# Patient Record
Sex: FEMALE | Race: WHITE | NOT HISPANIC OR LATINO | Employment: OTHER | URBAN - METROPOLITAN AREA
[De-identification: names, ages, dates, MRNs, and addresses within clinical notes are randomized per-mention and may not be internally consistent; named-entity substitution may affect disease eponyms.]

---

## 2017-01-03 ENCOUNTER — ALLSCRIPTS OFFICE VISIT (OUTPATIENT)
Dept: OTHER | Facility: OTHER | Age: 72
End: 2017-01-03

## 2017-06-16 ENCOUNTER — HOSPITAL ENCOUNTER (EMERGENCY)
Facility: HOSPITAL | Age: 72
Discharge: HOME | End: 2017-06-16
Attending: EMERGENCY MEDICINE
Payer: COMMERCIAL

## 2017-06-16 ENCOUNTER — APPOINTMENT (EMERGENCY)
Dept: NON INVASIVE DIAGNOSTICS | Facility: HOSPITAL | Age: 72
End: 2017-06-16
Payer: COMMERCIAL

## 2017-06-16 VITALS
DIASTOLIC BLOOD PRESSURE: 76 MMHG | RESPIRATION RATE: 16 BRPM | HEART RATE: 93 BPM | WEIGHT: 255.95 LBS | OXYGEN SATURATION: 98 % | TEMPERATURE: 98.6 F | SYSTOLIC BLOOD PRESSURE: 167 MMHG

## 2017-06-16 DIAGNOSIS — R60.9 PERIPHERAL EDEMA: ICD-10-CM

## 2017-06-16 DIAGNOSIS — R60.0 LOWER EXTREMITY EDEMA: Primary | ICD-10-CM

## 2017-06-16 LAB
ALBUMIN SERPL BCP-MCNC: 3.3 G/DL (ref 3.5–5)
ALP SERPL-CCNC: 119 U/L (ref 46–116)
ALT SERPL W P-5'-P-CCNC: 28 U/L (ref 12–78)
ANION GAP SERPL CALCULATED.3IONS-SCNC: 10 MMOL/L (ref 4–13)
AST SERPL W P-5'-P-CCNC: 24 U/L (ref 5–45)
ATRIAL RATE: 90 BPM
BASOPHILS # BLD AUTO: 0.04 THOUSANDS/ΜL (ref 0–0.1)
BASOPHILS NFR BLD AUTO: 1 % (ref 0–1)
BILIRUB SERPL-MCNC: 0.24 MG/DL (ref 0.2–1)
BUN SERPL-MCNC: 10 MG/DL (ref 5–25)
CALCIUM SERPL-MCNC: 8.6 MG/DL (ref 8.3–10.1)
CHLORIDE SERPL-SCNC: 105 MMOL/L (ref 100–108)
CO2 SERPL-SCNC: 26 MMOL/L (ref 21–32)
CREAT SERPL-MCNC: 1.17 MG/DL (ref 0.6–1.3)
EOSINOPHIL # BLD AUTO: 0.37 THOUSAND/ΜL (ref 0–0.61)
EOSINOPHIL NFR BLD AUTO: 5 % (ref 0–6)
ERYTHROCYTE [DISTWIDTH] IN BLOOD BY AUTOMATED COUNT: 15.1 % (ref 11.6–15.1)
GFR SERPL CREATININE-BSD FRML MDRD: 45.7 ML/MIN/1.73SQ M
GLUCOSE SERPL-MCNC: 100 MG/DL (ref 65–140)
HCT VFR BLD AUTO: 33.5 % (ref 34.8–46.1)
HGB BLD-MCNC: 10.7 G/DL (ref 11.5–15.4)
LYMPHOCYTES # BLD AUTO: 1.93 THOUSANDS/ΜL (ref 0.6–4.47)
LYMPHOCYTES NFR BLD AUTO: 28 % (ref 14–44)
MCH RBC QN AUTO: 27.7 PG (ref 26.8–34.3)
MCHC RBC AUTO-ENTMCNC: 31.9 G/DL (ref 31.4–37.4)
MCV RBC AUTO: 87 FL (ref 82–98)
MONOCYTES # BLD AUTO: 0.4 THOUSAND/ΜL (ref 0.17–1.22)
MONOCYTES NFR BLD AUTO: 6 % (ref 4–12)
NEUTROPHILS # BLD AUTO: 4.22 THOUSANDS/ΜL (ref 1.85–7.62)
NEUTS SEG NFR BLD AUTO: 60 % (ref 43–75)
NRBC BLD AUTO-RTO: 0 /100 WBCS
NT-PROBNP SERPL-MCNC: 510 PG/ML
P AXIS: 53 DEGREES
PLATELET # BLD AUTO: 239 THOUSANDS/UL (ref 149–390)
PMV BLD AUTO: 10.3 FL (ref 8.9–12.7)
POTASSIUM SERPL-SCNC: 4.2 MMOL/L (ref 3.5–5.3)
PR INTERVAL: 248 MS
PROT SERPL-MCNC: 6.9 G/DL (ref 6.4–8.2)
QRS AXIS: 43 DEGREES
QRSD INTERVAL: 84 MS
QT INTERVAL: 374 MS
QTC INTERVAL: 457 MS
RBC # BLD AUTO: 3.86 MILLION/UL (ref 3.81–5.12)
SODIUM SERPL-SCNC: 141 MMOL/L (ref 136–145)
SPECIMEN SOURCE: NORMAL
T WAVE AXIS: 52 DEGREES
TROPONIN I BLD-MCNC: 0.01 NG/ML (ref 0–0.08)
VENTRICULAR RATE: 90 BPM
WBC # BLD AUTO: 6.96 THOUSAND/UL (ref 4.31–10.16)

## 2017-06-16 PROCEDURE — 84484 ASSAY OF TROPONIN QUANT: CPT

## 2017-06-16 PROCEDURE — 85025 COMPLETE CBC W/AUTO DIFF WBC: CPT | Performed by: EMERGENCY MEDICINE

## 2017-06-16 PROCEDURE — 36415 COLL VENOUS BLD VENIPUNCTURE: CPT | Performed by: EMERGENCY MEDICINE

## 2017-06-16 PROCEDURE — 96374 THER/PROPH/DIAG INJ IV PUSH: CPT

## 2017-06-16 PROCEDURE — 99284 EMERGENCY DEPT VISIT MOD MDM: CPT

## 2017-06-16 PROCEDURE — 80053 COMPREHEN METABOLIC PANEL: CPT | Performed by: EMERGENCY MEDICINE

## 2017-06-16 PROCEDURE — 93005 ELECTROCARDIOGRAM TRACING: CPT | Performed by: EMERGENCY MEDICINE

## 2017-06-16 PROCEDURE — 93971 EXTREMITY STUDY: CPT

## 2017-06-16 PROCEDURE — 83880 ASSAY OF NATRIURETIC PEPTIDE: CPT | Performed by: EMERGENCY MEDICINE

## 2017-06-16 RX ORDER — MEDICAL SUPPLY, MISCELLANEOUS
1 EACH MISCELLANEOUS DAILY
Qty: 1 EACH | Refills: 0 | Status: SHIPPED | OUTPATIENT
Start: 2017-06-16 | End: 2017-10-07

## 2017-06-16 RX ORDER — NAPROXEN 500 MG/1
500 TABLET ORAL 2 TIMES DAILY WITH MEALS
COMMUNITY
End: 2017-10-07

## 2017-06-16 RX ORDER — TOPIRAMATE 50 MG/1
50 TABLET, FILM COATED ORAL DAILY
COMMUNITY
End: 2017-10-07

## 2017-06-16 RX ORDER — FUROSEMIDE 20 MG/1
20 TABLET ORAL 2 TIMES DAILY
Status: ON HOLD | COMMUNITY
End: 2017-10-11

## 2017-06-16 RX ORDER — ESCITALOPRAM OXALATE 10 MG/1
10 TABLET ORAL DAILY
COMMUNITY
End: 2018-11-14 | Stop reason: SDUPTHER

## 2017-06-16 RX ORDER — LABETALOL 100 MG/1
100 TABLET, FILM COATED ORAL 2 TIMES DAILY
COMMUNITY
End: 2017-10-07

## 2017-06-16 RX ORDER — MECLIZINE HYDROCHLORIDE 25 MG/1
25 TABLET ORAL 3 TIMES DAILY PRN
Status: ON HOLD | COMMUNITY
End: 2020-07-30 | Stop reason: SDUPTHER

## 2017-06-16 RX ORDER — CLOPIDOGREL BISULFATE 75 MG/1
75 TABLET ORAL DAILY
COMMUNITY
End: 2018-06-20 | Stop reason: SDUPTHER

## 2017-06-16 RX ORDER — FUROSEMIDE 40 MG/1
40 TABLET ORAL DAILY
Qty: 5 TABLET | Refills: 0 | Status: SHIPPED | OUTPATIENT
Start: 2017-06-16 | End: 2017-10-07

## 2017-06-16 RX ORDER — LORAZEPAM 1 MG/1
1 TABLET ORAL 3 TIMES DAILY PRN
COMMUNITY
End: 2018-08-29 | Stop reason: SDUPTHER

## 2017-06-16 RX ORDER — POTASSIUM CHLORIDE 20 MEQ/1
20 TABLET, EXTENDED RELEASE ORAL 2 TIMES DAILY
COMMUNITY
End: 2018-02-13 | Stop reason: SDUPTHER

## 2017-06-16 RX ORDER — MULTIVITAMIN
1 TABLET ORAL DAILY
COMMUNITY
End: 2020-12-04 | Stop reason: HOSPADM

## 2017-06-16 RX ORDER — SIMVASTATIN 10 MG
10 TABLET ORAL
COMMUNITY
End: 2018-01-31 | Stop reason: SDUPTHER

## 2017-06-16 RX ORDER — HYDROCHLOROTHIAZIDE 12.5 MG/1
12.5 TABLET ORAL DAILY
COMMUNITY
End: 2017-12-25 | Stop reason: ALTCHOICE

## 2017-06-16 RX ORDER — OXYCODONE HYDROCHLORIDE AND ACETAMINOPHEN 5; 325 MG/1; MG/1
1 TABLET ORAL EVERY 4 HOURS PRN
COMMUNITY
End: 2018-05-22 | Stop reason: SDUPTHER

## 2017-06-16 RX ORDER — NORTRIPTYLINE HYDROCHLORIDE 50 MG/1
50 CAPSULE ORAL
COMMUNITY
End: 2018-02-07 | Stop reason: SDUPTHER

## 2017-06-16 RX ORDER — MELOXICAM 15 MG/1
15 TABLET ORAL DAILY
COMMUNITY
End: 2017-10-07

## 2017-06-16 RX ORDER — FUROSEMIDE 10 MG/ML
20 INJECTION INTRAMUSCULAR; INTRAVENOUS ONCE
Status: COMPLETED | OUTPATIENT
Start: 2017-06-16 | End: 2017-06-16

## 2017-06-16 RX ADMIN — FUROSEMIDE 20 MG: 10 INJECTION, SOLUTION INTRAMUSCULAR; INTRAVENOUS at 15:24

## 2017-06-20 ENCOUNTER — GENERIC CONVERSION - ENCOUNTER (OUTPATIENT)
Dept: OTHER | Facility: OTHER | Age: 72
End: 2017-06-20

## 2017-06-21 ENCOUNTER — ALLSCRIPTS OFFICE VISIT (OUTPATIENT)
Dept: OTHER | Facility: OTHER | Age: 72
End: 2017-06-21

## 2017-06-21 ENCOUNTER — TRANSCRIBE ORDERS (OUTPATIENT)
Dept: ADMINISTRATIVE | Facility: HOSPITAL | Age: 72
End: 2017-06-21

## 2017-06-21 DIAGNOSIS — Z12.31 ENCOUNTER FOR SCREENING MAMMOGRAM FOR MALIGNANT NEOPLASM OF BREAST: ICD-10-CM

## 2017-06-21 DIAGNOSIS — R60.9 EDEMA, UNSPECIFIED TYPE: Primary | ICD-10-CM

## 2017-06-21 DIAGNOSIS — R73.03 PREDIABETES: ICD-10-CM

## 2017-06-21 DIAGNOSIS — E78.00 PURE HYPERCHOLESTEROLEMIA: ICD-10-CM

## 2017-06-21 DIAGNOSIS — Z12.31 VISIT FOR SCREENING MAMMOGRAM: ICD-10-CM

## 2017-06-21 DIAGNOSIS — E87.6 HYPOKALEMIA: ICD-10-CM

## 2017-06-21 DIAGNOSIS — I10 ESSENTIAL (PRIMARY) HYPERTENSION: ICD-10-CM

## 2017-06-21 DIAGNOSIS — R60.9 EDEMA: ICD-10-CM

## 2017-06-21 DIAGNOSIS — I63.9 CEREBRAL INFARCTION (HCC): ICD-10-CM

## 2017-06-30 ENCOUNTER — GENERIC CONVERSION - ENCOUNTER (OUTPATIENT)
Dept: OTHER | Facility: OTHER | Age: 72
End: 2017-06-30

## 2017-06-30 ENCOUNTER — APPOINTMENT (OUTPATIENT)
Dept: LAB | Facility: HOSPITAL | Age: 72
End: 2017-06-30
Attending: INTERNAL MEDICINE
Payer: COMMERCIAL

## 2017-06-30 DIAGNOSIS — I10 ESSENTIAL (PRIMARY) HYPERTENSION: ICD-10-CM

## 2017-06-30 DIAGNOSIS — I63.9 CEREBRAL INFARCTION (HCC): ICD-10-CM

## 2017-06-30 DIAGNOSIS — E78.00 PURE HYPERCHOLESTEROLEMIA: ICD-10-CM

## 2017-06-30 DIAGNOSIS — R73.03 PREDIABETES: ICD-10-CM

## 2017-06-30 DIAGNOSIS — R60.9 EDEMA: ICD-10-CM

## 2017-06-30 LAB
ALBUMIN SERPL BCP-MCNC: 4.6 G/DL (ref 3.5–5)
ALP SERPL-CCNC: 134 U/L (ref 46–116)
ALT SERPL W P-5'-P-CCNC: 37 U/L (ref 12–78)
ANION GAP SERPL CALCULATED.3IONS-SCNC: 8 MMOL/L (ref 4–13)
AST SERPL W P-5'-P-CCNC: 35 U/L (ref 5–45)
BASOPHILS # BLD AUTO: 0.05 THOUSANDS/ΜL (ref 0–0.1)
BASOPHILS NFR BLD AUTO: 0 % (ref 0–1)
BILIRUB SERPL-MCNC: 0.55 MG/DL (ref 0.2–1)
BUN SERPL-MCNC: 34 MG/DL (ref 5–25)
CALCIUM SERPL-MCNC: 9.3 MG/DL (ref 8.3–10.1)
CHLORIDE SERPL-SCNC: 95 MMOL/L (ref 100–108)
CHOLEST SERPL-MCNC: 108 MG/DL (ref 50–200)
CO2 SERPL-SCNC: 35 MMOL/L (ref 21–32)
CREAT SERPL-MCNC: 2.7 MG/DL (ref 0.6–1.3)
EOSINOPHIL # BLD AUTO: 0.41 THOUSAND/ΜL (ref 0–0.61)
EOSINOPHIL NFR BLD AUTO: 4 % (ref 0–6)
ERYTHROCYTE [DISTWIDTH] IN BLOOD BY AUTOMATED COUNT: 15 % (ref 11.6–15.1)
EST. AVERAGE GLUCOSE BLD GHB EST-MCNC: 117 MG/DL
GFR SERPL CREATININE-BSD FRML MDRD: 17.4 ML/MIN/1.73SQ M
GLUCOSE SERPL-MCNC: 157 MG/DL (ref 65–140)
HBA1C MFR BLD: 5.7 % (ref 4.2–6.3)
HCT VFR BLD AUTO: 41.8 % (ref 34.8–46.1)
HDLC SERPL-MCNC: 64 MG/DL (ref 40–60)
HGB BLD-MCNC: 13.8 G/DL (ref 11.5–15.4)
LDLC SERPL CALC-MCNC: 24 MG/DL (ref 0–100)
LYMPHOCYTES # BLD AUTO: 3.17 THOUSANDS/ΜL (ref 0.6–4.47)
LYMPHOCYTES NFR BLD AUTO: 27 % (ref 14–44)
MCH RBC QN AUTO: 28.2 PG (ref 26.8–34.3)
MCHC RBC AUTO-ENTMCNC: 33 G/DL (ref 31.4–37.4)
MCV RBC AUTO: 86 FL (ref 82–98)
MONOCYTES # BLD AUTO: 0.88 THOUSAND/ΜL (ref 0.17–1.22)
MONOCYTES NFR BLD AUTO: 8 % (ref 4–12)
NEUTROPHILS # BLD AUTO: 7.26 THOUSANDS/ΜL (ref 1.85–7.62)
NEUTS SEG NFR BLD AUTO: 61 % (ref 43–75)
NRBC BLD AUTO-RTO: 0 /100 WBCS
PLATELET # BLD AUTO: 449 THOUSANDS/UL (ref 149–390)
PMV BLD AUTO: 11.3 FL (ref 8.9–12.7)
POTASSIUM SERPL-SCNC: 2.7 MMOL/L (ref 3.5–5.3)
PROT SERPL-MCNC: 9.3 G/DL (ref 6.4–8.2)
RBC # BLD AUTO: 4.89 MILLION/UL (ref 3.81–5.12)
SODIUM SERPL-SCNC: 138 MMOL/L (ref 136–145)
TRIGL SERPL-MCNC: 98 MG/DL
TSH SERPL DL<=0.05 MIU/L-ACNC: 4.58 UIU/ML (ref 0.36–3.74)
WBC # BLD AUTO: 11.77 THOUSAND/UL (ref 4.31–10.16)

## 2017-06-30 PROCEDURE — 80053 COMPREHEN METABOLIC PANEL: CPT

## 2017-06-30 PROCEDURE — 36415 COLL VENOUS BLD VENIPUNCTURE: CPT

## 2017-06-30 PROCEDURE — 85025 COMPLETE CBC W/AUTO DIFF WBC: CPT

## 2017-06-30 PROCEDURE — 83036 HEMOGLOBIN GLYCOSYLATED A1C: CPT

## 2017-06-30 PROCEDURE — 84443 ASSAY THYROID STIM HORMONE: CPT

## 2017-06-30 PROCEDURE — 80061 LIPID PANEL: CPT

## 2017-07-03 ENCOUNTER — HOSPITAL ENCOUNTER (OUTPATIENT)
Dept: MAMMOGRAPHY | Facility: HOSPITAL | Age: 72
Discharge: HOME | End: 2017-07-03
Attending: INTERNAL MEDICINE
Payer: COMMERCIAL

## 2017-07-03 DIAGNOSIS — Z12.31 ENCOUNTER FOR SCREENING MAMMOGRAM FOR MALIGNANT NEOPLASM OF BREAST: ICD-10-CM

## 2017-07-03 PROCEDURE — G0202 SCR MAMMO BI INCL CAD: HCPCS

## 2017-07-05 ENCOUNTER — ALLSCRIPTS OFFICE VISIT (OUTPATIENT)
Dept: OTHER | Facility: OTHER | Age: 72
End: 2017-07-05

## 2017-07-07 ENCOUNTER — APPOINTMENT (OUTPATIENT)
Dept: LAB | Facility: HOSPITAL | Age: 72
End: 2017-07-07
Attending: INTERNAL MEDICINE
Payer: COMMERCIAL

## 2017-07-07 DIAGNOSIS — E87.6 HYPOKALEMIA: ICD-10-CM

## 2017-07-07 LAB
ANION GAP SERPL CALCULATED.3IONS-SCNC: 14 MMOL/L (ref 4–13)
BUN SERPL-MCNC: 36 MG/DL (ref 5–25)
CALCIUM SERPL-MCNC: 9.1 MG/DL (ref 8.3–10.1)
CHLORIDE SERPL-SCNC: 91 MMOL/L (ref 100–108)
CO2 SERPL-SCNC: 34 MMOL/L (ref 21–32)
CREAT SERPL-MCNC: 2.28 MG/DL (ref 0.6–1.3)
GFR SERPL CREATININE-BSD FRML MDRD: 21.2 ML/MIN/1.73SQ M
GLUCOSE SERPL-MCNC: 121 MG/DL (ref 65–140)
POTASSIUM SERPL-SCNC: 2.4 MMOL/L (ref 3.5–5.3)
SODIUM SERPL-SCNC: 139 MMOL/L (ref 136–145)

## 2017-07-07 PROCEDURE — 80048 BASIC METABOLIC PNL TOTAL CA: CPT

## 2017-07-07 PROCEDURE — 36415 COLL VENOUS BLD VENIPUNCTURE: CPT

## 2017-07-10 ENCOUNTER — APPOINTMENT (OUTPATIENT)
Dept: LAB | Facility: HOSPITAL | Age: 72
End: 2017-07-10
Attending: INTERNAL MEDICINE
Payer: COMMERCIAL

## 2017-07-10 DIAGNOSIS — E87.6 HYPOKALEMIA: ICD-10-CM

## 2017-07-10 LAB
ANION GAP SERPL CALCULATED.3IONS-SCNC: 6 MMOL/L (ref 4–13)
BUN SERPL-MCNC: 26 MG/DL (ref 5–25)
CALCIUM SERPL-MCNC: 8.8 MG/DL (ref 8.3–10.1)
CHLORIDE SERPL-SCNC: 98 MMOL/L (ref 100–108)
CO2 SERPL-SCNC: 34 MMOL/L (ref 21–32)
CREAT SERPL-MCNC: 1.48 MG/DL (ref 0.6–1.3)
GFR SERPL CREATININE-BSD FRML MDRD: 34.9 ML/MIN/1.73SQ M
GLUCOSE SERPL-MCNC: 102 MG/DL (ref 65–140)
POTASSIUM SERPL-SCNC: 3.1 MMOL/L (ref 3.5–5.3)
SODIUM SERPL-SCNC: 138 MMOL/L (ref 136–145)

## 2017-07-10 PROCEDURE — 36415 COLL VENOUS BLD VENIPUNCTURE: CPT

## 2017-07-10 PROCEDURE — 80048 BASIC METABOLIC PNL TOTAL CA: CPT

## 2017-07-18 ENCOUNTER — ALLSCRIPTS OFFICE VISIT (OUTPATIENT)
Dept: OTHER | Facility: OTHER | Age: 72
End: 2017-07-18

## 2017-08-01 DIAGNOSIS — R60.9 EDEMA: ICD-10-CM

## 2017-08-01 DIAGNOSIS — I10 ESSENTIAL (PRIMARY) HYPERTENSION: ICD-10-CM

## 2017-08-11 ENCOUNTER — APPOINTMENT (OUTPATIENT)
Dept: LAB | Facility: HOSPITAL | Age: 72
End: 2017-08-11
Attending: INTERNAL MEDICINE
Payer: COMMERCIAL

## 2017-08-11 DIAGNOSIS — I10 ESSENTIAL (PRIMARY) HYPERTENSION: ICD-10-CM

## 2017-08-11 DIAGNOSIS — R60.9 EDEMA: ICD-10-CM

## 2017-08-11 LAB
ANION GAP SERPL CALCULATED.3IONS-SCNC: 8 MMOL/L (ref 4–13)
BUN SERPL-MCNC: 16 MG/DL (ref 5–25)
CALCIUM SERPL-MCNC: 8.7 MG/DL (ref 8.3–10.1)
CHLORIDE SERPL-SCNC: 108 MMOL/L (ref 100–108)
CO2 SERPL-SCNC: 27 MMOL/L (ref 21–32)
CREAT SERPL-MCNC: 1.2 MG/DL (ref 0.6–1.3)
GFR SERPL CREATININE-BSD FRML MDRD: 46 ML/MIN/1.73SQ M
GLUCOSE SERPL-MCNC: 80 MG/DL (ref 65–140)
POTASSIUM SERPL-SCNC: 4 MMOL/L (ref 3.5–5.3)
SODIUM SERPL-SCNC: 143 MMOL/L (ref 136–145)

## 2017-08-11 PROCEDURE — 80048 BASIC METABOLIC PNL TOTAL CA: CPT

## 2017-08-11 PROCEDURE — 36415 COLL VENOUS BLD VENIPUNCTURE: CPT

## 2017-09-20 ENCOUNTER — GENERIC CONVERSION - ENCOUNTER (OUTPATIENT)
Dept: OTHER | Facility: OTHER | Age: 72
End: 2017-09-20

## 2017-09-20 ENCOUNTER — GENERIC CONVERSION - ENCOUNTER (OUTPATIENT)
Dept: INTERNAL MEDICINE CLINIC | Facility: CLINIC | Age: 72
End: 2017-09-20

## 2017-10-07 ENCOUNTER — APPOINTMENT (INPATIENT)
Dept: NON INVASIVE DIAGNOSTICS | Facility: HOSPITAL | Age: 72
DRG: 308 | End: 2017-10-07
Payer: COMMERCIAL

## 2017-10-07 ENCOUNTER — HOSPITAL ENCOUNTER (INPATIENT)
Facility: HOSPITAL | Age: 72
LOS: 4 days | Discharge: HOME | DRG: 308 | End: 2017-10-11
Attending: EMERGENCY MEDICINE | Admitting: INTERNAL MEDICINE
Payer: COMMERCIAL

## 2017-10-07 ENCOUNTER — APPOINTMENT (EMERGENCY)
Dept: RADIOLOGY | Facility: HOSPITAL | Age: 72
DRG: 308 | End: 2017-10-07
Payer: COMMERCIAL

## 2017-10-07 ENCOUNTER — GENERIC CONVERSION - ENCOUNTER (OUTPATIENT)
Dept: OTHER | Facility: OTHER | Age: 72
End: 2017-10-07

## 2017-10-07 DIAGNOSIS — I48.91 RAPID ATRIAL FIBRILLATION (HCC): Primary | ICD-10-CM

## 2017-10-07 DIAGNOSIS — I50.9 CHF (CONGESTIVE HEART FAILURE) (HCC): ICD-10-CM

## 2017-10-07 DIAGNOSIS — M79.89 SWELLING OF BOTH LOWER EXTREMITIES: ICD-10-CM

## 2017-10-07 PROBLEM — I67.9 CEREBROVASCULAR DISEASE: Status: ACTIVE | Noted: 2017-10-07

## 2017-10-07 PROBLEM — E78.5 HYPERLIPIDEMIA LDL GOAL <100: Status: ACTIVE | Noted: 2017-10-07

## 2017-10-07 PROBLEM — I10 ESSENTIAL HYPERTENSION: Status: ACTIVE | Noted: 2017-10-07

## 2017-10-07 PROBLEM — R73.03 PREDIABETES: Status: ACTIVE | Noted: 2017-10-07

## 2017-10-07 LAB
ALBUMIN SERPL BCP-MCNC: 3.6 G/DL (ref 3.5–5)
ALP SERPL-CCNC: 126 U/L (ref 46–116)
ALT SERPL W P-5'-P-CCNC: 38 U/L (ref 12–78)
ANION GAP SERPL CALCULATED.3IONS-SCNC: 12 MMOL/L (ref 4–13)
APTT PPP: 29 SECONDS (ref 23–35)
APTT PPP: 40 SECONDS (ref 23–35)
AST SERPL W P-5'-P-CCNC: 56 U/L (ref 5–45)
ATRIAL RATE: 340 BPM
BASOPHILS # BLD AUTO: 0.05 THOUSANDS/ΜL (ref 0–0.1)
BASOPHILS NFR BLD AUTO: 1 % (ref 0–1)
BILIRUB SERPL-MCNC: 0.52 MG/DL (ref 0.2–1)
BUN SERPL-MCNC: 14 MG/DL (ref 5–25)
CALCIUM SERPL-MCNC: 8.9 MG/DL (ref 8.3–10.1)
CHLORIDE SERPL-SCNC: 107 MMOL/L (ref 100–108)
CO2 SERPL-SCNC: 24 MMOL/L (ref 21–32)
CREAT SERPL-MCNC: 1.22 MG/DL (ref 0.6–1.3)
EOSINOPHIL # BLD AUTO: 0.42 THOUSAND/ΜL (ref 0–0.61)
EOSINOPHIL NFR BLD AUTO: 6 % (ref 0–6)
ERYTHROCYTE [DISTWIDTH] IN BLOOD BY AUTOMATED COUNT: 15.9 % (ref 11.6–15.1)
GFR SERPL CREATININE-BSD FRML MDRD: 45 ML/MIN/1.73SQ M
GLUCOSE SERPL-MCNC: 111 MG/DL (ref 65–140)
HCT VFR BLD AUTO: 35.9 % (ref 34.8–46.1)
HGB BLD-MCNC: 11.3 G/DL (ref 11.5–15.4)
INR PPP: 0.98 (ref 0.86–1.16)
LYMPHOCYTES # BLD AUTO: 2.27 THOUSANDS/ΜL (ref 0.6–4.47)
LYMPHOCYTES NFR BLD AUTO: 33 % (ref 14–44)
MAGNESIUM SERPL-MCNC: 2.1 MG/DL (ref 1.6–2.6)
MCH RBC QN AUTO: 27.4 PG (ref 26.8–34.3)
MCHC RBC AUTO-ENTMCNC: 31.5 G/DL (ref 31.4–37.4)
MCV RBC AUTO: 87 FL (ref 82–98)
MONOCYTES # BLD AUTO: 0.43 THOUSAND/ΜL (ref 0.17–1.22)
MONOCYTES NFR BLD AUTO: 6 % (ref 4–12)
NEUTROPHILS # BLD AUTO: 3.82 THOUSANDS/ΜL (ref 1.85–7.62)
NEUTS SEG NFR BLD AUTO: 54 % (ref 43–75)
NRBC BLD AUTO-RTO: 0 /100 WBCS
NT-PROBNP SERPL-MCNC: 2332 PG/ML
P AXIS: 0 DEGREES
PLATELET # BLD AUTO: 291 THOUSANDS/UL (ref 149–390)
PMV BLD AUTO: 10.7 FL (ref 8.9–12.7)
POTASSIUM SERPL-SCNC: 4.2 MMOL/L (ref 3.5–5.3)
PROT SERPL-MCNC: 7.3 G/DL (ref 6.4–8.2)
PROTHROMBIN TIME: 13 SECONDS (ref 12.1–14.4)
QRS AXIS: 62 DEGREES
QRSD INTERVAL: 84 MS
QT INTERVAL: 324 MS
QTC INTERVAL: 476 MS
RBC # BLD AUTO: 4.12 MILLION/UL (ref 3.81–5.12)
SODIUM SERPL-SCNC: 143 MMOL/L (ref 136–145)
SPECIMEN SOURCE: NORMAL
T WAVE AXIS: -23 DEGREES
T3 SERPL-MCNC: 1.2 NG/ML (ref 0.6–1.8)
T4 FREE SERPL-MCNC: 1.1 NG/DL (ref 0.76–1.46)
TROPONIN I BLD-MCNC: 0.01 NG/ML (ref 0–0.08)
TSH SERPL DL<=0.05 MIU/L-ACNC: 6.54 UIU/ML (ref 0.36–3.74)
VENTRICULAR RATE: 130 BPM
WBC # BLD AUTO: 6.99 THOUSAND/UL (ref 4.31–10.16)

## 2017-10-07 PROCEDURE — 96374 THER/PROPH/DIAG INJ IV PUSH: CPT

## 2017-10-07 PROCEDURE — 83735 ASSAY OF MAGNESIUM: CPT | Performed by: EMERGENCY MEDICINE

## 2017-10-07 PROCEDURE — 85025 COMPLETE CBC W/AUTO DIFF WBC: CPT | Performed by: EMERGENCY MEDICINE

## 2017-10-07 PROCEDURE — 36415 COLL VENOUS BLD VENIPUNCTURE: CPT | Performed by: EMERGENCY MEDICINE

## 2017-10-07 PROCEDURE — 93970 EXTREMITY STUDY: CPT

## 2017-10-07 PROCEDURE — 93005 ELECTROCARDIOGRAM TRACING: CPT | Performed by: EMERGENCY MEDICINE

## 2017-10-07 PROCEDURE — 84439 ASSAY OF FREE THYROXINE: CPT | Performed by: EMERGENCY MEDICINE

## 2017-10-07 PROCEDURE — 84443 ASSAY THYROID STIM HORMONE: CPT | Performed by: EMERGENCY MEDICINE

## 2017-10-07 PROCEDURE — 84484 ASSAY OF TROPONIN QUANT: CPT

## 2017-10-07 PROCEDURE — 80053 COMPREHEN METABOLIC PANEL: CPT | Performed by: EMERGENCY MEDICINE

## 2017-10-07 PROCEDURE — 85730 THROMBOPLASTIN TIME PARTIAL: CPT | Performed by: INTERNAL MEDICINE

## 2017-10-07 PROCEDURE — 84480 ASSAY TRIIODOTHYRONINE (T3): CPT | Performed by: EMERGENCY MEDICINE

## 2017-10-07 PROCEDURE — 71020 HB CHEST X-RAY 2VW FRONTAL&LATL: CPT

## 2017-10-07 PROCEDURE — 83880 ASSAY OF NATRIURETIC PEPTIDE: CPT | Performed by: EMERGENCY MEDICINE

## 2017-10-07 PROCEDURE — 85610 PROTHROMBIN TIME: CPT | Performed by: EMERGENCY MEDICINE

## 2017-10-07 PROCEDURE — 85730 THROMBOPLASTIN TIME PARTIAL: CPT | Performed by: EMERGENCY MEDICINE

## 2017-10-07 PROCEDURE — 99285 EMERGENCY DEPT VISIT HI MDM: CPT

## 2017-10-07 RX ORDER — TOPIRAMATE 25 MG/1
25 CAPSULE, COATED PELLETS ORAL 2 TIMES DAILY
COMMUNITY
End: 2018-05-03 | Stop reason: SDUPTHER

## 2017-10-07 RX ORDER — HEPARIN SODIUM 10000 [USP'U]/100ML
3-20 INJECTION, SOLUTION INTRAVENOUS
Status: DISCONTINUED | OUTPATIENT
Start: 2017-10-07 | End: 2017-10-10

## 2017-10-07 RX ORDER — POTASSIUM CHLORIDE 20 MEQ/1
20 TABLET, EXTENDED RELEASE ORAL 2 TIMES DAILY
Status: DISCONTINUED | OUTPATIENT
Start: 2017-10-07 | End: 2017-10-11 | Stop reason: HOSPADM

## 2017-10-07 RX ORDER — TOPIRAMATE 25 MG/1
25 TABLET ORAL 2 TIMES DAILY
Status: DISCONTINUED | OUTPATIENT
Start: 2017-10-07 | End: 2017-10-11 | Stop reason: HOSPADM

## 2017-10-07 RX ORDER — PRAVASTATIN SODIUM 40 MG
20 TABLET ORAL
Status: DISCONTINUED | OUTPATIENT
Start: 2017-10-07 | End: 2017-10-11 | Stop reason: HOSPADM

## 2017-10-07 RX ORDER — HEPARIN SODIUM 1000 [USP'U]/ML
4000 INJECTION, SOLUTION INTRAVENOUS; SUBCUTANEOUS ONCE
Status: COMPLETED | OUTPATIENT
Start: 2017-10-07 | End: 2017-10-07

## 2017-10-07 RX ORDER — FUROSEMIDE 10 MG/ML
40 INJECTION INTRAMUSCULAR; INTRAVENOUS ONCE
Status: COMPLETED | OUTPATIENT
Start: 2017-10-07 | End: 2017-10-07

## 2017-10-07 RX ORDER — NORTRIPTYLINE HYDROCHLORIDE 50 MG/1
50 CAPSULE ORAL
Status: DISCONTINUED | OUTPATIENT
Start: 2017-10-07 | End: 2017-10-11 | Stop reason: HOSPADM

## 2017-10-07 RX ORDER — METOPROLOL TARTRATE 50 MG/1
50 TABLET, FILM COATED ORAL EVERY 12 HOURS SCHEDULED
Status: DISCONTINUED | OUTPATIENT
Start: 2017-10-07 | End: 2017-10-10

## 2017-10-07 RX ORDER — LORAZEPAM 1 MG/1
1 TABLET ORAL 3 TIMES DAILY PRN
Status: DISCONTINUED | OUTPATIENT
Start: 2017-10-07 | End: 2017-10-11 | Stop reason: HOSPADM

## 2017-10-07 RX ORDER — HEPARIN SODIUM 1000 [USP'U]/ML
2000 INJECTION, SOLUTION INTRAVENOUS; SUBCUTANEOUS AS NEEDED
Status: DISCONTINUED | OUTPATIENT
Start: 2017-10-07 | End: 2017-10-10

## 2017-10-07 RX ORDER — HEPARIN SODIUM 1000 [USP'U]/ML
4000 INJECTION, SOLUTION INTRAVENOUS; SUBCUTANEOUS AS NEEDED
Status: DISCONTINUED | OUTPATIENT
Start: 2017-10-07 | End: 2017-10-10

## 2017-10-07 RX ORDER — FUROSEMIDE 10 MG/ML
20 INJECTION INTRAMUSCULAR; INTRAVENOUS
Status: DISCONTINUED | OUTPATIENT
Start: 2017-10-07 | End: 2017-10-08

## 2017-10-07 RX ORDER — DILTIAZEM HYDROCHLORIDE 5 MG/ML
15 INJECTION INTRAVENOUS ONCE
Status: COMPLETED | OUTPATIENT
Start: 2017-10-07 | End: 2017-10-07

## 2017-10-07 RX ORDER — OXYCODONE HYDROCHLORIDE AND ACETAMINOPHEN 5; 325 MG/1; MG/1
1 TABLET ORAL EVERY 4 HOURS PRN
Status: DISCONTINUED | OUTPATIENT
Start: 2017-10-07 | End: 2017-10-11 | Stop reason: HOSPADM

## 2017-10-07 RX ORDER — ESCITALOPRAM OXALATE 10 MG/1
10 TABLET ORAL DAILY
Status: DISCONTINUED | OUTPATIENT
Start: 2017-10-07 | End: 2017-10-11 | Stop reason: HOSPADM

## 2017-10-07 RX ORDER — ONDANSETRON 2 MG/ML
4 INJECTION INTRAMUSCULAR; INTRAVENOUS EVERY 6 HOURS PRN
Status: DISCONTINUED | OUTPATIENT
Start: 2017-10-07 | End: 2017-10-11 | Stop reason: HOSPADM

## 2017-10-07 RX ADMIN — METOPROLOL TARTRATE 50 MG: 50 TABLET ORAL at 12:16

## 2017-10-07 RX ADMIN — DILTIAZEM HYDROCHLORIDE 5 MG/HR: 5 INJECTION INTRAVENOUS at 08:39

## 2017-10-07 RX ADMIN — TOPIRAMATE 25 MG: 25 TABLET, FILM COATED ORAL at 18:00

## 2017-10-07 RX ADMIN — POTASSIUM CHLORIDE 20 MEQ: 1500 TABLET, EXTENDED RELEASE ORAL at 12:15

## 2017-10-07 RX ADMIN — HEPARIN SODIUM 4000 UNITS: 1000 INJECTION, SOLUTION INTRAVENOUS; SUBCUTANEOUS at 18:35

## 2017-10-07 RX ADMIN — ESCITALOPRAM OXALATE 10 MG: 10 TABLET ORAL at 12:15

## 2017-10-07 RX ADMIN — FUROSEMIDE 20 MG: 10 INJECTION, SOLUTION INTRAMUSCULAR; INTRAVENOUS at 12:15

## 2017-10-07 RX ADMIN — DILTIAZEM HYDROCHLORIDE 7.5 MG/HR: 5 INJECTION INTRAVENOUS at 12:14

## 2017-10-07 RX ADMIN — NORTRIPTYLINE HYDROCHLORIDE 50 MG: 50 CAPSULE ORAL at 22:00

## 2017-10-07 RX ADMIN — POTASSIUM CHLORIDE 20 MEQ: 1500 TABLET, EXTENDED RELEASE ORAL at 18:00

## 2017-10-07 RX ADMIN — PRAVASTATIN SODIUM 20 MG: 40 TABLET ORAL at 17:59

## 2017-10-07 RX ADMIN — FUROSEMIDE 20 MG: 10 INJECTION, SOLUTION INTRAMUSCULAR; INTRAVENOUS at 18:00

## 2017-10-07 RX ADMIN — HEPARIN SODIUM AND DEXTROSE 15.1 UNITS/KG/HR: 10000; 5 INJECTION INTRAVENOUS at 18:30

## 2017-10-07 RX ADMIN — DILTIAZEM HYDROCHLORIDE 15 MG: 5 INJECTION INTRAVENOUS at 07:20

## 2017-10-07 RX ADMIN — DILTIAZEM HYDROCHLORIDE 5 MG/HR: 5 INJECTION INTRAVENOUS at 22:04

## 2017-10-07 RX ADMIN — TOPIRAMATE 25 MG: 25 TABLET, FILM COATED ORAL at 12:16

## 2017-10-07 RX ADMIN — HEPARIN SODIUM 4000 UNITS: 1000 INJECTION, SOLUTION INTRAVENOUS; SUBCUTANEOUS at 12:11

## 2017-10-07 RX ADMIN — FUROSEMIDE 40 MG: 10 INJECTION, SOLUTION INTRAMUSCULAR; INTRAVENOUS at 08:14

## 2017-10-07 RX ADMIN — METOPROLOL TARTRATE 50 MG: 50 TABLET ORAL at 22:00

## 2017-10-07 RX ADMIN — OXYCODONE HYDROCHLORIDE AND ACETAMINOPHEN 1 TABLET: 5; 325 TABLET ORAL at 22:11

## 2017-10-07 NOTE — CONSULTS
Cardiology Consult  10/07/17    Referring Physian: MD TAMI Rivera    Chief Complain/Reason for Referal:     IMPRESSION:  1  New AF w/ RVR  2  Prior CVA 2013--s/p medtronic Reveal in place  3  Obesity  4  OSAS  5  HTN      DISCUSSION/RECOMMENDATIONS:  · Con't IV diltiazem, start PO metoprolol for rate control--symptoms improved  · No s/s of decompensated CHF but con't PO lasix, watch for the same  · D/C Eliquis, as will try to d/c loop recorder before discharge  Start IV heparin for CVA prevention (prior hx of the same) and transition to Eliquis after loop recorder removal  · Echo Monday    ======================================================    HPI:  I am seeing this patient in cardiology consultation for:      Gisselle Hartmann is a 81 y o  female with a significant history of CVA in 2013, status post Medtronic Reveal loop recorder placement at that time  Future interrogations did not reveal any paroxysms of atrial fibrillation, therefore she stopped following up with our office sometime last year  She has a history of cardiac catheterization in 2000 which was within normal limits  On this background, she states she has been feeling short of breath over the past 6 days, and presented to the emergency department after her night shift early this morning, and noted to be in atrial fibrillation with rapid ventricular response which was a new diagnosis  She has been on Plavix since her CVA in 2013 without any recurrent events  She has been initiated on intravenous diltiazem, with improvement in her heart rate, now into the low 100s  She states she still gets short of breath walking around in her room, but much better  She has chronic mild lower extremity edema for which she is on low-dose furosemide at home, without any progressive worsening over the past 6 days  She denies orthopnea, subjective weight gain, or exertional chest pain  She denies any dizzy spells or palpitations          Past Medical History:   Diagnosis Date    Hyperlipidemia     Hypertension     Migraine     Prediabetes     Stroke (HCC)          Scheduled Meds:  escitalopram 10 mg Oral Daily   furosemide 20 mg Intravenous BID (diuretic)   nortriptyline 50 mg Oral HS   potassium chloride 20 mEq Oral BID   pravastatin 20 mg Oral Daily With Dinner   topiramate 25 mg Oral BID     Continuous Infusions:  diltiazem 1-15 mg/hr     PRN Meds:  influenza vaccine    LORazepam    meclizine    ondansetron    oxyCODONE-acetaminophen  No Known Allergies  I reviewed the Home Medication list in the chart  Family History   Problem Relation Age of Onset    Diabetes Mother     Heart disease Mother     Hypertension Mother        Social History     Social History    Marital status:      Spouse name: N/A    Number of children: N/A    Years of education: N/A     Occupational History    Not on file  Social History Main Topics    Smoking status: Former Smoker    Smokeless tobacco: Never Used    Alcohol use No    Drug use: No    Sexual activity: Not on file     Other Topics Concern    Not on file     Social History Narrative    fhx not asked in triage       Review of Systems - as per HPI, all others reviewed and negative  Vitals:    10/07/17 0915   BP: 164/74   Pulse: (!) 116   Resp: 19   Temp:    SpO2: 95%     I/O     None        Weight (last 2 days)     Date/Time   Weight    10/07/17 0900  116 (255 4)    10/07/17 0642  113 (250)              GEN: NAD, Alert  HEENT: Mucus membranes moist, pink conjunctivae  EYES: Pupils equal, sclera anicteric  NECK: No JVD/HJR, no carotid bruit  CARDIOVASCULAR: RRR, No murmur, rub, gallops S1,S2, no parasternal heave/thrill  LUNGS: Clear To auscultation bilaterally  ABDOMEN: Soft, nondistended, no hepatic systolic pulsation  EXTREMITIES/VASCULAR: No edema    PSYCH: Normal Affect by limited examination  NEURO: Grossly intact by limited examination    HEME: No significant bleeding, bruising, petechia by limited examination  SKIN: No significant rashes by limited examination  MSK:  Normal upper extremity and trunk strengths by limited examination      EKG:  Atrial fibrillation with rapid ventricular response  TELE:AF  CXR: vascular prominence        Results from last 7 days  Lab Units 10/07/17  0708   WBC Thousand/uL 6 99   HEMOGLOBIN g/dL 11 3*   HEMATOCRIT % 35 9   PLATELETS Thousands/uL 291   NEUTROS PCT % 54   MONOS PCT % 6       Results from last 7 days  Lab Units 10/07/17  0708   SODIUM mmol/L 143   POTASSIUM mmol/L 4 2   CHLORIDE mmol/L 107   CO2 mmol/L 24   BUN mg/dL 14   CREATININE mg/dL 1 22   GLUCOSE RANDOM mg/dL 111   CALCIUM mg/dL 8 9       Results from last 7 days  Lab Units 10/07/17  0708   SODIUM mmol/L 143   POTASSIUM mmol/L 4 2   CHLORIDE mmol/L 107   CO2 mmol/L 24   BUN mg/dL 14   CREATININE mg/dL 1 22   CALCIUM mg/dL 8 9   TOTAL PROTEIN g/dL 7 3   BILIRUBIN TOTAL mg/dL 0 52   ALK PHOS U/L 126*   ALT U/L 38   AST U/L 56*   GLUCOSE RANDOM mg/dL 111     No results found for: TROPONINT                Results from last 7 days  Lab Units 10/07/17  0708   INR  0 98           Invalid input(s): TSH        I have personally reviewed the EKG, CXR and Telemetry images directly  Patient Active Problem List    Diagnosis Date Noted    Atrial fibrillation with rapid ventricular response ([facility] ) 10/07/2017     Priority: Low    Acute congestive heart failure ([facility] ) 10/07/2017     Priority: Low    Cerebrovascular disease 10/07/2017     Priority: Low    Essential hypertension 10/07/2017     Priority: Low    Hyperlipidemia LDL goal <100 10/07/2017     Priority: Low    Prediabetes 10/07/2017     Priority: Low       Portions of the record may have been created with voice recognition software  Occasional wrong word or "sound a like" substitutions may have occurred due to the inherent limitations of voice recognition software   Read the chart carefully and recognize, using context, where substitutions have occurred

## 2017-10-07 NOTE — H&P
History and Physical - [facility]    Patient Information: Christa Poe 81 y o  female MRN: [id number]  Unit/Bed#: ED 08 Encounter: [id number]  Admitting Physician: Kanika Burnham MD  PCP: Dayna Moseley MD  Date of Admission:  10/07/17    Assessment/Plan:    Hospital Problem List:     Principal Problem:    Atrial fibrillation with rapid ventricular response ([facility] )- she has an elevated chads Vasc score of 6 due to due to history of stroke/TIA, hypertension, congestive heart failure, age 76-69, and female sex  Rate remains uncontrolled  Start Cardizem drip at 5 mg and titrate to heart rate less than 100  Start Eliquis 5 mg b i d  in place of Plavix for stroke prevention  She still has a loop recorder in place for the past 6 years  Consult Cardiology  Possibly interrogate her loop recorder to check for chronicity of this atrial fibrillation  Active Problems:    Acute congestive heart failure ([facility] )- tachycardia induced  Check echocardiogram to verify ejection fraction, valvular heart disease, pulmonary pressures  Cerebrovascular disease-she had a history of stroke in 2011  At that time loop recorder was placed  She remained on Plavix since there was no atrial fibrillation identified  However she admitted not following up with Cardiology to check on her rhythm  Since she will be on Eliquis, hold Plavix  Continue statin  Essential hypertension-continue Cardizem drip  Watch for hypotension    Hyperlipidemia LDL goal <100-patient is on simvastatin at home  Last LDL this year was excellent at 24    Start pravastatin (formulary) while in the hospital    Prediabetes-ADA diet    VTE Prophylaxis: Apixaban (Eliquis)  / reason for no mechanical VTE prophylaxis Ruling out DVT   Code Status:  Full code  POLST: There is no POLST form on file for this patient (pre-hospital)    Anticipated Length of Stay:  Patient will be admitted on an Inpatient basis with an anticipated length of stay of  at least 2 midnights  Justification for Hospital Stay:  AFib with RVR and CHF    Total Time for Visit, including Counseling / Coordination of Care: 45 minutes  Greater than 50% of this total time spent on direct patient counseling and coordination of care  Chief Complaint:   Shortness of breath    History of Present Illness:    Woodrow Godinez is a 81 y o  female who presents with shortness of breath  She presented to the hospital due to shortness of breath on exertion  For the past week she has noticed shortness of breath on exertion and easy fatigability  These were associated with leg swelling  She denied chest pain or palpitations  She denied focal weakness or numbness of any extremity  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 and noticed that the shortness of breath was persistent  She had to take several breaks  After her shift she decided to come to the ER and was found to have atrial fibrillation with rapid ventricle response  She also was found to have asymmetric leg swelling  The patient recalls that in 2011 she was diagnosed with a stroke  Atrial fibrillation was considered then  She had a Reveal loop recorder placed  As far she knows, no atrial fibrillation was seen  However she admits that she also did not follow up consistently with her loop recorder and has not seen Cardiology in a while  She remained on Plavix for stroke prevention and fortunately did not suffer a new stroke in the interim  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  She was doing well until last week when she started noticing the shortness of breath  Review of Systems:    Review of Systems   Constitutional: Positive for fatigue  HENT: Negative  Eyes: Negative  Respiratory: Positive for shortness of breath and wheezing  Cardiovascular: Positive for leg swelling  Negative for chest pain and palpitations  Gastrointestinal: Negative  Endocrine: Negative  Genitourinary: Negative  Musculoskeletal: Negative  Skin: Positive for color change  Neurological: Negative  Psychiatric/Behavioral: Negative  Past Medical and Surgical History:     Past Medical History:   Diagnosis Date    Hyperlipidemia     Hypertension     Migraine     Prediabetes     Stroke [facility])        Past Surgical History:   Procedure Laterality Date    APPENDECTOMY      CHOLECYSTECTOMY         Meds/Allergies:    Prior to Admission medications    Medication Sig Start Date End Date Taking?  Authorizing Provider   clopidogrel (PLAVIX) 75 mg tablet Take 75 mg by mouth daily   Yes Historical Provider, MD   escitalopram (LEXAPRO) 10 mg tablet Take 10 mg by mouth daily   Yes Historical Provider, MD   furosemide (LASIX) 20 mg tablet Take 20 mg by mouth 2 (two) times a day     Yes Historical Provider, MD   hydrochlorothiazide (HYDRODIURIL) 12 5 mg tablet Take 12 5 mg by mouth daily   Yes Historical Provider, MD   LORazepam (ATIVAN) 1 mg tablet Take 1 mg by mouth 3 (three) times a day as needed for anxiety   Yes Historical Provider, MD   meclizine (ANTIVERT) 25 mg tablet Take 32 mg by mouth 3 (three) times a day as needed for dizziness   Yes Historical Provider, MD   Multiple Vitamin (MULTIVITAMIN) tablet Take 1 tablet by mouth daily   Yes Historical Provider, MD   nortriptyline (PAMELOR) 50 mg capsule Take 50 mg by mouth daily at bedtime   Yes Historical Provider, MD   oxyCODONE-acetaminophen (PERCOCET) 5-325 mg per tablet Take 1 tablet by mouth every 4 (four) hours as needed for moderate pain (take 1-2 tablets every 4-6 hours PRN)   Yes Historical Provider, MD   potassium chloride (K-DUR,KLOR-CON) 20 mEq tablet Take 20 mEq by mouth 2 (two) times a day     Yes Historical Provider, MD   simvastatin (ZOCOR) 10 mg tablet Take 10 mg by mouth daily at bedtime   Yes Historical Provider, MD   topiramate (TOPAMAX) 25 mg sprinkle capsule Take 25 mg by mouth 2 (two) times a day   Yes Historical Provider, MD   Elastic Bandages & Supports (V-4 HIGH COMPRESSION HOSE) MISC 1 application by Does not apply route daily 6/16/17 10/7/17 Yes Valdemar Cisneros MD   furosemide (LASIX) 20 mg tablet Take 1 tablet (20 mg total) by mouth daily for 15 days  6/17/16 10/7/17  Waylon Blood MD   furosemide (LASIX) 40 mg tablet Take 1 tablet by mouth daily for 5 days 6/16/17 10/7/17  Valdemar Cisneros MD   labetalol (NORMODYNE) 100 mg tablet Take 100 mg by mouth 2 (two) times a day  10/7/17  Historical Provider, MD   meloxicam (MOBIC) 15 mg tablet Take 15 mg by mouth daily  10/7/17  Historical Provider, MD   naproxen (NAPROSYN) 500 mg tablet Take 500 mg by mouth 2 (two) times a day with meals  10/7/17  Historical Provider, MD   topiramate (TOPAMAX) 50 MG tablet Take 50 mg by mouth daily  10/7/17  Historical Provider, MD     I have reviewed home medications using allscripts  Allergies: No Known Allergies    Social History:     Marital Status:    Occupation:  Employed  Patient Pre-hospital Living Situation:  Independent  Patient Pre-hospital Level of Mobility:  Independent  Patient Pre-hospital Diet Restrictions:  Low-carbohydrate  Substance Use History:   History   Alcohol Use No     History   Smoking Status    Former Smoker   Smokeless Tobacco    Never Used     History   Drug Use No       Family History:    Family History   Problem Relation Age of Onset    Diabetes Mother     Heart disease Mother     Hypertension Mother        Physical Exam:     Vitals:   Blood Pressure: 127/67 (10/07/17 0813)  Pulse: 104 (10/07/17 0813)  Temperature: 98 2 °F (36 8 °C) (10/07/17 3972)  Respirations: 20 (10/07/17 0813)  Weight - Scale: 113 kg (250 lb) (10/07/17 0642)  SpO2: 94 % (10/07/17 0813)    Physical Exam   Constitutional: She is oriented to person, place, and time  She appears well-developed  No distress  Obese   HENT:   Mouth/Throat: No oropharyngeal exudate  Eyes: No scleral icterus  Neck: No JVD present  Cardiovascular:   Irregular irregular   Pulmonary/Chest: No stridor  Course breath sounds  Bilateral crackles in the upper and lower lung fields   Abdominal: Soft  She exhibits no distension  There is no tenderness  Musculoskeletal: She exhibits edema  Neurological: She is alert and oriented to person, place, and time  Skin: Skin is warm and dry  She is not diaphoretic  Mild erythema on the right shin   Psychiatric: She has a normal mood and affect  Her behavior is normal        Additional Data:     Lab Results: I have personally reviewed pertinent reports  Results from last 7 days  Lab Units 10/07/17  0708   WBC Thousand/uL 6 99   HEMOGLOBIN g/dL 11 3*   HEMATOCRIT % 35 9   PLATELETS Thousands/uL 291   NEUTROS PCT % 54   LYMPHS PCT % 33   MONOS PCT % 6   EOS PCT % 6       Results from last 7 days  Lab Units 10/07/17  0708   SODIUM mmol/L 143   POTASSIUM mmol/L 4 2   CHLORIDE mmol/L 107   CO2 mmol/L 24   BUN mg/dL 14   CREATININE mg/dL 1 22   CALCIUM mg/dL 8 9   TOTAL PROTEIN g/dL 7 3   BILIRUBIN TOTAL mg/dL 0 52   ALK PHOS U/L 126*   ALT U/L 38   AST U/L 56*   GLUCOSE RANDOM mg/dL 111       Results from last 7 days  Lab Units 10/07/17  0708   INR  0 98       Imaging: I have personally reviewed pertinent reports  and I have personally reviewed pertinent films in PACS  Chest x-ray with vascular congestion    Xr Chest 2 Views    Result Date: 10/7/2017  Narrative: CHEST INDICATION:  dyspnea  History taken directly from the electronic ordering system  COMPARISON:  None VIEWS:  Frontal and lateral projections IMAGES:  2 FINDINGS:  Loop recorder device overlying the chest  Cardiomediastinal silhouette appears unremarkable  Mild interstitial and vascular prominence  No focal consolidation  No pneumothorax or pleural effusion  Visualized osseous structures appear within normal limits for the patient's age  Impression: Mild interstitial and vascular prominence   As per comments in the PACS workstation, findings are concordant with preliminary interpretation provided by the emergency room physician  Workstation performed: [id number]       EKG, Pathology, and Other Studies Reviewed on Admission:   · EKG:  Atrial fibrillation with rapid ventricular response    Allscripts Records Reviewed: Yes     ** Please Note: Dragon 360 Dictation voice to text software may have been used in the creation of this document   **

## 2017-10-07 NOTE — ED PROVIDER NOTES
History  Chief Complaint   Patient presents with    Shortness of Breath     "keep getting SOB when i move around, started monday and getting worse all week "       History provided by:  Patient   used: No    Shortness of Breath   Severity:  Severe  Onset quality:  Gradual  Duration:  1 week  Timing:  Constant  Progression:  Worsening  Chronicity:  New  Context: activity    Relieved by:  Rest  Worsened by: Activity  Ineffective treatments:  None tried  Associated symptoms: no abdominal pain, no chest pain, no claudication, no cough, no diaphoresis, no fever, no headaches, no PND, no sore throat, no sputum production, no vomiting and no wheezing    Risk factors: no hx of PE/DVT, no prolonged immobilization, no recent surgery and no tobacco use        Prior to Admission Medications   Prescriptions Last Dose Informant Patient Reported? Taking?    LORazepam (ATIVAN) 1 mg tablet   Yes Yes   Sig: Take 1 mg by mouth 3 (three) times a day as needed for anxiety   Multiple Vitamin (MULTIVITAMIN) tablet   Yes Yes   Sig: Take 1 tablet by mouth daily   clopidogrel (PLAVIX) 75 mg tablet   Yes Yes   Sig: Take 75 mg by mouth daily   escitalopram (LEXAPRO) 10 mg tablet   Yes Yes   Sig: Take 10 mg by mouth daily   furosemide (LASIX) 20 mg tablet   Yes Yes   Sig: Take 20 mg by mouth 2 (two) times a day     hydrochlorothiazide (HYDRODIURIL) 12 5 mg tablet   Yes Yes   Sig: Take 12 5 mg by mouth daily   meclizine (ANTIVERT) 25 mg tablet   Yes Yes   Sig: Take 32 mg by mouth 3 (three) times a day as needed for dizziness   nortriptyline (PAMELOR) 50 mg capsule   Yes Yes   Sig: Take 50 mg by mouth daily at bedtime   oxyCODONE-acetaminophen (PERCOCET) 5-325 mg per tablet   Yes Yes   Sig: Take 1 tablet by mouth every 4 (four) hours as needed for moderate pain (take 1-2 tablets every 4-6 hours PRN)   potassium chloride (K-DUR,KLOR-CON) 20 mEq tablet   Yes Yes   Sig: Take 20 mEq by mouth 2 (two) times a day     simvastatin (ZOCOR) 10 mg tablet   Yes Yes   Sig: Take 10 mg by mouth daily at bedtime   topiramate (TOPAMAX) 25 mg sprinkle capsule   Yes Yes   Sig: Take 25 mg by mouth 2 (two) times a day      Facility-Administered Medications: None       Past Medical History:   Diagnosis Date    Hyperlipidemia     Hypertension     Migraine     Prediabetes     Stroke [facility])        Past Surgical History:   Procedure Laterality Date    APPENDECTOMY      CHOLECYSTECTOMY         History reviewed  No pertinent family history  I have reviewed and agree with the history as documented  Social History   Substance Use Topics    Smoking status: Former Smoker    Smokeless tobacco: Never Used    Alcohol use No        Review of Systems   Constitutional: Negative for chills, diaphoresis and fever  HENT: Negative for congestion and sore throat  Respiratory: Positive for shortness of breath  Negative for cough, sputum production, chest tightness and wheezing  Cardiovascular: Positive for leg swelling  Negative for chest pain, palpitations, claudication and PND  Swelling for months, right worse than left   Gastrointestinal: Negative for abdominal pain, diarrhea, nausea and vomiting  Genitourinary: Negative for difficulty urinating, dysuria and flank pain  Musculoskeletal: Negative for arthralgias and back pain  Neurological: Negative for weakness, light-headedness, numbness and headaches  All other systems reviewed and are negative  Physical Exam  ED Triage Vitals [10/07/17 0642]   Temperature Pulse Respirations Blood Pressure SpO2   98 2 °F (36 8 °C) (!) 122 22 (!) 180/101 99 %      Temp src Heart Rate Source Patient Position - Orthostatic VS BP Location FiO2 (%)   -- Monitor -- Right arm --      Pain Score       2           Physical Exam   Constitutional: She appears well-developed and well-nourished  She is cooperative  Non-toxic appearance  She does not have a sickly appearance  She does not appear ill   No distress  HENT:   Head: Normocephalic and atraumatic  Right Ear: Hearing normal  No drainage or swelling  Left Ear: Hearing normal  No drainage or swelling  Mouth/Throat: Mucous membranes are normal    Eyes: Conjunctivae and lids are normal  Right eye exhibits no discharge  Left eye exhibits no discharge  Neck: Trachea normal and normal range of motion  No JVD present  Cardiovascular: Normal heart sounds, intact distal pulses and normal pulses  An irregularly irregular rhythm present  Tachycardia present  Exam reveals no gallop and no friction rub  No murmur heard  Pulmonary/Chest: Effort normal and breath sounds normal  No stridor  No respiratory distress  She has no wheezes  She has no rales  Abdominal: Soft  Normal appearance  She exhibits no distension, no ascites and no mass  There is no hepatosplenomegaly  There is no tenderness  There is no rebound, no guarding and no CVA tenderness  Musculoskeletal: Normal range of motion  She exhibits edema  She exhibits no tenderness  Right leg is slightly bigger than the left  Lymphadenopathy:        Right: No inguinal adenopathy present  Left: No inguinal adenopathy present  Neurological: She is alert  She has normal strength  No sensory deficit  She exhibits normal muscle tone  Gait normal  GCS eye subscore is 4  GCS verbal subscore is 5  GCS motor subscore is 6  Skin: Skin is warm, dry and intact  No rash noted  She is not diaphoretic  No pallor  Psychiatric: She has a normal mood and affect  Her speech is normal  Cognition and memory are normal    Nursing note and vitals reviewed        ED Medications  Medications   diltiazem (CARDIZEM) 125 mg in sodium chloride 0 9 % 125 mL infusion (not administered)   diltiazem (CARDIZEM) injection 15 mg (15 mg Intravenous Given 10/7/17 0720)   furosemide (LASIX) injection 40 mg (40 mg Intravenous Given 10/7/17 0814)       Diagnostic Studies  Labs Reviewed   CBC AND DIFFERENTIAL - Abnormal Result Value Ref Range Status    Hemoglobin 11 3 (*) 11 5 - 15 4 g/dL Final    RDW 15 9 (*) 11 6 - 15 1 % Final    WBC 6 99  4 31 - 10 16 Thousand/uL Final    RBC 4 12  3 81 - 5 12 Million/uL Final    Hematocrit 35 9  34 8 - 46 1 % Final    MCV 87  82 - 98 fL Final    MCH 27 4  26 8 - 34 3 pg Final    MCHC 31 5  31 4 - 37 4 g/dL Final    MPV 10 7  8 9 - 12 7 fL Final    Platelets 999  540 - 390 Thousands/uL Final    nRBC 0  /100 WBCs Final    Neutrophils Relative 54  43 - 75 % Final    Lymphocytes Relative 33  14 - 44 % Final    Monocytes Relative 6  4 - 12 % Final    Eosinophils Relative 6  0 - 6 % Final    Basophils Relative 1  0 - 1 % Final    Neutrophils Absolute 3 82  1 85 - 7 62 Thousands/µL Final    Lymphocytes Absolute 2 27  0 60 - 4 47 Thousands/µL Final    Monocytes Absolute 0 43  0 17 - 1 22 Thousand/µL Final    Eosinophils Absolute 0 42  0 00 - 0 61 Thousand/µL Final    Basophils Absolute 0 05  0 00 - 0 10 Thousands/µL Final   COMPREHENSIVE METABOLIC PANEL - Abnormal     AST 56 (*) 5 - 45 U/L Final    Comment: Slightly Hemolyzed; Results May be Affected  Specimen collection should occur prior to Sulfasalazine administration due to the potential for falsely depressed results  Alkaline Phosphatase 126 (*) 46 - 116 U/L Final    Sodium 143  136 - 145 mmol/L Final    Potassium 4 2  3 5 - 5 3 mmol/L Final    Comment: Slightly Hemolyzed; Results May be Affected    Chloride 107  100 - 108 mmol/L Final    CO2 24  21 - 32 mmol/L Final    Anion Gap 12  4 - 13 mmol/L Final    BUN 14  5 - 25 mg/dL Final    Creatinine 1 22  0 60 - 1 30 mg/dL Final    Comment: Standardized to IDMS reference method    Glucose 111  65 - 140 mg/dL Final    Comment:   If the patient is fasting, the ADA then defines impaired fasting glucose as > 100 mg/dL and diabetes as > or equal to 123 mg/dL  Specimen collection should occur prior to Sulfasalazine administration due to the potential for falsely depressed results   Specimen collection should occur prior to Sulfapyridine administration due to the potential for falsely elevated results  Calcium 8 9  8 3 - 10 1 mg/dL Final    ALT 38  12 - 78 U/L Final    Comment:   Specimen collection should occur prior to Sulfasalazine administration due to the potential for falsely depressed results  Total Protein 7 3  6 4 - 8 2 g/dL Final    Albumin 3 6  3 5 - 5 0 g/dL Final    Total Bilirubin 0 52  0 20 - 1 00 mg/dL Final    eGFR 45  ml/min/1 73sq m Final    Narrative:     National Kidney Disease Education Program recommendations are as follows:  GFR calculation is accurate only with a steady state creatinine  Chronic Kidney disease less than 60 ml/min/1 73 sq  meters  Kidney failure less than 15 ml/min/1 73 sq  meters  TSH, 3RD GENERATION - Abnormal     TSH 3RD [facility] 6 539 (*) 0 358 - 3 740 uIU/mL Final    Narrative:     Patients undergoing fluorescein dye angiography may retain small amounts of fluorescein in the body for 48-72 hours post procedure  Samples containing fluorescein can produce falsely depressed TSH values  If the patient had this procedure,a specimen should be resubmitted post fluorescein clearance  The recommended reference ranges for TSH during pregnancy are as follows:  First trimester 0 1 to 2 5 uIU/mL  Second trimester  0 2 to 3 0 uIU/mL  Third trimester 0 3 to 3 0 uIU/m     NT-BNP PRO (BRAIN NATRIURETIC PEPTIDE) - Abnormal     NT-proBNP 2,332 (*) <125 pg/mL Final   PROTIME-INR - Normal    Protime 13 0  12 1 - 14 4 seconds Final    INR 0 98  0 86 - 1 16 Final   APTT - Normal    PTT 29  23 - 35 seconds Final    Narrative:      Therapeutic Heparin Range = 60-90 seconds   MAGNESIUM - Normal    Magnesium 2 1  1 6 - 2 6 mg/dL Final   POCT TROPONIN - Normal    POC Troponin I 0 01  0 00 - 0 08 ng/ml Final    Specimen Type VENOUS   Final    Narrative:     [employer] i-Stat handheld analyzer 99% cutoff is > 0 08ng/mL in network Emergency Departments    o cTnI 99% cutoff is useful only when applied to patients in the clinical setting of myocardial ischemia  o cTnI 99% cutoff should be interpreted in the context of clinical history, ECG findings and possibly cardiac imaging to establish correct diagnosis  o cTnI 99% cutoff may be suggestive but clearly not indicative of a coronary event without the clinical setting of myocardial ischemia  T4, FREE   T3,TOTAL   POCT URINALYSIS DIPSTICK       XR chest 2 views   ED Interpretation   I have personally reviewed the x-ray and my findings are:   vascular congestion        VAS lower limb venous duplex study, complete bilateral    (Results Pending)       Procedures  ECG 12 Lead Documentation  Date/Time: 10/7/2017 7:07 AM  Performed by: Clara Hunt by: Zollie Dubin     Indications / Diagnosis:  Dyspnea  ECG reviewed by me, the ED Provider: yes    Patient location:  ED  Previous ECG:     Previous ECG:  Compared to current    Comparison ECG info:  6/2017    Similarity:  Changes noted  Interpretation:     Interpretation: abnormal    Rate:     ECG rate:  130    ECG rate assessment: tachycardic    Rhythm:     Rhythm: atrial fibrillation and atrial flutter    Ectopy:     Ectopy: none    QRS:     QRS axis:  Normal    QRS intervals:  Normal  Conduction:     Conduction: normal    ST segments:     ST segments:  Normal  T waves:     T waves: normal    Q waves:     Q waves:  II, III, aVF, V5 and V6    CriticalCare Time  Performed by: Clara Hunt by: Zollie Dubin     Critical care provider statement:     Critical care time (minutes):  35    Critical care time was exclusive of:  Separately billable procedures and treating other patients and teaching time    Critical care was necessary to treat or prevent imminent or life-threatening deterioration of the following conditions:  Cardiac failure (Rapid atrial fibrillation and CHF)    Critical care was time spent personally by me on the following activities:  Obtaining history from patient or surrogate, development of treatment plan with patient or surrogate, discussions with primary provider, evaluation of patient's response to treatment, examination of patient, ordering and review of laboratory studies, ordering and review of radiographic studies, re-evaluation of patient's condition and review of old charts    I assumed direction of critical care for this patient from another provider in my specialty: no            Phone Contacts  ED Phone Contact    ED Course  ED Course      0820:  Patient's heartbeat initially responded to IV Cardizem and went down to about 100 which revealed flutter waves variable block  Her heart rate then went back up into the 120s and 130s on placing her on a Cardizem drip  She has vascular congestion on her x-ray and her BNP is elevated so I ordered a dose of Lasix  Her chads 2 score is 3  I discussed this with Internal Medicine and they will take care of the possibility of anticoagulation  She will be admitted into the hospital   She will need telemetry  CHADS2 Score    Flowsheet Row Most Recent Value   CHADS2   CHF History  0 Filed at: 10/07/2017 0758   HTN History  1 Filed at: 10/07/2017 0758   Age 75+  0 Filed at: 10/07/2017 7657   Diabetes History  0 Filed at: 10/07/2017 6965   Stroke or TIA Symptoms Previously  2 Filed at: 10/07/2017 0758   CHADS2 Score  3 Filed at: 10/07/2017 8911                              MDM  Number of Diagnoses or Management Options  Diagnosis management comments: Patient appears to be in rapid atrial fibrillation/flutter  This most likely has been going on the whole week  Unfortunately she can't feel these palpitations so it is unclear as to how long she has been in it but since the symptoms started a week ago my suspicion is that she has been in this all week  Apparently she has never had this before  No history of heart disease or heart failure    She has had a stroke in the past that his left eye without any deficits  Will check laboratory studies to rule out anemia, electrolyte abnormalities, cardiac ischemia, heart failure  Check an x-ray  We will duplex her lower legs however the patient informed me thatA while back she had an ultrasound which revealed that it was negative for DVT  We will check thyroid studies  IV Cardizem for rate control  Amount and/or Complexity of Data Reviewed  Clinical lab tests: ordered and reviewed  Tests in the radiology section of CPT®: ordered and reviewed  Tests in the medicine section of CPT®: ordered and reviewed  Review and summarize past medical records: yes  Discuss the patient with other providers: yes  Independent visualization of images, tracings, or specimens: yes    Patient Progress  Patient progress: stable    CritCare Time    Disposition  Final diagnoses:   Rapid atrial fibrillation ([facility] [address] )   CHF (congestive heart failure) [facility])     ED Disposition     ED Disposition Condition Comment    Admit  Case was discussed with Beau and the patient's admission status was agreed to be Admission Status: inpatient status to the service of Dr Aida Nieves   Follow-up Information    None       Patient's Medications   Discharge Prescriptions    No medications on file     No discharge procedures on file      ED Provider  Electronically Signed by       Katina Rubalcava MD  10/07/17 8389

## 2017-10-07 NOTE — PLAN OF CARE
Problem: Potential for Falls  Goal: Patient will remain free of falls  INTERVENTIONS:  - Assess patient frequently for physical needs  -  Identify cognitive and physical deficits and behaviors that affect risk of falls  -  [place] fall precautions as indicated by assessment   - Educate patient/family on patient safety including physical limitations  - Instruct patient to call for assistance with activity based on assessment  - Modify environment to reduce risk of injury  - Consider OT/PT consult to assist with strengthening/mobility   Outcome: Progressing      Problem: DISCHARGE PLANNING  Goal: Discharge to home or other facility with appropriate resources  INTERVENTIONS:  - Identify barriers to discharge w/patient and caregiver  - Arrange for needed discharge resources and transportation as appropriate  - Identify discharge learning needs (meds, wound care, etc )  - Arrange for interpretive services to assist at discharge as needed  - Refer to Case Management Department for coordinating discharge planning if the patient needs post-hospital services based on physician/advanced practitioner order or complex needs related to functional status, cognitive ability, or social support system   Outcome: Progressing      Problem: Knowledge Deficit  Goal: Patient/family/caregiver demonstrates understanding of disease process, treatment plan, medications, and discharge instructions  Complete learning assessment and assess knowledge base    Interventions:  - Provide teaching at level of understanding  - Provide teaching via preferred learning methods   Outcome: Progressing      Problem: CARDIOVASCULAR - ADULT  Goal: Maintains optimal cardiac output and hemodynamic stability  INTERVENTIONS:  - Monitor I/O, vital signs and rhythm  - Monitor for S/S and trends of decreased cardiac output i e  bleeding, hypotension  - Administer and titrate ordered vasoactive medications to optimize hemodynamic stability  - Assess quality of pulses, skin color and temperature  - Assess for signs of decreased coronary artery perfusion - ex   Angina  - Instruct patient to report change in severity of symptoms  Outcome: Progressing    Goal: Absence of cardiac dysrhythmias or at baseline rhythm  INTERVENTIONS:  - Continuous cardiac monitoring, monitor vital signs, obtain 12 lead EKG if indicated  - Administer antiarrhythmic and heart rate control medications as ordered  - Monitor electrolytes and administer replacement therapy as ordered  Outcome: Progressing

## 2017-10-07 NOTE — ED NOTES
Patient transported to [facility]  [facility], RN  10/07/17 0523

## 2017-10-08 LAB
ANION GAP SERPL CALCULATED.3IONS-SCNC: 9 MMOL/L (ref 4–13)
APTT PPP: 65 SECONDS (ref 23–35)
APTT PPP: 66 SECONDS (ref 23–35)
BUN SERPL-MCNC: 18 MG/DL (ref 5–25)
CALCIUM SERPL-MCNC: 8.6 MG/DL (ref 8.3–10.1)
CHLORIDE SERPL-SCNC: 107 MMOL/L (ref 100–108)
CO2 SERPL-SCNC: 27 MMOL/L (ref 21–32)
CREAT SERPL-MCNC: 1.35 MG/DL (ref 0.6–1.3)
ERYTHROCYTE [DISTWIDTH] IN BLOOD BY AUTOMATED COUNT: 16.2 % (ref 11.6–15.1)
GFR SERPL CREATININE-BSD FRML MDRD: 40 ML/MIN/1.73SQ M
GLUCOSE SERPL-MCNC: 115 MG/DL (ref 65–140)
HCT VFR BLD AUTO: 35 % (ref 34.8–46.1)
HGB BLD-MCNC: 10.9 G/DL (ref 11.5–15.4)
MCH RBC QN AUTO: 27.5 PG (ref 26.8–34.3)
MCHC RBC AUTO-ENTMCNC: 31.1 G/DL (ref 31.4–37.4)
MCV RBC AUTO: 88 FL (ref 82–98)
PLATELET # BLD AUTO: 264 THOUSANDS/UL (ref 149–390)
PMV BLD AUTO: 10.5 FL (ref 8.9–12.7)
POTASSIUM SERPL-SCNC: 3.8 MMOL/L (ref 3.5–5.3)
RBC # BLD AUTO: 3.96 MILLION/UL (ref 3.81–5.12)
SODIUM SERPL-SCNC: 143 MMOL/L (ref 136–145)
WBC # BLD AUTO: 6.88 THOUSAND/UL (ref 4.31–10.16)

## 2017-10-08 PROCEDURE — 85730 THROMBOPLASTIN TIME PARTIAL: CPT | Performed by: INTERNAL MEDICINE

## 2017-10-08 PROCEDURE — 80048 BASIC METABOLIC PNL TOTAL CA: CPT | Performed by: INTERNAL MEDICINE

## 2017-10-08 PROCEDURE — 85027 COMPLETE CBC AUTOMATED: CPT | Performed by: INTERNAL MEDICINE

## 2017-10-08 RX ORDER — FUROSEMIDE 10 MG/ML
40 INJECTION INTRAMUSCULAR; INTRAVENOUS
Status: DISCONTINUED | OUTPATIENT
Start: 2017-10-08 | End: 2017-10-11 | Stop reason: HOSPADM

## 2017-10-08 RX ADMIN — HEPARIN SODIUM AND DEXTROSE 15.11 UNITS/KG/HR: 10000; 5 INJECTION INTRAVENOUS at 07:49

## 2017-10-08 RX ADMIN — FUROSEMIDE 20 MG: 10 INJECTION, SOLUTION INTRAMUSCULAR; INTRAVENOUS at 08:45

## 2017-10-08 RX ADMIN — PRAVASTATIN SODIUM 20 MG: 40 TABLET ORAL at 17:28

## 2017-10-08 RX ADMIN — POTASSIUM CHLORIDE 20 MEQ: 1500 TABLET, EXTENDED RELEASE ORAL at 08:45

## 2017-10-08 RX ADMIN — NORTRIPTYLINE HYDROCHLORIDE 50 MG: 50 CAPSULE ORAL at 21:42

## 2017-10-08 RX ADMIN — FUROSEMIDE 40 MG: 10 INJECTION, SOLUTION INTRAMUSCULAR; INTRAVENOUS at 17:23

## 2017-10-08 RX ADMIN — METOPROLOL TARTRATE 50 MG: 50 TABLET ORAL at 08:45

## 2017-10-08 RX ADMIN — ESCITALOPRAM OXALATE 10 MG: 10 TABLET ORAL at 08:45

## 2017-10-08 RX ADMIN — METOPROLOL TARTRATE 50 MG: 50 TABLET ORAL at 21:42

## 2017-10-08 RX ADMIN — POTASSIUM CHLORIDE 20 MEQ: 1500 TABLET, EXTENDED RELEASE ORAL at 17:28

## 2017-10-08 RX ADMIN — TOPIRAMATE 25 MG: 25 TABLET, FILM COATED ORAL at 17:28

## 2017-10-08 RX ADMIN — TOPIRAMATE 25 MG: 25 TABLET, FILM COATED ORAL at 08:45

## 2017-10-08 NOTE — PROGRESS NOTES
Progress Note - Cardiology   Alli Rodriguez 81 y o  female MRN: [id number]  Unit/Bed#: E4 -01 Encounter: [id number]      Assessment:     1  New AF w/ RVR  2  Prior CVA 2013--s/p medtronic Reveal in place  3  Obesity  4  OSAS  5  HTN    Discussion/Recommendations:    · HR adequately controlled, con't metoprolol  Now off IV dilt  · Con't IV heparin for AF/prior CVA  Transition to Eliquis 5  BID after loop recorder removal--will try for tomorrow  · Con't IV lasix, will augment to 40 mg twice daily in light of some weight gain, persistent shortness of breath, and bibasilar crackles  20 BID--follow I/O--watch renal fx, if continues to worsen, we will back off diuresis  · Echo tomorrow      Subjective:  Patient seen and examined, feels well, still short of breath with ambulation        Physical Exam:  GEN:  NAD  HEENT:  MMM, NCAT, pink conjunctiva, EOMI, nonicteric sclera  CV:  NO JVD/HJR, RR, NO M/R/G, +S1/S2, NO PARASTERNAL HEAVE/THRILL, NO LE EDEMA, NO HEPATIC SYSTOLIC PULSATION, WARM EXTREMITIES  RESP:  Mild bilateral crackles  ABD:  SOFT, NT, NO GROSS ORGANOMEGALY        Vitals:   /73   Pulse 83   Temp (!) 95 9 °F (35 5 °C) (Tympanic)   Resp 18   Ht 5' 0 6" (1 539 m)   Wt 116 kg (255 lb 6 4 oz)   LMP  (LMP Unknown)   SpO2 96%   BMI 48 90 kg/m²   Vitals:    10/07/17 0642 10/07/17 0900   Weight: 113 kg (250 lb) 116 kg (255 lb 6 4 oz)       Intake/Output Summary (Last 24 hours) at 10/08/17 1311  Last data filed at 10/08/17 0900   Gross per 24 hour   Intake           538 07 ml   Output                0 ml   Net           538 07 ml         TELEMETRY:  Atrial fibrillation, rate controlled  Lab Results:    Results from last 7 days  Lab Units 10/08/17  0530   WBC Thousand/uL 6 88   HEMOGLOBIN g/dL 10 9*   HEMATOCRIT % 35 0   PLATELETS Thousands/uL 264       Results from last 7 days  Lab Units 10/08/17  0530 10/07/17  0708   SODIUM mmol/L 143 143   POTASSIUM mmol/L 3 8 4 2   CHLORIDE mmol/L 107 107   CO2 mmol/L 27 24   BUN mg/dL 18 14   CREATININE mg/dL 1 35* 1 22   CALCIUM mg/dL 8 6 8 9   TOTAL PROTEIN g/dL  --  7 3   BILIRUBIN TOTAL mg/dL  --  0 52   ALK PHOS U/L  --  126*   ALT U/L  --  38   AST U/L  --  56*   GLUCOSE RANDOM mg/dL 115 111       Results from last 7 days  Lab Units 10/08/17  0530   SODIUM mmol/L 143   POTASSIUM mmol/L 3 8   CHLORIDE mmol/L 107   CO2 mmol/L 27   BUN mg/dL 18   CREATININE mg/dL 1 35*   GLUCOSE RANDOM mg/dL 115   CALCIUM mg/dL 8 6             Medications:    Current Facility-Administered Medications:     diltiazem (CARDIZEM) 125 mg in sodium chloride 0 9 % 125 mL infusion, 1-15 mg/hr, Intravenous, Titrated, Sarah Davenport MD, Stopped at 10/08/17 0515    escitalopram (LEXAPRO) tablet 10 mg, 10 mg, Oral, Daily, Sarah Davenport MD, 10 mg at 10/08/17 0845    furosemide (LASIX) injection 20 mg, 20 mg, Intravenous, BID (diuretic), Sarah Davenport MD, 20 mg at 10/08/17 0845    heparin (porcine) 25,000 units in 250 mL infusion (premix), 3-20 Units/kg/hr (Order-Specific), Intravenous, Titrated, Rujul Ferrell, DO, Last Rate: 13 6 mL/hr at 10/08/17 0749, 15 111 Units/kg/hr at 10/08/17 0749    heparin (porcine) injection 2,000 Units, 2,000 Units, Intravenous, PRN, Rujul Ferrell, DO    heparin (porcine) injection 4,000 Units, 4,000 Units, Intravenous, PRN, Rujul Ferrell, DO, 4,000 Units at 10/07/17 1835    influenza inactivated quadrivalent vaccine (FLULAVAL) IM injection 0 5 mL, 0 5 mL, Intramuscular, Prior to discharge, Anna Yanez DO    LORazepam (ATIVAN) tablet 1 mg, 1 mg, Oral, TID PRN, Sarah Davenport MD    meclizine (ANTIVERT) tablet 31 25 mg, 31 25 mg, Oral, TID PRN, Sarah Davenport MD    metoprolol tartrate (LOPRESSOR) tablet 50 mg, 50 mg, Oral, Q12H [address], Rujul DO Ghassan, 50 mg at 10/08/17 0845    nortriptyline (PAMELOR) capsule 50 mg, 50 mg, Oral, HS, Sarah Davenport MD, 50 mg at 10/07/17 2200    ondansetron (ZOFRAN) injection 4 mg, 4 mg, Intravenous, Q6H PRN, Sarah Davenport MD  [employer] oxyCODONE-acetaminophen (PERCOCET) 5-325 mg per tablet 1 tablet, 1 tablet, Oral, Q4H PRN, Frankie Garrett MD, 1 tablet at 10/07/17 2211    potassium chloride (K-DUR,KLOR-CON) CR tablet 20 mEq, 20 mEq, Oral, BID, Frankie Garrett MD, 20 mEq at 10/08/17 0845    pravastatin (PRAVACHOL) tablet 20 mg, 20 mg, Oral, Daily With Janeen Sylvester MD, 20 mg at 10/07/17 1759    topiramate (TOPAMAX) tablet 25 mg, 25 mg, Oral, BID, Frankie Garrett MD, 25 mg at 10/08/17 0845    Portions of the record may have been created with voice recognition software  Occasional wrong word or "sound a like" substitutions may have occurred due to the inherent limitations of voice recognition software  Read the chart carefully and recognize, using context, where substitutions have occurred

## 2017-10-08 NOTE — CASE MANAGEMENT
Initial Clinical Review    Admission: Date/Time/Statement: 10/7/17 @ 0806     Orders Placed This Encounter   Procedures    Inpatient Admission (expected length of stay for this patient is greater than two midnights)     Standing Status:   Standing     Number of Occurrences:   1     Order Specific Question:   Admitting Physician     Answer:   Jose Huerta     Order Specific Question:   Level of Care     Answer:   Med Surg [16]     Order Specific Question:   Estimated length of stay     Answer:   More than 2 Midnights     Order Specific Question:   Certification     Answer:   I certify that inpatient services are medically necessary for this patient for a duration of greater than two midnights  See H&P and MD Progress Notes for additional information about the patient's course of treatment  ED: Date/Time/Mode of Arrival:   ED Arrival Information     Expected Arrival Acuity Means of Arrival Escorted By Service Admission Type    - 10/7/2017 06:36 Urgent Walk-In Self General Medicine Urgent    Arrival Complaint    shortness of breath          Chief Complaint:   Chief Complaint   Patient presents with    Shortness of Breath     "keep getting SOB when i move around, started monday and getting worse all week "       History of Illness:   Prem Gaitan is a 81 y o  female who presents with shortness of breath  She presented to the hospital due to shortness of breath on exertion  For the past week she has noticed shortness of breath on exertion and easy fatigability  These were associated with leg swelling  She denied chest pain or palpitations  She denied focal weakness or numbness of any extremity  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 and noticed that the shortness of breath was persistent  She had to take several breaks  After her shift she decided to come to the ER and was found to have atrial fibrillation with rapid ventricle response    She also was found to have asymmetric leg swelling       The patient recalls that in 2011 she was diagnosed with a stroke  Atrial fibrillation was considered then  She had a Reveal loop recorder placed  As far she knows, no atrial fibrillation was seen  However she admits that she also did not follow up consistently with her loop recorder and has not seen Cardiology in a while  She remained on Plavix for stroke prevention and fortunately did not suffer a new stroke in the interim  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  She was doing well until last week when she started noticing the shortness of breath        ED Vital Signs:   ED Triage Vitals   Temperature Pulse Respirations Blood Pressure SpO2   10/07/17 0642 10/07/17 0642 10/07/17 0642 10/07/17 0642 10/07/17 0642   98 2 °F (36 8 °C) (!) 122 22 (!) 180/101 99 %      Temp Source Heart Rate Source Patient Position - Orthostatic VS BP Location FiO2 (%)   10/07/17 0900 10/07/17 0642 10/07/17 0900 10/07/17 0642 --   Tympanic Monitor Lying Right arm       Pain Score       10/07/17 0642       2        Wt Readings from Last 1 Encounters:   10/07/17 116 kg (255 lb 6 4 oz)       Vital Signs (abnormal):   above    Abnormal Labs/Diagnostic Test Results:   VAS  Le:  No evidence  DVT  BNP   2,332  CXR:     Mild interstitial and vascular prominence       ED Treatment:   Medication Administration from 10/07/2017 0635 to 10/07/2017 4820       Date/Time Order Dose Route Action Action by Comments     10/07/2017 0720 diltiazem (CARDIZEM) injection 15 mg 15 mg Intravenous Given Mary Luu RN      10/07/2017 5935 furosemide (LASIX) injection 40 mg 40 mg Intravenous Given Mary Luu RN      10/07/2017 0935 diltiazem (CARDIZEM) 125 mg in sodium chloride 0 9 % 125 mL infusion 7 5 mg/hr Intravenous Rate/Dose Change Mary Luu RN      10/07/2017 6994 diltiazem (CARDIZEM) 125 mg in sodium chloride 0 9 % 125 mL infusion 5 mg/hr Intravenous New Bag Mary Luu RN Past Medical/Surgical History: Active Ambulatory Problems     Diagnosis Date Noted    No Active Ambulatory Problems     Resolved Ambulatory Problems     Diagnosis Date Noted    No Resolved Ambulatory Problems     Past Medical History:   Diagnosis Date    Hyperlipidemia     Hypertension     Migraine     Prediabetes     Stroke [facility])        Admitting Diagnosis: CHF (congestive heart failure) ([facility]) [I50 9]  SOB (shortness of breath) [R06 02]  Rapid atrial fibrillation ([facility]) [I48 91]  Swelling of both lower extremities [M79 89]    Age/Sex: 81 y o  female    Assessment/Plan:    Atrial fibrillation with rapid ventricular response ([facility] [address] )- she has an elevated chads Vasc score of 6 due to due to history of stroke/TIA, hypertension, congestive heart failure, age 74-69, and female sex  Rate remains uncontrolled  Start Cardizem drip at 5 mg and titrate to heart rate less than 100  Start Eliquis 5 mg b i d  in place of Plavix for stroke prevention  She still has a loop recorder in place for the past 6 years  Consult Cardiology  Possibly interrogate her loop recorder to check for chronicity of this atrial fibrillation  Active Problems:    Acute congestive heart failure ([facility] [address] )- tachycardia induced  Check echocardiogram to verify ejection fraction, valvular heart disease, pulmonary pressures  Cerebrovascular disease-she had a history of stroke in 2011  At that time loop recorder was placed  She remained on Plavix since there was no atrial fibrillation identified  However she admitted not following up with Cardiology to check on her rhythm  Since she will be on Eliquis, hold Plavix  Continue statin  Essential hypertension-continue Cardizem drip  Watch for hypotension    Hyperlipidemia LDL goal <100-patient is on simvastatin at home  Last LDL this year was excellent at 24    Start pravastatin (formulary) while in the hospital    Prediabetes-ADA diet     VTE Prophylaxis: Apixaban (Eliquis)  / reason for no mechanical VTE prophylaxis Ruling out DVT   Code Status:  Full code  POLST: There is no POLST form on file for this patient (pre-hospital)     Anticipated Length of Stay:  Patient will be admitted on an Inpatient basis with an anticipated length of stay of  at least 2 midnights  Justification for Hospital Stay:  AFib with RVR and CHF       Admission Orders:   IP    10/7  @   0806  Scheduled Meds:   escitalopram 10 mg Oral Daily   furosemide 40 mg Intravenous BID (diuretic)   metoprolol tartrate 50 mg Oral Q12H [address]   nortriptyline 50 mg Oral HS   potassium chloride 20 mEq Oral BID   pravastatin 20 mg Oral Daily With Dinner   topiramate 25 mg Oral BID     Continuous Infusions:   diltiazem 1-15 mg/hr Last Rate: Stopped (10/08/17 0515)   heparin (porcine) 3-20 Units/kg/hr (Order-Specific) Last Rate: 15 111 Units/kg/hr (10/08/17 0749)     PRN Meds: heparin (porcine)    heparin (porcine)    influenza vaccine    LORazepam    meclizine    ondansetron    oxyCODONE-acetaminophen    Tele  CCD diet  Cons cardiology  2 DE    Cardiology  Progress note  10/8  1  New AF w/ RVR  2  Prior CVA 2013--s/p medtronic Reveal in place  3  Obesity  4  OSAS  5  HTN     Discussion/Recommendations:     · HR adequately controlled, con't metoprolol  Now off IV dilt  · Con't IV heparin for AF/prior CVA  Transition to Eliquis 5  BID after loop recorder removal--will try for tomorrow  · Con't IV lasix, will augment to 40 mg twice daily in light of some weight gain, persistent shortness of breath, and bibasilar crackles  20 BID--follow I/O--watch renal fx, if continues to worsen, we will back off diuresis  · Echo tomorrow       [facility] in the [facility] by Rajiv De La O for 2017  Network Utilization Review Department  Phone: [phone]; Fax [phone]  ATTENTION: The Network Utilization Review Department is now centralized for our 7 Facilities   Make a note that we have a new phone and fax numbers for our Department  Please call with any questions or concerns to [phone] and carefully follow the prompts so that you are directed to the right person  All voicemails are confidential  Fax any determinations, approvals, denials, and requests for initial or continue stay review clinical to [phone]  Due to HIGH CALL volume, it would be easier if you could please send faxed requests to expedite your requests and in part, help us provide discharge notifications faster

## 2017-10-08 NOTE — PLAN OF CARE
Problem: Potential for Falls  Goal: Patient will remain free of falls  INTERVENTIONS:  - Assess patient frequently for physical needs  -  Identify cognitive and physical deficits and behaviors that affect risk of falls  -  [place] fall precautions as indicated by assessment   - Educate patient/family on patient safety including physical limitations  - Instruct patient to call for assistance with activity based on assessment  - Modify environment to reduce risk of injury  - Consider OT/PT consult to assist with strengthening/mobility   Outcome: Progressing      Problem: DISCHARGE PLANNING  Goal: Discharge to home or other facility with appropriate resources  INTERVENTIONS:  - Identify barriers to discharge w/patient and caregiver  - Arrange for needed discharge resources and transportation as appropriate  - Identify discharge learning needs (meds, wound care, etc )  - Arrange for interpretive services to assist at discharge as needed  - Refer to Case Management Department for coordinating discharge planning if the patient needs post-hospital services based on physician/advanced practitioner order or complex needs related to functional status, cognitive ability, or social support system   Outcome: Progressing      Problem: Knowledge Deficit  Goal: Patient/family/caregiver demonstrates understanding of disease process, treatment plan, medications, and discharge instructions  Complete learning assessment and assess knowledge base    Interventions:  - Provide teaching at level of understanding  - Provide teaching via preferred learning methods   Outcome: Progressing      Problem: CARDIOVASCULAR - ADULT  Goal: Maintains optimal cardiac output and hemodynamic stability  INTERVENTIONS:  - Monitor I/O, vital signs and rhythm  - Monitor for S/S and trends of decreased cardiac output i e  bleeding, hypotension  - Administer and titrate ordered vasoactive medications to optimize hemodynamic stability  - Assess quality of pulses, skin color and temperature  - Assess for signs of decreased coronary artery perfusion - ex   Angina  - Instruct patient to report change in severity of symptoms   Outcome: Progressing    Goal: Absence of cardiac dysrhythmias or at baseline rhythm  INTERVENTIONS:  - Continuous cardiac monitoring, monitor vital signs, obtain 12 lead EKG if indicated  - Administer antiarrhythmic and heart rate control medications as ordered  - Monitor electrolytes and administer replacement therapy as ordered   Outcome: Progressing

## 2017-10-09 ENCOUNTER — TELEPHONE (OUTPATIENT)
Dept: PREADMISSION TESTING | Facility: HOSPITAL | Age: 72
End: 2017-10-09

## 2017-10-09 DIAGNOSIS — I50.32 CHRONIC DIASTOLIC HEART FAILURE (HCC): ICD-10-CM

## 2017-10-09 LAB
APTT PPP: 31 SECONDS (ref 23–35)
APTT PPP: 45 SECONDS (ref 23–35)

## 2017-10-09 PROCEDURE — 85730 THROMBOPLASTIN TIME PARTIAL: CPT | Performed by: INTERNAL MEDICINE

## 2017-10-09 RX ADMIN — ESCITALOPRAM OXALATE 10 MG: 10 TABLET ORAL at 08:47

## 2017-10-09 RX ADMIN — PRAVASTATIN SODIUM 20 MG: 40 TABLET ORAL at 16:44

## 2017-10-09 RX ADMIN — POTASSIUM CHLORIDE 20 MEQ: 1500 TABLET, EXTENDED RELEASE ORAL at 08:46

## 2017-10-09 RX ADMIN — POTASSIUM CHLORIDE 20 MEQ: 1500 TABLET, EXTENDED RELEASE ORAL at 16:44

## 2017-10-09 RX ADMIN — TOPIRAMATE 25 MG: 25 TABLET, FILM COATED ORAL at 08:47

## 2017-10-09 RX ADMIN — METOPROLOL TARTRATE 50 MG: 50 TABLET ORAL at 22:07

## 2017-10-09 RX ADMIN — NORTRIPTYLINE HYDROCHLORIDE 50 MG: 50 CAPSULE ORAL at 22:07

## 2017-10-09 RX ADMIN — HEPARIN SODIUM AND DEXTROSE 21.1 UNITS/KG/HR: 10000; 5 INJECTION INTRAVENOUS at 18:11

## 2017-10-09 RX ADMIN — HEPARIN SODIUM 2000 UNITS: 1000 INJECTION, SOLUTION INTRAVENOUS; SUBCUTANEOUS at 08:37

## 2017-10-09 RX ADMIN — TOPIRAMATE 25 MG: 25 TABLET, FILM COATED ORAL at 16:44

## 2017-10-09 RX ADMIN — HEPARIN SODIUM 4000 UNITS: 1000 INJECTION, SOLUTION INTRAVENOUS; SUBCUTANEOUS at 16:37

## 2017-10-09 RX ADMIN — FUROSEMIDE 40 MG: 10 INJECTION, SOLUTION INTRAMUSCULAR; INTRAVENOUS at 16:39

## 2017-10-09 RX ADMIN — FUROSEMIDE 40 MG: 10 INJECTION, SOLUTION INTRAMUSCULAR; INTRAVENOUS at 08:42

## 2017-10-09 RX ADMIN — HEPARIN SODIUM AND DEXTROSE 15.1 UNITS/KG/HR: 10000; 5 INJECTION INTRAVENOUS at 00:15

## 2017-10-09 RX ADMIN — METOPROLOL TARTRATE 50 MG: 50 TABLET ORAL at 08:46

## 2017-10-09 NOTE — PROGRESS NOTES
Progress Note - Cardiology   Lang Roy 81 y o  female MRN: [id number]  Unit/Bed#: E4 -01 Encounter: [id number]      Assessment:     1  New AF w/ RVR  2  Prior CVA 2013--s/p medtronic Reveal in place  3  Obesity  4  OSAS  5  HTN  6  Acute diastolic CHF, likely seconeary to #1    Discussion/Recommendations:    · Con't IV lasix--check BMP/BNP tomorrow--still some crackles on exam w/ TRAMMELL in the room  · Increase metoprolol to 75 pO BID for better rate control  · Echo pending  · Loop recorder explant tomorrow--bridge w/ IV Heparin in light of h/o CVA--then NOAC      Subjective: Pt seen/examined    Feels well, still TRAMMELL      Physical Exam:  GEN:  NAD  HEENT:  MMM, NCAT, pink conjunctiva, EOMI, nonicteric sclera  CV:  NO JVD/HJR, RR, NO M/R/G, +S1/S2, NO PARASTERNAL HEAVE/THRILL, NO LE EDEMA, NO HEPATIC SYSTOLIC PULSATION, WARM EXTREMITIES  RESP:  Bibasilar crackles  ABD:  SOFT, NT, NO GROSS ORGANOMEGALY        Vitals:   BP 98/78   Pulse 96   Temp (!) 96 8 °F (36 °C) (Tympanic)   Resp 18   Ht 5' 0 6" (1 539 m)   Wt 117 kg (257 lb 15 oz)   LMP  (LMP Unknown)   SpO2 98%   BMI 49 38 kg/m²   Vitals:    10/08/17 1316 10/09/17 0549   Weight: 116 kg (255 lb 11 7 oz) 117 kg (257 lb 15 oz)       Intake/Output Summary (Last 24 hours) at 10/09/17 1610  Last data filed at 10/09/17 1001   Gross per 24 hour   Intake          1295 92 ml   Output              400 ml   Net           895 92 ml         TELEMETRY: AF w/ intermittent RVR  Lab Results:    Results from last 7 days  Lab Units 10/08/17  0530   WBC Thousand/uL 6 88   HEMOGLOBIN g/dL 10 9*   HEMATOCRIT % 35 0   PLATELETS Thousands/uL 264       Results from last 7 days  Lab Units 10/08/17  0530 10/07/17  0708   SODIUM mmol/L 143 143   POTASSIUM mmol/L 3 8 4 2   CHLORIDE mmol/L 107 107   CO2 mmol/L 27 24   BUN mg/dL 18 14   CREATININE mg/dL 1 35* 1 22   CALCIUM mg/dL 8 6 8 9   TOTAL PROTEIN g/dL  --  7 3   BILIRUBIN TOTAL mg/dL  --  0 52   ALK PHOS U/L  --  126*   ALT U/L  --  38   AST U/L  --  56*   GLUCOSE RANDOM mg/dL 115 111       Results from last 7 days  Lab Units 10/08/17  0530   SODIUM mmol/L 143   POTASSIUM mmol/L 3 8   CHLORIDE mmol/L 107   CO2 mmol/L 27   BUN mg/dL 18   CREATININE mg/dL 1 35*   GLUCOSE RANDOM mg/dL 115   CALCIUM mg/dL 8 6             Medications:    Current Facility-Administered Medications:     escitalopram (LEXAPRO) tablet 10 mg, 10 mg, Oral, Daily, Jaxon Hannon MD, 10 mg at 10/09/17 0847    furosemide (LASIX) injection 40 mg, 40 mg, Intravenous, BID (diuretic), Rujul Ferrell, DO, 40 mg at 10/09/17 0842    heparin (porcine) 25,000 units in 250 mL infusion (premix), 3-20 Units/kg/hr (Order-Specific), Intravenous, Titrated, Pravin Factor PA-C, Last Rate: 15 4 mL/hr at 10/09/17 0831, 17 1 Units/kg/hr at 10/09/17 0831    heparin (porcine) injection 2,000 Units, 2,000 Units, Intravenous, PRN, Rujul Ferrell, DO, 2,000 Units at 10/09/17 0837    heparin (porcine) injection 4,000 Units, 4,000 Units, Intravenous, PRN, Rujul Ferrell, DO, 4,000 Units at 10/07/17 1835    influenza inactivated quadrivalent vaccine (FLULAVAL) IM injection 0 5 mL, 0 5 mL, Intramuscular, Prior to discharge, Anna Yanez DO    LORazepam (ATIVAN) tablet 1 mg, 1 mg, Oral, TID PRN, Jaxon Hannon MD    meclizine (ANTIVERT) tablet 31 25 mg, 31 25 mg, Oral, TID PRN, Jaxon Hannon MD    metoprolol tartrate (LOPRESSOR) tablet 50 mg, 50 mg, Oral, Q12H [facility], Milla Ferrell DO, 50 mg at 10/09/17 0846    nortriptyline (PAMELOR) capsule 50 mg, 50 mg, Oral, HS, Jaxon Hannon MD, 50 mg at 10/08/17 2142    ondansetron (ZOFRAN) injection 4 mg, 4 mg, Intravenous, Q6H PRN, Jaxon Hannon MD    oxyCODONE-acetaminophen (PERCOCET) 5-325 mg per tablet 1 tablet, 1 tablet, Oral, Q4H PRN, Jaxon Hannon MD, 1 tablet at 10/07/17 2211    potassium chloride (K-DUR,KLOR-CON) CR tablet 20 mEq, 20 mEq, Oral, BID, Jaxon Hannon MD, 20 mEq at 10/09/17 0846    pravastatin (PRAVACHOL) tablet 20 mg, 20 mg, Oral, Daily With Sesar Arvizu MD, 20 mg at 10/08/17 1728    topiramate (TOPAMAX) tablet 25 mg, 25 mg, Oral, BID, Alesha Burkett MD, 25 mg at 10/09/17 0847    Portions of the record may have been created with voice recognition software  Occasional wrong word or "sound a like" substitutions may have occurred due to the inherent limitations of voice recognition software  Read the chart carefully and recognize, using context, where substitutions have occurred

## 2017-10-09 NOTE — PROGRESS NOTES
[address] Internal Medicine Progress Note  Patient: Keerthi Conde 81 y o  female   MRN: [id number]  PCP: Felicitas Amaya MD  Unit/Bed#: E4 -01 Encounter: [id number]  Date Of Visit: 10/09/17    Assessment:    Principal Problem:    Atrial fibrillation with rapid ventricular response (HCC)  Active Problems:    Acute congestive heart failure ([address] )    Cerebrovascular disease    Essential hypertension    Hyperlipidemia LDL goal <100    Prediabetes      Plan:    · Atrial fibrillation with rapid ventricular response now off diltiazem drip and on beta-blockade with rate 90 to 100 still in AFib is due to have loop recorder removed today will continue heparin drip through today and initiate NOAC in a m  · Acute congestive heart failure likely in relation to rapid ventricular response echocardiogram pending still in fluid overload continue IV Lasix  · History of cerebral vascular disease Plavix on hold  · Essential hypertension BP at goal continue metoprolol dosing 50 mg b i d   · Hyperlipidemia continue pravastatin      VTE Pharmacologic Prophylaxis:   Pharmacologic: Heparin Drip  Mechanical VTE Prophylaxis in Place: No    Discussions with Specialists or Other Care Team Provider:  Now    Time Spent for Care: 45 minutes  More than 50% of total time spent on counseling and coordination of care as described above  Subjective:   Denies much discomfort denies palpitations denies shortness of breath denies any leg pain chest pain  Anxious to have loop recorder removed    Objective:     Vitals:   Temp (24hrs), Av 9 °F (35 5 °C), Min:95 °F (35 °C), Max:96 5 °F (35 8 °C)    HR:  [83-94] 93  Resp:  [18] 18  BP: (120-132)/(73-92) 120/92  SpO2:  [94 %-99 %] 99 %  Body mass index is 49 38 kg/m²  Input and Output Summary (last 24 hours):        Intake/Output Summary (Last 24 hours) at 10/09/17 1029  Last data filed at 10/09/17 0600   Gross per 24 hour   Intake          1021 69 ml   Output              400 ml   Net 621 69 ml       Physical Exam:     Physical Exam:   General appearance: alert, appears stated age and cooperative  Head: Normocephalic, without obvious abnormality, atraumatic  Lungs: clear to auscultation bilaterally  Heart: irregularly irregular rhythm  Abdomen: soft, non-tender; bowel sounds normal; no masses,  no organomegaly  Back: negative  Extremities: extremities normal, atraumatic, no cyanosis or edema  Neurologic: Grossly normal      Additional Data:     Labs:      Results from last 7 days  Lab Units 10/08/17  0530 10/07/17  0708   WBC Thousand/uL 6 88 6 99   HEMOGLOBIN g/dL 10 9* 11 3*   HEMATOCRIT % 35 0 35 9   PLATELETS Thousands/uL 264 291   NEUTROS PCT %  --  54   LYMPHS PCT %  --  33   MONOS PCT %  --  6   EOS PCT %  --  6       Results from last 7 days  Lab Units 10/08/17  0530 10/07/17  0708   SODIUM mmol/L 143 143   POTASSIUM mmol/L 3 8 4 2   CHLORIDE mmol/L 107 107   CO2 mmol/L 27 24   BUN mg/dL 18 14   CREATININE mg/dL 1 35* 1 22   CALCIUM mg/dL 8 6 8 9   TOTAL PROTEIN g/dL  --  7 3   BILIRUBIN TOTAL mg/dL  --  0 52   ALK PHOS U/L  --  126*   ALT U/L  --  38   AST U/L  --  56*   GLUCOSE RANDOM mg/dL 115 111       Results from last 7 days  Lab Units 10/07/17  0708   INR  0 98       * I Have Reviewed All Lab Data Listed Above  * Additional Pertinent Lab Tests Reviewed: All Labs For Current Hospital Admission Reviewed    Imaging:  Xr Chest 2 Views    Result Date: 10/7/2017  Narrative: CHEST INDICATION:  dyspnea  History taken directly from the electronic ordering system  COMPARISON:  None VIEWS:  Frontal and lateral projections IMAGES:  2 FINDINGS:  Loop recorder device overlying the chest  Cardiomediastinal silhouette appears unremarkable  Mild interstitial and vascular prominence  No focal consolidation  No pneumothorax or pleural effusion  Visualized osseous structures appear within normal limits for the patient's age  Impression: Mild interstitial and vascular prominence   As per comments in the PACS workstation, findings are concordant with preliminary interpretation provided by the emergency room physician  Workstation performed: [id number]     Vas Lower Limb Venous Duplex Study, Complete Bilateral    Result Date: 10/7/2017  Narrative:  [facility] REPORT [facility]INICAL: Indications: Other specified soft tissue disorders [M79 89]  Patient seen in the ED with SOB x 1 week  Patient denies any leg symptoms however her right leg is swollen and red with unknown duration  Risk Factors: The patient has history of obesity  Clinical: Right Lower Limb There is edema and pigmentation  CONCLUSION: Impression: RIGHT LOWER LIMB: No evidence of acute or chronic deep vein thrombosis  No evidence of superficial thrombophlebitis noted  Doppler evaluation shows a normal response to augmentation maneuvers  Popliteal, posterior tibial and anterior tibial arterial Doppler waveforms are triphasic  LEFT LOWER LIMB: No evidence of acute or chronic deep vein thrombosis  No evidence of superficial thrombophlebitis noted  Doppler evaluation shows a normal response to augmentation maneuvers  Popliteal, posterior tibial and anterior tibial arterial Doppler waveforms are triphasic  Dr Jad Riggs notified of preliminary results  SIGNATURE: Electronically Signed by: Leslie Weaver MD, [address] on 2017-10-07 04:20:33 PM    Imaging Reports Reviewed Today Include:  Chest x-ray venous Dopplers reviewed  Imaging Personally Reviewed by Myself Includes:  no  Procedure: Xr Chest 2 Views    Result Date: 10/7/2017  Narrative: CHEST INDICATION:  dyspnea  History taken directly from the electronic ordering system  COMPARISON:  None VIEWS:  Frontal and lateral projections IMAGES:  2 FINDINGS:  Loop recorder device overlying the chest  Cardiomediastinal silhouette appears unremarkable  Mild interstitial and vascular prominence  No focal consolidation  No pneumothorax or pleural effusion   Visualized osseous structures appear within normal limits for the patient's age  Impression: Mild interstitial and vascular prominence  As per comments in the PACS workstation, findings are concordant with preliminary interpretation provided by the emergency room physician  Workstation performed: [id number]     Procedure: Vas Lower Limb Venous Duplex Study, Complete Bilateral    Result Date: 10/7/2017  Narrative:  [facility] REPORT CLINICAL: Indications: Other specified soft tissue disorders [M79 89]  Patient seen in the ED with SOB x 1 week  Patient denies any leg symptoms however her right leg is swollen and red with unknown duration  Risk Factors: The patient has history of obesity  Clinical: Right Lower Limb There is edema and pigmentation  CONCLUSION: Impression: RIGHT LOWER LIMB: No evidence of acute or chronic deep vein thrombosis  No evidence of superficial thrombophlebitis noted  Doppler evaluation shows a normal response to augmentation maneuvers  Popliteal, posterior tibial and anterior tibial arterial Doppler waveforms are triphasic  LEFT LOWER LIMB: No evidence of acute or chronic deep vein thrombosis  No evidence of superficial thrombophlebitis noted  Doppler evaluation shows a normal response to augmentation maneuvers  Popliteal, posterior tibial and anterior tibial arterial Doppler waveforms are triphasic  Dr Lori Ramos notified of preliminary results    SIGNATURE: Electronically Signed by: Desmond Donaldson MD, RPVI on 2017-10-07 04:20:33 PM       Recent Cultures (last 7 days):           Last 24 Hours Medication List:     escitalopram 10 mg Oral Daily   furosemide 40 mg Intravenous BID (diuretic)   metoprolol tartrate 50 mg Oral Q12H [address]   nortriptyline 50 mg Oral HS   potassium chloride 20 mEq Oral BID   pravastatin 20 mg Oral Daily With Dinner   topiramate 25 mg Oral BID        Today, Patient Was Seen By: Tenzin Grady MD    ** Please Note: Dragon 360 Dictation voice to text software may have been used in the creation of this document   **

## 2017-10-10 ENCOUNTER — APPOINTMENT (INPATIENT)
Dept: NON INVASIVE DIAGNOSTICS | Facility: HOSPITAL | Age: 72
DRG: 308 | End: 2017-10-10
Attending: INTERNAL MEDICINE
Payer: COMMERCIAL

## 2017-10-10 ENCOUNTER — APPOINTMENT (INPATIENT)
Dept: NON INVASIVE DIAGNOSTICS | Facility: HOSPITAL | Age: 72
DRG: 308 | End: 2017-10-10
Payer: COMMERCIAL

## 2017-10-10 ENCOUNTER — GENERIC CONVERSION - ENCOUNTER (OUTPATIENT)
Dept: OTHER | Facility: OTHER | Age: 72
End: 2017-10-10

## 2017-10-10 LAB — APTT PPP: 98 SECONDS (ref 23–35)

## 2017-10-10 PROCEDURE — 93306 TTE W/DOPPLER COMPLETE: CPT

## 2017-10-10 PROCEDURE — 0JPT32Z REMOVAL OF MONITORING DEVICE FROM TRUNK SUBCUTANEOUS TISSUE AND FASCIA, PERCUTANEOUS APPROACH: ICD-10-PCS | Performed by: INTERNAL MEDICINE

## 2017-10-10 PROCEDURE — 33284 HB REMOVE PAT-ACTIVE HT RECORD: CPT | Performed by: INTERNAL MEDICINE

## 2017-10-10 RX ORDER — FENTANYL CITRATE 50 UG/ML
INJECTION, SOLUTION INTRAMUSCULAR; INTRAVENOUS AS NEEDED
Status: DISCONTINUED | OUTPATIENT
Start: 2017-10-10 | End: 2017-10-10 | Stop reason: SURG

## 2017-10-10 RX ORDER — GENTAMICIN SULFATE 40 MG/ML
INJECTION, SOLUTION INTRAMUSCULAR; INTRAVENOUS CODE/TRAUMA/SEDATION MEDICATION
Status: COMPLETED | OUTPATIENT
Start: 2017-10-10 | End: 2017-10-10

## 2017-10-10 RX ORDER — MIDAZOLAM HYDROCHLORIDE 1 MG/ML
INJECTION INTRAMUSCULAR; INTRAVENOUS AS NEEDED
Status: DISCONTINUED | OUTPATIENT
Start: 2017-10-10 | End: 2017-10-10 | Stop reason: SURG

## 2017-10-10 RX ORDER — DEXAMETHASONE SODIUM PHOSPHATE 4 MG/ML
INJECTION, SOLUTION INTRA-ARTICULAR; INTRALESIONAL; INTRAMUSCULAR; INTRAVENOUS; SOFT TISSUE AS NEEDED
Status: DISCONTINUED | OUTPATIENT
Start: 2017-10-10 | End: 2017-10-10 | Stop reason: SURG

## 2017-10-10 RX ORDER — ONDANSETRON 2 MG/ML
INJECTION INTRAMUSCULAR; INTRAVENOUS AS NEEDED
Status: DISCONTINUED | OUTPATIENT
Start: 2017-10-10 | End: 2017-10-10 | Stop reason: SURG

## 2017-10-10 RX ORDER — KETAMINE HYDROCHLORIDE 50 MG/ML
INJECTION, SOLUTION, CONCENTRATE INTRAMUSCULAR; INTRAVENOUS AS NEEDED
Status: DISCONTINUED | OUTPATIENT
Start: 2017-10-10 | End: 2017-10-10 | Stop reason: SURG

## 2017-10-10 RX ORDER — SODIUM CHLORIDE 9 MG/ML
INJECTION, SOLUTION INTRAVENOUS CONTINUOUS PRN
Status: DISCONTINUED | OUTPATIENT
Start: 2017-10-10 | End: 2017-10-10 | Stop reason: SURG

## 2017-10-10 RX ORDER — LIDOCAINE HYDROCHLORIDE 10 MG/ML
INJECTION, SOLUTION INFILTRATION; PERINEURAL CODE/TRAUMA/SEDATION MEDICATION
Status: COMPLETED | OUTPATIENT
Start: 2017-10-10 | End: 2017-10-10

## 2017-10-10 RX ADMIN — MIDAZOLAM HYDROCHLORIDE 1 MG: 1 INJECTION, SOLUTION INTRAMUSCULAR; INTRAVENOUS at 08:12

## 2017-10-10 RX ADMIN — TOPIRAMATE 25 MG: 25 TABLET, FILM COATED ORAL at 17:33

## 2017-10-10 RX ADMIN — FUROSEMIDE 40 MG: 10 INJECTION, SOLUTION INTRAMUSCULAR; INTRAVENOUS at 10:01

## 2017-10-10 RX ADMIN — FENTANYL CITRATE 25 MCG: 50 INJECTION, SOLUTION INTRAMUSCULAR; INTRAVENOUS at 08:20

## 2017-10-10 RX ADMIN — DEXAMETHASONE SODIUM PHOSPHATE 4 MG: 4 INJECTION, SOLUTION INTRAMUSCULAR; INTRAVENOUS at 08:28

## 2017-10-10 RX ADMIN — GENTAMICIN SULFATE 80 MG: 40 INJECTION, SOLUTION INTRAMUSCULAR; INTRAVENOUS at 08:47

## 2017-10-10 RX ADMIN — PRAVASTATIN SODIUM 20 MG: 40 TABLET ORAL at 17:33

## 2017-10-10 RX ADMIN — SODIUM CHLORIDE: 0.9 INJECTION, SOLUTION INTRAVENOUS at 08:12

## 2017-10-10 RX ADMIN — ESCITALOPRAM OXALATE 10 MG: 10 TABLET ORAL at 10:01

## 2017-10-10 RX ADMIN — METOPROLOL TARTRATE 50 MG: 50 TABLET ORAL at 10:01

## 2017-10-10 RX ADMIN — NORTRIPTYLINE HYDROCHLORIDE 50 MG: 50 CAPSULE ORAL at 21:33

## 2017-10-10 RX ADMIN — OXYCODONE HYDROCHLORIDE AND ACETAMINOPHEN 1 TABLET: 5; 325 TABLET ORAL at 23:59

## 2017-10-10 RX ADMIN — LIDOCAINE HYDROCHLORIDE 9 ML: 10 INJECTION, SOLUTION INFILTRATION; PERINEURAL at 08:41

## 2017-10-10 RX ADMIN — METOPROLOL TARTRATE 75 MG: 50 TABLET ORAL at 21:32

## 2017-10-10 RX ADMIN — FENTANYL CITRATE 25 MCG: 50 INJECTION, SOLUTION INTRAMUSCULAR; INTRAVENOUS at 08:27

## 2017-10-10 RX ADMIN — POTASSIUM CHLORIDE 20 MEQ: 1500 TABLET, EXTENDED RELEASE ORAL at 10:01

## 2017-10-10 RX ADMIN — TOPIRAMATE 25 MG: 25 TABLET, FILM COATED ORAL at 10:01

## 2017-10-10 RX ADMIN — ONDANSETRON HYDROCHLORIDE 4 MG: 2 INJECTION, SOLUTION INTRAVENOUS at 08:23

## 2017-10-10 RX ADMIN — FENTANYL CITRATE 25 MCG: 50 INJECTION, SOLUTION INTRAMUSCULAR; INTRAVENOUS at 08:36

## 2017-10-10 RX ADMIN — POTASSIUM CHLORIDE 20 MEQ: 1500 TABLET, EXTENDED RELEASE ORAL at 17:33

## 2017-10-10 RX ADMIN — APIXABAN 5 MG: 5 TABLET, FILM COATED ORAL at 10:01

## 2017-10-10 RX ADMIN — MIDAZOLAM HYDROCHLORIDE 1 MG: 1 INJECTION, SOLUTION INTRAMUSCULAR; INTRAVENOUS at 08:32

## 2017-10-10 RX ADMIN — KETAMINE HYDROCHLORIDE 25 MG: 50 INJECTION INTRAMUSCULAR; INTRAVENOUS at 08:12

## 2017-10-10 RX ADMIN — FUROSEMIDE 40 MG: 10 INJECTION, SOLUTION INTRAMUSCULAR; INTRAVENOUS at 17:33

## 2017-10-10 RX ADMIN — FENTANYL CITRATE 25 MCG: 50 INJECTION, SOLUTION INTRAMUSCULAR; INTRAVENOUS at 08:46

## 2017-10-10 NOTE — PLAN OF CARE
Problem: DISCHARGE PLANNING - CARE MANAGEMENT  Goal: Discharge to post-acute care or home with appropriate resources  INTERVENTIONS:  - Conduct assessment to determine patient/family and health care team treatment goals, and need for post-acute services based on payer coverage, community resources, and patient preferences, and barriers to discharge  - Address psychosocial, clinical, and financial barriers to discharge as identified in assessment in conjunction with the patient/family and health care team  - Arrange appropriate level of post-acute services according to patients   needs and preference and payer coverage in collaboration with the physician and health care team  - Communicate with and update the patient/family, physician, and health care team regarding progress on the discharge plan  - Arrange appropriate transportation to post-acute venues  Outcome: Adequate for Discharge  No other needs at present  CM to follow as needed

## 2017-10-10 NOTE — PLAN OF CARE
CARDIOVASCULAR - ADULT     Maintains optimal cardiac output and hemodynamic stability Progressing     Absence of cardiac dysrhythmias or at baseline rhythm Progressing        DISCHARGE PLANNING     Discharge to home or other facility with appropriate resources Progressing        Knowledge Deficit     Patient/family/caregiver demonstrates understanding of disease process, treatment plan, medications, and discharge instructions Progressing        Nutrition/Hydration-ADULT     Nutrient/Hydration intake appropriate for improving, restoring or maintaining nutritional needs Progressing        Potential for Falls     Patient will remain free of falls Progressing

## 2017-10-10 NOTE — PROCEDURES
PP  LINQ    History and physical were reviewed  Patient was examined and history was reviewed  No change in patient's condition Since history and physical has been completed        The pre- operative diagnosis:  A fib with RVR  Reveal at EOL      Postoperative diagnosis:  A fib with RVR  Reveal at EOL      Procedure:  Explantation of the Reveal        Surgeon: [id number] -none    Specimens - none    Estimated blood loss- 20 ml    Findings-none    Complications none    Anesthesia-  local lidocaine by myself      Details of the device  Reveal    Description of procedure: The patient was seen before the procedure  The details of the procedure was explained and patient agreed to the same  Appropriate consent was signed  The patient was brought to the electrophysiologic laboratory  Proper time out was done  Sterile dressing and draping was done  Local lidocaine was infiltrated, in the left third  intercostal space,  Over the old scar of implantation     A linear 4 cm incision was made with the knife along Jules's lines  The device is quiet deep  There were two major bleeding vessel on the way to the capsule   Each needed to be caught with mosquito forceps and cauterized individually   There was approximately 10 ml bleeding from each  The capsule was incised and the Reveal was removed  The patient did have some bleeding and needed electrocautery to control the same  Closed in 3 separate layers with 2O, 3O and finally two layers with 4 O vicryl      Dressing was done with Steri-Strips, Telfa and Tegaderm  A modified pressure dressing was applied as there was significant bleeding       Patient should not go back on heparin  Chances of hematoma high  Start oral anticoagulation      Summary of the procedure:   The patient came in to the laboratory for Reveal device removal  The device has been removed and patient tolerated the procedure well

## 2017-10-10 NOTE — PROGRESS NOTES
[address] Internal Medicine Progress Note  Patient: Mauri Cintron 81 y o  female   MRN: [id number]  PCP: Sonido Porter MD  Unit/Bed#: E4 -01 Encounter: [id number]  Date Of Visit: 10/10/17    Assessment:    Principal Problem:    Atrial fibrillation with rapid ventricular response (HCC)  Active Problems:    Acute congestive heart failure ([address] )    Cerebrovascular disease    Essential hypertension    Hyperlipidemia LDL goal <100    Prediabetes      Plan:    · Atrial fibrillation with rapid ventricular response now off diltiazem drip and on beta-blockade with rate 90 to 100 still in AFib is due to have loop recorder removed today will  initiate NOAC in a m  · Acute congestive heart failure likely in relation to rapid ventricular response echocardiogram pending still in fluid overload continue IV Lasix as per Cardiology  · History of cerebral vascular disease Plavix on hold  · Essential hypertension BP at goal continue metoprolol dosing 50 mg b i d   · Hyperlipidemia continue pravastatin      VTE Pharmacologic Prophylaxis:   Pharmacologic: Apixaban (Eliquis)  Mechanical VTE Prophylaxis in Place: No    Discussions with Specialists or Other Care Team Provider:  Now    Time Spent for Care: 45 minutes  More than 50% of total time spent on counseling and coordination of care as described above  Subjective:   Denies much discomfort denies palpitations denies shortness of breath denies any leg pain chest pain  Anxious to have loop recorder removed    Objective:     Vitals:   Temp (24hrs), Av 2 °F (36 2 °C), Min:96 4 °F (35 8 °C), Max:98 4 °F (36 9 °C)    HR:  [] 96  Resp:  [14-18] 14  BP: ()/(60-93) 119/60  SpO2:  [94 %-99 %] 99 %  Body mass index is 48 12 kg/m²  Input and Output Summary (last 24 hours):        Intake/Output Summary (Last 24 hours) at 10/10/17 0910  Last data filed at 10/10/17 09   Gross per 24 hour   Intake          1353 57 ml   Output             1300 ml   Net 53 57 ml       Physical Exam:     Physical Exam:   General appearance: alert, appears stated age and cooperative  Head: Normocephalic, without obvious abnormality, atraumatic  Lungs: rales bilaterally  Heart: irregularly irregular rhythm  Abdomen: soft, non-tender; bowel sounds normal; no masses,  no organomegaly  Back: negative  Extremities: extremities normal, atraumatic, no cyanosis or edema  Neurologic: Grossly normal      Additional Data:     Labs:      Results from last 7 days  Lab Units 10/08/17  0530 10/07/17  0708   WBC Thousand/uL 6 88 6 99   HEMOGLOBIN g/dL 10 9* 11 3*   HEMATOCRIT % 35 0 35 9   PLATELETS Thousands/uL 264 291   NEUTROS PCT %  --  54   LYMPHS PCT %  --  33   MONOS PCT %  --  6   EOS PCT %  --  6       Results from last 7 days  Lab Units 10/08/17  0530 10/07/17  0708   SODIUM mmol/L 143 143   POTASSIUM mmol/L 3 8 4 2   CHLORIDE mmol/L 107 107   CO2 mmol/L 27 24   BUN mg/dL 18 14   CREATININE mg/dL 1 35* 1 22   CALCIUM mg/dL 8 6 8 9   TOTAL PROTEIN g/dL  --  7 3   BILIRUBIN TOTAL mg/dL  --  0 52   ALK PHOS U/L  --  126*   ALT U/L  --  38   AST U/L  --  56*   GLUCOSE RANDOM mg/dL 115 111       Results from last 7 days  Lab Units 10/07/17  0708   INR  0 98       * I Have Reviewed All Lab Data Listed Above  * Additional Pertinent Lab Tests Reviewed: All Labs For Current Hospital Admission Reviewed    Imaging:  Xr Chest 2 Views    Result Date: 10/7/2017  Narrative: CHEST INDICATION:  dyspnea  History taken directly from the electronic ordering system  COMPARISON:  None VIEWS:  Frontal and lateral projections IMAGES:  2 FINDINGS:  Loop recorder device overlying the chest  Cardiomediastinal silhouette appears unremarkable  Mild interstitial and vascular prominence  No focal consolidation  No pneumothorax or pleural effusion  Visualized osseous structures appear within normal limits for the patient's age  Impression: Mild interstitial and vascular prominence   As per comments in the PACS workstation, findings are concordant with preliminary interpretation provided by the emergency room physician  Workstation performed: [id number]     Vas Lower Limb Venous Duplex Study, Complete Bilateral    Result Date: 10/7/2017  Narrative:  [facility] REPORT [facility]INICAL: Indications: Other specified soft tissue disorders [M79 89]  Patient seen in the ED with SOB x 1 week  Patient denies any leg symptoms however her right leg is swollen and red with unknown duration  Risk Factors: The patient has history of obesity  Clinical: Right Lower Limb There is edema and pigmentation  CONCLUSION: Impression: RIGHT LOWER LIMB: No evidence of acute or chronic deep vein thrombosis  No evidence of superficial thrombophlebitis noted  Doppler evaluation shows a normal response to augmentation maneuvers  Popliteal, posterior tibial and anterior tibial arterial Doppler waveforms are triphasic  LEFT LOWER LIMB: No evidence of acute or chronic deep vein thrombosis  No evidence of superficial thrombophlebitis noted  Doppler evaluation shows a normal response to augmentation maneuvers  Popliteal, posterior tibial and anterior tibial arterial Doppler waveforms are triphasic  Dr Zaina Hickey notified of preliminary results  SIGNATURE: Electronically Signed by: Jasmina Abdul MD, [address] on 2017-10-07 04:20:33 PM    Imaging Reports Reviewed Today Include:  Chest x-ray venous Dopplers reviewed  Imaging Personally Reviewed by Myself Includes:  no  Procedure: Xr Chest 2 Views    Result Date: 10/7/2017  Narrative: CHEST INDICATION:  dyspnea  History taken directly from the electronic ordering system  COMPARISON:  None VIEWS:  Frontal and lateral projections IMAGES:  2 FINDINGS:  Loop recorder device overlying the chest  Cardiomediastinal silhouette appears unremarkable  Mild interstitial and vascular prominence  No focal consolidation  No pneumothorax or pleural effusion   Visualized osseous structures appear within normal limits for the patient's age  Impression: Mild interstitial and vascular prominence  As per comments in the PACS workstation, findings are concordant with preliminary interpretation provided by the emergency room physician  Workstation performed: [id number]     Procedure: Vas Lower Limb Venous Duplex Study, Complete Bilateral    Result Date: 10/7/2017  Narrative:  [facility] REPORT [facility]IN[facility]: Indications: Other specified soft tissue disorders [M79 89]  Patient seen in the ED with SOB x 1 week  Patient denies any leg symptoms however her right leg is swollen and red with unknown duration  Risk Factors: The patient has history of obesity  Clinical: Right Lower Limb There is edema and pigmentation  CONCLUSION: Impression: RIGHT LOWER LIMB: No evidence of acute or chronic deep vein thrombosis  No evidence of superficial thrombophlebitis noted  Doppler evaluation shows a normal response to augmentation maneuvers  Popliteal, posterior tibial and anterior tibial arterial Doppler waveforms are triphasic  LEFT LOWER LIMB: No evidence of acute or chronic deep vein thrombosis  No evidence of superficial thrombophlebitis noted  Doppler evaluation shows a normal response to augmentation maneuvers  Popliteal, posterior tibial and anterior tibial arterial Doppler waveforms are triphasic  Dr Betty Baker notified of preliminary results    SIGNATURE: Electronically Signed by: Harriet Oates MD, RPVI on 2017-10-07 04:20:33 PM       Recent Cultures (last 7 days):           Last 24 Hours Medication List:     apixaban 5 mg Oral BID   escitalopram 10 mg Oral Daily   furosemide 40 mg Intravenous BID (diuretic)   metoprolol tartrate 50 mg Oral Q12H [address]   nortriptyline 50 mg Oral HS   potassium chloride 20 mEq Oral BID   pravastatin 20 mg Oral Daily With Dinner   topiramate 25 mg Oral BID        Today, Patient Was Seen By: Lenin Nagel MD    ** Please Note: Dragon 360 Dictation voice to text software may have been used in the creation of this document   **

## 2017-10-10 NOTE — PLAN OF CARE
CARDIOVASCULAR - ADULT     Maintains optimal cardiac output and hemodynamic stability Progressing     Absence of cardiac dysrhythmias or at baseline rhythm Progressing        DISCHARGE PLANNING     Discharge to home or other facility with appropriate resources Progressing        DISCHARGE PLANNING - CARE MANAGEMENT     Discharge to post-acute care or home with appropriate resources Progressing        Knowledge Deficit     Patient/family/caregiver demonstrates understanding of disease process, treatment plan, medications, and discharge instructions Progressing        Nutrition/Hydration-ADULT     Nutrient/Hydration intake appropriate for improving, restoring or maintaining nutritional needs Progressing        Potential for Falls     Patient will remain free of falls Progressing

## 2017-10-10 NOTE — ANESTHESIA PREPROCEDURE EVALUATION
Review of Systems/Medical History  Patient summary reviewed        Cardiovascular  Hyperlipidemia, Hypertension , CHF ,    Pulmonary  Negative pulmonary ROS ,        GI/Hepatic  Negative GI/hepatic ROS          Negative  ROS        Endo/Other  Negative endo/other ROS      GYN  Negative gynecology ROS          Hematology  Negative hematology ROS      Musculoskeletal  Negative musculoskeletal ROS        Neurology    CVA ,    Psychology   Negative psychology ROS            Physical Exam    Airway    Mallampati score: III  TM Distance: >3 FB  Neck ROM: full     Dental   upper dentures and lower dentures,     Cardiovascular  Rhythm: regular, Rate: normal, Cardiovascular exam normal    Pulmonary  Pulmonary exam normal Breath sounds clear to auscultation,     Other Findings        Anesthesia Plan  ASA Score- 3       Anesthesia Type- general and IV sedation with anesthesia  Comment: choice      Induction- intravenous      Informed Consent  Anesthetic plan and risks discussed with patient

## 2017-10-11 VITALS
DIASTOLIC BLOOD PRESSURE: 78 MMHG | HEART RATE: 65 BPM | RESPIRATION RATE: 18 BRPM | OXYGEN SATURATION: 93 % | BODY MASS INDEX: 46.66 KG/M2 | WEIGHT: 247.14 LBS | TEMPERATURE: 97.7 F | HEIGHT: 61 IN | SYSTOLIC BLOOD PRESSURE: 121 MMHG

## 2017-10-11 LAB
ANION GAP SERPL CALCULATED.3IONS-SCNC: 6 MMOL/L (ref 4–13)
BUN SERPL-MCNC: 28 MG/DL (ref 5–25)
CALCIUM SERPL-MCNC: 9.1 MG/DL (ref 8.3–10.1)
CHLORIDE SERPL-SCNC: 105 MMOL/L (ref 100–108)
CO2 SERPL-SCNC: 30 MMOL/L (ref 21–32)
CREAT SERPL-MCNC: 1.39 MG/DL (ref 0.6–1.3)
GFR SERPL CREATININE-BSD FRML MDRD: 38 ML/MIN/1.73SQ M
GLUCOSE SERPL-MCNC: 115 MG/DL (ref 65–140)
POTASSIUM SERPL-SCNC: 4.5 MMOL/L (ref 3.5–5.3)
SODIUM SERPL-SCNC: 141 MMOL/L (ref 136–145)

## 2017-10-11 PROCEDURE — 80048 BASIC METABOLIC PNL TOTAL CA: CPT | Performed by: INTERNAL MEDICINE

## 2017-10-11 PROCEDURE — 90686 IIV4 VACC NO PRSV 0.5 ML IM: CPT | Performed by: INTERNAL MEDICINE

## 2017-10-11 RX ORDER — FUROSEMIDE 20 MG/1
40 TABLET ORAL DAILY
Qty: 30 TABLET | Refills: 0 | Status: SHIPPED | OUTPATIENT
Start: 2017-10-11 | End: 2017-12-28 | Stop reason: HOSPADM

## 2017-10-11 RX ORDER — METOPROLOL TARTRATE 75 MG/1
75 TABLET, FILM COATED ORAL EVERY 12 HOURS SCHEDULED
Qty: 60 TABLET | Refills: 0 | Status: SHIPPED | OUTPATIENT
Start: 2017-10-11 | End: 2017-12-28 | Stop reason: HOSPADM

## 2017-10-11 RX ADMIN — FUROSEMIDE 40 MG: 10 INJECTION, SOLUTION INTRAMUSCULAR; INTRAVENOUS at 09:58

## 2017-10-11 RX ADMIN — ESCITALOPRAM OXALATE 10 MG: 10 TABLET ORAL at 09:59

## 2017-10-11 RX ADMIN — INFLUENZA VIRUS VACCINE 0.5 ML: 15; 15; 15; 15 SUSPENSION INTRAMUSCULAR at 16:18

## 2017-10-11 RX ADMIN — METOPROLOL TARTRATE 75 MG: 50 TABLET ORAL at 09:59

## 2017-10-11 RX ADMIN — POTASSIUM CHLORIDE 20 MEQ: 1500 TABLET, EXTENDED RELEASE ORAL at 09:59

## 2017-10-11 RX ADMIN — TOPIRAMATE 25 MG: 25 TABLET, FILM COATED ORAL at 09:59

## 2017-10-11 RX ADMIN — APIXABAN 5 MG: 5 TABLET, FILM COATED ORAL at 09:59

## 2017-10-11 NOTE — DISCHARGE SUMMARY
Discharge Summary - [facility]    Patient Information: Lisy Ann 81 y o  female MRN: [id number]  Unit/Bed#: E4 -01 Encounter: [id number]    Discharging Physician / Practitioner: Danny Pace MD  PCP: Qasim Lance MD  Admission Date: 10/7/2017  Discharge Date: 10/11/17    Reason for Admission:  Atrial fibrillation with rapid ventricular response    Discharge Diagnoses:  Atrial fibrillation with rapid ventricular response with induced acute congestive heart failure    Principal Problem:    Atrial fibrillation with rapid ventricular response ([address] )  Active Problems:    Acute congestive heart failure ([address] )    Cerebrovascular disease    Essential hypertension    Hyperlipidemia LDL goal <100    Prediabetes  Resolved Problems:    * No resolved hospital problems  *      Consultations During Hospital Stay:  · Cardiology    Procedures Performed:     Xr Chest 2 Views    Result Date: 10/7/2017  Narrative: CHEST INDICATION:  dyspnea  History taken directly from the electronic ordering system  COMPARISON:  None VIEWS:  Frontal and lateral projections IMAGES:  2 FINDINGS:  Loop recorder device overlying the chest  Cardiomediastinal silhouette appears unremarkable  Mild interstitial and vascular prominence  No focal consolidation  No pneumothorax or pleural effusion  Visualized osseous structures appear within normal limits for the patient's age  Impression: Mild interstitial and vascular prominence  As per comments in the PACS workstation, findings are concordant with preliminary interpretation provided by the emergency room physician  Workstation performed: [id number]     Vas Lower Limb Venous Duplex Study, Complete Bilateral    Result Date: 10/7/2017  Narrative:  [facility] REPORT [facility]INICAL: Indications: Other specified soft tissue disorders [M79 89]  Patient seen in the ED with SOB x 1 week   Patient denies any leg symptoms however her right leg is swollen and red with unknown duration  Risk Factors: The patient has history of obesity  Clinical: Right Lower Limb There is edema and pigmentation  CONCLUSION: Impression: RIGHT LOWER LIMB: No evidence of acute or chronic deep vein thrombosis  No evidence of superficial thrombophlebitis noted  Doppler evaluation shows a normal response to augmentation maneuvers  Popliteal, posterior tibial and anterior tibial arterial Doppler waveforms are triphasic  LEFT LOWER LIMB: No evidence of acute or chronic deep vein thrombosis  No evidence of superficial thrombophlebitis noted  Doppler evaluation shows a normal response to augmentation maneuvers  Popliteal, posterior tibial and anterior tibial arterial Doppler waveforms are triphasic  Dr Leopold Dominion notified of preliminary results  SIGNATURE: Electronically Signed by: Abelardo Yeboah MD, RPVI on 2017-10-07 04:20:33 PM        Significant Findings:     · Patient presented with shortness of breath peripheral edema to the ED and was noted to be in a rapid response atrial fibrillation she had an elevated Chads Vasc score due to history of stroke/TIA hypertension congestive heart failure and age female sex  She had an implanted loop recorder 6 years ago  · She underwent Lasix diuresis based on presentation of basilar crackles and fluid overload  · 2D echocardiogram showing an echo of 50% ejection fraction no regional wall motion abnormalities with mildly increased wall thickness mildly dilated left atrium and moderate to severe mitral regurgitation  · She was placed on full anticoagulation with heparin as a bridge to NOAC and had her loop recorder removed by Dr Felicitas Price    · Rate control was achieved with metoprolol 75 mg twice a day and remains is atrial fibrillation she has been placed on Eliquis after loop recorder removal 5 mg twice a day  · Given prescription for metoprolol 75 mg twice a day furosemide 40 mg daily which is a change from previous 20 mg b i d  and Eliquis 5 mg b i d  she will continue on simvastatin 10 mg daily and potassium chloride b i d  avoiding hydrochlorothiazide going forward and have follow-up with Cardiology in 2 to 4 weeks and sees Dr Liu Barnes  Incidental Findings:   · * none    Test Results Pending at Discharge (will require follow up): · None     Outpatient Tests Requested:  · None    Complications:  None    Hospital Course:     Marcia Neville is a 81 y o  female patient who originally presented to the hospital on 10/7/2017 due to rapid ventricular response atrial fibrillation    Condition at Discharge: good     Discharge Day Visit / Exam:     * Please refer to separate progress for these details *    Discharge instructions/Information to patient and family:   See after visit summary for information provided to patient and family  Provisions for Follow-Up Care:  See after visit summary for information related to follow-up care and any pertinent home health orders  Disposition:     Home    For Discharges to [facility] SNF:   · Not Applicable to this Patient - Not Applicable to this Patient      Discharge Statement:  I spent 50minutes discharging the patient  This time was spent on the day of discharge  I had direct contact with the patient on the day of discharge  Greater than 50% of the total time was spent examining patient, answering all patient questions, arranging and discussing plan of care with patient as well as directly providing post-discharge instructions  Additional time then spent on discharge activities  Discharge Medications:  See after visit summary for reconciled discharge medications provided to patient and family  ** Please Note: Dragon 360 Dictation voice to text software may have been used in the creation of this document   **

## 2017-10-11 NOTE — PROGRESS NOTES
[facility] Progress Note  Patient: Gissel Guillen 81 y o  female   MRN: [id number]  PCP: Yani Faustin MD  Unit/Bed#: E4 -01 Encounter: [id number]  Date Of Visit: 10/11/17    Assessment:    Principal Problem:    Atrial fibrillation with rapid ventricular response (HCC)  Active Problems:    Acute congestive heart failure ([address] )    Cerebrovascular disease    Essential hypertension    Hyperlipidemia LDL goal <100    Prediabetes      Plan:    · Atrial fibrillation with rapid ventricular response now off diltiazem drip and on beta-blockade with rate 90 to 100 still in AFib is due to have loop recorder removed today will  initiate NOAC in a m  and ask case management to assess coverage  · Acute congestive heart failure likely in relation to rapid ventricular response echocardiogram pending still in fluid overload continue IV Lasix as per Cardiology still has positive fluid balance and has not had progressive diuresis 2 point that can change to p o  will confer with Cardiology  · History of cerebral vascular disease Plavix on hold  · Essential hypertension BP at goal continue metoprolol dosing 50 mg b i d   · Hyperlipidemia continue pravastatin      VTE Pharmacologic Prophylaxis:   Pharmacologic: Apixaban (Eliquis)  Mechanical VTE Prophylaxis in Place: No    Discussions with Specialists or Other Care Team Provider:  Now    Time Spent for Care: 45 minutes  More than 50% of total time spent on counseling and coordination of care as described above  Subjective:   Denies much discomfort denies palpitations denies shortness of breath denies any leg pain chest pain  Had loop recorder removed some tenderness on on over dressing site otherwise no signs of active bleeding nor hematoma      Objective:     Vitals:   Temp (24hrs), Av 5 °F (36 4 °C), Min:97 2 °F (36 2 °C), Max:97 8 °F (36 6 °C)    HR:  [] 94  Resp:  [17-18] 18  BP: ()/(65-94) 123/94  SpO2:  [95 %-99 %] 95 %  Body mass index is 47 31 kg/m²  Input and Output Summary (last 24 hours): Intake/Output Summary (Last 24 hours) at 10/11/17 1003  Last data filed at 10/10/17 1905   Gross per 24 hour   Intake              240 ml   Output              200 ml   Net               40 ml       Physical Exam:     Physical Exam:   General appearance: alert, appears stated age and cooperative  Head: Normocephalic, without obvious abnormality, atraumatic  Lungs: clear to auscultation bilaterally  Heart: irregularly irregular rhythm  Abdomen: soft, non-tender; bowel sounds normal; no masses,  no organomegaly  Back: negative  Extremities: extremities normal, atraumatic, no cyanosis or edema  Neurologic: Grossly normal      Additional Data:     Labs:      Results from last 7 days  Lab Units 10/08/17  0530 10/07/17  0708   WBC Thousand/uL 6 88 6 99   HEMOGLOBIN g/dL 10 9* 11 3*   HEMATOCRIT % 35 0 35 9   PLATELETS Thousands/uL 264 291   NEUTROS PCT %  --  54   LYMPHS PCT %  --  33   MONOS PCT %  --  6   EOS PCT %  --  6       Results from last 7 days  Lab Units 10/11/17  0510  10/07/17  0708   SODIUM mmol/L 141  < > 143   POTASSIUM mmol/L 4 5  < > 4 2   CHLORIDE mmol/L 105  < > 107   CO2 mmol/L 30  < > 24   BUN mg/dL 28*  < > 14   CREATININE mg/dL 1 39*  < > 1 22   CALCIUM mg/dL 9 1  < > 8 9   TOTAL PROTEIN g/dL  --   --  7 3   BILIRUBIN TOTAL mg/dL  --   --  0 52   ALK PHOS U/L  --   --  126*   ALT U/L  --   --  38   AST U/L  --   --  56*   GLUCOSE RANDOM mg/dL 115  < > 111   < > = values in this interval not displayed  Results from last 7 days  Lab Units 10/07/17  0708   INR  0 98       * I Have Reviewed All Lab Data Listed Above  * Additional Pertinent Lab Tests Reviewed: All Labs For Current Hospital Admission Reviewed    Imaging:  Xr Chest 2 Views    Result Date: 10/7/2017  Narrative: CHEST INDICATION:  dyspnea  History taken directly from the electronic ordering system      COMPARISON:  None VIEWS:  Frontal and lateral projections IMAGES:  2 FINDINGS:  Loop recorder device overlying the chest  Cardiomediastinal silhouette appears unremarkable  Mild interstitial and vascular prominence  No focal consolidation  No pneumothorax or pleural effusion  Visualized osseous structures appear within normal limits for the patient's age  Impression: Mild interstitial and vascular prominence  As per comments in the PACS workstation, findings are concordant with preliminary interpretation provided by the emergency room physician  Workstation performed: [id number]     Vas Lower Limb Venous Duplex Study, Complete Bilateral    Result Date: 10/7/2017  Narrative:  [facility] REPORT CLINICAL: Indications: Other specified soft tissue disorders [M79 89]  Patient seen in the ED with SOB x 1 week  Patient denies any leg symptoms however her right leg is swollen and red with unknown duration  Risk Factors: The patient has history of obesity  Clinical: Right Lower Limb There is edema and pigmentation  CONCLUSION: Impression: RIGHT LOWER LIMB: No evidence of acute or chronic deep vein thrombosis  No evidence of superficial thrombophlebitis noted  Doppler evaluation shows a normal response to augmentation maneuvers  Popliteal, posterior tibial and anterior tibial arterial Doppler waveforms are triphasic  LEFT LOWER LIMB: No evidence of acute or chronic deep vein thrombosis  No evidence of superficial thrombophlebitis noted  Doppler evaluation shows a normal response to augmentation maneuvers  Popliteal, posterior tibial and anterior tibial arterial Doppler waveforms are triphasic  Dr Argenis Brown notified of preliminary results  SIGNATURE: Electronically Signed by: Gabrielle Kwong MD, [address] on 2017-10-07 04:20:33 PM    Imaging Reports Reviewed Today Include:  Chest x-ray venous Dopplers reviewed  Imaging Personally Reviewed by Myself Includes:  no  Procedure: Xr Chest 2 Views    Result Date: 10/7/2017  Narrative: CHEST INDICATION:  dyspnea   History taken directly from the electronic ordering system  COMPARISON:  None VIEWS:  Frontal and lateral projections IMAGES:  2 FINDINGS:  Loop recorder device overlying the chest  Cardiomediastinal silhouette appears unremarkable  Mild interstitial and vascular prominence  No focal consolidation  No pneumothorax or pleural effusion  Visualized osseous structures appear within normal limits for the patient's age  Impression: Mild interstitial and vascular prominence  As per comments in the PACS workstation, findings are concordant with preliminary interpretation provided by the emergency room physician  Workstation performed: [id number]     Procedure: Vas Lower Limb Venous Duplex Study, Complete Bilateral    Result Date: 10/7/2017  Narrative:  [facility] REPORT CLINICAL: Indications: Other specified soft tissue disorders [M79 89]  Patient seen in the ED with SOB x 1 week  Patient denies any leg symptoms however her right leg is swollen and red with unknown duration  Risk Factors: The patient has history of obesity  Clinical: Right Lower Limb There is edema and pigmentation  CONCLUSION: Impression: RIGHT LOWER LIMB: No evidence of acute or chronic deep vein thrombosis  No evidence of superficial thrombophlebitis noted  Doppler evaluation shows a normal response to augmentation maneuvers  Popliteal, posterior tibial and anterior tibial arterial Doppler waveforms are triphasic  LEFT LOWER LIMB: No evidence of acute or chronic deep vein thrombosis  No evidence of superficial thrombophlebitis noted  Doppler evaluation shows a normal response to augmentation maneuvers  Popliteal, posterior tibial and anterior tibial arterial Doppler waveforms are triphasic  Dr Ethan Zimmer notified of preliminary results    SIGNATURE: Electronically Signed by: Miryam Pinzon MD, RPVI on 2017-10-07 04:20:33 PM       Recent Cultures (last 7 days):           Last 24 Hours Medication List:     apixaban 5 mg Oral BID   escitalopram 10 mg Oral Daily   furosemide 40 mg Intravenous BID (diuretic)   metoprolol tartrate 75 mg Oral Q12H PÉREZ   nortriptyline 50 mg Oral HS   potassium chloride 20 mEq Oral BID   pravastatin 20 mg Oral Daily With Dinner   topiramate 25 mg Oral BID        Today, Patient Was Seen By: Quyen Ríos MD    ** Please Note: Dragon 360 Dictation voice to text software may have been used in the creation of this document   **

## 2017-10-11 NOTE — CASE MANAGEMENT
Continued Stay Review    Date: 10/10/2017    Vital Signs: 97 1 - 120 - 18   125/90    Medications:   Scheduled Meds:   apixaban 5 mg Oral BID   escitalopram 10 mg Oral Daily   furosemide 40 mg Intravenous BID (diuretic)   metoprolol tartrate 75 mg Oral Q12H [address]   nortriptyline 50 mg Oral HS   potassium chloride 20 mEq Oral BID   pravastatin 20 mg Oral Daily With Dinner   topiramate 25 mg Oral BID     Continuous Infusions:  Heparin Drip - DC'd  PRN Meds: influenza vaccine    LORazepam    meclizine    ondansetron    oxyCODONE-acetaminophen x 1    Abnormal Labs/Diagnostic Results: none    Age/Sex: 81 y o  female     Assessment/Plan:     Principal Problem:    Atrial fibrillation with rapid ventricular response ([facility])  Active Problems:    Acute congestive heart failure ([facility] [address] )    Cerebrovascular disease    Essential hypertension    Hyperlipidemia LDL goal <100    Prediabetes     Plan:     · Atrial fibrillation with rapid ventricular response now off diltiazem drip and on beta-blockade with rate 90 to 100 still in AFib is due to have loop recorder removed today will  initiate NOAC in a m  · Acute congestive heart failure likely in relation to rapid ventricular response echocardiogram pending still in fluid overload continue IV Lasix as per Cardiology  · History of cerebral vascular disease Plavix on hold  · Essential hypertension BP at goal continue metoprolol dosing 50 mg b i d   · Hyperlipidemia continue pravastatin  ·   Discharge Plan:  Anticipate home when medically cleared    [facility] in the [facility] by Rajiv De La O for 2017  Network Utilization Review Department  Phone: [phone]; Fax [phone]  ATTENTION: The Network Utilization Review Department is now centralized for our 7 Facilities  Make a note that we have a new phone and fax numbers for our Department   Please call with any questions or concerns to [phone] and carefully follow the prompts so that you are directed to the right person  All voicemails are confidential  Fax any determinations, approvals, denials, and requests for initial or continue stay review clinical to [phone]  Due to HIGH CALL volume, it would be easier if you could please send faxed requests to expedite your requests and in part, help us provide discharge notifications faster

## 2017-10-11 NOTE — PROGRESS NOTES
Pt alert/oriented, no complain of pain, L chest wall dressing is dried and intact, rapid HR (112), given schedule metoprolol

## 2017-10-11 NOTE — PROGRESS NOTES
Progress Note - Juliette Ayala 81 y o  female MRN: [id number]    Unit/Bed#: E4 -01 Encounter: [id number]  Subjective:   No chest pain  No palpitations  Still SOB  No edema or lightheadedness  Objective:   Vitals: Blood pressure 116/76, pulse 62, temperature (!) 97 3 °F (36 3 °C), temperature source Tympanic, resp  rate 18, height 5' 0 6" (1 539 m), weight 112 kg (247 lb 2 2 oz), SpO2 95 %  ,Body mass index is 47 31 kg/m²  CMP:   Results from last 7 days  Lab Units 10/11/17  0510  10/07/17  0708   SODIUM mmol/L 141  < > 143   POTASSIUM mmol/L 4 5  < > 4 2   CHLORIDE mmol/L 105  < > 107   CO2 mmol/L 30  < > 24   ANION GAP mmol/L 6  < > 12   BUN mg/dL 28*  < > 14   CREATININE mg/dL 1 39*  < > 1 22   GLUCOSE RANDOM mg/dL 115  < > 111   CALCIUM mg/dL 9 1  < > 8 9   AST U/L  --   --  56*   ALT U/L  --   --  38   ALK PHOS U/L  --   --  126*   TOTAL PROTEIN g/dL  --   --  7 3   ALBUMIN g/dL  --   --  3 6   BILIRUBIN TOTAL mg/dL  --   --  0 52   EGFR ml/min/1 73sq m 38  < > 45   < > = values in this interval not displayed        Physical exam:  Lungs-decreased breath sounds w/o wheezing rhonchi or rales  Heart-Irregular without murmur rub or gallop  Abd-NT w/o mass or OM  Ext-no edema        Assessment:  1/ AFIB with RVR-now rate ok on metoprolol 75 BID-now on eliquis  2/ prior CVA  3/ acute diastolic CHF-improved  4/ HTN  5/ HLP    Plan:  Ok to home  Lopressor 75 mg BID (new)  On furosemide 40 mg daily (increased dosage)  Eliquis 5 mg BID (new)  Simvastatin 10 mg daily  KCL 20 meq BID (same)  No HCTZ  Will arrange FU with us in 2-4 weeks  Sees Dr Lindell Pallas as well      Bonnie Shelby MD

## 2017-10-12 NOTE — CASE MANAGEMENT
Notification of Discharge  This is a Notification of Discharge from our facility [address]  Please be advised that this patient has been discharge from our facility  Below you will find the admission and discharge date and time including the patients disposition  PRESENTATION DATE: 10/7/2017  6:45 AM  IP ADMISSION DATE: 10/7/17 0806  DISCHARGE DATE: 10/11/2017  4:52 PM  DISPOSITION: [address] [place] [address] in the [facility] by Rajiv De L aO for 2017  Network Utilization Review Department  Phone: [phone]; Fax [phone]  ATTENTION: The Network Utilization Review Department is now centralized for our 7 Facilities  Make a note that we have a new phone and fax numbers for our Department  Please call with any questions or concerns to [phone] and carefully follow the prompts so that you are directed to the right person  All voicemails are confidential  Fax any determinations, approvals, denials, and requests for initial or continue stay review clinical to [phone]  Due to HIGH CALL volume, it would be easier if you could please send faxed requests to expedite your requests and in part, help us provide discharge notifications faster

## 2017-10-17 ENCOUNTER — GENERIC CONVERSION - ENCOUNTER (OUTPATIENT)
Dept: OTHER | Facility: OTHER | Age: 72
End: 2017-10-17

## 2017-10-17 ENCOUNTER — HOSPITAL ENCOUNTER (EMERGENCY)
Facility: HOSPITAL | Age: 72
Discharge: HOME | End: 2017-10-17
Admitting: EMERGENCY MEDICINE
Payer: COMMERCIAL

## 2017-10-17 VITALS
SYSTOLIC BLOOD PRESSURE: 139 MMHG | WEIGHT: 250.8 LBS | BODY MASS INDEX: 48.02 KG/M2 | HEART RATE: 103 BPM | OXYGEN SATURATION: 98 % | TEMPERATURE: 98.4 F | RESPIRATION RATE: 20 BRPM | DIASTOLIC BLOOD PRESSURE: 65 MMHG

## 2017-10-17 DIAGNOSIS — S05.00XA CORNEAL ABRASION: Primary | ICD-10-CM

## 2017-10-17 PROCEDURE — 99283 EMERGENCY DEPT VISIT LOW MDM: CPT

## 2017-10-17 RX ORDER — PROPARACAINE HYDROCHLORIDE 5 MG/ML
1 SOLUTION/ DROPS OPHTHALMIC ONCE
Status: COMPLETED | OUTPATIENT
Start: 2017-10-17 | End: 2017-10-17

## 2017-10-17 RX ORDER — POLYMYXIN B SULFATE AND TRIMETHOPRIM 1; 10000 MG/ML; [USP'U]/ML
1 SOLUTION OPHTHALMIC EVERY 4 HOURS
Qty: 10 ML | Refills: 0 | Status: SHIPPED | OUTPATIENT
Start: 2017-10-17 | End: 2017-10-22

## 2017-10-17 RX ADMIN — FLUORESCEIN SODIUM 1 STRIP: 1 STRIP OPHTHALMIC at 17:14

## 2017-10-17 RX ADMIN — PROPARACAINE HYDROCHLORIDE 1 DROP: 5 SOLUTION/ DROPS OPHTHALMIC at 17:14

## 2017-10-17 NOTE — DISCHARGE INSTRUCTIONS
Corneal Abrasion   WHAT YOU NEED TO KNOW:   A corneal abrasion is a scratch on the cornea of your eye  The cornea is the clear layer that covers the front of your eye  A small scratch may heal in 1 to 2 days  Deeper or larger scratches may take longer to heal         DISCHARGE INSTRUCTIONS:   Contact your healthcare provider if:   · Your eye pain or vision gets worse  · You have yellow or green drainage from your eye  · You have questions or concerns about your condition or care  Medicines:   · Medicines  may be given in the form of eyedrops or ointment to help prevent an eye infection  You may also be given eye drops to decrease pain  Ask how to take this medicine safely  · Take your medicine as directed  Contact your healthcare provider if you think your medicine is not helping or if you have side effects  Tell him or her if you are allergic to any medicine  Keep a list of the medicines, vitamins, and herbs you take  Include the amounts, and when and why you take them  Bring the list or the pill bottles to follow-up visits  Carry your medicine list with you in case of an emergency  Follow up with your healthcare provider as directed:  Write down your questions so you remember to ask them during your visits  Self-care:   · Do not touch or rub your eye  · Ask your healthcare provider when you can start your normal activities  · Ask your healthcare provider when you can wear your contact lenses  · Wear sunglasses in bright light until your eyes feel better  Help prevent corneal abrasions:   · Remove your contact lenses if your eyes feel dry or irritated  · Wash your hands if you need to touch your eyes or your face  · Trim your child's fingernails so he cannot scratch his eye  · Wear protective eyewear when you work with chemicals, wood, dust, or metal      · Wear protective eyewear when you play sports  · Do not wear your contacts for longer than you should       · Do not wear colored lenses or lenses with shapes on them  These lenses may cause eye damage and vision loss  · Do not wear glitter makeup  Glitter can easily get into your eyes and under contact lenses  · Do not sleep with your contacts in your eyes  © 2017 [address] Information is for End User's use only and may not be sold, redistributed or otherwise used for commercial purposes  All illustrations and images included in CareNotes® are the copyrighted property of [employer] , [employer]  or Rajiv De La O  The above information is an  only  It is not intended as medical advice for individual conditions or treatments  Talk to your doctor, nurse or pharmacist before following any medical regimen to see if it is safe and effective for you

## 2017-10-17 NOTE — ED NOTES
Patient tolerated eye flush with norbert lens and 500 mL NSS       Madeline Holden RN  10/17/17 8985

## 2017-10-17 NOTE — ED PROVIDER NOTES
History  Chief Complaint   Patient presents with    Foreign Body in Eye     Woke up with foreign body sensation in right eye  Feels like something scratching everytime she blinks  Redness noted  82 yo f presents today c/o foreign body sensation to right eye since this AM  Pt reports blurred vision, tearing and redness  Cannot recall getting anything in her eye  Denies pain on EOM, headaches  Wears glasses  Doesn't usually see an eye doctor  Prior to Admission Medications   Prescriptions Last Dose Informant Patient Reported? Taking?    LORazepam (ATIVAN) 1 mg tablet   Yes No   Sig: Take 1 mg by mouth 3 (three) times a day as needed for anxiety   Multiple Vitamin (MULTIVITAMIN) tablet   Yes No   Sig: Take 1 tablet by mouth daily   apixaban (ELIQUIS) 5 mg   No No   Sig: Take 1 tablet by mouth 2 (two) times a day   clopidogrel (PLAVIX) 75 mg tablet   Yes No   Sig: Take 75 mg by mouth daily   escitalopram (LEXAPRO) 10 mg tablet   Yes No   Sig: Take 10 mg by mouth daily   furosemide (LASIX) 20 mg tablet   No No   Sig: Take 2 tablets by mouth daily   hydrochlorothiazide (HYDRODIURIL) 12 5 mg tablet   Yes No   Sig: Take 12 5 mg by mouth daily   meclizine (ANTIVERT) 25 mg tablet   Yes No   Sig: Take 32 mg by mouth 3 (three) times a day as needed for dizziness   metoprolol tartrate 75 MG TABS   No No   Sig: Take 1 tablet by mouth every 12 (twelve) hours   nortriptyline (PAMELOR) 50 mg capsule   Yes No   Sig: Take 50 mg by mouth daily at bedtime   oxyCODONE-acetaminophen (PERCOCET) 5-325 mg per tablet   Yes No   Sig: Take 1 tablet by mouth every 4 (four) hours as needed for moderate pain (take 1-2 tablets every 4-6 hours PRN)   potassium chloride (K-DUR,KLOR-CON) 20 mEq tablet   Yes No   Sig: Take 20 mEq by mouth 2 (two) times a day     simvastatin (ZOCOR) 10 mg tablet   Yes No   Sig: Take 10 mg by mouth daily at bedtime   topiramate (TOPAMAX) 25 mg sprinkle capsule   Yes No   Sig: Take 25 mg by mouth 2 (two) times a day      Facility-Administered Medications: None       Past Medical History:   Diagnosis Date    Hyperlipidemia     Hypertension     Migraine     Prediabetes     Stroke [facility])        Past Surgical History:   Procedure Laterality Date    APPENDECTOMY      CHOLECYSTECTOMY         Family History   Problem Relation Age of Onset    Diabetes Mother     Heart disease Mother     Hypertension Mother      I have reviewed and agree with the history as documented  Social History   Substance Use Topics    Smoking status: Former Smoker    Smokeless tobacco: Never Used    Alcohol use No        Review of Systems   Eyes: Positive for photophobia, redness and visual disturbance  Negative for pain, discharge and itching  All other systems reviewed and are negative  Physical Exam  ED Triage Vitals [10/17/17 1606]   Temperature Pulse Respirations Blood Pressure SpO2   98 4 °F (36 9 °C) 103 20 139/65 98 %      Temp Source Heart Rate Source Patient Position - Orthostatic VS BP Location FiO2 (%)   Oral Monitor Sitting Right arm --      Pain Score       8           Physical Exam   Constitutional: She appears well-developed and well-nourished  No distress  HENT:   Head: Normocephalic and atraumatic  Eyes: EOM and lids are normal  Pupils are equal, round, and reactive to light  Lids are everted and swept, no foreign bodies found  Right conjunctiva is injected  Slit lamp exam:       The right eye shows corneal abrasion  The right eye shows no anterior chamber bulge  Neck: Normal range of motion  Cardiovascular: Normal rate  Pulmonary/Chest: Effort normal  No respiratory distress  Abdominal: Soft  Musculoskeletal: Normal range of motion  Neurological: She is alert  Skin: Skin is warm and dry  Capillary refill takes less than 2 seconds  No rash noted  She is not diaphoretic  Psychiatric: She has a normal mood and affect         ED Medications  Medications   fluorescein sodium sterile 1 mg ophthalmic strip 1 strip (1 strip Right Eye Given 10/17/17 1714)   proparacaine (ALCAINE) 0 5 % ophthalmic solution 1 drop (1 drop Right Eye Given 10/17/17 1714)       Diagnostic Studies  Labs Reviewed - No data to display    No orders to display       Procedures  Procedures      Phone Contacts  ED Phone Contact    ED Course  ED Course                                MDM  Number of Diagnoses or Management Options  Corneal abrasion:   Diagnosis management comments: Will treat for corneal abrasion and instructed patient to follow up with Ophthalmology as soon as possible  Return precautions discussed with the patient and her family member  CritCare Time    Disposition  Final diagnoses:   Corneal abrasion     ED Disposition     ED Disposition Condition Comment    Discharge  Keerthi Conde discharge to home/self care      Condition at discharge: Good        Follow-up Information    None       Discharge Medication List as of 10/17/2017  5:15 PM      START taking these medications    Details   polymyxin b-trimethoprim (POLYTRIM) ophthalmic solution Administer 1 drop to the right eye every 4 (four) hours for 5 days, Starting Tue 10/17/2017, Until Sun 10/22/2017, Normal         CONTINUE these medications which have NOT CHANGED    Details   apixaban (ELIQUIS) 5 mg Take 1 tablet by mouth 2 (two) times a day, Starting Wed 10/11/2017, Print      clopidogrel (PLAVIX) 75 mg tablet Take 75 mg by mouth daily, Historical Med      escitalopram (LEXAPRO) 10 mg tablet Take 10 mg by mouth daily, Historical Med      furosemide (LASIX) 20 mg tablet Take 2 tablets by mouth daily, Starting Wed 10/11/2017, Print      hydrochlorothiazide (HYDRODIURIL) 12 5 mg tablet Take 12 5 mg by mouth daily, Historical Med      LORazepam (ATIVAN) 1 mg tablet Take 1 mg by mouth 3 (three) times a day as needed for anxiety, Historical Med      meclizine (ANTIVERT) 25 mg tablet Take 32 mg by mouth 3 (three) times a day as needed for dizziness, Historical Med metoprolol tartrate 75 MG TABS Take 1 tablet by mouth every 12 (twelve) hours, Starting Wed 10/11/2017, Print      Multiple Vitamin (MULTIVITAMIN) tablet Take 1 tablet by mouth daily, Historical Med      nortriptyline (PAMELOR) 50 mg capsule Take 50 mg by mouth daily at bedtime, Historical Med      oxyCODONE-acetaminophen (PERCOCET) 5-325 mg per tablet Take 1 tablet by mouth every 4 (four) hours as needed for moderate pain (take 1-2 tablets every 4-6 hours PRN), Historical Med      potassium chloride (K-DUR,KLOR-CON) 20 mEq tablet Take 20 mEq by mouth 2 (two) times a day  , Historical Med      simvastatin (ZOCOR) 10 mg tablet Take 10 mg by mouth daily at bedtime, Historical Med      topiramate (TOPAMAX) 25 mg sprinkle capsule Take 25 mg by mouth 2 (two) times a day, Historical Med           No discharge procedures on file      ED Provider  Electronically Signed by       Aditya Davis PA-C  10/17/17 9680

## 2017-10-18 ENCOUNTER — ALLSCRIPTS OFFICE VISIT (OUTPATIENT)
Dept: OTHER | Facility: OTHER | Age: 72
End: 2017-10-18

## 2017-10-23 ENCOUNTER — GENERIC CONVERSION - ENCOUNTER (OUTPATIENT)
Dept: OTHER | Facility: OTHER | Age: 72
End: 2017-10-23

## 2017-10-25 ENCOUNTER — ALLSCRIPTS OFFICE VISIT (OUTPATIENT)
Dept: OTHER | Facility: OTHER | Age: 72
End: 2017-10-25

## 2017-10-26 NOTE — PROGRESS NOTES
Assessment  Assessed    1  Atrial fibrillation, unspecified type (427 31) (I48 91)   2  Hypertension (401 9) (I10)   3  Mitral regurgitation (424 0) (I34 0)   4  Chronic diastolic heart failure (203 36) (I50 32)    Plan  Atrial fibrillation, unspecified type    · Amiodarone HCl - 200 MG Oral Tablet; 1 tab three times a day for 2 weeks then 1  tab daily   Rx By: Dorie Silveira; Dispense: 30 Days ; #:200 Tablet; Refill: 0;For: Atrial fibrillation, unspecified type; DAMON = N; Verified Transmission to [address] [place] [address]; Last Updated By: System, SureScripts; 10/25/2017 11:40:49 AM   · Metoprolol Tartrate 100 MG Oral Tablet; One tab every 12 hours   Rx By: Dorie Silveira; Dispense: 30 Days ; #:30 Tablet; Refill: 3;For: Atrial fibrillation, unspecified type; DAMON = N; Verified Transmission to [address] [place] [address]; Last Updated By: System, SureScripts; 10/25/2017 11:40:48 AM  Chronic diastolic heart failure    · 1 - Milla Ferrell DO  Cardiology Co-Management  *  Status: Active  Requested for:  50BWM3788   Ordered; For: Chronic diastolic heart failure; Ordered By: Dorie Silveira Performed:  Due: 50FZA6750  Care Summary provided  : Yes   · Limit your liquids to 6 glasses throughout the day ; Status:Complete;   Done: 82EGD5559   Ordered; For:Chronic diastolic heart failure; Ordered By:Steff Rendon;   · Restrict the salt in your diet by avoiding highly salted foods ; Status:Complete;   Done:  58XPW1816   Ordered; For:Chronic diastolic heart failure; Ordered By:Demian Rendon;   · Weigh yourself every day ; Status:Complete;   Done: 70ANC7923   Ordered; For:Chronic diastolic heart failure; Ordered By:Steff Rendon;   · (1) BASIC METABOLIC PROFILE; Status:Active; Requested KCS:43FSZ8768;    Perform:[facility]; LBD:39YCH6175; Ordered; For:Chronic diastolic heart failure; Ordered By:Steff Rendon;   · (1) MAGNESIUM; Status:Active;  Requested WNX:59SZH5816;    Perform:[facility]; ISL:60WIL3319; Ordered; For:Chronic diastolic heart failure; Ordered By:Steff Rendon;   · 3D LARRY; Status:Active; Requested CUR:54MJP6796;    Perform:[facility]; GPO:08MMB0778; Ordered; For:Chronic diastolic heart failure; Ordered By:Steff Rendon;   · CARDIAC CARDIOVERSION (CATH LAB ONLY); Status:Need Information - Financial  Authorization; Requested [id number];    Perform:[facility]; PALMER:[id number]; Ordered; For:Chronic diastolic heart failure; Ordered By:Carol Rendon;   · EKG/ECG- POC; Status:Active - Perform Order; Requested NHJ:[id number];    Perform: In Office; 30-11-62-95; Ordered; For:Chronic diastolic heart failure; Ordered By:Carol Rendon;    Discussion/Summary  Cardiology Discussion Summary Free Text Note Form [facility]:   Ms Joce Crabtree presents to our office for a recent hospitalization follow up visit  She remains in atrial fibrillation with RVR at 110 BPM  Her BP is 114/62  I have stopped the extra dose of Metoprolol and increased the dose of Metoprolol to 100mg Q12 hours  I have discussed Lainey's history and presentation with Dr Sherwin Barrett  She will start Amiodarone 200mg three times a day for 2 weeks then decrease to 200mg daily  She will undergo a 3 D LARRY to evaluate her mod to severe MR and undergo a CV at this time  Virgil Matt is in agreement with this plan  I have re enforced education in regards to Eliquis monitoring for signs of bleeding  I have re enforced a 2 gm sodium diet and daily weights  Virgil Matt will follow up with Dr Sherwin Barrett in one month  I have ordered a BMP and mag to be done prior to CV  Our  David Veras will coordinate procedures and call Virgil Matt to inform her of the date and time planned        Chief Complaint  Chief Complaint Free Text Note Form: [facility] F/U      History of Present Illness  Cardiology HPI Free Text Note Form [facility]: Ms Dudley Matt is a 81year old female with a known past medical history of HTN, HLD, CVA in 2013 on Plavix sp Reveal loop recorder in plant at this time, and migraine headaches on Topamax  Ms Chilango Vazquez was recently hospitalized at [facility] on 10/07-10/11/17 with atrial fibrillation with RVR and acute diastolic heart failure  She presented to [facility] ER with progressive dyspnea and ro LE Edema  She was found to be in atrial fibrillation with RVR  She was started on IV Cardizem and transitioned to Lopressor 75mg Q12 hours  [id number]= 6 she was started on Eliquis 5mg BID  NT pro BNP 2332, CXR showed mild intersititial and vascular prominence  Carolann Muro was diuresed with IV Lasix for acute diastolic heart failure  Loop recorder was removed by Dr Hernan Stern prior to starting Eliquis  TTE showed LV sytolic function lowe liits of normal EF 50%, wall thickness mildly increased, mild concentric hypertrophy, LA mod dilated, mod to severe MR with possible systolic reversal of pulonary flow, mod AV regurgitation, mild TR  Carolann Muro was discharged home  Labs at discharge sodium 141, potassium 4 5, BUN 28, creat 1 39 GFR 38,  Carolann Muro presents to our office for a recent hospitalization follow up visit  She admit to fatigue and dyspnea with moderate exertion  She denies CP, palpitations, lightheadedness or dizziness  Carolann Muro was seen by Dr Dalila Mckeon on 10/18/17  She continued in AF with RVR  Extra dose of Metoprolol tartrate was ordered 37 5mg in the afternoon  She is not weighting herself at home  She admits to abiding by a 2 gm sodium diet  Review of Systems  Cardiology Female ROS:     Cardiac: rhythm problems, but-- no chest pain,-- no signs of swelling-- and-- no palpitations present  Respiratory: shortness of breath  Neurological: no dizziness      Active Problems  Problems    1  Anserine bursitis (726 61) (M70 50)   2  Anxiety (300 00) (F41 9)   3  Atrial fibrillation, unspecified type (427 31) (I48 91)   4  Bicipital tendinitis of right shoulder (726 12) (M75 21)   5  Cervicalgia (723 1) (M54 2)   6   Colonoscopy (Fiberoptic) Screening 7  Depression (311) (F32 9)   8  Edema (782 3) (R60 9)   9  Encounter for screening mammogram for malignant neoplasm of breast (V76 12)   (Z12 31)   10  Hypercholesterolemia (272 0) (E78 00)   11  Hypertension (401 9) (I10)   12  Hypokalemia (276 8) (E87 6)   13  Insomnia (780 52) (G47 00)   14  Migraine (346 90) (G43 909)   15  Mitral regurgitation (424 0) (I34 0)   16  Need for prophylactic vaccination and inoculation against influenza (V04 81) (Z23)   17  Obesity (278 00) (E66 9)   18  Obstructive sleep apnea (327 23) (G47 33)   19  Osteoarthritis of both knees, unspecified osteoarthritis type (715 96) (M17 0)   20  Prediabetes (790 29) (R73 03)   21  Primary localized osteoarthritis of right knee (715 16) (M17 11)   22  Right knee pain (719 46) (M25 561)   23  Screening for genitourinary condition (V81 6) (Z13 89)   24  Screening for osteoporosis (V82 81) (Z13 820)   25  Stroke (434 91) (I63 9)   26  Transient ischemic attack (435 9) (G45 9)   27  Vertigo (780 4) (R42)    Past Medical History  Problems    1  History of migraine (V12 49) (Z86 69)   2  History of Polyp of sigmoid colon (211 3) (D12 5)   3  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 (433 81)  Active Problems And Past Medical History Reviewed: The active problems and past medical history were reviewed and updated today  Surgical History  Problems    1  History of Appendectomy   2  History of Cholecystectomy  Surgical History Reviewed: The surgical history was reviewed and updated today  Family History  Mother    1  Family history of Arthritis (V17 7)   2  Family history of Coronary Artery Disease (V17 49)   3  Family history of Hypertension (V17 49)  Family History Reviewed: The family history was reviewed and updated today  Social History  Problems    · Former smoker (L74 00) (N98 327)   · Social alcohol use (Z78 9)  Social History Reviewed:  The social history was reviewed and updated today  The social history was reviewed and is unchanged  Current Meds   1  Clopidogrel Bisulfate 75 MG Oral Tablet; take one tablet by mouth every day; Therapy: 41ZNQ7964 to (Evaluate:22Nov2017)  Requested for: 60HHD5666; Last   Rx:92Wky3317 Ordered   2  Eliquis 5 MG Oral Tablet; Take 1 tablet twice daily; Therapy: (Recorded:16Oct2017) to Recorded   3  Escitalopram Oxalate 10 MG Oral Tablet; TAKE ONE TABLET BY MOUTH ONCE DAILY; Therapy: 10THQ8651 to (Last Rx:18Oct2017)  Requested for: 96LSC2749 Ordered   4  Furosemide 20 MG Oral Tablet; Take one tablet  twice daily; Therapy: 88PND1047 to (96 147823)  Requested for: 98Blz1671; Last   Rx:88Jir7799 Ordered   5  LORazepam 1 MG Oral Tablet; TAKE ONE TABLET BY MOUTH 3 TIMES A DAY AS   NEEDED; Therapy: 29QDD4264 to (Evaluate:92Rat4079)  Requested for: 15Dgs4008; Last   Rx:41Juk3390 Ordered   6  Meclizine HCl - 25 MG Oral Tablet; TAKE 1 TABLET 3 TIMES DAILY AS NEEDED; Therapy: 55PNV0665 to (Evaluate:02Jan2016)  Requested for: 80GOY4633; Last   Rx:25Kmu9002 Ordered   7  Metoprolol Tartrate 75 MG Oral Tablet; 75mg bid,37 5 mg each afternoon; Last   Rx:18Oct2017 Ordered   8  Multivitamins TABS; TAKE 1 TABLET DAILY; Therapy: 35YWO0416 to Recorded   9  Nortriptyline HCl - 50 MG Oral Capsule; TAKE 1 CAPSULE AT BEDTIME; Therapy: 15EHN4799 to (96 812019)  Requested for: 51QGU0491; Last   Rx:07Ubi0905 Ordered   10  Oxycodone-Acetaminophen 5-325 MG Oral Tablet; TAKE 1 TO 2 TABLETS EVERY 4 TO 6    HOURS AS NEEDED; Therapy: 66GXS1621 to (Evaluate:20Oct2017); Last Rx:98All2453 Ordered   11  Potassium Chloride Ofelia ER 20 MEQ Oral Tablet Extended Release; TAKE ONE TABLET    BY MOUTH TWICE DAILY; Therapy: 44SDT8848 to (Eloy Orantes)  Requested for: 80Nuz0957; Last    Rx:88Kwn8371 Ordered   12  Simvastatin 10 MG Oral Tablet; take one tablet by mouth every day;     Therapy: 86SWY6217 to (Last Rx:24Jan2017)  Requested for: 25Jan2017 Ordered 13  Topiramate 25 MG Oral Tablet; take 1 tablet by mouth daily; Therapy: 35RWB9486 to (Last Rx:72Gpq3182)  Requested for: 43PMW4986 Ordered  Medication List Reviewed: The medication list was reviewed and updated today  Allergies  Medication    1  No Known Drug Allergies    Vitals  Vital Signs    Recorded: 73OKX6352 11:39AM Recorded: 75QRO8091 10:53AM   Heart Rate  88, L Radial   Respiration  16   Systolic 436, LUE, Sitting 004, RUE   Diastolic 62, LUE, Sitting 70, RUE   BP Comments  No meds this AM   Height  5 ft 5 in   Weight  250 lb 6 oz   BMI Calculated  41 66   BSA Calculated  2 18     Physical Exam    Constitutional   General appearance: No acute distress, well appearing and well nourished  Neck   Neck and thyroid: Normal, supple, trachea midline, no thyromegaly  Pulmonary   Respiratory effort: No increased work of breathing or signs of respiratory distress  Auscultation of lungs: Clear to auscultation, no rales, no rhonchi, no wheezing, good air movement  Cardiovascular   Auscultation of heart: Abnormal  -- irregular rate and rhythm , 2/6 left apex border murmur  Examination of extremities for edema and/or varicosities: Normal     Abdomen   Abdomen: Abnormal  -- obese  Musculoskeletal Gait and station: Normal gait  Skin - Skin and subcutaneous tissue: Normal without rashes or lesions  Skin is warm and well perfused, normal turgor  Neurologic - Speech: Normal     Psychiatric - Orientation to person, place, and time: Normal -- Mood and affect: Normal       Results/Data  ECG Report:   Rhythm and rate: atrial fibrillation--   ventricular rate is 110 beats per minute  QRS: QRS interval: 82 seconds   ST segment: QTc interval: 498 AFib with RVR      Health Management  Encounter for screening mammogram for malignant neoplasm of breast   Digital Bilateral Screening Mammogram With CAD; every 1 year; Last 51Zxk2609; Next Due:  55Lvo7917;  Overdue    Future Appointments    Date/Time Provider Specialty Site   11/29/2017 08:30 AM [place]  Internal Medicine [facility]   12/27/2017 09:30 AM [place]   Internal Medicine [facility]     Signatures   Electronically signed by : Parmjit Gilbert; Oct 25 2017 12:40PM EST                       (Author)    Electronically signed by : Hugo Sharma DO; Oct 25 2017  1:11PM EST

## 2017-11-09 ENCOUNTER — ANESTHESIA (OUTPATIENT)
Dept: NON INVASIVE DIAGNOSTICS | Facility: HOSPITAL | Age: 72
End: 2017-11-09

## 2017-11-09 ENCOUNTER — GENERIC CONVERSION - ENCOUNTER (OUTPATIENT)
Dept: OTHER | Facility: OTHER | Age: 72
End: 2017-11-09

## 2017-11-09 ENCOUNTER — HOSPITAL ENCOUNTER (OUTPATIENT)
Dept: NON INVASIVE DIAGNOSTICS | Facility: HOSPITAL | Age: 72
Discharge: HOME | End: 2017-11-09
Payer: COMMERCIAL

## 2017-11-09 ENCOUNTER — HOSPITAL ENCOUNTER (OUTPATIENT)
Dept: NON INVASIVE DIAGNOSTICS | Facility: HOSPITAL | Age: 72
Discharge: HOME | End: 2017-11-09
Attending: INTERNAL MEDICINE
Payer: COMMERCIAL

## 2017-11-09 ENCOUNTER — ANESTHESIA EVENT (OUTPATIENT)
Dept: NON INVASIVE DIAGNOSTICS | Facility: HOSPITAL | Age: 72
End: 2017-11-09

## 2017-11-09 VITALS
RESPIRATION RATE: 18 BRPM | OXYGEN SATURATION: 93 % | SYSTOLIC BLOOD PRESSURE: 118 MMHG | DIASTOLIC BLOOD PRESSURE: 60 MMHG | HEART RATE: 60 BPM | TEMPERATURE: 98.3 F

## 2017-11-09 DIAGNOSIS — I48.91 ATRIAL FIBRILLATION, UNSPECIFIED TYPE (HCC): ICD-10-CM

## 2017-11-09 DIAGNOSIS — I50.32 CHRONIC DIASTOLIC HEART FAILURE (HCC): ICD-10-CM

## 2017-11-09 LAB
ATRIAL RATE: 63 BPM
P AXIS: 55 DEGREES
PR INTERVAL: 282 MS
QRS AXIS: 0 DEGREES
QRSD INTERVAL: 88 MS
QT INTERVAL: 482 MS
QTC INTERVAL: 493 MS
T WAVE AXIS: -2 DEGREES
VENTRICULAR RATE: 63 BPM

## 2017-11-09 PROCEDURE — 93005 ELECTROCARDIOGRAM TRACING: CPT

## 2017-11-09 PROCEDURE — 93312 ECHO TRANSESOPHAGEAL: CPT

## 2017-11-09 PROCEDURE — 92961 CARDIOVERSION ELECTRIC INT: CPT

## 2017-11-09 PROCEDURE — 76376 3D RENDER W/INTRP POSTPROCES: CPT

## 2017-11-09 RX ORDER — LIDOCAINE HYDROCHLORIDE 10 MG/ML
INJECTION, SOLUTION INFILTRATION; PERINEURAL AS NEEDED
Status: DISCONTINUED | OUTPATIENT
Start: 2017-11-09 | End: 2017-11-09 | Stop reason: SURG

## 2017-11-09 RX ORDER — SODIUM CHLORIDE 9 MG/ML
INJECTION, SOLUTION INTRAVENOUS CONTINUOUS PRN
Status: DISCONTINUED | OUTPATIENT
Start: 2017-11-09 | End: 2017-11-09 | Stop reason: SURG

## 2017-11-09 RX ORDER — PROPOFOL 10 MG/ML
INJECTION, EMULSION INTRAVENOUS AS NEEDED
Status: DISCONTINUED | OUTPATIENT
Start: 2017-11-09 | End: 2017-11-09 | Stop reason: SURG

## 2017-11-09 RX ADMIN — PROPOFOL 100 MG: 10 INJECTION, EMULSION INTRAVENOUS at 11:46

## 2017-11-09 RX ADMIN — PROPOFOL 20 MG: 10 INJECTION, EMULSION INTRAVENOUS at 11:56

## 2017-11-09 RX ADMIN — PROPOFOL 50 MG: 10 INJECTION, EMULSION INTRAVENOUS at 12:02

## 2017-11-09 RX ADMIN — PROPOFOL 20 MG: 10 INJECTION, EMULSION INTRAVENOUS at 11:54

## 2017-11-09 RX ADMIN — PROPOFOL 20 MG: 10 INJECTION, EMULSION INTRAVENOUS at 11:52

## 2017-11-09 RX ADMIN — PROPOFOL 20 MG: 10 INJECTION, EMULSION INTRAVENOUS at 11:58

## 2017-11-09 RX ADMIN — TOPICAL ANESTHETIC 1 SPRAY: 200 SPRAY DENTAL; PERIODONTAL at 11:45

## 2017-11-09 RX ADMIN — PROPOFOL 20 MG: 10 INJECTION, EMULSION INTRAVENOUS at 11:50

## 2017-11-09 RX ADMIN — LIDOCAINE HYDROCHLORIDE 50 MG: 10 INJECTION, SOLUTION INFILTRATION; PERINEURAL at 11:46

## 2017-11-09 RX ADMIN — SODIUM CHLORIDE: 0.9 INJECTION, SOLUTION INTRAVENOUS at 11:15

## 2017-11-09 RX ADMIN — PROPOFOL 20 MG: 10 INJECTION, EMULSION INTRAVENOUS at 11:48

## 2017-11-09 NOTE — DISCHARGE INSTRUCTIONS
Transesophageal Echocardiogram   WHAT YOU NEED TO KNOW:   A transesophageal echocardiogram (LARRY) is a procedure used to check for problems with your heart  It will also show any problems in the blood vessels near your heart  Sound waves are sent to the heart through a tube inserted into your throat  The sound waves show the structure and function of your heart through pictures on a monitor  DISCHARGE INSTRUCTIONS:   Rest:  Rest until you are fully awake  You may be sleepy for a while after your procedure  Do not eat or drink until you are able to swallow normally:  Start with soft foods, such as oatmeal, yogurt, or applesauce  Once you can eat soft foods easily, you may begin to eat solid foods  Follow up with your healthcare provider as directed:  Write down your questions so you remember to ask them during your visits  Contact your healthcare provider if:   · You have a fever or chills  · You taste blood in your mouth  · You have a severe sore throat or difficulty swallowing  · You have questions or concerns about your condition or care  Seek care immediately or call 911 if:   · You have any of the following signs of a heart attack:      ¨ Squeezing, pressure, or pain in your chest that lasts longer than 5 minutes or returns    ¨ Discomfort or pain in your back, neck, jaw, stomach, or arm     ¨ Trouble breathing    ¨ Nausea or vomiting    ¨ Lightheadedness or a sudden cold sweat, especially with chest pain or trouble breathing    © 2017 [facility] [address] is for End User's use only and may not be sold, redistributed or otherwise used for commercial purposes  All illustrations and images included in CareNotes® are the copyrighted property of A [employer] M , Inc  or [employer]  The above information is an  only  It is not intended as medical advice for individual conditions or treatments   Talk to your doctor, nurse or pharmacist before following any medical regimen to see if it is safe and effective for you  Cardioversion   WHAT YOU SHOULD KNOW:   Cardioversion is a procedure to correct arrhythmias, which is when your heart beats too fast or irregularly  Arrhythmias may prevent your body from getting the blood and oxygen it needs  Cardioversion delivers a shock of electricity to your heart to help it return to its normal rhythm  AFTER YOU LEAVE:   Medicines:   · Anticoagulants    are a type of blood thinner medicine that helps prevent clots  Clots can cause strokes, heart attacks, and death  These medicines may cause you to bleed or bruise more easily  ¨ Watch for bleeding from your gums or nose  Watch for blood in your urine and bowel movements  Use a soft washcloth and a soft toothbrush  If you shave, use an electric razor  Avoid activities that can cause bruising or bleeding  ¨ Tell your healthcare provider about all medicines you take because many medicines cannot be used with anticoagulants  Do not start or stop any medicines unless your healthcare provider tells you to  Tell your dentist and other healthcare providers that you take anticoagulants  Wear a bracelet or necklace that says you take this medicine  ¨ You will need regular blood tests so your healthcare provider can decide how much medicine you need  Take anticoagulants exactly as directed  Tell your healthcare provider right away if you forget to take the medicine, or if you take too much  ¨ If you take warfarin, some foods can change how your blood clots  Do not make major changes to your diet while you take warfarin  Warfarin works best when you eat about the same amount of vitamin K every day  Vitamin K is found in green leafy vegetables, broccoli, grapes, and other foods  Ask for more information about what to eat when you take warfarin  · Heart medicine: This medicine helps strengthen or regulate your heartbeat  · Take your medicine as directed    Call your healthcare provider if you think your medicine is not helping or if you have side effects  Tell him if you are allergic to any medicine  Keep a list of the medicines, vitamins, and herbs you take  Include the amounts, and when and why you take them  Bring the list or the pill bottles to follow-up visits  Carry your medicine list with you in case of an emergency  Follow up with your cardiologist as directed:  Write down your questions so you remember to ask them during your visits  Contact your cardiologist if:   · You have a fever  · You have new or worsening weakness or tiredness  · You have questions or concerns about your condition or care  Seek care immediately or call 911 if:   · You feel like your heart is fluttering or jumping in your chest     · The skin around the area where the internal catheter was placed is warm, red, swollen, or has pus coming from it  · You feel lightheaded or you have fainted  · You have chest pain when you take a deep breath or cough  You may cough up blood  · You have discomfort in your chest that feels like squeezing, pressure, fullness, or pain  · You have pain or discomfort in your back, neck, jaw, stomach, or arm  · You have weakness or numbness in part of your body  · You have sudden trouble breathing  · You become confused or have difficulty speaking  · You have dizziness, a severe headache, or vision loss  © 2014 [address] is for End User's use only and may not be sold, redistributed or otherwise used for commercial purposes  All illustrations and images included in CareNotes® are the copyrighted property of A [employer] A M , Inc  or [employer]  The above information is an  only  It is not intended as medical advice for individual conditions or treatments   Talk to your doctor, nurse or pharmacist before following any medical regimen to see if it is safe and effective for you       Moderate Sedation   WHAT YOU NEED TO KNOW:   Moderate sedation, or conscious sedation, is medicine used during procedures to help you feel relaxed and calm  You will be awake and able to follow directions without anxiety or pain  You will remember little to none of the procedure  You may feel tired, weak, or unsteady on your feet after you get sedation  You may also have trouble concentrating or short-term memory loss  These symptoms should go away in 24 hours or less  DISCHARGE INSTRUCTIONS:   Call 911 or have someone else call for any of the following:   · You have sudden trouble breathing  · You cannot be woken  Seek care immediately if:   · You have a severe headache or dizziness  · Your heart is beating faster than usual   Contact your healthcare provider if:   · You have a fever  · You have nausea or are vomiting for more than 8 hours after the procedure  · Your skin is itchy, swollen, or you have a rash  · You have questions or concerns about your condition or care  Self-care:   · Have someone stay with you for 24 hours  This person can drive you to errands and help you do things around the house  This person can also watch for problems  · Rest and do quiet activities for 24 hours  Do not exercise, ride a bike, or play sports  Stand up slowly to prevent dizziness and falls  Take short walks around the house with another person  Slowly return to your usual activities the next day  · Do not drive or use dangerous machines or tools for 24 hours  You may injure yourself or others  Examples include a lawnmower, saw, or drill  Do not return to work for 24 hours if you use dangerous machines or tools for work  · Do not make important decisions for 24 hours  For example, do not sign important papers or invest money  · Drink liquids as directed  Liquids help flush the sedation medicine out of your body   Ask how much liquid to drink each day and which liquids are best for you  · Eat small, frequent meals to prevent nausea and vomiting  Start with clear liquids such as juice or broth  If you do not vomit after clear liquids, you can eat your usual foods  · Do not drink alcohol or take medicines that make you drowsy  This includes medicines that help you sleep and anxiety medicines  Ask your healthcare provider if it is safe for you to take pain medicine  Follow up with your healthcare provider as directed:  Write down your questions so you remember to ask them during your visits  © 2017 [address] Information is for End User's use only and may not be sold, redistributed or otherwise used for commercial purposes  All illustrations and images included in CareNotes® are the copyrighted property of A [employer] A M , Inc  or Rajiv De [employer] O  The above information is an  only  It is not intended as medical advice for individual conditions or treatments  Talk to your doctor, nurse or pharmacist before following any medical regimen to see if it is safe and effective for you

## 2017-11-09 NOTE — PROCEDURES
Indication: Symptomatic atrial fibrillation   Anti-coagulation: Apixaban  Anesthesia: Conscious sedation provided by anesthesiology  20% Benzocaine spray x 1 and propofol 280 mg IV  LARRY findings:  No intracardiac thrombus  Dense persistent smoke seen in LA appendage  Electrical Pad Placement: Anteriorly on the upper sternum and under left breast  Two synchronized biphasic shock delivered at 200 J and 300 J  Successful cardioversion following the second synchronized biphasic shock at 300 J  Complications: None  Sinus rhythm documented by 12 lead ECG  Disposition: Recovery area

## 2017-11-09 NOTE — ANESTHESIA POSTPROCEDURE EVALUATION
Post-Op Assessment Note      CV Status:  Stable    Mental Status:  Alert and awake    Hydration Status:  Euvolemic    PONV Controlled:  Controlled    Airway Patency:  Patent    Post Op Vitals Reviewed: Yes          Staff: CRNA           /51 (11/09/17 1222)    Temp 98 3 °F (36 8 °C) (11/09/17 1222)    Pulse 65 (11/09/17 1222)   Resp 20 (11/09/17 1222)    SpO2 97 % (11/09/17 1222)

## 2017-11-09 NOTE — ANESTHESIA PREPROCEDURE EVALUATION
Review of Systems/Medical History  Patient summary reviewed  Chart reviewed      Cardiovascular  EKG reviewed, Exercise tolerance: poor, Exercise comment: Short of breath after ambulating short distances Hyperlipidemia, Hypertension , Valvular heart disease , mitral regurgitation, Dysrhythmias, atrial fibrillation, CHF heart failure unspecified,   Comment: A-fib with rapid ventricular response; diastolic heart failure, moderate to severe MR, moderate AS,  Pulmonary  Smoker ( Quit in 2011) ex-smoker , ,        GI/Hepatic            Endo/Other    Comment: prediabetes   GYN       Hematology   Musculoskeletal  Obesity  morbid obesity,        Neurology    CVA ( 2011, presented with left-sided weakness) , no residual symptoms,    Psychology         ECHO 10/10/17    LEFT VENTRICLE:  Systolic function was at the lower limits of normal  Ejection fraction was estimated to be 50 %  There were no regional wall motion abnormalities  Wall thickness was mildly increased  There was mild concentric hypertrophy      LEFT ATRIUM:  The atrium was moderately dilated      MITRAL VALVE:  There was atleast moderate to severe regurgitation with possible systolic reversal of pulmonary vein flow      AORTIC VALVE:  There was moderate regurgitation      TRICUSPID VALVE:  There was mild regurgitation      IVC, HEPATIC VEINS:  The inferior vena cava was normal in size and course      PULMONARY ARTERIES:  Systolic pressure was within the normal range  Estimated peak pressure was 30 mmHg      Lab Results   Component Value Date    WBC 6 88 10/08/2017    HGB 10 9 (L) 10/08/2017    HCT 35 0 10/08/2017    MCV 88 10/08/2017     10/08/2017     Lab Results   Component Value Date    GLUCOSE 115 10/11/2017    CALCIUM 9 1 10/11/2017     10/11/2017    K 4 5 10/11/2017    CO2 30 10/11/2017     10/11/2017    BUN 28 (H) 10/11/2017    CREATININE 1 39 (H) 10/11/2017     Lab Results   Component Value Date    HGBA1C 5 7 06/30/2017

## 2017-11-10 LAB
ATRIAL RATE: 98 BPM
QRS AXIS: 7 DEGREES
QRSD INTERVAL: 84 MS
QT INTERVAL: 412 MS
QTC INTERVAL: 490 MS
T WAVE AXIS: 12 DEGREES
VENTRICULAR RATE: 85 BPM

## 2017-11-29 ENCOUNTER — ALLSCRIPTS OFFICE VISIT (OUTPATIENT)
Dept: OTHER | Facility: OTHER | Age: 72
End: 2017-11-29

## 2017-11-29 DIAGNOSIS — I48.91 ATRIAL FIBRILLATION (HCC): ICD-10-CM

## 2017-11-29 DIAGNOSIS — I10 ESSENTIAL (PRIMARY) HYPERTENSION: ICD-10-CM

## 2017-11-29 DIAGNOSIS — I63.9 CEREBRAL INFARCTION (HCC): ICD-10-CM

## 2017-11-29 DIAGNOSIS — I50.32 CHRONIC DIASTOLIC HEART FAILURE (HCC): ICD-10-CM

## 2017-11-29 DIAGNOSIS — E78.00 PURE HYPERCHOLESTEROLEMIA: ICD-10-CM

## 2017-11-30 NOTE — PROGRESS NOTES
Assessment    1  Atrial fibrillation, unspecified type (427 31) (I48 91)   2  Chronic diastolic heart failure (071 45) (I50 32)   3  Hypertension (401 9) (I10)   4  Hypercholesterolemia (272 0) (E78 00)   5  Stroke (434 91) (I63 9)    Plan  Atrial fibrillation, unspecified type    · EKG/ECG- POC; Status:Complete;   Done: 90CND7432 09:02AM  Atrial fibrillation, unspecified type, Chronic diastolic heart failure, Hypercholesterolemia,Hypertension, Stroke    · (1) CBC/PLT/DIFF; Status:Active; Requested for:29Nov2017;    · (1) COMPREHENSIVE METABOLIC PANEL; Status:Active; Requested for:29Nov2017;    · (1) LIPID PANEL, FASTING; Status:Active; Requested for:29Nov2017;    · (1) TSH; Status:Active; Requested for:29Nov2017;    · 1 - Steve Rubin DO  (Cardiology) Co-Management  *  Status: Active  Requested for: 84YMG0255  Care Summary provided  : Yes    Discussion/Summary  Discussion Summary: An EKG was performed which showed atrial fibrillation without acute ischemic changes  I believe that this accounts for the patient's dyspnea on exertion  She is not in overt heart failure at present  Her blood pressure is adequately controlled  The patient is heart rate is adequately controlled  She is on Eliquis for stroke prophylaxis  I recommended that she see Dr Kia Beebe for re-evaluation  I reviewed with her the option of radiofrequency ablation  The patient will continue her current medications for now and restrict her activity  The patient will schedule an appointment here for 3 months  She will contact us sooner for any problems  Chief Complaint  Chief Complaint Free Text Note Form: 6 week f/u for AF & hypertensiongo to lab next week for labs ordered for December      History of Present Illness  HPI: The patient came to the office for evaluation of dyspnea on exertion and diminishing exercise tolerance  Earlier this month she underwent electrical cardioversion because of atrial fibrillation   She was started on amiodarone in advance of this  When she was in atrial fibrillation she noticed diminished exercise capacity  She felt well for a time  However over the last 2 weeks she has had progressive dyspnea on exertion  She has had no chest pain  She has had no PND, orthopnea, or edema  She was concerned that she had reverted to atrial fibrillation  Otherwise, she has been feeling reasonably well  She has no particular issues with her medications at present  Review of Systems  Complete-Female:  Constitutional: No fever, no chills, feels well, no tiredness, no recent weight gain or weight loss  Eyes: No complaints of eye pain, no red eyes, no eyesight problems, no discharge, no dry eyes, no itching of eyes  ENT: no complaints of earache, no loss of hearing, no nose bleeds, no nasal discharge, no sore throat, no hoarseness  Cardiovascular: as noted in HPI  Respiratory: No complaints of shortness of breath, no wheezing, no cough, no SOB on exertion, no orthopnea, no PND  Gastrointestinal: No complaints of abdominal pain, no constipation, no nausea or vomiting, no diarrhea, no bloody stools  Genitourinary: No complaints of dysuria, no incontinence, no pelvic pain, no dysmenorrhea, no vaginal discharge or bleeding  Musculoskeletal: No complaints of arthralgias, no myalgias, no joint swelling or stiffness, no limb pain or swelling  Integumentary: No complaints of skin rash or lesions, no itching, no skin wounds, no breast pain or lump  Neurological: No complaints of headache, no confusion, no convulsions, no numbness, no dizziness or fainting, no tingling, no limb weakness, no difficulty walking  Psychiatric: Not suicidal, no sleep disturbance, no anxiety or depression, no change in personality, no emotional problems  Endocrine: No complaints of proptosis, no hot flashes, no muscle weakness, no deepening of the voice, no feelings of weakness    Hematologic/Lymphatic: No complaints of swollen glands, no swollen glands in the neck, does not bleed easily, does not bruise easily  Active Problems  1  Anserine bursitis (726 61) (M70 50)   2  Anxiety (300 00) (F41 9)   3  Atrial fibrillation, unspecified type (427 31) (I48 91)   4  Bicipital tendinitis of right shoulder (726 12) (M75 21)   5  Cervicalgia (723 1) (M54 2)   6  Chronic diastolic heart failure (235 29) (I50 32)   7  Colonoscopy (Fiberoptic) Screening   8  Depression (311) (F32 9)   9  Edema (782 3) (R60 9)   10  Encounter for screening mammogram for malignant neoplasm of breast (V76 12) (Z12 31)   11  Hypercholesterolemia (272 0) (E78 00)   12  Hypertension (401 9) (I10)   13  Hypokalemia (276 8) (E87 6)   14  Insomnia (780 52) (G47 00)   15  Migraine (346 90) (G43 909)   16  Mitral regurgitation (424 0) (I34 0)   17  Need for prophylactic vaccination and inoculation against influenza (V04 81) (Z23)   18  Obesity (278 00) (E66 9)   19  Obstructive sleep apnea (327 23) (G47 33)   20  Osteoarthritis of both knees, unspecified osteoarthritis type (715 96) (M17 0)   21  Prediabetes (790 29) (R73 03)   22  Primary localized osteoarthritis of right knee (715 16) (M17 11)   23  Right knee pain (719 46) (M25 561)   24  Screening for genitourinary condition (V81 6) (Z13 89)   25  Screening for osteoporosis (V82 81) (Z13 820)   26  Stroke (434 91) (I63 9)   27  Transient ischemic attack (435 9) (G45 9)   28  Vertigo (780 4) (R42)    Past Medical History  1  History of migraine (V12 49) (Z86 69)   2  History of Polyp of sigmoid colon (211 3) (D12 5)   3  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 (433 81)  Active Problems And Past Medical History Reviewed: The active problems and past medical history were reviewed and updated today  Surgical History  1  History of Appendectomy   2  History of Cholecystectomy    Family History  Mother    1  Family history of Arthritis (V17 7)   2  Family history of Coronary Artery Disease (V17 49)   3   Family history of Hypertension (V17 49)  Family History Reviewed: The family history was reviewed and updated today  Social History     · Former smoker (K19 91) (K66 007)   · Social alcohol use (Z78 9)  Social History Reviewed: The social history was reviewed and is unchanged  Current Meds   1  Amiodarone HCl - 200 MG Oral Tablet; 1 tab three times a day for 2 weeks then 1 tab daily; Therapy: 64LUW1643 to (Beny Jana)  Requested for: 25Oct2017; Last Rx:25Oct2017 Ordered   2  Clopidogrel Bisulfate 75 MG Oral Tablet; take one tablet by mouth every day; Therapy: 99AJQ1471 to (Suraj Talavera)  Requested for: 88IEA0724; Last Rx:28Nov2017 Ordered   3  Eliquis 5 MG Oral Tablet; Take 1 tablet twice daily  Requested for: 28Nov2017; Last Rx:28Nov2017 Ordered   4  Escitalopram Oxalate 10 MG Oral Tablet; TAKE ONE TABLET BY MOUTH ONCE DAILY; Therapy: 52MIC8677 to (Last Rx:18Oct2017)  Requested for: 71GLZ8951 Ordered   5  Furosemide 20 MG Oral Tablet; Take one tablet  twice daily; Therapy: 19GWU9729 to (Evaluate:85Xfa7584)  Requested for: 76UJO4441; Last Rx:06Nov2017 Ordered   6  LORazepam 1 MG Oral Tablet; TAKE ONE TABLET BY MOUTH 3 TIMES A DAY AS NEEDED; Therapy: 23FIF6516 to (Evaluate:51Wbc0141)  Requested for: 10Cnp0580; Last Rx:43Qzc8582 Ordered   7  Meclizine HCl - 25 MG Oral Tablet; TAKE 1 TABLET 3 TIMES DAILY AS NEEDED; Therapy: 56SKZ8689 to (Evaluate:02Jan2016)  Requested for: 44MYJ4740; Last Rx:42Xgi2494 Ordered   8  Metoprolol Tartrate 100 MG Oral Tablet; One tab every 12 hours  Requested for: 25Oct2017; Last HERNANDEZ:85QGH8639 Ordered   9  Multivitamins TABS; TAKE 1 TABLET DAILY; Therapy: 88Ber9929 to Recorded   10  Nortriptyline HCl - 50 MG Oral Capsule; TAKE 1 CAPSULE AT BEDTIME; Therapy: 01JUI2656 to (Evaluate:14Tdo3670)  Requested for: 54OHV6462; Last Rx:06Nov2017  Ordered   11  Oxycodone-Acetaminophen 5-325 MG Oral Tablet; TAKE 1 TO 2 TABLETS EVERY 4 TO 6 HOURS AS  NEEDED;   Therapy: 96GHU5572 to "(Evaluate:20Oct2017); Last Rx:69Naw7092 Ordered   12  Potassium Chloride Ofelia ER 20 MEQ Oral Tablet Extended Release; TAKE ONE TABLET BY MOUTH  TWICE DAILY; Therapy: 62XCA2102 to (Evaluate:56Ttc7948)  Requested for: 96NMO0294; Last Rx:06Nov2017  Ordered   13  Simvastatin 10 MG Oral Tablet; take one tablet by mouth every day; Therapy: 96ARW2164 to (Last Rx:24Jan2017)  Requested for: 25Jan2017 Ordered   14  Topiramate 25 MG Oral Tablet; take 1 tablet by mouth daily; Therapy: 60ZDL8741 to (Last Rx:27Oct2017)  Requested for: 30Oct2017 Ordered    Allergies  1  No Known Drug Allergies    Vitals  Vital Signs    Recorded: 29Nov2017 08:39AM   Temperature 98 2 F   Heart Rate 100   Respiration 16   Systolic 096   Diastolic 70   BP CUFF SIZE Large   Height 5 ft 5 in   Weight 250 lb 2 oz   BMI Calculated 41 62   BSA Calculated 2 18       Physical Exam   Constitutional  General appearance: No acute distress, well appearing and well nourished  Pulmonary  Respiratory effort: No increased work of breathing or signs of respiratory distress  Auscultation of lungs: Clear to auscultation  Cardiovascular  Palpation of heart: Abnormal  -- Irregular rhythm  Auscultation of heart: Normal rate and rhythm, normal S1 and S2, without murmurs  Examination of extremities for edema and/or varicosities: Abnormal  -- trace edema  Carotid pulses: Normal    Abdomen  Abdomen: Non-tender, no masses  Liver and spleen: No hepatomegaly or splenomegaly  Lymphatic  Palpation of lymph nodes in neck: No lymphadenopathy     Musculoskeletal  Gait and station: Normal    Inspection/palpation of joints, bones, and muscles: Normal       Psychiatric  Orientation to person, place, and time: Normal    Mood and affect: Normal          Results/Data  EKG/ECG- POC 87NFM0638 09:02AM Landa Barefoot Whitley Kelvin     Test Name Result Flag Reference   EKG/ECG 11/29/2017           Health Management  Encounter for screening mammogram for malignant neoplasm of breast " Digital Bilateral Screening Mammogram With CAD; every 1 year; Last 23Rxk0779; Next HVD:29NKJ1756; Overdue    Future Appointments    Date/Time Provider Specialty Site   03/12/2018 08:30 AM HAIR Dixon   Internal Medicine [facility]   12/15/2017 10:00 AM Fallon Martin DO Cardiology  CARDIOLOGY  [facility]       Signatures   Electronically signed by : HAIR Ibarra ; Nov 29 2017 12:02PM EST                       (Author)

## 2017-12-04 DIAGNOSIS — R73.03 PREDIABETES: ICD-10-CM

## 2017-12-04 DIAGNOSIS — I10 ESSENTIAL (PRIMARY) HYPERTENSION: ICD-10-CM

## 2017-12-04 DIAGNOSIS — E87.6 HYPOKALEMIA: ICD-10-CM

## 2017-12-04 DIAGNOSIS — E78.00 PURE HYPERCHOLESTEROLEMIA: ICD-10-CM

## 2017-12-04 DIAGNOSIS — I48.91 ATRIAL FIBRILLATION (HCC): ICD-10-CM

## 2017-12-15 ENCOUNTER — ALLSCRIPTS OFFICE VISIT (OUTPATIENT)
Dept: OTHER | Facility: OTHER | Age: 72
End: 2017-12-15

## 2017-12-16 NOTE — PROGRESS NOTES
Assessment  Assessed    1  Atrial fibrillation, unspecified type (427 31) (I48 91)   2  Hypertension (401 9) (I10)   3  Mitral regurgitation (424 0) (I34 0)   4  Obstructive sleep apnea (327 23) (G47 33)   5  Obesity (278 00) (E66 9)   6  Stroke (434 91) (I63 9)    Plan  Atrial fibrillation, unspecified type    · EKG/ECG- POC; Status:Complete;   Done: 76CTX7970 10:26AM   Perform: In Office; Last Updated By:Melva Lebron; 12/15/2017 10:26:53 AM;Ordered; For:Atrial fibrillation, unspecified type; Ordered By:Steve Ohara;    Discussion/Summary  Cardiology Discussion Summary Free Text Note Form [facility]:   She has symptomatic persistent atrial fibrillation complicating this is untreated sleep apnea because she does not tolerate a mask obesity and hypertension  Her transthoracic echocardiogram showed severe mitral regurgitation however transesophageal echocardiogram showed no significant mitral regurgitation she does have a very dilated left atrium either secondary to atrial fibrillation or as a cause of atrial fibrillation  Her mitral regurgitation is mild on transesophageal echocardiogram Her fatigue is likely a combination of her the atrial fibrillation and possibly the rate controlling agents at high doses that are required to keep her rate controlled  For atrial fibrillation she has failed amiodarone therapy and the next logical step would be ablation  Given her left atrial dilatation she could be considered for an epicardial and endocardial atrial fibrillation ablation  This would likely give her her best chance at sinus rhythm however is a more complicated procedure involving epicardial access  We will start with an endocardial balloon cryo balloon ablation for pulmonary vein isolation  I explained to her that risks include but are not limited to bleeding bruising damage to blood vessels heart bowels other organ stroke heart perforation heart attack phrenic nerve damage atrial esophageal fistula need for pacemaker   She understands thisI am going to change her from Plavix to baby aspirin seven days prior the procedureBecause she had left atrial appendage dense smoke I am only going to hold one dose of Eliquis prior to the ablation  She will undergo transesophageal echocardiogram and atrial fibrillation ablation in the near futureIf she continues to have symptomatic AFib post standard ablation would encourage her to undergo epicardial ablation with isolation of the posterior wall and consideration for left atrial appendage occlusion at the same time  Finally ideally we could find an option for her sleep apnea such as a dental appliance we could discuss this at a later date  I will also ablate atrial flutter at that same time as her atrial fibrillation ablation as at times on her EKG today her AFib appears organized and atrial flutter can be a trigger for atrial fibrillationI will follow up with her at the time of her atrial fibrillation ablation  Chief Complaint  Chief Complaint Free Text Note Form: return office visit with EKG      History of Present Illness  Cardiology HPI Free Text Note Form [facility]: consult for atrial fibrillation was cardioverted and started on amiodarone  lack of energy for one month  was in hospital with atrial fibrillation RVR in october  symptoms are fatigue  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 afibno etoh  no tobacco has sleep apnea but can't use mask due to claustrophobiaShe 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[facility] [employer] she is helping to support her granddaughter going to medical school and her daughter has lymphoma and is undergoing treatment is due for a bone marrow biopsy in the near futureShe underwent a transesophageal echocardiogram and cardioversion however she did not maintain sinus rhythm I personally reviewed this echocardiogram she has a very dilated left atrium there was smoke but no active clot  Despite amiodarone therapy she remains in atrial fibrillation   She states that she is very tired on the weekend she cannot get up the energy to do her tasks      Review of Systems  Cardiology Female ROS:    Cardiac: No complaints of chest pain, no palpitations, no fainting  Skin: No complaints of nonhealing sores or skin rash  Genitourinary: No complaints of recurrent urinary tract infections, frequent urination at night, difficult urination, blood in urine, kidney stones, loss of bladder control, kidney problems, denies any birth control or hormone replacement, is not post menopausal, not currently pregnant  Psychological: No complaints of feeling depressed, anxiety, panic attacks, or difficulty concentrating  General: lack of energy/fatigue  Respiratory: shortness of breath-- and-- wheezing  HEENT: No complaints of serious problems, hearing problems, nose problems, throat problems, or snoring  Gastrointestinal: No complaints of liver problems, nausea, vomiting, heartburn, constipation, bloody stools, diarrhea, problems swallowing, adbominal pain, or rectal bleeding  Hematologic: No complaints of bleeding disorders, anemia, blood clots, or excessive brusing  Neurological: headaches  Musculoskeletal: back pain    ROS Reviewed:   ROS reviewed  Active Problems  Problems    1  Anserine bursitis (726 61) (M70 50)   2  Anxiety (300 00) (F41 9)   3  Atrial fibrillation, unspecified type (427 31) (I48 91)   4  Bicipital tendinitis of right shoulder (726 12) (M75 21)   5  Cervicalgia (723 1) (M54 2)   6  Chronic diastolic heart failure (111 21) (I50 32)   7  Colonoscopy (Fiberoptic) Screening   8  Depression (311) (F32 9)   9  Edema (782 3) (R60 9)   10  Encounter for screening mammogram for malignant neoplasm of breast (V76 12)  (Z12 31)   11  Hypercholesterolemia (272 0) (E78 00)   12  Hypertension (401 9) (I10)   13  Hypokalemia (276 8) (E87 6)   14  Insomnia (780 52) (G47 00)   15  Migraine (346 90) (G43 909)   16  Mitral regurgitation (424 0) (I34 0)   17   Need for prophylactic vaccination and inoculation against influenza (V04 81) (Z23)   18  Obesity (278 00) (E66 9)   19  Obstructive sleep apnea (327 23) (G47 33)   20  Osteoarthritis of both knees, unspecified osteoarthritis type (715 96) (M17 0)   21  Prediabetes (790 29) (R73 03)   22  Primary localized osteoarthritis of right knee (715 16) (M17 11)   23  Right knee pain (719 46) (M25 561)   24  Screening for genitourinary condition (V81 6) (Z13 89)   25  Screening for osteoporosis (V82 81) (Z13 820)   26  Stroke (434 91) (I63 9)   27  Transient ischemic attack (435 9) (G45 9)   28  Vertigo (780 4) (R42)    Past Medical History  Problems    1  History of migraine (V12 49) (Z86 69)   2  History of Polyp of sigmoid colon (211 3) (D12 5)   3  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 (433 81)  Active Problems And Past Medical History Reviewed: The active problems and past medical history were reviewed and updated today  Surgical History  Problems    1  History of Appendectomy   2  History of Cholecystectomy  Surgical History Reviewed: The surgical history was reviewed and updated today  Family History  Mother    1  Family history of Arthritis (V17 7)   2  Family history of Coronary Artery Disease (V17 49)   3  Family history of Hypertension (V17 49)  Family History Reviewed: The family history was reviewed and updated today  Social History  Problems    · Former smoker (C16 14) (P88 806)   · Social alcohol use (Z78 9)  Social History Reviewed: The social history was reviewed and updated today  The social history was reviewed and is unchanged  Current Meds   1  Amiodarone HCl - 200 MG Oral Tablet; 1 tab three times a day for 2 weeks then 1 tab daily; Therapy: 27TUI3773 to (Megan Slimmer)  Requested for: 25Oct2017; Last Rx:25Oct2017 Ordered   2  Clopidogrel Bisulfate 75 MG Oral Tablet; take one tablet by mouth every day;  Therapy: 78YPR4604 to (Gabi Loud)  Requested for: 30WEJ7357; Last Rx:28Nov2017 Ordered   3  Eliquis 5 MG Oral Tablet; Take 1 tablet twice daily  Requested for: 28Nov2017; Last Rx:28Nov2017 Ordered   4  Escitalopram Oxalate 10 MG Oral Tablet; TAKE ONE TABLET BY MOUTH ONCE DAILY; Therapy: 03KMK2776 to (Last Rx:18Oct2017)  Requested for: 82NPT8910 Ordered   5  Furosemide 20 MG Oral Tablet; Take one tablet  twice daily; Therapy: 43NXV1066 to (Evaluate:57Thu8280)  Requested for: 49COG6551; Last Rx:06Nov2017 Ordered   6  LORazepam 1 MG Oral Tablet; TAKE ONE TABLET BY MOUTH 3 TIMES A DAY AS NEEDED; Therapy: 65AJT4984 to (Evaluate:51Qsx6043)  Requested for: 60Jsj1129; Last Rx:98Mmj1516 Ordered   7  Meclizine HCl - 25 MG Oral Tablet; TAKE 1 TABLET 3 TIMES DAILY AS NEEDED; Therapy: 08XGP4753 to (Evaluate:02Jan2016)  Requested for: 71LPI2768; Last Rx:54Bcq7942 Ordered   8  Metoprolol Tartrate 100 MG Oral Tablet; One tab every 12 hours  Requested for: 25Oct2017; Last JL:48ICY7140 Ordered   9  Multivitamins TABS; TAKE 1 TABLET DAILY; Therapy: 20Mau1512 to Recorded   10  Nortriptyline HCl - 50 MG Oral Capsule; TAKE 1 CAPSULE AT BEDTIME; Therapy: 98SMP1869 to (Evaluate:04Feb2018)  Requested for: 16NAY8814; Last  Rx:06Nov2017 Ordered   11  Oxycodone-Acetaminophen 5-325 MG Oral Tablet; TAKE 1 TO 2 TABLETS EVERY 4 TO 6  HOURS AS NEEDED; Therapy: 26PVD4420 to (Evaluate:20Oct2017); Last Rx:57Ele0491 Ordered   12  Potassium Chloride Ofelia ER 20 MEQ Oral Tablet Extended Release; TAKE ONE TABLET  BY MOUTH TWICE DAILY; Therapy: 69JXZ4597 to (Evaluate:35Ltp4927)  Requested for: 91CEP1527; Last  Rx:06Nov2017 Ordered   13  Simvastatin 10 MG Oral Tablet; take one tablet by mouth every day; Therapy: 20IJR6733 to (Last Rx:24Jan2017)  Requested for: 25Jan2017 Ordered   14  Topiramate 25 MG Oral Tablet; take 1 tablet by mouth daily; Therapy: 87ZTR4783 to (Last Lele Lopez)  Requested for: 69MVB5414 Ordered  Medication List Reviewed: The medication list was reviewed and updated today  Allergies  Medication    1  No Known Drug Allergies    Vitals  Vital Signs    Recorded: 64Ivb2853 10:34AM   Heart Rate 73, Apical   Pulse Quality Irregular, Apical   Systolic 152, LUE, Sitting   Diastolic 72, LUE, Sitting   BP CUFF SIZE Large   Height 5 ft 5 in   Weight 262 lb    BMI Calculated 43 6   BSA Calculated 2 22     Physical Exam   Constitutional - General appearance: No acute distress, well appearing and well nourished  Eyes - Conjunctiva and Sclera examination: Conjunctiva pink, sclera anicteric  Neck - Normal, no JVD   Pulmonary - Respiratory effort: No signs of respiratory distress  -- Auscultation of lungs: Clear to auscultation  Cardiovascular - Auscultation of heart: Abnormal  -- irregularly irregular  -- Pedal pulses: Normal, 2+ bilaterally  -- Examination of extremities for edema and/or varicosities: Normal    Abdomen - Soft  Musculoskeletal - Gait and station: Normal gait  Skin - Skin: Normal without rashes  Skin is warm and well perfused  Neurologic - Speech normal  No focal deficits  Psychiatric - Orientation to person, place, and time: Normal       Results/Data  ECG Report: Atrial fibrillation with controlled ventricular response      Health Management  Encounter for screening mammogram for malignant neoplasm of breast   Digital Bilateral Screening Mammogram With CAD; every 1 year; Last 62Xri7925; Next PRP:77JBY4193; Overdue    Future Appointments    Date/Time Provider Specialty Site   03/12/2018 08:30 AM HAIR Parry   Internal Medicine [facility]     Signatures   Electronically signed by : Palu Banks DO; Dec 15 2017  1:14PM EST                       (Author)

## 2017-12-18 ENCOUNTER — TRANSCRIBE ORDERS (OUTPATIENT)
Dept: ADMINISTRATIVE | Facility: HOSPITAL | Age: 72
End: 2017-12-18

## 2017-12-18 DIAGNOSIS — I48.91 ATRIAL FIBRILLATION, UNSPECIFIED TYPE (HCC): Primary | ICD-10-CM

## 2017-12-23 ENCOUNTER — APPOINTMENT (OUTPATIENT)
Dept: LAB | Facility: HOSPITAL | Age: 72
End: 2017-12-23
Attending: INTERNAL MEDICINE
Payer: COMMERCIAL

## 2017-12-23 DIAGNOSIS — I63.9 CEREBRAL INFARCTION (HCC): ICD-10-CM

## 2017-12-23 DIAGNOSIS — E87.6 HYPOKALEMIA: ICD-10-CM

## 2017-12-23 DIAGNOSIS — E78.00 PURE HYPERCHOLESTEROLEMIA: ICD-10-CM

## 2017-12-23 DIAGNOSIS — I10 ESSENTIAL (PRIMARY) HYPERTENSION: ICD-10-CM

## 2017-12-23 DIAGNOSIS — I50.32 CHRONIC DIASTOLIC HEART FAILURE (HCC): ICD-10-CM

## 2017-12-23 DIAGNOSIS — R73.03 PREDIABETES: ICD-10-CM

## 2017-12-23 DIAGNOSIS — I48.91 ATRIAL FIBRILLATION (HCC): ICD-10-CM

## 2017-12-23 LAB
ALBUMIN SERPL BCP-MCNC: 3.6 G/DL (ref 3.5–5)
ALP SERPL-CCNC: 125 U/L (ref 46–116)
ALT SERPL W P-5'-P-CCNC: 26 U/L (ref 12–78)
ANION GAP SERPL CALCULATED.3IONS-SCNC: 8 MMOL/L (ref 4–13)
AST SERPL W P-5'-P-CCNC: 23 U/L (ref 5–45)
BASOPHILS # BLD AUTO: 0.05 THOUSANDS/ΜL (ref 0–0.1)
BASOPHILS NFR BLD AUTO: 1 % (ref 0–1)
BILIRUB SERPL-MCNC: 0.47 MG/DL (ref 0.2–1)
BUN SERPL-MCNC: 21 MG/DL (ref 5–25)
CALCIUM SERPL-MCNC: 9 MG/DL (ref 8.3–10.1)
CHLORIDE SERPL-SCNC: 107 MMOL/L (ref 100–108)
CHOLEST SERPL-MCNC: 114 MG/DL (ref 50–200)
CO2 SERPL-SCNC: 26 MMOL/L (ref 21–32)
CREAT SERPL-MCNC: 1.36 MG/DL (ref 0.6–1.3)
EOSINOPHIL # BLD AUTO: 0.54 THOUSAND/ΜL (ref 0–0.61)
EOSINOPHIL NFR BLD AUTO: 7 % (ref 0–6)
ERYTHROCYTE [DISTWIDTH] IN BLOOD BY AUTOMATED COUNT: 17.2 % (ref 11.6–15.1)
EST. AVERAGE GLUCOSE BLD GHB EST-MCNC: 111 MG/DL
GFR SERPL CREATININE-BSD FRML MDRD: 39 ML/MIN/1.73SQ M
GLUCOSE P FAST SERPL-MCNC: 95 MG/DL (ref 65–99)
HBA1C MFR BLD: 5.5 % (ref 4.2–6.3)
HCT VFR BLD AUTO: 36.1 % (ref 34.8–46.1)
HDLC SERPL-MCNC: 66 MG/DL (ref 40–60)
HGB BLD-MCNC: 11.2 G/DL (ref 11.5–15.4)
LDLC SERPL CALC-MCNC: 33 MG/DL (ref 0–100)
LYMPHOCYTES # BLD AUTO: 2.61 THOUSANDS/ΜL (ref 0.6–4.47)
LYMPHOCYTES NFR BLD AUTO: 33 % (ref 14–44)
MAGNESIUM SERPL-MCNC: 2.4 MG/DL (ref 1.6–2.6)
MCH RBC QN AUTO: 28.1 PG (ref 26.8–34.3)
MCHC RBC AUTO-ENTMCNC: 31 G/DL (ref 31.4–37.4)
MCV RBC AUTO: 91 FL (ref 82–98)
MONOCYTES # BLD AUTO: 0.39 THOUSAND/ΜL (ref 0.17–1.22)
MONOCYTES NFR BLD AUTO: 5 % (ref 4–12)
NEUTROPHILS # BLD AUTO: 4.27 THOUSANDS/ΜL (ref 1.85–7.62)
NEUTS SEG NFR BLD AUTO: 54 % (ref 43–75)
NRBC BLD AUTO-RTO: 0 /100 WBCS
PLATELET # BLD AUTO: 307 THOUSANDS/UL (ref 149–390)
PMV BLD AUTO: 11.1 FL (ref 8.9–12.7)
POTASSIUM SERPL-SCNC: 4.6 MMOL/L (ref 3.5–5.3)
PROT SERPL-MCNC: 7.4 G/DL (ref 6.4–8.2)
RBC # BLD AUTO: 3.99 MILLION/UL (ref 3.81–5.12)
SODIUM SERPL-SCNC: 141 MMOL/L (ref 136–145)
TRIGL SERPL-MCNC: 74 MG/DL
TSH SERPL DL<=0.05 MIU/L-ACNC: 14.61 UIU/ML (ref 0.36–3.74)
WBC # BLD AUTO: 7.86 THOUSAND/UL (ref 4.31–10.16)

## 2017-12-23 PROCEDURE — 83036 HEMOGLOBIN GLYCOSYLATED A1C: CPT

## 2017-12-23 PROCEDURE — 85025 COMPLETE CBC W/AUTO DIFF WBC: CPT

## 2017-12-23 PROCEDURE — 36415 COLL VENOUS BLD VENIPUNCTURE: CPT

## 2017-12-23 PROCEDURE — 80053 COMPREHEN METABOLIC PANEL: CPT

## 2017-12-23 PROCEDURE — 80061 LIPID PANEL: CPT

## 2017-12-23 PROCEDURE — 83735 ASSAY OF MAGNESIUM: CPT

## 2017-12-23 PROCEDURE — 84443 ASSAY THYROID STIM HORMONE: CPT

## 2017-12-25 ENCOUNTER — HOSPITAL ENCOUNTER (INPATIENT)
Facility: HOSPITAL | Age: 72
LOS: 3 days | Discharge: HOME | DRG: 291 | End: 2017-12-28
Attending: EMERGENCY MEDICINE | Admitting: INTERNAL MEDICINE
Payer: COMMERCIAL

## 2017-12-25 ENCOUNTER — APPOINTMENT (EMERGENCY)
Dept: RADIOLOGY | Facility: HOSPITAL | Age: 72
DRG: 291 | End: 2017-12-25
Payer: COMMERCIAL

## 2017-12-25 DIAGNOSIS — R06.00 DYSPNEA: ICD-10-CM

## 2017-12-25 DIAGNOSIS — I50.9 ACUTE CONGESTIVE HEART FAILURE (HCC): ICD-10-CM

## 2017-12-25 DIAGNOSIS — I50.9 CHF EXACERBATION (HCC): Primary | ICD-10-CM

## 2017-12-25 DIAGNOSIS — I48.91 ATRIAL FIBRILLATION (HCC): ICD-10-CM

## 2017-12-25 DIAGNOSIS — M79.89 LEFT LEG SWELLING: ICD-10-CM

## 2017-12-25 PROBLEM — R06.02 SOB (SHORTNESS OF BREATH): Status: ACTIVE | Noted: 2017-12-25

## 2017-12-25 LAB
ALBUMIN SERPL BCP-MCNC: 3.5 G/DL (ref 3.5–5)
ALP SERPL-CCNC: 133 U/L (ref 46–116)
ALT SERPL W P-5'-P-CCNC: 31 U/L (ref 12–78)
ANION GAP SERPL CALCULATED.3IONS-SCNC: 8 MMOL/L (ref 4–13)
ANION GAP SERPL CALCULATED.3IONS-SCNC: 9 MMOL/L (ref 4–13)
APTT PPP: 28 SECONDS (ref 23–35)
AST SERPL W P-5'-P-CCNC: 30 U/L (ref 5–45)
BASOPHILS # BLD AUTO: 0.07 THOUSANDS/ΜL (ref 0–0.1)
BASOPHILS NFR BLD AUTO: 1 % (ref 0–1)
BILIRUB SERPL-MCNC: 0.36 MG/DL (ref 0.2–1)
BUN SERPL-MCNC: 16 MG/DL (ref 5–25)
BUN SERPL-MCNC: 17 MG/DL (ref 5–25)
CALCIUM SERPL-MCNC: 8.4 MG/DL (ref 8.3–10.1)
CALCIUM SERPL-MCNC: 8.8 MG/DL (ref 8.3–10.1)
CHLORIDE SERPL-SCNC: 105 MMOL/L (ref 100–108)
CHLORIDE SERPL-SCNC: 107 MMOL/L (ref 100–108)
CO2 SERPL-SCNC: 24 MMOL/L (ref 21–32)
CO2 SERPL-SCNC: 26 MMOL/L (ref 21–32)
CREAT SERPL-MCNC: 1.11 MG/DL (ref 0.6–1.3)
CREAT SERPL-MCNC: 1.2 MG/DL (ref 0.6–1.3)
EOSINOPHIL # BLD AUTO: 0.42 THOUSAND/ΜL (ref 0–0.61)
EOSINOPHIL NFR BLD AUTO: 4 % (ref 0–6)
ERYTHROCYTE [DISTWIDTH] IN BLOOD BY AUTOMATED COUNT: 17 % (ref 11.6–15.1)
GFR SERPL CREATININE-BSD FRML MDRD: 46 ML/MIN/1.73SQ M
GFR SERPL CREATININE-BSD FRML MDRD: 50 ML/MIN/1.73SQ M
GLUCOSE SERPL-MCNC: 120 MG/DL (ref 65–140)
GLUCOSE SERPL-MCNC: 130 MG/DL (ref 65–140)
HCT VFR BLD AUTO: 39.4 % (ref 34.8–46.1)
HGB BLD-MCNC: 12.4 G/DL (ref 11.5–15.4)
INR PPP: 1.03 (ref 0.86–1.16)
LYMPHOCYTES # BLD AUTO: 1.5 THOUSANDS/ΜL (ref 0.6–4.47)
LYMPHOCYTES NFR BLD AUTO: 14 % (ref 14–44)
MCH RBC QN AUTO: 28.3 PG (ref 26.8–34.3)
MCHC RBC AUTO-ENTMCNC: 31.5 G/DL (ref 31.4–37.4)
MCV RBC AUTO: 90 FL (ref 82–98)
MONOCYTES # BLD AUTO: 0.49 THOUSAND/ΜL (ref 0.17–1.22)
MONOCYTES NFR BLD AUTO: 5 % (ref 4–12)
NEUTROPHILS # BLD AUTO: 8.24 THOUSANDS/ΜL (ref 1.85–7.62)
NEUTS SEG NFR BLD AUTO: 76 % (ref 43–75)
NT-PROBNP SERPL-MCNC: 2050 PG/ML
P AXIS: 0 DEGREES
PLATELET # BLD AUTO: 365 THOUSANDS/UL (ref 149–390)
PMV BLD AUTO: 10.8 FL (ref 8.9–12.7)
POTASSIUM SERPL-SCNC: 4 MMOL/L (ref 3.5–5.3)
POTASSIUM SERPL-SCNC: 4.3 MMOL/L (ref 3.5–5.3)
PROT SERPL-MCNC: 7.6 G/DL (ref 6.4–8.2)
PROTHROMBIN TIME: 13.5 SECONDS (ref 12.1–14.4)
QRS AXIS: 77 DEGREES
QRSD INTERVAL: 94 MS
QT INTERVAL: 376 MS
QTC INTERVAL: 487 MS
RBC # BLD AUTO: 4.38 MILLION/UL (ref 3.81–5.12)
SODIUM SERPL-SCNC: 139 MMOL/L (ref 136–145)
SODIUM SERPL-SCNC: 140 MMOL/L (ref 136–145)
SPECIMEN SOURCE: NORMAL
T WAVE AXIS: 54 DEGREES
TROPONIN I BLD-MCNC: 0 NG/ML (ref 0–0.08)
TROPONIN I SERPL-MCNC: <0.02 NG/ML
VENTRICULAR RATE: 101 BPM
WBC # BLD AUTO: 10.72 THOUSAND/UL (ref 4.31–10.16)

## 2017-12-25 PROCEDURE — 85730 THROMBOPLASTIN TIME PARTIAL: CPT | Performed by: EMERGENCY MEDICINE

## 2017-12-25 PROCEDURE — 84484 ASSAY OF TROPONIN QUANT: CPT

## 2017-12-25 PROCEDURE — 84484 ASSAY OF TROPONIN QUANT: CPT | Performed by: PHYSICIAN ASSISTANT

## 2017-12-25 PROCEDURE — 83880 ASSAY OF NATRIURETIC PEPTIDE: CPT | Performed by: EMERGENCY MEDICINE

## 2017-12-25 PROCEDURE — 99285 EMERGENCY DEPT VISIT HI MDM: CPT

## 2017-12-25 PROCEDURE — 80048 BASIC METABOLIC PNL TOTAL CA: CPT | Performed by: PHYSICIAN ASSISTANT

## 2017-12-25 PROCEDURE — 85025 COMPLETE CBC W/AUTO DIFF WBC: CPT | Performed by: EMERGENCY MEDICINE

## 2017-12-25 PROCEDURE — 85610 PROTHROMBIN TIME: CPT | Performed by: EMERGENCY MEDICINE

## 2017-12-25 PROCEDURE — 80053 COMPREHEN METABOLIC PANEL: CPT | Performed by: EMERGENCY MEDICINE

## 2017-12-25 PROCEDURE — 36415 COLL VENOUS BLD VENIPUNCTURE: CPT | Performed by: EMERGENCY MEDICINE

## 2017-12-25 PROCEDURE — 71020 HB CHEST X-RAY 2VW FRONTAL&LATL: CPT

## 2017-12-25 PROCEDURE — 93005 ELECTROCARDIOGRAM TRACING: CPT | Performed by: EMERGENCY MEDICINE

## 2017-12-25 RX ORDER — METOPROLOL TARTRATE 100 MG/1
100 TABLET ORAL EVERY 12 HOURS SCHEDULED
Status: DISCONTINUED | OUTPATIENT
Start: 2017-12-25 | End: 2017-12-28 | Stop reason: HOSPADM

## 2017-12-25 RX ORDER — TOPIRAMATE 25 MG/1
25 TABLET ORAL 2 TIMES DAILY
Status: DISCONTINUED | OUTPATIENT
Start: 2017-12-25 | End: 2017-12-28 | Stop reason: HOSPADM

## 2017-12-25 RX ORDER — ESCITALOPRAM OXALATE 10 MG/1
10 TABLET ORAL DAILY
Status: DISCONTINUED | OUTPATIENT
Start: 2017-12-25 | End: 2017-12-28 | Stop reason: HOSPADM

## 2017-12-25 RX ORDER — ACETAMINOPHEN 325 MG/1
650 TABLET ORAL EVERY 4 HOURS PRN
Status: DISCONTINUED | OUTPATIENT
Start: 2017-12-25 | End: 2017-12-28 | Stop reason: HOSPADM

## 2017-12-25 RX ORDER — AMIODARONE HYDROCHLORIDE 200 MG/1
200 TABLET ORAL DAILY
COMMUNITY
End: 2018-02-28 | Stop reason: SDUPTHER

## 2017-12-25 RX ORDER — PRAVASTATIN SODIUM 40 MG
20 TABLET ORAL
Status: DISCONTINUED | OUTPATIENT
Start: 2017-12-25 | End: 2017-12-28 | Stop reason: HOSPADM

## 2017-12-25 RX ORDER — TOPIRAMATE 25 MG/1
25 CAPSULE, COATED PELLETS ORAL 2 TIMES DAILY
Status: DISCONTINUED | OUTPATIENT
Start: 2017-12-25 | End: 2017-12-25 | Stop reason: RX

## 2017-12-25 RX ORDER — AMIODARONE HYDROCHLORIDE 200 MG/1
200 TABLET ORAL DAILY
Status: DISCONTINUED | OUTPATIENT
Start: 2017-12-25 | End: 2017-12-28 | Stop reason: HOSPADM

## 2017-12-25 RX ORDER — LORAZEPAM 1 MG/1
1 TABLET ORAL 3 TIMES DAILY PRN
Status: DISCONTINUED | OUTPATIENT
Start: 2017-12-25 | End: 2017-12-28 | Stop reason: HOSPADM

## 2017-12-25 RX ORDER — POTASSIUM CHLORIDE 20 MEQ/1
20 TABLET, EXTENDED RELEASE ORAL 2 TIMES DAILY
Status: DISCONTINUED | OUTPATIENT
Start: 2017-12-25 | End: 2017-12-28 | Stop reason: HOSPADM

## 2017-12-25 RX ORDER — NORTRIPTYLINE HYDROCHLORIDE 50 MG/1
50 CAPSULE ORAL
Status: DISCONTINUED | OUTPATIENT
Start: 2017-12-25 | End: 2017-12-28 | Stop reason: HOSPADM

## 2017-12-25 RX ORDER — FUROSEMIDE 10 MG/ML
40 INJECTION INTRAMUSCULAR; INTRAVENOUS
Status: DISCONTINUED | OUTPATIENT
Start: 2017-12-25 | End: 2017-12-28 | Stop reason: HOSPADM

## 2017-12-25 RX ORDER — OXYCODONE HYDROCHLORIDE AND ACETAMINOPHEN 5; 325 MG/1; MG/1
1 TABLET ORAL EVERY 4 HOURS PRN
Status: DISCONTINUED | OUTPATIENT
Start: 2017-12-25 | End: 2017-12-28 | Stop reason: HOSPADM

## 2017-12-25 RX ORDER — CLOPIDOGREL BISULFATE 75 MG/1
75 TABLET ORAL DAILY
Status: DISCONTINUED | OUTPATIENT
Start: 2017-12-25 | End: 2017-12-28 | Stop reason: HOSPADM

## 2017-12-25 RX ORDER — FUROSEMIDE 10 MG/ML
20 INJECTION INTRAMUSCULAR; INTRAVENOUS ONCE
Status: COMPLETED | OUTPATIENT
Start: 2017-12-25 | End: 2017-12-25

## 2017-12-25 RX ADMIN — OXYCODONE HYDROCHLORIDE AND ACETAMINOPHEN 1 TABLET: 5; 325 TABLET ORAL at 15:26

## 2017-12-25 RX ADMIN — ESCITALOPRAM OXALATE 10 MG: 10 TABLET ORAL at 15:26

## 2017-12-25 RX ADMIN — NORTRIPTYLINE HYDROCHLORIDE 50 MG: 50 CAPSULE ORAL at 21:41

## 2017-12-25 RX ADMIN — TOPIRAMATE 25 MG: 25 TABLET, FILM COATED ORAL at 17:14

## 2017-12-25 RX ADMIN — APIXABAN 5 MG: 5 TABLET, FILM COATED ORAL at 17:15

## 2017-12-25 RX ADMIN — METOPROLOL TARTRATE 100 MG: 100 TABLET ORAL at 21:41

## 2017-12-25 RX ADMIN — CLOPIDOGREL BISULFATE 75 MG: 75 TABLET ORAL at 15:26

## 2017-12-25 RX ADMIN — FUROSEMIDE 20 MG: 10 INJECTION, SOLUTION INTRAMUSCULAR; INTRAVENOUS at 11:14

## 2017-12-25 RX ADMIN — FUROSEMIDE 40 MG: 10 INJECTION, SOLUTION INTRAMUSCULAR; INTRAVENOUS at 15:26

## 2017-12-25 RX ADMIN — METOPROLOL TARTRATE 100 MG: 100 TABLET ORAL at 15:26

## 2017-12-25 RX ADMIN — AMIODARONE HYDROCHLORIDE 200 MG: 200 TABLET ORAL at 15:26

## 2017-12-25 RX ADMIN — POTASSIUM CHLORIDE 20 MEQ: 1500 TABLET, EXTENDED RELEASE ORAL at 17:15

## 2017-12-25 RX ADMIN — PRAVASTATIN SODIUM 20 MG: 40 TABLET ORAL at 17:14

## 2017-12-25 NOTE — PLAN OF CARE
Problem: Potential for Falls  Goal: Patient will remain free of falls  INTERVENTIONS:  - Assess patient frequently for physical needs  -  Identify cognitive and physical deficits and behaviors that affect risk of falls    -  [place] fall precautions as indicated by assessment   - Educate patient/family on patient safety including physical limitations  - Instruct patient to call for assistance with activity based on assessment  - Modify environment to reduce risk of injury  - Consider OT/PT consult to assist with strengthening/mobility   Outcome: Progressing      Problem: INFECTION - ADULT  Goal: Absence or prevention of progression during hospitalization  INTERVENTIONS:  - Assess and monitor for signs and symptoms of infection  - Monitor lab/diagnostic results  - Monitor all insertion sites, i e  indwelling lines, tubes, and drains  - Monitor endotracheal (as able) and nasal secretions for changes in amount and color  - [place] appropriate cooling/warming therapies per order  - Administer medications as ordered  - Instruct and encourage patient and family to use good hand hygiene technique  - Identify and instruct in appropriate isolation precautions for identified infection/condition   Outcome: Progressing    Goal: Absence of fever/infection during neutropenic period  INTERVENTIONS:  - Monitor WBC  - Implement neutropenic guidelines   Outcome: Progressing      Problem: SAFETY ADULT  Goal: Maintain or return to baseline ADL function  INTERVENTIONS:  -  Assess patient's ability to carry out ADLs; assess patient's baseline for ADL function and identify physical deficits which impact ability to perform ADLs (bathing, care of mouth/teeth, toileting, grooming, dressing, etc )  - Assess/evaluate cause of self-care deficits   - Assess range of motion  - Assess patient's mobility; develop plan if impaired  - Assess patient's need for assistive devices and provide as appropriate  - Encourage maximum independence but intervene and supervise when necessary  ¯ Involve family in performance of ADLs  ¯ Assess for home care needs following discharge   ¯ Request OT consult to assist with ADL evaluation and planning for discharge  ¯ Provide patient education as appropriate   Outcome: Progressing    Goal: Maintain or return mobility status to optimal level  INTERVENTIONS:  - Assess patient's baseline mobility status (ambulation, transfers, stairs, etc )    - Identify cognitive and physical deficits and behaviors that affect mobility  - Identify mobility aids required to assist with transfers and/or ambulation (gait belt, sit-to-stand, lift, walker, cane, etc )  - [place] fall precautions as indicated by assessment  - Record patient progress and toleration of activity level on Mobility SBAR; progress patient to next Phase/Stage  - Instruct patient to call for assistance with activity based on assessment  - Request Rehabilitation consult to assist with strengthening/weightbearing, etc    Outcome: Progressing      Problem: DISCHARGE PLANNING  Goal: Discharge to home or other facility with appropriate resources  INTERVENTIONS:  - Identify barriers to discharge w/patient and caregiver  - Arrange for needed discharge resources and transportation as appropriate  - Identify discharge learning needs (meds, wound care, etc )  - Arrange for interpretive services to assist at discharge as needed  - Refer to Case Management Department for coordinating discharge planning if the patient needs post-hospital services based on physician/advanced practitioner order or complex needs related to functional status, cognitive ability, or social support system   Outcome: Progressing      Problem: Knowledge Deficit  Goal: Patient/family/caregiver demonstrates understanding of disease process, treatment plan, medications, and discharge instructions  Complete learning assessment and assess knowledge base    Interventions:  - Provide teaching at level of understanding  - Provide teaching via preferred learning methods   Outcome: Progressing

## 2017-12-25 NOTE — H&P
H&P- Jordan  1945, 81 y o  female MRN: [id number]    Unit/Bed#: E4 -01 Encounter: [id number]    Primary Care Provider: Nadiya Sandoval MD   Date and time admitted to hospital: 12/25/2017  9:58 AM      History and Physical - [facility]    Patient Information: Jordan  81 y o  female MRN: [id number]  Unit/Bed#: E4 -01 Encounter: [id number]  Admitting Physician: Queen David PA-C  PCP: Nadiya Sandoval MD  Date of Admission:  12/25/17    Assessment/Plan:    Hospital Problem List:     Principal Problem:    SOB (shortness of breath)  Active Problems:    Atrial fibrillation ([address] )    Acute congestive heart failure ([address] )    Essential hypertension    Hyperlipidemia LDL goal <100    Prediabetes        * SOB (shortness of breath)   Assessment & Plan    Suspect 2* CHF consider contribution of a fib to s/sx, pt on 1L NC w/no wob, conversational dyspnea  Lungs with faint rales on lower lateral lung fields  No PNA or s/sx of acute infection  Continue to monitor and treat underlying etiologies        Acute congestive heart failure ([address] )   Assessment & Plan    Suspect mild exacerbation 2* diastolic dysfunction given uncontrolled AFib  LARRY in 11/17  W/LVEF 50% and diffuse mild hypokinesia w/LA w/marked dilatation w/spontaneous echo contrast "Smoke" and severely reduced function of LA appendage w/severe continuous echo contrast "smoke"  By [employer] of 2000 (previous approx 2300) mild worsening lower extremity edema and PND/orthopnea yesterday evening  Chest x-ray demonstrates worsening vascular congestion and right upper and peripheral lung fields compared to prior  Patient on 1 L nasal cannula, no conversational dyspnea work of breathing and nonpitting edema in lower extremities  Received IV Lasix 20 mg in the ED, patient reports she is on 20 mg b i d  40 mg total   Will continue with IV Lasix 40 mg b i d , given concomitant a fib which is uncontrolled will consult Cardiology Atrial fibrillation ([address] )   Assessment & Plan    With lax control w/RVR with heart rate  Between  thus far on tele  Pt has failed OP DC cardioversion, has been loaded w/amiodarone 200mg TID x 1 week then switched to daily as well as had her lopressor increased to 100mg BID  PLEASE NOTE that pt has still been taking her amiodarone TID as 'no one told her to switch to daily'  Pt scheduled for OP cardiac ablation by Dr Bárbara Burnett mid January 2018  Continue OP meds, start on telemetry, consult cardiology as pt known to [address] hgb a1c  Start diabetic diet w/low salt cardiac diet        Hyperlipidemia LDL goal <100   Assessment & Plan    Continue pravachol as substitute for zocor as nonformulary        Essential hypertension   Assessment & Plan    Continue lasix, metoprolol tartrate 100mg BID            ·     VTE Prophylaxis: Apixaban (Eliquis)  / sequential compression device   Code Status:   POLST: There is no POLST form on file for this patient (pre-hospital)    Anticipated Length of Stay:  Patient will be admitted on an Inpatient basis with an anticipated length of stay of  Greater than 2 midnights  Justification for Hospital Stay: CHF w/acute exacerbation    Total Time for Visit, including Counseling / Coordination of Care: 45 minutes  Greater than 50% of this total time spent on direct patient counseling and coordination of care  Chief Complaint:   PND/orthopnea on chronic SOb    History of Present Illness:    Roosevelt Carpenter is a 81 y o  female who presents with PMH of congestive heart failure, uncontrolled atrial fibrillation, obstructive sleep apnea with intolerance to her CPAP mask hypertension, diabetes and hyperlipidemia coming the ED for PND and orthopnea starting yesterday  Patient was last seen in October of 2017 for mild CHF exacerbation  She was on 20 mg p  o  Lasix and then discharged home on 40 mg daily  She has not taken as 20 mg b i d     The patient has uncontrolled AFib and has been followed up with Dr Chantell Minor and now Dr carter  She was initially on Lopressor 75 mg b i d  has been titrated up to 100 mg b i d  and loaded with amiodarone 200 mg t i d  x1 week and now 200 mg daily although she is still taking it by 200 mg t i d  by her own report as no one told her otherwise "  She has been seen by Dr Ethan Martin as her AFib is still uncontrolled and she has LARRY and OP DC cardioversion in November of 2017 with regression to AFib immediately  She is scheduled for an outpatient ablation by Dr carter in mid January 2018  She in on eliquis for her AC  She is on plavix due to a hx of CVA w/o residual deficit  She is coming in today because her shortness of breath has been stable over the last month or two but does have to catch her breath after ambulating in her home to the bathroom requiring 2 minutes to catch her breath  She also has no chest pain no lightheadedness dizziness no palpitations  Unfortunately yesterday evening this seemed to be all of a sudden worse of the patient normally is on 2 pillows and lie supine in her bed  She woke up with PND and orthopnea and took 15 minutes to recover her breathing  She then tried laying back down on 4 pillows behind her head but was still unable to sleep due to her breathing  She has noticed a very mild swelling in her left lower extremity worse than the right although she does not weigh herself daily she does actively use no salt in her diet and avoid canned vegetables and meats    She has no URI symptoms, no fevers or chills, was in her normal state of health she has no nausea vomiting diarrhea  She has not been recently traveling and has a no long distance flight  She is still working and does walk around but cannot do any physical activity such as cleaning the house due to her dyspnea    She is on no o2 at home    Review of Systems:    Review of Systems   Constitutional: Negative for appetite change, chills and fever  HENT: Negative for congestion and sore throat  Respiratory: Positive for cough (dry nonproductive) and shortness of breath  Cardiovascular: Positive for leg swelling  Negative for chest pain and palpitations  Gastrointestinal: Negative for abdominal pain, diarrhea, nausea and vomiting  Neurological: Negative for light-headedness  All other systems reviewed and are negative  Past Medical and Surgical History:     Past Medical History:   Diagnosis Date    Hyperlipidemia     Hypertension     Migraine     Prediabetes     Stroke [facility])        Past Surgical History:   Procedure Laterality Date    APPENDECTOMY      CHOLECYSTECTOMY         Meds/Allergies:    Prior to Admission medications    Medication Sig Start Date End Date Taking?  Authorizing Provider   amiodarone 200 mg tablet Take 200 mg by mouth daily   Yes Historical Provider, MD   apixaban (ELIQUIS) 5 mg Take 1 tablet by mouth 2 (two) times a day 10/11/17  Yes Angel Luis Jasmine MD   clopidogrel (PLAVIX) 75 mg tablet Take 75 mg by mouth daily   Yes Historical Provider, MD   escitalopram (LEXAPRO) 10 mg tablet Take 10 mg by mouth daily   Yes Historical Provider, MD   furosemide (LASIX) 20 mg tablet Take 2 tablets by mouth daily  Patient taking differently: Take 40 mg by mouth 2 (two) times a day   10/11/17  Yes Angel Luis Jasmine MD   LORazepam (ATIVAN) 1 mg tablet Take 1 mg by mouth 3 (three) times a day as needed for anxiety   Yes Historical Provider, MD   metoprolol tartrate 75 MG TABS Take 1 tablet by mouth every 12 (twelve) hours 10/11/17  Yes Angel Luis Jasmine MD   Multiple Vitamin (MULTIVITAMIN) tablet Take 1 tablet by mouth daily   Yes Historical Provider, MD   nortriptyline (PAMELOR) 50 mg capsule Take 50 mg by mouth daily at bedtime   Yes Historical Provider, MD   potassium chloride (K-DUR,KLOR-CON) 20 mEq tablet Take 20 mEq by mouth 2 (two) times a day     Yes Historical Provider, MD   simvastatin (ZOCOR) 10 mg tablet Take 10 mg by mouth daily at bedtime   Yes Historical Provider, MD   topiramate (TOPAMAX) 25 mg sprinkle capsule Take 25 mg by mouth 2 (two) times a day   Yes Historical Provider, MD   meclizine (ANTIVERT) 25 mg tablet Take 32 mg by mouth 3 (three) times a day as needed for dizziness    Historical Provider, MD   oxyCODONE-acetaminophen (PERCOCET) 5-325 mg per tablet Take 1 tablet by mouth every 4 (four) hours as needed for moderate pain (take 1-2 tablets every 4-6 hours PRN)    Historical Provider, MD   hydrochlorothiazide (HYDRODIURIL) 12 5 mg tablet Take 12 5 mg by mouth daily  12/25/17  Historical Provider, MD     I have reviewed home medications with patient personally  Allergies: No Known Allergies    Social History:     Marital Status:    Occupation:   Patient Pre-hospital Living Situation:   Patient Pre-hospital Level of Mobility:   Patient Pre-hospital Diet Restrictions:   Substance Use History:   History   Alcohol Use No     History   Smoking Status    Former Smoker   Smokeless Tobacco    Never Used     History   Drug Use No       Family History:    Family History   Problem Relation Age of Onset    Diabetes Mother     Heart disease Mother     Hypertension Mother        Physical Exam:     Vitals:   Blood Pressure: 140/80 (12/25/17 1310)  Pulse: 95 (12/25/17 1310)  Temperature: 99 7 °F (37 6 °C) (12/25/17 1310)  Temp Source: Tympanic (12/25/17 1310)  Respirations: 20 (12/25/17 1310)  Height: 5' 7" (170 2 cm) (12/25/17 1310)  Weight - Scale: 117 kg (257 lb 0 9 oz) (12/25/17 1310)  SpO2: 99 % (12/25/17 1310)    Physical Exam   Constitutional: She appears well-developed  No distress  Morbidly obese   HENT:   Head: Normocephalic and atraumatic  Right Ear: External ear normal    Left Ear: External ear normal    Mucus membnranes are moist   Eyes: Conjunctivae are normal  Right eye exhibits no discharge  Left eye exhibits no discharge  No scleral icterus  Neck: Normal range of motion  Cardiovascular: Normal rate and normal heart sounds  Exam reveals no gallop and no friction rub  No murmur heard  Irregularly irregular   Pulmonary/Chest: No respiratory distress  She has no wheezes  She has rales  She exhibits no tenderness  Somewhat decreased breath sounds  Fine rales on lower lungs in lateral periphery b/l, on 1L NC w/o wob, conversational dyspnea     Abdominal: Soft  She exhibits no distension  There is no tenderness  There is no rebound and no guarding  Musculoskeletal: She exhibits edema (nonpitting on LE L>R, no posterior calf tenderness negative humberto's)  Neurological: She is alert  Skin: Skin is warm and dry  She is not diaphoretic  No erythema  Psychiatric: She has a normal mood and affect  Vitals reviewed  Additional Data:     Lab Results: I have personally reviewed pertinent reports  Results from last 7 days  Lab Units 12/25/17  1011   WBC Thousand/uL 10 72*   HEMOGLOBIN g/dL 12 4   HEMATOCRIT % 39 4   PLATELETS Thousands/uL 365   NEUTROS PCT % 76*   LYMPHS PCT % 14   MONOS PCT % 5   EOS PCT % 4       Results from last 7 days  Lab Units 12/25/17  1011   SODIUM mmol/L 140   POTASSIUM mmol/L 4 3   CHLORIDE mmol/L 107   CO2 mmol/L 24   BUN mg/dL 16   CREATININE mg/dL 1 11   CALCIUM mg/dL 8 8   TOTAL PROTEIN g/dL 7 6   BILIRUBIN TOTAL mg/dL 0 36   ALK PHOS U/L 133*   ALT U/L 31   AST U/L 30   GLUCOSE RANDOM mg/dL 130       Results from last 7 days  Lab Units 12/25/17  1011   INR  1 03       Imaging: I have personally reviewed pertinent films in PACS and mild vascular congestion of mid and lower lungs particularly worsened in the right by comparison CXR,  no infiltrate or effusion appreciated    No results found      EKG, Pathology, and Other Studies Reviewed on Admission:   · EKG: a fib w/borderline RVR  · No ST changes or T wave inversions in 2+ leads    Allscripts / Epic Records Reviewed: Yes     ** Please Note: This note has been constructed using a voice recognition system   **

## 2017-12-25 NOTE — ASSESSMENT & PLAN NOTE
Suspect mild exacerbation 2* diastolic dysfunction given uncontrolled AFib  LARRY in 11/17  W/LVEF 50% and diffuse mild hypokinesia w/LA w/marked dilatation w/spontaneous echo contrast "Smoke" and severely reduced function of LA appendage w/severe continuous echo contrast "smoke"  By [employer] of 2000 (previous approx 2300) mild worsening lower extremity edema and PND/orthopnea yesterday evening  Chest x-ray demonstrates worsening vascular congestion and right upper and peripheral lung fields compared to prior  Patient on 1 L nasal cannula, no conversational dyspnea work of breathing and nonpitting edema in lower extremities  Received IV Lasix 20 mg in the ED, patient reports she is on 20 mg b i d  40 mg total   Will continue with IV Lasix 40 mg b i d , given concomitant a fib which is uncontrolled will consult Cardiology

## 2017-12-25 NOTE — PLAN OF CARE
DISCHARGE PLANNING     Discharge to home or other facility with appropriate resources Progressing        INFECTION - ADULT     Absence or prevention of progression during hospitalization Progressing     Absence of fever/infection during neutropenic period Progressing        Knowledge Deficit     Patient/family/caregiver demonstrates understanding of disease process, treatment plan, medications, and discharge instructions Progressing        Potential for Falls     Patient will remain free of falls Progressing        SAFETY ADULT     Maintain or return to baseline ADL function Progressing     Maintain or return mobility status to optimal level Progressing

## 2017-12-25 NOTE — ASSESSMENT & PLAN NOTE
With lax control w/RVR with heart rate  Between  thus far on tele  Pt has failed OP DC cardioversion, has been loaded w/amiodarone 200mg TID x 1 week then switched to daily as well as had her lopressor increased to 100mg BID    PLEASE NOTE that pt has still been taking her amiodarone TID as 'no one told her to switch to daily'  Pt scheduled for OP cardiac ablation by Dr Arben Smith mid January 2018  Continue OP meds, start on telemetry, consult cardiology as pt known to Arben Smith

## 2017-12-25 NOTE — ED PROVIDER NOTES
History  Chief Complaint   Patient presents with    Shortness of Breath     pt presents with SOB reports "I'm in afib" pt reports scheduled for ablation soon  pt also reports cough and wheezing  History provided by:  Patient   used: No    Shortness of Breath   Severity:  Moderate  Onset quality:  Gradual  Duration:  1 day  Timing:  Constant  Progression:  Worsening  Chronicity:  Recurrent  Context comment:  CHF, Afib, Orthopnea, PND  Relieved by:  Nothing  Worsened by:  Nothing  Ineffective treatments:  None tried  Associated symptoms: PND    Associated symptoms: no abdominal pain, no chest pain, no cough, no fever, no headaches, no neck pain, no rash, no sore throat, no sputum production and no vomiting    Risk factors: obesity    Risk factors comment:  CHF, Afib      Prior to Admission Medications   Prescriptions Last Dose Informant Patient Reported? Taking?    LORazepam (ATIVAN) 1 mg tablet 12/24/2017 at Unknown time  Yes Yes   Sig: Take 1 mg by mouth 3 (three) times a day as needed for anxiety   Multiple Vitamin (MULTIVITAMIN) tablet 12/24/2017 at Unknown time  Yes Yes   Sig: Take 1 tablet by mouth daily   amiodarone 200 mg tablet 12/24/2017 at Unknown time  Yes Yes   Sig: Take 200 mg by mouth daily   apixaban (ELIQUIS) 5 mg 12/24/2017 at Unknown time  No Yes   Sig: Take 1 tablet by mouth 2 (two) times a day   clopidogrel (PLAVIX) 75 mg tablet 12/24/2017 at Unknown time  Yes Yes   Sig: Take 75 mg by mouth daily   escitalopram (LEXAPRO) 10 mg tablet 12/24/2017 at Unknown time  Yes Yes   Sig: Take 10 mg by mouth daily   furosemide (LASIX) 20 mg tablet 12/24/2017 at Unknown time  No Yes   Sig: Take 2 tablets by mouth daily   Patient taking differently: Take 40 mg by mouth 2 (two) times a day     meclizine (ANTIVERT) 25 mg tablet Unknown at Unknown time  Yes No   Sig: Take 32 mg by mouth 3 (three) times a day as needed for dizziness   metoprolol tartrate 75 MG TABS 12/24/2017 at Unknown time  No Yes   Sig: Take 1 tablet by mouth every 12 (twelve) hours   nortriptyline (PAMELOR) 50 mg capsule 12/24/2017 at Unknown time  Yes Yes   Sig: Take 50 mg by mouth daily at bedtime   oxyCODONE-acetaminophen (PERCOCET) 5-325 mg per tablet Unknown at Unknown time  Yes No   Sig: Take 1 tablet by mouth every 4 (four) hours as needed for moderate pain (take 1-2 tablets every 4-6 hours PRN)   potassium chloride (K-DUR,KLOR-CON) 20 mEq tablet 12/24/2017 at Unknown time  Yes Yes   Sig: Take 20 mEq by mouth 2 (two) times a day     simvastatin (ZOCOR) 10 mg tablet 12/24/2017 at Unknown time  Yes Yes   Sig: Take 10 mg by mouth daily at bedtime   topiramate (TOPAMAX) 25 mg sprinkle capsule 12/24/2017 at Unknown time  Yes Yes   Sig: Take 25 mg by mouth 2 (two) times a day      Facility-Administered Medications: None       Past Medical History:   Diagnosis Date    Hyperlipidemia     Hypertension     Migraine     Prediabetes     Stroke [facility])        Past Surgical History:   Procedure Laterality Date    APPENDECTOMY      CHOLECYSTECTOMY         Family History   Problem Relation Age of Onset    Diabetes Mother     Heart disease Mother     Hypertension Mother      I have reviewed and agree with the history as documented  Social History   Substance Use Topics    Smoking status: Former Smoker    Smokeless tobacco: Never Used    Alcohol use No        Review of Systems   Constitutional: Negative for activity change, chills and fever  HENT: Negative for facial swelling, sore throat and trouble swallowing  Eyes: Negative for pain and visual disturbance  Respiratory: Positive for shortness of breath  Negative for cough, sputum production and chest tightness  Cardiovascular: Positive for PND  Negative for chest pain and leg swelling  Gastrointestinal: Negative for abdominal pain, blood in stool, diarrhea, nausea and vomiting  Genitourinary: Negative for dysuria and flank pain     Musculoskeletal: Negative for back pain, neck pain and neck stiffness  Skin: Negative for pallor and rash  Allergic/Immunologic: Negative for environmental allergies and immunocompromised state  Neurological: Negative for dizziness and headaches  Hematological: Negative for adenopathy  Does not bruise/bleed easily  Psychiatric/Behavioral: Negative for agitation and behavioral problems  All other systems reviewed and are negative  Physical Exam  ED Triage Vitals   Temperature Pulse Respirations Blood Pressure SpO2   12/25/17 1003 12/25/17 1003 12/25/17 1003 12/25/17 1037 12/25/17 1042   98 °F (36 7 °C) (!) 108 20 (!) 193/82 96 %      Temp Source Heart Rate Source Patient Position - Orthostatic VS BP Location FiO2 (%)   12/25/17 1003 12/25/17 1003 12/25/17 1140 12/25/17 1140 --   Oral Monitor Sitting Right arm       Pain Score       12/25/17 1257       No Pain           Orthostatic Vital Signs  Vitals:    12/25/17 1037 12/25/17 1140 12/25/17 1257 12/25/17 1310   BP: (!) 193/82 162/67 137/86 140/80   Pulse:  82 99 95   Patient Position - Orthostatic VS:  Sitting  Sitting       Physical Exam   Constitutional: She is oriented to person, place, and time  She appears well-developed and well-nourished  No distress  HENT:   Head: Normocephalic and atraumatic  Eyes: EOM are normal    Neck: Normal range of motion  Neck supple  Cardiovascular: Normal rate, normal heart sounds and intact distal pulses  An irregularly irregular rhythm present  Pulmonary/Chest: Effort normal and breath sounds normal    Abdominal: Soft  Bowel sounds are normal  There is no tenderness  There is no rebound and no guarding  Musculoskeletal: Normal range of motion  Neurological: She is alert and oriented to person, place, and time  Skin: Skin is warm and dry  Psychiatric: She has a normal mood and affect  Nursing note and vitals reviewed        ED Medications  Medications   escitalopram (LEXAPRO) tablet 10 mg (10 mg Oral Given 12/25/17 1526) clopidogrel (PLAVIX) tablet 75 mg (75 mg Oral Given 12/25/17 1526)   amiodarone tablet 200 mg (200 mg Oral Given 12/25/17 1526)   apixaban (ELIQUIS) tablet 5 mg (not administered)   LORazepam (ATIVAN) tablet 1 mg (not administered)   meclizine (ANTIVERT) tablet 31 25 mg (not administered)   metoprolol tartrate (LOPRESSOR) tablet 100 mg (100 mg Oral Given 12/25/17 1526)   nortriptyline (PAMELOR) capsule 50 mg (not administered)   oxyCODONE-acetaminophen (PERCOCET) 5-325 mg per tablet 1 tablet (1 tablet Oral Given 12/25/17 1526)   potassium chloride (K-DUR,KLOR-CON) CR tablet 20 mEq (not administered)   pravastatin (PRAVACHOL) tablet 20 mg (not administered)   furosemide (LASIX) injection 40 mg (40 mg Intravenous Given 12/25/17 1526)   acetaminophen (TYLENOL) tablet 650 mg (not administered)   topiramate (TOPAMAX) tablet 25 mg (not administered)   furosemide (LASIX) injection 20 mg (20 mg Intravenous Given 12/25/17 1114)       Diagnostic Studies  Results Reviewed     Procedure Component Value Units Date/Time    BNP [[id number]]  (Abnormal) Collected:  12/25/17 1011    Lab Status:  Final result Specimen:  Blood from Arm, Left Updated:  12/25/17 1048     NT-proBNP 2,050 (H) pg/mL     Comprehensive metabolic panel [[id number]]  (Abnormal) Collected:  12/25/17 1011    Lab Status:  Final result Specimen:  Blood from Arm, Left Updated:  12/25/17 1029     Sodium 140 mmol/L      Potassium 4 3 mmol/L      Chloride 107 mmol/L      CO2 24 mmol/L      Anion Gap 9 mmol/L      BUN 16 mg/dL      Creatinine 1 11 mg/dL      Glucose 130 mg/dL      Calcium 8 8 mg/dL      AST 30 U/L      ALT 31 U/L      Alkaline Phosphatase 133 (H) U/L      Total Protein 7 6 g/dL      Albumin 3 5 g/dL      Total Bilirubin 0 36 mg/dL      eGFR 50 ml/min/1 73sq m     Narrative:         National Kidney Disease Education Program recommendations are as follows:  GFR calculation is accurate only with a steady state creatinine  Chronic Kidney disease less than 60 ml/min/1 73 sq  meters  Kidney failure less than 15 ml/min/1 73 sq  meters  Protime-INR [[id number]]  (Normal) Collected:  12/25/17 1011    Lab Status:  Final result Specimen:  Blood from Arm, Left Updated:  12/25/17 1028     Protime 13 5 seconds      INR 1 03    APTT [[id number]]  (Normal) Collected:  12/25/17 1011    Lab Status:  Final result Specimen:  Blood from Arm, Left Updated:  12/25/17 1028     PTT 28 seconds     Narrative: Therapeutic Heparin Range = 60-90 seconds    POCT troponin [[id number]]  (Normal) Collected:  12/25/17 1014    Lab Status:  Final result Updated:  12/25/17 1027     POC Troponin I 0 00 ng/ml      Specimen Type VENOUS    Narrative:         [employer] i-Stat handheld analyzer 99% cutoff is > 0 08ng/mL in [facility] Emergency Departments    o cTnI 99% cutoff is useful only when applied to patients in the clinical setting of myocardial ischemia  o cTnI 99% cutoff should be interpreted in the context of clinical history, ECG findings and possibly cardiac imaging to establish correct diagnosis  o cTnI 99% cutoff may be suggestive but clearly not indicative of a coronary event without the clinical setting of myocardial ischemia      CBC and differential [[id number]]  (Abnormal) Collected:  12/25/17 1011    Lab Status:  Final result Specimen:  Blood from Arm, Left Updated:  12/25/17 1018     WBC 10 72 (H) Thousand/uL      RBC 4 38 Million/uL      Hemoglobin 12 4 g/dL      Hematocrit 39 4 %      MCV 90 fL      MCH 28 3 pg      MCHC 31 5 g/dL      RDW 17 0 (H) %      MPV 10 8 fL      Platelets 640 Thousands/uL      Neutrophils Relative 76 (H) %      Lymphocytes Relative 14 %      Monocytes Relative 5 %      Eosinophils Relative 4 %      Basophils Relative 1 %      Neutrophils Absolute 8 24 (H) Thousands/µL      Lymphocytes Absolute 1 50 Thousands/µL      Monocytes Absolute 0 49 Thousand/µL      Eosinophils Absolute 0 42 Thousand/µL      Basophils Absolute 0 07 Thousands/µL                  X-ray chest 2 views    (Results Pending)   VAS upper limb venous duplex scan, unilateral/limited    (Results Pending)              Procedures  ECG 12 Lead Documentation  Date/Time: 12/25/2017 10:59 AM  Performed by: Filomena Holden  Authorized by: Filomena Holden     Indications / Diagnosis:  Dyspnea  ECG reviewed by me, the ED Provider: yes    Patient location:  ED  Interpretation:     Interpretation: normal    Rate:     ECG rate:  101    ECG rate assessment: normal    Rhythm:     Rhythm: atrial fibrillation    Ectopy:     Ectopy: none    QRS:     QRS axis:  Normal  Conduction:     Conduction: normal    ST segments:     ST segments:  Normal  T waves:     T waves: normal             Phone Contacts  ED Phone Contact    ED Course  ED Course as of Dec 25 1647   Mon Dec 25, 2017   1053 Labs:  Chest x-ray, EKG reviewed, heart rate between 90s to low 100s, chest x-ray c/w CHF exacerbation, will give Lasix, admit to Medicine  NT-proBNP: (!) 2,050                               MDM  Number of Diagnoses or Management Options  [facility] fibrillation ([facility] ):   CHF exacerbation ([facility] ): established and worsening  Dyspnea: established and worsening  Diagnosis management comments: Patient is a 51-year-old female, with history of congestive heart failure, atrial fibrillation, comes in with worsening dyspnea, states that she has been feeling short of breath when she lays down to sleep, orthopnea, PND  He denies fever, cough, chest pain  Patient had a cardioversion for AFib with RVR 3 weeks back, was extended just on her medications, takes metoprolol amiodarone, is scheduled for ablation next month  On exam no acute distress:  Vital signs stable, oxygen saturation 95% on room air, no increase work of breathing, lung shows basal crackles, rest of physical exam within normal limits    Impression:  Congestive heart failure exacerbation, AFib with intermittent RVR, heart rate between 90 and low 100s, at this point we will check cardiac labs, x-ray, give diuretics, admit to Medicine  Amount and/or Complexity of Data Reviewed  Clinical lab tests: reviewed and ordered  Tests in the radiology section of CPT®: ordered and reviewed  Tests in the medicine section of CPT®: ordered and reviewed  Discuss the patient with other providers: yes  Independent visualization of images, tracings, or specimens: yes      CritCare Time    Disposition  Final diagnoses:   CHF exacerbation ([facility] )   Atrial fibrillation ([facility] )   Dyspnea     Time reflects when diagnosis was documented in both MDM as applicable and the Disposition within this note     Time User Action Codes Description Comment    12/25/2017 10:54 AM Lorry Ferraris Add [I50 9] CHF exacerbation ([facility] )     12/25/2017 10:54 AM Lorry Ferraris Add [I48 91] Atrial fibrillation ([facility] )     12/25/2017 10:54 AM Lorry Ferraris Add [R06 00] Dyspnea     12/25/2017  2:29 PM Zach Zabala Add [I50 9] Acute congestive heart failure [facility])       ED Disposition     ED Disposition Condition Comment    Admit  Case was discussed with TAMI Balderas) and the patient's admission status was agreed to be Admission Status: inpatient status to the service of Dr Graeme Michel          Follow-up Information    None       Current Discharge Medication List      CONTINUE these medications which have NOT CHANGED    Details   amiodarone 200 mg tablet Take 200 mg by mouth daily      apixaban (ELIQUIS) 5 mg Take 1 tablet by mouth 2 (two) times a day  Qty: 60 tablet, Refills: 0      clopidogrel (PLAVIX) 75 mg tablet Take 75 mg by mouth daily      escitalopram (LEXAPRO) 10 mg tablet Take 10 mg by mouth daily      furosemide (LASIX) 20 mg tablet Take 2 tablets by mouth daily  Qty: 30 tablet, Refills: 0      LORazepam (ATIVAN) 1 mg tablet Take 1 mg by mouth 3 (three) times a day as needed for anxiety      metoprolol tartrate 75 MG TABS Take 1 tablet by mouth every 12 (twelve) hours  Qty: 60 tablet, Refills: 0      Multiple Vitamin (MULTIVITAMIN) tablet Take 1 tablet by mouth daily      nortriptyline (PAMELOR) 50 mg capsule Take 50 mg by mouth daily at bedtime      potassium chloride (K-DUR,KLOR-CON) 20 mEq tablet Take 20 mEq by mouth 2 (two) times a day        simvastatin (ZOCOR) 10 mg tablet Take 10 mg by mouth daily at bedtime      topiramate (TOPAMAX) 25 mg sprinkle capsule Take 25 mg by mouth 2 (two) times a day      meclizine (ANTIVERT) 25 mg tablet Take 32 mg by mouth 3 (three) times a day as needed for dizziness      oxyCODONE-acetaminophen (PERCOCET) 5-325 mg per tablet Take 1 tablet by mouth every 4 (four) hours as needed for moderate pain (take 1-2 tablets every 4-6 hours PRN)           No discharge procedures on file      ED Provider  Electronically Signed by           Dante Vazquez MD  12/25/17 0375

## 2017-12-26 ENCOUNTER — APPOINTMENT (INPATIENT)
Dept: NON INVASIVE DIAGNOSTICS | Facility: HOSPITAL | Age: 72
DRG: 291 | End: 2017-12-26
Payer: COMMERCIAL

## 2017-12-26 PROCEDURE — 93971 EXTREMITY STUDY: CPT

## 2017-12-26 RX ADMIN — METOPROLOL TARTRATE 100 MG: 100 TABLET ORAL at 10:02

## 2017-12-26 RX ADMIN — POTASSIUM CHLORIDE 20 MEQ: 1500 TABLET, EXTENDED RELEASE ORAL at 10:03

## 2017-12-26 RX ADMIN — ESCITALOPRAM OXALATE 10 MG: 10 TABLET ORAL at 10:05

## 2017-12-26 RX ADMIN — FUROSEMIDE 40 MG: 10 INJECTION, SOLUTION INTRAMUSCULAR; INTRAVENOUS at 16:49

## 2017-12-26 RX ADMIN — FUROSEMIDE 40 MG: 10 INJECTION, SOLUTION INTRAMUSCULAR; INTRAVENOUS at 10:05

## 2017-12-26 RX ADMIN — AMIODARONE HYDROCHLORIDE 200 MG: 200 TABLET ORAL at 10:03

## 2017-12-26 RX ADMIN — POTASSIUM CHLORIDE 20 MEQ: 1500 TABLET, EXTENDED RELEASE ORAL at 18:36

## 2017-12-26 RX ADMIN — TOPIRAMATE 25 MG: 25 TABLET, FILM COATED ORAL at 10:02

## 2017-12-26 RX ADMIN — APIXABAN 5 MG: 5 TABLET, FILM COATED ORAL at 10:03

## 2017-12-26 RX ADMIN — APIXABAN 5 MG: 5 TABLET, FILM COATED ORAL at 18:36

## 2017-12-26 RX ADMIN — TOPIRAMATE 25 MG: 25 TABLET, FILM COATED ORAL at 18:36

## 2017-12-26 RX ADMIN — NORTRIPTYLINE HYDROCHLORIDE 50 MG: 50 CAPSULE ORAL at 21:39

## 2017-12-26 RX ADMIN — PRAVASTATIN SODIUM 20 MG: 40 TABLET ORAL at 16:49

## 2017-12-26 RX ADMIN — CLOPIDOGREL BISULFATE 75 MG: 75 TABLET ORAL at 10:04

## 2017-12-26 NOTE — CASE MANAGEMENT
Thank you,  [facility] in the [facility] by Rajiv De La O for 2017  Network Utilization Review Department  Phone: [phone]; Fax [phone]  ATTENTION: The Network Utilization Review Department is now centralized for our 7 Facilities  Make a note that we have a new phone and fax numbers for our Department  Please call with any questions or concerns to [phone] and carefully follow the prompts so that you are directed to the right person  All voicemails are confidential  Fax any determinations, approvals, denials, and requests for initial or continue stay review clinical to [phone]  Due to HIGH CALL volume, it would be easier if you could please send faxed requests to expedite your requests and in part, help us provide discharge notifications faster  Initial Clinical Review    Admission: Date/Time/Statement: 12/25/17 @ 1055     Orders Placed This Encounter   Procedures    Inpatient Admission (expected length of stay for this patient is greater than two midnights)     Standing Status:   Standing     Number of Occurrences:   1     Order Specific Question:   Admitting Physician     Answer:   Anibal Rendon     Order Specific Question:   Level of Care     Answer:   Med Surg [16]     Order Specific Question:   Estimated length of stay     Answer:   More than 2 Midnights     Order Specific Question:   Certification     Answer:   I certify that inpatient services are medically necessary for this patient for a duration of greater than two midnights  See H&P and MD Progress Notes for additional information about the patient's course of treatment           ED: Date/Time/Mode of Arrival:   ED Arrival Information     Expected Arrival Acuity Means of Arrival Escorted By Service Admission Type    - 12/25/2017 09:55 Urgent Wheelchair Self General Medicine Urgent    Arrival Complaint    AFIB,SOB          Chief Complaint:   Chief Complaint   Patient presents with    Shortness of Breath     pt presents with SOB reports "I'm in afib" pt reports scheduled for ablation soon  pt also reports cough and wheezing  History of Illness: Denise Harrington is a 81 y o  female who presents with PMH of congestive heart failure, uncontrolled atrial fibrillation, obstructive sleep apnea with intolerance to her CPAP mask hypertension, diabetes and hyperlipidemia coming the ED for PND and orthopnea starting yesterday  Patient was last seen in October of 2017 for mild CHF exacerbation  She was on 20 mg p  o  Lasix and then discharged home on 40 mg daily  She has not taken as 20 mg b i d     The patient has uncontrolled AFib and has been followed up with Dr Alejandra Fang and now Dr carter  She was initially on Lopressor 75 mg b i d  has been titrated up to 100 mg b i d  and loaded with amiodarone 200 mg t i d  x1 week and now 200 mg daily although she is still taking it by 200 mg t i d  by her own report as no one told her otherwise "  She has been seen by Dr Annemarie Roman as her AFib is still uncontrolled and she has LARRY and OP DC cardioversion in November of 2017 with regression to AFib immediately  She is scheduled for an outpatient ablation by Dr carter in mid January 2018  She in on eliquis for her AC  She is on plavix due to a hx of CVA w/o residual deficit    She is coming in today because her shortness of breath has been stable over the last month or two but does have to catch her breath after ambulating in her home to the bathroom requiring 2 minutes to catch her breath  She also has no chest pain no lightheadedness dizziness no palpitations  Unfortunately yesterday evening this seemed to be all of a sudden worse of the patient normally is on 2 pillows and lie supine in her bed  She woke up with PND and orthopnea and took 15 minutes to recover her breathing  She then tried laying back down on 4 pillows behind her head but was still unable to sleep due to her breathing    She has noticed a very mild swelling in her left lower extremity worse than the right although she does not weigh herself daily she does actively use no salt in her diet and avoid canned vegetables and meats   She has no URI symptoms, no fevers or chills, was in her normal state of health she has no nausea vomiting diarrhea  She has not been recently traveling and has a no long distance flight  She is still working and does walk around but cannot do any physical activity such as cleaning the house due to her dyspnea  She is on no o2 at home       ED Vital Signs:   ED Triage Vitals   Temperature Pulse Respirations Blood Pressure SpO2   12/25/17 1003 12/25/17 1003 12/25/17 1003 12/25/17 1037 12/25/17 1042   98 °F (36 7 °C) (!) 108 20 (!) 193/82 96 %      Temp Source Heart Rate Source Patient Position - Orthostatic VS BP Location FiO2 (%)   12/25/17 1003 12/25/17 1003 12/25/17 1140 12/25/17 1140 --   Oral Monitor Sitting Right arm       Pain Score       12/25/17 1257       No Pain        Wt Readings from Last 1 Encounters:   12/26/17 117 kg (257 lb 8 oz)       Vital Signs (abnormal): wnl     Abnormal Labs/Diagnostic Test Results: BNP   2050, alk phos  133, wbc 10 72  CXR -      1   Right upper lobe infiltrate, probably pneumonia  EKG- afib / flutter   ED Treatment:   Medication Administration from 12/25/2017 0955 to 12/25/2017 1317       Date/Time Order Dose Route Action Action by Comments     12/25/2017 1114 furosemide (LASIX) injection 20 mg 20 mg Intravenous Given Kitty Paredes RN           Past Medical/Surgical History:    Active Ambulatory Problems     Diagnosis Date Noted    Atrial fibrillation ([facility] ) 10/07/2017    Acute congestive heart failure ([facility] ) 10/07/2017    Cerebrovascular disease 10/07/2017    Essential hypertension 10/07/2017    Hyperlipidemia LDL goal <100 10/07/2017    Prediabetes 10/07/2017     Resolved Ambulatory Problems     Diagnosis Date Noted    No Resolved Ambulatory Problems     Past Medical History:   Diagnosis Date    Hyperlipidemia     Hypertension     Migraine     Prediabetes     Stroke [facility])        Admitting Diagnosis: Atrial fibrillation (HCC) [I48 91]  Dyspnea [R06 00]  SOB (shortness of breath) [R06 02]  CHF exacerbation (HCC) [I50 9]    Age/Sex: 81 y o  female    Assessment/Plan:    Hospital Problem List:      Principal Problem:    SOB (shortness of breath)  Active Problems:    Atrial fibrillation (HCC)    Acute congestive heart failure (HCC)    Essential hypertension    Hyperlipidemia LDL goal <100    Prediabetes         * SOB (shortness of breath)   Assessment & Plan     Suspect 2* CHF consider contribution of a fib to s/sx, pt on 1L NC w/no wob, conversational dyspnea  Lungs with faint rales on lower lateral lung fields  No PNA or s/sx of acute infection  Continue to monitor and treat underlying etiologies       Acute congestive heart failure (HCC)   Assessment & Plan     Suspect mild exacerbation 2* diastolic dysfunction given uncontrolled AFib  LARRY in 11/17  W/LVEF 50% and diffuse mild hypokinesia w/LA w/marked dilatation w/spontaneous echo contrast "Smoke" and severely reduced function of LA appendage w/severe continuous echo contrast "smoke"  By Bnp of 2000 (previous approx 2300) mild worsening lower extremity edema and PND/orthopnea yesterday evening  Chest x-ray demonstrates worsening vascular congestion and right upper and peripheral lung fields compared to prior  Patient on 1 L nasal cannula, no conversational dyspnea work of breathing and nonpitting edema in lower extremities  Received IV Lasix 20 mg in the ED, patient reports she is on 20 mg b i d  40 mg total   Will continue with IV Lasix 40 mg b i d , given concomitant a fib which is uncontrolled will consult Cardiology       Atrial fibrillation [facility])   Assessment & Plan     With lax control w/RVR with heart rate  Between  thus far on tele  Pt has failed OP DC cardioversion, has been loaded w/amiodarone 200mg TID x 1 week then switched to daily as well as had her lopressor increased to 100mg BID  PLEASE NOTE that pt has still been taking her amiodarone TID as 'no one told her to switch to daily'  Pt scheduled for OP cardiac ablation by Dr Zachary Sanchez mid January 2018  Continue OP meds, start on telemetry, consult cardiology as pt known to Zachary Sanchez       Prediabetes   Assessment & Plan     Check hgb a1c  Start diabetic diet w/low salt cardiac diet       Hyperlipidemia LDL goal <100   Assessment & Plan     Continue pravachol as substitute for zocor as nonformulary       Essential hypertension   Assessment & Plan     Continue lasix, metoprolol tartrate 100mg BID               VTE Prophylaxis: Apixaban (Eliquis)  / sequential compression device   Code Status:   POLST: There is no POLST form on file for this patient (pre-hospital)   Anticipated Length of Stay:  Patient will be admitted on an Inpatient basis with an anticipated length of stay of  Greater than 2 midnights     Justification for Hospital Stay: CHF w/acute exacerbation      Admission Orders:  Scheduled Meds:   amiodarone 200 mg Oral Daily   apixaban 5 mg Oral BID   clopidogrel 75 mg Oral Daily   escitalopram 10 mg Oral Daily   furosemide 40 mg Intravenous BID (diuretic)   metoprolol tartrate 100 mg Oral Q12H [facility]   nortriptyline 50 mg Oral HS   potassium chloride 20 mEq Oral BID   pravastatin 20 mg Oral Daily With Dinner   topiramate 25 mg Oral BID     Continuous Infusions:    PRN Meds:   acetaminophen    LORazepam    meclizine    oxyCODONE-acetaminophen    Cardiac diet   BRP   Up as alfonzo   EKG prn cp   Daily weight   I&O   VAS duplex scan   Cardiology consult   SCD  Tele   Cardiology consult   Serial trop     12/25  Alk phos  133, wbc  10 72, BNP   2050

## 2017-12-26 NOTE — MALNUTRITION/BMI
This medical record reflects one or more clinical indicators suggestive of malnutrition and/or morbid obesity  Please indicate the one diagnosis below which you feel best reflects the clinical picture  BMI Findings:  40-44 9    Body mass index is 40 4 kg/m²  See Nutrition note dated 12/26/2017 for additional details  Completed nutrition assessment is viewable in the nutrition documentation

## 2017-12-26 NOTE — PROGRESS NOTES
Progress Note - Lesia Hussein 81 y o  female MRN: [id number]    Unit/Bed#: E4 -01 Encounter: [id number]      Assessment/Plan:  Acute hypoxic respiratory failure secondary to CHF exacerbation   Assessment & Plan      2* CHF consider contribution of a fib to s/sx, pt on 2L NC w/no wob, conversational dyspnea  Lungs with faint rales on lower lateral lung fields  No PNA or s/sx of acute infection         Acute congestive heart failure (HCC)   Assessment & Plan      2* diastolic dysfunction given uncontrolled AFib  LARRY in 11/17  W/LVEF 50% and diffuse mild hypokinesia w/LA w/marked dilatation w/spontaneous echo contrast "Smoke" and severely reduced function of LA appendage w/severe continuous echo contrast "smoke"  Patient presented with BNP  of 2000 ,worsening lower extremity edema and PND/orthopnea the evening prior to admission  Chest x-ray demonstrates worsening vascular congestion and right upper and peripheral lung fields compared to prior  Patient on 2 L nasal cannula, no conversational dyspnea work of breathing and nonpitting edema in lower extremities   Will continue with IV Lasix 40 mg b i d , given concomitant a fib which is uncontrolled will consult Cardiology       Atrial fibrillation ([address] ) with rapid ventricular rate   Assessment & Plan     With lax control w/RVR with heart rate  Between  thus far on tele  Pt has failed OP DC cardioversion, has been loaded w/amiodarone 200mg TID x 1 week then switched to daily as well as had her lopressor increased to 100mg BID       pt was taking her amiodarone TID as 'no one told her to switch to daily'-this is now been corrected  Pt scheduled for OP cardiac ablation by Dr Brandi Singletary mid January 2018  Continue OP meds, start on telemetry, consult cardiology as pt known to Brandi Singletary  Patient on Eliquis       Prediabetes   Assessment & Plan     Check hgb a1c  Start diabetic diet w/low salt cardiac diet       Hyperlipidemia LDL goal <100   Assessment & Plan     Continue pravachol as substitute for zocor as nonformulary       Essential hypertension   Assessment & Plan     Continue lasix, metoprolol tartrate 100mg BID                  Subjective:  Patient admitted with acute diastolic heart failure and atrial fibrillation with rapid ventricular response  Patient was due for cardiac ablation by dr Lyle Lambert mid January 2018  Currently still feeling short of breath and orthopneic  Better on IV Lasix  Is currently on 200 mg daily of amiodarone    Physical Exam:   Vitals: Blood pressure 111/66, pulse 68, temperature 97 9 °F (36 6 °C), temperature source Temporal, resp  rate 18, height 5' 7" (1 702 m), weight 117 kg (257 lb 8 oz), SpO2 95 %  ,Body mass index is 40 33 kg/m²  Gen:  Pleasant, non-tachypnic, non-dyspnic  Conversant  Heart: irregular rate and rhythm, S1S2 present, no murmur, rub or gallop  Lungs:  Bibasilar rales  Abd: soft, non-tender, non-distended  NABS, no guarding, rebound or peritoneal signs  Extremities: no clubbing, cyanosis or edema  2+pedal pulses bilaterally  Full range of motion  Neuro: awake, alert and oriented  Cranial nerves 2-12 intact  Strength and sensation grossly intact  Skin: warm and dry: no petechiae, purpura and rash  LABS:     Results from last 7 days  Lab Units 12/25/17  1011 12/23/17  0630   WBC Thousand/uL 10 72* 7 86   HEMOGLOBIN g/dL 12 4 11 2*   HEMATOCRIT % 39 4 36 1   PLATELETS Thousands/uL 365 307       Results from last 7 days  Lab Units 12/25/17  2212 12/25/17  1011  12/23/17  0630   SODIUM mmol/L 139 140  --  141   POTASSIUM mmol/L 4 0 4 3  --  4 6   CHLORIDE mmol/L 105 107  --  107   CO2 mmol/L 26 24  --  26   BUN mg/dL 17 16  --  21   CREATININE mg/dL 1 20 1 11  --  1 36*   GLUCOSE RANDOM mg/dL 120 130  < >  --    CALCIUM mg/dL 8 4 8 8  --  9 0   < > = values in this interval not displayed    No intake or output data in the 24 hours ending 12/26/17 1244        amiodarone 200 mg Oral Daily   apixaban 5 mg Oral BID clopidogrel 75 mg Oral Daily   escitalopram 10 mg Oral Daily   furosemide 40 mg Intravenous BID (diuretic)   metoprolol tartrate 100 mg Oral Q12H [facility]   nortriptyline 50 mg Oral HS   potassium chloride 20 mEq Oral BID   pravastatin 20 mg Oral Daily With Dinner   topiramate 25 mg Oral BID       Family update-offered to update family-patient declined

## 2017-12-26 NOTE — CONSULTS
Consult - Cardiology   Valeria Solares 81 y o  female MRN: [id number]  Unit/Bed#: E4 -01 Encounter: [id number]          Reason For Consult:  Congestive heart failure    History Of Present Illness: The patient is a 30-year-old white female with a history of morbid obesity and established atrial fibrillation  She also has diastolic congestive heart failure  She also has hypertension and hyperlipidemia as well as prior stroke  The patient has been seen by Dr Pankaj Alvarez as an outpatient  She did have transesophageal echo and direct current cardioversion in November but did revert to atrial fibrillation  She has had ongoing problems with exertional fatigue and has been set up for ablation of atrial fibrillation late next month  She is admitted now with increasing shortness of breath with effort as well as orthopnea  She was aware some left lower extremity edema  She does not weigh herself at home  She has been unaware of palpitations, chest pain or lightheadedness  She did have some wheezing without coughing  Past Medical History:   Established atrial fibrillation  Mitral regurgitation deemed moderate to severe by surface echo but only mild by transesophageal ECHO  Prior stroke  Chronic diastolic heart failure  Hypertension  Hyperlipidemia  Obesity  History of migraine  Anxiety  Obstructive sleep apnea  Glucose intolerance  Vertigo  Appendectomy  Cholecystectomy    Allergy:  No Known Allergies    Medications:  Amiodarone 200 mg daily  Clopidogrel 75 mg daily  Metoprolol tartrate 100 mg b i d  Furosemide 20 mg b i d  Eliquis 5 mg b i d  Celexa  Lorazepam  Meclizine p r n  Multivitamins  Nortriptyline  Oxycodone/acetaminophen  Potassium 20 mEq b i d  Simvastatin 10 mg a day  Topiramate    Family History:  Arthritis, coronary disease, hypertension    Social History:  Quit smoking 2011  Occasional alcohol use      ROS:  Fatigue  Headaches  Back pain    No TIAs or claudication    Exam:  111/66  Pulse 76  General:  Obese elderly female no acute distress  Head: Normocephalic, atraumatic  Eyes:   No Icterus  Normal Conjunctiva  Oropharynx: normal-appearing mucosa and no pharyngitis, no exudate  Neck: supple, symmetrical, trachea midline, thyroid: not enlarged, symmetric, no tenderness/mass/nodules, no carotid bruit and no JVD   Heart: RRR, No: murmer, rub or gallop,   Lungs:  Decreased breath sounds without wheezing rhonchi rales  Abdomen: obese, normal findings: no masses palpable, no organomegaly and soft, non-tender  Lower Limbs:  Trace to +1 edema being greater on the left than the right  Pulses intact      probnp 2050  Hemoglobin 12 4, white blood count 10 72, platelets 306 K  K 4 3, BUN 16, creatinine 1 11  Troponins normal  EKG shows AFib/flutter with poor R-wave progression  Chest x-ray shows possible right upper lobe infiltrate    ASSESSMENT AND PLAN:   1  Acute diastolic congestive heart failure  Continue IV furosemide b i d   Close surveillance of renal function  Weight on admission was about 10 lb greater than it had been on discharge in early October  2   Established atrial fibrillation  For ablation later next month  Rate is well controlled on metoprolol  Continue Eliquis  Patient on Plavix in light of history of stroke which will be converted to aspirin 7 days prior to the procedure  Patient is on amiodarone which I believe electrophysiology wanted on board for upcoming ablation  3   Hypertension-well controlled on beta-blocker therapy only  4    Hyperlipidemia-on statin      Will follow with you and advise further    Sam Nichols MD

## 2017-12-26 NOTE — PLAN OF CARE
Problem: Potential for Falls  Goal: Patient will remain free of falls  INTERVENTIONS:  - Assess patient frequently for physical needs  -  Identify cognitive and physical deficits and behaviors that affect risk of falls    -  [place] fall precautions as indicated by assessment   - Educate patient/family on patient safety including physical limitations  - Instruct patient to call for assistance with activity based on assessment  - Modify environment to reduce risk of injury  - Consider OT/PT consult to assist with strengthening/mobility   Outcome: Progressing      Problem: INFECTION - ADULT  Goal: Absence or prevention of progression during hospitalization  INTERVENTIONS:  - Assess and monitor for signs and symptoms of infection  - Monitor lab/diagnostic results  - Monitor all insertion sites, i e  indwelling lines, tubes, and drains  - Monitor endotracheal (as able) and nasal secretions for changes in amount and color  - [place] appropriate cooling/warming therapies per order  - Administer medications as ordered  - Instruct and encourage patient and family to use good hand hygiene technique  - Identify and instruct in appropriate isolation precautions for identified infection/condition   Outcome: Progressing    Goal: Absence of fever/infection during neutropenic period  INTERVENTIONS:  - Monitor WBC  - Implement neutropenic guidelines   Outcome: Progressing      Problem: SAFETY ADULT  Goal: Maintain or return to baseline ADL function  INTERVENTIONS:  -  Assess patient's ability to carry out ADLs; assess patient's baseline for ADL function and identify physical deficits which impact ability to perform ADLs (bathing, care of mouth/teeth, toileting, grooming, dressing, etc )  - Assess/evaluate cause of self-care deficits   - Assess range of motion  - Assess patient's mobility; develop plan if impaired  - Assess patient's need for assistive devices and provide as appropriate  - Encourage maximum independence but intervene and supervise when necessary  ¯ Involve family in performance of ADLs  ¯ Assess for home care needs following discharge   ¯ Request OT consult to assist with ADL evaluation and planning for discharge  ¯ Provide patient education as appropriate   Outcome: Progressing    Goal: Maintain or return mobility status to optimal level  INTERVENTIONS:  - Assess patient's baseline mobility status (ambulation, transfers, stairs, etc )    - Identify cognitive and physical deficits and behaviors that affect mobility  - Identify mobility aids required to assist with transfers and/or ambulation (gait belt, sit-to-stand, lift, walker, cane, etc )  - [place] fall precautions as indicated by assessment  - Record patient progress and toleration of activity level on Mobility SBAR; progress patient to next Phase/Stage  - Instruct patient to call for assistance with activity based on assessment  - Request Rehabilitation consult to assist with strengthening/weightbearing, etc    Outcome: Progressing      Problem: DISCHARGE PLANNING  Goal: Discharge to home or other facility with appropriate resources  INTERVENTIONS:  - Identify barriers to discharge w/patient and caregiver  - Arrange for needed discharge resources and transportation as appropriate  - Identify discharge learning needs (meds, wound care, etc )  - Arrange for interpretive services to assist at discharge as needed  - Refer to Case Management Department for coordinating discharge planning if the patient needs post-hospital services based on physician/advanced practitioner order or complex needs related to functional status, cognitive ability, or social support system   Outcome: Progressing      Problem: Knowledge Deficit  Goal: Patient/family/caregiver demonstrates understanding of disease process, treatment plan, medications, and discharge instructions  Complete learning assessment and assess knowledge base    Interventions:  - Provide teaching at level of understanding  - Provide teaching via preferred learning methods   Outcome: Progressing

## 2017-12-27 LAB
ANION GAP SERPL CALCULATED.3IONS-SCNC: 10 MMOL/L (ref 4–13)
BUN SERPL-MCNC: 20 MG/DL (ref 5–25)
CALCIUM SERPL-MCNC: 8.8 MG/DL (ref 8.3–10.1)
CHLORIDE SERPL-SCNC: 105 MMOL/L (ref 100–108)
CO2 SERPL-SCNC: 26 MMOL/L (ref 21–32)
CREAT SERPL-MCNC: 1.32 MG/DL (ref 0.6–1.3)
GFR SERPL CREATININE-BSD FRML MDRD: 41 ML/MIN/1.73SQ M
GLUCOSE SERPL-MCNC: 95 MG/DL (ref 65–140)
POTASSIUM SERPL-SCNC: 4.1 MMOL/L (ref 3.5–5.3)
SODIUM SERPL-SCNC: 141 MMOL/L (ref 136–145)

## 2017-12-27 PROCEDURE — 80048 BASIC METABOLIC PNL TOTAL CA: CPT | Performed by: HOSPITALIST

## 2017-12-27 RX ADMIN — APIXABAN 5 MG: 5 TABLET, FILM COATED ORAL at 17:13

## 2017-12-27 RX ADMIN — FUROSEMIDE 40 MG: 10 INJECTION, SOLUTION INTRAMUSCULAR; INTRAVENOUS at 17:13

## 2017-12-27 RX ADMIN — METOPROLOL TARTRATE 100 MG: 100 TABLET ORAL at 08:24

## 2017-12-27 RX ADMIN — TOPIRAMATE 25 MG: 25 TABLET, FILM COATED ORAL at 08:24

## 2017-12-27 RX ADMIN — FUROSEMIDE 40 MG: 10 INJECTION, SOLUTION INTRAMUSCULAR; INTRAVENOUS at 08:24

## 2017-12-27 RX ADMIN — POTASSIUM CHLORIDE 20 MEQ: 1500 TABLET, EXTENDED RELEASE ORAL at 08:24

## 2017-12-27 RX ADMIN — CLOPIDOGREL BISULFATE 75 MG: 75 TABLET ORAL at 08:24

## 2017-12-27 RX ADMIN — AMIODARONE HYDROCHLORIDE 200 MG: 200 TABLET ORAL at 08:24

## 2017-12-27 RX ADMIN — NORTRIPTYLINE HYDROCHLORIDE 50 MG: 50 CAPSULE ORAL at 21:03

## 2017-12-27 RX ADMIN — APIXABAN 5 MG: 5 TABLET, FILM COATED ORAL at 08:24

## 2017-12-27 RX ADMIN — TOPIRAMATE 25 MG: 25 TABLET, FILM COATED ORAL at 17:13

## 2017-12-27 RX ADMIN — POTASSIUM CHLORIDE 20 MEQ: 1500 TABLET, EXTENDED RELEASE ORAL at 17:13

## 2017-12-27 RX ADMIN — PRAVASTATIN SODIUM 20 MG: 40 TABLET ORAL at 17:13

## 2017-12-27 RX ADMIN — ESCITALOPRAM OXALATE 10 MG: 10 TABLET ORAL at 08:25

## 2017-12-27 NOTE — PLAN OF CARE
Problem: Potential for Falls  Goal: Patient will remain free of falls  INTERVENTIONS:  - Assess patient frequently for physical needs  -  Identify cognitive and physical deficits and behaviors that affect risk of falls    -  [place] fall precautions as indicated by assessment   - Educate patient/family on patient safety including physical limitations  - Instruct patient to call for assistance with activity based on assessment  - Modify environment to reduce risk of injury  - Consider OT/PT consult to assist with strengthening/mobility   Outcome: Progressing      Problem: INFECTION - ADULT  Goal: Absence or prevention of progression during hospitalization  INTERVENTIONS:  - Assess and monitor for signs and symptoms of infection  - Monitor lab/diagnostic results  - Monitor all insertion sites, i e  indwelling lines, tubes, and drains  - Monitor endotracheal (as able) and nasal secretions for changes in amount and color  - [place] appropriate cooling/warming therapies per order  - Administer medications as ordered  - Instruct and encourage patient and family to use good hand hygiene technique  - Identify and instruct in appropriate isolation precautions for identified infection/condition   Outcome: Progressing    Goal: Absence of fever/infection during neutropenic period  INTERVENTIONS:  - Monitor WBC  - Implement neutropenic guidelines   Outcome: Progressing      Problem: SAFETY ADULT  Goal: Maintain or return to baseline ADL function  INTERVENTIONS:  -  Assess patient's ability to carry out ADLs; assess patient's baseline for ADL function and identify physical deficits which impact ability to perform ADLs (bathing, care of mouth/teeth, toileting, grooming, dressing, etc )  - Assess/evaluate cause of self-care deficits   - Assess range of motion  - Assess patient's mobility; develop plan if impaired  - Assess patient's need for assistive devices and provide as appropriate  - Encourage maximum independence but intervene and supervise when necessary  ¯ Involve family in performance of ADLs  ¯ Assess for home care needs following discharge   ¯ Request OT consult to assist with ADL evaluation and planning for discharge  ¯ Provide patient education as appropriate   Outcome: Progressing    Goal: Maintain or return mobility status to optimal level  INTERVENTIONS:  - Assess patient's baseline mobility status (ambulation, transfers, stairs, etc )    - Identify cognitive and physical deficits and behaviors that affect mobility  - Identify mobility aids required to assist with transfers and/or ambulation (gait belt, sit-to-stand, lift, walker, cane, etc )  - [place] fall precautions as indicated by assessment  - Record patient progress and toleration of activity level on Mobility SBAR; progress patient to next Phase/Stage  - Instruct patient to call for assistance with activity based on assessment  - Request Rehabilitation consult to assist with strengthening/weightbearing, etc    Outcome: Progressing      Problem: DISCHARGE PLANNING  Goal: Discharge to home or other facility with appropriate resources  INTERVENTIONS:  - Identify barriers to discharge w/patient and caregiver  - Arrange for needed discharge resources and transportation as appropriate  - Identify discharge learning needs (meds, wound care, etc )  - Arrange for interpretive services to assist at discharge as needed  - Refer to Case Management Department for coordinating discharge planning if the patient needs post-hospital services based on physician/advanced practitioner order or complex needs related to functional status, cognitive ability, or social support system   Outcome: Progressing      Problem: Knowledge Deficit  Goal: Patient/family/caregiver demonstrates understanding of disease process, treatment plan, medications, and discharge instructions  Complete learning assessment and assess knowledge base    Interventions:  - Provide teaching at level of understanding  - Provide teaching via preferred learning methods   Outcome: Progressing      Problem: Nutrition/Hydration-ADULT  Goal: Nutrient/Hydration intake appropriate for improving, restoring or maintaining nutritional needs  Monitor and assess patient's nutrition/hydration status for malnutrition (ex- brittle hair, bruises, dry skin, pale skin and conjunctiva, muscle wasting, smooth red tongue, and disorientation)  Collaborate with interdisciplinary team and initiate plan and interventions as ordered  Monitor patient's weight and dietary intake as ordered or per policy  Utilize nutrition screening tool and intervene per policy  Determine patient's food preferences and provide high-protein, high-caloric foods as appropriate       INTERVENTIONS:  - Monitor oral intake, urinary output, labs, and treatment plans  - Assess nutrition and hydration status and recommend course of action  - Evaluate amount of meals eaten  - Assist patient with eating if necessary   - Allow adequate time for meals  - Recommend/ encourage appropriate diets, oral nutritional supplements, and vitamin/mineral supplements  - Order, calculate, and assess calorie counts as needed  - Recommend, monitor, and adjust tube feedings and TPN/PPN based on assessed needs  - Assess need for intravenous fluids  - Provide specific nutrition/hydration education as appropriate  - Include patient/family/caregiver in decisions related to nutrition   Outcome: Progressing

## 2017-12-27 NOTE — PROGRESS NOTES
Progress Note - Severo Mor 81 y o  female MRN: [id number]    Unit/Bed#: E4 -01 Encounter: [id number]      Assessment/Plan:  Acute hypoxic respiratory failure secondary to CHF exacerbation-improved   Assessment & Plan      2* CHF consider contribution of a fib to s/sx, pt on 2L NC w/no wob, conversational dyspnea  Lungs with faint rales on lower lateral lung fields  No PNA or s/sx of acute infection-patient off oxygen now and saturating at 92% on room air        Acute congestive heart failure (HCC)   Assessment & Plan      2* diastolic dysfunction given uncontrolled AFib  LARRY in 11/17  W/LVEF 50% and diffuse mild hypokinesia w/LA w/marked dilatation w/spontaneous echo contrast "Smoke" and severely reduced function of LA appendage w/severe continuous echo contrast "smoke"  Patient presented with BNP  of 2000 ,worsening lower extremity edema and PND/orthopnea the evening prior to admission  Chest x-ray demonstrates worsening vascular congestion and right upper and peripheral lung fields compared to prior  Patient now off oxygen no conversational dyspnea work of breathing and nonpitting edema in lower extremities   Will continue with IV Lasix 40 mg b i d ,-had a 5 lb weight loss over 24 hours  Hopefully switch to oral Lasix tomorrow       Atrial fibrillation [facility]) with rapid ventricular rate   Assessment & Plan     With lax control w/RVR with heart rate  Between  thus far on tele  Pt has failed OP DC cardioversion, has been loaded w/amiodarone 200mg TID x 1 week then switched to daily as well as had her lopressor increased to 100mg BID      pt was taking her amiodarone TID as 'no one told her to switch to daily'-this is now been corrected  Pt scheduled for OP cardiac ablation by Dr Zachary Sanchez mid January 2018  Continue OP meds, start on telemetry, consult cardiology as pt known to Zachary Sanchez  Patient on Eliquis    Plan to switch Plavix to aspirin 7 days prior to procedure        Prediabetes   Assessment & Plan     Check hgb a1c  Start diabetic diet w/low salt cardiac diet       Hyperlipidemia LDL goal <100   Assessment & Plan     Continue pravachol as substitute for zocor as nonformulary       Essential hypertension   Assessment & Plan     Continue lasix, metoprolol tartrate 100mg BID            Morbid obesity with BMI more than 40  Weight reduction recommended      Subjective:  Feels better  Was able to lie flat last night  5 lb weight loss since yesterday  Continue IV Lasix for today  Plan to switch to oral and hopefully discharge  Physical Exam:   Vitals: Blood pressure 134/84, pulse 82, temperature 97 8 °F (36 6 °C), temperature source Tympanic, resp  rate 20, height 5' 7" (1 702 m), weight 115 kg (252 lb 10 4 oz), SpO2 98 %  ,Body mass index is 39 57 kg/m²  Gen:  Pleasant, non-tachypnic, non-dyspnic  Conversant  Obese  Heart: regular rate and rhythm, S1S2 present, no murmur, rub or gallop  Lungs:  Few bibasilar crackles  Abd: soft, non-tender, non-distended  NABS, no guarding, rebound or peritoneal signs  Extremities: no clubbing, cyanosis or edema  2+pedal pulses bilaterally  Full range of motion  Neuro: awake, alert and oriented  Cranial nerves 2-12 intact  Strength and sensation grossly intact  Skin: warm and dry: no petechiae, purpura and rash  LABS:     Results from last 7 days  Lab Units 12/25/17  1011 12/23/17  0630   WBC Thousand/uL 10 72* 7 86   HEMOGLOBIN g/dL 12 4 11 2*   HEMATOCRIT % 39 4 36 1   PLATELETS Thousands/uL 365 307       Results from last 7 days  Lab Units 12/27/17  0536 12/25/17  2212 12/25/17  1011   SODIUM mmol/L 141 139 140   POTASSIUM mmol/L 4 1 4 0 4 3   CHLORIDE mmol/L 105 105 107   CO2 mmol/L 26 26 24   BUN mg/dL 20 17 16   CREATININE mg/dL 1 32* 1 20 1  11   GLUCOSE RANDOM mg/dL 95 120 130   CALCIUM mg/dL 8 8 8 4 8 8       Intake/Output Summary (Last 24 hours) at 12/27/17 1036  Last data filed at 12/27/17 0801   Gross per 24 hour   Intake              180 ml Output              625 ml   Net             -445 ml           amiodarone 200 mg Oral Daily   apixaban 5 mg Oral BID   clopidogrel 75 mg Oral Daily   escitalopram 10 mg Oral Daily   furosemide 40 mg Intravenous BID (diuretic)   metoprolol tartrate 100 mg Oral Q12H PÉREZ   nortriptyline 50 mg Oral HS   potassium chloride 20 mEq Oral BID   pravastatin 20 mg Oral Daily With Dinner   topiramate 25 mg Oral BID       Family update-offered to update daughter patient decline

## 2017-12-27 NOTE — PROGRESS NOTES
Progress Note - Patience Hamper 81 y o  female MRN: [id number]    Unit/Bed#: E4 -01 Encounter: [id number]  Subjective:   Breathing better  Edema better  No palpitations or lightheadedness  No chest pain  Objective:   Vitals: Blood pressure 112/66, pulse 75, temperature 98 3 °F (36 8 °C), temperature source Tympanic, resp  rate 20, height 5' 7" (1 702 m), weight 115 kg (252 lb 10 4 oz), SpO2 94 %  ,Body mass index is 39 57 kg/m²  CMP:   Results from last 7 days  Lab Units 12/27/17  0536  12/25/17  1011   SODIUM mmol/L 141  < > 140   POTASSIUM mmol/L 4 1  < > 4 3   CHLORIDE mmol/L 105  < > 107   CO2 mmol/L 26  < > 24   ANION GAP mmol/L 10  < > 9   BUN mg/dL 20  < > 16   CREATININE mg/dL 1 32*  < > 1 11   GLUCOSE RANDOM mg/dL 95  < > 130   CALCIUM mg/dL 8 8  < > 8 8   AST U/L  --   --  30   ALT U/L  --   --  31   ALK PHOS U/L  --   --  133*   TOTAL PROTEIN g/dL  --   --  7 6   ALBUMIN g/dL  --   --  3 5   BILIRUBIN TOTAL mg/dL  --   --  0 36   EGFR ml/min/1 73sq m 41  < > 50   < > = values in this interval not displayed  Physical exam:  Lungs-decreased breath sounds without wheezing rhonchi rales  Heart-irregular without murmur rub or gallop  Abdomen-obese  Nontender without mass organomegaly  Extremities-trace edema of the left lower extremity  Pulses intact    Assessment/Plan:  1  Acute diastolic congestive heart failure  Continue IV furosemide b i d  Feel that the patient's weight needs to be about 5 lb lower and then can switch to p  o  Diuretics  Will need a higher dose than furosemide 20 mg b i d  and suspect she will need 60-80 mg daily  Hopefully post ablation which is planned for later next month her heart failure will be easier to control  2  Established atrial fibrillation  For ablation in late January with Dr Jayne Vossing  Continue Eliquis  Patient is on Plavix in light of history of stroke which will be converted to aspirin 7 days prior to the procedure    She is on amiodarone which is being loaded in anticipation of ablation  3   Hypertension-well controlled on beta-blocker therapy only  4    Hyperlipidemia-on statin    Magalie Thao MD

## 2017-12-27 NOTE — PLAN OF CARE
Problem: Potential for Falls  Goal: Patient will remain free of falls  INTERVENTIONS:  - Assess patient frequently for physical needs  -  Identify cognitive and physical deficits and behaviors that affect risk of falls    -  [place] fall precautions as indicated by assessment   - Educate patient/family on patient safety including physical limitations  - Instruct patient to call for assistance with activity based on assessment  - Modify environment to reduce risk of injury  - Consider OT/PT consult to assist with strengthening/mobility   Outcome: Progressing      Problem: INFECTION - ADULT  Goal: Absence or prevention of progression during hospitalization  INTERVENTIONS:  - Assess and monitor for signs and symptoms of infection  - Monitor lab/diagnostic results  - Monitor all insertion sites, i e  indwelling lines, tubes, and drains  - Monitor endotracheal (as able) and nasal secretions for changes in amount and color  - [place] appropriate cooling/warming therapies per order  - Administer medications as ordered  - Instruct and encourage patient and family to use good hand hygiene technique  - Identify and instruct in appropriate isolation precautions for identified infection/condition   Outcome: Progressing    Goal: Absence of fever/infection during neutropenic period  INTERVENTIONS:  - Monitor WBC  - Implement neutropenic guidelines   Outcome: Progressing      Problem: SAFETY ADULT  Goal: Maintain or return to baseline ADL function  INTERVENTIONS:  -  Assess patient's ability to carry out ADLs; assess patient's baseline for ADL function and identify physical deficits which impact ability to perform ADLs (bathing, care of mouth/teeth, toileting, grooming, dressing, etc )  - Assess/evaluate cause of self-care deficits   - Assess range of motion  - Assess patient's mobility; develop plan if impaired  - Assess patient's need for assistive devices and provide as appropriate  - Encourage maximum independence but intervene and supervise when necessary  ¯ Involve family in performance of ADLs  ¯ Assess for home care needs following discharge   ¯ Request OT consult to assist with ADL evaluation and planning for discharge  ¯ Provide patient education as appropriate   Outcome: Progressing    Goal: Maintain or return mobility status to optimal level  INTERVENTIONS:  - Assess patient's baseline mobility status (ambulation, transfers, stairs, etc )    - Identify cognitive and physical deficits and behaviors that affect mobility  - Identify mobility aids required to assist with transfers and/or ambulation (gait belt, sit-to-stand, lift, walker, cane, etc )  - [place] fall precautions as indicated by assessment  - Record patient progress and toleration of activity level on Mobility SBAR; progress patient to next Phase/Stage  - Instruct patient to call for assistance with activity based on assessment  - Request Rehabilitation consult to assist with strengthening/weightbearing, etc    Outcome: Progressing      Problem: DISCHARGE PLANNING  Goal: Discharge to home or other facility with appropriate resources  INTERVENTIONS:  - Identify barriers to discharge w/patient and caregiver  - Arrange for needed discharge resources and transportation as appropriate  - Identify discharge learning needs (meds, wound care, etc )  - Arrange for interpretive services to assist at discharge as needed  - Refer to Case Management Department for coordinating discharge planning if the patient needs post-hospital services based on physician/advanced practitioner order or complex needs related to functional status, cognitive ability, or social support system   Outcome: Progressing      Problem: Knowledge Deficit  Goal: Patient/family/caregiver demonstrates understanding of disease process, treatment plan, medications, and discharge instructions  Complete learning assessment and assess knowledge base    Interventions:  - Provide teaching at level of understanding  - Provide teaching via preferred learning methods   Outcome: Progressing      Problem: Nutrition/Hydration-ADULT  Goal: Nutrient/Hydration intake appropriate for improving, restoring or maintaining nutritional needs  Monitor and assess patient's nutrition/hydration status for malnutrition (ex- brittle hair, bruises, dry skin, pale skin and conjunctiva, muscle wasting, smooth red tongue, and disorientation)  Collaborate with interdisciplinary team and initiate plan and interventions as ordered  Monitor patient's weight and dietary intake as ordered or per policy  Utilize nutrition screening tool and intervene per policy  Determine patient's food preferences and provide high-protein, high-caloric foods as appropriate       INTERVENTIONS:  - Monitor oral intake, urinary output, labs, and treatment plans  - Assess nutrition and hydration status and recommend course of action  - Evaluate amount of meals eaten  - Assist patient with eating if necessary   - Allow adequate time for meals  - Recommend/ encourage appropriate diets, oral nutritional supplements, and vitamin/mineral supplements  - Order, calculate, and assess calorie counts as needed  - Recommend, monitor, and adjust tube feedings and TPN/PPN based on assessed needs  - Assess need for intravenous fluids  - Provide specific nutrition/hydration education as appropriate  - Include patient/family/caregiver in decisions related to nutrition   Outcome: Progressing

## 2017-12-28 VITALS
RESPIRATION RATE: 20 BRPM | HEIGHT: 67 IN | SYSTOLIC BLOOD PRESSURE: 122 MMHG | TEMPERATURE: 98.3 F | HEART RATE: 70 BPM | DIASTOLIC BLOOD PRESSURE: 80 MMHG | BODY MASS INDEX: 39.1 KG/M2 | WEIGHT: 249.12 LBS | OXYGEN SATURATION: 100 %

## 2017-12-28 PROBLEM — R06.02 SOB (SHORTNESS OF BREATH): Status: RESOLVED | Noted: 2017-12-25 | Resolved: 2017-12-28

## 2017-12-28 PROBLEM — I50.9 ACUTE CONGESTIVE HEART FAILURE (HCC): Status: RESOLVED | Noted: 2017-10-07 | Resolved: 2017-12-28

## 2017-12-28 RX ORDER — FUROSEMIDE 20 MG/1
20 TABLET ORAL
Qty: 30 TABLET | Refills: 0 | Status: SHIPPED | OUTPATIENT
Start: 2017-12-28 | End: 2018-02-07 | Stop reason: SDUPTHER

## 2017-12-28 RX ORDER — METOPROLOL TARTRATE 100 MG/1
100 TABLET ORAL EVERY 12 HOURS SCHEDULED
Qty: 30 TABLET | Refills: 0 | Status: SHIPPED | OUTPATIENT
Start: 2017-12-28 | End: 2018-08-13 | Stop reason: SDUPTHER

## 2017-12-28 RX ORDER — FUROSEMIDE 40 MG/1
40 TABLET ORAL DAILY
Qty: 30 TABLET | Refills: 0 | Status: SHIPPED | OUTPATIENT
Start: 2017-12-28 | End: 2018-02-07

## 2017-12-28 RX ADMIN — ESCITALOPRAM OXALATE 10 MG: 10 TABLET ORAL at 08:10

## 2017-12-28 RX ADMIN — FUROSEMIDE 40 MG: 10 INJECTION, SOLUTION INTRAMUSCULAR; INTRAVENOUS at 08:11

## 2017-12-28 RX ADMIN — TOPIRAMATE 25 MG: 25 TABLET, FILM COATED ORAL at 08:10

## 2017-12-28 RX ADMIN — AMIODARONE HYDROCHLORIDE 200 MG: 200 TABLET ORAL at 08:11

## 2017-12-28 RX ADMIN — METOPROLOL TARTRATE 100 MG: 100 TABLET ORAL at 08:10

## 2017-12-28 RX ADMIN — APIXABAN 5 MG: 5 TABLET, FILM COATED ORAL at 08:11

## 2017-12-28 RX ADMIN — CLOPIDOGREL BISULFATE 75 MG: 75 TABLET ORAL at 08:11

## 2017-12-28 RX ADMIN — POTASSIUM CHLORIDE 20 MEQ: 1500 TABLET, EXTENDED RELEASE ORAL at 08:11

## 2017-12-28 NOTE — PLAN OF CARE

## 2017-12-28 NOTE — SOCIAL WORK
CM met with patient to discuss dc plan  Patient lives in an apartment alone  She is independent with ADLs, no DME, no hx of VNA  Patient drives and will transport herself home upon d/c, as car is in parking lot  Has support in the home  Has rx coverage and uses [facility] the [facility]  PCP is Dr Abdulkadir Sky  No dc needs expressed or identified at this time  CM to follow as needed

## 2017-12-28 NOTE — PLAN OF CARE
DISCHARGE PLANNING     Discharge to home or other facility with appropriate resources Progressing        INFECTION - ADULT     Absence or prevention of progression during hospitalization Progressing     Absence of fever/infection during neutropenic period Progressing        Knowledge Deficit     Patient/family/caregiver demonstrates understanding of disease process, treatment plan, medications, and discharge instructions Progressing        Nutrition/Hydration-ADULT     Nutrient/Hydration intake appropriate for improving, restoring or maintaining nutritional needs Progressing        Potential for Falls     Patient will remain free of falls Progressing        SAFETY ADULT     Maintain or return to baseline ADL function Progressing     Maintain or return mobility status to optimal level Progressing

## 2017-12-28 NOTE — DISCHARGE INSTRUCTIONS
New Lasix dose 40 mg daily and 20 mg after dinner  Measure weight daily  Follow with Cardiology for a lesion in late January 2018  Heart Failure   WHAT YOU NEED TO KNOW:   Heart failure (HF) is a condition that does not allow your heart to fill or pump properly  Not enough oxygen in your blood gets to your organs and tissues  HF can occur in the right side, the left side, or both lower chambers of your heart  HF is often caused by damage or injury to your heart  The damage may be caused by heart attack, other heart conditions, or high blood pressure  HF is a long-term condition that tends to get worse over time  It is important to manage your health to improve your quality of life  HF can be worsened by heavy alcohol use, smoking, diabetes that is not controlled, or obesity  DISCHARGE INSTRUCTIONS:   Call 911 if:   · You have any of the following signs of a heart attack:      ¨ Squeezing, pressure, or pain in your chest that lasts longer than 5 minutes or returns    ¨ Discomfort or pain in your back, neck, jaw, stomach, or arm     ¨ Trouble breathing    ¨ Nausea or vomiting    ¨ Lightheadedness or a sudden cold sweat, especially with chest pain or trouble breathing    Seek care immediately if:   · You gain 3 or more pounds (1 4 kg) in a day, or more than your healthcare provider says you should  · Your heartbeat is fast, slow, or uneven all the time  Contact your healthcare provider if:   · You have symptoms of worsening HF:      ¨ Shortness of breath at rest, at night, or that is getting worse in any way     ¨ Weight gain of 5 or more pounds (2 2 kg) in a week     ¨ More swelling in your legs or ankles     ¨ Abdominal pain or swelling     ¨ More coughing     ¨ Loss of appetite     ¨ Feeling tired all the time    · You feel hopeless or depressed, or you have lost interest in things you used to enjoy  · You often feel worried or afraid       · You have questions or concerns about your condition or care   Medicines: You may  need any of the following:  · Medicines  may be given to help regulate your heart rhythm  You may also need medicines to lower your blood pressure, and to get rid of extra fluids  · Take your medicine as directed  Contact your healthcare provider if you think your medicine is not helping or if you have side effects  Tell him or her if you are allergic to any medicine  Keep a list of the medicines, vitamins, and herbs you take  Include the amounts, and when and why you take them  Bring the list or the pill bottles to follow-up visits  Carry your medicine list with you in case of an emergency  Follow up with your healthcare provider or cardiologist as directed: You may need to return for other tests  You may need home health care  A healthcare provider will monitor your vital signs, weight, and make sure your medicines are working  Write down your questions so you remember to ask them during your visits  Go to cardiac rehab as directed:  Cardiac rehab is a program run by specialists who will help you safely strengthen your heart  The program includes exercise, relaxation, stress management, and heart-healthy nutrition  Healthcare providers will also make sure your medicines are helping to reduce your symptoms  Manage your HF:  · Do not smoke  Nicotine and other chemicals in cigarettes and cigars can cause lung damage and make HF difficult to manage  Ask your healthcare provider for information if you currently smoke and need help to quit  E-cigarettes or smokeless tobacco still contain nicotine  Talk to your healthcare provider before you use these products  · Do not drink alcohol or take illegal drugs  Alcohol and drugs can worsen your symptoms quickly  · Weigh yourself every morning  Use the same scale, in the same spot  Do this after you use the bathroom, but before you eat or drink anything  Wear the same type of clothing  Do not wear shoes   Record your weight each day so you will notice any sudden weight gain  Swelling and weight gain are signs of fluid retention  If you are overweight, ask how to lose weight safely  · Check your blood pressure and heart rate every day  Ask for more information about how to measure your blood pressure and heart rate correctly  Ask what these numbers should be for you  · Manage any chronic health conditions you have  These include high blood pressure, diabetes, obesity, high cholesterol, metabolic syndrome, and COPD  You will have fewer symptoms if you manage these health conditions  Follow your healthcare provider's recommendations and follow up with him or her regularly  · Eat heart-healthy foods and limit sodium (salt)  An easy way to do this is to eat more fresh fruits and vegetables and fewer canned and processed foods  Replace butter and margarine with heart-healthy oils such as olive oil and canola oil  Other heart-healthy foods include walnuts, whole-grain breads, low-fat dairy products, beans, and lean meats  Fatty fish such as salmon and tuna are also heart healthy  Ask how much salt you can eat each day  Do not use salt substitutes  · Drink liquids as directed  You may need to limit the amount of liquids you drink if you retain fluid  Ask how much liquid to drink each day and which liquids are best for you  · Stay active  If you are not active, your symptoms are likely to worsen quickly  Walking, bicycling, and other types of physical activity help maintain your strength and improve your mood  Physical activity also helps you manage your weight  Work with your healthcare provider to create an exercise plan that is right for you  · Get vaccines as directed  Get a flu shot every year  You may also need the pneumonia vaccine  The flu and pneumonia can be severe for a person who has HF  Vaccines protect you from these infections  Join a support group:  Living with HF can be difficult   It may be helpful to talk with others who have HF  You may learn how to better manage your condition or get emotional support  For more information:   · [address]  [place] , [place]   Phone: 0- [phone]  Web Address: https://www strong com/  org   © 2017 [address]  Information is for End User's use only and may not be sold, redistributed or otherwise used for commercial purposes  All illustrations and images included in CareNotes® are the copyrighted property of A D A M , Inc  or Rajiv De La O  The above information is an  only  It is not intended as medical advice for individual conditions or treatments  Talk to your doctor, nurse or pharmacist before following any medical regimen to see if it is safe and effective for you

## 2017-12-28 NOTE — PLAN OF CARE
CARDIOVASCULAR - ADULT     Maintains optimal cardiac output and hemodynamic stability Progressing     Absence of cardiac dysrhythmias or at baseline rhythm Progressing        DISCHARGE PLANNING     Discharge to home or other facility with appropriate resources Progressing        INFECTION - ADULT     Absence or prevention of progression during hospitalization Progressing     Absence of fever/infection during neutropenic period Progressing        Knowledge Deficit     Patient/family/caregiver demonstrates understanding of disease process, treatment plan, medications, and discharge instructions Progressing        Nutrition/Hydration-ADULT     Nutrient/Hydration intake appropriate for improving, restoring or maintaining nutritional needs Progressing        Potential for Falls     Patient will remain free of falls Progressing        SAFETY ADULT     Maintain or return to baseline ADL function Progressing     Maintain or return mobility status to optimal level Progressing

## 2017-12-28 NOTE — DISCHARGE SUMMARY
[address] INTERNAL MEDICINE  [address] 81 y o  female   MRN: [id number]   Encounter: [id number]   Unit/Bed: E4 -01     Admitting Provider:  Gail Torres DO  Discharge Provider:  Merlyn Pink PA-C  Admission Date: 12/25/2017       Discharge Date: 12/28/17   LOS: 3  Primary Care Physician at Discharge: Mark Mccullough [phone]    DISCHARGE DIAGNOSES  Principal Problem (Resolved):    SOB (shortness of breath)  Active Problems:    Atrial fibrillation ([facility] [address] )    CHF exacerbation ([facility] [address] )    Essential hypertension    Hyperlipidemia LDL goal <100    Prediabetes    HOSPITAL COURSE:  Denise Harrington is a 81 y o  female patient who originally presented to the hospital on 12/25/2017 due to shortness of breath, acute hypoxic respiratory failure, secondary to acute CHF exacerbation   air  Patient required 2 L via nasal cannula during admission  Patient now 100% on room  Patient was evaluated by Cardiology who administered intravenous Lasix for diuresis  Weight decreased the 4 kg occurred during admission  At time of discharge, patient's shortness of breath has greatly improved  On discharge, patient will be sent on Lasix 40 mg daily and 20 mg after dinner  Prior home Lasix regimen of 20 mg b i d  Other active problems during hospitalization are listed below:     1  Atrial fibrillation with RVR:  Patient is known to cardiologist Dr Indai Bejarano  Scheduled cardiac ablation in January of 2018  Continue amiodarone 200 mg daily, Eliquis, Shayla Toprol 100 mg q 12 hours  2   Prediabetes:  Diabetic diet  Hemoglobin A1c 5 5     3   Hyperlipidemia:  Continue statin    4  Benign essential hypertension:  Well controlled between 110-120  Continue metoprolol and Lasix  [facility]   Cardiology, Dr Avilez Smart Chest 2 Views    Result Date: 12/25/2017  Narrative: CHEST INDICATION:  Shortness of breath   COMPARISON: October 7, 2017 VIEWS:  Frontal and lateral projections IMAGES:  2 FINDINGS:     Heart enlarged  Aorta uncoiled  Pulmonary vessels unremarkable  Patchy consolidation in the right upper lobe, most likely pneumonia  Possible additional pneumonia in the right lower lobe and mid left lung  Right lower lobe subsegmental atelectasis  Pleural spaces clear  Visualized osseous structures appear within normal limits for the patient's age  Impression: 1  Right upper lobe infiltrate, probably pneumonia  2   Questionable additional areas of pneumonia in the mid left lung and right lower lobe  Workstation performed: [id number]     Vas Lower Limb Venous Duplex Study, Unilateral/limited    Result Date: 12/26/2017  Narrative:  [facility] REPORT CLINICAL: Indications: Patient presents with left ankle edema for the past couple of months however it has recently subsided  Risk Factors: The patient has history of obesity  Clinical: Left Lower Limb There is edema  CONCLUSION: Impression: RIGHT LOWER LIMB LIMITED: NORMAL Evaluation shows no evidence of thrombus in the common femoral vein  Doppler evaluation shows a normal response to augmentation maneuvers  LEFT LOWER LIMB: NORMAL No evidence of acute or chronic deep vein thrombosis No evidence of superficial thrombophlebitis noted  Doppler evaluation shows a normal response to augmentation maneuvers  Popliteal, posterior tibial and anterior tibial arterial Doppler waveforms are triphasic/biphasic    SIGNATURE: Electronically Signed by: Chong Kwok MD, [address] on 2017-12-26 09:25:18 PM      Other Pertinent Test Results    Results from last 7 days  Lab Units 12/25/17  1011 12/23/17  0630   WBC Thousand/uL 10 72* 7 86   HEMOGLOBIN g/dL 12 4 11 2*   HEMATOCRIT % 39 4 36 1   MCV fL 90 91   PLATELETS Thousands/uL 365 307   INR  1 03  --        Results from last 7 days  Lab Units 12/27/17  0536 12/25/17  2212 12/25/17  1011 12/23/17  0630   SODIUM mmol/L 141 139 140 141 POTASSIUM mmol/L 4 1 4 0 4 3 4 6   CHLORIDE mmol/L 105 105 107 107   CO2 mmol/L 26 26 24 26   ANION GAP mmol/L 10 8 9 8   BUN mg/dL 20 17 16 21   CREATININE mg/dL 1 32* 1 20 1 11 1 36*   CALCIUM mg/dL 8 8 8 4 8 8 9 0   TOTAL PROTEIN g/dL  --   --  7 6 7 4   BILIRUBIN TOTAL mg/dL  --   --  0 36 0 47   ALK PHOS U/L  --   --  133* 125*   ALT U/L  --   --  31 26   AST U/L  --   --  30 23   EGFR ml/min/1 73sq m 41 46 50 39   GLUCOSE RANDOM mg/dL 95 120 130  --    MAGNESIUM mg/dL  --   --   --  2 4       Results from last 7 days  Lab Units 12/25/17  2212 12/25/17  1905 12/25/17  1451   TROPONIN I ng/mL <0 02 <0 02 <0 02       Results from last 7 days  Lab Units 12/25/17  1011   NT-PRO BNP pg/mL 2,050*           Results from last 7 days  Lab Units 12/23/17  0630   HEMOGLOBIN A1C % 5 5       Results from last 7 days  Lab Units 12/23/17  0630   TSH 3RD GENERATON uIU/mL 14 612*           Results from last 7 days  Lab Units 12/23/17  0630   TRIGLYCERIDES mg/dL 74   CHOLESTEROL mg/dL 114   LDL CALC mg/dL 33   HDL mg/dL 66*       Cultures:         Invalid input(s): URIBILINOGEN              PHYSICAL EXAM:  Vitals:   Temp:  [98 2 °F (36 8 °C)-98 3 °F (36 8 °C)] 98 3 °F (36 8 °C)  HR:  [70-71] 70  Resp:  [20] 20  BP: (102-122)/(62-80) 122/80    General appearance: alert, appears stated age and cooperative  Skin: Skin color, texture, turgor normal  No rashes or lesions  Head: Normocephalic, without obvious abnormality, atraumatic  Eyes: conjunctivae/corneas clear  PERRL, EOM's intact  Lungs: clear to auscultation bilaterally  Heart: regular rate and rhythm, S1, S2 normal, no murmur, click, rub or gallop  Abdomen: soft, non-tender; bowel sounds normal; no masses,  no organomegaly  Back: symmetric, no curvature  ROM normal  No CVA tenderness    Extremities: extremities normal, atraumatic, no cyanosis or edema  Neurologic: Grossly normal    Planned Re-admission:  None  Discharge Disposition: Home/Self Care  Facility:  None    Test Results Pending at Discharge:  None  Incidental findings:  None    Medications   Please see After Visit Summary for reconciled discharge medications provided to patient and family  Scheduled medications prescribed or discontinued upon discharge:    1  Lasix 40 mg daily and Lasix 20 mg after dinner   2  Metoprolol changed from 75 mg to 100 mg q 12 hours    Diet restrictions:        Diet Orders            Start     Ordered    12/25/17 1440  Diet Cardiac; Cardiac TLC 2 3 GM NA; Consistent Carbohydrate Diet Level 2 (5 carb servings/75 grams CHO/meal)  Diet effective now     Question Answer Comment   Diet Type Cardiac    Cardiac Cardiac TLC 2 3 GM NA    Other Restriction(s): Consistent Carbohydrate Diet Level 2 (5 carb servings/75 grams CHO/meal)    RD to adjust diet per protocol? Yes        12/25/17 1440    12/25/17 1328  Room Service  Once     Question:  Type of Service  Answer:  Room Service-Appropriate    12/25/17 1328        Activity restrictions: No strenuous activity  Discharge Condition: good    Outpatient Follow-Up  1  Sonya Knowles MD [address] [facility] / [address] [phone] - follow-up within one week  2  Cardiologist Dr Judson Alford in late January as scheduled for ablation     Code Status: Level 1 - Full Code  Discharge Statement   I spent 38 minutes discharging the patient  This time was spent on the day of discharge  Greater than 50% of total time was spent with the patient and / or family counseling and / or coordination of care  NOTE: This text is generated with voice recognition software  There may be translation, syntax, or grammatical errors  If you have any questions, please contact the dictating provider

## 2017-12-28 NOTE — PROGRESS NOTES
Progress Note - Cardiology   aDwit Leon 81 y o  female MRN: [id number]  Unit/Bed#: E4 -01 Encounter: [id number]      Assessment:     1  Acute diastolic congestive heart failure  2  Atrial fibrillation-for ablation next month  3  Hypertension  4  Hyperlipidemia  5  CVA in past    Discussion/Recommendations:    Looks euvolemic and feels back to baseline-if moves around today without any difficulty would be acceptable for discharge  Would send home on 40 mg in the morning and 20 mg in the evening of Lasix  Plavix will be converted to aspirin 7 days prior to ablation  Amiodarone in anticipation of ablation  Would check renal function about 1 week after discharge on the increased diuretic dose  Subjective:     Feels well, notes that she slept almost flat last night without any shortness of breath      Physical Exam:  GEN:  NAD  HEENT:  MMM, NCAT, pink conjunctiva, EOMI, nonicteric sclera  CV:  NO JVD/HJR, IRREGULAR, NO M/R/G, +S1/S2, NO PARASTERNAL HEAVE/THRILL, NO LE EDEMA, NO HEPATIC SYSTOLIC PULSATION, WARM EXTREMITIES  RESP:  CTAB/L  ABD:  SOFT, NT, NO GROSS ORGANOMEGALY    Vitals:   /80   Pulse 70   Temp 98 3 °F (36 8 °C) (Tympanic)   Resp 20   Ht 5' 7" (1 702 m)   Wt 113 kg (249 lb 1 9 oz)   LMP  (LMP Unknown)   SpO2 100%   BMI 39 02 kg/m²   Vitals:    12/28/17 0500 12/28/17 0600   Weight: 113 kg (248 lb 3 8 oz) 113 kg (249 lb 1 9 oz)       Intake/Output Summary (Last 24 hours) at 12/28/17 0848  Last data filed at 12/27/17 2102   Gross per 24 hour   Intake                0 ml   Output              600 ml   Net             -600 ml     Lab Results:    Results from last 7 days  Lab Units 12/25/17  1011   WBC Thousand/uL 10 72*   HEMOGLOBIN g/dL 12 4   HEMATOCRIT % 39 4   PLATELETS Thousands/uL 365       Results from last 7 days  Lab Units 12/27/17  0536  12/25/17  1011   SODIUM mmol/L 141  < > 140   POTASSIUM mmol/L 4 1  < > 4 3   CHLORIDE mmol/L 105  < > 107   CO2 mmol/L 26  < > 24   BUN mg/dL 20  < > 16   CREATININE mg/dL 1 32*  < > 1 11   CALCIUM mg/dL 8 8  < > 8 8   TOTAL PROTEIN g/dL  --   --  7 6   BILIRUBIN TOTAL mg/dL  --   --  0 36   ALK PHOS U/L  --   --  133*   ALT U/L  --   --  31   AST U/L  --   --  30   GLUCOSE RANDOM mg/dL 95  < > 130   < > = values in this interval not displayed      Results from last 7 days  Lab Units 12/27/17  0536   SODIUM mmol/L 141   POTASSIUM mmol/L 4 1   CHLORIDE mmol/L 105   CO2 mmol/L 26   BUN mg/dL 20   CREATININE mg/dL 1 32*   GLUCOSE RANDOM mg/dL 95   CALCIUM mg/dL 8 8       Results from last 7 days  Lab Units 12/23/17  0630   CHOLESTEROL mg/dL 114         Medications:    Current Facility-Administered Medications:     acetaminophen (TYLENOL) tablet 650 mg, 650 mg, Oral, Q4H PRN, Fatoumata Zabala PA-C    amiodarone tablet 200 mg, 200 mg, Oral, Daily, Fatoumata Zabala PA-C, 200 mg at 12/28/17 5256    apixaban (ELIQUIS) tablet 5 mg, 5 mg, Oral, BID, MAEGAN Rodriguez-CHIQUI, 5 mg at 12/28/17 2695    clopidogrel (PLAVIX) tablet 75 mg, 75 mg, Oral, Daily, MAEGAN Rodriguez-C, 75 mg at 12/28/17 0811    escitalopram (LEXAPRO) tablet 10 mg, 10 mg, Oral, Daily, Fatoumata Zabala PA-C, 10 mg at 12/28/17 0810    furosemide (LASIX) injection 40 mg, 40 mg, Intravenous, BID (diuretic), MAEGAN Rodriguez-C, 40 mg at 12/28/17 4622    LORazepam (ATIVAN) tablet 1 mg, 1 mg, Oral, TID PRN, Fatoumata Zabala PA-C    meclizine (ANTIVERT) tablet 31 25 mg, 31 25 mg, Oral, TID PRN, Fatoumata Zabala PA-C    metoprolol tartrate (LOPRESSOR) tablet 100 mg, 100 mg, Oral, Q12H [address], Fatoumata Zabala PA-C, 100 mg at 12/28/17 0810    nortriptyline (PAMELOR) capsule 50 mg, 50 mg, Oral, HS, Fatoumata Zabala PA-C, 50 mg at 12/27/17 2103    oxyCODONE-acetaminophen (PERCOCET) 5-325 mg per tablet 1 tablet, 1 tablet, Oral, Q4H PRN, Fatoumata Zabala PA-C, 1 tablet at 12/25/17 1526    potassium chloride (K-DUR,KLOR-CON) CR tablet 20 mEq, 20 mEq, Oral, BID, Kimani Alberto PA-C, 20 mEq at 12/28/17 3714    pravastatin (PRAVACHOL) tablet 20 mg, 20 mg, Oral, Daily With Clide Macarenaet HAIR Zabala PA-C, 20 mg at 12/27/17 1713    topiramate (TOPAMAX) tablet 25 mg, 25 mg, Oral, BID, Fatoumata Zabala PA-C, 25 mg at 12/28/17 0732    Portions of the record may have been created with voice recognition software  Occasional wrong word or "sound a like" substitutions may have occurred due to the inherent limitations of voice recognition software  Read the chart carefully and recognize, using context, where substitutions have occurred

## 2017-12-29 ENCOUNTER — GENERIC CONVERSION - ENCOUNTER (OUTPATIENT)
Dept: OTHER | Facility: OTHER | Age: 72
End: 2017-12-29

## 2018-01-03 NOTE — CASE MANAGEMENT
Notification of Discharge  This is a Notification of Discharge from our facility [address]  Please be advised that this patient has been discharge from our facility  Below you will find the admission and discharge date and time including the patients disposition  PRESENTATION DATE: 12/25/2017  9:58 AM  IP ADMISSION DATE: 12/25/17 1055  DISCHARGE DATE: 12/28/2017  2:05 PM  DISPOSITION: [facility] in the [facility] by Rajiv De La O for 2017  Network Utilization Review Department  Phone: [phone]; Fax [phone]  ATTENTION: The Network Utilization Review Department is now centralized for our 7 Facilities  Make a note that we have a new phone and fax numbers for our Department  Please call with any questions or concerns to [phone] and carefully follow the prompts so that you are directed to the right person  All voicemails are confidential  Fax any determinations, approvals, denials, and requests for initial or continue stay review clinical to [phone]  Due to HIGH CALL volume, it would be easier if you could please send faxed requests to expedite your requests and in part, help us provide discharge notifications faster

## 2018-01-09 ENCOUNTER — GENERIC CONVERSION - ENCOUNTER (OUTPATIENT)
Dept: OTHER | Facility: OTHER | Age: 73
End: 2018-01-09

## 2018-01-11 ENCOUNTER — ALLSCRIPTS OFFICE VISIT (OUTPATIENT)
Dept: OTHER | Facility: OTHER | Age: 73
End: 2018-01-11

## 2018-01-11 ENCOUNTER — GENERIC CONVERSION - ENCOUNTER (OUTPATIENT)
Dept: OTHER | Facility: OTHER | Age: 73
End: 2018-01-11

## 2018-01-11 DIAGNOSIS — I10 ESSENTIAL (PRIMARY) HYPERTENSION: ICD-10-CM

## 2018-01-11 DIAGNOSIS — I50.32 CHRONIC DIASTOLIC HEART FAILURE (HCC): ICD-10-CM

## 2018-01-11 DIAGNOSIS — E87.6 HYPOKALEMIA: ICD-10-CM

## 2018-01-11 DIAGNOSIS — I48.91 ATRIAL FIBRILLATION (HCC): ICD-10-CM

## 2018-01-11 NOTE — PROCEDURES
Procedures by Gracie Peng MD at 10/10/2017   9:08 AM      Author:  Gracie Peng MD Service:  Electrophysiology Author Type:  Physician    Filed:  10/10/2017  9:14 AM Date of Service:  10/10/2017  9:08 AM Status:  Signed    :  Gracie Peng MD (Physician)         PP  LINQ    History and physical were reviewed  Patient was examined and history was reviewed  No change in patient's condition Since history and physical has been completed        The pre- operative diagnosis:  A fib with RVR  Reveal at [facility]      Postoperative diagnosis:  A fib with RVR  Reveal at [facility]      Procedure:  Explantation of the Reveal        Surgeon: [id number] -none    Specimens - none    Estimated blood loss- 20 ml    Findings-none    Complications none    Anesthesia-  local lidocaine by myself      Details of the device  Reveal    Description of procedure: The patient was seen before the procedure  The details of the procedure was explained and patient agreed to the same  Appropriate consent was signed  The patient was brought to the electrophysiologic laboratory  Proper time out was done  Sterile dressing and draping was done  Local lidocaine was infiltrated, in the left third  intercostal space,  Over the old scar of implantation     A linear 4 cm incision was made with the knife along Jules's lines  The device is quiet deep  There were two major bleeding vessel on the way to the capsule   Each needed to be caught with mosquito forceps and cauterized individually   There was approximately  10 ml bleeding from each  The capsule was incised and the Reveal was removed  The patient did have some bleeding and needed electrocautery to control the same     Closed in 3 separate layers with 2O, 3O and finally two layers with 4 O vicryl      Dressing was done with Steri-Strips, Telfa and Tegaderm  A modified pressure dressing was applied as there was significant bleeding       Patient should not go back on heparin  Chances of hematoma high  Start oral anticoagulation      Summary of the procedure: The patient came in to the laboratory for Reveal device removal  The device has been removed and patient tolerated the procedure well                           Ginny OBRIEN    Oct 10 2017  9:14AM [facility] Standard Time

## 2018-01-12 VITALS
DIASTOLIC BLOOD PRESSURE: 86 MMHG | WEIGHT: 256 LBS | SYSTOLIC BLOOD PRESSURE: 120 MMHG | TEMPERATURE: 98.2 F | BODY MASS INDEX: 42.65 KG/M2 | HEIGHT: 65 IN | RESPIRATION RATE: 16 BRPM | HEART RATE: 96 BPM

## 2018-01-12 VITALS
WEIGHT: 255.13 LBS | BODY MASS INDEX: 42.51 KG/M2 | SYSTOLIC BLOOD PRESSURE: 140 MMHG | HEIGHT: 65 IN | TEMPERATURE: 98.8 F | HEART RATE: 84 BPM | RESPIRATION RATE: 16 BRPM | DIASTOLIC BLOOD PRESSURE: 82 MMHG

## 2018-01-13 VITALS
TEMPERATURE: 97.8 F | DIASTOLIC BLOOD PRESSURE: 70 MMHG | SYSTOLIC BLOOD PRESSURE: 130 MMHG | RESPIRATION RATE: 20 BRPM | WEIGHT: 262.38 LBS | HEART RATE: 104 BPM | HEIGHT: 65 IN | BODY MASS INDEX: 43.71 KG/M2

## 2018-01-13 VITALS
DIASTOLIC BLOOD PRESSURE: 80 MMHG | TEMPERATURE: 98.3 F | WEIGHT: 244.38 LBS | SYSTOLIC BLOOD PRESSURE: 128 MMHG | HEART RATE: 86 BPM | RESPIRATION RATE: 16 BRPM | HEIGHT: 65 IN | BODY MASS INDEX: 40.72 KG/M2

## 2018-01-13 NOTE — PROCEDURES
Procedures by Phil Neri MD at  11/9/2017 11:59 AM      Author:  Phil Neri MD Service:  Cardiology Author Type:  Fellow    Filed:  11/9/2017  3:35 PM Date of Service:  11/9/2017 11:59 AM Status:  Attested    :  Phil Neri MD (Fellow)  Cosigner:  Dewayne Kovacs MD at 11/10/2017 11:21 AM      Pre-procedure Diagnoses:       1  A-fib ([address] ) [I48 91]                Post-procedure Diagnoses:       1  Atrial fibrillation, currently in sinus rhythm [Z86 79]                Procedures:       1  ELECTRICAL CARDIOVERSION [SBR233 (Custom)]              Attestation signed by Dewayne Kovacs MD at 11/10/2017 11:21 AM      I was present throughout the procedure and supervised Dr Chasidy Singer with her documentation  Indication: Symptomatic atrial fibrillation   Anti-coagulation: Apixaban  Anesthesia: Conscious sedation provided by anesthesiology  20% Benzocaine spray x 1 and propofol 280 mg IV  LARRY findings:  No intracardiac thrombus  Dense persistent smoke seen in LA appendage  Electrical Pad Placement: Anteriorly on the upper sternum and under left breast  Two synchronized biphasic shock delivered at 200 J and 300 J  Successful cardioversion following the second synchronized biphasic shock at 300 J  Complications: None  Sinus rhythm documented by 12 lead ECG  Disposition: Recovery area             Received Andrew Alberts MD  Nov 10 2017 11:22AM Eastern Standard Time

## 2018-01-14 VITALS
HEIGHT: 65 IN | DIASTOLIC BLOOD PRESSURE: 62 MMHG | WEIGHT: 250.38 LBS | SYSTOLIC BLOOD PRESSURE: 114 MMHG | HEART RATE: 88 BPM | RESPIRATION RATE: 16 BRPM | BODY MASS INDEX: 41.72 KG/M2

## 2018-01-14 VITALS
HEART RATE: 100 BPM | DIASTOLIC BLOOD PRESSURE: 70 MMHG | WEIGHT: 250.13 LBS | RESPIRATION RATE: 16 BRPM | TEMPERATURE: 98.2 F | HEIGHT: 65 IN | BODY MASS INDEX: 41.67 KG/M2 | SYSTOLIC BLOOD PRESSURE: 124 MMHG

## 2018-01-16 ENCOUNTER — TRANSCRIBE ORDERS (OUTPATIENT)
Dept: RADIOLOGY | Facility: HOSPITAL | Age: 73
End: 2018-01-16

## 2018-01-16 ENCOUNTER — APPOINTMENT (OUTPATIENT)
Dept: LAB | Facility: HOSPITAL | Age: 73
End: 2018-01-16
Payer: COMMERCIAL

## 2018-01-16 DIAGNOSIS — I10 ESSENTIAL (PRIMARY) HYPERTENSION: ICD-10-CM

## 2018-01-16 DIAGNOSIS — I48.91 ATRIAL FIBRILLATION (HCC): ICD-10-CM

## 2018-01-16 DIAGNOSIS — E87.6 HYPOKALEMIA: ICD-10-CM

## 2018-01-16 DIAGNOSIS — I50.32 CHRONIC DIASTOLIC HEART FAILURE (HCC): ICD-10-CM

## 2018-01-16 LAB
ANION GAP SERPL CALCULATED.3IONS-SCNC: 6 MMOL/L (ref 4–13)
BUN SERPL-MCNC: 21 MG/DL (ref 5–25)
CALCIUM SERPL-MCNC: 9 MG/DL (ref 8.3–10.1)
CHLORIDE SERPL-SCNC: 108 MMOL/L (ref 100–108)
CO2 SERPL-SCNC: 29 MMOL/L (ref 21–32)
CREAT SERPL-MCNC: 1.37 MG/DL (ref 0.6–1.3)
GFR SERPL CREATININE-BSD FRML MDRD: 39 ML/MIN/1.73SQ M
GLUCOSE P FAST SERPL-MCNC: 106 MG/DL (ref 65–99)
POTASSIUM SERPL-SCNC: 3.9 MMOL/L (ref 3.5–5.3)
SODIUM SERPL-SCNC: 143 MMOL/L (ref 136–145)

## 2018-01-16 PROCEDURE — 80048 BASIC METABOLIC PNL TOTAL CA: CPT

## 2018-01-16 NOTE — MISCELLANEOUS
Assessment   1  Atrial fibrillation, unspecified type (427 31) (I48 91)  2  Hypertension (401 9) (I10)  3  Hypercholesterolemia (272 0) (E78 00)  4  Obstructive sleep apnea (327 23) (G47 33)  5  Mitral regurgitation (424 0) (I34 0)    Plan  Atrial fibrillation, unspecified type    · Changed: From  Metoprolol Tartrate TABS 75mg bid To Metoprolol Tartrate 75 MG Oral  Tablet 75mg bid,37 5 mg each afternoon  Rx By: David Hardy; Dispense: 0 Days ; #:90 Tablet; Refill: 0;For: Atrial fibrillation,   unspecified type; DAMON = N; Record  Atrial fibrillation, unspecified type, Tachycardia    · EKG/ECG- POC; Status:Complete;   Done: 85YIK4467 09:36AM  Performed: In Office; Due:82Myx0485; Ordered; For:Atrial fibrillation, unspecified type,   Tachycardia; Ordered By:Surya Trinidad;  Depression    · Renew: Escitalopram Oxalate 10 MG Oral Tablet (Lexapro); TAKE ONE TABLET BY  MOUTH ONCE DAILY  Rx By: David Hardy; Dispense: 0 Days ; #:90 Tablet; Refill: 3;For: Depression; DAMON =   N; Verified Transmission to [address] [place] [address]; Last Updated By:   [employer]; 10/18/2017 9:59:27 AM  Mitral regurgitation    · Follow-up visit in 6 weeks Evaluation and Treatment  Follow-up  Status: Complete  Done:  08BCP2404  Ordered; For: Mitral regurgitation;  Ordered By: David Hardy  Performed:   Due:   74XTH6850; Last Updated By: [facility]; 10/18/2017 9:59:22 AM    Discussion/Summary  Discussion Summary:   The patient's heart rate is not that well controlled  An EKG was done which showed atrial fibrillation with a heart rate of 110  No acute ischemic changes were noted  1  I asked her to add an extra half of Lopressor mid day  She will maintain her Eliquis  She will follow up with cardiology as planned  The patient's blood pressure is acceptable  She remains on CPAP for sleep apnea  She is on appropriate cholesterol therapy  I asked her to return here in 6 weeks for further evaluation         1 Amended By: David Hardy; Oct 20 2017 4:53 PM EST    Chief Complaint  Chief Complaint Free Text Note Form: d/c from [facility] 10/11/17  patient's cell phone number was out of order, patient called the office 10/13/2017 to make PIPO LAMBERTS appointment  new medication for AF, Eliquis 5 mg BID & Metoprolol 75 mg BID  to ER yesterday, corneal abrasion  flu vaccine received 10/11/17 inpatient      History of Present Illness  TCM Communication [facility]: The patient is being contacted for 10/18/2017  She was hospitalized [facility]  The date of admission: 10/7/2017, date of discharge: 10/11/2017  Diagnosis: AF with rapid ventricular response  She was discharged to home  Medications reviewed and updated today  She scheduled a follow up appointment  Counseling was provided to the patient  Communication performed and completed by   HPI: The patient returned to the office after recent hospitalization at [facility]  She was admitted with rapid atrial fibrillation and congestive heart failure  Her heart rate was controlled  Heart failure resolved easily  Echocardiogram showed excellent ventricular function with significant mitral regurgitation  The patient was discharged on metoprolol 75 mg twice daily and Eliquis  She has been feeling reasonably well on this although somewhat fatigued  She does have mild dyspnea on exertion but no chest pain, PND, orthopnea, or edema  She has an appointment with Cardiology in 5 days  Review of Systems  Complete-Female:   Constitutional: as noted in HPI  Eyes: No complaints of eye pain, no red eyes, no eyesight problems, no discharge, no dry eyes, no itching of eyes  ENT: no complaints of earache, no loss of hearing, no nose bleeds, no nasal discharge, no sore throat, no hoarseness  Cardiovascular: as noted in HPI  Respiratory: No complaints of shortness of breath, no wheezing, no cough, no SOB on exertion, no orthopnea, no PND     Gastrointestinal: No complaints of abdominal pain, no constipation, no nausea or vomiting, no diarrhea, no bloody stools  Genitourinary: No complaints of dysuria, no incontinence, no pelvic pain, no dysmenorrhea, no vaginal discharge or bleeding  Musculoskeletal: No complaints of arthralgias, no myalgias, no joint swelling or stiffness, no limb pain or swelling  Integumentary: No complaints of skin rash or lesions, no itching, no skin wounds, no breast pain or lump  Neurological: No complaints of headache, no confusion, no convulsions, no numbness, no dizziness or fainting, no tingling, no limb weakness, no difficulty walking  Psychiatric: Not suicidal, no sleep disturbance, no anxiety or depression, no change in personality, no emotional problems  Endocrine: No complaints of proptosis, no hot flashes, no muscle weakness, no deepening of the voice, no feelings of weakness  Hematologic/Lymphatic: No complaints of swollen glands, no swollen glands in the neck, does not bleed easily, does not bruise easily  Active Problems   1  Anserine bursitis (726 61) (M70 50)  2  Anxiety (300 00) (F41 9)  3  Bicipital tendinitis of right shoulder (726 12) (M75 21)  4  Cervicalgia (723 1) (M54 2)  5  Colonoscopy (Fiberoptic) Screening  6  Depression (311) (F32 9)  7  Edema (782 3) (R60 9)  8  Encounter for screening mammogram for malignant neoplasm of breast (V76 12)   (Z12 31)  9  Hypercholesterolemia (272 0) (E78 00)  10  Hypertension (401 9) (I10)  11  Hypokalemia (276 8) (E87 6)  12  Insomnia (780 52) (G47 00)  13  Migraine (346 90) (G43 909)  14  Need for prophylactic vaccination and inoculation against influenza (V04 81) (Z23)  15  Obesity (278 00) (E66 9)  16  Obstructive sleep apnea (327 23) (G47 33)  17  Osteoarthritis of both knees, unspecified osteoarthritis type (715 96) (M17 0)  18  Prediabetes (790 29) (R73 03)  19  Primary localized osteoarthritis of right knee (715 16) (M17 11)  20  Right knee pain (719 46) (M25 561)  21   Screening for genitourinary condition (V81 6) (Z13 89)  22  Screening for osteoporosis (V82 81) (Z13 820)  23  Stroke (434 91) (I63 9)  24  Transient ischemic attack (435 9) (G45 9)  25  Vertigo (780 4) (R42)    Past Medical History   1  History of migraine (V12 49) (Z86 69)  2  History of Polyp of sigmoid colon (211 3) (D12 5)  3  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 (433 81)    Surgical History   1  History of Appendectomy  2  History of Cholecystectomy    Family History  Mother   1  Family history of Arthritis (V17 7)  2  Family history of Coronary Artery Disease (V17 49)  3  Family history of Hypertension (V17 49)  Family History Reviewed: The family history was reviewed and updated today  Social History    · Former smoker (F31 28) (Z87 509)   · Social alcohol use (Z78 9)  Social History Reviewed: The social history was reviewed and is unchanged  Current Meds  1  Clopidogrel Bisulfate 75 MG Oral Tablet; take one tablet by mouth every day; Therapy: 26CVJ2228 to (Evaluate:22Nov2017)  Requested for: 27MFI4763; Last   Rx:54Leb6242 Ordered  2  Eliquis 5 MG Oral Tablet; Take 1 tablet twice daily; Therapy: (Recorded:16Oct2017) to Recorded  3  Escitalopram Oxalate 10 MG Oral Tablet; TAKE ONE TABLET BY MOUTH ONCE DAILY; Therapy: 09BCL3178 to (Last Tab Brink)  Requested for: 27Oct2016 Ordered  4  Furosemide 20 MG Oral Tablet; Take one tablet  twice daily; Therapy: 58PTB6701 to [phone])  Requested for: 01Fhq0673; Last   Rx:88Gud5916 Ordered  5  LORazepam 1 MG Oral Tablet; TAKE ONE TABLET BY MOUTH 3 TIMES A DAY AS   NEEDED; Therapy: 67RUY5532 to (Evaluate:17Enn5253)  Requested for: 19Lgt0476; Last   Rx:59Qaq0016 Ordered  6  Meclizine HCl - 25 MG Oral Tablet; TAKE 1 TABLET 3 TIMES DAILY AS NEEDED; Therapy: 41DJA5710 to (Evaluate:02Jan2016)  Requested for: 35NMO8489; Last   Rx:53Xds8420 Ordered  7  Metoprolol Tartrate TABS; 75mg bid; Therapy: (Recorded:16Oct2017) to Recorded  8  Multivitamins TABS; TAKE 1 TABLET DAILY; Therapy: 03CFV7806 to Recorded  9  Nortriptyline HCl - 50 MG Oral Capsule; TAKE 1 CAPSULE AT BEDTIME; Therapy: 82FQS6930 to ([phone])  Requested for: 74ULV0270; Last   Rx:82Qgf1534 Ordered  10  Oxycodone-Acetaminophen 5-325 MG Oral Tablet; TAKE 1 TO 2 TABLETS EVERY 4 TO 6    HOURS AS NEEDED; Therapy: 28QTA5907 to (Evaluate:20Oct2017); Last Rx:60Bxa6169 Ordered  11  Potassium Chloride Ofelia ER 20 MEQ Oral Tablet Extended Release; TAKE ONE TABLET    BY MOUTH TWICE DAILY; Therapy: 74BWV9552 to ([facility])  Requested for: 67Mgu5065; Last    Rx:92Ysl1062 Ordered  12  Simvastatin 10 MG Oral Tablet; take one tablet by mouth every day; Therapy: 69JDM7224 to (Last Rx:24Jan2017)  Requested for: 25Jan2017 Ordered  13  Topiramate 25 MG Oral Tablet; take 1 tablet by mouth daily; Therapy: 75RTP1684 to (Last Rx:46Klj9585)  Requested for: 25GRN4542 Ordered    Allergies   1  No Known Drug Allergies    Vitals  Signs   Recorded: 61YXG8459 09:20AM   Temperature: 99 3 F  Heart Rate: 128  Respiration: 18  Systolic: 449  Diastolic: 80  BP Cuff Size: Large  Height: 5 ft 5 in  Weight: 251 lb   BMI Calculated: 41 77  BSA Calculated: 2 18    Physical Exam    Constitutional   General appearance: No acute distress, well appearing and well nourished  Pulmonary   Respiratory effort: No increased work of breathing or signs of respiratory distress  Auscultation of lungs: Clear to auscultation  Cardiovascular   Palpation of heart: Abnormal     Auscultation of heart: Normal rate and rhythm, normal S1 and S2, without murmurs  Examination of extremities for edema and/or varicosities: Abnormal   trace edema  Carotid pulses: Normal     Abdomen   Abdomen: Non-tender, no masses  Liver and spleen: No hepatomegaly or splenomegaly  Lymphatic   Palpation of lymph nodes in neck: No lymphadenopathy      Musculoskeletal   Gait and station: Normal  "  Inspection/palpation of joints, bones, and muscles: Normal        Psychiatric   Orientation to person, place, and time: Normal     Mood and affect: Normal          Results/Data  EKG/ECG- POC 42TGV1755 09:36AM Chichi Sheridan     Test Name Result Flag Reference   EKG/ECG 10/18/2017         Health Management  Encounter for screening mammogram for malignant neoplasm of breast   Digital Bilateral Screening Mammogram With CAD; every 1 year; Last 39Lsr9999; Next Due:  26Juj7318; Overdue    Message   Recorded as Task   Date: 10/11/2017 04:53 PM, Created By: System   Task Name: [facility]   Assigned To: Hortencia Lala   Regarding Patient: Fernando Hill, Status: In Progress   Comment:    System - 11 Oct 2017 4:53 PM     Patient discharged from hospital   Patient Name: Michael Car  Patient YOB: 1945  Discharge Date: 10/11/2017  Facility: [facility] 12 Oct 2017 7:14 AM     TASK REASSIGNED: Previously Assigned To [facility] Ebonie Alcocer [facility] 12 Oct 2017 11:09 AM     TASK EDITED  attempted calling patient, voice mail box was full   [facility] 12 Oct 2017 11:09 AM     TASK IN PROGRESS   [facility] 12 Oct 2017 3:58 PM     TASK EDITED  tried to call patient again, mail box still full     Future Appointments    Date/Time Provider Specialty Site   10/25/2017 10:40 AM Lisy Huitron, [address]  Cardiology [facility] 84   11/29/2017 08:30 AM HAIR Ferreira  Internal Medicine [facility] 84   12/27/2017 09:30 AM HAIR Ferreira   Internal Medicine [facility]     Signatures   Electronically signed by : HAIR Cheney ; Oct 20 2017  4:48PM EST                       (Author)    Electronically signed by : HAIR Cheney ; Oct 20 2017  4:53PM EST                       (Author)    "

## 2018-01-16 NOTE — MISCELLANEOUS
Message  Return to work or school:   Lorenzo Pérez is under my professional care   She was seen in my office on   She is able to return to work on  10/24/17            Signatures   Electronically signed by : Larry Rojas DO; Oct 23 2017  4:22PM EST

## 2018-01-17 ENCOUNTER — GENERIC CONVERSION - ENCOUNTER (OUTPATIENT)
Dept: OTHER | Facility: OTHER | Age: 73
End: 2018-01-17

## 2018-01-17 NOTE — RESULT NOTES
Message   Date: 30 Jun 2017 4:24 PM EST, Recorded By: Trish Bland For: Davidson Trinidad   Caller: Nicole Elmira, Self   Phone: [phone] (Home), [phone] x,,,,, (Work)   Reason: Results Inquiry   K+ 2 7 6/30/17, increase KCL 20meq BID, recheck BMP in 1 week, slip will be faxed to [facility]  Hypokalemia    · (1) BASIC METABOLIC PROFILE; Status:Active - Retrospective Authorization;   Requested AGQ:[id number];

## 2018-01-17 NOTE — PROGRESS NOTES
Assessment    1  Hypertension (401 9) (I10)   2  Hypercholesterolemia (272 0) (E78 0)   3  Migraine headache (346 90) (G43 909)   4  Obstructive sleep apnea (327 23) (G47 33)   5  Anserine bursitis (726 61) (M70 50)    Plan  Anserine bursitis    · Follow-up visit in 3 months Evaluation and Treatment  Follow-up  Status: Complete  Done: 18FFH0514  Colonoscopy (Fiberoptic) Screening    · 2 - Adelia Lawrence MD, Rommel Ramos  (Gastroenterology) Physician Referral  Consult  Status: Active  Requested for:  83WNH6081  Care Summary provided  : Yes  Screening for genitourinary condition    · *VB-Urinary Incontinence Screen (Dx V81 6 Screen for UI); Status:Complete;   Done: 26PHV2556  08:53AM    Discussion/Summary  Discussion Summary:   The patient appears to be doing generally well  Her blood pressure control is adequate  She is on appropriate therapy for hyperlipidemia and her migraine  She demonstrates no symptoms of cerebral ischemia  She will pursue a conservative course with respect to her anserine bursitis for the moment  I discussed the possibility of 60 right injections  If things go well she will return here for followup in 3 months  Chief Complaint  Chief Complaint Free Text Note Form: 3 month f/u for hypertension  to ER 1/25/16 for ro knee pain, given Mobic 7 5mg daily, intends to  med today  taking Advil 2 q4h prn until she starts Mobic  has not had any falls in the past year  does not have UI      History of Present Illness  HPI: the patient returned to the office for reevaluation of hypertension, hyperlipidemia, previous stroke, migraine, and sleep apnea  In general, since her last visit she has been feeling well  She denies chest pain, shortness of breath, palpitations, or dizziness  She has had no unusual neurologic symptoms  However, she is having pain in both knees  This was severe enough that she went to the emergency room several days ago  On meloxicam was recommended  She will start that later today   Her knees are not swollen  They are not red  They have not given way nor LOC  She has had no trauma  Today she has a migraine but overall her migraines are adequately controlled  Review of Systems  Complete-Female:   Constitutional: No fever, no chills, feels well, no tiredness, no recent weight gain or weight loss  Eyes: No complaints of eye pain, no red eyes, no eyesight problems, no discharge, no dry eyes, no itching of eyes  ENT: no complaints of earache, no loss of hearing, no nose bleeds, no nasal discharge, no sore throat, no hoarseness  Cardiovascular: as noted in HPI  Respiratory: No complaints of shortness of breath, no wheezing, no cough, no SOB on exertion, no orthopnea, no PND  Gastrointestinal: No complaints of abdominal pain, no constipation, no nausea or vomiting, no diarrhea, no bloody stools  Genitourinary: No complaints of dysuria, no incontinence, no pelvic pain, no dysmenorrhea, no vaginal discharge or bleeding  Musculoskeletal: as noted in HPI  Neurological: as noted in HPI  Psychiatric: Not suicidal, no sleep disturbance, no anxiety or depression, no change in personality, no emotional problems  Endocrine: no muscle weakness  Hematologic/Lymphatic: no tendency for easy bruising  Active Problems    1  Anxiety (300 00) (F41 9)   2  Atrial fibrillation (427 31) (I48 91)   3  Bicipital tendinitis of right shoulder (726 12) (M75 21)   4  Cerebrovascular disease, ill-defined, acute (436) (I67 89)   5  Cervicalgia (723 1) (M54 2)   6  Colonoscopy (Fiberoptic) Screening   7  Depression (311) (F32 9)   8  Encounter for screening mammogram for malignant neoplasm of breast (V76 12) (Z12 31)   9  Hypercholesterolemia (272 0) (E78 0)   10  Hypertension (401 9) (I10)   11  Hypokalemia (276 8) (E87 6)   12  Insomnia (780 52) (G47 00)   13  Migraine headache (346 90) (G43 909)   14  Need for pneumococcal vaccination (V03 82) (Z23)   15   Need for prophylactic vaccination and inoculation against influenza (V04 81) (Z23)   16  Obesity (278 00) (E66 9)   17  Obstructive sleep apnea (327 23) (G47 33)   18  Prediabetes (790 29) (R73 09)   19  Screening for osteoporosis (V82 81) (Z13 820)   20  Transient ischemic attack (435 9) (G45 9)   21  Vertigo (780 4) (R42)    Past Medical History    1  History of migraine (V12 49) (Z86 69)   2  History of Myalgia And Myositis (729 1)   3  History of Polyp of sigmoid colon (211 3) (D12 5)   4  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 (433 81)  Active Problems And Past Medical History Reviewed: The active problems and past medical history were reviewed and updated today  Surgical History    1  History of Appendectomy   2  History of Cholecystectomy    Family History    1  Family history of Arthritis (V17 7)   2  Family history of Coronary Artery Disease (V17 49)   3  Family history of Hypertension (V17 49)  Family History Reviewed: The family history was reviewed and updated today  Social History    · Former smoker (G23 78) (Q19 244)   · Social alcohol use (F10 99)  Social History Reviewed: The social history was reviewed and is unchanged  Current Meds   1  Clopidogrel Bisulfate 75 MG Oral Tablet; take 1 tablet by mouth every day; Therapy: 15QJF8243 to (Last Rx:17Nov2015)  Requested for: 07LFJ9834 Ordered   2  Escitalopram Oxalate 10 MG Oral Tablet; TAKE ONE TABLET BY MOUTH ONCE DAILY; Therapy: 49SQW2868 to (Last Rx:21Wjv3765) Ordered   3  Hydrochlorothiazide 12 5 MG Oral Capsule; TAKE ONE CAPSULE BY MOUTH EVERY DAY; Therapy: 45THM0540 to (Benjie Abel)  Requested for: 59Trt7937; Last Rx:56Wje3230   Ordered   4  Labetalol HCl - 100 MG Oral Tablet; TAKE 1 TABLET BY MOUTH TWICE DAILY AS DIRECTED; Therapy: 04DUF7360 to (Cary Mello)  Requested for: 61JYI7104; Last Rx:59Icd5575   Ordered   5   LORazepam 1 MG Oral Tablet; TAKE ONE TABLET BY MOUTH 3 TIMES A DAY AS NEEDED; Therapy: 18HOM3714 to (Evaluate:05Cvn2226)  Requested for: 22Apr2015; Last Rx:22Apr2015   Ordered   6  Meclizine HCl - 25 MG Oral Tablet; TAKE 1 TABLET 3 TIMES DAILY AS NEEDED; Therapy: 73HIS4090 to (Evaluate:02Jan2016)  Requested for: 58HYE4793; Last Rx:31Lwf8081 Ordered   7  Multivitamins TABS; TAKE 1 TABLET DAILY; Therapy: 92BUL2070 to Recorded   8  Nortriptyline HCl - 25 MG Oral Capsule; TAKE ONE CAPSULE BY MOUTH AT BEDTIME; Therapy: 84PIF4834 to (Last Rx:14Oct2015)  Requested for: 14Oct2015 Ordered   9  Oxycodone-Acetaminophen 5-325 MG Oral Tablet; TAKE 1 TO 2 TABLETS EVERY 4 TO 6 HOURS AS   NEEDED; Therapy: 69MJL3604 to (Evaluate:04Nlv5585); Last Rx:25Jan2016 Ordered   10  Potassium Chloride Ofelia ER 20 MEQ Oral Tablet Extended Release; Take 1 tablet by mouth daily; Therapy: 78CRJ3695 to (Last Rx:25Abj7970)  Requested for: 39Mqb4924 Ordered   11  Simvastatin 10 MG Oral Tablet; take one tablet by mouth every day; Therapy: 31BOX7155 to (Last Rx:22Jan2016)  Requested for: 31SIM5436 Ordered   12  Topiramate 100 MG Oral Tablet; Take 1 tablet by mouth at bedtime; Therapy: 03REV6355 to (Last Rx:82Jlh5614)  Requested for: 75Ajt1407 Ordered   13  Topiramate 50 MG Oral Tablet; TAKE 1 TABLET DAILY; Therapy: 91XLS7661 to (Evaluate:23Apr2016)  Requested for: 444 14 907; Last Rx:26Oct2015    Ordered    Allergies    1  No Known Drug Allergies    Vitals  Vital Signs [Data Includes: Current Encounter]    Recorded: 17LOB4570 09:48AM Recorded: 82ZTV4437 08:46AM   Temperature  98 1 F   Heart Rate  88   Respiration  16   Systolic 561 802   Diastolic 80 999   BP Cuff Size Large    Height  5 ft 5 in   Weight  246 lb    BMI Calculated  40 94   BSA Calculated  2 16     Physical Exam    Constitutional   General appearance: No acute distress, well appearing and well nourished  Pulmonary   Respiratory effort: No increased work of breathing or signs of respiratory distress      Auscultation of lungs: Clear to auscultation  Cardiovascular   Palpation of heart: Normal PMI, no thrills  Auscultation of heart: Normal rate and rhythm, normal S1 and S2, without murmurs  Examination of extremities for edema and/or varicosities: Normal     Carotid pulses: Normal     Abdomen   Abdomen: Non-tender, no masses  Liver and spleen: No hepatomegaly or splenomegaly  Lymphatic   Palpation of lymph nodes in neck: No lymphadenopathy  Musculoskeletal   Gait and station: Normal     Inspection/palpation of joints, bones, and muscles: Abnormal   there was a small effusion of the right knee  range of motion was full bilaterally  There was no evidence of ligamentous laxity or internal derangement  There was significant tenderness over both anserine bursae  Psychiatric   Orientation to person, place, and time: Normal     Mood and affect: Normal          Results/Data  Encounter Results   *VB-Urinary Incontinence Screen (Dx V81 6 Screen for UI) 58EPV5214 08:53AM Javier Trinidad     Test Name Result Flag Reference   Urinary Incontinence Assessment 99ZIN9470       Falls Risk Assessment (Dx V80 09 Screen for Neurologic Disorder) 38YHS7649 08:52AM User, Ahs     Test Name Result Flag Reference   Falls Risk      No falls in the past year       Health Management  Encounter for screening mammogram for malignant neoplasm of breast   Digital Bilateral Screening Mammogram With CAD; every 1 year; Last 76Ukh5423; Next Due:  43Qmj6917; Overdue    Future Appointments    Date/Time Provider Specialty Site   05/04/2016 08:45 AM HAIR Hdz   Internal Medicine [facility]     Signatures   Electronically signed by : HAIR De Leon ; Jan 28 2016  5:39PM EST                       (Author)

## 2018-01-18 NOTE — MISCELLANEOUS
Message     Recorded as Task   Date: 06/16/2017 05:56 PM, Created By: System   Task Name: Verify External Doc   Assigned To: Davidson Trinidad   Regarding Patient: Kathie Bradley, Status: Active   Comment:    System - 16 Jun 2017 5:56 PM     To: Dayo Bourne    Recipient DirectID: [email or web address]    From:     Sender DirectID: [email or web address]  [employer]iri [employer] - 20 Jun 2017 4:08 PM     TASK EDITED  Patient went to ER on 6/16/17 for leg pain and swelling  Patient had a venous duplex done and results were Negative for DVT  Patient was treated and discharged home  Patient has an upcoming appt with Dr Cleopatra Torres on 6/21/17 at 8:45am         Active Problems    1  Anserine bursitis (726 61) (M70 50)   2  Anxiety (300 00) (F41 9)   3  Bicipital tendinitis of right shoulder (726 12) (M75 21)   4  Cervicalgia (723 1) (M54 2)   5  Colonoscopy (Fiberoptic) Screening   6  Depression (311) (F32 9)   7  Encounter for screening mammogram for malignant neoplasm of breast (V76 12)   (Z12 31)   8  Hypercholesterolemia (272 0) (E78 00)   9  Hypertension (401 9) (I10)   10  Hypokalemia (276 8) (E87 6)   11  Insomnia (780 52) (G47 00)   12  Migraine (346 90) (G43 909)   13  Need for pneumococcal vaccination (V03 82) (Z23)   14  Need for prophylactic vaccination and inoculation against influenza (V04 81) (Z23)   15  Obesity (278 00) (E66 9)   16  Obstructive sleep apnea (327 23) (G47 33)   17  Osteoarthritis of both knees, unspecified osteoarthritis type (715 96) (M17 0)   18  Prediabetes (790 29) (R73 03)   19  Primary localized osteoarthritis of right knee (715 16) (M17 11)   20  Right knee pain (719 46) (M25 561)   21  Screening for genitourinary condition (V81 6) (Z13 89)   22  Screening for osteoporosis (V82 81) (Z13 820)   23  Stroke (434 91) (I63 9)   24  Transient ischemic attack (435 9) (G45 9)   25  Vertigo (780 4) (R42)    Current Meds   1   Clopidogrel Bisulfate 75 MG Oral Tablet; take one tablet by mouth every day; Therapy: 93VJZ3200 to (Evaluate:22Nov2017)  Requested for: 86LHB9375; Last   Rx:26Nqt0363 Ordered   2  Escitalopram Oxalate 10 MG Oral Tablet; TAKE ONE TABLET BY MOUTH ONCE DAILY; Therapy: 11RMN3077 to (Last Nicholas Mendez)  Requested for: 27Oct2016 Ordered   3  Furosemide 20 MG Oral Tablet; TAKE 1 TABLET DAILY; Therapy: 27YNS3395 to (Evaluate:22Nov2017)  Requested for: 55APB1740; Last   Rx:07Dzy0045 Ordered   4  Labetalol HCl - 100 MG Oral Tablet; TAKE 1 TABLET BY MOUTH TWICE DAILY AS   DIRECTED; Therapy: 28RFD5754 to (Evaluate:44Nuf8640)  Requested for: 46UOY5543; Last   Rx:29Mar2017 Ordered   5  LORazepam 1 MG Oral Tablet; TAKE ONE TABLET BY MOUTH 3 TIMES A DAY AS   NEEDED; Therapy: 78YNF8085 to (Evaluate:97Cwl8243)  Requested for: 21Swy0209; Last   Rx:97Kdq4107 Ordered   6  Meclizine HCl - 25 MG Oral Tablet; TAKE 1 TABLET 3 TIMES DAILY AS NEEDED; Therapy: 53HIP6072 to (Evaluate:02Jan2016)  Requested for: 15IIX4791; Last   Rx:80Nki5905 Ordered   7  Meloxicam 15 MG Oral Tablet; TAKE 1 TABLET DAILY; Therapy: 21FSR9457 to (Evaluate:56Vof8085)  Requested for: 01ETS8816; Last   Rx:89Kup1107 Ordered   8  Multivitamins TABS; TAKE 1 TABLET DAILY; Therapy: 90PXP6584 to Recorded   9  Naproxen 500 MG Oral Tablet; TAKE 1 TABLET EVERY 12 HOURS WITH FOOD; Therapy: 44LQE3839 to (Evaluate:16Oct2016)  Requested for: 75Vie9167; Last   Rx:68Fno8197 Ordered   10  Nortriptyline HCl - 50 MG Oral Capsule; TAKE 1 CAPSULE AT BEDTIME; Therapy: 99NIQ2222 to (Evaluate:43Zwr4632)  Requested for: 12TIN8122; Last    Rx:02May2017 Ordered   11  Oxycodone-Acetaminophen 5-325 MG Oral Tablet; TAKE 1 TO 2 TABLETS EVERY 4 TO    6 HOURS AS NEEDED; Therapy: 28ALO7432 to (Evaluate:15Jan2017); Last Rx:28Xhq3829 Ordered   12  Potassium Chloride Ofelia ER 20 MEQ Oral Tablet Extended Release; Take 1 tablet by    mouth daily; Therapy: 43ERN2764 to (Last Rx:12Sep2016)  Requested for: 12Sep2016 Ordered   13  Simvastatin 10 MG Oral Tablet; take one tablet by mouth every day; Therapy: 53RYG2992 to (Last Rx:24Jan2017)  Requested for: 25Jan2017 Ordered   14  Topiramate 50 MG Oral Tablet; take one tablet by mouth twice daily; Therapy: 63SDI2010 to (Evaluate:24Hze4532)  Requested for: 20Apr2017; Last    Rx:20Apr2017 Ordered   15  TraMADol HCl - 50 MG Oral Tablet; TAKE 1 TABLET 3 TIMES DAILY, AS NEEDED; Therapy: 48MQS2089 to (Evaluate:06Nky5395); Last Rx:28Jun2016 Ordered    Allergies    1   No Known Drug Allergies    Signatures   Electronically signed by : HAIR Finch ; Jun 21 2017  8:09AM EST                       (Author)

## 2018-01-22 VITALS
BODY MASS INDEX: 42.36 KG/M2 | HEART RATE: 100 BPM | WEIGHT: 254.25 LBS | RESPIRATION RATE: 24 BRPM | SYSTOLIC BLOOD PRESSURE: 170 MMHG | DIASTOLIC BLOOD PRESSURE: 80 MMHG | HEIGHT: 65 IN | TEMPERATURE: 98.5 F

## 2018-01-22 VITALS
WEIGHT: 251 LBS | DIASTOLIC BLOOD PRESSURE: 80 MMHG | BODY MASS INDEX: 41.82 KG/M2 | SYSTOLIC BLOOD PRESSURE: 142 MMHG | TEMPERATURE: 99.3 F | HEIGHT: 65 IN | HEART RATE: 128 BPM | RESPIRATION RATE: 18 BRPM

## 2018-01-22 VITALS — SYSTOLIC BLOOD PRESSURE: 136 MMHG | DIASTOLIC BLOOD PRESSURE: 82 MMHG

## 2018-01-23 VITALS
SYSTOLIC BLOOD PRESSURE: 122 MMHG | WEIGHT: 262 LBS | HEART RATE: 73 BPM | BODY MASS INDEX: 43.65 KG/M2 | DIASTOLIC BLOOD PRESSURE: 72 MMHG | HEIGHT: 65 IN

## 2018-01-23 NOTE — MISCELLANEOUS
History of Present Illness  Communication  A message was left on voicemail requesting a return phone call  A call was made to the patient/patient's family  Future Appointments    Date/Time Provider Specialty Site   03/12/2018 08:30 AM HAIR Shaver   Internal Medicine [facility]     Signatures   Electronically signed by : Merline Andrews, ; Jan 17 2018  3:29PM EST                       (Author)

## 2018-01-23 NOTE — MISCELLANEOUS
History of Present Illness  Communication  A message was left on voicemail requesting a return phone call  A call was made to the patient/patient's family  Future Appointments    Date/Time Provider Specialty Site   01/11/2018 10:00 AM HAIR Mcintyre  Internal Medicine [facility]   03/12/2018 08:30 AM HAIR Burrell   Internal Medicine [facility]     Signatures   Electronically signed by : Michael Ford, ; Jan 9 2018  3:17PM EST                       (Author)

## 2018-01-24 ENCOUNTER — TELEPHONE (OUTPATIENT)
Dept: INPATIENT UNIT | Facility: HOSPITAL | Age: 73
End: 2018-01-24

## 2018-01-24 VITALS
BODY MASS INDEX: 42.03 KG/M2 | HEIGHT: 65 IN | DIASTOLIC BLOOD PRESSURE: 70 MMHG | TEMPERATURE: 96.5 F | WEIGHT: 252.25 LBS | SYSTOLIC BLOOD PRESSURE: 128 MMHG | RESPIRATION RATE: 15 BRPM | HEART RATE: 68 BPM

## 2018-01-24 RX ORDER — PANTOPRAZOLE SODIUM 40 MG/1
40 INJECTION, POWDER, FOR SOLUTION INTRAVENOUS ONCE
Status: CANCELLED | OUTPATIENT
Start: 2018-01-24 | End: 2018-01-24

## 2018-01-25 ENCOUNTER — ANESTHESIA EVENT (OUTPATIENT)
Dept: NON INVASIVE DIAGNOSTICS | Facility: HOSPITAL | Age: 73
End: 2018-01-25
Payer: COMMERCIAL

## 2018-01-25 ENCOUNTER — APPOINTMENT (OUTPATIENT)
Dept: NON INVASIVE DIAGNOSTICS | Facility: HOSPITAL | Age: 73
End: 2018-01-25
Attending: INTERNAL MEDICINE
Payer: COMMERCIAL

## 2018-01-25 ENCOUNTER — HOSPITAL ENCOUNTER (OUTPATIENT)
Dept: NON INVASIVE DIAGNOSTICS | Facility: HOSPITAL | Age: 73
Discharge: HOME | End: 2018-01-26
Attending: INTERNAL MEDICINE | Admitting: INTERNAL MEDICINE
Payer: COMMERCIAL

## 2018-01-25 ENCOUNTER — ANESTHESIA (OUTPATIENT)
Dept: NON INVASIVE DIAGNOSTICS | Facility: HOSPITAL | Age: 73
End: 2018-01-25
Payer: COMMERCIAL

## 2018-01-25 DIAGNOSIS — I48.91 ATRIAL FIBRILLATION (HCC): ICD-10-CM

## 2018-01-25 LAB
ANION GAP SERPL CALCULATED.3IONS-SCNC: 6 MMOL/L (ref 4–13)
ATRIAL RATE: 60 BPM
BASOPHILS # BLD AUTO: 0.04 THOUSANDS/ΜL (ref 0–0.1)
BASOPHILS NFR BLD AUTO: 1 % (ref 0–1)
BUN SERPL-MCNC: 23 MG/DL (ref 5–25)
CALCIUM SERPL-MCNC: 8.9 MG/DL (ref 8.3–10.1)
CHLORIDE SERPL-SCNC: 109 MMOL/L (ref 100–108)
CO2 SERPL-SCNC: 27 MMOL/L (ref 21–32)
CREAT SERPL-MCNC: 1.46 MG/DL (ref 0.6–1.3)
EOSINOPHIL # BLD AUTO: 0.42 THOUSAND/ΜL (ref 0–0.61)
EOSINOPHIL NFR BLD AUTO: 6 % (ref 0–6)
ERYTHROCYTE [DISTWIDTH] IN BLOOD BY AUTOMATED COUNT: 16.4 % (ref 11.6–15.1)
GFR SERPL CREATININE-BSD FRML MDRD: 36 ML/MIN/1.73SQ M
GLUCOSE P FAST SERPL-MCNC: 98 MG/DL (ref 65–99)
GLUCOSE SERPL-MCNC: 98 MG/DL (ref 65–140)
HCT VFR BLD AUTO: 36.9 % (ref 34.8–46.1)
HGB BLD-MCNC: 11.7 G/DL (ref 11.5–15.4)
KCT BLD-ACNC: 189 SEC (ref 89–137)
KCT BLD-ACNC: 303 SEC (ref 89–137)
KCT BLD-ACNC: 354 SEC (ref 89–137)
KCT BLD-ACNC: 374 SEC (ref 89–137)
KCT BLD-ACNC: 412 SEC (ref 89–137)
KCT BLD-ACNC: 449 SEC (ref 89–137)
LYMPHOCYTES # BLD AUTO: 2.19 THOUSANDS/ΜL (ref 0.6–4.47)
LYMPHOCYTES NFR BLD AUTO: 29 % (ref 14–44)
MAGNESIUM SERPL-MCNC: 2.8 MG/DL (ref 1.6–2.6)
MCH RBC QN AUTO: 28.5 PG (ref 26.8–34.3)
MCHC RBC AUTO-ENTMCNC: 31.7 G/DL (ref 31.4–37.4)
MCV RBC AUTO: 90 FL (ref 82–98)
MONOCYTES # BLD AUTO: 0.44 THOUSAND/ΜL (ref 0.17–1.22)
MONOCYTES NFR BLD AUTO: 6 % (ref 4–12)
NEUTROPHILS # BLD AUTO: 4.36 THOUSANDS/ΜL (ref 1.85–7.62)
NEUTS SEG NFR BLD AUTO: 58 % (ref 43–75)
NRBC BLD AUTO-RTO: 0 /100 WBCS
P AXIS: 75 DEGREES
PLATELET # BLD AUTO: 333 THOUSANDS/UL (ref 149–390)
PMV BLD AUTO: 10.9 FL (ref 8.9–12.7)
POTASSIUM SERPL-SCNC: 4.5 MMOL/L (ref 3.5–5.3)
PR INTERVAL: 417 MS
QRS AXIS: 57 DEGREES
QRSD INTERVAL: 100 MS
QT INTERVAL: 583 MS
QTC INTERVAL: 583 MS
RBC # BLD AUTO: 4.11 MILLION/UL (ref 3.81–5.12)
SODIUM SERPL-SCNC: 142 MMOL/L (ref 136–145)
SPECIMEN SOURCE: ABNORMAL
T WAVE AXIS: 56 DEGREES
VENTRICULAR RATE: 60 BPM
WBC # BLD AUTO: 7.46 THOUSAND/UL (ref 4.31–10.16)

## 2018-01-25 PROCEDURE — 80048 BASIC METABOLIC PNL TOTAL CA: CPT | Performed by: PHYSICIAN ASSISTANT

## 2018-01-25 PROCEDURE — 93655 ICAR CATH ABLTJ DSCRT ARRHYT: CPT | Performed by: INTERNAL MEDICINE

## 2018-01-25 PROCEDURE — 83735 ASSAY OF MAGNESIUM: CPT | Performed by: PHYSICIAN ASSISTANT

## 2018-01-25 PROCEDURE — C1894 INTRO/SHEATH, NON-LASER: HCPCS | Performed by: INTERNAL MEDICINE

## 2018-01-25 PROCEDURE — C1730 CATH, EP, 19 OR FEW ELECT: HCPCS | Performed by: INTERNAL MEDICINE

## 2018-01-25 PROCEDURE — C1769 GUIDE WIRE: HCPCS | Performed by: INTERNAL MEDICINE

## 2018-01-25 PROCEDURE — 93325 DOPPLER ECHO COLOR FLOW MAPG: CPT | Performed by: INTERNAL MEDICINE

## 2018-01-25 PROCEDURE — 93320 DOPPLER ECHO COMPLETE: CPT | Performed by: INTERNAL MEDICINE

## 2018-01-25 PROCEDURE — 92960 CARDIOVERSION ELECTRIC EXT: CPT | Performed by: INTERNAL MEDICINE

## 2018-01-25 PROCEDURE — 93312 ECHO TRANSESOPHAGEAL: CPT | Performed by: INTERNAL MEDICINE

## 2018-01-25 PROCEDURE — 93662 INTRACARDIAC ECG (ICE): CPT | Performed by: INTERNAL MEDICINE

## 2018-01-25 PROCEDURE — 85347 COAGULATION TIME ACTIVATED: CPT

## 2018-01-25 PROCEDURE — C1726 CATH, BAL DIL, NON-VASCULAR: HCPCS | Performed by: INTERNAL MEDICINE

## 2018-01-25 PROCEDURE — 93656 COMPRE EP EVAL ABLTJ ATR FIB: CPT | Performed by: INTERNAL MEDICINE

## 2018-01-25 PROCEDURE — C9113 INJ PANTOPRAZOLE SODIUM, VIA: HCPCS | Performed by: PHYSICIAN ASSISTANT

## 2018-01-25 PROCEDURE — 93621 COMP EP EVL L PAC&REC C SINS: CPT | Performed by: INTERNAL MEDICINE

## 2018-01-25 PROCEDURE — C1733 CATH, EP, OTHR THAN COOL-TIP: HCPCS | Performed by: INTERNAL MEDICINE

## 2018-01-25 PROCEDURE — 93613 INTRACARDIAC EPHYS 3D MAPG: CPT | Performed by: INTERNAL MEDICINE

## 2018-01-25 PROCEDURE — C1893 INTRO/SHEATH, FIXED,NON-PEEL: HCPCS | Performed by: INTERNAL MEDICINE

## 2018-01-25 PROCEDURE — 93005 ELECTROCARDIOGRAM TRACING: CPT | Performed by: PHYSICIAN ASSISTANT

## 2018-01-25 PROCEDURE — 93312 ECHO TRANSESOPHAGEAL: CPT

## 2018-01-25 PROCEDURE — 85025 COMPLETE CBC W/AUTO DIFF WBC: CPT | Performed by: PHYSICIAN ASSISTANT

## 2018-01-25 PROCEDURE — C1759 CATH, INTRA ECHOCARDIOGRAPHY: HCPCS | Performed by: INTERNAL MEDICINE

## 2018-01-25 RX ORDER — ALBUTEROL SULFATE 2.5 MG/3ML
2.5 SOLUTION RESPIRATORY (INHALATION) ONCE AS NEEDED
Status: DISCONTINUED | OUTPATIENT
Start: 2018-01-25 | End: 2018-01-25 | Stop reason: HOSPADM

## 2018-01-25 RX ORDER — HEPARIN SODIUM 1000 [USP'U]/ML
INJECTION, SOLUTION INTRAVENOUS; SUBCUTANEOUS CODE/TRAUMA/SEDATION MEDICATION
Status: COMPLETED | OUTPATIENT
Start: 2018-01-25 | End: 2018-01-25

## 2018-01-25 RX ORDER — LORAZEPAM 1 MG/1
1 TABLET ORAL 3 TIMES DAILY PRN
Status: DISCONTINUED | OUTPATIENT
Start: 2018-01-25 | End: 2018-01-26 | Stop reason: HOSPADM

## 2018-01-25 RX ORDER — METOPROLOL TARTRATE 50 MG/1
100 TABLET, FILM COATED ORAL EVERY 12 HOURS SCHEDULED
Status: DISCONTINUED | OUTPATIENT
Start: 2018-01-25 | End: 2018-01-26 | Stop reason: HOSPADM

## 2018-01-25 RX ORDER — ONDANSETRON 2 MG/ML
INJECTION INTRAMUSCULAR; INTRAVENOUS AS NEEDED
Status: DISCONTINUED | OUTPATIENT
Start: 2018-01-25 | End: 2018-01-25 | Stop reason: SURG

## 2018-01-25 RX ORDER — FENTANYL CITRATE 50 UG/ML
INJECTION, SOLUTION INTRAMUSCULAR; INTRAVENOUS AS NEEDED
Status: DISCONTINUED | OUTPATIENT
Start: 2018-01-25 | End: 2018-01-25 | Stop reason: SURG

## 2018-01-25 RX ORDER — PROPOFOL 10 MG/ML
INJECTION, EMULSION INTRAVENOUS AS NEEDED
Status: DISCONTINUED | OUTPATIENT
Start: 2018-01-25 | End: 2018-01-25 | Stop reason: SURG

## 2018-01-25 RX ORDER — POTASSIUM CHLORIDE 20 MEQ/1
20 TABLET, EXTENDED RELEASE ORAL 2 TIMES DAILY
Status: DISCONTINUED | OUTPATIENT
Start: 2018-01-25 | End: 2018-01-26 | Stop reason: HOSPADM

## 2018-01-25 RX ORDER — ACETAMINOPHEN 325 MG/1
650 TABLET ORAL EVERY 4 HOURS PRN
Status: DISCONTINUED | OUTPATIENT
Start: 2018-01-25 | End: 2018-01-26 | Stop reason: HOSPADM

## 2018-01-25 RX ORDER — SUCCINYLCHOLINE CHLORIDE 20 MG/ML
INJECTION INTRAMUSCULAR; INTRAVENOUS AS NEEDED
Status: DISCONTINUED | OUTPATIENT
Start: 2018-01-25 | End: 2018-01-25 | Stop reason: SURG

## 2018-01-25 RX ORDER — MEPERIDINE HYDROCHLORIDE 25 MG/ML
12.5 INJECTION INTRAMUSCULAR; INTRAVENOUS; SUBCUTANEOUS AS NEEDED
Status: DISCONTINUED | OUTPATIENT
Start: 2018-01-25 | End: 2018-01-25 | Stop reason: HOSPADM

## 2018-01-25 RX ORDER — FUROSEMIDE 20 MG/1
20 TABLET ORAL
Status: DISCONTINUED | OUTPATIENT
Start: 2018-01-25 | End: 2018-01-26 | Stop reason: HOSPADM

## 2018-01-25 RX ORDER — PANTOPRAZOLE SODIUM 40 MG/1
40 TABLET, DELAYED RELEASE ORAL
Status: DISCONTINUED | OUTPATIENT
Start: 2018-01-26 | End: 2018-01-26 | Stop reason: HOSPADM

## 2018-01-25 RX ORDER — CLOPIDOGREL BISULFATE 75 MG/1
75 TABLET ORAL DAILY
Status: DISCONTINUED | OUTPATIENT
Start: 2018-01-25 | End: 2018-01-26 | Stop reason: HOSPADM

## 2018-01-25 RX ORDER — AMIODARONE HYDROCHLORIDE 200 MG/1
200 TABLET ORAL DAILY
Status: DISCONTINUED | OUTPATIENT
Start: 2018-01-25 | End: 2018-01-26 | Stop reason: HOSPADM

## 2018-01-25 RX ORDER — PROTAMINE SULFATE 10 MG/ML
INJECTION, SOLUTION INTRAVENOUS AS NEEDED
Status: DISCONTINUED | OUTPATIENT
Start: 2018-01-25 | End: 2018-01-25 | Stop reason: SURG

## 2018-01-25 RX ORDER — PANTOPRAZOLE SODIUM 40 MG/1
40 INJECTION, POWDER, FOR SOLUTION INTRAVENOUS ONCE
Status: COMPLETED | OUTPATIENT
Start: 2018-01-25 | End: 2018-01-25

## 2018-01-25 RX ORDER — ONDANSETRON 2 MG/ML
4 INJECTION INTRAMUSCULAR; INTRAVENOUS ONCE AS NEEDED
Status: DISCONTINUED | OUTPATIENT
Start: 2018-01-25 | End: 2018-01-25 | Stop reason: HOSPADM

## 2018-01-25 RX ORDER — OXYCODONE HYDROCHLORIDE AND ACETAMINOPHEN 5; 325 MG/1; MG/1
1 TABLET ORAL EVERY 4 HOURS PRN
Status: DISCONTINUED | OUTPATIENT
Start: 2018-01-25 | End: 2018-01-26 | Stop reason: HOSPADM

## 2018-01-25 RX ORDER — ROCURONIUM BROMIDE 10 MG/ML
INJECTION, SOLUTION INTRAVENOUS AS NEEDED
Status: DISCONTINUED | OUTPATIENT
Start: 2018-01-25 | End: 2018-01-25 | Stop reason: SURG

## 2018-01-25 RX ORDER — PRAVASTATIN SODIUM 20 MG
20 TABLET ORAL
Status: DISCONTINUED | OUTPATIENT
Start: 2018-01-25 | End: 2018-01-26 | Stop reason: HOSPADM

## 2018-01-25 RX ORDER — SODIUM CHLORIDE 9 MG/ML
INJECTION, SOLUTION INTRAVENOUS CONTINUOUS PRN
Status: DISCONTINUED | OUTPATIENT
Start: 2018-01-25 | End: 2018-01-25 | Stop reason: SURG

## 2018-01-25 RX ORDER — FUROSEMIDE 40 MG/1
40 TABLET ORAL DAILY
Status: DISCONTINUED | OUTPATIENT
Start: 2018-01-26 | End: 2018-01-26 | Stop reason: HOSPADM

## 2018-01-25 RX ORDER — NORTRIPTYLINE HYDROCHLORIDE 50 MG/1
50 CAPSULE ORAL
Status: DISCONTINUED | OUTPATIENT
Start: 2018-01-25 | End: 2018-01-26 | Stop reason: HOSPADM

## 2018-01-25 RX ORDER — HEPARIN SODIUM 10000 [USP'U]/100ML
INJECTION, SOLUTION INTRAVENOUS
Status: COMPLETED | OUTPATIENT
Start: 2018-01-25 | End: 2018-01-25

## 2018-01-25 RX ORDER — TOPIRAMATE 25 MG/1
25 CAPSULE, COATED PELLETS ORAL 2 TIMES DAILY
Status: DISCONTINUED | OUTPATIENT
Start: 2018-01-25 | End: 2018-01-26 | Stop reason: HOSPADM

## 2018-01-25 RX ORDER — ESCITALOPRAM OXALATE 10 MG/1
10 TABLET ORAL DAILY
Status: DISCONTINUED | OUTPATIENT
Start: 2018-01-25 | End: 2018-01-26 | Stop reason: HOSPADM

## 2018-01-25 RX ORDER — GLYCOPYRROLATE 0.2 MG/ML
INJECTION INTRAMUSCULAR; INTRAVENOUS AS NEEDED
Status: DISCONTINUED | OUTPATIENT
Start: 2018-01-25 | End: 2018-01-25 | Stop reason: SURG

## 2018-01-25 RX ADMIN — SODIUM CHLORIDE: 0.9 INJECTION, SOLUTION INTRAVENOUS at 11:32

## 2018-01-25 RX ADMIN — APIXABAN 5 MG: 5 TABLET, FILM COATED ORAL at 14:42

## 2018-01-25 RX ADMIN — FUROSEMIDE 20 MG: 20 TABLET ORAL at 17:39

## 2018-01-25 RX ADMIN — GLYCOPYRROLATE 0.4 MG: 0.2 INJECTION, SOLUTION INTRAMUSCULAR; INTRAVENOUS at 10:09

## 2018-01-25 RX ADMIN — NORTRIPTYLINE HYDROCHLORIDE 50 MG: 50 CAPSULE ORAL at 23:23

## 2018-01-25 RX ADMIN — IOHEXOL 62 ML: 350 INJECTION, SOLUTION INTRAVENOUS at 11:36

## 2018-01-25 RX ADMIN — DEXAMETHASONE SODIUM PHOSPHATE 10 MG: 10 INJECTION INTRAMUSCULAR; INTRAVENOUS at 11:43

## 2018-01-25 RX ADMIN — CLOPIDOGREL BISULFATE 75 MG: 75 TABLET ORAL at 16:46

## 2018-01-25 RX ADMIN — HEPARIN SODIUM 1300 UNITS/HR: 10000 INJECTION, SOLUTION INTRAVENOUS at 09:22

## 2018-01-25 RX ADMIN — ROCURONIUM BROMIDE 20 MG: 10 INJECTION INTRAVENOUS at 09:39

## 2018-01-25 RX ADMIN — PROPOFOL 150 MG: 10 INJECTION, EMULSION INTRAVENOUS at 08:31

## 2018-01-25 RX ADMIN — APIXABAN 5 MG: 5 TABLET, FILM COATED ORAL at 17:39

## 2018-01-25 RX ADMIN — SUCCINYLCHOLINE CHLORIDE 100 MG: 20 INJECTION, SOLUTION INTRAMUSCULAR; INTRAVENOUS at 08:31

## 2018-01-25 RX ADMIN — HEPARIN SODIUM 20000 UNITS: 1000 INJECTION INTRAVENOUS; SUBCUTANEOUS at 09:21

## 2018-01-25 RX ADMIN — HEPARIN SODIUM 7500 UNITS: 1000 INJECTION INTRAVENOUS; SUBCUTANEOUS at 10:07

## 2018-01-25 RX ADMIN — FENTANYL CITRATE 50 MCG: 50 INJECTION, SOLUTION INTRAMUSCULAR; INTRAVENOUS at 08:26

## 2018-01-25 RX ADMIN — PHENYLEPHRINE HYDROCHLORIDE 25 MCG/MIN: 10 INJECTION INTRAVENOUS at 08:32

## 2018-01-25 RX ADMIN — PRAVASTATIN SODIUM 20 MG: 20 TABLET ORAL at 16:46

## 2018-01-25 RX ADMIN — SODIUM CHLORIDE: 0.9 INJECTION, SOLUTION INTRAVENOUS at 08:20

## 2018-01-25 RX ADMIN — METOPROLOL TARTRATE 100 MG: 50 TABLET ORAL at 21:02

## 2018-01-25 RX ADMIN — NEOSTIGMINE METHYLSULFATE 3 MG: 1 INJECTION, SOLUTION INTRAMUSCULAR; INTRAVENOUS; SUBCUTANEOUS at 10:09

## 2018-01-25 RX ADMIN — PROTAMINE SULFATE 50 MG: 10 INJECTION, SOLUTION INTRAVENOUS at 11:32

## 2018-01-25 RX ADMIN — ROCURONIUM BROMIDE 25 MG: 10 INJECTION INTRAVENOUS at 08:47

## 2018-01-25 RX ADMIN — PANTOPRAZOLE SODIUM 40 MG: 40 INJECTION, POWDER, FOR SOLUTION INTRAVENOUS at 09:03

## 2018-01-25 RX ADMIN — POTASSIUM CHLORIDE 20 MEQ: 1500 TABLET, EXTENDED RELEASE ORAL at 16:46

## 2018-01-25 RX ADMIN — ONDANSETRON 4 MG: 2 INJECTION INTRAMUSCULAR; INTRAVENOUS at 11:46

## 2018-01-25 RX ADMIN — TOPIRAMATE 25 MG: 25 CAPSULE, COATED PELLETS ORAL at 16:46

## 2018-01-25 RX ADMIN — FENTANYL CITRATE 50 MCG: 50 INJECTION, SOLUTION INTRAMUSCULAR; INTRAVENOUS at 10:15

## 2018-01-25 NOTE — ANESTHESIA PREPROCEDURE EVALUATION
Review of Systems/Medical History  Patient summary reviewed  Chart reviewed      Cardiovascular  EKG reviewed, Exercise tolerance: poor, Exercise comment: Short of breath after ambulating short distances Hyperlipidemia, Hypertension , Valvular heart disease , mitral regurgitation, Dysrhythmias, atrial fibrillation, CHF heart failure unspecified,   Comment: A-fib with rapid ventricular response; diastolic heart failure, moderate to severe MR, moderate AS,  Pulmonary  Smoker ( Quit in 2011) ex-smoker  ,        GI/Hepatic            Endo/Other    Comment: prediabetes Obesity  morbid obesity   GYN       Hematology   Musculoskeletal       Neurology    CVA ( 2011, presented with left-sided weakness) , no residual symptoms,    Psychology         ECHO 10/10/17    LEFT VENTRICLE:  Systolic function was at the lower limits of normal  Ejection fraction was estimated to be 50 %  There were no regional wall motion abnormalities  Wall thickness was mildly increased  There was mild concentric hypertrophy      LEFT ATRIUM:  The atrium was moderately dilated      MITRAL VALVE:  There was atleast moderate to severe regurgitation with possible systolic reversal of pulmonary vein flow      AORTIC VALVE:  There was moderate regurgitation      TRICUSPID VALVE:  There was mild regurgitation      IVC, HEPATIC VEINS:  The inferior vena cava was normal in size and course      PULMONARY ARTERIES:  Systolic pressure was within the normal range  Estimated peak pressure was 30 mmHg      Lab Results   Component Value Date    WBC 7 46 01/25/2018    HGB 11 7 01/25/2018    HCT 36 9 01/25/2018    MCV 90 01/25/2018     01/25/2018     Lab Results   Component Value Date    GLUCOSE 95 12/27/2017    CALCIUM 9 0 01/16/2018     01/16/2018    K 3 9 01/16/2018    CO2 29 01/16/2018     01/16/2018    BUN 21 01/16/2018    CREATININE 1 37 (H) 01/16/2018     Lab Results   Component Value Date    HGBA1C 5 5 12/23/2017         Physical Exam    Airway    Mallampati score: III  TM Distance: >3 FB  Neck ROM: full     Dental   upper dentures and lower dentures,     Cardiovascular  Rhythm: irregular, No murmur,     Pulmonary  Breath sounds clear to auscultation,     Other Findings          Anesthesia Plan  ASA Score- 3     Anesthesia Type- general with ASA Monitors  Additional Monitors: arterial line  Airway Plan: ETT  Comment: Patient seen and examined  History reviewed  Patient to be done under general anesthesia with ETT and routine monitors  Risks discussed with the patient  Consent obtained        Plan Factors-    Induction- intravenous  Postoperative Plan-     Informed Consent- Anesthetic plan and risks discussed with patient  I personally reviewed this patient with the CRNA  Discussed and agreed on the Anesthesia Plan with the CRNA  Robby Coffman

## 2018-01-25 NOTE — PERIOPERATIVE NURSING NOTE
SBP 77/43, iraj 110/51, Dr Talia Pacheco notified    Recheck of BP in left arm 93/48, Dr Talia Pacheco notified, no orders given

## 2018-01-26 VITALS
OXYGEN SATURATION: 95 % | RESPIRATION RATE: 18 BRPM | SYSTOLIC BLOOD PRESSURE: 136 MMHG | TEMPERATURE: 98.4 F | DIASTOLIC BLOOD PRESSURE: 75 MMHG | WEIGHT: 257.94 LBS | HEART RATE: 75 BPM | HEIGHT: 67 IN | BODY MASS INDEX: 40.48 KG/M2

## 2018-01-26 LAB
ANION GAP SERPL CALCULATED.3IONS-SCNC: 7 MMOL/L (ref 4–13)
BUN SERPL-MCNC: 21 MG/DL (ref 5–25)
CALCIUM SERPL-MCNC: 8.4 MG/DL (ref 8.3–10.1)
CHLORIDE SERPL-SCNC: 110 MMOL/L (ref 100–108)
CO2 SERPL-SCNC: 23 MMOL/L (ref 21–32)
CREAT SERPL-MCNC: 1.28 MG/DL (ref 0.6–1.3)
ERYTHROCYTE [DISTWIDTH] IN BLOOD BY AUTOMATED COUNT: 16.5 % (ref 11.6–15.1)
GFR SERPL CREATININE-BSD FRML MDRD: 42 ML/MIN/1.73SQ M
GLUCOSE SERPL-MCNC: 134 MG/DL (ref 65–140)
HCT VFR BLD AUTO: 31.4 % (ref 34.8–46.1)
HGB BLD-MCNC: 9.8 G/DL (ref 11.5–15.4)
MAGNESIUM SERPL-MCNC: 2.8 MG/DL (ref 1.6–2.6)
MCH RBC QN AUTO: 28.3 PG (ref 26.8–34.3)
MCHC RBC AUTO-ENTMCNC: 31.2 G/DL (ref 31.4–37.4)
MCV RBC AUTO: 91 FL (ref 82–98)
PLATELET # BLD AUTO: 290 THOUSANDS/UL (ref 149–390)
PMV BLD AUTO: 11.3 FL (ref 8.9–12.7)
POTASSIUM SERPL-SCNC: 4.8 MMOL/L (ref 3.5–5.3)
RBC # BLD AUTO: 3.46 MILLION/UL (ref 3.81–5.12)
SODIUM SERPL-SCNC: 140 MMOL/L (ref 136–145)
WBC # BLD AUTO: 7.86 THOUSAND/UL (ref 4.31–10.16)

## 2018-01-26 PROCEDURE — 83735 ASSAY OF MAGNESIUM: CPT | Performed by: PHYSICIAN ASSISTANT

## 2018-01-26 PROCEDURE — 85027 COMPLETE CBC AUTOMATED: CPT | Performed by: PHYSICIAN ASSISTANT

## 2018-01-26 PROCEDURE — 99217 PR OBSERVATION CARE DISCHARGE MANAGEMENT: CPT | Performed by: PHYSICIAN ASSISTANT

## 2018-01-26 PROCEDURE — 80048 BASIC METABOLIC PNL TOTAL CA: CPT | Performed by: PHYSICIAN ASSISTANT

## 2018-01-26 RX ORDER — PANTOPRAZOLE SODIUM 40 MG/1
40 TABLET, DELAYED RELEASE ORAL
Qty: 30 TABLET | Refills: 1 | Status: SHIPPED | OUTPATIENT
Start: 2018-01-27 | End: 2018-05-23 | Stop reason: ALTCHOICE

## 2018-01-26 RX ADMIN — ESCITALOPRAM OXALATE 10 MG: 10 TABLET ORAL at 11:15

## 2018-01-26 RX ADMIN — METOPROLOL TARTRATE 100 MG: 50 TABLET ORAL at 08:44

## 2018-01-26 RX ADMIN — CLOPIDOGREL BISULFATE 75 MG: 75 TABLET ORAL at 08:44

## 2018-01-26 RX ADMIN — POTASSIUM CHLORIDE 20 MEQ: 1500 TABLET, EXTENDED RELEASE ORAL at 08:44

## 2018-01-26 RX ADMIN — FUROSEMIDE 40 MG: 40 TABLET ORAL at 08:44

## 2018-01-26 RX ADMIN — AMIODARONE HYDROCHLORIDE 200 MG: 200 TABLET ORAL at 08:44

## 2018-01-26 RX ADMIN — PANTOPRAZOLE SODIUM 40 MG: 40 TABLET, DELAYED RELEASE ORAL at 05:30

## 2018-01-26 RX ADMIN — TOPIRAMATE 25 MG: 25 CAPSULE, COATED PELLETS ORAL at 08:44

## 2018-01-26 RX ADMIN — APIXABAN 5 MG: 5 TABLET, FILM COATED ORAL at 08:44

## 2018-01-26 NOTE — PLAN OF CARE
CARDIOVASCULAR - ADULT     Maintains optimal cardiac output and hemodynamic stability Progressing     Absence of cardiac dysrhythmias or at baseline rhythm Progressing        DISCHARGE PLANNING     Discharge to home or other facility with appropriate resources Progressing        INFECTION - ADULT     Absence or prevention of progression during hospitalization Progressing     Absence of fever/infection during neutropenic period Progressing        Knowledge Deficit     Patient/family/caregiver demonstrates understanding of disease process, treatment plan, medications, and discharge instructions Progressing        PAIN - ADULT     Verbalizes/displays adequate comfort level or baseline comfort level Progressing        RESPIRATORY - ADULT     Achieves optimal ventilation and oxygenation Progressing        SAFETY ADULT     Patient will remain free of falls Progressing     Maintain or return to baseline ADL function Progressing     Maintain or return mobility status to optimal level Progressing

## 2018-01-26 NOTE — DISCHARGE SUMMARY
Discharge Summary - Dory Mcghee 81 y o  female MRN: [id number]    Unit/Bed#: [facility] 527-01 Encounter: [id number]      Admission Date: 1/25/2018   Discharge Date: 1/26/2018    Discharge Diagnosis:   1  Symptomatic persistent atrial fibrillation, status post cryoablation with pulmonary vein isolation and typical flutter ablation 1/26/2018   A ) maintained on amiodarone antiarrhythmic therapy and Eliquis anticoagulation  2  Untreated sleep apnea, she is intolerant of the mask  3  Chronic diastolic congestive heart failure, with LVEF 50% per echo 10/2017  4  Hypertension  5  Hyperlipidemia  6  History of CVA    Procedures Performed:   Orders Placed This Encounter   Procedures    Cardiac eps/afib ablation   1  Cryoablation of atrial fibrillation with pulmonary vein isolation  2  Ablation of typical atrial flutter  3  Transesophageal echocardiogram, results currently pending    Consultants:  None    HPI: Please refer to the initial history and physical as well as procedure notes for full details  Briefly, Dory Mcghee is a 81y o  year old female with a history of symptomatic persistent atrial fibrillation with periods of rapid ventricular response, breakthrough despite amiodarone therapy, chronic diastolic congestive heart failure, hypertension, hyperlipidemia, and prior CVA  She was seen by Dr Maverick Quintero previously as an outpatient, and has also had recent hospitalization for acute on chronic diastolic congestive heart failure in the setting of AFib  An ablation was recommended, and she presented this hospital admission to undergo this procedure  Hospital Course: Dory Mcghee presented at her baseline state of health  After the procedure was explained in detail and consent was obtained, she underwent LARRY, which is not yet officially read which showed no evidence of thrombus  She then underwent cryoablation of AFib and typical flutter ablation without complications  She tolerated the procedure well   She was then monitored overnight for further observation  There were no acute issues or events overnight  The following morning she denied all cardiac complaints, including chest pain/pressure, dyspnea, palpitations, dizziness, lightheadedness, or syncope  Her vital signs were reviewed and labs are stable  Telemetry showed normal sinus rhythm with first-degree AV block, but no recurrent arrhythmias  Her groins were soft without significant hematoma or recurrent bleeding  Physical exam is as follows:  GEN: NAD, alert and oriented, well appearing  SKIN: dry without significant lesions or rashes  HEENT: NCAT, PERRL, EOMs intact  NECK: No JVD appreciated  CARDIOVASCULAR: RRR, distant S1, S2 without murmurs, rubs, or gallops appreciated  LUNGS: Clear to auscultation bilaterally without wheezes, rhonchi, or rales  ABDOMEN: Soft, nontender, nondistended  EXTREMITIES/VASCULAR: perfused without clubbing, cyanosis, or edema b/l  PSYCH: Normal mood and affect  NEURO: CN ll-Xll grossly intact    She was given routine post ablation discharge instructions and restrictions, and these were explained in detail  She was given a follow up appointment with Dr Daniel Sotelo, and she was instructed to follow up with her primary physician in the near future to continue to monitor her volume status  I also asked her to question other possible options for treating obstructive sleep apnea, and she is intolerant of the mask  I explained the relationship between untreated sleep apnea and atrial fibrillation, and she will likely contact the physician who did her sleep study to discuss other options  In terms of her medications, no changes will be made at this time and she will continue amiodarone, Lopressor, and Eliquis as previously instructed  She will also take Protonix 40 mg daily for 1 month following her ablation  She is stable for discharge at this time with all questions answered   She was discussed in detail with Dr Daniel Sotelo who is in agreement with this discharge summary  Discharge Medications:  See after visit summary for reconciled discharge medications provided to patient and family  Prescriptions Prior to Admission   Medication    amiodarone 200 mg tablet    apixaban (ELIQUIS) 5 mg    escitalopram (LEXAPRO) 10 mg tablet    furosemide (LASIX) 20 mg tablet    furosemide (LASIX) 40 mg tablet    LORazepam (ATIVAN) 1 mg tablet    metoprolol tartrate (LOPRESSOR) 100 mg tablet    Multiple Vitamin (MULTIVITAMIN) tablet    nortriptyline (PAMELOR) 50 mg capsule    oxyCODONE-acetaminophen (PERCOCET) 5-325 mg per tablet    potassium chloride (K-DUR,KLOR-CON) 20 mEq tablet    simvastatin (ZOCOR) 10 mg tablet    topiramate (TOPAMAX) 25 mg sprinkle capsule    clopidogrel (PLAVIX) 75 mg tablet    meclizine (ANTIVERT) 25 mg tablet         [facility]inet Labs/diagnostics:  CBC with diff:   Results from last 7 days  Lab Units 01/26/18  0543 01/25/18  0728   WBC Thousand/uL 7 86 7 46   HEMOGLOBIN g/dL 9 8* 11 7   HEMATOCRIT % 31 4* 36 9   MCV fL 91 90   PLATELETS Thousands/uL 290 333   MCH pg 28 3 28 5   MCHC g/dL 31 2* 31 7   RDW % 16 5* 16 4*   MPV fL 11 3 10 9   NRBC AUTO /100 WBCs  --  0       BMP:  Results from last 7 days  Lab Units 01/26/18  0511 01/25/18  0700   SODIUM mmol/L 140 142   POTASSIUM mmol/L 4 8 4 5   CHLORIDE mmol/L 110* 109*   CO2 mmol/L 23 27   BUN mg/dL 21 23   CREATININE mg/dL 1 28 1 46*   GLUCOSE RANDOM mg/dL 134 98   CALCIUM mg/dL 8 4 8 9       Magnesium:   Results from last 7 days  Lab Units 01/26/18  0511 01/25/18  0700   MAGNESIUM mg/dL 2 8* 2 8*       Coags:         Complications: none    Condition at Discharge: good     Discharge instructions/Information to patient and family:   See after visit summary for information provided to patient and family  Provisions for Follow-Up Care:  See after visit summary for information related to follow-up care and any pertinent home health orders        Disposition: Home    Planned Readmission: No    Discharge Statement   I spent 45 minutes minutes discharging the patient  This time was spent on the day of discharge  I had direct contact with the patient on the day of discharge  Additional documentation is required if more than 30 minutes were spent on discharge   Evaluating the incision, discussing discharge instructions and restrictions, arranging follow up appointments, discussing medications

## 2018-01-26 NOTE — CASE MANAGEMENT
Initial Clinical Review  SCHEDULED OUTPATIENT CARD PROCEDURE ATRIAL FIB ABLATION 18    Age/Sex: 81 y o  female    Surgery Date: 18    [facility]  [address] [place], [address]      PT NAME: Dory Mcghee  MRN: [id number]  : 1945     PERFORMING/ATTENDING PHY: Layla Lozano DO     REFERRING PHY: Araceli Modi MD  DATE OF PROCEDURE: 18     PREOPERATIVE DIAGNOSIS:  Persistent atrial fibrillation refractory to amiodarone therapy highly symptomatic        POSTOPERATIVE DIAGNOSIS Successful ablation of atrial fibrillation with Bidirectional Block in 4 pulmonary veins/      PROCEDURES PERFORMED   1  Atrial fibrillation ablation  2  Catheter ablation of atrial flutter to eliminate a secondary trigger for atrial fibrillation   3  3-D map with [facility] precision two mapping system   4  Intracardiac Echocardiography ICE  ANESTHESIA General anesthesia  FINAL IMPRESSION: Successful ablation of atrial fibrillation with all 4 pulmonary veins with bidirectional block achieved  Linear catheter ablation performed in the right atrium for elimination of atrial flutter as a trigger for atrial fibrillation  Hemostasis was achieved with manual pressure  The patient tolerated the procedure well and had no neurological deficits upon awaking  Intracardiac ICE confirmed no effusion at the end of the case        PLAN Postoperative monitoring ovrnight  Resume oral anticoagulation in 1 hours   Follow-up in 2-4 weeks              Admission Orders: Date/Time/Statement: 17 AT 3918 OUTPATIENT NO CHARGE BED    18 1204  Outpatient No Charge Bed Once     Transfer Service: Cardiology       Question: Admitting Physician Answer: Anne Marie Gutiérrez    18 1207       Vital Signs: /60 (BP Location: Right arm)   Pulse 75   Temp 97 5 °F (36 4 °C) (Oral)   Resp 22   Ht 5' 7" (1 702 m)   Wt 117 kg (257 lb 15 oz)   LMP  (LMP Unknown)   SpO2 96%   BMI 40 40 kg/m²     Diet:        Diet Orders            Start     Ordered    01/25/18 1208  Diet Cardiac; Cardiac TLC 2 3 GM NA  Diet effective now     Question Answer Comment   Diet Type Cardiac    Cardiac Cardiac TLC 2 3 GM NA    Liquid Modifier Thin Liquid    RD to adjust diet per protocol?  Yes        01/25/18 1207          IV ACCESS    Mobility: Post Procedure Restrictions    DVT Prophylaxis:  Eliquis; Plavix; SCD      Scheduled Meds:  amiodarone 200 mg Oral Daily   apixaban 5 mg Oral BID   clopidogrel 75 mg Oral Daily   escitalopram 10 mg Oral Daily   furosemide 20 mg Oral After Dinner   furosemide 40 mg Oral Daily   metoprolol tartrate 100 mg Oral Q12H PÉREZ   nortriptyline 50 mg Oral HS   pantoprazole 40 mg Oral Early Morning   potassium chloride 20 mEq Oral BID   pravastatin 20 mg Oral Daily With Dinner   topiramate 25 mg Oral BID     PRN Meds:     acetaminophen    LORazepam    oxyCODONE-acetaminophen

## 2018-01-26 NOTE — DISCHARGE INSTRUCTIONS
Please continue your medications as previously instructed  Take protonix 40 mg daily for one month following your ablation  No heavy lifting or strenuous activity for one week  No soaking in a bath tub/hot tub/swimming pool for one week or until groin heals  If you notice ongoing bleeding, swelling, or firm lumps in groin near ablation incision, please contact Dr Lisa Gardiner office - [phone]

## 2018-01-30 NOTE — MISCELLANEOUS
History of Present Illness    The patient is being contacted for follow-up after hospitalization  Left message on voicemail requesting return call  Patient is experiencing the following symptoms:1   Denies1   The patient is currently asymptomatic1    [facility] Additional Notes:1    Patient called back and reported she was "doing fine" and denied any symptoms  Was able to get all her medication  1         1 Amended By: Papo Stephen; Dec 29 2017 3:48 PM EST    Current Meds  1  Amiodarone HCl - 200 MG Oral Tablet; 1 tab three times a day for 2 weeks then 1 tab   daily; Therapy: 76XFW7274 to (Redd Gallegos)  Requested for: 25Oct2017; Last   Rx:25Oct2017 Ordered  2  Clopidogrel Bisulfate 75 MG Oral Tablet (Plavix); take one tablet by mouth every day; Therapy: 22FMG0863 to (Bijan Knapp)  Requested for: 80QEK5590; Last   Rx:28Nov2017 Ordered  3  Eliquis 5 MG Oral Tablet; Take 1 tablet twice daily  Requested for: 28Nov2017; Last   Rx:28Nov2017 Ordered  4  Escitalopram Oxalate 10 MG Oral Tablet (Lexapro); TAKE ONE TABLET BY MOUTH   ONCE DAILY; Therapy: 37LLX7936 to (Last Rx:00Syh2807)  Requested for: 13GSR2452 Ordered  5  Furosemide 20 MG Oral Tablet; Take one tablet  twice daily; Therapy: 42XLG7263 to (Evaluate:96Zbc4892)  Requested for: 60WYE9359; Last   Rx:06Nov2017 Ordered  6  LORazepam 1 MG Oral Tablet (Ativan); TAKE ONE TABLET BY MOUTH 3 TIMES A DAY AS   NEEDED; Therapy: 42KSI9169 to (Evaluate:72Xul5899)  Requested for: 30Rsq7167; Last   Rx:59Lrn4271 Ordered  7  Meclizine HCl - 25 MG Oral Tablet (Antivert); TAKE 1 TABLET 3 TIMES DAILY AS NEEDED; Therapy: 35KMB9033 to (Evaluate:02Jan2016)  Requested for: 79AZY9771; Last   Rx:31Fub8666 Ordered  8  Metoprolol Tartrate 100 MG Oral Tablet; TAKE ONE TABLET BY MOUTH EVERY 12   HOURS; Therapy: 60ZTX9259 to (Last Rx:63Mtv8655)  Requested for: 73Obv6207 Ordered  9  Multivitamins TABS; TAKE 1 TABLET DAILY; Therapy: 96Cqx2472 to Recorded  10  Nortriptyline HCl - 50 MG Oral Capsule; TAKE 1 CAPSULE AT BEDTIME; Therapy: 70AQQ5515 to (Evaluate:75Aze3325)  Requested for: 75VCU1110; Last    Rx:06Nov2017 Ordered  11  Oxycodone-Acetaminophen 5-325 MG Oral Tablet (Percocet); TAKE 1 TO 2 TABLETS    EVERY 4 TO 6 HOURS AS NEEDED; Therapy: 75ZGE5562 to (Evaluate:20Oct2017); Last Rx:65Obs9833 Ordered  12  Potassium Chloride Ofelia ER 20 MEQ Oral Tablet Extended Release; TAKE ONE TABLET    BY MOUTH TWICE DAILY; Therapy: 01ZFW5916 to (Evaluate:04Feb2018)  Requested for: 69DGU6516; Last    Rx:06Nov2017 Ordered  13  Simvastatin 10 MG Oral Tablet; take one tablet by mouth every day; Therapy: 43LYI6111 to (Last Rx:24Jan2017)  Requested for: 25Jan2017 Ordered  14  Topiramate 25 MG Oral Tablet (Topamax); take 1 tablet by mouth daily; Therapy: 48IVJ7607 to (Last Dalphine Batch)  Requested for: 05KEN2277 Ordered    Future Appointments    Date/Time Provider Specialty Site   03/12/2018 08:30 AM HAIR [facility]manzor  [facility]     Allergies   1   No Known Drug Allergies    Signatures   Electronically signed by : Rylan Silva, ; Dec 29 2017 12:37PM EST                       (Author)    Electronically signed by : Rylan Silva, ; Dec 29 2017  3:49PM EST                       (Author)

## 2018-01-31 DIAGNOSIS — E78.00 HYPERCHOLESTEROLEMIA: Primary | ICD-10-CM

## 2018-01-31 RX ORDER — SIMVASTATIN 10 MG
10 TABLET ORAL
Qty: 90 TABLET | Refills: 3 | Status: SHIPPED | OUTPATIENT
Start: 2018-01-31 | End: 2019-02-28 | Stop reason: SDUPTHER

## 2018-02-07 DIAGNOSIS — G43.909 MIGRAINE: Primary | ICD-10-CM

## 2018-02-07 DIAGNOSIS — I10 ESSENTIAL HYPERTENSION: ICD-10-CM

## 2018-02-07 RX ORDER — NORTRIPTYLINE HYDROCHLORIDE 50 MG/1
50 CAPSULE ORAL
Qty: 90 CAPSULE | Refills: 1 | Status: SHIPPED | OUTPATIENT
Start: 2018-02-07 | End: 2018-08-20 | Stop reason: SDUPTHER

## 2018-02-07 RX ORDER — FUROSEMIDE 20 MG/1
TABLET ORAL
Qty: 270 TABLET | Refills: 0 | Status: SHIPPED | OUTPATIENT
Start: 2018-02-07 | End: 2018-04-23 | Stop reason: SDUPTHER

## 2018-02-13 ENCOUNTER — OFFICE VISIT (OUTPATIENT)
Dept: INTERNAL MEDICINE CLINIC | Facility: CLINIC | Age: 73
End: 2018-02-13
Payer: COMMERCIAL

## 2018-02-13 VITALS
SYSTOLIC BLOOD PRESSURE: 150 MMHG | HEART RATE: 80 BPM | WEIGHT: 255.6 LBS | DIASTOLIC BLOOD PRESSURE: 80 MMHG | BODY MASS INDEX: 40.12 KG/M2 | HEIGHT: 67 IN | RESPIRATION RATE: 15 BRPM | TEMPERATURE: 98.9 F

## 2018-02-13 DIAGNOSIS — I50.33 ACUTE ON CHRONIC DIASTOLIC CONGESTIVE HEART FAILURE (HCC): ICD-10-CM

## 2018-02-13 DIAGNOSIS — I34.0 NON-RHEUMATIC MITRAL REGURGITATION: ICD-10-CM

## 2018-02-13 DIAGNOSIS — I10 ESSENTIAL HYPERTENSION: Primary | ICD-10-CM

## 2018-02-13 DIAGNOSIS — E78.5 HYPERLIPIDEMIA LDL GOAL <100: ICD-10-CM

## 2018-02-13 DIAGNOSIS — R73.03 PREDIABETES: ICD-10-CM

## 2018-02-13 DIAGNOSIS — I48.91 ATRIAL FIBRILLATION, UNSPECIFIED TYPE (HCC): ICD-10-CM

## 2018-02-13 PROCEDURE — 99214 OFFICE O/P EST MOD 30 MIN: CPT | Performed by: INTERNAL MEDICINE

## 2018-02-13 RX ORDER — POTASSIUM CHLORIDE 20 MEQ/1
20 TABLET, EXTENDED RELEASE ORAL 2 TIMES DAILY
Qty: 180 TABLET | Refills: 3 | Status: SHIPPED | OUTPATIENT
Start: 2018-02-13 | End: 2019-03-05 | Stop reason: SDUPTHER

## 2018-02-13 NOTE — PROGRESS NOTES
[facility] [place]      NAME: Severo Mor  AGE: 81 y o  SEX: female  : 1945   MRN: [id number]    DATE: 2018  TIME: 8:27 AM    Assessment and Plan   1  Essential hypertension  Well controlled    2  Acute on chronic diastolic congestive heart failure ([address] )   well compensated at present    3  Atrial fibrillation, unspecified type ([address] )   now in sinus rhythm after ablation    4  Hyperlipidemia LDL goal <100   on statin therapy    5  Prediabetes   glucose metabolism has been stable    6  Non-rheumatic mitral regurgitation   currently asymptomatic     The patient appears to be doing well overall after undergoing ablation for atrial fibrillation  She is in sinus rhythm  There is no evidence of congestive heart failure which seemed to be largely a by product of her rhythm disturbance  Her blood pressure is well controlled  Her lipids and glucose status are satisfactory  Updated lab work has been requested          Return to office in:  3 months    Chief Complaint    follow-up after her ablation of atrial fibrillation    History of Present Illness      the patient returned to the office for re-evaluation after a recent hospitalization at [facility]  She underwent radiofrequency ablation for atrial fibrillation  She tolerated the procedure well  Since she has been home she has been feeling pretty well  She denies chest pain, palpitations, dizziness, shortness of breath, orthopnea, PND, or significant edema  She is tolerating her medications well  The following portions of the patient's history were reviewed and updated as appropriate: allergies, current medications, past family history, past medical history, past social history, past surgical history and problem list     Review of Systems   Review of Systems   Constitutional: Negative  HENT: Negative for congestion, ear pain, postnasal drip, rhinorrhea, sore throat and trouble swallowing      Eyes: Negative for pain, discharge, redness and visual disturbance  Respiratory: Negative for cough, shortness of breath and wheezing  Cardiovascular: Negative  Gastrointestinal: Negative  Endocrine: Negative  Genitourinary: Negative for difficulty urinating, dysuria, frequency, hematuria and urgency  Musculoskeletal: Negative for arthralgias, gait problem, joint swelling and myalgias  Skin: Negative for rash  Neurological: Negative for dizziness, speech difficulty, weakness, light-headedness, numbness and headaches  Hematological: Negative  Psychiatric/Behavioral: Negative for confusion, decreased concentration, dysphoric mood and sleep disturbance  The patient is not nervous/anxious  Active Problem List     Patient Active Problem List   Diagnosis    Atrial fibrillation ([facility] )    CHF exacerbation (HCC)    Essential hypertension    Prediabetes    Depression    Hypercholesterolemia    Migraine    Mitral regurgitation    Obesity    Obstructive sleep apnea    Primary localized osteoarthritis of right knee    Stroke ([facility] )       Objective   /80 (BP Location: Left arm, Patient Position: Sitting, Cuff Size: Large)   Pulse 80   Temp 98 9 °F (37 2 °C) (Tympanic)   Resp 15   Ht 5' 7" (1 702 m)   Wt 116 kg (255 lb 9 6 oz)   LMP  (LMP Unknown)   BMI 40 03 kg/m²     Physical Exam   Constitutional: She is oriented to person, place, and time  She appears well-developed  obese   HENT:   Head: Normocephalic and atraumatic  Neck: Neck supple  No JVD present  No tracheal deviation present  No thyromegaly present  Cardiovascular: Normal rate, regular rhythm, normal heart sounds and intact distal pulses  Exam reveals no gallop and no friction rub  No murmur heard  Pulmonary/Chest: Effort normal and breath sounds normal  She has no wheezes  She has no rales  She exhibits no tenderness  Abdominal: Soft  Bowel sounds are normal  She exhibits no distension and no mass  There is no tenderness  There is no rebound  Musculoskeletal: Normal range of motion  She exhibits no edema, tenderness or deformity  Lymphadenopathy:     She has no cervical adenopathy  Neurological: She is alert and oriented to person, place, and time  Skin: Skin is warm and dry  Psychiatric: She has a normal mood and affect   Her behavior is normal  Judgment and thought content normal        Pertinent Laboratory/Diagnostic Studies:  CBC:   Lab Results   Component Value Date/Time    WBC 7 86 01/26/2018 05:43 AM    WBC 7 05 08/19/2015 07:13 AM    RBC 3 46 (L) 01/26/2018 05:43 AM    RBC 4 41 08/19/2015 07:13 AM    HGB 9 8 (L) 01/26/2018 05:43 AM    HGB 12 4 08/19/2015 07:13 AM    HCT 31 4 (L) 01/26/2018 05:43 AM    HCT 37 6 08/19/2015 07:13 AM    MCV 91 01/26/2018 05:43 AM    MCV 85 08/19/2015 07:13 AM    MCH 28 3 01/26/2018 05:43 AM    MCH 28 1 08/19/2015 07:13 AM    MCHC 31 2 (L) 01/26/2018 05:43 AM    MCHC 33 0 08/19/2015 07:13 AM    RDW 16 5 (H) 01/26/2018 05:43 AM    RDW 14 6 08/19/2015 07:13 AM    MPV 11 3 01/26/2018 05:43 AM    MPV 11 3 08/19/2015 07:13 AM     01/26/2018 05:43 AM     08/19/2015 07:13 AM    NRBC 0 01/25/2018 07:28 AM    NEUTOPHILPCT 58 01/25/2018 07:28 AM    NEUTOPHILPCT 51 08/19/2015 07:13 AM    LYMPHOPCT 29 01/25/2018 07:28 AM    LYMPHOPCT 37 08/19/2015 07:13 AM    MONOPCT 6 01/25/2018 07:28 AM    MONOPCT 7 08/19/2015 07:13 AM    EOSPCT 6 01/25/2018 07:28 AM    EOSPCT 4 08/19/2015 07:13 AM    BASOPCT 1 01/25/2018 07:28 AM    BASOPCT 1 08/19/2015 07:13 AM    NEUTROABS 4 36 01/25/2018 07:28 AM    NEUTROABS 3 60 08/19/2015 07:13 AM    LYMPHSABS 2 19 01/25/2018 07:28 AM    LYMPHSABS 2 61 08/19/2015 07:13 AM    MONOSABS 0 44 01/25/2018 07:28 AM    MONOSABS 0 49 08/19/2015 07:13 AM    EOSABS 0 42 01/25/2018 07:28 AM    EOSABS 0 28 08/19/2015 07:13 AM     Chemistry Profile:   Lab Results   Component Value Date/Time     01/26/2018 05:11 AM     08/19/2015 07:13 AM    K 4 8 01/26/2018 05:11 AM    K 4 0 08/19/2015 07:13 AM     (H) 01/26/2018 05:11 AM     08/19/2015 07:13 AM    CO2 23 01/26/2018 05:11 AM    CO2 29 0 08/19/2015 07:13 AM    ANIONGAP 7 01/26/2018 05:11 AM    ANIONGAP 8 08/19/2015 07:13 AM    BUN 21 01/26/2018 05:11 AM    BUN 16 08/19/2015 07:13 AM    CREATININE 1 28 01/26/2018 05:11 AM    CREATININE 1 26 08/19/2015 07:13 AM    GLUCOSE 134 01/26/2018 05:11 AM    GLUCOSE 103 08/19/2015 07:13 AM    CALCIUM 8 4 01/26/2018 05:11 AM    CALCIUM 8 8 08/19/2015 07:13 AM    MG 2 8 (H) 01/26/2018 05:11 AM    MG 2 3 09/13/2014 12:30 AM    AST 30 12/25/2017 10:11 AM    AST 22 08/19/2015 07:13 AM    ALT 31 12/25/2017 10:11 AM    ALT 31 08/19/2015 07:13 AM    ALKPHOS 133 (H) 12/25/2017 10:11 AM    ALKPHOS 101 08/19/2015 07:13 AM    PROT 7 6 12/25/2017 10:11 AM    PROT 7 7 08/19/2015 07:13 AM    BILITOT 0 36 12/25/2017 10:11 AM    BILITOT 0 29 08/19/2015 07:13 AM    EGFR 42 01/26/2018 05:11 AM       Current Medications     Current Outpatient Prescriptions:     amiodarone 200 mg tablet, Take 200 mg by mouth daily  , Disp: , Rfl:     apixaban (ELIQUIS) 5 mg, Take 1 tablet by mouth 2 (two) times a day (Patient taking differently: Take 5 mg by mouth 2 (two) times a day  ), Disp: 60 tablet, Rfl: 0    clopidogrel (PLAVIX) 75 mg tablet, Take 75 mg by mouth daily, Disp: , Rfl:     escitalopram (LEXAPRO) 10 mg tablet, Take 10 mg by mouth daily  , Disp: , Rfl:     furosemide (LASIX) 20 mg tablet, 20mg in the morning and 40mg in the evening, Disp: 270 tablet, Rfl: 0    LORazepam (ATIVAN) 1 mg tablet, Take 1 mg by mouth 3 (three) times a day as needed for anxiety, Disp: , Rfl:     meclizine (ANTIVERT) 25 mg tablet, Take 25 mg by mouth 3 (three) times a day as needed for dizziness , Disp: , Rfl:     metoprolol tartrate (LOPRESSOR) 100 mg tablet, Take 1 tablet by mouth every 12 (twelve) hours (Patient taking differently: Take 100 mg by mouth every 12 (twelve) hours  ), Disp: 30 tablet, Rfl: 0   Multiple Vitamin (MULTIVITAMIN) tablet, Take 1 tablet by mouth daily  , Disp: , Rfl:     nortriptyline (PAMELOR) 50 mg capsule, Take 1 capsule (50 mg total) by mouth daily at bedtime, Disp: 90 capsule, Rfl: 1    oxyCODONE-acetaminophen (PERCOCET) 5-325 mg per tablet, Take 1 tablet by mouth every 4 (four) hours as needed for migraine (take 1-2 tablets every 4-6 hours PRN) , Disp: , Rfl:     pantoprazole (PROTONIX) 40 mg tablet, Take 1 tablet by mouth daily in the early morning, Disp: 30 tablet, Rfl: 1    potassium chloride (K-DUR,KLOR-CON) 20 mEq tablet, Take 20 mEq by mouth 2 (two) times a day  , Disp: , Rfl:     simvastatin (ZOCOR) 10 mg tablet, Take 1 tablet (10 mg total) by mouth daily at bedtime, Disp: 90 tablet, Rfl: 3    topiramate (TOPAMAX) 25 mg sprinkle capsule, Take 25 mg by mouth 2 (two) times a day  , Disp: , Rfl:     Health Maintenance       Madonna Castellon MD

## 2018-02-26 NOTE — MISCELLANEOUS
Assessment    1  Atrial fibrillation, unspecified type (427 31) (I48 91)   2  Chronic diastolic heart failure (524 24) (I50 32)   3  Hypokalemia (276 8) (E87 6)   4  Hypertension (401 9) (I10)    Plan  Atrial fibrillation, unspecified type, Chronic diastolic heart failure, Hypertension,  Hypokalemia    · (1) BASIC METABOLIC PROFILE; Status:Active; Requested KGJ:[id number]; Perform:[facility]; VJE:[id number];XANQMOK; For:Atrial fibrillation, unspecified type, Chronic diastolic heart failure, Hypertension, Hypokalemia; Ordered By:Nu Lowery;   · (1) CBC/PLT/DIFF; Status:Active; Requested PMW:[id number]; Perform:[facility]; HWV:[id number];DOXEFJC; For:Atrial fibrillation, unspecified type, Chronic diastolic heart failure, Hypertension, Hypokalemia; Ordered By:Nu Lowery;   · (1) MAGNESIUM; Status:Active; Requested FLJ:[id number]; Perform:[facility]; OAR:[id number];RYZFTLL; For:Atrial fibrillation, unspecified type, Chronic diastolic heart failure, Hypertension, Hypokalemia; Ordered By:Nu Lowery;   · (1) T4, FREE; Status:Active; Requested HGW:[id number]; Perform:[facility]; AQF:19QBI4529;CDMKIQB; For:Atrial fibrillation, unspecified type, Chronic diastolic heart failure, Hypertension, Hypokalemia; Ordered By:Nu Lowery;   · (1) TSH; Status:Active; Requested LBL:[id number]; Perform:[facility]; JTQ:20WRD0858;HDJFGGX; For:Atrial fibrillation, unspecified type, Chronic diastolic heart failure, Hypertension, Hypokalemia; Ordered By:Nu Lowery;  Migraine    · Oxycodone-Acetaminophen 5-325 MG Oral Tablet (Percocet); TAKE 1 TO 2  TABLETS EVERY 4 TO 6 HOURS AS NEEDED   Rx By: Vassie Opitz; Dispense: 30 Days ; #:30 Tablet; Refill: 0; For: Migraine; DAMON = N; Print Rx    Discussion/Summary  Discussion Summary:   She is rate controlled on her AFib and anticoagulated with Eliquis  This timed is no acute exacerbation of CHF   Continue monitoring weights at home  Her TSH in the hospital was 12  Given the fact that she is on amiodarone, this should be monitored closely  Hopefully once she undergoes cardiac ablation, amiodarone can be discontinued  If this does not take happen as expected, she would need to follow up with ophthalmologist as well  Her Percocet script was refilled  Follow up with Dr Tyrell Melgar in March  Medication SE Review and Pt Understands Tx: Possible side effects of new medications were reviewed with the patient/guardian today  The treatment plan was reviewed with the patient/guardian  The patient/guardian understands and agrees with the treatment plan      Chief Complaint  Chief Complaint Free Text Note Form: d/c 12/28/17 [facility], AF with RVR, Ablation to be done soon  Amiodarone changed from 3 times a day to 1 daily  History of Present Illness  TCM Communication [place]: The patient is being contacted for 1/11/2018  She was hospitalized [facility]  The date of admission: 12/25/2017, date of discharge: 12/28/2017  Diagnosis: AF with RVR  She was discharged to home  Medications reviewed and updated today  She scheduled a follow up appointment  Follow-up appointments with other specialists: cardiology  Counseling was provided to the patient  Communication performed and completed by Emmanuelle AMIN: She comes in for follow-up after hospitalization  She was admitted with acute exacerbation of CHF, AFib with RVR  She was seen by Cardiology and given IV diuresis  On discharge her weight was back to 250 pounds  She has been maintained on amiodarone and is is affected to undergo ablation  Previous attempts for cardioversion have not been successful  Since discharge she notes that she has been monitoring her weights and have been stable  She is to medications regularly  She notes that recently she has been increasingly stressed with diagnosis of cancer for her daughter   Her headaches have been worse and has used Percocet for that       Review of Systems  Complete-Female:   Constitutional: no fever  Eyes: no eyesight problems  ENT: no nasal discharge  Cardiovascular: no chest pain and no palpitations  Respiratory: shortness of breath during exertion, but no shortness of breath and no wheezing  Gastrointestinal: no abdominal pain  Musculoskeletal: arthralgias  Neurological: headache, but no dizziness and no fainting  Active Problems    1  Anserine bursitis (726 61) (M70 50)   2  Anxiety (300 00) (F41 9)   3  Atrial fibrillation, unspecified type (427 31) (I48 91)   4  Bicipital tendinitis of right shoulder (726 12) (M75 21)   5  Cervicalgia (723 1) (M54 2)   6  Chronic diastolic heart failure (048 73) (I50 32)   7  Colonoscopy (Fiberoptic) Screening   8  Depression (311) (F32 9)   9  Edema (782 3) (R60 9)   10  Encounter for screening mammogram for malignant neoplasm of breast (V76 12)    (Z12 31)   11  Hypercholesterolemia (272 0) (E78 00)   12  Hypertension (401 9) (I10)   13  Hypokalemia (276 8) (E87 6)   14  Insomnia (780 52) (G47 00)   15  Migraine (346 90) (G43 909)   16  Mitral regurgitation (424 0) (I34 0)   17  Need for prophylactic vaccination and inoculation against influenza (V04 81) (Z23)   18  Obesity (278 00) (E66 9)   19  Obstructive sleep apnea (327 23) (G47 33)   20  Osteoarthritis of both knees, unspecified osteoarthritis type (715 96) (M17 0)   21  Prediabetes (790 29) (R73 03)   22  Primary localized osteoarthritis of right knee (715 16) (M17 11)   23  Right knee pain (719 46) (M25 561)   24  Screening for genitourinary condition (V81 6) (Z13 89)   25  Screening for osteoporosis (V82 81) (Z13 820)   26  Stroke (434 91) (I63 9)   27  Transient ischemic attack (435 9) (G45 9)   28  Vertigo (780 4) (R42)    Past Medical History    1  History of migraine (V12 49) (Z86 69)   2  History of Polyp of sigmoid colon (211 3) (D12 5)   3   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 (433 81)    Surgical History    1  History of Appendectomy   2  History of Cholecystectomy  Surgical History Reviewed: The surgical history was reviewed and updated today  Family History  Mother    1  Family history of Arthritis (V17 7)   2  Family history of Coronary Artery Disease (V17 49)   3  Family history of Hypertension (V17 49)  Family History Reviewed: The family history was reviewed and updated today  Social History    · Former smoker (P24 13) (O78 243)   · Social alcohol use (Z78 9)  Social History Reviewed: The social history was reviewed and updated today  The social history was reviewed and is unchanged  Current Meds   1  Amiodarone HCl - 200 MG Oral Tablet; TAKE 1 TABLET DAILY; Therapy: 11TLJ4225 to  Requested for: 57NRC9500 Recorded   2  Clopidogrel Bisulfate 75 MG Oral Tablet; take one tablet by mouth every day; Therapy: 51EQW4926 to (Gisselle Collazo)  Requested for: 55NDB0419; Last   Rx:28Nov2017 Ordered   3  Eliquis 5 MG Oral Tablet; Take 1 tablet twice daily  Requested for: 28Nov2017; Last   Rx:28Nov2017 Ordered   4  Escitalopram Oxalate 10 MG Oral Tablet; TAKE ONE TABLET BY MOUTH ONCE DAILY; Therapy: 84IWO3407 to (Last Rx:18Oct2017)  Requested for: 72NSV6603 Ordered   5  Furosemide 20 MG Oral Tablet; Take one tablet  twice daily; Therapy: 94PPY4694 to (Evaluate:97Nhy3880)  Requested for: 85UFF1408; Last   Rx:06Nov2017 Ordered   6  LORazepam 1 MG Oral Tablet; TAKE ONE TABLET BY MOUTH 3 TIMES A DAY AS   NEEDED; Therapy: 41YDY8888 to (Evaluate:35Bal6303)  Requested for: 13Lal3371; Last   Rx:12Ppf7452 Ordered   7  Meclizine HCl - 25 MG Oral Tablet; TAKE 1 TABLET 3 TIMES DAILY AS NEEDED; Therapy: 10EGJ8443 to (Evaluate:02Jan2016)  Requested for: 15MEN1858; Last   Rx:42Piq3636 Ordered   8  Metoprolol Tartrate 100 MG Oral Tablet; TAKE ONE TABLET BY MOUTH EVERY 12   HOURS; Therapy: 14GYC0320 to (Last Rx:41Hky4362)  Requested for: 86Azy4157 Ordered   9  Multivitamins TABS; TAKE 1 TABLET DAILY; Therapy: 75Ewv2971 to Recorded   10  Nortriptyline HCl - 50 MG Oral Capsule; TAKE 1 CAPSULE AT BEDTIME; Therapy: 72VNG5961 to (Evaluate:14Efv7676)  Requested for: 11MWM8127; Last    Rx:06Nov2017 Ordered   11  Oxycodone-Acetaminophen 5-325 MG Oral Tablet; TAKE 1 TO 2 TABLETS EVERY 4 TO 6    HOURS AS NEEDED; Therapy: 36SFE7169 to (Evaluate:20Oct2017); Last Rx:20Sep2017 Ordered   12  Potassium Chloride Ofelia ER 20 MEQ Oral Tablet Extended Release; TAKE ONE TABLET    BY MOUTH TWICE DAILY; Therapy: 49AGE7610 to (Evaluate:04Feb2018)  Requested for: 10GRX6483; Last    Rx:06Nov2017 Ordered   13  Simvastatin 10 MG Oral Tablet; take one tablet by mouth every day; Therapy: 18SER9668 to (Last Rx:24Jan2017)  Requested for: 25Jan2017 Ordered   14  Topiramate 25 MG Oral Tablet; take 1 tablet by mouth daily; Therapy: 18KFI2400 to (Last Orren Care)  Requested for: 25OKO7290 Ordered  Medication List Reviewed: The medication list was reviewed and updated today  Allergies    1  No Known Drug Allergies    Vitals  Signs   Recorded: 96UMI6948 10:11AM   Temperature: 96 5 F  Heart Rate: 68  Respiration: 15  Systolic: 574  Diastolic: 70  Height: 5 ft 5 in  Weight: 252 lb 4 oz  BMI Calculated: 41 98  BSA Calculated: 2 18    Physical Exam    Constitutional   General appearance: Abnormal   obese  Ears, Nose, Mouth, and Throat   External inspection of ears and nose: Normal     Otoscopic examination: Tympanic membranes translucent with normal light reflex  Canals patent without erythema  Oropharynx: Normal with no erythema, edema, exudate or lesions  Pulmonary   Respiratory effort: No increased work of breathing or signs of respiratory distress  Auscultation of lungs: Clear to auscultation  Cardiovascular   Auscultation of heart: Abnormal   The heart rate was normal  The rhythm was irregularly irregular     Examination of extremities for edema and/or varicosities: Normal     Abdomen   Abdomen: Non-tender, no masses  Musculoskeletal   Gait and station: Normal          Health Management  Encounter for screening mammogram for malignant neoplasm of breast   Digital Bilateral Screening Mammogram With CAD; every 1 year; Last 98Dmd8554; Next Due:  26Ftc8822; Overdue    Message   Recorded as Task   Date: 12/28/2017 02:05 PM, Created By: System   Task Name: [facility]   Assigned To: Hortencia Lala   Regarding Patient: Silvia Vargas, Status: In Progress   Comment:    System - 28 Dec 2017 2:05 PM     Patient discharged from hospital   Patient Name: Alvin Vidal  Patient YOB: 1945  Discharge Date: 12/28/2017  Facility: [facility] - 29 Dec 2017 10:08 AM     TASK REASSIGNED: Previously Assigned To Davidson Trinidad Mary - 29 Dec 2017 11:37 AM     TASK EDITED  called patient but could not leave a message because her phone mailbox is full  will try again later   [facility] - 29 Dec 2017 11:37 AM     TASK IN PROGRESS   Hortencia Lala - 29 Dec 2017 3:20 PM     TASK EDITED  unable to contact patient     Future Appointments    Date/Time Provider Specialty Site   03/12/2018 08:30 AM Sunday HAIR Roth   Internal Medicine [facility] [place]     Signatures   Electronically signed by : HAIR Hernandez ; Jan 11 2018 12:13PM EST                       (Author)

## 2018-02-28 DIAGNOSIS — I48.0 PAROXYSMAL ATRIAL FIBRILLATION (HCC): Primary | ICD-10-CM

## 2018-02-28 RX ORDER — AMIODARONE HYDROCHLORIDE 200 MG/1
200 TABLET ORAL DAILY
Qty: 90 TABLET | Refills: 1 | Status: SHIPPED | OUTPATIENT
Start: 2018-02-28 | End: 2018-05-23 | Stop reason: SDUPTHER

## 2018-03-07 NOTE — PROGRESS NOTES
"  Discussion/Summary  Normal device function      Results/Data  Results   Cardiac Device Remote [id number] 01:26PM Dai Luevano     Test Name Result Flag Reference   MISCELLANEOUS COMMENT      CARELINK TRANSMISSION: BATTERY STATUS "GOOD"  NO DEVICE DETECTED & NO PT ACTIVATED EPISODES  PRESENTING RHYTHM NSR  NORMAL DEVICE FUNCTION  GV   Cardiac Electrophysiology Report      [id number]}  pdf   DEVICE TYPE Monitor       Cardiac Electrophysiology Report [id number] 01:26PM Dai Luevano     Test Name Result Flag Reference   Cardiac Electrophysiology Report      [id number]  pdf     Signatures   Electronically signed by : Eleonora Kapoor RN; Feb 8 2016  4:19PM EST                       (Author)    Electronically signed by : Aubrey Coughlin DO; Feb 14 2016  7:24AM EST                       (Author)    "

## 2018-03-07 NOTE — PROGRESS NOTES
"  Discussion/Summary  Normal device function      Results/Data  Results   Cardiac Device Remote 21Mar2016 11:11AM Catina Burdick     Test Name Result Flag Reference   MISCELLANEOUS COMMENT      CARELINK TRANSMISSION (LOOP RECODER); BATTERY STATUS OK BUT "AGEING"; PRESENTING = NSR WITH INTACT A-V CONDUCTION; NO PATIENT OR DEVICE ACTIVATED EPISODES DETECTED; NORMAL DEVICE FUNCTION  eb   Cardiac Electrophysiology Report      [id number]}  pdf   DEVICE TYPE Monitor       Cardiac Electrophysiology Report [id number] 11:11AM Catina Burdick     Test Name Result Flag Reference   Cardiac Electrophysiology Report      [id number]  pdf     Signatures   Electronically signed by : Melo Chaney, ; Mar 21 2016  9:42AM EST                       (Author)    Electronically signed by : Linsey Rowley DO; Mar 26 2016  3:10PM EST                       (Author)    "

## 2018-04-23 DIAGNOSIS — I10 ESSENTIAL HYPERTENSION: ICD-10-CM

## 2018-04-23 RX ORDER — FUROSEMIDE 20 MG/1
TABLET ORAL
Qty: 270 TABLET | Refills: 1 | Status: SHIPPED | OUTPATIENT
Start: 2018-04-23 | End: 2018-08-29 | Stop reason: DRUGHIGH

## 2018-05-03 DIAGNOSIS — G43.909 MIGRAINE: Primary | ICD-10-CM

## 2018-05-03 RX ORDER — TOPIRAMATE 25 MG/1
25 CAPSULE, COATED PELLETS ORAL 2 TIMES DAILY
Qty: 180 CAPSULE | Refills: 1 | Status: SHIPPED | OUTPATIENT
Start: 2018-05-03 | End: 2018-11-20 | Stop reason: ALTCHOICE

## 2018-05-18 ENCOUNTER — APPOINTMENT (EMERGENCY)
Dept: CT IMAGING | Facility: HOSPITAL | Age: 73
End: 2018-05-18
Payer: COMMERCIAL

## 2018-05-18 ENCOUNTER — APPOINTMENT (OUTPATIENT)
Dept: NON INVASIVE DIAGNOSTICS | Facility: HOSPITAL | Age: 73
End: 2018-05-18
Payer: COMMERCIAL

## 2018-05-18 ENCOUNTER — HOSPITAL ENCOUNTER (OUTPATIENT)
Facility: HOSPITAL | Age: 73
Setting detail: OBSERVATION
Discharge: HOME | End: 2018-05-18
Attending: HOSPITALIST | Admitting: INTERNAL MEDICINE
Payer: COMMERCIAL

## 2018-05-18 ENCOUNTER — APPOINTMENT (OUTPATIENT)
Dept: MRI IMAGING | Facility: HOSPITAL | Age: 73
End: 2018-05-18
Payer: COMMERCIAL

## 2018-05-18 ENCOUNTER — APPOINTMENT (OUTPATIENT)
Dept: RADIOLOGY | Facility: HOSPITAL | Age: 73
End: 2018-05-18
Payer: COMMERCIAL

## 2018-05-18 VITALS
WEIGHT: 268.52 LBS | RESPIRATION RATE: 17 BRPM | TEMPERATURE: 97.9 F | SYSTOLIC BLOOD PRESSURE: 130 MMHG | BODY MASS INDEX: 42.15 KG/M2 | HEART RATE: 66 BPM | OXYGEN SATURATION: 96 % | DIASTOLIC BLOOD PRESSURE: 60 MMHG | HEIGHT: 67 IN

## 2018-05-18 DIAGNOSIS — Z86.79 HISTORY OF HEART FAILURE: ICD-10-CM

## 2018-05-18 DIAGNOSIS — Z86.73 HISTORY OF STROKE: ICD-10-CM

## 2018-05-18 DIAGNOSIS — I67.82 ISCHEMIC CHANGES ON HEAD CT: ICD-10-CM

## 2018-05-18 DIAGNOSIS — E78.00 HYPERCHOLESTEROLEMIA: ICD-10-CM

## 2018-05-18 DIAGNOSIS — R55 SYNCOPE, NEAR: Primary | ICD-10-CM

## 2018-05-18 DIAGNOSIS — E66.9 OBESITY: ICD-10-CM

## 2018-05-18 DIAGNOSIS — Z86.79 HISTORY OF ATRIAL FIBRILLATION: ICD-10-CM

## 2018-05-18 LAB
ALBUMIN SERPL BCP-MCNC: 3.4 G/DL (ref 3.5–5)
ALP SERPL-CCNC: 125 U/L (ref 46–116)
ALT SERPL W P-5'-P-CCNC: 32 U/L (ref 12–78)
ANION GAP SERPL CALCULATED.3IONS-SCNC: 6 MMOL/L (ref 4–13)
ANION GAP SERPL CALCULATED.3IONS-SCNC: 7 MMOL/L (ref 4–13)
APTT PPP: 30 SECONDS (ref 24–36)
AST SERPL W P-5'-P-CCNC: 28 U/L (ref 5–45)
ATRIAL RATE: 64 BPM
ATRIAL RATE: 65 BPM
BASOPHILS # BLD AUTO: 0.05 THOUSANDS/ΜL (ref 0–0.1)
BASOPHILS NFR BLD AUTO: 1 % (ref 0–1)
BILIRUB SERPL-MCNC: 0.35 MG/DL (ref 0.2–1)
BUN SERPL-MCNC: 13 MG/DL (ref 5–25)
BUN SERPL-MCNC: 14 MG/DL (ref 5–25)
CALCIUM SERPL-MCNC: 8.5 MG/DL (ref 8.3–10.1)
CALCIUM SERPL-MCNC: 8.6 MG/DL (ref 8.3–10.1)
CHLORIDE SERPL-SCNC: 106 MMOL/L (ref 100–108)
CHLORIDE SERPL-SCNC: 110 MMOL/L (ref 100–108)
CHOLEST SERPL-MCNC: 109 MG/DL (ref 50–200)
CO2 SERPL-SCNC: 28 MMOL/L (ref 21–32)
CO2 SERPL-SCNC: 28 MMOL/L (ref 21–32)
CREAT SERPL-MCNC: 1.16 MG/DL (ref 0.6–1.3)
CREAT SERPL-MCNC: 1.19 MG/DL (ref 0.6–1.3)
DEPRECATED D DIMER PPP: 287 NG/ML (FEU) (ref 0–424)
EOSINOPHIL # BLD AUTO: 0.47 THOUSAND/ΜL (ref 0–0.61)
EOSINOPHIL NFR BLD AUTO: 6 % (ref 0–6)
ERYTHROCYTE [DISTWIDTH] IN BLOOD BY AUTOMATED COUNT: 15.6 % (ref 11.6–15.1)
ERYTHROCYTE [DISTWIDTH] IN BLOOD BY AUTOMATED COUNT: 15.6 % (ref 11.6–15.1)
EST. AVERAGE GLUCOSE BLD GHB EST-MCNC: 103 MG/DL
GFR SERPL CREATININE-BSD FRML MDRD: 46 ML/MIN/1.73SQ M
GFR SERPL CREATININE-BSD FRML MDRD: 48 ML/MIN/1.73SQ M
GLUCOSE SERPL-MCNC: 102 MG/DL (ref 65–140)
GLUCOSE SERPL-MCNC: 103 MG/DL (ref 65–140)
HBA1C MFR BLD: 5.2 % (ref 4.2–6.3)
HCT VFR BLD AUTO: 32.8 % (ref 34.8–46.1)
HCT VFR BLD AUTO: 34.7 % (ref 34.8–46.1)
HDLC SERPL-MCNC: 62 MG/DL (ref 40–60)
HGB BLD-MCNC: 10.2 G/DL (ref 11.5–15.4)
HGB BLD-MCNC: 11.1 G/DL (ref 11.5–15.4)
INR PPP: 1.19 (ref 0.86–1.17)
LDLC SERPL CALC-MCNC: 31 MG/DL (ref 0–100)
LYMPHOCYTES # BLD AUTO: 1.84 THOUSANDS/ΜL (ref 0.6–4.47)
LYMPHOCYTES NFR BLD AUTO: 24 % (ref 14–44)
MCH RBC QN AUTO: 28 PG (ref 26.8–34.3)
MCH RBC QN AUTO: 28.8 PG (ref 26.8–34.3)
MCHC RBC AUTO-ENTMCNC: 31.1 G/DL (ref 31.4–37.4)
MCHC RBC AUTO-ENTMCNC: 32 G/DL (ref 31.4–37.4)
MCV RBC AUTO: 90 FL (ref 82–98)
MCV RBC AUTO: 90 FL (ref 82–98)
MONOCYTES # BLD AUTO: 0.46 THOUSAND/ΜL (ref 0.17–1.22)
MONOCYTES NFR BLD AUTO: 6 % (ref 4–12)
NEUTROPHILS # BLD AUTO: 4.92 THOUSANDS/ΜL (ref 1.85–7.62)
NEUTS SEG NFR BLD AUTO: 64 % (ref 43–75)
NRBC BLD AUTO-RTO: 0 /100 WBCS
P AXIS: -17 DEGREES
P AXIS: 82 DEGREES
PA ADP BLD-ACNC: 295 PRU (ref 194–418)
PLATELET # BLD AUTO: 227 THOUSANDS/UL (ref 149–390)
PLATELET # BLD AUTO: 295 THOUSANDS/UL (ref 149–390)
PMV BLD AUTO: 10.5 FL (ref 8.9–12.7)
PMV BLD AUTO: 11 FL (ref 8.9–12.7)
POTASSIUM SERPL-SCNC: 4.2 MMOL/L (ref 3.5–5.3)
POTASSIUM SERPL-SCNC: 4.8 MMOL/L (ref 3.5–5.3)
PR INTERVAL: 284 MS
PR INTERVAL: 302 MS
PROT SERPL-MCNC: 7.5 G/DL (ref 6.4–8.2)
PROTHROMBIN TIME: 15.2 SECONDS (ref 11.8–14.2)
QRS AXIS: 23 DEGREES
QRS AXIS: 56 DEGREES
QRSD INTERVAL: 88 MS
QRSD INTERVAL: 92 MS
QT INTERVAL: 478 MS
QT INTERVAL: 488 MS
QTC INTERVAL: 497 MS
QTC INTERVAL: 503 MS
RBC # BLD AUTO: 3.64 MILLION/UL (ref 3.81–5.12)
RBC # BLD AUTO: 3.85 MILLION/UL (ref 3.81–5.12)
SODIUM SERPL-SCNC: 141 MMOL/L (ref 136–145)
SODIUM SERPL-SCNC: 144 MMOL/L (ref 136–145)
T WAVE AXIS: 58 DEGREES
T WAVE AXIS: 7 DEGREES
TRIGL SERPL-MCNC: 82 MG/DL
TROPONIN I SERPL-MCNC: <0.02 NG/ML
VENTRICULAR RATE: 64 BPM
VENTRICULAR RATE: 65 BPM
WBC # BLD AUTO: 6.38 THOUSAND/UL (ref 4.31–10.16)
WBC # BLD AUTO: 7.74 THOUSAND/UL (ref 4.31–10.16)

## 2018-05-18 PROCEDURE — 71046 X-RAY EXAM CHEST 2 VIEWS: CPT

## 2018-05-18 PROCEDURE — 99220 PR INITIAL OBSERVATION CARE/DAY 70 MINUTES: CPT | Performed by: INTERNAL MEDICINE

## 2018-05-18 PROCEDURE — 85025 COMPLETE CBC W/AUTO DIFF WBC: CPT | Performed by: PHYSICIAN ASSISTANT

## 2018-05-18 PROCEDURE — 85730 THROMBOPLASTIN TIME PARTIAL: CPT | Performed by: PHYSICIAN ASSISTANT

## 2018-05-18 PROCEDURE — 85379 FIBRIN DEGRADATION QUANT: CPT | Performed by: PHYSICIAN ASSISTANT

## 2018-05-18 PROCEDURE — 84484 ASSAY OF TROPONIN QUANT: CPT | Performed by: PHYSICIAN ASSISTANT

## 2018-05-18 PROCEDURE — 93306 TTE W/DOPPLER COMPLETE: CPT

## 2018-05-18 PROCEDURE — 83036 HEMOGLOBIN GLYCOSYLATED A1C: CPT | Performed by: PHYSICIAN ASSISTANT

## 2018-05-18 PROCEDURE — 96360 HYDRATION IV INFUSION INIT: CPT

## 2018-05-18 PROCEDURE — 80048 BASIC METABOLIC PNL TOTAL CA: CPT | Performed by: PHYSICIAN ASSISTANT

## 2018-05-18 PROCEDURE — 99236 HOSP IP/OBS SAME DATE HI 85: CPT | Performed by: INTERNAL MEDICINE

## 2018-05-18 PROCEDURE — 93005 ELECTROCARDIOGRAM TRACING: CPT

## 2018-05-18 PROCEDURE — 99285 EMERGENCY DEPT VISIT HI MDM: CPT

## 2018-05-18 PROCEDURE — 85027 COMPLETE CBC AUTOMATED: CPT | Performed by: PHYSICIAN ASSISTANT

## 2018-05-18 PROCEDURE — 85576 BLOOD PLATELET AGGREGATION: CPT | Performed by: PHYSICIAN ASSISTANT

## 2018-05-18 PROCEDURE — 80053 COMPREHEN METABOLIC PANEL: CPT | Performed by: PHYSICIAN ASSISTANT

## 2018-05-18 PROCEDURE — 70551 MRI BRAIN STEM W/O DYE: CPT

## 2018-05-18 PROCEDURE — 36415 COLL VENOUS BLD VENIPUNCTURE: CPT | Performed by: PHYSICIAN ASSISTANT

## 2018-05-18 PROCEDURE — 70450 CT HEAD/BRAIN W/O DYE: CPT

## 2018-05-18 PROCEDURE — 99255 IP/OBS CONSLTJ NEW/EST HI 80: CPT | Performed by: PSYCHIATRY & NEUROLOGY

## 2018-05-18 PROCEDURE — 93306 TTE W/DOPPLER COMPLETE: CPT | Performed by: INTERNAL MEDICINE

## 2018-05-18 PROCEDURE — 85610 PROTHROMBIN TIME: CPT | Performed by: PHYSICIAN ASSISTANT

## 2018-05-18 PROCEDURE — 93010 ELECTROCARDIOGRAM REPORT: CPT | Performed by: INTERNAL MEDICINE

## 2018-05-18 PROCEDURE — 80061 LIPID PANEL: CPT | Performed by: PHYSICIAN ASSISTANT

## 2018-05-18 RX ORDER — METOPROLOL TARTRATE 100 MG/1
100 TABLET ORAL EVERY 12 HOURS SCHEDULED
Status: DISCONTINUED | OUTPATIENT
Start: 2018-05-18 | End: 2018-05-18 | Stop reason: HOSPADM

## 2018-05-18 RX ORDER — POTASSIUM CHLORIDE 20 MEQ/1
20 TABLET, EXTENDED RELEASE ORAL 2 TIMES DAILY
Status: DISCONTINUED | OUTPATIENT
Start: 2018-05-18 | End: 2018-05-18

## 2018-05-18 RX ORDER — SODIUM CHLORIDE 9 MG/ML
125 INJECTION, SOLUTION INTRAVENOUS CONTINUOUS
Status: DISCONTINUED | OUTPATIENT
Start: 2018-05-18 | End: 2018-05-18

## 2018-05-18 RX ORDER — CLOPIDOGREL BISULFATE 75 MG/1
75 TABLET ORAL DAILY
Status: DISCONTINUED | OUTPATIENT
Start: 2018-05-18 | End: 2018-05-18 | Stop reason: HOSPADM

## 2018-05-18 RX ORDER — MAGNESIUM HYDROXIDE/ALUMINUM HYDROXICE/SIMETHICONE 120; 1200; 1200 MG/30ML; MG/30ML; MG/30ML
30 SUSPENSION ORAL EVERY 6 HOURS PRN
Status: DISCONTINUED | OUTPATIENT
Start: 2018-05-18 | End: 2018-05-18 | Stop reason: HOSPADM

## 2018-05-18 RX ORDER — AMIODARONE HYDROCHLORIDE 200 MG/1
200 TABLET ORAL DAILY
Status: DISCONTINUED | OUTPATIENT
Start: 2018-05-18 | End: 2018-05-18 | Stop reason: HOSPADM

## 2018-05-18 RX ORDER — ESCITALOPRAM OXALATE 10 MG/1
10 TABLET ORAL DAILY
Status: DISCONTINUED | OUTPATIENT
Start: 2018-05-18 | End: 2018-05-18 | Stop reason: HOSPADM

## 2018-05-18 RX ORDER — TOPIRAMATE 25 MG/1
25 CAPSULE, COATED PELLETS ORAL 2 TIMES DAILY
Status: DISCONTINUED | OUTPATIENT
Start: 2018-05-18 | End: 2018-05-18 | Stop reason: HOSPADM

## 2018-05-18 RX ORDER — NORTRIPTYLINE HYDROCHLORIDE 50 MG/1
50 CAPSULE ORAL
Status: DISCONTINUED | OUTPATIENT
Start: 2018-05-18 | End: 2018-05-18 | Stop reason: HOSPADM

## 2018-05-18 RX ORDER — LORAZEPAM 1 MG/1
1 TABLET ORAL 3 TIMES DAILY PRN
Status: DISCONTINUED | OUTPATIENT
Start: 2018-05-18 | End: 2018-05-18 | Stop reason: HOSPADM

## 2018-05-18 RX ORDER — PRAVASTATIN SODIUM 10 MG
20 TABLET ORAL
Status: DISCONTINUED | OUTPATIENT
Start: 2018-05-18 | End: 2018-05-18 | Stop reason: HOSPADM

## 2018-05-18 RX ORDER — ACETAMINOPHEN 325 MG/1
650 TABLET ORAL EVERY 4 HOURS PRN
Status: DISCONTINUED | OUTPATIENT
Start: 2018-05-18 | End: 2018-05-18 | Stop reason: HOSPADM

## 2018-05-18 RX ORDER — ONDANSETRON 2 MG/ML
4 INJECTION INTRAMUSCULAR; INTRAVENOUS EVERY 6 HOURS PRN
Status: DISCONTINUED | OUTPATIENT
Start: 2018-05-18 | End: 2018-05-18 | Stop reason: HOSPADM

## 2018-05-18 RX ORDER — PANTOPRAZOLE SODIUM 40 MG/1
40 TABLET, DELAYED RELEASE ORAL
Status: DISCONTINUED | OUTPATIENT
Start: 2018-05-18 | End: 2018-05-18 | Stop reason: HOSPADM

## 2018-05-18 RX ORDER — MECLIZINE HCL 12.5 MG/1
25 TABLET ORAL 3 TIMES DAILY PRN
Status: DISCONTINUED | OUTPATIENT
Start: 2018-05-18 | End: 2018-05-18 | Stop reason: HOSPADM

## 2018-05-18 RX ADMIN — SODIUM CHLORIDE 125 ML/HR: 0.9 INJECTION, SOLUTION INTRAVENOUS at 02:10

## 2018-05-18 RX ADMIN — APIXABAN 5 MG: 5 TABLET, FILM COATED ORAL at 17:00

## 2018-05-18 RX ADMIN — APIXABAN 5 MG: 5 TABLET, FILM COATED ORAL at 08:43

## 2018-05-18 RX ADMIN — POTASSIUM CHLORIDE 20 MEQ: 1500 TABLET, EXTENDED RELEASE ORAL at 08:43

## 2018-05-18 RX ADMIN — AMIODARONE HYDROCHLORIDE 200 MG: 200 TABLET ORAL at 08:43

## 2018-05-18 RX ADMIN — PANTOPRAZOLE SODIUM 40 MG: 40 TABLET, DELAYED RELEASE ORAL at 05:17

## 2018-05-18 RX ADMIN — TOPIRAMATE 25 MG: 25 CAPSULE, COATED PELLETS ORAL at 08:43

## 2018-05-18 RX ADMIN — TOPIRAMATE 25 MG: 25 CAPSULE, COATED PELLETS ORAL at 17:00

## 2018-05-18 RX ADMIN — METOPROLOL TARTRATE 100 MG: 100 TABLET ORAL at 08:43

## 2018-05-18 RX ADMIN — ESCITALOPRAM OXALATE 10 MG: 10 TABLET ORAL at 08:43

## 2018-05-18 RX ADMIN — CLOPIDOGREL BISULFATE 75 MG: 75 TABLET ORAL at 08:43

## 2018-05-18 RX ADMIN — PRAVASTATIN SODIUM 20 MG: 10 TABLET ORAL at 16:56

## 2018-05-18 RX ADMIN — LORAZEPAM 1 MG: 1 TABLET ORAL at 08:43

## 2018-05-18 NOTE — ED PROVIDER NOTES
History  Chief Complaint   Patient presents with    Dizziness     Patient c/o near syncope episodes starting approximately an hour ago  Patient states she feels hot and her vision goes black, patient denies LOC, sob, cp, N/V/D  Reports hx of stroke  Cold and clammy  History provided by:  Patient   used: No    Syncope   Episode history:  Multiple  Most recent episode: Today  Timing:  Constant  Progression:  Worsening  Chronicity:  New  Context: not blood draw, not exertion and not sight of blood    Relieved by:  Nothing  Worsened by:  Nothing  Ineffective treatments:  None tried  Associated symptoms: diaphoresis, dizziness and visual change    Associated symptoms: no anxiety, no difficulty breathing and no palpitations    Associated symptoms comment:  Areas of black spots in vision during episodes  Prior to Admission Medications   Prescriptions Last Dose Informant Patient Reported? Taking?    LORazepam (ATIVAN) 1 mg tablet More than a month at Unknown time  Yes No   Sig: Take 1 mg by mouth 3 (three) times a day as needed for anxiety   Multiple Vitamin (MULTIVITAMIN) tablet 2018 at Unknown time Self Yes Yes   Sig: Take 1 tablet by mouth daily     amiodarone 200 mg tablet 2018 at Unknown time  No Yes   Sig: Take 1 tablet (200 mg total) by mouth daily   apixaban (ELIQUIS) 5 mg 2018 at Unknown time Self No Yes   Sig: Take 1 tablet by mouth 2 (two) times a day   Patient taking differently: Take 5 mg by mouth 2 (two) times a day     clopidogrel (PLAVIX) 75 mg tablet 2018 at Unknown time  Yes Yes   Sig: Take 75 mg by mouth daily   escitalopram (LEXAPRO) 10 mg tablet 2018 at Unknown time Self Yes Yes   Sig: Take 10 mg by mouth daily     furosemide (LASIX) 20 mg tablet 2018 at Unknown time  No Yes   Simg in the morning and 40mg in the evening   meclizine (ANTIVERT) 25 mg tablet More than a month at Unknown time  Yes No   Sig: Take 25 mg by mouth 3 (three) times a day as needed for dizziness    metoprolol tartrate (LOPRESSOR) 100 mg tablet 5/17/2018 at Unknown time Self No Yes   Sig: Take 1 tablet by mouth every 12 (twelve) hours   Patient taking differently: Take 100 mg by mouth every 12 (twelve) hours     nortriptyline (PAMELOR) 50 mg capsule 5/17/2018 at Unknown time  No Yes   Sig: Take 1 capsule (50 mg total) by mouth daily at bedtime   oxyCODONE-acetaminophen (PERCOCET) 5-325 mg per tablet More than a month at Unknown time  Yes No   Sig: Take 1 tablet by mouth every 4 (four) hours as needed for migraine (take 1-2 tablets every 4-6 hours PRN)    pantoprazole (PROTONIX) 40 mg tablet More than a month at Unknown time  No No   Sig: Take 1 tablet by mouth daily in the early morning   potassium chloride (K-DUR,KLOR-CON) 20 mEq tablet 5/17/2018 at Unknown time  No Yes   Sig: Take 1 tablet (20 mEq total) by mouth 2 (two) times a day   simvastatin (ZOCOR) 10 mg tablet 5/17/2018 at Unknown time  No Yes   Sig: Take 1 tablet (10 mg total) by mouth daily at bedtime   topiramate (TOPAMAX) 25 mg sprinkle capsule 5/17/2018 at Unknown time  No Yes   Sig: Take 1 capsule (25 mg total) by mouth 2 (two) times a day      Facility-Administered Medications: None       Past Medical History:   Diagnosis Date    Atrial fibrillation (HCC)     Hyperlipidemia     Hypertension     Migraine     Polyp of sigmoid colon     Prediabetes     Stroke [facility])        Past Surgical History:   Procedure Laterality Date    APPENDECTOMY      CARDIAC ELECTROPHYSIOLOGY STUDY AND ABLATION      CHOLECYSTECTOMY         Family History   Problem Relation Age of Onset    Diabetes Mother     Heart disease Mother     Hypertension Mother     Arthritis Mother     Coronary artery disease Mother      I have reviewed and agree with the history as documented      Social History   Substance Use Topics    Smoking status: Former Smoker     Quit date: 2/11/2011    Smokeless tobacco: Never Used    Alcohol use No        Review of Systems   Constitutional: Positive for diaphoresis  HENT: Negative for drooling  Eyes: Negative for pain  Respiratory: Negative for chest tightness  Cardiovascular: Positive for syncope  Negative for palpitations  Gastrointestinal: Negative for abdominal pain  Endocrine: Negative for polydipsia  Genitourinary: Negative for dyspareunia  Musculoskeletal: Negative for back pain  Neurological: Positive for dizziness  Hematological: Negative for adenopathy  Psychiatric/Behavioral: Negative for behavioral problems  Physical Exam  ED Triage Vitals   Temperature Pulse Respirations Blood Pressure SpO2   05/18/18 0200 05/18/18 0159 05/18/18 0159 05/18/18 0159 05/18/18 0159   98 3 °F (36 8 °C) 65 18 161/70 99 %      Temp Source Heart Rate Source Patient Position - Orthostatic VS BP Location FiO2 (%)   05/18/18 0200 05/18/18 0159 05/18/18 0315 05/18/18 0315 --   Oral Monitor Sitting Right arm       Pain Score       05/18/18 0314       No Pain           Orthostatic Vital Signs  Vitals:    05/18/18 1015 05/18/18 1215 05/18/18 1415 05/18/18 1615   BP: 155/67 167/72 136/62 130/60   Pulse: 62 63 63 66   Patient Position - Orthostatic VS: Lying Lying Lying Lying       Physical Exam   Constitutional: She is oriented to person, place, and time  She appears well-developed and well-nourished  No distress  HENT:   Head: Normocephalic and atraumatic  Eyes: Conjunctivae are normal    Neck: Neck supple  No JVD present  Cardiovascular: Normal rate and regular rhythm  Pulmonary/Chest: Effort normal    Abdominal: Soft  Genitourinary:   Genitourinary Comments: No complaints deferred  Musculoskeletal: She exhibits no edema or deformity  Neurological: She is alert and oriented to person, place, and time  She displays normal reflexes  No cranial nerve deficit or sensory deficit  She exhibits normal muscle tone   Coordination normal    Skin: Capillary refill takes less than 2 seconds  She is not diaphoretic  No erythema  Psychiatric: She has a normal mood and affect  ED Medications  Medications - No data to display    Diagnostic Studies  Results Reviewed     Procedure Component Value Units Date/Time    Hemoglobin A1c w/EAG Estimation [[id number]] Collected:  05/18/18 0439    Lab Status:  Final result Specimen:  Blood from Arm, Right Updated:  05/18/18 1107     Hemoglobin A1C 5 2 %       mg/dl     Lipid Panel with Direct LDL reflex [[id number]]  (Abnormal) Collected:  05/18/18 0439    Lab Status:  Final result Specimen:  Blood from Arm, Right Updated:  05/18/18 0523     Cholesterol 109 mg/dL      Triglycerides 82 mg/dL      HDL, Direct 62 (H) mg/dL      LDL Calculated 31 mg/dL     Narrative:         Triglyceride:        Normal               <150 mg/dl        Borderline High     150-199 mg/dl        High               200-499 mg/dl        Very High           >499 mg/dl      Basic metabolic panel [[id number]]  (Abnormal) Collected:  05/18/18 0439    Lab Status:  Final result Specimen:  Blood from Arm, Right Updated:  05/18/18 0523     Sodium 144 mmol/L      Potassium 4 8 mmol/L      Chloride 110 (H) mmol/L      CO2 28 mmol/L      Anion Gap 6 mmol/L      BUN 14 mg/dL      Creatinine 1 16 mg/dL      Glucose 102 mg/dL      Calcium 8 6 mg/dL      eGFR 48 ml/min/1 73sq m     Narrative:         National Kidney Disease Education Program recommendations are as follows:  GFR calculation is accurate only with a steady state creatinine  Chronic Kidney disease less than 60 ml/min/1 73 sq  meters  Kidney failure less than 15 ml/min/1 73 sq  meters      CBC (With Platelets) [[id number]]  (Abnormal) Collected:  05/18/18 0439    Lab Status:  Final result Specimen:  Blood from Arm, Right Updated:  05/18/18 0450     WBC 6 38 Thousand/uL      RBC 3 64 (L) Million/uL      Hemoglobin 10 2 (L) g/dL      Hematocrit 32 8 (L) %      MCV 90 fL      MCH 28 0 pg      MCHC 31 1 (L) g/dL      RDW 15 6 (H) % Platelets 736 Thousands/uL      MPV 10 5 fL     Protime-INR [[id number]]  (Abnormal) Collected:  05/18/18 0155    Lab Status:  Final result Specimen:  Blood from Arm, Left Updated:  05/18/18 0223     Protime 15 2 (H) seconds      INR 1 19 (H)    APTT [[id number]]  (Normal) Collected:  05/18/18 0155    Lab Status:  Final result Specimen:  Blood from Arm, Left Updated:  05/18/18 0223     PTT 30 seconds     Troponin I [[id number]]  (Normal) Collected:  05/18/18 0155    Lab Status:  Final result Specimen:  Blood from Arm, Left Updated:  05/18/18 0222     Troponin I <0 02 ng/mL     Narrative:         [employer] Chemistry analyzer 99% cutoff is > 0 04 ng/mL in network labs    o cTnI 99% cutoff is useful only when applied to patients in the clinical setting of myocardial ischemia  o cTnI 99% cutoff should be interpreted in the context of clinical history, ECG findings and possibly cardiac imaging to establish correct diagnosis  o cTnI 99% cutoff may be suggestive but clearly not indicative of a coronary event without the clinical setting of myocardial ischemia  Comprehensive metabolic panel [[id number]]  (Abnormal) Collected:  05/18/18 0155    Lab Status:  Final result Specimen:  Blood from Arm, Left Updated:  05/18/18 0220     Sodium 141 mmol/L      Potassium 4 2 mmol/L      Chloride 106 mmol/L      CO2 28 mmol/L      Anion Gap 7 mmol/L      BUN 13 mg/dL      Creatinine 1 19 mg/dL      Glucose 103 mg/dL      Calcium 8 5 mg/dL      AST 28 U/L      ALT 32 U/L      Alkaline Phosphatase 125 (H) U/L      Total Protein 7 5 g/dL      Albumin 3 4 (L) g/dL      Total Bilirubin 0 35 mg/dL      eGFR 46 ml/min/1 73sq m     Narrative:         National Kidney Disease Education Program recommendations are as follows:  GFR calculation is accurate only with a steady state creatinine  Chronic Kidney disease less than 60 ml/min/1 73 sq  meters  Kidney failure less than 15 ml/min/1 73 sq  meters      D-Dimer [[id number]]  (Normal) Collected:  05/18/18 0155    Lab Status:  Final result Specimen:  Blood from Arm, Left Updated:  05/18/18 0216     D-Dimer, Quant 287 ng/ml (FEU)     CBC and differential [[id number]]  (Abnormal) Collected:  05/18/18 0155    Lab Status:  Final result Specimen:  Blood from Arm, Left Updated:  05/18/18 0206     WBC 7 74 Thousand/uL      RBC 3 85 Million/uL      Hemoglobin 11 1 (L) g/dL      Hematocrit 34 7 (L) %      MCV 90 fL      MCH 28 8 pg      MCHC 32 0 g/dL      RDW 15 6 (H) %      MPV 11 0 fL      Platelets 802 Thousands/uL      nRBC 0 /100 WBCs      Neutrophils Relative 64 %      Lymphocytes Relative 24 %      Monocytes Relative 6 %      Eosinophils Relative 6 %      Basophils Relative 1 %      Neutrophils Absolute 4 92 Thousands/µL      Lymphocytes Absolute 1 84 Thousands/µL      Monocytes Absolute 0 46 Thousand/µL      Eosinophils Absolute 0 47 Thousand/µL      Basophils Absolute 0 05 Thousands/µL     Narrative: This is an appended report  These results have been appended to a previously verified report  MRI brain wo contrast   Final Result by Regina Mejia MD (05/18 2025)         1  No acute infarction, intracranial hemorrhage or mass effect  Findings on the recent prior head CT correlate to chronic microangiopathy/old lacunar infarction in the left parietal lobe   2  Mild, chronic microangiopathy and chronic bilateral cerebellar lacunar infarctions, increased from the prior study  Workstation performed: [id number]         XR chest 2 views   Final Result by Gloria Underwood MD (05/18 7068)      Borderline prominence of cardiac silhouette size  No acute pulmonary disease            Workstation performed: [id number]         CT head without contrast   Final Result by Fransisca Obrien DO (05/18 0665)      Age indeterminate likely old lacunar infarct in the left cerebral hemisphere  Clinical correlation is recommended        Area of hypodensity in the left parietal lobe representing age-indeterminate ischemic changes  Further evaluation with a MRI may be obtained  I personally discussed this study with Sonal Segovia on 5/18/2018 at 2:25 AM                                  Workstation performed: [id number]                    Procedures  Procedures       Phone Contacts  ED Phone Contact    ED Course             NIH Stroke Scale      Most Recent Value   Level of Consciousness (1a )  0 Filed at: 05/18/2018 0321   LOC Questions (1b )  0 Filed at: 05/18/2018 0321   LOC Commands (1c )  0 Filed at: 05/18/2018 0321   Best Gaze (2 )  0 Filed at: 05/18/2018 0321   Visual (3 )  0 Filed at: 05/18/2018 0321   Facial Palsy (4 )  0 Filed at: 05/18/2018 0321   Motor Arm, Left (5a )  0 Filed at: 05/18/2018 0321   Motor Arm, Right (5b )  0 Filed at: 05/18/2018 0321   Motor Leg, Left (6a )  0 Filed at: 05/18/2018 0321   Motor Leg, Right (6b )  0 Filed at: 05/18/2018 0321   Limb Ataxia (7 )  0 Filed at: 05/18/2018 0321   Sensory (8 )  0 Filed at: 05/18/2018 0321   Best Language (9 )  0 Filed at: 05/18/2018 0321   Dysarthria (10 )  0 Filed at: 05/18/2018 0321   Extinction and Inattention (11 ) (Formerly Neglect)  0 Filed at: 05/18/2018 0321   Total  0 Filed at: 05/18/2018 0321                        MDM  Number of Diagnoses or Management Options  History of atrial fibrillation:   History of heart failure:   History of stroke:   Hypercholesterolemia:   Ischemic changes on head CT:   Obesity:   Syncope, near:   Diagnosis management comments: 79-year-old female presents emergency department for near syncopal event this day  Patient has multiple chronic comorbid conditions  Patient states seeing black dots in her vision during the time of these episodes  Patient denies weakness of the hands arms legs or feet  Patient does state that this was similar to her prior history of stroke  Patient has a nonfocal neurological exam   NIH scale is 0  CT reviewed  Labs reviewed    At this time is felt the patient is in need for inpatient evaluation and management by the general medicine team   General medicine is agreeable  Patient agreeable to admission  Patient remained stable in the department and upon transfer to the standard nursing floor  Amount and/or Complexity of Data Reviewed  Clinical lab tests: ordered and reviewed  Tests in the radiology section of CPT®: ordered and reviewed      CritCare Time    Disposition  Final diagnoses:   Syncope, near   History of stroke   Ischemic changes on head CT   History of heart failure   Obesity   Hypercholesterolemia   History of atrial fibrillation     Time reflects when diagnosis was documented in both MDM as applicable and the Disposition within this note     Time User Action Codes Description Comment    5/18/2018  2:41 AM Len Son Add [R55] Syncope, near     5/18/2018  2:42 AM Chares Son Add [Z86 73] History of stroke     5/18/2018  2:42 AM Chares Son Add [I67 82] Ischemic changes on head CT     5/18/2018  2:42 AM Chares Son Add [Z86 79] History of heart failure     5/18/2018  2:43 AM Charandre Son Add [E66 9] Obesity     5/18/2018  2:43 AM Charandre Son Add [E78 00] Hypercholesterolemia     5/19/2018  2:14 AM Fior Morris [Z86 79] History of atrial fibrillation       ED Disposition     ED Disposition Condition Comment    Admit  Case was discussed with DIANNA and the patient's admission status was agreed to be Admission Status: observation status to the service of Dr Coleen Barreto   Follow-up Information     Follow up With Specialties Details Why Contact Info    Qasim Lance MD Internal Medicine Schedule an appointment as soon as possible for a visit in 7 day(s)  [address] Cardiology Schedule an appointment as soon as possible for a visit in 4 day(s)  [address]  [phone]          Discharge Medication List as of 5/18/2018  6:25 PM      CONTINUE these medications which have NOT CHANGED    Details   amiodarone 200 mg tablet Take 1 tablet (200 mg total) by mouth daily, Starting Wed 2/28/2018, Normal      apixaban (ELIQUIS) 5 mg Take 1 tablet by mouth 2 (two) times a day, Starting Wed 10/11/2017, Print      clopidogrel (PLAVIX) 75 mg tablet Take 75 mg by mouth daily, Historical Med      escitalopram (LEXAPRO) 10 mg tablet Take 10 mg by mouth daily  , Historical Med      furosemide (LASIX) 20 mg tablet 20mg in the morning and 40mg in the evening, Normal      metoprolol tartrate (LOPRESSOR) 100 mg tablet Take 1 tablet by mouth every 12 (twelve) hours, Starting Thu 12/28/2017, Normal      Multiple Vitamin (MULTIVITAMIN) tablet Take 1 tablet by mouth daily  , Historical Med      nortriptyline (PAMELOR) 50 mg capsule Take 1 capsule (50 mg total) by mouth daily at bedtime, Starting Wed 2/7/2018, Normal      potassium chloride (K-DUR,KLOR-CON) 20 mEq tablet Take 1 tablet (20 mEq total) by mouth 2 (two) times a day, Starting Tue 2/13/2018, Normal      simvastatin (ZOCOR) 10 mg tablet Take 1 tablet (10 mg total) by mouth daily at bedtime, Starting Wed 1/31/2018, Normal      topiramate (TOPAMAX) 25 mg sprinkle capsule Take 1 capsule (25 mg total) by mouth 2 (two) times a day, Starting Thu 5/3/2018, Normal      LORazepam (ATIVAN) 1 mg tablet Take 1 mg by mouth 3 (three) times a day as needed for anxiety, Historical Med      meclizine (ANTIVERT) 25 mg tablet Take 25 mg by mouth 3 (three) times a day as needed for dizziness , Historical Med      oxyCODONE-acetaminophen (PERCOCET) 5-325 mg per tablet Take 1 tablet by mouth every 4 (four) hours as needed for migraine (take 1-2 tablets every 4-6 hours PRN) , Historical Med      pantoprazole (PROTONIX) 40 mg tablet Take 1 tablet by mouth daily in the early morning, Starting Sat 1/27/2018, Normal             Outpatient Discharge Orders  Discharge Diet     Activity as tolerated         ED Provider  Electronically Signed by Keyshawn Reese PA-C  05/19/18 3360

## 2018-05-18 NOTE — PLAN OF CARE
Activity Intolerance/Impaired Mobility     Mobility/activity is maintained at optimum level for patient [address] Discharge to home or other facility with appropriate resources Progressing        Knowledge Deficit     Patient/family/caregiver demonstrates understanding of disease process, treatment plan, medications, and discharge instructions Progressing        Neurological Deficit     Neurological status is stable or improving Progressing        Potential for Falls     Patient will remain free of falls Progressing        SAFETY ADULT     Maintain or return to baseline ADL function Progressing     Maintain or return mobility status to optimal level Progressing

## 2018-05-18 NOTE — MALNUTRITION/BMI
This medical record reflects one or more clinical indicators suggestive of malnutrition and/or morbid obesity  Malnutrition Findings:              BMI Findings:  BMI Classifications: Morbid Obesity 40-44 9     Body mass index is 42 06 kg/m²  See Nutrition note dated 5/18/18 for additional details  Completed nutrition assessment is viewable in the nutrition documentation

## 2018-05-18 NOTE — H&P
History and Physical - [facility] Internal Medicine    Patient Information: Woodrow Godinez 81 y o  female MRN: [id number]  Unit/Bed#: E4 -01 Encounter: [id number]  Admitting Physician: Spencer Owens PA-C  PCP: Jac Arias MD  Date of Admission:  05/18/18    Assessment/Plan:    Hospital Problem List:     Principal Problem:    Syncope, near  Active Problems:    Atrial fibrillation ([address] )    Essential hypertension    Migraine    Obstructive sleep apnea    History of stroke    History of heart failure      Plan for the Primary Problem(s):  · Near syncope  · Admit to med/surg on telemetry  Differential diagnosis includes stroke or cardiac arrhythmia  See below for additional details    Plan for Additional Problems:   · Hx afib- s/p ablation in January  On plavix and eliquis  Order ECHO  Continue amiodarone  · Hx stroke- CT shows old lacunar infarct which was seen on prior CT from 2014  Also shows age indeterminate ischemic changes in left parietal lobe  Will order MRI and neuro consult  · HTN- continue home meds  · Sleep apnea- does not use CPAP  · Hx CHF- no signs of fluid overload    VTE Prophylaxis: Apixaban (Eliquis)  / sequential compression device   Code Status: full code  POLST: There is no POLST form on file for this patient (pre-hospital)    Anticipated Length of Stay:  Patient will be admitted on an Observation basis with an anticipated length of stay of  Less than 2 midnights  Justification for Hospital Stay: patient requires cardiac monitoring and MRI brain    Total Time for Visit, including Counseling / Coordination of Care: 45 minutes  Greater than 50% of this total time spent on direct patient counseling and coordination of care  Chief Complaint:   dizziness    History of Present Illness:    Woodrow Godinez is a 81 y o  female who presents with 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   She states that she felt like this previously when she had a stroke in 2011  At that time she also had left sided weakness which has since resolved  She recently had afib that required ablation in January  She is on plavix and eliquis  She denies palpitations  No visual changes, no chest pain, shortness of breath, focal weakness or facial droop  She did develop a mild headache in the ED  She has a history of migraine but states this is not anything like her migraines  She does admit she didn;t eat anything today prior to coming to work  She is not diabetic  Review of Systems:    Review of Systems   Constitutional: Negative  HENT: Negative  Eyes: Negative  Respiratory: Negative  Cardiovascular: Negative  Gastrointestinal: Negative  Endocrine: Negative  Musculoskeletal: Negative  Skin: Negative  Allergic/Immunologic: Negative  Neurological: Positive for dizziness and headaches  Hematological: Negative  Psychiatric/Behavioral: Negative  Past Medical and Surgical History:     Past Medical History:   Diagnosis Date    Atrial fibrillation ([facility] )     Hyperlipidemia     Hypertension     Migraine     Polyp of sigmoid colon     Prediabetes     Stroke [facility])        Past Surgical History:   Procedure Laterality Date    APPENDECTOMY      CARDIAC ELECTROPHYSIOLOGY STUDY AND ABLATION      CHOLECYSTECTOMY         Meds/Allergies:    Prior to Admission medications    Medication Sig Start Date End Date Taking?  Authorizing Provider   amiodarone 200 mg tablet Take 1 tablet (200 mg total) by mouth daily 2/28/18  Yes Steve Prado DO   apixaban (ELIQUIS) 5 mg Take 1 tablet by mouth 2 (two) times a day  Patient taking differently: Take 5 mg by mouth 2 (two) times a day   10/11/17  Yes Waldo Ludwig MD   clopidogrel (PLAVIX) 75 mg tablet Take 75 mg by mouth daily   Yes Historical Provider, MD   escitalopram (LEXAPRO) 10 mg tablet Take 10 mg by mouth daily     Yes Historical Provider, MD   furosemide (LASIX) 20 mg tablet 20mg in the morning and 40mg in the evening 4/23/18  Yes Román Tracy MD   metoprolol tartrate (LOPRESSOR) 100 mg tablet Take 1 tablet by mouth every 12 (twelve) hours  Patient taking differently: Take 100 mg by mouth every 12 (twelve) hours   12/28/17  Yes Monica Celis MD   Multiple Vitamin (MULTIVITAMIN) tablet Take 1 tablet by mouth daily     Yes Historical Provider, MD   nortriptyline (PAMELOR) 50 mg capsule Take 1 capsule (50 mg total) by mouth daily at bedtime 2/7/18  Yes Jorge Brumfield MD   potassium chloride (K-DUR,KLOR-CON) 20 mEq tablet Take 1 tablet (20 mEq total) by mouth 2 (two) times a day 2/13/18  Yes Jorge Brumfield MD   simvastatin (ZOCOR) 10 mg tablet Take 1 tablet (10 mg total) by mouth daily at bedtime 1/31/18  Yes Jorge Brumfield MD   topiramate (TOPAMAX) 25 mg sprinkle capsule Take 1 capsule (25 mg total) by mouth 2 (two) times a day 5/3/18  Yes Jorge Brumfield MD   LORazepam (ATIVAN) 1 mg tablet Take 1 mg by mouth 3 (three) times a day as needed for anxiety    Historical Provider, MD   meclizine (ANTIVERT) 25 mg tablet Take 25 mg by mouth 3 (three) times a day as needed for dizziness     Historical Provider, MD   oxyCODONE-acetaminophen (PERCOCET) 5-325 mg per tablet Take 1 tablet by mouth every 4 (four) hours as needed for migraine (take 1-2 tablets every 4-6 hours PRN)     Historical Provider, MD   pantoprazole (PROTONIX) 40 mg tablet Take 1 tablet by mouth daily in the early morning 1/27/18   Lucas De Anda, DO     I have reviewed home medications with patient personally      Allergies: No Known Allergies    Social History:     Marital Status:    Occupation: works in lab    Patient Pre-hospital Level of Mobility: ambulatory  Patient Pre-hospital Diet Restrictions: none  Substance Use History:   History   Alcohol Use No     History   Smoking Status    Former Smoker    Quit date: 2/11/2011   Smokeless Tobacco    Never Used     History   Drug Use No       Family History:    non-contributory    Physical Exam: Vitals:   Blood Pressure: 136/63 (05/18/18 0615)  Pulse: 61 (05/18/18 0615)  Temperature: 97 6 °F (36 4 °C) (05/18/18 0615)  Temp Source: Temporal (05/18/18 0615)  Respirations: 18 (05/18/18 0615)  Height: 5' 7" (170 2 cm) (05/18/18 0315)  Weight - Scale: 122 kg (268 lb 8 3 oz) (05/18/18 0315)  SpO2: 99 % (05/18/18 0615)    Physical Exam   Constitutional: She is oriented to person, place, and time  She appears well-developed and well-nourished  No distress  HENT:   Head: Normocephalic and atraumatic  Eyes: Conjunctivae are normal  Pupils are equal, round, and reactive to light  Neck: Normal range of motion  No JVD present  No tracheal deviation present  No thyromegaly present  Cardiovascular: Normal rate and regular rhythm  Pulmonary/Chest: Effort normal and breath sounds normal  No respiratory distress  She has no wheezes  Abdominal: Soft  Bowel sounds are normal  She exhibits no distension and no mass  There is no tenderness  There is no rebound and no guarding  Musculoskeletal: Normal range of motion  She exhibits no tenderness or deformity  Lymphadenopathy:     She has no cervical adenopathy  Neurological: She is alert and oriented to person, place, and time  No cranial nerve deficit  Skin: Skin is warm and dry  No rash noted  She is not diaphoretic  No erythema  No pallor  Psychiatric: She has a normal mood and affect  Her behavior is normal    Vitals reviewed  Additional Data:     Lab Results: I have personally reviewed pertinent reports          Results from last 7 days  Lab Units 05/18/18  0439 05/18/18  0155   WBC Thousand/uL 6 38 7 74   HEMOGLOBIN g/dL 10 2* 11 1*   HEMATOCRIT % 32 8* 34 7*   PLATELETS Thousands/uL 227 295   NEUTROS PCT %  --  64   LYMPHS PCT %  --  24   MONOS PCT %  --  6   EOS PCT %  --  6       Results from last 7 days  Lab Units 05/18/18  0439 05/18/18  0155   SODIUM mmol/L 144 141   POTASSIUM mmol/L 4 8 4 2   CHLORIDE mmol/L 110* 106   CO2 mmol/L 28 28   BUN mg/dL 14 13   CREATININE mg/dL 1 16 1 19   CALCIUM mg/dL 8 6 8 5   TOTAL PROTEIN g/dL  --  7 5   BILIRUBIN TOTAL mg/dL  --  0 35   ALK PHOS U/L  --  125*   ALT U/L  --  32   AST U/L  --  28   GLUCOSE RANDOM mg/dL 102 103       Results from last 7 days  Lab Units 05/18/18  0155   INR  1 19*       Imaging: I have personally reviewed pertinent reports  Ct Head Without Contrast    Result Date: 5/18/2018  Narrative: CT BRAIN - WITHOUT CONTRAST INDICATION:   Stroke  COMPARISON:  CT scan dated September 13, 2014 TECHNIQUE:  CT examination of the brain was performed  In addition to axial images, coronal 2D reformatted images were created and submitted for interpretation  Radiation dose length product (DLP) for this visit:  01 41 28 69 59 mGy-cm   This examination, like all CT scans performed in the [facility], was performed utilizing techniques to minimize radiation dose exposure, including the use of iterative reconstruction and automated exposure control  IMAGE QUALITY:  Diagnostic  FINDINGS: PARENCHYMA:  No intracranial mass, mass effect or midline shift  Area of hypodensity in the left parietal lobe is seen  Age indeterminate likely old lacunar infarct in the left cerebral hemisphere is seen  Bilateral old infarcts in the cerebellar hemispheres are visualized  VENTRICLES AND EXTRA-AXIAL SPACES:  Normal for the patient's age  VISUALIZED ORBITS AND PARANASAL SINUSES:  Small cyst versus polyp is seen within the left sphenoid sinus  CALVARIUM AND EXTRACRANIAL SOFT TISSUES:  Normal      Impression: Age indeterminate likely old lacunar infarct in the left cerebral hemisphere  Clinical correlation is recommended  Area of hypodensity in the left parietal lobe representing age-indeterminate ischemic changes  Further evaluation with a MRI may be obtained    I personally discussed this study with Ree Ignacio on 5/18/2018 at 2:25 AM  Workstation performed: [id number]       EKG, Pathology, and Other Studies Reviewed on Admission:   · EKG: pending    Allscripts / Epic Records Reviewed: Yes     ** Please Note: This note has been constructed using a voice recognition system   **

## 2018-05-18 NOTE — ED NOTES
Patient reports similar near syncopal episodes with previous stroke       Luigi Barrios RN  05/18/18 6249

## 2018-05-18 NOTE — CASE MANAGEMENT
Thank you,  [facility] in the [facility] by Rajiv De La O for 2017  Network Utilization Review Department  Phone: [phone]; Fax [phone]  ATTENTION: The Network Utilization Review Department is now centralized for our 7 Facilities  Make a note that we have a new phone and fax numbers for our Department  Please call with any questions or concerns to [phone] and carefully follow the prompts so that you are directed to the right person  All voicemails are confidential  Fax any determinations, approvals, denials, and requests for initial or continue stay review clinical to [phone]  Due to HIGH CALL volume, it would be easier if you could please send faxed requests to expedite your requests and in part, help us provide discharge notifications faster  Initial Clinical Review    Admission: Date/Time/Statement: OBS [email or web address]    Orders Placed This Encounter   Procedures    Place in Observation (expected length of stay for this patient is less than two midnights)     Standing Status:   Standing     Number of Occurrences:   1     Order Specific Question:   Admitting Physician     Answer:   Elier Burkett [[id number]]     Order Specific Question:   Level of Care     Answer:   Med Surg [16]         ED: Date/Time/Mode of Arrival:   ED Arrival Information     Expected Arrival Acuity Means of Arrival Escorted By Service Admission Type    - 5/18/2018 01:43 Urgent Wheelchair Self General Medicine Urgent    Arrival Complaint    medical problem          Chief Complaint:   Chief Complaint   Patient presents with    Dizziness     Patient c/o near syncope episodes starting approximately an hour ago  Patient states she feels hot and her vision goes black, patient denies LOC, sob, cp, N/V/D  Reports hx of stroke  History of Illness:   Mackenzie Antunez is a 81 y o  female who presents with 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   She states that she felt like this previously when she had a stroke in 2011  At that time she also had left sided weakness which has since resolved  She recently had afib that required ablation in January  She is on plavix and eliquis  She denies palpitations  No visual changes, no chest pain, shortness of breath, focal weakness or facial droop  She did develop a mild headache in the ED  She has a history of migraine but states this is not anything like her migraines  She does admit she didn;t eat anything today prior to coming to work  She is not diabetic     ED Vital Signs:   ED Triage Vitals   Temperature Pulse Respirations Blood Pressure SpO2   05/18/18 0200 05/18/18 0159 05/18/18 0159 05/18/18 0159 05/18/18 0159   98 3 °F (36 8 °C) 65 18 161/70 99 %      Temp Source Heart Rate Source Patient Position - Orthostatic VS BP Location FiO2 (%)   05/18/18 0200 05/18/18 0159 05/18/18 0315 05/18/18 0315 --   Oral Monitor Sitting Right arm       Pain Score       05/18/18 0314       No Pain        Wt Readings from Last 1 Encounters:   05/18/18 122 kg (268 lb 8 3 oz)       Vital Signs (abnormal):   05/18/18 0415  99 1 °F (37 3 °C)  63  18  134/60  100 %  None (Room air)  Sitting   05/18/18 0315  98 7 °F (37 1 °C)  65  18  145/67  --  --  Sitting   05/18/18 0200  98 3 °F (36 8 °C)  --  --  --  --  --  --   05/18/18 0159  --  65  18  161/70  99 %  None (Room air)           Abnormal Labs/Diagnostic Test Results:   Lab Units 05/18/18  0439 05/18/18  0155   WBC Thousand/uL 6 38 7 74   HEMOGLOBIN g/dL 10 2* 11 1*   HEMATOCRIT % 32 8* 34 7*   PLATELETS Thousands/uL 227 295   NEUTROS PCT %  --  64   LYMPHS PCT %  --  24   MONOS PCT %  --  6   EOS PCT %  --  6         Results from last 7 days  Lab Units 05/18/18  0439 05/18/18  0155   SODIUM mmol/L 144 141   POTASSIUM mmol/L 4 8 4 2   CHLORIDE mmol/L 110* 106   CO2 mmol/L 28 28   BUN mg/dL 14 13   CREATININE mg/dL 1 16 1 19   CALCIUM mg/dL 8 6 8 5   TOTAL PROTEIN g/dL  --  7 5 BILIRUBIN TOTAL mg/dL  --  0 35   ALK PHOS U/L  --  125*   ALT U/L  --  32   AST U/L  --  28   GLUCOSE RANDOM mg/dL 102 103         Results from last 7 days  Lab Units 05/18/18  0155   INR   1 19*         Imaging: I have personally reviewed pertinent reports        Ct Head Without Contrast     Result Date: 5/18/2018  Narrative: CT BRAIN - WITHOUT CONTRAST INDICATION:   Stroke  COMPARISON:  CT scan dated September 13, 2014 TECHNIQUE:  CT examination of the brain was performed  In addition to axial images, coronal 2D reformatted images were created and submitted for interpretation  Radiation dose length product (DLP) for this visit:  01 41 28 69 59 mGy-cm   This examination, like all CT scans performed in the [facility], was performed utilizing techniques to minimize radiation dose exposure, including the use of iterative reconstruction and automated exposure control  IMAGE QUALITY:  Diagnostic  FINDINGS: PARENCHYMA:  No intracranial mass, mass effect or midline shift  Area of hypodensity in the left parietal lobe is seen  Age indeterminate likely old lacunar infarct in the left cerebral hemisphere is seen  Bilateral old infarcts in the cerebellar hemispheres are visualized  VENTRICLES AND EXTRA-AXIAL SPACES:  Normal for the patient's age  VISUALIZED ORBITS AND PARANASAL SINUSES:  Small cyst versus polyp is seen within the left sphenoid sinus  CALVARIUM AND EXTRACRANIAL SOFT TISSUES:  Normal       Impression: Age indeterminate likely old lacunar infarct in the left cerebral hemisphere  Clinical correlation is recommended  Area of hypodensity in the left parietal lobe representing age-indeterminate ischemic changes  Further evaluation with a MRI may be obtained    I personally discussed this study with Magda Kay on 5/18/2018 at 2:25 AM  Workstation performed: [id number]         EKG, Pathology, and Other Studies Reviewed on Admission:   · EKG: pending       ED Treatment:   Medication Administration from 05/18/2018 0143 to 05/18/2018 5446       Date/Time Order Dose Route Action Action by Comments     05/18/2018 0210 sodium chloride 0 9 % infusion 125 mL/hr Intravenous New Bag Neisha Moffett RN           Past Medical/Surgical History: Active Ambulatory Problems     Diagnosis Date Noted    At[facility] fibrillation ([facility] ) 10/07/2017    CHF exacerbation ([facility] ) 10/07/2017    Essential hypertension 10/07/2017    Prediabetes 10/07/2017    Depression 01/13/2015    Hypercholesterolemia 07/30/2012    Migraine 06/18/2012    Mitral regurgitation 10/18/2017    Obesity 07/24/2012    Obstructive sleep apnea 01/04/2013    Primary localized osteoarthritis of right knee 08/17/2016    Stroke ([facility] ) 11/14/2016     Resolved Ambulatory Problems     Diagnosis Date Noted    SOB (shortness of breath) 12/25/2017     Past Medical History:   Diagnosis Date    Atrial fibrillation (HCC)     Hyperlipidemia     Hypertension     Migraine     Polyp of sigmoid colon     Prediabetes     Stroke [facility])        Admitting Diagnosis: Dizziness [R42]  Hypercholesterolemia [E78 00]  Obesity [E66 9]  History of stroke [Z86 73]  Syncope, near [R55]  History of heart failure [Z86 79]  Ischemic changes on head CT [I67 82]    Age/Sex: 81 y o  female    Assessment/Plan: Principal Problem:    Syncope, near  Active Problems:    [facility]    Essential hypertension    Migraine    Obstructive sleep apnea    History of stroke    History of heart failure        Plan for the Primary Problem(s):  · Near syncope  ? Admit to med/surg on telemetry  Differential diagnosis includes stroke or cardiac arrhythmia  See below for additional details     Plan for Additional Problems:   · Hx afib- s/p ablation in January  On plavix and eliquis  Order ECHO  Continue amiodarone  · Hx stroke- CT shows old lacunar infarct which was seen on prior CT from 2014  Also shows age indeterminate ischemic changes in left parietal lobe   Will order MRI and neuro consult  · HTN- continue home meds  · Sleep apnea- does not use CPAP  · Hx CHF- no signs of fluid overload     VTE Prophylaxis: Apixaban (Eliquis)  / sequential compression device   Code Status: full code  POLST: There is no POLST form on file for this patient (pre-hospital)     Anticipated Length of Stay:  Patient will be admitted on an Observation basis with an anticipated length of stay of  Less than 2 midnights     Justification for Hospital Stay: patient requires cardiac monitoring and MRI brain       Admission Orders:  TELE  CONSULT NEUROLOGY  NEURO CHECKS Q1H, Q2H, Q4H  IS  REG DIET  DYSPHAGIA ASSESSMENT  SEQ COMP DEVICE    Scheduled Meds:   Current Facility-Administered Medications:  acetaminophen 650 mg Oral Q4H PRN Mame Flores, PA-C   aluminum-magnesium hydroxide-simethicone 30 mL Oral Q6H PRN Mame Flores, PA-C   amiodarone 200 mg Oral Daily Mame Flores, PA-C   apixaban 5 mg Oral BID Mame Flores, PA-C   clopidogrel 75 mg Oral Daily Mame Flores, PA-C   escitalopram 10 mg Oral Daily Mame Flores, PA-C   LORazepam 1 mg Oral TID PRN Mame Flores, PA-C   meclizine 25 mg Oral TID PRN Mame Flores, PA-C   metoprolol tartrate 100 mg Oral Q12H [address] Mame Flores, PA-C   nortriptyline 50 mg Oral HS Mame Flores, PA-C   ondansetron 4 mg Intravenous Q6H PRN Mame Flores, PA-C   pantoprazole 40 mg Oral Early Morning Mame Flores, PA-C   potassium chloride 20 mEq Oral BID Mame Flores, PA-C   pravastatin 20 mg Oral Daily With [employer], PA-C   topiramate 25 mg Oral BID Mame Flores, PA-C     Continuous Infusions:    PRN Meds:   acetaminophen    aluminum-magnesium hydroxide-simethicone    PO  LORazepam X1     meclizine    ondansetron

## 2018-05-18 NOTE — DISCHARGE SUMMARY
Discharge Summary - Medical Jia Byrd 81 y o  female MRN: [id number]    [address] [place] [address] MED SURG Room / Bed: [place] [address] [address] 437/E4 -* Encounter: [id number]    BRIEF OVERVIEW    Admitting Provider: Tyson Yancey DO  Discharge Provider: Tyson Yancey DO  Admission Date: 5/18/2018     Discharge Date: No discharge date for patient encounter  Primary Care Physician at Discharge: Dionicio Alaniz [phone]    Primary Discharge Diagnosis  Syncope    Other Problems Addressed  Patient Active Problem List    Diagnosis Date Noted    Syncope, near 05/18/2018    History of stroke 05/18/2018    History of heart failure 05/18/2018    Mitral regurgitation 10/18/2017    [facility] ) 10/07/2017    [facility] 10/07/2017    Essential hypertension 10/07/2017    Prediabetes 10/07/2017    Stroke ([facility] ) 11/14/2016    Primary localized osteoarthritis of right knee 08/17/2016    Depression 01/13/2015    Obstructive sleep apnea 01/04/2013    Hypercholesterolemia 07/30/2012    Obesity 07/24/2012    Migraine 06/18/2012       Consulting Providers   Therapeutic Operative Procedures Performed  CT head   Old lacunar infarct  ECHO   ED 60 % grade 1 diastolic dysfunction     Discharge Disposition: home    Allergies  No Known Allergies  Diet restrictions: low salt diet  Activity restrictions: Low salt  Discharge Condition: [address] in one week  Follow up with consulting providers  Dr James Akers in 4 days     [address] [facility]    Presenting Problem/History of Present Illness  Syncope, near  Hospital Course  81year old presents after nearly passing out  Syncope  Admitted for neuro and cardio monitoring, Seen in consultation by Neurology and felt related to hypoperfusion  MRI reviewed by neurology and no acute findings  Continue Eliquis and Plavix  Follow up with cardiology for evaluation of diurectics and possible loop recorder  Discharge  Statement   I spent 22 minutes discharging the patient  This time was spent on the day of discharge  I had direct contact with the patient on the day of discharge  Additional documentation is required if more than 30 minutes were spent on discharge  Discharge instructions/Information to patient and family:   See after visit summary for information provided to patient and family

## 2018-05-18 NOTE — CONSULTS
Consultation - Neurology   Meet Rivera 81 y o  female MRN: [id number]  Unit/Bed#: E4 -01 Encounter: [id number]      Assessment/Plan   1)  Sudden onset flushing, diaphoresis and dimmed vision, reproducible with standing - Resolved  Likely global hypoperfusion 2/2 cardiac dysrhythmia vs orthostatic hypotension  -MRI B without acute abnormality   -Orthostatics pending   -Recommend loop monitor, may be completed as OP per primary team and/or cardiology   -Continue Eliquis and Plavix as written for now    -Echo pending    -Tele   -PT /OT   -LDL, HbA1c WNL    -P2Y12 pending    -Cardiac work up per primary team    -Further recommendations to follow, please see attending attestation  Monitor neuro exam and notify with changes  History of Present Illness     Reason for Consult / Principal Problem: Dizziness   Hx and PE limited by: None   HPI: Meet Rivera is a 81 y o   female with a PMH of afib, on amiodarone and Eliquis, also on plavix w/ hx of  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, 2017, CHF, HTN, HLD, DANTE and migraine headaches who presents to the [facility] ED with a CC of dizziness that began while working on night shift at the [employer]  She reports that she did not eat before the start of her shift but that that is usual for her  The patient was working in the lab and experienced sudden onset flushing, then cold diaphoresis and feeling her vision "dimming" without LOC  This occurred 3-4 times with standing and was relieved by rest   No loss of vision, or curtain-like black out vision  The pt reports that she felt she had to sit down, but is adamant that she was not dizzy  The pt was brought to the ED and reported relief of symptoms with laying down  Specifically denies recent illness, fever, CP, palpitations, SOB, abdominal pain, N/V, difficulty with bowel or bladder, numbness, weakness, ataxia, slurred speech or difficulty with word finding   CTH was completed in the ED and demonstrated age indeterminate lacunar infarction in the left cerebral hemisphere and age indeterminate ischemic changes in the left parietal lobe  CBC, BMP, D Dimer, HbA1c, LDL WNL  Blood glucose taken 3 hours later WNL  The pt has a hx of prior CVA as above  She reports that this occurred years prior to her persistent Afib and was not thought to be cardioembolic in nature  No residual symptoms with the exception of mild LUE ataxia  On exam today, the pt is pleasant, polite and interactive in no acute distress  ROS as above with addition of slight bifrontal HA  No focal deficits on neurological exam with exception of LUE ataxia with FTN  Pt able to walk without difficulty  (-) Romberg  Some difficulty with heel and toe walking but not out of proportion to pt's age and body habitus  Remainder of exam as detailed below  Inpatient consult to Neurology  Consult performed by: Dairl Epley  Consult ordered by: Jacques Sanches          Review of Systems     See HPI    Historical Information   Past Medical History:   Diagnosis Date    Atrial fibrillation ([facility] [address] )     Hyperlipidemia     Hypertension     Migraine     Polyp of sigmoid colon     Prediabetes     Stroke [facility])      Past Surgical History:   Procedure Laterality Date    APPENDECTOMY      CARDIAC ELECTROPHYSIOLOGY STUDY AND ABLATION      CHOLECYSTECTOMY       Social History   History   Alcohol Use No     History   Drug Use No     History   Smoking Status    Former Smoker    Quit date: 2/11/2011   Smokeless Tobacco    Never Used     Family History: non-contributory    Review of previous medical records was completed       Meds/Allergies   Scheduled Meds:  Current Facility-Administered Medications:  acetaminophen 650 mg Oral Q4H PRN Scooby [facility], PA-C   aluminum-magnesium hydroxide-simethicone 30 mL Oral Q6H PRN Scooby [facility], PA-C   amiodarone 200 mg Oral Daily Scooby Heart, PA-C   apixaban 5 mg Oral BID Scooby Heart, PA-C clopidogrel 75 mg Oral Daily Verlyavel Verde, PA-C   escitalopram 10 mg Oral Daily Verlyavel Verde, PA-C   LORazepam 1 mg Oral TID PRN Verlyn Mehreen, PA-C   meclizine 25 mg Oral TID PRN Verlyavel Verde, PA-C   metoprolol tartrate 100 mg Oral Q12H [address] Verlyn Mehreen, PA-C   nortriptyline 50 mg Oral HS Verlyavel Verde, PA-C   ondansetron 4 mg Intravenous Q6H PRN Verlyavel Verde, PA-C   pantoprazole 40 mg Oral Early Morning Verlyavel Verde, PA-C   potassium chloride 20 mEq Oral BID Verlyavel Verde, PA-C   pravastatin 20 mg Oral Daily With [employer], PA-C   topiramate 25 mg Oral BID Verlyavel Verde, PA-C     Continuous Infusions:   PRN Meds:   acetaminophen    aluminum-magnesium hydroxide-simethicone    LORazepam    meclizine    ondansetron      No Known Allergies    Objective   Vitals:Blood pressure 136/63, pulse 61, temperature 97 6 °F (36 4 °C), temperature source Temporal, resp  rate 18, height 5' 7" (1 702 m), weight 122 kg (268 lb 8 3 oz), SpO2 99 %  ,Body mass index is 42 06 kg/m²  No intake or output data in the 24 hours ending 05/18/18 0817    Invasive Devices: Invasive Devices     Peripheral Intravenous Line            Peripheral IV 05/18/18 Left Antecubital less than 1 day                Physical Exam   Constitutional: She is oriented to person, place, and time  She appears well-developed  No distress  Obese    HENT:   Head: Normocephalic and atraumatic  Right Ear: External ear normal    Left Ear: External ear normal    Nose: Nose normal    Mouth/Throat: No oropharyngeal exudate  Eyes: Conjunctivae are normal  Right eye exhibits no discharge  Left eye exhibits no discharge  No scleral icterus  Neck: Normal range of motion  Neck supple  No tracheal deviation present  No thyromegaly present  Cardiovascular: Normal rate and regular rhythm  Pulmonary/Chest: Effort normal and breath sounds normal  No respiratory distress  She has no wheezes  She has no rales   She exhibits no tenderness  Abdominal: Soft  Bowel sounds are normal    Musculoskeletal: Normal range of motion  She exhibits edema  She exhibits no tenderness or deformity  b/l 1+ LE edema   Neurological: She is oriented to person, place, and time  She has normal strength  She has a normal Heel to [employer] and a normal Romberg Test  Finger-nose-finger test: Dysmetria noted on LUE  No disdiadochokinesia  Gait normal    Reflex Scores:       Tricep reflexes are 1+ on the right side and 1+ on the left side  Bicep reflexes are 1+ on the right side and 1+ on the left side  Brachioradialis reflexes are 1+ on the right side and 1+ on the left side  Patellar reflexes are Right patellar reflex grade: trace  and Left patellar reflex grade: trace          Achilles reflexes are Right achilles reflex grade: trace  and Left achilles reflex grade: trace     Skin: Skin is warm and dry  No rash noted  She is not diaphoretic  No erythema  No pallor  Psychiatric: She has a normal mood and affect  Her speech is normal and behavior is normal    Nursing note and vitals reviewed  Neurologic Exam     Mental Status   Oriented to person, place, and time  Follows 2 step commands  Attention: normal  Concentration: normal    Speech: speech is normal   Level of consciousness: alert  Knowledge: good  Able to perform simple calculations  Normal comprehension  Cranial Nerves   Cranial nerves II through XII intact  Motor Exam   Muscle bulk: normal  Overall muscle tone: normal  Right arm pronator drift: absent  Left arm pronator drift: absent    Strength   Strength 5/5 throughout  Sensory Exam   Light touch normal    Vibration normal    Pinprick normal      Temperature intact throughout      Gait, Coordination, and Reflexes     Gait  Gait: normal    Coordination   Romberg: negative  Finger-nose-finger test: Dysmetria noted on LUE   No disdiadochokinesia   Heel to shin coordination: normal    Tremor   Resting tremor: absent  Intention tremor: absent  Action tremor: absent    Reflexes   Right brachioradialis: 1+  Left brachioradialis: 1+  Right biceps: 1+  Left biceps: 1+  Right triceps: 1+  Left triceps: 1+  Right patellar reflex grade: trace  Left patellar reflex grade: trace  Right achilles reflex grade: trace  Left achilles reflex grade: trace  Right plantar: equivocal  Left plantar: equivocal  Right ankle clonus: absent  Left ankle clonus: absent  Minor ataxia with heel and toe walking but not out of proportion to pt's age and body habitus        Lab Results: I have personally reviewed pertinent reports       Recent Results (from the past 24 hour(s))   CBC and differential    Collection Time: 05/18/18  1:55 AM   Result Value Ref Range    WBC 7 74 4 31 - 10 16 Thousand/uL    RBC 3 85 3 81 - 5 12 Million/uL    Hemoglobin 11 1 (L) 11 5 - 15 4 g/dL    Hematocrit 34 7 (L) 34 8 - 46 1 %    MCV 90 82 - 98 fL    MCH 28 8 26 8 - 34 3 pg    MCHC 32 0 31 4 - 37 4 g/dL    RDW 15 6 (H) 11 6 - 15 1 %    MPV 11 0 8 9 - 12 7 fL    Platelets 539 127 - 946 Thousands/uL    nRBC 0 /100 WBCs    Neutrophils Relative 64 43 - 75 %    Lymphocytes Relative 24 14 - 44 %    Monocytes Relative 6 4 - 12 %    Eosinophils Relative 6 0 - 6 %    Basophils Relative 1 0 - 1 %    Neutrophils Absolute 4 92 1 85 - 7 62 Thousands/µL    Lymphocytes Absolute 1 84 0 60 - 4 47 Thousands/µL    Monocytes Absolute 0 46 0 17 - 1 22 Thousand/µL    Eosinophils Absolute 0 47 0 00 - 0 61 Thousand/µL    Basophils Absolute 0 05 0 00 - 0 10 Thousands/µL   Protime-INR    Collection Time: 05/18/18  1:55 AM   Result Value Ref Range    Protime 15 2 (H) 11 8 - 14 2 seconds    INR 1 19 (H) 0 86 - 1 17   APTT    Collection Time: 05/18/18  1:55 AM   Result Value Ref Range    PTT 30 24 - 36 seconds   Comprehensive metabolic panel    Collection Time: 05/18/18  1:55 AM   Result Value Ref Range    Sodium 141 136 - 145 mmol/L    Potassium 4 2 3 5 - 5 3 mmol/L    Chloride 106 100 - 108 mmol/L    CO2 28 21 - 32 mmol/L    Anion Gap 7 4 - 13 mmol/L    BUN 13 5 - 25 mg/dL    Creatinine 1 19 0 60 - 1 30 mg/dL    Glucose 103 65 - 140 mg/dL    Calcium 8 5 8 3 - 10 1 mg/dL    AST 28 5 - 45 U/L    ALT 32 12 - 78 U/L    Alkaline Phosphatase 125 (H) 46 - 116 U/L    Total Protein 7 5 6 4 - 8 2 g/dL    Albumin 3 4 (L) 3 5 - 5 0 g/dL    Total Bilirubin 0 35 0 20 - 1 00 mg/dL    eGFR 46 ml/min/1 73sq m   D-Dimer    Collection Time: 05/18/18  1:55 AM   Result Value Ref Range    D-Dimer, Quant 287 0 - 424 ng/ml (FEU)   Troponin I    Collection Time: 05/18/18  1:55 AM   Result Value Ref Range    Troponin I <0 02 <=0 04 ng/mL   Lipid Panel with Direct LDL reflex    Collection Time: 05/18/18  4:39 AM   Result Value Ref Range    Cholesterol 109 50 - 200 mg/dL    Triglycerides 82 <=150 mg/dL    HDL, Direct 62 (H) 40 - 60 mg/dL    LDL Calculated 31 0 - 100 mg/dL   Basic metabolic panel    Collection Time: 05/18/18  4:39 AM   Result Value Ref Range    Sodium 144 136 - 145 mmol/L    Potassium 4 8 3 5 - 5 3 mmol/L    Chloride 110 (H) 100 - 108 mmol/L    CO2 28 21 - 32 mmol/L    Anion Gap 6 4 - 13 mmol/L    BUN 14 5 - 25 mg/dL    Creatinine 1 16 0 60 - 1 30 mg/dL    Glucose 102 65 - 140 mg/dL    Calcium 8 6 8 3 - 10 1 mg/dL    eGFR 48 ml/min/1 73sq m   CBC (With Platelets)    Collection Time: 05/18/18  4:39 AM   Result Value Ref Range    WBC 6 38 4 31 - 10 16 Thousand/uL    RBC 3 64 (L) 3 81 - 5 12 Million/uL    Hemoglobin 10 2 (L) 11 5 - 15 4 g/dL    Hematocrit 32 8 (L) 34 8 - 46 1 %    MCV 90 82 - 98 fL    MCH 28 0 26 8 - 34 3 pg    MCHC 31 1 (L) 31 4 - 37 4 g/dL    RDW 15 6 (H) 11 6 - 15 1 %    Platelets 630 381 - 113 Thousands/uL    MPV 10 5 8 9 - 12 7 fL   ]  Imaging Studies: I have personally reviewed pertinent reports  and I have personally reviewed pertinent films in PACS  EKG, Pathology, and Other Studies: I have personally reviewed pertinent reports      VTE Prophylaxis: Sequential compression device (Venodyne) and eliquis      Code Status: Level 1 - Full Code  Advance Directive and Living Will:      Power of :    POLST:

## 2018-05-22 ENCOUNTER — OFFICE VISIT (OUTPATIENT)
Dept: INTERNAL MEDICINE CLINIC | Facility: CLINIC | Age: 73
End: 2018-05-22
Payer: COMMERCIAL

## 2018-05-22 VITALS
DIASTOLIC BLOOD PRESSURE: 80 MMHG | HEIGHT: 67 IN | SYSTOLIC BLOOD PRESSURE: 162 MMHG | RESPIRATION RATE: 16 BRPM | HEART RATE: 84 BPM | BODY MASS INDEX: 41.75 KG/M2 | WEIGHT: 266 LBS | TEMPERATURE: 98.3 F

## 2018-05-22 DIAGNOSIS — R55 SYNCOPE, NEAR: ICD-10-CM

## 2018-05-22 DIAGNOSIS — G43.009 MIGRAINE WITHOUT AURA AND WITHOUT STATUS MIGRAINOSUS, NOT INTRACTABLE: ICD-10-CM

## 2018-05-22 DIAGNOSIS — Z11.59 NEED FOR HEPATITIS C SCREENING TEST: ICD-10-CM

## 2018-05-22 DIAGNOSIS — Z13.1 SCREENING FOR DIABETES MELLITUS: ICD-10-CM

## 2018-05-22 DIAGNOSIS — G47.33 OBSTRUCTIVE SLEEP APNEA: ICD-10-CM

## 2018-05-22 DIAGNOSIS — I63.40 CEREBROVASCULAR ACCIDENT (CVA) DUE TO EMBOLISM OF CEREBRAL ARTERY (HCC): ICD-10-CM

## 2018-05-22 DIAGNOSIS — I48.91 ATRIAL FIBRILLATION, UNSPECIFIED TYPE (HCC): ICD-10-CM

## 2018-05-22 DIAGNOSIS — I10 ESSENTIAL HYPERTENSION: Primary | ICD-10-CM

## 2018-05-22 PROCEDURE — 1101F PT FALLS ASSESS-DOCD LE1/YR: CPT | Performed by: INTERNAL MEDICINE

## 2018-05-22 PROCEDURE — 99214 OFFICE O/P EST MOD 30 MIN: CPT | Performed by: INTERNAL MEDICINE

## 2018-05-22 PROCEDURE — 1111F DSCHRG MED/CURRENT MED MERGE: CPT | Performed by: INTERNAL MEDICINE

## 2018-05-22 RX ORDER — OXYCODONE HYDROCHLORIDE AND ACETAMINOPHEN 5; 325 MG/1; MG/1
1 TABLET ORAL EVERY 4 HOURS PRN
Qty: 50 TABLET | Refills: 0 | Status: SHIPPED | OUTPATIENT
Start: 2018-05-22 | End: 2018-12-27 | Stop reason: SDUPTHER

## 2018-05-22 NOTE — PROGRESS NOTES
[address] [facility] Medicine [place]      NAME: Woodrow Godinez  AGE: 81 y o  SEX: female  : 1945   MRN: [id number]    DATE: 2018  TIME: 5:38 PM    Assessment and Plan   1  Essential hypertension  Adequately controlled on reduced furosemide  - Comprehensive metabolic panel    2  Atrial fibrillation, unspecified type ([address] )  Currently in sinus rhythm  The patient has had no definite atrial fibrillation since her ablation  She will be seeing Cardiology in the near future  3  Cerebrovascular accident (CVA) due to embolism of cerebral artery (HCC)  On Eliquis and clopidogrel   - Lipid panel    4  Syncope, near  The patient was briefly hospitalized after a near syncopal episode  This was thought to be related to orthostatic hypotension  5  Migraine without aura and without status migrainosus, not intractable  Well controlled  - oxyCODONE-acetaminophen (PERCOCET) 5-325 mg per tablet; Take 1 tablet by mouth every 4 (four) hours as needed for moderate pain Max Daily Amount: 6 tablets  Dispense: 50 tablet; Refill: 0    6  Obstructive sleep apnea  On CPAP  7  Need for hepatitis C screening test  - Hepatitis C Qualitative by PCR    8  Screening for diabetes mellitus  - HEMOGLOBIN A1C W/ EAG ESTIMATION    The patient's blood pressure and heart rhythm are normal at present  She will continue her current medications  She will be seen Cardiology in the near future  Her other medical issues appear to be adequately compensated  She will return in 3 months with updated lab work  Hemoglobin A1c was ordered 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   She will be screened for hepatitis-C  She was reminded to arrange colonoscopy and ophthalmological evaluation  Return to office in:   3 months  Return to office in:    Chief Complaint     Chief Complaint   Patient presents with    Follow-up     3 months, no falls or UI, negative depression, due for colonoscopy, has not had glaucoma screening       History of Present Illness     The patient returned to the office for re-evaluation of hypertension, hyperlipidemia, obstructive sleep apnea, migraine, past stroke, and history of atrial fibrillation  She was recently hospitalized at [address] [address] after a near syncopal episode  Evaluation was unremarkable  It was thought to be related to orthostatic hypotension and furosemide was reduced  The patient will be seeing Dr Suellen Moncada in the near future for cardiology follow-up  Since her discharge, she has been feeling pretty well  She denies chest pain, shortness of breath, palpitations, or dizziness  She is tolerating her current medications well  The following portions of the patient's history were reviewed and updated as appropriate: allergies, current medications, past family history, past medical history, past social history, past surgical history and problem list     Review of Systems   Review of Systems   Constitutional: Negative  HENT: Negative for congestion, ear pain, postnasal drip, rhinorrhea, sore throat and trouble swallowing  Eyes: Negative for pain, discharge, redness and visual disturbance  Respiratory: Negative for cough, shortness of breath and wheezing  Cardiovascular: Negative  Gastrointestinal: Negative  Endocrine: Negative  Genitourinary: Negative for difficulty urinating, dysuria, frequency, hematuria and urgency  Musculoskeletal: Negative for arthralgias, gait problem, joint swelling and myalgias  Skin: Negative for rash  Neurological: Positive for dizziness  Negative for speech difficulty, weakness, light-headedness, numbness and headaches  Hematological: Negative  Psychiatric/Behavioral: Negative for confusion, decreased concentration, dysphoric mood and sleep disturbance  The patient is not nervous/anxious          Active Problem List     Patient Active Problem List   Diagnosis    [facility] )    CHF exacerbation ([facility] )    Essential hypertension    Prediabetes    Depression    Hypercholesterolemia    Migraine    Mitral regurgitation    Obesity    Obstructive sleep apnea    Primary localized osteoarthritis of right knee    Stroke ([address] )    Syncope, near    History of stroke    History of heart failure       Objective   /80 (BP Location: Left arm, Patient Position: Sitting, Cuff Size: Large)   Pulse 84   Temp 98 3 °F (36 8 °C) (Tympanic)   Resp 16   Ht 5' 7" (1 702 m)   Wt 121 kg (266 lb)   LMP  (LMP Unknown)   BMI 41 66 kg/m²     Physical Exam   Constitutional: She is oriented to person, place, and time  She appears well-developed  No distress  Obese   HENT:   Head: Normocephalic and atraumatic  Neck: Neck supple  No JVD present  No tracheal deviation present  No thyromegaly present  Cardiovascular: Normal rate, regular rhythm, normal heart sounds and intact distal pulses  Exam reveals no gallop and no friction rub  No murmur heard  Pulmonary/Chest: Effort normal and breath sounds normal  She has no wheezes  She has no rales  She exhibits no tenderness  Abdominal: Soft  Bowel sounds are normal  She exhibits no distension and no mass  There is no tenderness  There is no rebound  Musculoskeletal: Normal range of motion  She exhibits no edema or tenderness  Lymphadenopathy:     She has no cervical adenopathy  Neurological: She is alert and oriented to person, place, and time  Skin: Skin is warm and dry  Psychiatric: She has a normal mood and affect   Her behavior is normal  Judgment and thought content normal        Pertinent Laboratory/Diagnostic Studies:  Admission on 05/18/2018, Discharged on 05/18/2018   Component Date Value Ref Range Status    WBC 05/18/2018 7 74  4 31 - 10 16 Thousand/uL Final    RBC 05/18/2018 3 85  3 81 - 5 12 Million/uL Final    Hemoglobin 05/18/2018 11 1* 11 5 - 15 4 g/dL Final    Hematocrit 05/18/2018 34 7* 34 8 - 46 1 % Final    MCV 05/18/2018 90  82 - 98 fL Final    MCH 05/18/2018 28 8  26 8 - 34 3 pg Final    MCHC 05/18/2018 32 0  31 4 - 37 4 g/dL Final    RDW 05/18/2018 15 6* 11 6 - 15 1 % Final    MPV 05/18/2018 11 0  8 9 - 12 7 fL Final    Platelets 58/64/8263 295  149 - 390 Thousands/uL Final    nRBC 05/18/2018 0  /100 WBCs Final    Neutrophils Relative 05/18/2018 64  43 - 75 % Final    Lymphocytes Relative 05/18/2018 24  14 - 44 % Final    Monocytes Relative 05/18/2018 6  4 - 12 % Final    Eosinophils Relative 05/18/2018 6  0 - 6 % Final    Basophils Relative 05/18/2018 1  0 - 1 % Final    Neutrophils Absolute 05/18/2018 4 92  1 85 - 7 62 Thousands/µL Final    Lymphocytes Absolute 05/18/2018 1 84  0 60 - 4 47 Thousands/µL Final    Monocytes Absolute 05/18/2018 0 46  0 17 - 1 22 Thousand/µL Final    Eosinophils Absolute 05/18/2018 0 47  0 00 - 0 61 Thousand/µL Final    Basophils Absolute 05/18/2018 0 05  0 00 - 0 10 Thousands/µL Final    Protime 05/18/2018 15 2* 11 8 - 14 2 seconds Final    INR 05/18/2018 1 19* 0 86 - 1 17 Final    PTT 05/18/2018 30  24 - 36 seconds Final    Sodium 05/18/2018 141  136 - 145 mmol/L Final    Potassium 05/18/2018 4 2  3 5 - 5 3 mmol/L Final    Chloride 05/18/2018 106  100 - 108 mmol/L Final    CO2 05/18/2018 28  21 - 32 mmol/L Final    Anion Gap 05/18/2018 7  4 - 13 mmol/L Final    BUN 05/18/2018 13  5 - 25 mg/dL Final    Creatinine 05/18/2018 1 19  0 60 - 1 30 mg/dL Final    Glucose 05/18/2018 103  65 - 140 mg/dL Final    Calcium 05/18/2018 8 5  8 3 - 10 1 mg/dL Final    AST 05/18/2018 28  5 - 45 U/L Final    ALT 05/18/2018 32  12 - 78 U/L Final    Alkaline Phosphatase 05/18/2018 125* 46 - 116 U/L Final    Total Protein 05/18/2018 7 5  6 4 - 8 2 g/dL Final    Albumin 05/18/2018 3 4* 3 5 - 5 0 g/dL Final    Total Bilirubin 05/18/2018 0 35  0 20 - 1 00 mg/dL Final    eGFR 05/18/2018 46  ml/min/1 73sq m Final    D-Dimer, Quant 05/18/2018 287  0 - 424 ng/ml (FEU) Final    Troponin I 05/18/2018 <0 02  <=0 04 ng/mL Final    Cholesterol 05/18/2018 109  50 - 200 mg/dL Final    Triglycerides 05/18/2018 82  <=150 mg/dL Final    HDL, Direct 05/18/2018 62* 40 - 60 mg/dL Final    LDL Calculated 05/18/2018 31  0 - 100 mg/dL Final    Hemoglobin A1C 05/18/2018 5 2  4 2 - 6 3 % Final    EAG 05/18/2018 103  mg/dl Final    Sodium 05/18/2018 144  136 - 145 mmol/L Final    Potassium 05/18/2018 4 8  3 5 - 5 3 mmol/L Final    Chloride 05/18/2018 110* 100 - 108 mmol/L Final    CO2 05/18/2018 28  21 - 32 mmol/L Final    Anion Gap 05/18/2018 6  4 - 13 mmol/L Final    BUN 05/18/2018 14  5 - 25 mg/dL Final    Creatinine 05/18/2018 1 16  0 60 - 1 30 mg/dL Final    Glucose 05/18/2018 102  65 - 140 mg/dL Final    Calcium 05/18/2018 8 6  8 3 - 10 1 mg/dL Final    eGFR 05/18/2018 48  ml/min/1 73sq m Final    WBC 05/18/2018 6 38  4 31 - 10 16 Thousand/uL Final    RBC 05/18/2018 3 64* 3 81 - 5 12 Million/uL Final    Hemoglobin 05/18/2018 10 2* 11 5 - 15 4 g/dL Final    Hematocrit 05/18/2018 32 8* 34 8 - 46 1 % Final    MCV 05/18/2018 90  82 - 98 fL Final    MCH 05/18/2018 28 0  26 8 - 34 3 pg Final    MCHC 05/18/2018 31 1* 31 4 - 37 4 g/dL Final    RDW 05/18/2018 15 6* 11 6 - 15 1 % Final    Platelets 36/71/2896 227  149 - 390 Thousands/uL Final    MPV 05/18/2018 10 5  8 9 - 12 7 fL Final    Ventricular Rate 05/18/2018 65  BPM Final    Atrial Rate 05/18/2018 65  BPM Final    MS Interval 05/18/2018 284  ms Final    QRSD Interval 05/18/2018 88  ms Final    QT Interval 05/18/2018 478  ms Final    QTC Interval 05/18/2018 497  ms Final    P Axis 05/18/2018 82  degrees Final    QRS Axis 05/18/2018 56  degrees Final    T Wave Harbert 05/18/2018 58  degrees Final    Ventricular Rate 05/18/2018 64  BPM Final    Atrial Rate 05/18/2018 64  BPM Final    MS Interval 05/18/2018 302  ms Final    QRSD Interval 05/18/2018 92  ms Final    QT Interval 05/18/2018 488  ms Final    QTC Interval 05/18/2018 503  ms Final    P Axis 05/18/2018 -17  degrees Final    QRS Axis 05/18/2018 23  degrees Final    T Wave Tiller 05/18/2018 7  degrees Final    P2Y12 05/18/2018 295  194 - 418 PRU Final           Current Medications     Current Outpatient Prescriptions:     amiodarone 200 mg tablet, Take 1 tablet (200 mg total) by mouth daily, Disp: 90 tablet, Rfl: 1    apixaban (ELIQUIS) 5 mg, Take 1 tablet by mouth 2 (two) times a day (Patient taking differently: Take 5 mg by mouth 2 (two) times a day  ), Disp: 60 tablet, Rfl: 0    clopidogrel (PLAVIX) 75 mg tablet, Take 75 mg by mouth daily, Disp: , Rfl:     escitalopram (LEXAPRO) 10 mg tablet, Take 10 mg by mouth daily  , Disp: , Rfl:     furosemide (LASIX) 20 mg tablet, 20mg in the morning and 40mg in the evening, Disp: 270 tablet, Rfl: 1    LORazepam (ATIVAN) 1 mg tablet, Take 1 mg by mouth 3 (three) times a day as needed for anxiety, Disp: , Rfl:     meclizine (ANTIVERT) 25 mg tablet, Take 25 mg by mouth 3 (three) times a day as needed for dizziness , Disp: , Rfl:     metoprolol tartrate (LOPRESSOR) 100 mg tablet, Take 1 tablet by mouth every 12 (twelve) hours (Patient taking differently: Take 100 mg by mouth every 12 (twelve) hours  ), Disp: 30 tablet, Rfl: 0    Multiple Vitamin (MULTIVITAMIN) tablet, Take 1 tablet by mouth daily  , Disp: , Rfl:     nortriptyline (PAMELOR) 50 mg capsule, Take 1 capsule (50 mg total) by mouth daily at bedtime, Disp: 90 capsule, Rfl: 1    oxyCODONE-acetaminophen (PERCOCET) 5-325 mg per tablet, Take 1 tablet by mouth every 4 (four) hours as needed for moderate pain Max Daily Amount: 6 tablets, Disp: 50 tablet, Rfl: 0    pantoprazole (PROTONIX) 40 mg tablet, Take 1 tablet by mouth daily in the early morning, Disp: 30 tablet, Rfl: 1    potassium chloride (K-DUR,KLOR-CON) 20 mEq tablet, Take 1 tablet (20 mEq total) by mouth 2 (two) times a day, Disp: 180 tablet, Rfl: 3    simvastatin (ZOCOR) 10 mg tablet, Take 1 tablet (10 mg total) by mouth daily at bedtime, Disp: 90 tablet, Rfl: 3    topiramate (TOPAMAX) 25 mg sprinkle capsule, Take 1 capsule (25 mg total) by mouth 2 (two) times a day, Disp: 180 capsule, Rfl: 1      Chance Ruiz MD

## 2018-05-23 ENCOUNTER — OFFICE VISIT (OUTPATIENT)
Dept: CARDIOLOGY CLINIC | Facility: CLINIC | Age: 73
End: 2018-05-23
Payer: COMMERCIAL

## 2018-05-23 VITALS
BODY MASS INDEX: 42.22 KG/M2 | RESPIRATION RATE: 16 BRPM | HEIGHT: 67 IN | WEIGHT: 269 LBS | HEART RATE: 78 BPM | SYSTOLIC BLOOD PRESSURE: 128 MMHG | DIASTOLIC BLOOD PRESSURE: 68 MMHG

## 2018-05-23 DIAGNOSIS — IMO0001 CLASS 3 OBESITY WITH BODY MASS INDEX (BMI) OF 40.0 TO 44.9 IN ADULT, UNSPECIFIED OBESITY TYPE, UNSPECIFIED WHETHER SERIOUS COMORBIDITY PRESENT: ICD-10-CM

## 2018-05-23 DIAGNOSIS — R06.00 DYSPNEA ON EXERTION: ICD-10-CM

## 2018-05-23 DIAGNOSIS — I48.91 ATRIAL FIBRILLATION, UNSPECIFIED TYPE (HCC): Primary | ICD-10-CM

## 2018-05-23 DIAGNOSIS — G47.33 OSA (OBSTRUCTIVE SLEEP APNEA): ICD-10-CM

## 2018-05-23 DIAGNOSIS — I48.0 PAROXYSMAL ATRIAL FIBRILLATION (HCC): ICD-10-CM

## 2018-05-23 DIAGNOSIS — I10 ESSENTIAL HYPERTENSION: ICD-10-CM

## 2018-05-23 DIAGNOSIS — Z86.73 HISTORY OF STROKE: ICD-10-CM

## 2018-05-23 PROCEDURE — 99214 OFFICE O/P EST MOD 30 MIN: CPT | Performed by: INTERNAL MEDICINE

## 2018-05-23 PROCEDURE — 93000 ELECTROCARDIOGRAM COMPLETE: CPT | Performed by: INTERNAL MEDICINE

## 2018-05-23 RX ORDER — AMIODARONE HYDROCHLORIDE 100 MG/1
100 TABLET ORAL DAILY
Qty: 90 TABLET | Refills: 3 | Status: SHIPPED | OUTPATIENT
Start: 2018-05-23 | End: 2018-12-17 | Stop reason: SDUPTHER

## 2018-05-23 NOTE — PROGRESS NOTES
EPS Progress Note - Mackenzie Antunez 72 y o  female MRN: [id number]           ASSESSMENT:  1  Atrial fibrillation, unspecified type ([facility] )  POCT ECG   2  History of stroke     3  Class 3 obesity with body mass index (BMI) of 40 0 to 44 9 in adult, unspecified obesity type, unspecified whether serious comorbidity present ([facility] )     4  Essential hypertension       5  Pulmonary htn - secondary to untreated dante and obesity      PLAN:  DANTE interolant of mask  Obesity - contributing to SOB  HTN - well controlled  AFIB - continue amiodarone will decrease to 100mg daily  Thyroid abnormalities - check full thyroid panel    HPI:   Interim history s/p afib ablation  On amiodarone 200 mg daily  Feels better overall but at times still sob          ROS:  negative, palpitations, tachycardia, irregular heart beat, stable dyspnea on exertion  positive HA and back pain  Positive swelling    Objective:     Vitals: Blood pressure 128/68, pulse 78, resp  rate 16, height 5' 7" (1 702 m), weight 122 kg (269 lb)  , Body mass index is 42 13 kg/m²  ,        Physical Exam:    GEN: Mackenzie Antunez appears well, alert and oriented x 3, pleasant and cooperative   HEENT: pupils equal, round, and reactive to light; extraocular muscles intact  NECK: supple, no carotid bruits   HEART: regular rhythm, normal S1 and S2, no murmurs, clicks, gallops or rubs   LUNGS: clear to auscultation bilaterally; no wheezes, rales, or rhonchi   ABDOMEN: normal bowel sounds, soft, no tenderness, no distention  EXTREMITIES: peripheral pulses normal; no clubbing, cyanosis, or 1 + edema  NEURO: no focal findings   SKIN: normal without suspicious lesions on exposed skin    Medications:      Current Outpatient Prescriptions:     amiodarone 200 mg tablet, Take 1 tablet (200 mg total) by mouth daily, Disp: 90 tablet, Rfl: 1    apixaban (ELIQUIS) 5 mg, Take 1 tablet by mouth 2 (two) times a day (Patient taking differently: Take 5 mg by mouth 2 (two) times a day  ), Disp: 60 tablet, Rfl: 0    clopidogrel (PLAVIX) 75 mg tablet, Take 75 mg by mouth daily, Disp: , Rfl:     escitalopram (LEXAPRO) 10 mg tablet, Take 10 mg by mouth daily  , Disp: , Rfl:     furosemide (LASIX) 20 mg tablet, 20mg in the morning and 40mg in the evening, Disp: 270 tablet, Rfl: 1    LORazepam (ATIVAN) 1 mg tablet, Take 1 mg by mouth 3 (three) times a day as needed for anxiety, Disp: , Rfl:     meclizine (ANTIVERT) 25 mg tablet, Take 25 mg by mouth 3 (three) times a day as needed for dizziness , Disp: , Rfl:     metoprolol tartrate (LOPRESSOR) 100 mg tablet, Take 1 tablet by mouth every 12 (twelve) hours (Patient taking differently: Take 100 mg by mouth every 12 (twelve) hours  ), Disp: 30 tablet, Rfl: 0    Multiple Vitamin (MULTIVITAMIN) tablet, Take 1 tablet by mouth daily  , Disp: , Rfl:     nortriptyline (PAMELOR) 50 mg capsule, Take 1 capsule (50 mg total) by mouth daily at bedtime, Disp: 90 capsule, Rfl: 1    oxyCODONE-acetaminophen (PERCOCET) 5-325 mg per tablet, Take 1 tablet by mouth every 4 (four) hours as needed for moderate pain Max Daily Amount: 6 tablets, Disp: 50 tablet, Rfl: 0    potassium chloride (K-DUR,KLOR-CON) 20 mEq tablet, Take 1 tablet (20 mEq total) by mouth 2 (two) times a day, Disp: 180 tablet, Rfl: 3    simvastatin (ZOCOR) 10 mg tablet, Take 1 tablet (10 mg total) by mouth daily at bedtime, Disp: 90 tablet, Rfl: 3    topiramate (TOPAMAX) 25 mg sprinkle capsule, Take 1 capsule (25 mg total) by mouth 2 (two) times a day, Disp: 180 capsule, Rfl: 1     Family History: obesity, no afib    Labs & Results:  Below is the patient's most recent value for Albumin, ALT, AST, BUN, Calcium, Chloride, Cholesterol, CO2, Creatinine, GFR, Glucose, HDL, Hematocrit, Hemoglobin, Hemoglobin A1C, LDL, Magnesium, Phosphorus, Platelets, Potassium, PSA, Sodium, Triglycerides, and WBC     Lab Results   Component Value Date    ALT 32 05/18/2018    AST 28 05/18/2018    BUN 14 05/18/2018    CALCIUM 8 6 2018     (H) 2018    CHOL 109 2018    CO2 28 2018    CREATININE 1 16 2018    HDL 62 (H) 2018    HCT 32 8 (L) 2018    HGB 10 2 (L) 2018    HGBA1C 5 2 2018    MG 2 8 (H) 2018     2018    K 4 8 2018     2018    TRIG 82 2018    WBC 6 38 2018     Note: for a comprehensive list of the patient's lab results, access the Results Review activity  Cardiac testing:   Results for orders placed during the hospital encounter of 18   Echo complete with contrast if indicated    Narrative [address] [place] [address], [address]  [phone]    Transthoracic Echocardiogram  2D, M-mode, Doppler, and Color Doppler    Study date:  18-May-2018    Patient: Paula Mccauley  MR number: [id number]  Account number: [de-identified]  : 1945  Age: 81 years  Gender: Female  Status: Outpatient  Location: Bedside  Height: 67 in  Weight: 273 lb  BP: 136/ 63 mmHg    Indications: A-Fib  Diagnoses: I48 0 - Atrial fibrillation    Sonographer:  Christiano Wolfe RDCS  Primary Physician:  Keara Ignacio MD  Referring Physician:  Dionne Yeboah DO  Group:  [facility]  Interpreting Physician:  Erica Sierra DO    SUMMARY    LEFT VENTRICLE:  Systolic function was normal  Ejection fraction was estimated to be 60 %  There were no regional wall motion abnormalities  Wall thickness was mildly increased  There was mild concentric hypertrophy  Doppler parameters were consistent with abnormal left ventricular relaxation (grade 1 diastolic dysfunction)  MITRAL VALVE:  There was very mild stenosis (mean transvalvular diastolic gradient ~ 4 mmHg at HR 62 BPM)  There was mild regurgitation  AORTIC VALVE:  There was moderate regurgitation  TRICUSPID VALVE:  There was mild regurgitation  PULMONARY ARTERIES:  Systolic pressure was mildly to moderately increased  Estimated peak pressure was 45 mmHg  HISTORY: PRIOR HISTORY: A-Fib   HTN  Obese  HLD  Syncope  History of HF  Depression  MR     PROCEDURE: The procedure was performed at the bedside  This was a routine study  The transthoracic approach was used  The study included complete 2D imaging, M-mode, complete spectral Doppler, and color Doppler  This was a technically  difficult study  LEFT VENTRICLE: Size was normal  Systolic function was normal  Ejection fraction was estimated to be 60 %  There were no regional wall motion abnormalities  Wall thickness was mildly increased  There was mild concentric hypertrophy  DOPPLER: Doppler parameters were consistent with abnormal left ventricular relaxation (grade 1 diastolic dysfunction)  RIGHT VENTRICLE: The size was normal  Systolic function was normal  Wall thickness was normal     LEFT ATRIUM: Size was at the upper limits of normal     RIGHT ATRIUM: Size was normal     MITRAL VALVE: There was mild thickening, with mild chordal involvement  There was mildly restricted mobility of both the anterior and posterior leaflets in systole and diastole  DOPPLER: Transmitral velocity was minimally increased  There  was very mild stenosis (mean transvalvular diastolic gradient ~ 4 mmHg at HR 62 BPM)  There was mild regurgitation  AORTIC VALVE: The valve was trileaflet  Leaflets exhibited mildly increased thickness, normal cuspal separation, and sclerosis  DOPPLER: Transaortic velocity was within the normal range  There was no evidence for stenosis  There was  moderate regurgitation  TRICUSPID VALVE: The valve structure was normal  There was normal leaflet separation  DOPPLER: The transtricuspid velocity was within the normal range  There was no evidence for stenosis  There was mild regurgitation  PULMONIC VALVE: Leaflets exhibited normal thickness, no calcification, and normal cuspal separation  DOPPLER: The transpulmonic velocity was within the normal range  There was no regurgitation  PERICARDIUM: There was no pericardial effusion  AORTA: The root exhibited normal size  SYSTEMIC VEINS: IVC: The inferior vena cava was normal in size and course  Respirophasic changes were normal     PULMONARY ARTERY: DOPPLER: Systolic pressure was mildly to moderately increased  Estimated peak pressure was 45 mmHg      SYSTEM MEASUREMENT TABLES    2D  %FS: 33 1 %  Ao Diam: 3 3 cm  EDV(Teich): 100 6 ml  EF(Teich): 61 7 %  ESV(Teich): 38 6 ml  IVSd: 1 3 cm  LA Area: 23 4 cm2  LA Diam: 4 5 cm  LVEDV MOD A4C: 82 ml  LVEF MOD A4C: 62 6 %  LVESV MOD A4C: 30 7 ml  LVIDd: 4 7 cm  LVIDs: 3 1 cm  LVLd A4C: 8 5 cm  LVLs A4C: 7 3 cm  LVPWd: 1 3 cm  RA Area: 17 3 cm2  RVIDd: 3 cm  SV MOD A4C: 51 3 ml  SV(Teich): 62 1 ml    CW  AR Dec McDonald: 3 4 m/s2  AR Dec Time: 1355 6 ms  AR PHT: 393 1 ms  AR Vmax: 4 6 m/s  AR maxP 7 mmHg  HR: 61 BPM  MV VTI: 47 2 cm  MV Vmax: 1 6 m/s  MV Vmean: 1 m/s  MV maxPG: 10 2 mmHg  MV meanP 1 mmHg  TR Vmax: 3 2 m/s  TR maxP 6 mmHg    MM  TAPSE: 2 5 cm    PW  E': 0 m/s  E/E': 24 6  MV A Rahul: 1 3 m/s  MV Dec McDonald: 3 8 m/s2  MV DecT: 201 3 ms  MV E Rahul: 0 8 m/s  MV E/A Ratio: 0 6  MV PHT: 58 4 ms  MVA By PHT: 3 8 cm2    [employer] Accredited Echocardiography Laboratory    Prepared and electronically signed by    Yolande Andrade DO  Signed 18-May-2018 17:46:39       Results for orders placed during the hospital encounter of 18   LARRY    Narrative Ted [address] [place] [id number]  [phone]    Transesophageal Echocardiogram  2D, Doppler, and Color Doppler    Study date:  2018    Patient: Ben Cole  MR number: [id number]  Account number: [de-identified]  : 1945  Age: 81 years  Gender: Female  Status: Outpatient  Location: Cath lab  Height: 63 in  Weight: 246 lb  BP: 123/ 67 mmHg    Indications: Atrial Fibrillation    Diagnoses: I48 0 - Atrial fibrillation    Sonographer: HALLE Rdz, [facility]  Primary Physician:  Jorge Brumfield MD  Referring Physician:  Lucas De Anda DO  Group:  [facility]  Interpreting Physician:  Lucas De Anda DO    SUMMARY    LEFT VENTRICLE:  Systolic function was normal  Ejection fraction was estimated in the range of 55 % to 65 %  There were no regional wall motion abnormalities  LEFT ATRIAL APPENDAGE:  smoke present but no organized thrombus    ATRIAL SEPTUM:  No defect or patent foramen ovale was identified  Contrast injection was performed  There was no right-to-left shunt, with provocative maneuvers to increase right atrial pressure  MITRAL VALVE:  There was moderate regurgitation  AORTIC VALVE:  There was mild regurgitation  HISTORY: PRIOR HISTORY: Atrial Fibrillation, Shortness of Breath, CHF, Hypertension, Hyperlipidemia, Respiratory Failure    PROCEDURE: The procedure was performed in the catheterization laboratory  This was a routine study  The risks and alternatives of the procedure were explained to the patient and informed consent was obtained  The transesophageal approach  was used  The study included complete 2D imaging, complete spectral Doppler, and color Doppler  The heart rate was 67 bpm, at the start of the study  An adult omniplane probe was inserted by the attending cardiologist  Intubated with ease  One intubation attempt(s)  There was no blood detected on the probe  Image quality was adequate  There were no complications during the procedure  MEDICATIONS: Anesthesia administered by anesthesia team     LEFT VENTRICLE: Size was normal  Systolic function was normal  Ejection fraction was estimated in the range of 55 % to 65 %  There were no regional wall motion abnormalities  Wall thickness was normal  No evidence of apical thrombus      RIGHT VENTRICLE: The size was normal  Systolic function was normal  Wall thickness was normal     LEFT ATRIUM: Size was normal  APPENDAGE: smoke present but no organized thrombus The size was normal     ATRIAL SEPTUM: No defect or patent foramen ovale was identified  Contrast injection was performed  There was no right-to-left shunt, with provocative maneuvers to increase right atrial pressure  RIGHT ATRIUM: Size was normal     MITRAL VALVE: Valve structure was normal  There was normal leaflet separation  DOPPLER: There was no evidence for stenosis  There was moderate regurgitation  AORTIC VALVE: The valve was trileaflet  Leaflets exhibited normal thickness and normal cuspal separation  DOPPLER: There was no evidence for stenosis  There was mild regurgitation  TRICUSPID VALVE: The valve structure was normal  There was normal leaflet separation  DOPPLER: There was no significant regurgitation  PULMONIC VALVE: Not well visualized  PERICARDIUM: There was no pericardial effusion  The pericardium was normal in appearance      [address] Accredited Echocardiography Laboratory    Prepared and electronically signed by    Yarely Perez DO  Signed 29-Jan-2018 18:47:26               EKG personally reviewed by Yarely Perez DO  [facility]

## 2018-05-29 ENCOUNTER — HOSPITAL ENCOUNTER (OUTPATIENT)
Dept: PULMONOLOGY | Facility: HOSPITAL | Age: 73
Discharge: HOME | End: 2018-05-29
Attending: INTERNAL MEDICINE
Payer: COMMERCIAL

## 2018-05-29 DIAGNOSIS — R06.00 DYSPNEA ON EXERTION: ICD-10-CM

## 2018-05-29 PROCEDURE — 94726 PLETHYSMOGRAPHY LUNG VOLUMES: CPT | Performed by: INTERNAL MEDICINE

## 2018-05-29 PROCEDURE — 94729 DIFFUSING CAPACITY: CPT

## 2018-05-29 PROCEDURE — 94760 N-INVAS EAR/PLS OXIMETRY 1: CPT

## 2018-05-29 PROCEDURE — 94726 PLETHYSMOGRAPHY LUNG VOLUMES: CPT

## 2018-05-29 PROCEDURE — 94060 EVALUATION OF WHEEZING: CPT

## 2018-05-29 PROCEDURE — 94729 DIFFUSING CAPACITY: CPT | Performed by: INTERNAL MEDICINE

## 2018-05-29 PROCEDURE — 94060 EVALUATION OF WHEEZING: CPT | Performed by: INTERNAL MEDICINE

## 2018-05-29 RX ORDER — ALBUTEROL SULFATE 2.5 MG/3ML
2.5 SOLUTION RESPIRATORY (INHALATION) ONCE AS NEEDED
Status: COMPLETED | OUTPATIENT
Start: 2018-05-29 | End: 2018-05-29

## 2018-05-29 RX ADMIN — ALBUTEROL SULFATE 2.5 MG: 2.5 SOLUTION RESPIRATORY (INHALATION) at 08:17

## 2018-06-20 DIAGNOSIS — I63.9 CEREBROVASCULAR ACCIDENT (CVA), UNSPECIFIED MECHANISM (HCC): Primary | ICD-10-CM

## 2018-06-20 RX ORDER — CLOPIDOGREL BISULFATE 75 MG/1
75 TABLET ORAL DAILY
Qty: 90 TABLET | Refills: 1 | Status: SHIPPED | OUTPATIENT
Start: 2018-06-20 | End: 2018-12-27 | Stop reason: SDUPTHER

## 2018-07-17 ENCOUNTER — OFFICE VISIT (OUTPATIENT)
Dept: SLEEP CENTER | Facility: CLINIC | Age: 73
End: 2018-07-17
Payer: COMMERCIAL

## 2018-07-17 VITALS
HEART RATE: 70 BPM | WEIGHT: 268.4 LBS | BODY MASS INDEX: 42.12 KG/M2 | DIASTOLIC BLOOD PRESSURE: 68 MMHG | SYSTOLIC BLOOD PRESSURE: 140 MMHG | HEIGHT: 67 IN

## 2018-07-17 DIAGNOSIS — E66.01 CLASS 3 SEVERE OBESITY DUE TO EXCESS CALORIES WITH SERIOUS COMORBIDITY AND BODY MASS INDEX (BMI) OF 40.0 TO 44.9 IN ADULT (HCC): ICD-10-CM

## 2018-07-17 DIAGNOSIS — G47.33 OBSTRUCTIVE SLEEP APNEA: Primary | ICD-10-CM

## 2018-07-17 PROCEDURE — 99204 OFFICE O/P NEW MOD 45 MIN: CPT | Performed by: NURSE PRACTITIONER

## 2018-07-17 NOTE — PATIENT INSTRUCTIONS
1   Schedule CPAP titration study  2   Obtain new CPAP equipment  3   Schedule follow up visit after 2-3 months of using CPAP

## 2018-07-19 ENCOUNTER — HOSPITAL ENCOUNTER (OUTPATIENT)
Dept: SLEEP CENTER | Facility: CLINIC | Age: 73
Discharge: HOME | End: 2018-07-19
Payer: COMMERCIAL

## 2018-07-19 DIAGNOSIS — G47.33 OSA (OBSTRUCTIVE SLEEP APNEA): ICD-10-CM

## 2018-07-19 PROCEDURE — 95810 POLYSOM 6/> YRS 4/> PARAM: CPT

## 2018-07-19 PROCEDURE — 95810 POLYSOM 6/> YRS 4/> PARAM: CPT | Performed by: PSYCHIATRY & NEUROLOGY

## 2018-07-20 NOTE — PROGRESS NOTES
Sleep Study Documentation    Pre-Sleep Study       Sleep testing procedure explained to patient:YES    Patient napped prior to study:NO    Caffeine:Nightshift worker after midnight  Caffeine use:YES- soda  12 to 26 ounces    Alcohol:Nightshift workers after 7AM: Alcohol use:NO    Typical day for patient:YES       Study Documentation  Diagnostic   Snore:Severe  Supplemental O2: no    O2 flow rate (L/min) range 0  O2 flow rate (L/min) final 0  Minimum SaO2 81  Baseline SaO2 94 3            EKG abnormalities: no     EEG abnormalities: yes: Occasional PVC,  Epoch 798    Study Terminated:no           Post-Sleep Study    Medication used at bedtime or during sleep study:NO    Patient reports time it took to fall asleep:greater than 60 minutes    Patient reports waking up during study:3 or more times  Patient reports returning to sleep in 10 to 30 minutes  Patient reports sleeping 2 to 4 hours without dreaming  Patient reports sleep during study:worse than usual    Patient rated sleepiness: Not sleepy or tired    PAP treatment:no

## 2018-07-24 ENCOUNTER — TELEPHONE (OUTPATIENT)
Dept: SLEEP CENTER | Facility: CLINIC | Age: 73
End: 2018-07-24

## 2018-07-24 ENCOUNTER — HOSPITAL ENCOUNTER (OUTPATIENT)
Dept: SLEEP CENTER | Facility: CLINIC | Age: 73
Discharge: HOME | End: 2018-07-24
Payer: COMMERCIAL

## 2018-07-24 DIAGNOSIS — G47.33 OBSTRUCTIVE SLEEP APNEA: ICD-10-CM

## 2018-07-24 PROCEDURE — 95811 POLYSOM 6/>YRS CPAP 4/> PARM: CPT

## 2018-07-24 PROCEDURE — 95811 POLYSOM 6/>YRS CPAP 4/> PARM: CPT | Performed by: PSYCHIATRY & NEUROLOGY

## 2018-07-24 NOTE — TELEPHONE ENCOUNTER
Left message for patient to keep appointment for CPAP study scheduled tonight in [facility]  Instructed to call if any questions prior to study

## 2018-07-25 NOTE — PROGRESS NOTES
Sleep Study Documentation    Pre-Sleep Study       Sleep testing procedure explained to patient:YES    Patient napped prior to study:NO    Caffeine:Dayshift worker after 12PM   Caffeine use:NO    Alcohol:Dayshift workers after 5PM: Alcohol use:NO    Typical day for patient:YES       Study Documentation  Treatment   Optimal PAP pressure: 15cm  Leak:None  Snore:Eliminated  REM Obtained:yes  Supplemental O2: no    Minimum SaO2 91  Baseline SaO2 96  PAP mask choice medium [employer] Simplus Full face  PAP mask type:full face  PAP pressure at which snoring was eliminated 15cm  Mode of Therapy:CPAP  ETCO2:No  CPAP changed to BiPAP:No    EKG abnormalities: no     EEG abnormalities: no    Study Terminated:no    Patient classification: employed       Post-Sleep Study    Medication used at bedtime or during sleep study:NO    Patient reports time it took to fall asleep:greater than 60 minutes    Patient reports waking up during study:3 or more times  Patient reports returning to sleep in 10 to 30 minutes  Patient reports sleeping 2 to 4 hours without dreaming  Patient reports sleep during study:worse than usual    Patient rated sleepiness: Not sleepy or tired    PAP treatment:yes: Post PAP treatment patient reports feeling unchanged and would wear PAP mask at home

## 2018-07-26 DIAGNOSIS — G47.33 OSA (OBSTRUCTIVE SLEEP APNEA): Primary | ICD-10-CM

## 2018-07-31 ENCOUNTER — TELEPHONE (OUTPATIENT)
Dept: SLEEP CENTER | Facility: CLINIC | Age: 73
End: 2018-07-31

## 2018-07-31 NOTE — TELEPHONE ENCOUNTER
Left message for patient to keep appointment for CPAP study 8/15 as scheduled  Instructed to bring mask and call if any questions    Rx and data to [facility]

## 2018-08-13 DIAGNOSIS — I48.0 PAROXYSMAL ATRIAL FIBRILLATION (HCC): Primary | ICD-10-CM

## 2018-08-13 RX ORDER — METOPROLOL TARTRATE 100 MG/1
100 TABLET ORAL EVERY 12 HOURS SCHEDULED
Qty: 180 TABLET | Refills: 2 | Status: SHIPPED | OUTPATIENT
Start: 2018-08-13 | End: 2018-10-06 | Stop reason: HOSPADM

## 2018-08-20 DIAGNOSIS — G43.909 MIGRAINE WITHOUT STATUS MIGRAINOSUS, NOT INTRACTABLE, UNSPECIFIED MIGRAINE TYPE: ICD-10-CM

## 2018-08-22 RX ORDER — NORTRIPTYLINE HYDROCHLORIDE 50 MG/1
50 CAPSULE ORAL
Qty: 90 CAPSULE | Refills: 0 | Status: SHIPPED | OUTPATIENT
Start: 2018-08-22 | End: 2018-11-29 | Stop reason: SDUPTHER

## 2018-08-29 ENCOUNTER — OFFICE VISIT (OUTPATIENT)
Dept: INTERNAL MEDICINE CLINIC | Facility: CLINIC | Age: 73
End: 2018-08-29
Payer: COMMERCIAL

## 2018-08-29 ENCOUNTER — APPOINTMENT (OUTPATIENT)
Dept: LAB | Facility: HOSPITAL | Age: 73
End: 2018-08-29
Attending: INTERNAL MEDICINE
Payer: COMMERCIAL

## 2018-08-29 VITALS
HEIGHT: 67 IN | RESPIRATION RATE: 16 BRPM | DIASTOLIC BLOOD PRESSURE: 84 MMHG | BODY MASS INDEX: 42.53 KG/M2 | TEMPERATURE: 98 F | WEIGHT: 271 LBS | HEART RATE: 80 BPM | SYSTOLIC BLOOD PRESSURE: 180 MMHG

## 2018-08-29 DIAGNOSIS — G47.33 OBSTRUCTIVE SLEEP APNEA: ICD-10-CM

## 2018-08-29 DIAGNOSIS — I48.91 ATRIAL FIBRILLATION, UNSPECIFIED TYPE (HCC): ICD-10-CM

## 2018-08-29 DIAGNOSIS — F32.A DEPRESSION, UNSPECIFIED DEPRESSION TYPE: ICD-10-CM

## 2018-08-29 DIAGNOSIS — I50.32 CHRONIC DIASTOLIC HEART FAILURE (HCC): ICD-10-CM

## 2018-08-29 DIAGNOSIS — G43.009 MIGRAINE WITHOUT AURA AND WITHOUT STATUS MIGRAINOSUS, NOT INTRACTABLE: ICD-10-CM

## 2018-08-29 DIAGNOSIS — I63.9 CEREBRAL INFARCTION (HCC): ICD-10-CM

## 2018-08-29 DIAGNOSIS — I10 ESSENTIAL HYPERTENSION: Primary | ICD-10-CM

## 2018-08-29 DIAGNOSIS — I50.32 CHRONIC DIASTOLIC CONGESTIVE HEART FAILURE (HCC): ICD-10-CM

## 2018-08-29 DIAGNOSIS — I10 ESSENTIAL (PRIMARY) HYPERTENSION: ICD-10-CM

## 2018-08-29 DIAGNOSIS — E78.00 PURE HYPERCHOLESTEROLEMIA: ICD-10-CM

## 2018-08-29 DIAGNOSIS — E78.00 HYPERCHOLESTEROLEMIA: ICD-10-CM

## 2018-08-29 DIAGNOSIS — R73.03 PREDIABETES: ICD-10-CM

## 2018-08-29 DIAGNOSIS — E03.2 HYPOTHYROIDISM DUE TO MEDICATION: ICD-10-CM

## 2018-08-29 DIAGNOSIS — I48.91 ATRIAL FIBRILLATION (HCC): ICD-10-CM

## 2018-08-29 LAB
ALBUMIN SERPL BCP-MCNC: 3.3 G/DL (ref 3.5–5)
ALP SERPL-CCNC: 111 U/L (ref 46–116)
ALT SERPL W P-5'-P-CCNC: 30 U/L (ref 12–78)
ANION GAP SERPL CALCULATED.3IONS-SCNC: 7 MMOL/L (ref 4–13)
AST SERPL W P-5'-P-CCNC: 27 U/L (ref 5–45)
BASOPHILS # BLD AUTO: 0.07 THOUSANDS/ΜL (ref 0–0.1)
BASOPHILS NFR BLD AUTO: 1 % (ref 0–1)
BILIRUB SERPL-MCNC: 0.3 MG/DL (ref 0.2–1)
BUN SERPL-MCNC: 11 MG/DL (ref 5–25)
CALCIUM SERPL-MCNC: 9 MG/DL (ref 8.3–10.1)
CHLORIDE SERPL-SCNC: 109 MMOL/L (ref 100–108)
CHOLEST SERPL-MCNC: 116 MG/DL (ref 50–200)
CO2 SERPL-SCNC: 28 MMOL/L (ref 21–32)
CREAT SERPL-MCNC: 1.39 MG/DL (ref 0.6–1.3)
EOSINOPHIL # BLD AUTO: 0.56 THOUSAND/ΜL (ref 0–0.61)
EOSINOPHIL NFR BLD AUTO: 7 % (ref 0–6)
ERYTHROCYTE [DISTWIDTH] IN BLOOD BY AUTOMATED COUNT: 15.5 % (ref 11.6–15.1)
EST. AVERAGE GLUCOSE BLD GHB EST-MCNC: 111 MG/DL
GFR SERPL CREATININE-BSD FRML MDRD: 38 ML/MIN/1.73SQ M
GLUCOSE P FAST SERPL-MCNC: 102 MG/DL (ref 65–99)
HBA1C MFR BLD: 5.5 % (ref 4.2–6.3)
HCT VFR BLD AUTO: 37.1 % (ref 34.8–46.1)
HDLC SERPL-MCNC: 55 MG/DL (ref 40–60)
HGB BLD-MCNC: 10.9 G/DL (ref 11.5–15.4)
IMM GRANULOCYTES # BLD AUTO: 0.02 THOUSAND/UL (ref 0–0.2)
IMM GRANULOCYTES NFR BLD AUTO: 0 % (ref 0–2)
LDLC SERPL CALC-MCNC: 37 MG/DL (ref 0–100)
LYMPHOCYTES # BLD AUTO: 2.22 THOUSANDS/ΜL (ref 0.6–4.47)
LYMPHOCYTES NFR BLD AUTO: 29 % (ref 14–44)
MCH RBC QN AUTO: 26.7 PG (ref 26.8–34.3)
MCHC RBC AUTO-ENTMCNC: 29.4 G/DL (ref 31.4–37.4)
MCV RBC AUTO: 91 FL (ref 82–98)
MONOCYTES # BLD AUTO: 0.61 THOUSAND/ΜL (ref 0.17–1.22)
MONOCYTES NFR BLD AUTO: 8 % (ref 4–12)
NEUTROPHILS # BLD AUTO: 4.24 THOUSANDS/ΜL (ref 1.85–7.62)
NEUTS SEG NFR BLD AUTO: 55 % (ref 43–75)
NONHDLC SERPL-MCNC: 61 MG/DL
NRBC BLD AUTO-RTO: 0 /100 WBCS
NT-PROBNP SERPL-MCNC: 581 PG/ML
PLATELET # BLD AUTO: 300 THOUSANDS/UL (ref 149–390)
PMV BLD AUTO: 10.5 FL (ref 8.9–12.7)
POTASSIUM SERPL-SCNC: 4.5 MMOL/L (ref 3.5–5.3)
PROT SERPL-MCNC: 7.2 G/DL (ref 6.4–8.2)
RBC # BLD AUTO: 4.08 MILLION/UL (ref 3.81–5.12)
SODIUM SERPL-SCNC: 144 MMOL/L (ref 136–145)
T4 FREE SERPL-MCNC: 0.62 NG/DL (ref 0.76–1.46)
TRIGL SERPL-MCNC: 118 MG/DL
TSH SERPL DL<=0.05 MIU/L-ACNC: 28.6 UIU/ML (ref 0.36–3.74)
WBC # BLD AUTO: 7.72 THOUSAND/UL (ref 4.31–10.16)

## 2018-08-29 PROCEDURE — 83880 ASSAY OF NATRIURETIC PEPTIDE: CPT | Performed by: INTERNAL MEDICINE

## 2018-08-29 PROCEDURE — 85025 COMPLETE CBC W/AUTO DIFF WBC: CPT

## 2018-08-29 PROCEDURE — 84443 ASSAY THYROID STIM HORMONE: CPT

## 2018-08-29 PROCEDURE — 99214 OFFICE O/P EST MOD 30 MIN: CPT | Performed by: INTERNAL MEDICINE

## 2018-08-29 PROCEDURE — 87521 HEPATITIS C PROBE&RVRS TRNSC: CPT | Performed by: INTERNAL MEDICINE

## 2018-08-29 PROCEDURE — 80061 LIPID PANEL: CPT | Performed by: INTERNAL MEDICINE

## 2018-08-29 PROCEDURE — 36415 COLL VENOUS BLD VENIPUNCTURE: CPT | Performed by: INTERNAL MEDICINE

## 2018-08-29 PROCEDURE — 84439 ASSAY OF FREE THYROXINE: CPT | Performed by: INTERNAL MEDICINE

## 2018-08-29 PROCEDURE — 80053 COMPREHEN METABOLIC PANEL: CPT | Performed by: INTERNAL MEDICINE

## 2018-08-29 PROCEDURE — 83036 HEMOGLOBIN GLYCOSYLATED A1C: CPT | Performed by: INTERNAL MEDICINE

## 2018-08-29 RX ORDER — LORAZEPAM 1 MG/1
1 TABLET ORAL 3 TIMES DAILY PRN
Qty: 30 TABLET | Refills: 0 | Status: ON HOLD | OUTPATIENT
Start: 2018-08-29 | End: 2020-07-30 | Stop reason: SDUPTHER

## 2018-08-29 RX ORDER — FUROSEMIDE 40 MG/1
40 TABLET ORAL 2 TIMES DAILY
Qty: 180 TABLET | Refills: 1 | Status: SHIPPED | OUTPATIENT
Start: 2018-08-29 | End: 2018-10-06 | Stop reason: HOSPADM

## 2018-08-30 PROBLEM — I50.32 CHRONIC DIASTOLIC CONGESTIVE HEART FAILURE (HCC): Status: ACTIVE | Noted: 2018-08-30

## 2018-08-30 RX ORDER — LEVOTHYROXINE SODIUM 0.05 MG/1
50 TABLET ORAL DAILY
Qty: 90 TABLET | Refills: 1 | Status: SHIPPED | OUTPATIENT
Start: 2018-08-30 | End: 2018-12-27 | Stop reason: SDUPTHER

## 2018-08-30 NOTE — PROGRESS NOTES
[facility]      NAME: Raul Garcia  AGE: 72 y o  SEX: female  : 1945   MRN: [id number]    DATE: 2018  TIME: 8:57 AM    Assessment and Plan   1  Essential hypertension  Adequately controlled  - furosemide (LASIX) 40 mg tablet; Take 1 tablet (40 mg total) by mouth 2 (two) times a day  Dispense: 180 tablet; Refill: 1  - NT-BNP PRO    2  Atrial fibrillation, unspecified type (HCC)  In sinus rhythm  Status post ablation  On low-dose amiodarone  3  Obstructive sleep apnea  Utilizing CPAP  4  Prediabetes  Stable on diet  5  Hypercholesterolemia  Controlled on simvastatin  6  Depression, unspecified depression type  Generally well controlled  The patient does have some anxiety at times  - LORazepam (ATIVAN) 1 mg tablet; Take 1 tablet (1 mg total) by mouth 3 (three) times a day as needed for anxiety  Dispense: 30 tablet; Refill: 0    7  Migraine without aura and without status migrainosus, not intractable  Currently asymptomatic  On metoprolol, Topamax and nortriptyline for prophylaxis  8  Chronic diastolic congestive heart failure ([address] )  The patient has some dyspnea on exertion and peripheral edema  She does not appear to be overtly and heart failure though this mow be playing a role  Furosemide will be increased  We will recheck her labs  Return to office in:   1 month    Chief Complaint     Chief Complaint   Patient presents with    Follow-up     3 months, last colonoscopy        History of Present Illness     The patient returned to the office for follow-up of hypertension, hyperlipidemia, atrial fibrillation, status post ablation, chronic diastolic congestive heart failure, and migraine  Since her last visit she has had some increase in dyspnea on exertion  She denies chest pain  She is not short of breath at rest   She denies orthopnea or paroxysmal nocturnal dyspnea  She does have some edema  She has otherwise been doing well    She recently retired and is very happy about that  She has no issues with her current medications  The following portions of the patient's history were reviewed and updated as appropriate: allergies, current medications, past family history, past medical history, past social history, past surgical history and problem list     Review of Systems   Review of Systems   Constitutional: Negative  HENT: Negative for congestion, ear pain, postnasal drip, rhinorrhea, sore throat and trouble swallowing  Eyes: Negative for pain, discharge, redness and visual disturbance  Respiratory: Negative for cough, shortness of breath and wheezing  Cardiovascular: Positive for leg swelling  Negative for chest pain and palpitations  Gastrointestinal: Negative  Endocrine: Negative  Genitourinary: Negative for difficulty urinating, dysuria, frequency, hematuria and urgency  Musculoskeletal: Negative for arthralgias, gait problem, joint swelling and myalgias  Skin: Negative for rash  Neurological: Negative for dizziness, speech difficulty, weakness, light-headedness, numbness and headaches  Hematological: Negative  Psychiatric/Behavioral: Negative for confusion, decreased concentration, dysphoric mood and sleep disturbance  The patient is not nervous/anxious          Active Problem List     Patient Active Problem List   Diagnosis    [facility]    Essential hypertension    Prediabetes    Depression    Hypercholesterolemia    Migraine    Mitral regurgitation    Class 3 severe obesity due to excess calories with serious comorbidity and body mass index (BMI) of 40 0 to 44 9 in adult [facility])    Obstructive sleep apnea    Primary localized osteoarthritis of right knee    Stroke [facility] )    Syncope, near    History of stroke    Chronic diastolic congestive heart failure (HCC)       Objective   BP (!) 180/84 (BP Location: Left arm, Patient Position: Sitting, Cuff Size: Large)   Pulse 80   Temp 98 °F (36 7 °C) (Tympanic)   Resp 16   Ht 5' 7" (1 702 m)   Wt 123 kg (271 lb)   LMP  (LMP Unknown)   BMI 42 44 kg/m²     Physical Exam   Constitutional: She is oriented to person, place, and time  She appears well-developed  No distress  Obese   HENT:   Head: Normocephalic and atraumatic  Neck: Neck supple  No JVD present  No tracheal deviation present  No thyromegaly present  Cardiovascular: Normal rate, regular rhythm, normal heart sounds and intact distal pulses  Exam reveals no gallop and no friction rub  No murmur heard  Pulmonary/Chest: Effort normal and breath sounds normal  She has no wheezes  She has no rales  She exhibits no tenderness  Abdominal: Soft  Bowel sounds are normal  She exhibits no distension and no mass  There is no tenderness  There is no rebound  Musculoskeletal: Normal range of motion  She exhibits no tenderness  1+ edema  Lymphadenopathy:     She has no cervical adenopathy  Neurological: She is alert and oriented to person, place, and time  Skin: Skin is warm and dry  Psychiatric: She has a normal mood and affect   Her behavior is normal  Judgment and thought content normal        Pertinent Laboratory/Diagnostic Studies:  Appointment on 08/29/2018   Component Date Value Ref Range Status    WBC 08/29/2018 7 72  4 31 - 10 16 Thousand/uL Final    RBC 08/29/2018 4 08  3 81 - 5 12 Million/uL Final    Hemoglobin 08/29/2018 10 9* 11 5 - 15 4 g/dL Final    Hematocrit 08/29/2018 37 1  34 8 - 46 1 % Final    MCV 08/29/2018 91  82 - 98 fL Final    MCH 08/29/2018 26 7* 26 8 - 34 3 pg Final    MCHC 08/29/2018 29 4* 31 4 - 37 4 g/dL Final    RDW 08/29/2018 15 5* 11 6 - 15 1 % Final    MPV 08/29/2018 10 5  8 9 - 12 7 fL Final    Platelets 56/72/4965 300  149 - 390 Thousands/uL Final    nRBC 08/29/2018 0  /100 WBCs Final    Neutrophils Relative 08/29/2018 55  43 - 75 % Final    Immat GRANS % 08/29/2018 0  0 - 2 % Final    Lymphocytes Relative 08/29/2018 29  14 - 44 % Final    Monocytes Relative 08/29/2018 8  4 - 12 % Final    Eosinophils Relative 08/29/2018 7* 0 - 6 % Final    Basophils Relative 08/29/2018 1  0 - 1 % Final    Neutrophils Absolute 08/29/2018 4 24  1 85 - 7 62 Thousands/µL Final    Immature Grans Absolute 08/29/2018 0 02  0 00 - 0 20 Thousand/uL Final    Lymphocytes Absolute 08/29/2018 2 22  0 60 - 4 47 Thousands/µL Final    Monocytes Absolute 08/29/2018 0 61  0 17 - 1 22 Thousand/µL Final    Eosinophils Absolute 08/29/2018 0 56  0 00 - 0 61 Thousand/µL Final    Basophils Absolute 08/29/2018 0 07  0 00 - 0 10 Thousands/µL Final    TSH 3RD GENERATON 08/29/2018 28 601* 0 358 - 3 740 uIU/mL Final   Office Visit on 08/29/2018   Component Date Value Ref Range Status    NT-proBNP 08/29/2018 581* <125 pg/mL Final           Current Medications     Current Outpatient Prescriptions:     amiodarone 100 mg tablet, Take 1 tablet (100 mg total) by mouth daily, Disp: 90 tablet, Rfl: 3    apixaban (ELIQUIS) 5 mg, Take 5 mg by mouth 2 (two) times a day, Disp: , Rfl:     clopidogrel (PLAVIX) 75 mg tablet, Take 1 tablet (75 mg total) by mouth daily, Disp: 90 tablet, Rfl: 1    escitalopram (LEXAPRO) 10 mg tablet, Take 10 mg by mouth daily  , Disp: , Rfl:     LORazepam (ATIVAN) 1 mg tablet, Take 1 tablet (1 mg total) by mouth 3 (three) times a day as needed for anxiety, Disp: 30 tablet, Rfl: 0    meclizine (ANTIVERT) 25 mg tablet, Take 25 mg by mouth 3 (three) times a day as needed for dizziness , Disp: , Rfl:     metoprolol tartrate (LOPRESSOR) 100 mg tablet, Take 1 tablet (100 mg total) by mouth every 12 (twelve) hours, Disp: 180 tablet, Rfl: 2    Multiple Vitamin (MULTIVITAMIN) tablet, Take 1 tablet by mouth daily  , Disp: , Rfl:     nortriptyline (PAMELOR) 50 mg capsule, Take 1 capsule (50 mg total) by mouth daily at bedtime, Disp: 90 capsule, Rfl: 0    oxyCODONE-acetaminophen (PERCOCET) 5-325 mg per tablet, Take 1 tablet by mouth every 4 (four) hours as needed for moderate pain Max Daily Amount: 6 tablets, Disp: 50 tablet, Rfl: 0    potassium chloride (K-DUR,KLOR-CON) 20 mEq tablet, Take 1 tablet (20 mEq total) by mouth 2 (two) times a day, Disp: 180 tablet, Rfl: 3    simvastatin (ZOCOR) 10 mg tablet, Take 1 tablet (10 mg total) by mouth daily at bedtime, Disp: 90 tablet, Rfl: 3    topiramate (TOPAMAX) 25 mg sprinkle capsule, Take 1 capsule (25 mg total) by mouth 2 (two) times a day, Disp: 180 capsule, Rfl: 1    furosemide (LASIX) 40 mg tablet, Take 1 tablet (40 mg total) by mouth 2 (two) times a day, Disp: 180 tablet, Rfl: 1      Manjit Amanda MD

## 2018-08-31 LAB — HCV RNA SERPL QL NAA+PROBE: NEGATIVE

## 2018-10-05 ENCOUNTER — HOSPITAL ENCOUNTER (INPATIENT)
Facility: HOSPITAL | Age: 73
LOS: 1 days | Discharge: HOME | DRG: 309 | End: 2018-10-06
Attending: EMERGENCY MEDICINE | Admitting: FAMILY MEDICINE
Payer: MEDICARE

## 2018-10-05 ENCOUNTER — APPOINTMENT (EMERGENCY)
Dept: RADIOLOGY | Facility: HOSPITAL | Age: 73
DRG: 309 | End: 2018-10-05
Payer: MEDICARE

## 2018-10-05 DIAGNOSIS — I50.32 CHRONIC DIASTOLIC CHF (CONGESTIVE HEART FAILURE) (HCC): ICD-10-CM

## 2018-10-05 DIAGNOSIS — R00.1 SYMPTOMATIC BRADYCARDIA: Primary | ICD-10-CM

## 2018-10-05 DIAGNOSIS — I10 ESSENTIAL HYPERTENSION: ICD-10-CM

## 2018-10-05 PROBLEM — R42 POSTURAL DIZZINESS WITH PRESYNCOPE: Status: ACTIVE | Noted: 2018-10-05

## 2018-10-05 PROBLEM — R55 POSTURAL DIZZINESS WITH PRESYNCOPE: Status: ACTIVE | Noted: 2018-10-05

## 2018-10-05 LAB
ALBUMIN SERPL BCP-MCNC: 3.3 G/DL (ref 3.5–5)
ALP SERPL-CCNC: 131 U/L (ref 46–116)
ALT SERPL W P-5'-P-CCNC: 60 U/L (ref 12–78)
ANION GAP SERPL CALCULATED.3IONS-SCNC: 10 MMOL/L (ref 4–13)
AST SERPL W P-5'-P-CCNC: 45 U/L (ref 5–45)
ATRIAL RATE: 79 BPM
BASOPHILS # BLD AUTO: 0.07 THOUSANDS/ΜL (ref 0–0.1)
BASOPHILS NFR BLD AUTO: 1 % (ref 0–1)
BILIRUB SERPL-MCNC: 0.26 MG/DL (ref 0.2–1)
BUN SERPL-MCNC: 12 MG/DL (ref 5–25)
CALCIUM SERPL-MCNC: 8.7 MG/DL (ref 8.3–10.1)
CHLORIDE SERPL-SCNC: 105 MMOL/L (ref 100–108)
CO2 SERPL-SCNC: 26 MMOL/L (ref 21–32)
CREAT SERPL-MCNC: 1.39 MG/DL (ref 0.6–1.3)
EOSINOPHIL # BLD AUTO: 0.79 THOUSAND/ΜL (ref 0–0.61)
EOSINOPHIL NFR BLD AUTO: 10 % (ref 0–6)
ERYTHROCYTE [DISTWIDTH] IN BLOOD BY AUTOMATED COUNT: 16.1 % (ref 11.6–15.1)
GFR SERPL CREATININE-BSD FRML MDRD: 38 ML/MIN/1.73SQ M
GLUCOSE SERPL-MCNC: 120 MG/DL (ref 65–140)
HCT VFR BLD AUTO: 34.3 % (ref 34.8–46.1)
HGB BLD-MCNC: 10.5 G/DL (ref 11.5–15.4)
IMM GRANULOCYTES # BLD AUTO: 0.03 THOUSAND/UL (ref 0–0.2)
IMM GRANULOCYTES NFR BLD AUTO: 0 % (ref 0–2)
LYMPHOCYTES # BLD AUTO: 2.54 THOUSANDS/ΜL (ref 0.6–4.47)
LYMPHOCYTES NFR BLD AUTO: 32 % (ref 14–44)
MCH RBC QN AUTO: 26.9 PG (ref 26.8–34.3)
MCHC RBC AUTO-ENTMCNC: 30.6 G/DL (ref 31.4–37.4)
MCV RBC AUTO: 88 FL (ref 82–98)
MONOCYTES # BLD AUTO: 0.67 THOUSAND/ΜL (ref 0.17–1.22)
MONOCYTES NFR BLD AUTO: 9 % (ref 4–12)
NEUTROPHILS # BLD AUTO: 3.76 THOUSANDS/ΜL (ref 1.85–7.62)
NEUTS SEG NFR BLD AUTO: 48 % (ref 43–75)
NRBC BLD AUTO-RTO: 0 /100 WBCS
NT-PROBNP SERPL-MCNC: 649 PG/ML
P AXIS: 81 DEGREES
PLATELET # BLD AUTO: 335 THOUSANDS/UL (ref 149–390)
PMV BLD AUTO: 9.6 FL (ref 8.9–12.7)
POTASSIUM SERPL-SCNC: 4.3 MMOL/L (ref 3.5–5.3)
PR INTERVAL: 280 MS
PROT SERPL-MCNC: 7.3 G/DL (ref 6.4–8.2)
QRS AXIS: 35 DEGREES
QRSD INTERVAL: 92 MS
QT INTERVAL: 432 MS
QTC INTERVAL: 495 MS
RBC # BLD AUTO: 3.91 MILLION/UL (ref 3.81–5.12)
SODIUM SERPL-SCNC: 141 MMOL/L (ref 136–145)
T WAVE AXIS: 58 DEGREES
TROPONIN I SERPL-MCNC: <0.02 NG/ML
VENTRICULAR RATE: 79 BPM
WBC # BLD AUTO: 7.86 THOUSAND/UL (ref 4.31–10.16)

## 2018-10-05 PROCEDURE — 85025 COMPLETE CBC W/AUTO DIFF WBC: CPT | Performed by: EMERGENCY MEDICINE

## 2018-10-05 PROCEDURE — 94760 N-INVAS EAR/PLS OXIMETRY 1: CPT

## 2018-10-05 PROCEDURE — 99222 1ST HOSP IP/OBS MODERATE 55: CPT | Performed by: INTERNAL MEDICINE

## 2018-10-05 PROCEDURE — 93010 ELECTROCARDIOGRAM REPORT: CPT | Performed by: INTERNAL MEDICINE

## 2018-10-05 PROCEDURE — 94660 CPAP INITIATION&MGMT: CPT

## 2018-10-05 PROCEDURE — 99222 1ST HOSP IP/OBS MODERATE 55: CPT | Performed by: FAMILY MEDICINE

## 2018-10-05 PROCEDURE — 99285 EMERGENCY DEPT VISIT HI MDM: CPT

## 2018-10-05 PROCEDURE — 36415 COLL VENOUS BLD VENIPUNCTURE: CPT | Performed by: EMERGENCY MEDICINE

## 2018-10-05 PROCEDURE — 71046 X-RAY EXAM CHEST 2 VIEWS: CPT

## 2018-10-05 PROCEDURE — 84484 ASSAY OF TROPONIN QUANT: CPT | Performed by: EMERGENCY MEDICINE

## 2018-10-05 PROCEDURE — 93005 ELECTROCARDIOGRAM TRACING: CPT

## 2018-10-05 PROCEDURE — 80053 COMPREHEN METABOLIC PANEL: CPT | Performed by: EMERGENCY MEDICINE

## 2018-10-05 PROCEDURE — 83880 ASSAY OF NATRIURETIC PEPTIDE: CPT | Performed by: EMERGENCY MEDICINE

## 2018-10-05 RX ORDER — OXYCODONE HYDROCHLORIDE AND ACETAMINOPHEN 5; 325 MG/1; MG/1
1 TABLET ORAL EVERY 4 HOURS PRN
Status: DISCONTINUED | OUTPATIENT
Start: 2018-10-05 | End: 2018-10-06 | Stop reason: HOSPADM

## 2018-10-05 RX ORDER — CLOPIDOGREL BISULFATE 75 MG/1
75 TABLET ORAL DAILY
Status: DISCONTINUED | OUTPATIENT
Start: 2018-10-05 | End: 2018-10-06 | Stop reason: HOSPADM

## 2018-10-05 RX ORDER — MECLIZINE HCL 12.5 MG/1
25 TABLET ORAL 3 TIMES DAILY PRN
Status: DISCONTINUED | OUTPATIENT
Start: 2018-10-05 | End: 2018-10-06 | Stop reason: HOSPADM

## 2018-10-05 RX ORDER — ESCITALOPRAM OXALATE 10 MG/1
10 TABLET ORAL DAILY
Status: DISCONTINUED | OUTPATIENT
Start: 2018-10-05 | End: 2018-10-06 | Stop reason: HOSPADM

## 2018-10-05 RX ORDER — AMIODARONE HYDROCHLORIDE 200 MG/1
100 TABLET ORAL DAILY
Status: DISCONTINUED | OUTPATIENT
Start: 2018-10-05 | End: 2018-10-05

## 2018-10-05 RX ORDER — PRAVASTATIN SODIUM 10 MG
10 TABLET ORAL
Status: DISCONTINUED | OUTPATIENT
Start: 2018-10-05 | End: 2018-10-06 | Stop reason: HOSPADM

## 2018-10-05 RX ORDER — TOPIRAMATE 25 MG/1
25 CAPSULE, COATED PELLETS ORAL 2 TIMES DAILY
Status: DISCONTINUED | OUTPATIENT
Start: 2018-10-05 | End: 2018-10-06 | Stop reason: HOSPADM

## 2018-10-05 RX ORDER — LEVOTHYROXINE SODIUM 0.05 MG/1
50 TABLET ORAL
Status: DISCONTINUED | OUTPATIENT
Start: 2018-10-06 | End: 2018-10-06 | Stop reason: HOSPADM

## 2018-10-05 RX ORDER — POTASSIUM CHLORIDE 20 MEQ/1
20 TABLET, EXTENDED RELEASE ORAL 2 TIMES DAILY
Status: DISCONTINUED | OUTPATIENT
Start: 2018-10-05 | End: 2018-10-06 | Stop reason: HOSPADM

## 2018-10-05 RX ORDER — IBUPROFEN 400 MG/1
400 TABLET ORAL EVERY 6 HOURS PRN
Status: DISCONTINUED | OUTPATIENT
Start: 2018-10-05 | End: 2018-10-06 | Stop reason: HOSPADM

## 2018-10-05 RX ORDER — METOPROLOL TARTRATE 50 MG/1
50 TABLET, FILM COATED ORAL EVERY 12 HOURS SCHEDULED
Status: DISCONTINUED | OUTPATIENT
Start: 2018-10-05 | End: 2018-10-06 | Stop reason: HOSPADM

## 2018-10-05 RX ORDER — LORAZEPAM 1 MG/1
1 TABLET ORAL 3 TIMES DAILY PRN
Status: DISCONTINUED | OUTPATIENT
Start: 2018-10-05 | End: 2018-10-06 | Stop reason: HOSPADM

## 2018-10-05 RX ORDER — ASPIRIN 81 MG/1
324 TABLET, CHEWABLE ORAL ONCE
Status: COMPLETED | OUTPATIENT
Start: 2018-10-05 | End: 2018-10-05

## 2018-10-05 RX ORDER — FUROSEMIDE 40 MG/1
40 TABLET ORAL 2 TIMES DAILY
Status: DISCONTINUED | OUTPATIENT
Start: 2018-10-05 | End: 2018-10-06

## 2018-10-05 RX ADMIN — IBUPROFEN 400 MG: 400 TABLET ORAL at 14:51

## 2018-10-05 RX ADMIN — TOPIRAMATE 25 MG: 25 CAPSULE, COATED PELLETS ORAL at 17:04

## 2018-10-05 RX ADMIN — METOPROLOL TARTRATE 50 MG: 50 TABLET ORAL at 21:34

## 2018-10-05 RX ADMIN — APIXABAN 5 MG: 5 TABLET, FILM COATED ORAL at 17:03

## 2018-10-05 RX ADMIN — FUROSEMIDE 40 MG: 40 TABLET ORAL at 17:03

## 2018-10-05 RX ADMIN — MECLIZINE 25 MG: 12.5 TABLET ORAL at 17:02

## 2018-10-05 RX ADMIN — POTASSIUM CHLORIDE 20 MEQ: 1500 TABLET, EXTENDED RELEASE ORAL at 17:03

## 2018-10-05 RX ADMIN — ASPIRIN 81 MG 324 MG: 81 TABLET ORAL at 11:21

## 2018-10-05 NOTE — ASSESSMENT & PLAN NOTE
Chronic and stable and compensated  Continue Lasix 40 mg daily  Had recent echo 78/97/2652-[facility] normal LV systolic function with ejection fraction estimated to be 60%, there were no regional wall motion abnormalities, wall thickness was mildly increased, there was mild concentric hypertrophy, grade 1 diastolic dysfunction  There was mild mitral valve stenosis with mild regurgitation, moderate aortic valve regurgitation, mild tricuspid valve regurgitation

## 2018-10-05 NOTE — H&P
H&P- Dory Mcghee 1945, 72 y o  female MRN: [id number]    Unit/Bed#: E4 -01 Encounter: [id number]    Primary Care Provider: Brionna Moreno MD   Date and time admitted to hospital: 10/5/2018 10:54 AM        * Symptomatic bradycardia   Assessment & Plan    Keep on telemetry  Check serial troponins  Cardiology consulted for possibility of pacemaker placement  Beta-blocker and amiodarone held on admission  Chronic diastolic CHF (congestive heart failure) (HCC)   Assessment & Plan    Chronic and stable and compensated  Continue Lasix 40 mg daily  Had recent echo 26/31/3051-[facility] normal LV systolic function with ejection fraction estimated to be 60%, there were no regional wall motion abnormalities, wall thickness was mildly increased, there was mild concentric hypertrophy, grade 1 diastolic dysfunction  There was mild mitral valve stenosis with mild regurgitation, moderate aortic valve regurgitation, mild tricuspid valve regurgitation  Atrial fibrillation (HCC)   Assessment & Plan    Beta-blocker and amiodarone held due to symptomatic bradycardia  Continue anticoagulation with Eliquis  Essential hypertension   Assessment & Plan    Beta-blocker held due to symptomatic bradycardia  Obstructive sleep apnea   Assessment & Plan    CPAP q h s  History of stroke   Assessment & Plan    Continue aspirin, Plavix, Pravachol  Depression   Assessment & Plan    Continue Lexapro       Hypercholesterolemia   Assessment & Plan    Continue Pravachol     Class 3 severe obesity due to excess calories with serious comorbidity and body mass index (BMI) of 40 0 to 44 9 in [facility])   Assessment & Plan    Needs weight loss counseling and dietary modification             VTE Prophylaxis: Apixaban (Eliquis)  / reason for no mechanical VTE prophylaxis Patient refused   Code Status:  Full code  POLST: There is no POLST form on file for this patient (pre-hospital)  Discussion with family:  Yes with daughter at bedside    Anticipated Length of Stay:  Patient will be admitted on an Inpatient basis with an anticipated length of stay of  greater than 2 midnights  Justification for Hospital Stay:  Symptomatic bradycardia    Total Time for Visit, including Counseling / Coordination of Care: 20 minutes  Greater than 50% of this total time spent on direct patient counseling and coordination of care  Chief Complaint:  Shortness of breath when active for several weeks  History of Present Illness:    Marin Welch is a 72 y o  female who presents with complaint of dyspnea on exertion for several weeks causing almost near-syncope  The patient states that her symptoms have been on going and mostly pronounced on worse when she stands up to walk or exert herself with some form of activity  She states that it has been going on for several weeks and only got worse today which was why she came to the ED at [place] [address] for evaluation  She states that her symptoms are better when she is laying down in bed and resting  She denies any chest pain, palpitations, fevers, chills, nausea, vomiting  The patient saw her primary care physician recently who increased her Lasix from 40 mg daily to 40 mg b i d  During evaluation in the ED at [place] [address] she had a chest x-ray that was completely negative  She also had an EKG that showed evidence of sinus rhythm with 1st degree AV block  Patient has continued to be kept on the cardiac monitor  She is currently asymptomatic but only complains of a headache  Review of Systems:    Review of Systems   Constitutional: Negative  HENT: Negative  Eyes: Negative  Respiratory: Positive for shortness of breath  Cardiovascular: Negative  Gastrointestinal: Negative  Endocrine: Negative  Genitourinary: Negative  Musculoskeletal: Negative  Skin: Negative  Allergic/Immunologic: Negative          Past Medical and Surgical History:     Past Medical History:   Diagnosis Date    Atrial fibrillation ([address] )     CHF (congestive heart failure) (HCC)     Hyperlipidemia     Hypertension     Migraine     Polyp of sigmoid colon     Prediabetes     Stroke [facility])        Past Surgical History:   Procedure Laterality Date    APPENDECTOMY      CARDIAC ELECTROPHYSIOLOGY STUDY AND ABLATION      CHOLECYSTECTOMY         Meds/Allergies:    Prior to Admission medications    Medication Sig Start Date End Date Taking?  Authorizing Provider   amiodarone 100 mg tablet Take 1 tablet (100 mg total) by mouth daily 5/23/18  Yes Steve Ontiveros DO   apixaban (ELIQUIS) 5 mg Take 5 mg by mouth 2 (two) times a day   Yes Historical Provider, MD   clopidogrel (PLAVIX) 75 mg tablet Take 1 tablet (75 mg total) by mouth daily 6/20/18  Yes Madonna Castellon MD   escitalopram (LEXAPRO) 10 mg tablet Take 10 mg by mouth daily     Yes Historical Provider, MD   furosemide (LASIX) 40 mg tablet Take 1 tablet (40 mg total) by mouth 2 (two) times a day 8/29/18  Yes Madonna Castellon MD   levothyroxine 50 mcg tablet Take 1 tablet (50 mcg total) by mouth daily 8/30/18  Yes Madonna Castellon MD   metoprolol tartrate (LOPRESSOR) 100 mg tablet Take 1 tablet (100 mg total) by mouth every 12 (twelve) hours 8/13/18  Yes Linsey Rowley DO   Multiple Vitamin (MULTIVITAMIN) tablet Take 1 tablet by mouth daily     Yes Historical Provider, MD   nortriptyline (PAMELOR) 50 mg capsule Take 1 capsule (50 mg total) by mouth daily at bedtime  Patient taking differently: Take 50 mg by mouth daily   8/22/18  Yes Madonna Castellon MD   potassium chloride (K-DUR,KLOR-CON) 20 mEq tablet Take 1 tablet (20 mEq total) by mouth 2 (two) times a day 2/13/18  Yes Madonna Castellon MD   simvastatin (ZOCOR) 10 mg tablet Take 1 tablet (10 mg total) by mouth daily at bedtime 1/31/18  Yes Madonna Castellon MD   topiramate (TOPAMAX) 25 mg sprinkle capsule Take 1 capsule (25 mg total) by mouth 2 (two) times a day 5/3/18  Yes Madonna Castellon MD LORazepam (ATIVAN) 1 mg tablet Take 1 tablet (1 mg total) by mouth 3 (three) times a day as needed for anxiety 8/29/18   Mariellen Moritz, MD   meclizine (ANTIVERT) 25 mg tablet Take 25 mg by mouth 3 (three) times a day as needed for dizziness     Historical Provider, MD   oxyCODONE-acetaminophen (PERCOCET) 5-325 mg per tablet Take 1 tablet by mouth every 4 (four) hours as needed for moderate pain Max Daily Amount: 6 tablets 5/22/18   Mariellen Moritz, MD     I have reviewed home medications with patient personally  Allergies: No Known Allergies    Social History:     Marital Status:    Occupation:  None  Patient Pre-hospital Living Situation:  At home  Patient Pre-hospital Level of Mobility:  Ambulatory  Patient Pre-hospital Diet Restrictions:  Unknown  Substance Use History:   History   Alcohol Use No     History   Smoking Status    Former Smoker    Quit date: 2/11/2011   Smokeless Tobacco    Never Used     History   Drug Use No       Family History:    Family History   Problem Relation Age of Onset    Diabetes Mother     Heart disease Mother     Hypertension Mother     Arthritis Mother     Coronary artery disease Mother        Physical Exam:     Vitals:   Blood Pressure: 153/96 (10/05/18 1345)  Pulse: 68 (10/05/18 1345)  Temperature: 97 6 °F (36 4 °C) (10/05/18 1345)  Temp Source: Temporal (10/05/18 1345)  Respirations: 20 (10/05/18 1345)  Weight - Scale: 125 kg (275 lb) (10/05/18 1058)  SpO2: 98 % (10/05/18 1345)    Physical Exam   Constitutional: She is oriented to person, place, and time  She appears well-developed and well-nourished  Morbidly obese body habitus   HENT:   Head: Normocephalic and atraumatic  Eyes: Pupils are equal, round, and reactive to light  Neck: Normal range of motion  Neck supple  No JVD present  Cardiovascular: Normal rate and normal heart sounds  Irregular rhythm  Pulmonary/Chest: Effort normal and breath sounds normal    Abdominal: Soft   Bowel sounds are normal    Musculoskeletal: Normal range of motion  Bilateral mid lower leg to ankle 1+ edema   Lymphadenopathy:     She has no cervical adenopathy  Neurological: She is alert and oriented to person, place, and time  Skin: Skin is warm and dry  Psychiatric: She has a normal mood and affect  Additional Data:     Lab Results: I have personally reviewed pertinent reports  Results from last 7 days  Lab Units 10/05/18  1110   WBC Thousand/uL 7 86   HEMOGLOBIN g/dL 10 5*   HEMATOCRIT % 34 3*   PLATELETS Thousands/uL 335   NEUTROS ABS Thousands/µL 3 76   NEUTROS PCT % 48   LYMPHS PCT % 32   MONOS PCT % 9   EOS PCT % 10*       Results from last 7 days  Lab Units 10/05/18  1110   SODIUM mmol/L 141   POTASSIUM mmol/L 4 3   CHLORIDE mmol/L 105   CO2 mmol/L 26   BUN mg/dL 12   CREATININE mg/dL 1 39*   ANION GAP mmol/L 10   CALCIUM mg/dL 8 7   ALBUMIN g/dL 3 3*   TOTAL BILIRUBIN mg/dL 0 26   ALK PHOS U/L 131*   ALT U/L 60   AST U/L 45                       Imaging: I have personally reviewed pertinent reports  XR chest 2 views   ED Interpretation by Cecille Colon DO (10/05 1207)   No acute disease      Final Result by Estevan Stein MD (10/05 1223)      No acute cardiopulmonary disease  Workstation performed: [id number]             EKG, Pathology, and Other Studies Reviewed on Admission:   · EKG:  Chest x-ray PA and lateral, 12 lead EKG  Allscripts / Epic Records Reviewed: No     ** Please Note: This note has been constructed using a voice recognition system   **

## 2018-10-05 NOTE — ED PROVIDER NOTES
History  Chief Complaint   Patient presents with    Shortness of Breath     SOB, lightheadedness/dizziness started this morning, worse w/ambulation  denies fever/recent illness  "feel like  I am going to pass out"     A 74-year-old female with past medical history of hyperlipidemia, hypertension, prediabetes, CVA, CHF and AFib s/p ablation; presents after an episode of shortness of breath  Patient states she has been having increasing shortness of breath over the past several weeks  Shortness of breath is mostly with exertion, improving with rest   Today while walking through a store she developed significant shortness of breath, associated with lightheadedness and feeling as though she was going to pass out  Patient sat down and gradually had improvement in her symptoms  Patient denies associated fever, chills, chest pain, cough, abdominal pain, nausea, vomiting and diarrhea  Patient reports having peripheral edema at baseline, left greater than right, however over the past several weeks the edema has worsened  Patient was seen by her PCP three weeks ago who increased her Lasix to 40 mg BID  Patient has been compliant with his does  A/P:  Dyspnea with exertion, patient currently resting comfortably  Lungs clear to auscultation  Concern for possible CHF exacerbation as etiology of patient's symptoms  EKG is normal sinus rhythm, however with minimal ST elevations inferiorly  Will obtain complete cardiac workup and chest x-ray for further evaluation  Discussed with patient that she will likely require admission for further treatment  Will give full-dose aspirin now  History provided by:  Patient      Prior to Admission Medications   Prescriptions Last Dose Informant Patient Reported? Taking?    LORazepam (ATIVAN) 1 mg tablet Unknown at Unknown time  No No   Sig: Take 1 tablet (1 mg total) by mouth 3 (three) times a day as needed for anxiety   Multiple Vitamin (MULTIVITAMIN) tablet 10/5/2018 at Unknown time Self Yes Yes   Sig: Take 1 tablet by mouth daily     amiodarone 100 mg tablet 10/5/2018 at Unknown time  No Yes   Sig: Take 1 tablet (100 mg total) by mouth daily   apixaban (ELIQUIS) 5 mg 10/5/2018 at Unknown time  Yes Yes   Sig: Take 5 mg by mouth 2 (two) times a day   clopidogrel (PLAVIX) 75 mg tablet 10/5/2018 at Unknown time  No Yes   Sig: Take 1 tablet (75 mg total) by mouth daily   escitalopram (LEXAPRO) 10 mg tablet 10/5/2018 at Unknown time Self Yes Yes   Sig: Take 10 mg by mouth daily     furosemide (LASIX) 40 mg tablet 10/5/2018 at Unknown time  No Yes   Sig: Take 1 tablet (40 mg total) by mouth 2 (two) times a day   levothyroxine 50 mcg tablet 10/5/2018 at Unknown time  No Yes   Sig: Take 1 tablet (50 mcg total) by mouth daily   meclizine (ANTIVERT) 25 mg tablet Unknown at Unknown time Self Yes No   Sig: Take 25 mg by mouth 3 (three) times a day as needed for dizziness    metoprolol tartrate (LOPRESSOR) 100 mg tablet 10/5/2018 at Unknown time  No Yes   Sig: Take 1 tablet (100 mg total) by mouth every 12 (twelve) hours   nortriptyline (PAMELOR) 50 mg capsule 10/5/2018 at Unknown time  No Yes   Sig: Take 1 capsule (50 mg total) by mouth daily at bedtime   Patient taking differently: Take 50 mg by mouth daily     oxyCODONE-acetaminophen (PERCOCET) 5-325 mg per tablet Unknown at Unknown time Self No No   Sig: Take 1 tablet by mouth every 4 (four) hours as needed for moderate pain Max Daily Amount: 6 tablets   potassium chloride (K-DUR,KLOR-CON) 20 mEq tablet 10/5/2018 at Unknown time Self No Yes   Sig: Take 1 tablet (20 mEq total) by mouth 2 (two) times a day   simvastatin (ZOCOR) 10 mg tablet 10/5/2018 at Unknown time Self No Yes   Sig: Take 1 tablet (10 mg total) by mouth daily at bedtime   topiramate (TOPAMAX) 25 mg sprinkle capsule 10/5/2018 at Unknown time Self No Yes   Sig: Take 1 capsule (25 mg total) by mouth 2 (two) times a day      Facility-Administered Medications: None       Past Medical History:   Diagnosis Date    Atrial fibrillation (HCC)     CHF (congestive heart failure) (HCC)     Hyperlipidemia     Hypertension     Migraine     Polyp of sigmoid colon     Prediabetes     Stroke [facility])        Past Surgical History:   Procedure Laterality Date    APPENDECTOMY      CARDIAC ELECTROPHYSIOLOGY STUDY AND ABLATION      CHOLECYSTECTOMY         Family History   Problem Relation Age of Onset    Diabetes Mother     Heart disease Mother     Hypertension Mother     Arthritis Mother     Coronary artery disease Mother      I have reviewed and agree with the history as documented  Social History   Substance Use Topics    Smoking status: Former Smoker     Quit date: 2/11/2011    Smokeless tobacco: Never Used    Alcohol use No        Review of Systems   Respiratory: Positive for shortness of breath  Cardiovascular: Positive for leg swelling  Neurological: Positive for light-headedness  All other systems reviewed and are negative  Physical Exam  Physical Exam   General Appearance: alert and oriented, nad, non toxic appearing  Skin:  Warm, dry, intact  HEENT: atraumatic, normocephalic  Neck: Supple, trachea midline  Cardiac: RRR; no murmurs, rub, gallops  Pulmonary: lungs CTAB; no wheezes, rales, rhonchi  Gastrointestinal: abdomen soft, nontender, nondistended; no guarding or rebound tenderness; good bowel sounds, no mass or bruits  Extremities: Edema appreciated to bilateral lower extremities, L>R (pt reports this is baseline)    2+ pulses; no calf tenderness, no clubbing, no cyanosis  Neuro:  no focal motor or sensory deficits, CN 2-12 grossly intact  Psych:  Normal mood and affect, normal judgement and insight      Vital Signs  ED Triage Vitals   Temperature Pulse Respirations Blood Pressure SpO2   10/05/18 1058 10/05/18 1058 10/05/18 1058 10/05/18 1058 10/05/18 1058   97 9 °F (36 6 °C) 55 (!) 24 129/61 96 %      Temp Source Heart Rate Source Patient Position - Orthostatic VS BP Location FiO2 (%)   10/05/18 1345 10/05/18 1226 10/05/18 1323 10/05/18 1226 --   Temporal Monitor Lying Left arm       Pain Score       10/05/18 1058       No Pain           Vitals:    10/06/18 0006 10/06/18 0732 10/06/18 0734 10/06/18 0736   BP: 137/86 122/68 121/71 135/74   Pulse: 73 68 73 76   Patient Position - Orthostatic VS: Lying Lying - Orthostatic VS Sitting - Orthostatic VS Standing - Orthostatic VS       Visual Acuity      ED Medications  Medications   aspirin chewable tablet 324 mg (324 mg Oral Given 10/5/18 1121)       Diagnostic Studies  Results Reviewed     Procedure Component Value Units Date/Time    B-type natriuretic peptide [[id number]]  (Abnormal) Collected:  10/05/18 1110    Lab Status:  Final result Specimen:  Blood from Arm, Right Updated:  10/05/18 1148     NT-proBNP 649 (H) pg/mL     Troponin I [[id number]]  (Normal) Collected:  10/05/18 1110    Lab Status:  Final result Specimen:  Blood from Arm, Right Updated:  10/05/18 1142     Troponin I <0 02 ng/mL     Comprehensive metabolic panel [[id number]]  (Abnormal) Collected:  10/05/18 1110    Lab Status:  Final result Specimen:  Blood from Arm, Right Updated:  10/05/18 1141     Sodium 141 mmol/L      Potassium 4 3 mmol/L      Chloride 105 mmol/L      CO2 26 mmol/L      ANION GAP 10 mmol/L      BUN 12 mg/dL      Creatinine 1 39 (H) mg/dL      Glucose 120 mg/dL      Calcium 8 7 mg/dL      AST 45 U/L      ALT 60 U/L      Alkaline Phosphatase 131 (H) U/L      Total Protein 7 3 g/dL      Albumin 3 3 (L) g/dL      Total Bilirubin 0 26 mg/dL      eGFR 38 ml/min/1 73sq m     Narrative:         National Kidney Disease Education Program recommendations are as follows:  GFR calculation is accurate only with a steady state creatinine  Chronic Kidney disease less than 60 ml/min/1 73 sq  meters  Kidney failure less than 15 ml/min/1 73 sq  meters      CBC and differential [[id number]]  (Abnormal) Collected:  10/05/18 1110    Lab Status:  Final result Specimen: Blood from Arm, Right Updated:  10/05/18 1119     WBC 7 86 Thousand/uL      RBC 3 91 Million/uL      Hemoglobin 10 5 (L) g/dL      Hematocrit 34 3 (L) %      MCV 88 fL      MCH 26 9 pg      MCHC 30 6 (L) g/dL      RDW 16 1 (H) %      MPV 9 6 fL      Platelets 082 Thousands/uL      nRBC 0 /100 WBCs      Neutrophils Relative 48 %      Immat GRANS % 0 %      Lymphocytes Relative 32 %      Monocytes Relative 9 %      Eosinophils Relative 10 (H) %      Basophils Relative 1 %      Neutrophils Absolute 3 76 Thousands/µL      Immature Grans Absolute 0 03 Thousand/uL      Lymphocytes Absolute 2 54 Thousands/µL      Monocytes Absolute 0 67 Thousand/µL      Eosinophils Absolute 0 79 (H) Thousand/µL      Basophils Absolute 0 07 Thousands/µL                  XR chest 2 views   ED Interpretation by Katelyn Zaldivar DO (10/05 1207)   No acute disease      Final Result by Alison Martinez MD (10/05 1223)      No acute cardiopulmonary disease  Workstation performed: [id number]                    Procedures  Procedures   ECG 12 Lead Documentation  Date/Time: today/date: 10/6/2018  Performed by: Loreto Vidal    ECG reviewed by me, the ED Provider: yes    Patient location:  ED   Previous ECG:  Changes noted   Rate:  79  ECG rate assessment: normal    Rhythm: sinus rhythm    Ectopy:  none    QRS axis:  Normal  Intervals: normal   Q waves: None   ST segments:  0 5 mm elevation in II, III and aVF  T waves: normal      Impression: NSR with ischemic changes inferiorly        Phone Contacts  ED Phone Contact    ED Course  ED Course as of Oct 06 1709   Fri Oct 05, 2018   1153 Pt with an episode of dizziness in room, found to be bradycardic in 50's  Rhythm strip consistent with possible afib, as no P waves seen  Will attempt to obtain EKG when symptomatic  1205 Baseline when compared to labs obtained one month ago Creatinine: (!) 1 39   1206 Troponin I: <0 02   1240 Pt resting comfortably, updated on lab work    Reviewed tele monitor, pt with frequent episodes of bradycardia down to 40's  One episode with isolated p wave, possible heart block vs slow afib  Likely cause of dizziness and near syncope  Will admit for further evaluation  [facility]  CritCare Time    Disposition  Final diagnoses:   Symptomatic bradycardia     Time reflects when diagnosis was documented in both MDM as applicable and the Disposition within this note     Time User Action Codes Description Comment    10/5/2018 12:51 PM [place], Caroline Add [R00 1] Symptomatic bradycardia     10/5/2018  2:24 PM IkedioOk [place] Modify [R00 1] Symptomatic bradycardia     10/6/2018 12:28 PM Ikediobi, Okwu Add [I10] Essential hypertension     10/6/2018 12:28 PM Ikedioscarlett, Okwu Add [U05 67] Chronic diastolic CHF (congestive heart failure) [facility])       ED Disposition     ED Disposition Condition Comment    Admit  Case was discussed with TAMI and the patient's admission status was agreed to be Admission Status: inpatient status to the service of Dr Haydee Arce           Follow-up Information    None         Discharge Medication List as of 10/6/2018 12:40 PM      CONTINUE these medications which have CHANGED    Details   furosemide (LASIX) 40 mg tablet Take 1 tablet (40 mg total) by mouth daily, Starting Sun 10/7/2018, Normal      metoprolol tartrate (LOPRESSOR) 50 mg tablet Take 1 tablet (50 mg total) by mouth every 12 (twelve) hours, Starting Sat 10/6/2018, Normal         CONTINUE these medications which have NOT CHANGED    Details   amiodarone 100 mg tablet Take 1 tablet (100 mg total) by mouth daily, Starting Wed 5/23/2018, Normal      apixaban (ELIQUIS) 5 mg Take 5 mg by mouth 2 (two) times a day, Historical Med      clopidogrel (PLAVIX) 75 mg tablet Take 1 tablet (75 mg total) by mouth daily, Starting Wed 6/20/2018, Normal      escitalopram (LEXAPRO) 10 mg tablet Take 10 mg by mouth daily  , Historical Med      levothyroxine 50 mcg tablet Take 1 tablet (50 mcg total) by mouth daily, Starting Thu 8/30/2018, Normal      Multiple Vitamin (MULTIVITAMIN) tablet Take 1 tablet by mouth daily  , Historical Med      nortriptyline (PAMELOR) 50 mg capsule Take 1 capsule (50 mg total) by mouth daily at bedtime, Starting Wed 8/22/2018, Normal      potassium chloride (K-DUR,KLOR-CON) 20 mEq tablet Take 1 tablet (20 mEq total) by mouth 2 (two) times a day, Starting Tue 2/13/2018, Normal      simvastatin (ZOCOR) 10 mg tablet Take 1 tablet (10 mg total) by mouth daily at bedtime, Starting Wed 1/31/2018, Normal      topiramate (TOPAMAX) 25 mg sprinkle capsule Take 1 capsule (25 mg total) by mouth 2 (two) times a day, Starting Thu 5/3/2018, Normal      LORazepam (ATIVAN) 1 mg tablet Take 1 tablet (1 mg total) by mouth 3 (three) times a day as needed for anxiety, Starting Wed 8/29/2018, Normal      meclizine (ANTIVERT) 25 mg tablet Take 25 mg by mouth 3 (three) times a day as needed for dizziness , Historical Med      oxyCODONE-acetaminophen (PERCOCET) 5-325 mg per tablet Take 1 tablet by mouth every 4 (four) hours as needed for moderate pain Max Daily Amount: 6 tablets, Starting Tue 5/22/2018, Normal             Outpatient Discharge Orders  Ambulatory referral to Cardiology   Standing Status: Future  Standing Exp   Date: 04/06/19     Activity as tolerated         ED Provider  Electronically Signed by           Phuong Alvarado DO  10/06/18 2600

## 2018-10-05 NOTE — ASSESSMENT & PLAN NOTE
Keep on telemetry  Check serial troponins  Cardiology consulted for possibility of pacemaker placement  Beta-blocker and amiodarone held on admission

## 2018-10-05 NOTE — ASSESSMENT & PLAN NOTE
Beta-blocker and amiodarone held due to symptomatic bradycardia  Continue anticoagulation with Eliquis

## 2018-10-05 NOTE — CONSULTS
Consult - Cardiology   Dory Mcghee 72 y o  female MRN: [id number]  Unit/Bed#: E4 -01 Encounter: [id number]        Reason For Consult:  Near syncope               Assessment and Plan:     1  Intermittent but recurrent symptoms of lightheadedness/near syncope:     -No true syncope  -uncertain etiology with symptom complex as discussed in HPI noting all events occur within minutes of standing possibly representing orthostatic hypotension versus vasovagal response   2  First-degree AV block:  OH interval 200ms    3  History of atrial fibrillation:     -S/p fibrillation and flutter ablation January 2018   -Eliquis anticoagulation    -Pre-hospital amiodarone 100 mg daily plus metoprolol 100 mg b i d   4  History of CVA-2013:  No sequela   -Patient remains on Plavix in the aftermath of her CVA though she is also anticoagulated with Eliquis  5  Aortic insufficiency:  Moderate per echocardiogram of 05/2018  6  Obesity  7  Obstructive sleep apnea:  Does not utilize CPAP reporting intolerance of mask    Discussion:  Will reduce beta-blocker and continue telemetry observing for signs of symptomatic bradycardia or other dysrhythmia  Patient should have formal orthostatic blood pressure checks and given her report of a frequent consistent pattern of symptoms after standing her telemetry could be reviewed correlating to these times  For now will continue amiodarone and Eliquis anticoagulation  With recent echocardiogram in May of this year will defer repeating exam      History Of Present Illness: This woman is a 57-year-old presently cared for by Dr Maverick Quintero of our group  In 2013 she suffered a CVA (without sequelae) at which time she was placed on Plavix and had insertion of an implantable loop recorder  When subsequent interrogations proved unfruitful she stopped coming to our office for follow-up      In September of 2017 she presented to the hospital with complaints of dyspnea at which time she was found to have atrial fibrillation with a rapid ventricular rate  She then had explantation of her loop recorder with initiation of metoprolol and Eliquis anticoagulation  In November of 2017 because of symptomatic atrial fibrillation refractory to AV blocking therapy she underwent cardioversion with initiation of amiodarone  Later, because of continued symptomatic AFib with RVR she underwent a fibrillation and typical flutter ablation 1/26/2018  She was thereafter maintained on amiodarone with beta-blocker and Eliquis anticoagulation  Of note, for uncertain reason she also still takes Plavix    The patient is presently hospitalized reporting what sounds like at least a 1-2 month history of intermittent feelings of presyncope  Her symptom pattern typically begins within minutes of standing  She then feels lightheaded at times presyncopal without associated symptoms to include perceived ectopy, pain, nausea, or diaphoresis  With sitting her symptoms usually resolve in 1-3 minutes  She has never had symptoms occur at rest or unrelated to standing  Her symptoms have increased in frequency essentially occurring on a daily basis during the last few weeks  Of note frequency of her symptoms may have increased in the aftermath of recent escalation of Lasix from a daily dose of 60 mg to 80 mg though her symptoms did predates this change  Today, the patient had had gone shopping  After exiting her car she and entered the store shortly thereafter becoming acutely lightheaded as discussed above  With this she exited the store and returned to her car where she then sat down  Within 1-2 minutes of sitting she returned to normal reporting total duration of symptoms as 3-5 minutes  She had no sequelae  Because of the recurrent nature of her symptoms her daughter brought to the hospital to be evaluated  In the emergency department the patient was reported to have some intermittent bradycardia    Review of the provided telemetry appears to show sinus rhythm with APCs and no obvious dysrhythmia or gross bradycardia  Past Medical History:        Past Medical History:   Diagnosis Date    Atrial fibrillation ([address] )     CHF (congestive heart failure) (HCC)     Hyperlipidemia     Hypertension     Migraine     Polyp of sigmoid colon     Prediabetes     [facility]roke [facility])     Past Surgical History:   Procedure Laterality Date    APPENDECTOMY      CARDIAC ELECTROPHYSIOLOGY STUDY AND ABLATION      CHOLECYSTECTOMY          Allergy:        No Known Allergies    Medications:       Prior to Admission medications    Medication Sig Start Date End Date Taking?  Authorizing Provider   amiodarone 100 mg tablet Take 1 tablet (100 mg total) by mouth daily 5/23/18  Yes Steve Woodson,    apixaban (ELIQUIS) 5 mg Take 5 mg by mouth 2 (two) times a day   Yes Historical Provider, MD   clopidogrel (PLAVIX) 75 mg tablet Take 1 tablet (75 mg total) by mouth daily 6/20/18  Yes Jose L Erickson MD   escitalopram (LEXAPRO) 10 mg tablet Take 10 mg by mouth daily     Yes Historical Provider, MD   furosemide (LASIX) 40 mg tablet Take 1 tablet (40 mg total) by mouth 2 (two) times a day 8/29/18  Yes Jose L Erickson MD   levothyroxine 50 mcg tablet Take 1 tablet (50 mcg total) by mouth daily 8/30/18  Yes Jose L Erickson MD   metoprolol tartrate (LOPRESSOR) 100 mg tablet Take 1 tablet (100 mg total) by mouth every 12 (twelve) hours 8/13/18  Yes Steve Woodson, DO   Multiple Vitamin (MULTIVITAMIN) tablet Take 1 tablet by mouth daily     Yes Historical Provider, MD   nortriptyline (PAMELOR) 50 mg capsule Take 1 capsule (50 mg total) by mouth daily at bedtime  Patient taking differently: Take 50 mg by mouth daily   8/22/18  Yes Jose L Erickson MD   potassium chloride (K-DUR,KLOR-CON) 20 mEq tablet Take 1 tablet (20 mEq total) by mouth 2 (two) times a day 2/13/18  Yes Jose L Erickson MD   simvastatin (ZOCOR) 10 mg tablet Take 1 tablet (10 mg total) by mouth daily at bedtime 1/31/18  Yes Sonny Venegas MD   topiramate (TOPAMAX) 25 mg sprinkle capsule Take 1 capsule (25 mg total) by mouth 2 (two) times a day 5/3/18  Yes Sonny Venegas MD   LORazepam (ATIVAN) 1 mg tablet Take 1 tablet (1 mg total) by mouth 3 (three) times a day as needed for anxiety 8/29/18   Sonny Venegas MD   meclizine (ANTIVERT) 25 mg tablet Take 25 mg by mouth 3 (three) times a day as needed for dizziness     Historical Provider, MD   oxyCODONE-acetaminophen (PERCOCET) 5-325 mg per tablet Take 1 tablet by mouth every 4 (four) hours as needed for moderate pain Max Daily Amount: 6 tablets 5/22/18   Sonny Venegas MD       Family History:     Family History   Problem Relation Age of Onset    Diabetes Mother     Heart disease Mother     Hypertension Mother     Arthritis Mother     Coronary artery disease Mother         Social History:       Social History     Social History    Marital status:      Spouse name: N/A    Number of children: N/A    Years of education: N/A     Social History Main Topics    Smoking status: Former Smoker     Quit date: 2/11/2011    Smokeless tobacco: Never Used    Alcohol use No    Drug use: No    Sexual activity: No     Other Topics Concern    None     Social History Narrative    fhx not asked in triage       ROS:  Symptoms per HPI  Weight gain in last year possibly 20 lb  Remainder review of systems is negative    Exam:  General:  Alert, normally conversant, appears comfortable, visually appears to have BMI of obesity  Head: Normocephalic, atraumatic  Eyes:  EOMI  Pupils - equal, round, reactive to accomodation  No icterus  Normal Conjunctiva  Oropharynx:  Moist without lesion  Neck:  No gross bruit, JVD, thyromegaly, or lymphadenopathy  Heart:  Regular with controlled rate  Distant heart sounds with without gross murmur  Lungs:  Clear without rales/rhonchi/wheeze  Abdomen:  Soft and nontender with normal bowel sounds   No organomegaly or mass  Lower Limbs: No edema  Pulses[de-identified]  RLE - DP:  2+                 LLE - DP:  2+  Musculoskeletal: Independent movement of limbs observed, Formal ROM and strength eval not performed  Neurologic:    Oriented to: person , place, situation  Cranial Nerves: grossly intact - vision, smell, taste, and hearing  were not tested       Motor function:grossly normal, symmetric   Sensation: Was not tested

## 2018-10-06 VITALS
BODY MASS INDEX: 43.16 KG/M2 | RESPIRATION RATE: 18 BRPM | TEMPERATURE: 97.9 F | HEART RATE: 76 BPM | SYSTOLIC BLOOD PRESSURE: 135 MMHG | OXYGEN SATURATION: 97 % | DIASTOLIC BLOOD PRESSURE: 74 MMHG | HEIGHT: 67 IN | WEIGHT: 275 LBS

## 2018-10-06 LAB
ALBUMIN SERPL BCP-MCNC: 3 G/DL (ref 3.5–5)
ALP SERPL-CCNC: 120 U/L (ref 46–116)
ALT SERPL W P-5'-P-CCNC: 47 U/L (ref 12–78)
ANION GAP SERPL CALCULATED.3IONS-SCNC: 8 MMOL/L (ref 4–13)
APTT PPP: 30 SECONDS (ref 24–36)
AST SERPL W P-5'-P-CCNC: 33 U/L (ref 5–45)
BASOPHILS # BLD AUTO: 0.06 THOUSANDS/ΜL (ref 0–0.1)
BASOPHILS NFR BLD AUTO: 1 % (ref 0–1)
BILIRUB SERPL-MCNC: 0.24 MG/DL (ref 0.2–1)
BUN SERPL-MCNC: 17 MG/DL (ref 5–25)
CALCIUM SERPL-MCNC: 8.8 MG/DL (ref 8.3–10.1)
CHLORIDE SERPL-SCNC: 107 MMOL/L (ref 100–108)
CO2 SERPL-SCNC: 25 MMOL/L (ref 21–32)
CREAT SERPL-MCNC: 1.49 MG/DL (ref 0.6–1.3)
EOSINOPHIL # BLD AUTO: 0.53 THOUSAND/ΜL (ref 0–0.61)
EOSINOPHIL NFR BLD AUTO: 10 % (ref 0–6)
ERYTHROCYTE [DISTWIDTH] IN BLOOD BY AUTOMATED COUNT: 16.1 % (ref 11.6–15.1)
GFR SERPL CREATININE-BSD FRML MDRD: 35 ML/MIN/1.73SQ M
GLUCOSE SERPL-MCNC: 113 MG/DL (ref 65–140)
HCT VFR BLD AUTO: 31.4 % (ref 34.8–46.1)
HGB BLD-MCNC: 9.5 G/DL (ref 11.5–15.4)
IMM GRANULOCYTES # BLD AUTO: 0.02 THOUSAND/UL (ref 0–0.2)
IMM GRANULOCYTES NFR BLD AUTO: 0 % (ref 0–2)
INR PPP: 1.28 (ref 0.86–1.17)
LYMPHOCYTES # BLD AUTO: 2.04 THOUSANDS/ΜL (ref 0.6–4.47)
LYMPHOCYTES NFR BLD AUTO: 37 % (ref 14–44)
MAGNESIUM SERPL-MCNC: 2.3 MG/DL (ref 1.6–2.6)
MCH RBC QN AUTO: 26.5 PG (ref 26.8–34.3)
MCHC RBC AUTO-ENTMCNC: 30.3 G/DL (ref 31.4–37.4)
MCV RBC AUTO: 88 FL (ref 82–98)
MONOCYTES # BLD AUTO: 0.44 THOUSAND/ΜL (ref 0.17–1.22)
MONOCYTES NFR BLD AUTO: 8 % (ref 4–12)
NEUTROPHILS # BLD AUTO: 2.5 THOUSANDS/ΜL (ref 1.85–7.62)
NEUTS SEG NFR BLD AUTO: 44 % (ref 43–75)
NRBC BLD AUTO-RTO: 0 /100 WBCS
PLATELET # BLD AUTO: 271 THOUSANDS/UL (ref 149–390)
PMV BLD AUTO: 9.9 FL (ref 8.9–12.7)
POTASSIUM SERPL-SCNC: 4 MMOL/L (ref 3.5–5.3)
PROT SERPL-MCNC: 6.6 G/DL (ref 6.4–8.2)
PROTHROMBIN TIME: 16.1 SECONDS (ref 11.8–14.2)
RBC # BLD AUTO: 3.58 MILLION/UL (ref 3.81–5.12)
SODIUM SERPL-SCNC: 140 MMOL/L (ref 136–145)
WBC # BLD AUTO: 5.59 THOUSAND/UL (ref 4.31–10.16)

## 2018-10-06 PROCEDURE — 99238 HOSP IP/OBS DSCHRG MGMT 30/<: CPT | Performed by: FAMILY MEDICINE

## 2018-10-06 PROCEDURE — 85025 COMPLETE CBC W/AUTO DIFF WBC: CPT | Performed by: FAMILY MEDICINE

## 2018-10-06 PROCEDURE — 83735 ASSAY OF MAGNESIUM: CPT | Performed by: FAMILY MEDICINE

## 2018-10-06 PROCEDURE — 85610 PROTHROMBIN TIME: CPT | Performed by: FAMILY MEDICINE

## 2018-10-06 PROCEDURE — 85730 THROMBOPLASTIN TIME PARTIAL: CPT | Performed by: FAMILY MEDICINE

## 2018-10-06 PROCEDURE — 94660 CPAP INITIATION&MGMT: CPT

## 2018-10-06 PROCEDURE — 99232 SBSQ HOSP IP/OBS MODERATE 35: CPT | Performed by: INTERNAL MEDICINE

## 2018-10-06 PROCEDURE — 80053 COMPREHEN METABOLIC PANEL: CPT | Performed by: FAMILY MEDICINE

## 2018-10-06 RX ORDER — FUROSEMIDE 40 MG/1
40 TABLET ORAL DAILY
Qty: 30 TABLET | Refills: 3 | Status: SHIPPED | OUTPATIENT
Start: 2018-10-07 | End: 2018-11-05 | Stop reason: HOSPADM

## 2018-10-06 RX ORDER — METOPROLOL TARTRATE 50 MG/1
50 TABLET, FILM COATED ORAL EVERY 12 HOURS SCHEDULED
Qty: 60 TABLET | Refills: 3 | Status: SHIPPED | OUTPATIENT
Start: 2018-10-06 | End: 2019-05-10 | Stop reason: SDUPTHER

## 2018-10-06 RX ORDER — AMIODARONE HYDROCHLORIDE 200 MG/1
100 TABLET ORAL DAILY
Status: DISCONTINUED | OUTPATIENT
Start: 2018-10-06 | End: 2018-10-06 | Stop reason: HOSPADM

## 2018-10-06 RX ORDER — FUROSEMIDE 40 MG/1
40 TABLET ORAL DAILY
Status: DISCONTINUED | OUTPATIENT
Start: 2018-10-07 | End: 2018-10-06 | Stop reason: HOSPADM

## 2018-10-06 RX ADMIN — FUROSEMIDE 40 MG: 40 TABLET ORAL at 09:28

## 2018-10-06 RX ADMIN — CLOPIDOGREL 75 MG: 75 TABLET, FILM COATED ORAL at 09:28

## 2018-10-06 RX ADMIN — LEVOTHYROXINE SODIUM 50 MCG: 50 TABLET ORAL at 06:12

## 2018-10-06 RX ADMIN — APIXABAN 5 MG: 5 TABLET, FILM COATED ORAL at 09:28

## 2018-10-06 RX ADMIN — ESCITALOPRAM OXALATE 10 MG: 10 TABLET ORAL at 09:27

## 2018-10-06 RX ADMIN — METOPROLOL TARTRATE 50 MG: 50 TABLET ORAL at 09:28

## 2018-10-06 RX ADMIN — AMIODARONE HYDROCHLORIDE 100 MG: 200 TABLET ORAL at 11:45

## 2018-10-06 RX ADMIN — TOPIRAMATE 25 MG: 25 CAPSULE, COATED PELLETS ORAL at 09:45

## 2018-10-06 RX ADMIN — POTASSIUM CHLORIDE 20 MEQ: 1500 TABLET, EXTENDED RELEASE ORAL at 09:28

## 2018-10-06 NOTE — ASSESSMENT & PLAN NOTE
Resolved  Patient has ruled out for acute coronary syndrome with negative serial troponins and EKG  She was seen and evaluated by Cardiology and appreciate the consult and recommendations-reviewed patient's at tele strip which showed evidence of sinus bradycardia with possible first-degree AV block  They also think her symptoms may be due to orthostasis and because of that her metoprolol dose was decreased from 100mg b i d  to 50mg b i d  Patient was also continued on amiodarone  Also her Lasix dose which was increased from 40 mg daily to 60 mg daily will be decreased back to 40 mg daily  Patient was not orthostatic on testing during this admission  No need to repeat echo as she recently had an echo in May 2018  She is to follow up with Dr Qing forrest as an outpatient  Patient is hemodynamically stable for discharge to home today  She is being discharged to home today sooner than expected, due to unexpected rapid improvement in her clinical condition

## 2018-10-06 NOTE — PROGRESS NOTES
Progress Note - Cardiology   [facility] 72 y o  female MRN: [id number]  Unit/Bed#: E4 -01 Encounter: [id number]      Assessment/Recommendations/Discussion:   1  Intermittent lightheadedness, always with positional change, suspicious of orthostasis  2  Paroxysmal atrial fibrillation, status post ablation of fibrillation/flutter 01/2018  3  History of CVA 2013  4  Moderate aortic insufficiency  5  Sleep apnea  6  Chronic diastolic heart failure    · Clinical scenario sounds like orthostasis, as lightheadedness always with positional change, never during supine position or sitting  Interestingly, no significant orthostatic gradients when checked yesterday  Metoprolol decreased now to 50 mg p  O  B i d  And furosemide now decreased to 40 mg p  O  Daily  Continue the same  Telemetry unremarkable  Okay to discharge from cardiac standpoint and follow up with Dr Jeffery Harrison    Patient will call to make appointment on Monday    · D/W Dr Kathi Craft        Subjective:  Patient seen and examined, feels well, no further lightheadedness while here      Physical Exam:  GEN:  NAD  HEENT:  MMM, NCAT, pink conjunctiva, EOMI, nonicteric sclera  CV:  NO JVD/HJR but obese neck, RR, NO M/R/G, +S1/S2, NO PARASTERNAL HEAVE/THRILL, NO LE EDEMA, NO HEPATIC SYSTOLIC PULSATION, WARM EXTREMITIES  RESP:  CTAB/L  ABD:  SOFT, NT, NO GROSS ORGANOMEGALY        Vitals:   /74 (BP Location: Left arm)   Pulse 76   Temp 97 9 °F (36 6 °C) (Tympanic)   Resp 18   Ht 5' 7" (1 702 m)   Wt 125 kg (275 lb)   LMP  (LMP Unknown)   SpO2 97%   BMI 43 07 kg/m²   Vitals:    10/05/18 1058   Weight: 125 kg (275 lb)       Intake/Output Summary (Last 24 hours) at 10/06/18 1151  Last data filed at 10/06/18 0601   Gross per 24 hour   Intake              540 ml   Output              200 ml   Net              340 ml       TELEMETRY:  Sinus rhythm, no evidence of tachyarrhythmia or bradyarrhythmia  Lab Results:    Results from last 7 days  Lab Units 10/06/18  0456   WBC Thousand/uL 5 59   HEMOGLOBIN g/dL 9 5*   HEMATOCRIT % 31 4*   PLATELETS Thousands/uL 271       Results from last 7 days  Lab Units 10/06/18  0456   SODIUM mmol/L 140   POTASSIUM mmol/L 4 0   CHLORIDE mmol/L 107   CO2 mmol/L 25   BUN mg/dL 17   CREATININE mg/dL 1 49*   CALCIUM mg/dL 8 8   ALK PHOS U/L 120*   ALT U/L 47   AST U/L 33       Results from last 7 days  Lab Units 10/06/18  0456   SODIUM mmol/L 140   POTASSIUM mmol/L 4 0   CHLORIDE mmol/L 107   CO2 mmol/L 25   BUN mg/dL 17   CREATININE mg/dL 1 49*   CALCIUM mg/dL 8 8           Medications:    Current Facility-Administered Medications:     amiodarone tablet 100 mg, 100 mg, Oral, Daily, Cheryle Yeager MD    apixaban (ELIQUIS) tablet 5 mg, 5 mg, Oral, BID, Cheryle Yeager MD, 5 mg at 10/06/18 0928    clopidogrel (PLAVIX) tablet 75 mg, 75 mg, Oral, Daily, Cheryle Yeager MD, 75 mg at 10/06/18 0928    escitalopram (LEXAPRO) tablet 10 mg, 10 mg, Oral, Daily, Cheryle Yeager MD, 10 mg at 10/06/18 1140    furosemide (LASIX) tablet 40 mg, 40 mg, Oral, BID, Cheryle Yeager MD, 40 mg at 10/06/18 8645    ibuprofen (MOTRIN) tablet 400 mg, 400 mg, Oral, Q6H PRN, Cheryle Yeager MD, 400 mg at 10/05/18 1451    levothyroxine tablet 50 mcg, 50 mcg, Oral, Early Morning, Cheryle Yeager MD, 50 mcg at 10/06/18 0612    LORazepam (ATIVAN) tablet 1 mg, 1 mg, Oral, TID PRN, Cheryle Yeager MD    meclizine (ANTIVERT) tablet 25 mg, 25 mg, Oral, TID PRN, Cheryle Yeager MD, 25 mg at 10/05/18 1702    metoprolol tartrate (LOPRESSOR) tablet 50 mg, 50 mg, Oral, Q12H [address], Willi Siu PA-C, 50 mg at 10/06/18 4965    oxyCODONE-acetaminophen (PERCOCET) 5-325 mg per tablet 1 tablet, 1 tablet, Oral, Q4H PRN, Cheryle Yeager MD    potassium chloride (K-DUR,KLOR-CON) CR tablet 20 mEq, 20 mEq, Oral, BID, Kat Elkins MD, 20 mEq at 10/06/18 9227    pravastatin (PRAVACHOL) tablet 10 mg, 10 mg, Oral, Daily With Dinner, Cheryle Yeager MD    topiramate (TOPAMAX) sprinkle capsule 25 mg, 25 mg, Oral, BID, Praveen Zelaya MD, 25 mg at 10/05/18 9633    This note was completed in part utilizing M-Modal Fluency Direct Software  Grammatical errors, random word insertions, spelling mistakes, and incomplete sentences may be an occasional consequence of this system secondary to software limitations, ambient noise, and hardware issues  If you have any questions or concerns about the content, text, or information contained within the body of this dictation, please contact the provider for clarification

## 2018-10-06 NOTE — PLAN OF CARE
CARDIOVASCULAR - ADULT     Maintains optimal cardiac output and hemodynamic stability Progressing     Absence of cardiac dysrhythmias or at baseline rhythm Progressing        DISCHARGE PLANNING     Discharge to home or other facility with appropriate resources Progressing        INFECTION - ADULT     Absence or prevention of progression during hospitalization Progressing        Knowledge Deficit     Patient/family/caregiver demonstrates understanding of disease process, treatment plan, medications, and discharge instructions Progressing        PAIN - ADULT     Verbalizes/displays adequate comfort level or baseline comfort level Progressing        Potential for Falls     Patient will remain free of falls Progressing

## 2018-10-06 NOTE — ASSESSMENT & PLAN NOTE
Resume metoprolol at 50 mg b i d  and amiodarone at 100 mg daily  Continue anticoagulation with Eliquis

## 2018-10-06 NOTE — ASSESSMENT & PLAN NOTE
Chronic and stable and compensated  Continue Lasix 40 mg daily  Had recent echo 26/58/3340-HOCMGI normal LV systolic function with ejection fraction estimated to be 60%, there were no regional wall motion abnormalities, wall thickness was mildly increased, there was mild concentric hypertrophy, grade 1 diastolic dysfunction  There was mild mitral valve stenosis with mild regurgitation, moderate aortic valve regurgitation, mild tricuspid valve regurgitation

## 2018-10-06 NOTE — DISCHARGE SUMMARY
Discharge- Morgan Jimenez 1945, 72 y o  female MRN: [id number]    Unit/Bed#: E4 -01 Encounter: [id number]    Primary Care Provider: Aminah Brizuela MD   Date and time admitted to hospital: 10/5/2018 10:54 AM        * Symptomatic bradycardia   Assessment & Plan    Resolved  Patient has ruled out for acute coronary syndrome with negative serial troponins and EKG  She was seen and evaluated by Cardiology and appreciate the consult and recommendations-reviewed patient's at tele strip which showed evidence of sinus bradycardia with possible first-degree AV block  They also think her symptoms may be due to orthostasis and because of that her metoprolol dose was decreased from 100mg b i d  to 50mg b i d  Patient was also continued on amiodarone  Also her Lasix dose which was increased from 40 mg daily to 60 mg daily will be decreased back to 40 mg daily  Patient was not orthostatic on testing during this admission  No need to repeat echo as she recently had an echo in May 2018  She is to follow up with Dr Dora Grubbs as an outpatient  Patient is hemodynamically stable for discharge to home today  She is being discharged to home today sooner than expected, due to unexpected rapid improvement in her clinical condition  Chronic diastolic CHF (congestive heart failure) (HCC)   Assessment & Plan    Chronic and stable and compensated  Continue Lasix 40 mg daily  Had recent echo 66/19/1171-GUXDKG normal LV systolic function with ejection fraction estimated to be 60%, there were no regional wall motion abnormalities, wall thickness was mildly increased, there was mild concentric hypertrophy, grade 1 diastolic dysfunction  There was mild mitral valve stenosis with mild regurgitation, moderate aortic valve regurgitation, mild tricuspid valve regurgitation  Atrial fibrillation [facility])   Assessment & Plan    Resume metoprolol at 50 mg b i d  and amiodarone at 100 mg daily    Continue anticoagulation with Eliquis  Essential hypertension   Assessment & Plan    Resumed metoprolol at lower dose of 50 mg b i d  Obstructive sleep apnea   Assessment & Plan    CPAP q h s  History of stroke   Assessment & Plan    Continue aspirin, Plavix, Pravachol  Depression   Assessment & Plan    Continue Lexapro  Hypercholesterolemia   Assessment & Plan    Continue Pravachol     Class 3 severe obesity due to excess calories with serious comorbidity and body mass index (BMI) of 40 0 to 44 9 in adult [facility])   Assessment & Plan    Needs weight loss counseling and dietary modification           Discharging Physician / Practitioner: Nicole August MD  PCP: Madonna Castellon MD  Admission Date:   Admission Orders     Ordered        10/05/18 1251  Inpatient Admission (expected length of stay for this patient is greater than two midnights)  Once             Discharge Date: 10/06/18    Resolved Problems  Date Reviewed: 10/5/2018    None          Consultations During Hospital Stay:  · Cardiology    Procedures Performed:     · Chest x-ray PA and lateral 10/05/2018, 12 lead EKG 10/5/18  Significant Findings / Test Results:     · Chest x-ray PA and lateral 10/5-no acute cardiopulmonary disease  · Twelve lead EKG 10/5-normal sinus rhythm with 1st degree AV block, poor anterior R-wave progression is noted, possible inferior infarct age undetermined, when compared with EKG of 05/18/2018, ST elevation now present in inferior leads and T-wave inversion is no longer evident in inferior leads  Incidental Findings:   · None     Test Results Pending at Discharge (will require follow up): · None     Outpatient Tests Requested:  · None    Complications:  None    Reason for Admission:  Symptomatic bradycardia    Hospital Course:     Chen Kessler is a 72 y o  female patient who originally presented to the hospital on 10/5/2018 due to complaint of dyspnea on exertion for several weeks causing almost near syncopal episodes    She states that us symptoms were mostly worse when she stood up from a seated or on a recumbent position  It was also worse with activity  On initial evaluation in the ED she was seen to have some bradycardia on the cardiac monitor  She was admitted for further evaluation  Chest x-ray was negative for any acute abnormalities  She was kept on the telemetry during her admission and Cardiology was consulted  After evaluation by Cardiology this seem to think that she had sinus bradycardia with some first-degree AV block that was responsible for her symptoms  Orthostatic vital signs did not show her to be orthostatic  They did however recommend decreasing the dose of her metoprolol from 100 mg b i d  to 50 mg b i d  as this could be the cause of her symptomatic bradycardia  They recommended continuing her on the amiodarone and also reducing the dose of her Lasix from 60 mg daily to 40 mg daily  The patient's symptoms have currently resolved and she is asymptomatic  She is hemodynamically stable for discharge to home today and she is being discharged to home sooner than expected given her unexpected rapid improvement in her clinical condition  She is to follow up with Dr Maverick Quintero, her cardiologist as an outpatient  Please see above list of diagnoses and related plan for additional information  Condition at Discharge: stable     Discharge Day Visit / Exam:     Subjective:  Patient has no complaints at this time  Vitals: Blood Pressure: 135/74 (10/06/18 0736)  Pulse: 76 (10/06/18 0736)  Temperature: 97 9 °F (36 6 °C) (10/06/18 0732)  Temp Source: Tympanic (10/06/18 0732)  Respirations: 18 (10/06/18 0736)  Height: 5' 7" (170 2 cm) (10/05/18 1933)  Weight - Scale: 125 kg (275 lb) (10/05/18 1058)  SpO2: 97 % (10/06/18 0732)  Exam:   Physical Exam   Constitutional: She is oriented to person, place, and time  She appears well-developed and well-nourished  No distress  HENT:   Head: Normocephalic and atraumatic  Cardiovascular: Normal rate, regular rhythm and normal heart sounds  Pulmonary/Chest: Effort normal and breath sounds normal    Abdominal: Soft  Bowel sounds are normal    Musculoskeletal: Normal range of motion  She exhibits edema  Neurological: She is alert and oriented to person, place, and time  Skin: Skin is warm and dry  She is not diaphoretic  Psychiatric: She has a normal mood and affect  Her behavior is normal        Discussion with Family:  Yes    Discharge instructions/Information to patient and family:   See after visit summary for information provided to patient and family  Provisions for Follow-Up Care:  See after visit summary for information related to follow-up care and any pertinent home health orders  Disposition:     Home    For Discharges to [facility] SNF:   · Not Applicable to this Patient - Not Applicable to this Patient    Planned Readmission:  None     Discharge Statement:  I spent 20 minutes discharging the patient  This time was spent on the day of discharge  I had direct contact with the patient on the day of discharge  Greater than 50% of the total time was spent examining patient, answering all patient questions, arranging and discussing plan of care with patient as well as directly providing post-discharge instructions  Additional time then spent on discharge activities  Discharge Medications:  See after visit summary for reconciled discharge medications provided to patient and family        ** Please Note: This note has been constructed using a voice recognition system **

## 2018-10-08 ENCOUNTER — TRANSITIONAL CARE MANAGEMENT (OUTPATIENT)
Dept: INTERNAL MEDICINE CLINIC | Facility: CLINIC | Age: 73
End: 2018-10-08

## 2018-10-08 NOTE — CASE MANAGEMENT
Initial Clinical Review    Admission: Date/Time/Statement: 10/5/18 @ 1251   Orders Placed This Encounter   Procedures    Inpatient Admission (expected length of stay for this patient is greater than two midnights)     Standing Status:   Standing     Number of Occurrences:   1     Order Specific Question:   Admitting Physician     Answer:   Du Castillo     Order Specific Question:   Level of Care     Answer:   Med Surg [16]     Order Specific Question:   Estimated length of stay     Answer:   More than 2 Midnights     Order Specific Question:   Certification     Answer:   I certify that inpatient services are medically necessary for this patient for a duration of greater than two midnights  See H&P and MD Progress Notes for additional information about the patient's course of treatment  ED: Date/Time/Mode of Arrival:   ED Arrival Information     Expected Arrival Acuity Means of Arrival Escorted By Service Admission Type    - 10/5/2018 10:49 Emergent Wheelchair Family Member General Medicine Emergency    Arrival Complaint    CHF,SOB          Chief Complaint:   Chief Complaint   Patient presents with    Shortness of Breath     SOB, lightheadedness/dizziness started this morning, worse w/ambulation  denies fever/recent illness  "feel like  I am going to pass out"       History of Illness:   Vargas Ann is a 72 y o  female who presents with complaint of dyspnea on exertion for several weeks causing almost near-syncope  The patient states that her symptoms have been on going and mostly pronounced on worse when she stands up to walk or exert herself with some form of activity  She states that it has been going on for several weeks and only got worse today which was why she came to the ED at [facility] for evaluation  She states that her symptoms are better when she is laying down in bed and resting  She denies any chest pain, palpitations, fevers, chills, nausea, vomiting    The patient saw her primary care physician recently who increased her Lasix from 40 mg daily to 40 mg b i d  During evaluation in the ED at [facility] she had a chest x-ray that was completely negative  She also had an EKG that showed evidence of sinus rhythm with 1st degree AV block  Patient has continued to be kept on the cardiac monitor  She is currently asymptomatic but only complains of a headache  ED Vital Signs:   ED Triage Vitals   Temperature Pulse Respirations Blood Pressure SpO2   10/05/18 1058 10/05/18 1058 10/05/18 1058 10/05/18 1058 10/05/18 1058   97 9 °F (36 6 °C) 55 (!) 24 129/61 96 %      Temp Source Heart Rate Source Patient Position - Orthostatic VS BP Location FiO2 (%)   10/05/18 1345 10/05/18 1226 10/05/18 1323 10/05/18 1226 --   Temporal Monitor Lying Left arm       Pain Score       10/05/18 1058       No Pain        Wt Readings from Last 1 Encounters:   10/05/18 125 kg (275 lb)     Abnormal Labs/Diagnostic Test Results:   WBC Thousand/uL 7 86   HEMOGLOBIN g/dL 10 5*   HEMATOCRIT % 34 3*   PLATELETS Thousands/uL 335     SODIUM mmol/L 141   POTASSIUM mmol/L 4 3   CHLORIDE mmol/L 105   CO2 mmol/L 26   BUN mg/dL 12   CREATININE mg/dL 1 39*   ANION GAP mmol/L 10   CALCIUM mg/dL 8 7   ALBUMIN g/dL 3 3*   TOTAL BILIRUBIN mg/dL 0 26   ALK PHOS U/L 131*   ALT U/L 60   AST U/L 45     Chest X:  No acute cardiopulmonary disease  ED Treatment:   Medication Administration from 10/05/2018 1049 to 10/05/2018 1344       Date/Time Order Dose Route Action Action by Comments     10/05/2018 1121 aspirin chewable tablet 324 mg 324 mg Oral Given Katie Rai RN           Past Medical/Surgical History:    Active Ambulatory Problems     Diagnosis Date Noted    Atrial fibrillation [facility] [address] ) 10/07/2017    Essential hypertension 10/07/2017    Prediabetes 10/07/2017    Depression 01/13/2015    Hypercholesterolemia 07/30/2012    Migraine 06/18/2012    Mitral regurgitation 10/18/2017    Class 3 severe obesity due to excess calories with serious comorbidity and body mass index (BMI) of 40 0 to 44 9 in adult [facility]) 07/24/2012    Obstructive sleep apnea 01/04/2013    Primary localized osteoarthritis of right knee 08/17/2016    [facility] 11/14/2016    Syncope, near 05/18/2018    History of stroke 05/18/2018    Chronic diastolic CHF (congestive heart failure) ([facility] ) 08/30/2018    Postural dizziness with presyncope 10/05/2018     Resolved Ambulatory Problems     Diagnosis Date Noted    SOB (shortness of breath) 12/25/2017     Past Medical History:   Diagnosis Date    Atrial fibrillation ([facility] )     CHF (congestive heart failure) ([place] [address] )     Hyperlipidemia     Hypertension     Migraine     Polyp of sigmoid colon     Prediabetes     Stroke [facility])        Admitting Diagnosis: SOB (shortness of breath) [R06 02]  Symptomatic bradycardia [R00 1]    Age/Sex: 72 y o  female    Assessment/Plan:   * Symptomatic bradycardia   Assessment & Plan     Keep on telemetry  Check serial troponins  Cardiology consulted for possibility of pacemaker placement  Beta-blocker and amiodarone held on admission       Chronic diastolic CHF (congestive heart failure) (HCC)   Assessment & Plan     Chronic and stable and compensated  Continue Lasix 40 mg daily  Had recent echo 87/79/2315-EQXCSP normal LV systolic function with ejection fraction estimated to be 60%, there were no regional wall motion abnormalities, wall thickness was mildly increased, there was mild concentric hypertrophy, grade 1 diastolic dysfunction  There was mild mitral valve stenosis with mild regurgitation, moderate aortic valve regurgitation, mild tricuspid valve regurgitation       Atrial fibrillation (HCC)   Assessment & Plan     Beta-blocker and amiodarone held due to symptomatic bradycardia      Continue anticoagulation with Eliquis       Essential hypertension   Assessment & Plan     Beta-blocker held due to symptomatic bradycardia       Obstructive sleep apnea   Assessment & Plan     CPAP q h s       History of stroke   Assessment & Plan     Continue aspirin, Plavix, Pravachol       Depression   Assessment & Plan     Continue Lexapro       Hypercholesterolemia   Assessment & Plan     Continue Pravachol      Class 3 severe obesity due to excess calories with serious comorbidity and body mass index (BMI) of 40 0 to 44 9 in adult [facility])   Assessment & Plan     Needs weight loss counseling and dietary modification                  VTE Prophylaxis: Apixaban (Eliquis)  / reason for no mechanical VTE prophylaxis Patient refused   Anticipated Length of Stay:  Patient will be admitted on an Inpatient basis with an anticipated length of stay of  greater than 2 midnights     Justification for Hospital Stay:  Symptomatic bradycardia    Admission Orders:      amiodarone tablet 100 mg, 100 mg, Oral, Daily, Ivonne Marte MD    apixaban (ELIQUIS) tablet 5 mg, 5 mg, Oral, BID, Ivonne Marte MD, 5 mg at 10/06/18 0928    clopidogrel (PLAVIX) tablet 75 mg, 75 mg, Oral, Daily, Ivonne Marte MD, 75 mg at 10/06/18 0928    escitalopram (LEXAPRO) tablet 10 mg, 10 mg, Oral, Daily, Ivonne Marte MD, 10 mg at 10/06/18 3507    furosemide (LASIX) tablet 40 mg, 40 mg, Oral, BID, Ivonne Marte MD, 40 mg at 10/06/18 7419    ibuprofen (MOTRIN) tablet 400 mg, 400 mg, Oral, Q6H PRN, Ivonne Marte MD, 400 mg at 10/05/18 1451    levothyroxine tablet 50 mcg, 50 mcg, Oral, Early Morning, Kat Elkins MD, 50 mcg at 10/06/18 0612    LORazepam (ATIVAN) tablet 1 mg, 1 mg, Oral, TID PRN, Ivonne Marte MD    meclizine (ANTIVERT) tablet 25 mg, 25 mg, Oral, TID PRN, Ivonne Marte MD, 25 mg at 10/05/18 1702    metoprolol tartrate (LOPRESSOR) tablet 50 mg, 50 mg, Oral, Q12H [address], Clerance [place], PA-C, 50 mg at 10/06/18 0928    oxyCODONE-acetaminophen (PERCOCET) 5-325 mg per tablet 1 tablet, 1 tablet, Oral, Q4H PRN, Ivonne Marte MD    potassium chloride (K-DUR,KLOR-CON) CR tablet 20 mEq, 20 mEq, Oral, BID, Florentin Hunt MD, 20 mEq at 10/06/18 5051    pravastatin (PRAVACHOL) tablet 10 mg, 10 mg, Oral, Daily With Dinner, Florentin Hunt MD    topiramate (TOPAMAX) sprinkle capsule 25 mg, 25 mg, Oral, BID, Kat Elkins MD, 25 mg at 10/05/18 1709    Activity as tolerated  Up and OOB as tolerated  TELM

## 2018-10-09 NOTE — PHYSICIAN ADVISOR
Current patient class: Inpatient  The patient is currently on Hospital Day: 2 at [facility]      The patient was admitted to the hospital at  on 10/5/18 for the following diagnosis:  SOB (shortness of breath) [R06 02]  Symptomatic bradycardia [R00 1]       There is documentation in the medical record of an expected length of stay of at least 2 midnights  The patient is therefore expected to satisfy the 2 midnight benchmark and given the 2 midnight presumption is appropriate for INPATIENT ADMISSION  Given this expectation of a satisfying stay, CMS instructs us that the patient is most often appropriate for inpatient admission under part A provided medical necessity is documented in the chart  After review of the relevant documentation, labs, vital signs and test results, the patient is appropriate for INPATIENT ADMISSION  Admission to the hospital as an inpatient is a complex decision making process which requires the practitioner to consider the patients presenting complaint, history and physical examination and all relevant testing  With this in mind, in this case, the patient was deemed appropriate for INPATIENT ADMISSION  After review of the documentation and testing available at the time of the admission I concur with this clinical determination of medical necessity  Rationale is as follows: The patient is a 72 yrs old Female who presented to the ED at 10/5/2018 10:54 AM with a chief complaint of Shortness of Breath (SOB, lightheadedness/dizziness started this morning, worse w/ambulation  denies fever/recent illness  "feel like  I am going to pass out")     Given the need for further hospitalization, and along with the documentation of medical necessity present in the chart, the patient is appropriate for inpatient admission  The patient is expected to satisfy the 2 midnight benchmark, and will require further acute medical care   The patient does have comorbid conditions which increases the risk for significant adverse outcome  Given this the patient is appropriate for inpatient admission  The patients vitals on arrival were ED Triage Vitals   Temperature Pulse Respirations Blood Pressure SpO2   10/05/18 1058 10/05/18 1058 10/05/18 1058 10/05/18 1058 10/05/18 1058   97 9 °F (36 6 °C) 55 (!) 24 129/61 96 %      Temp Source Heart Rate Source Patient Position - Orthostatic VS BP Location FiO2 (%)   10/05/18 1345 10/05/18 1226 10/05/18 1323 10/05/18 1226 --   Temporal Monitor Lying Left arm       Pain Score       10/05/18 1058       No Pain           Past Medical History:   Diagnosis Date    Atrial fibrillation (HCC)     CHF (congestive heart failure) (HCC)     Hyperlipidemia     Hypertension     Migraine     Polyp of sigmoid colon     Prediabetes     Stroke [facility])      Past Surgical History:   Procedure Laterality Date    APPENDECTOMY      CARDIAC ELECTROPHYSIOLOGY STUDY AND ABLATION      CHOLECYSTECTOMY             Consults have been placed to:   IP CONSULT TO CARDIOLOGY    Vitals:    10/06/18 0006 10/06/18 0732 10/06/18 0734 10/06/18 0736   BP: 137/86 122/68 121/71 135/74   BP Location: Left arm Left arm Left arm Left arm   Pulse: 73 68 73 76   Resp: 18 18 18 18   Temp: 98 3 °F (36 8 °C) 97 9 °F (36 6 °C)     TempSrc: Tympanic Tympanic     SpO2: 98% 97%     Weight:       Height:           Most recent labs:    Recent Labs      10/06/18   0456   WBC  5 59   HGB  9 5*   HCT  31 4*   PLT  271   K  4 0   NA  140   CALCIUM  8 8   BUN  17   CREATININE  1 49*   INR  1 28*   AST  33   ALT  47   ALKPHOS  120*       Scheduled Meds:  Continuous Infusions:  No current facility-administered medications for this encounter  PRN Meds:      Surgical procedures (if appropriate):

## 2018-10-10 ENCOUNTER — PATIENT OUTREACH (OUTPATIENT)
Dept: INTERNAL MEDICINE CLINIC | Facility: CLINIC | Age: 73
End: 2018-10-10

## 2018-11-01 ENCOUNTER — APPOINTMENT (EMERGENCY)
Dept: RADIOLOGY | Facility: HOSPITAL | Age: 73
DRG: 291 | End: 2018-11-01
Payer: MEDICARE

## 2018-11-01 ENCOUNTER — HOSPITAL ENCOUNTER (INPATIENT)
Facility: HOSPITAL | Age: 73
LOS: 4 days | Discharge: HOME | DRG: 291 | End: 2018-11-05
Attending: EMERGENCY MEDICINE | Admitting: FAMILY MEDICINE
Payer: MEDICARE

## 2018-11-01 ENCOUNTER — APPOINTMENT (EMERGENCY)
Dept: CT IMAGING | Facility: HOSPITAL | Age: 73
DRG: 291 | End: 2018-11-01
Payer: MEDICARE

## 2018-11-01 DIAGNOSIS — E66.01 CLASS 3 SEVERE OBESITY DUE TO EXCESS CALORIES WITH SERIOUS COMORBIDITY AND BODY MASS INDEX (BMI) OF 40.0 TO 44.9 IN ADULT (HCC): ICD-10-CM

## 2018-11-01 DIAGNOSIS — I50.33 ACUTE ON CHRONIC DIASTOLIC CONGESTIVE HEART FAILURE (HCC): ICD-10-CM

## 2018-11-01 DIAGNOSIS — I50.9 CHF (CONGESTIVE HEART FAILURE) (HCC): Primary | ICD-10-CM

## 2018-11-01 PROBLEM — G89.29 CHRONIC BILATERAL LOW BACK PAIN WITHOUT SCIATICA: Status: ACTIVE | Noted: 2018-11-01

## 2018-11-01 PROBLEM — N18.30 STAGE 3 CHRONIC KIDNEY DISEASE (HCC): Status: ACTIVE | Noted: 2018-11-01

## 2018-11-01 PROBLEM — M54.50 CHRONIC BILATERAL LOW BACK PAIN WITHOUT SCIATICA: Status: ACTIVE | Noted: 2018-11-01

## 2018-11-01 LAB
ALBUMIN SERPL BCP-MCNC: 3.4 G/DL (ref 3.5–5)
ALP SERPL-CCNC: 113 U/L (ref 46–116)
ALT SERPL W P-5'-P-CCNC: 54 U/L (ref 12–78)
ANION GAP SERPL CALCULATED.3IONS-SCNC: 11 MMOL/L (ref 4–13)
APTT PPP: 30 SECONDS (ref 24–36)
AST SERPL W P-5'-P-CCNC: 52 U/L (ref 5–45)
ATRIAL RATE: 84 BPM
BASOPHILS # BLD AUTO: 0.07 THOUSANDS/ΜL (ref 0–0.1)
BASOPHILS NFR BLD AUTO: 1 % (ref 0–1)
BILIRUB SERPL-MCNC: 0.39 MG/DL (ref 0.2–1)
BUN SERPL-MCNC: 18 MG/DL (ref 5–25)
CALCIUM SERPL-MCNC: 8.6 MG/DL (ref 8.3–10.1)
CHLORIDE SERPL-SCNC: 109 MMOL/L (ref 100–108)
CO2 SERPL-SCNC: 25 MMOL/L (ref 21–32)
CREAT SERPL-MCNC: 1.33 MG/DL (ref 0.6–1.3)
DEPRECATED D DIMER PPP: 622 NG/ML (FEU) (ref 0–424)
EOSINOPHIL # BLD AUTO: 0.57 THOUSAND/ΜL (ref 0–0.61)
EOSINOPHIL NFR BLD AUTO: 8 % (ref 0–6)
ERYTHROCYTE [DISTWIDTH] IN BLOOD BY AUTOMATED COUNT: 16.4 % (ref 11.6–15.1)
GFR SERPL CREATININE-BSD FRML MDRD: 40 ML/MIN/1.73SQ M
GLUCOSE SERPL-MCNC: 127 MG/DL (ref 65–140)
HCT VFR BLD AUTO: 33.7 % (ref 34.8–46.1)
HGB BLD-MCNC: 9.9 G/DL (ref 11.5–15.4)
IMM GRANULOCYTES # BLD AUTO: 0.03 THOUSAND/UL (ref 0–0.2)
IMM GRANULOCYTES NFR BLD AUTO: 0 % (ref 0–2)
INR PPP: 1.34 (ref 0.86–1.17)
LYMPHOCYTES # BLD AUTO: 1.63 THOUSANDS/ΜL (ref 0.6–4.47)
LYMPHOCYTES NFR BLD AUTO: 24 % (ref 14–44)
MCH RBC QN AUTO: 26.5 PG (ref 26.8–34.3)
MCHC RBC AUTO-ENTMCNC: 29.4 G/DL (ref 31.4–37.4)
MCV RBC AUTO: 90 FL (ref 82–98)
MONOCYTES # BLD AUTO: 0.5 THOUSAND/ΜL (ref 0.17–1.22)
MONOCYTES NFR BLD AUTO: 7 % (ref 4–12)
NEUTROPHILS # BLD AUTO: 4 THOUSANDS/ΜL (ref 1.85–7.62)
NEUTS SEG NFR BLD AUTO: 60 % (ref 43–75)
NRBC BLD AUTO-RTO: 0 /100 WBCS
NT-PROBNP SERPL-MCNC: 549 PG/ML
P AXIS: 91 DEGREES
PLATELET # BLD AUTO: 267 THOUSANDS/UL (ref 149–390)
PMV BLD AUTO: 9.5 FL (ref 8.9–12.7)
POTASSIUM SERPL-SCNC: 3.9 MMOL/L (ref 3.5–5.3)
PR INTERVAL: 248 MS
PROT SERPL-MCNC: 7.2 G/DL (ref 6.4–8.2)
PROTHROMBIN TIME: 16.7 SECONDS (ref 11.8–14.2)
QRS AXIS: 55 DEGREES
QRSD INTERVAL: 96 MS
QT INTERVAL: 428 MS
QTC INTERVAL: 500 MS
RBC # BLD AUTO: 3.74 MILLION/UL (ref 3.81–5.12)
SODIUM SERPL-SCNC: 145 MMOL/L (ref 136–145)
T WAVE AXIS: 69 DEGREES
TROPONIN I SERPL-MCNC: 0.02 NG/ML
VENTRICULAR RATE: 82 BPM
WBC # BLD AUTO: 6.8 THOUSAND/UL (ref 4.31–10.16)

## 2018-11-01 PROCEDURE — 83880 ASSAY OF NATRIURETIC PEPTIDE: CPT | Performed by: EMERGENCY MEDICINE

## 2018-11-01 PROCEDURE — 71046 X-RAY EXAM CHEST 2 VIEWS: CPT

## 2018-11-01 PROCEDURE — 85025 COMPLETE CBC W/AUTO DIFF WBC: CPT | Performed by: EMERGENCY MEDICINE

## 2018-11-01 PROCEDURE — 36415 COLL VENOUS BLD VENIPUNCTURE: CPT | Performed by: EMERGENCY MEDICINE

## 2018-11-01 PROCEDURE — 85379 FIBRIN DEGRADATION QUANT: CPT | Performed by: EMERGENCY MEDICINE

## 2018-11-01 PROCEDURE — 84484 ASSAY OF TROPONIN QUANT: CPT | Performed by: EMERGENCY MEDICINE

## 2018-11-01 PROCEDURE — 99223 1ST HOSP IP/OBS HIGH 75: CPT | Performed by: FAMILY MEDICINE

## 2018-11-01 PROCEDURE — 93010 ELECTROCARDIOGRAM REPORT: CPT | Performed by: INTERNAL MEDICINE

## 2018-11-01 PROCEDURE — 99285 EMERGENCY DEPT VISIT HI MDM: CPT

## 2018-11-01 PROCEDURE — 85610 PROTHROMBIN TIME: CPT | Performed by: EMERGENCY MEDICINE

## 2018-11-01 PROCEDURE — 80053 COMPREHEN METABOLIC PANEL: CPT | Performed by: EMERGENCY MEDICINE

## 2018-11-01 PROCEDURE — 93005 ELECTROCARDIOGRAM TRACING: CPT

## 2018-11-01 PROCEDURE — 71275 CT ANGIOGRAPHY CHEST: CPT

## 2018-11-01 PROCEDURE — 85730 THROMBOPLASTIN TIME PARTIAL: CPT | Performed by: EMERGENCY MEDICINE

## 2018-11-01 RX ORDER — MECLIZINE HCL 12.5 MG/1
25 TABLET ORAL 3 TIMES DAILY PRN
Status: DISCONTINUED | OUTPATIENT
Start: 2018-11-01 | End: 2018-11-05 | Stop reason: HOSPADM

## 2018-11-01 RX ORDER — PRAVASTATIN SODIUM 40 MG
40 TABLET ORAL
Status: DISCONTINUED | OUTPATIENT
Start: 2018-11-01 | End: 2018-11-05 | Stop reason: HOSPADM

## 2018-11-01 RX ORDER — POTASSIUM CHLORIDE 20 MEQ/1
20 TABLET, EXTENDED RELEASE ORAL 2 TIMES DAILY
Status: DISCONTINUED | OUTPATIENT
Start: 2018-11-01 | End: 2018-11-05 | Stop reason: HOSPADM

## 2018-11-01 RX ORDER — TOPIRAMATE 25 MG/1
25 CAPSULE, COATED PELLETS ORAL EVERY 12 HOURS SCHEDULED
Status: DISCONTINUED | OUTPATIENT
Start: 2018-11-01 | End: 2018-11-05 | Stop reason: HOSPADM

## 2018-11-01 RX ORDER — CLOPIDOGREL BISULFATE 75 MG/1
75 TABLET ORAL DAILY
Status: DISCONTINUED | OUTPATIENT
Start: 2018-11-02 | End: 2018-11-05 | Stop reason: HOSPADM

## 2018-11-01 RX ORDER — NORTRIPTYLINE HYDROCHLORIDE 50 MG/1
50 CAPSULE ORAL
Status: DISCONTINUED | OUTPATIENT
Start: 2018-11-01 | End: 2018-11-05 | Stop reason: HOSPADM

## 2018-11-01 RX ORDER — AMIODARONE HYDROCHLORIDE 200 MG/1
100 TABLET ORAL DAILY
Status: DISCONTINUED | OUTPATIENT
Start: 2018-11-02 | End: 2018-11-05 | Stop reason: HOSPADM

## 2018-11-01 RX ORDER — LEVOTHYROXINE SODIUM 0.05 MG/1
50 TABLET ORAL
Status: DISCONTINUED | OUTPATIENT
Start: 2018-11-02 | End: 2018-11-05 | Stop reason: HOSPADM

## 2018-11-01 RX ORDER — ESCITALOPRAM OXALATE 10 MG/1
10 TABLET ORAL DAILY
Status: DISCONTINUED | OUTPATIENT
Start: 2018-11-01 | End: 2018-11-05 | Stop reason: HOSPADM

## 2018-11-01 RX ORDER — MULTIVITAMIN
1 TABLET ORAL DAILY
Status: DISCONTINUED | OUTPATIENT
Start: 2018-11-02 | End: 2018-11-01 | Stop reason: SDUPTHER

## 2018-11-01 RX ORDER — FUROSEMIDE 10 MG/ML
40 INJECTION INTRAMUSCULAR; INTRAVENOUS ONCE
Status: COMPLETED | OUTPATIENT
Start: 2018-11-01 | End: 2018-11-01

## 2018-11-01 RX ORDER — LORAZEPAM 1 MG/1
1 TABLET ORAL 3 TIMES DAILY PRN
Status: DISCONTINUED | OUTPATIENT
Start: 2018-11-01 | End: 2018-11-05 | Stop reason: HOSPADM

## 2018-11-01 RX ORDER — FUROSEMIDE 10 MG/ML
40 INJECTION INTRAMUSCULAR; INTRAVENOUS
Status: DISCONTINUED | OUTPATIENT
Start: 2018-11-02 | End: 2018-11-05 | Stop reason: HOSPADM

## 2018-11-01 RX ORDER — METOPROLOL TARTRATE 50 MG/1
50 TABLET, FILM COATED ORAL EVERY 12 HOURS SCHEDULED
Status: DISCONTINUED | OUTPATIENT
Start: 2018-11-01 | End: 2018-11-05 | Stop reason: HOSPADM

## 2018-11-01 RX ORDER — OXYCODONE HYDROCHLORIDE AND ACETAMINOPHEN 5; 325 MG/1; MG/1
1 TABLET ORAL EVERY 4 HOURS PRN
Status: DISCONTINUED | OUTPATIENT
Start: 2018-11-01 | End: 2018-11-05 | Stop reason: HOSPADM

## 2018-11-01 RX ADMIN — TOPIRAMATE 25 MG: 25 CAPSULE, COATED PELLETS ORAL at 21:49

## 2018-11-01 RX ADMIN — OXYCODONE HYDROCHLORIDE AND ACETAMINOPHEN 1 TABLET: 5; 325 TABLET ORAL at 17:56

## 2018-11-01 RX ADMIN — METOPROLOL TARTRATE 50 MG: 50 TABLET ORAL at 21:49

## 2018-11-01 RX ADMIN — NORTRIPTYLINE HYDROCHLORIDE 50 MG: 50 CAPSULE ORAL at 21:49

## 2018-11-01 RX ADMIN — POTASSIUM CHLORIDE 20 MEQ: 1500 TABLET, EXTENDED RELEASE ORAL at 17:50

## 2018-11-01 RX ADMIN — IOHEXOL 100 ML: 350 INJECTION, SOLUTION INTRAVENOUS at 14:01

## 2018-11-01 RX ADMIN — PRAVASTATIN SODIUM 40 MG: 40 TABLET ORAL at 17:50

## 2018-11-01 RX ADMIN — ESCITALOPRAM 10 MG: 10 TABLET, FILM COATED ORAL at 17:50

## 2018-11-01 RX ADMIN — FUROSEMIDE 40 MG: 10 INJECTION, SOLUTION INTRAMUSCULAR; INTRAVENOUS at 15:34

## 2018-11-01 RX ADMIN — APIXABAN 5 MG: 5 TABLET, FILM COATED ORAL at 17:50

## 2018-11-01 NOTE — ASSESSMENT & PLAN NOTE
Previously had left-sided weakness which resolved with no residual deficit  Compliant with home regimen

## 2018-11-01 NOTE — ASSESSMENT & PLAN NOTE
Patient with recent hospitalization for same  Patient follows with cardiology Dr Jose Ortega  Patient feels her "oral Lasix does not work"  The patient feels she is mostly compliant with low sodium diet, compliant with medications, does not monitor weights  - Admit to Med-Surg with telemetry  - Consult Cardiology due to recurrent admissions  - Continue home regimen  - Received IV Lasix 40 mg x 1 in ED, continue at 40 mg IV BID, await Cardiology recommendations

## 2018-11-01 NOTE — ASSESSMENT & PLAN NOTE
On amiodarone and metoprolol  On Eliquis for anticoagulation  Telemetry monitoring  Cardiology consult due to readmission

## 2018-11-01 NOTE — H&P
H&P- Art David 1945, 72 y o  female MRN: [id number]    Unit/Bed#: E4 -01 Encounter: [id number]    Primary Care Provider: Misha Frederick MD   Date and time admitted to hospital: 11/1/2018  9:49 AM        * Acute on chronic diastolic congestive heart failure [facility])   Assessment & Plan    Patient with recent hospitalization for same  Patient follows with cardiology Dr Garcia Brain  Patient feels her "oral Lasix does not work"  The patient feels she is mostly compliant with low sodium diet, compliant with medications, does not monitor weights  - Admit to Med-Surg with telemetry  - Consult Cardiology due to recurrent admissions  - Continue home regimen  - Received IV Lasix 40 mg x 1 in ED, continue at 40 mg IV BID, await Cardiology recommendations           Atrial fibrillation ([address] )   Assessment & Plan    On amiodarone and metoprolol  On Eliquis for anticoagulation  Telemetry monitoring  Cardiology consult due to readmission     Essential hypertension   Assessment & Plan    Continue metoprolol on amiodarone  IV Lasix  Monitor blood pressure trend     Hypercholesterolemia   Assessment & Plan    Patient on low dose statin - on Zocor 10 mg daily  Place on pravastatin 40 mg daily     Chronic bilateral low back pain without sciatica   Assessment & Plan    Patient only feels on ambulation  On Percocet PRN     History of stroke   Assessment & Plan    Previously had left-sided weakness which resolved with no residual deficit  Compliant with home regimen     Obstructive sleep apnea   Assessment & Plan    Overnight CPAP     Class 3 severe obesity due to excess calories with serious comorbidity and body mass index (BMI) of 40 0 to 44 9 in adult [facility])   Assessment & Plan    Patient would benefit from weight loss, and likely contributes to recurrent CHF exacerbations  Nutrition consult       CKD stage 3       Cr at baseline       Monitor BMP       Avoid nephrotoxins    Mitral regurgitation   Assessment & Plan    Mild her last echo and May 2018  Will defer to Cardiology need for repeat echocardiogram     Depression   Assessment & Plan    Resume home regimen         VTE Prophylaxis: Apixaban (Eliquis)  / sequential compression device   Code Status: Full  POLST: POLST form is not discussed and not completed at this time  Discussion with family: Daughter    Anticipated Length of Stay:  Patient will be admitted on an Inpatient basis with an anticipated length of stay of longer than 2 midnights  Justification for Hospital Stay: need for diuretic adjustment, outpatient care plan, specialist evaluation    Total Time for Visit, including Counseling / Coordination of Care: 1 hour  Greater than 50% of this total time spent on direct patient counseling and coordination of care  Chief Complaint:   SOB    History of Present Illness:    Katiana Hoffmann is a 72 y o  female with history of diastolic CHF, atrial fibrillation, hypertension, obesity who presents with worsening shortness of breath since last night  The patient states that she 1st started feeling dyspneic on exertion and as she tried to sleep was feeling short of breath in supine position which interfered with her ability to sleep  The patient denies chest pain or pressure  She states that she really feels better after she received IV Lasix in the emergency department  She denies significant extremity edema  She denies palpitations  She denies chills, fever, cough recent illness  She reports normal appetite and denies nausea, vomiting, diarrhea or constipation  The patient reports compliance with her medical regimen  She states that she is mostly compliant with low-salt diet, about 90%  She states that she does not monitor her daily weight  Patient states that she has not followed up with her cardiologist and discharged to to her moving to a different apartment      Review of Systems:    Review of Systems   Constitutional: Negative for activity change, chills, fever and unexpected weight change  HENT: Negative for congestion and rhinorrhea  Eyes: Negative for visual disturbance  Respiratory: Positive for shortness of breath  Negative for wheezing  Cardiovascular: Negative for chest pain, palpitations and leg swelling  Gastrointestinal: Negative for abdominal pain, constipation, diarrhea, nausea and vomiting  Endocrine: Negative for polydipsia and polyuria  Genitourinary: Negative for dysuria  Musculoskeletal: Positive for back pain and gait problem  Skin: Negative for rash  Neurological: Negative for dizziness and syncope  Hematological: Negative for adenopathy  Psychiatric/Behavioral: Negative for confusion  Past Medical and Surgical History:     Past Medical History:   Diagnosis Date    Atrial fibrillation ([facility] )     CHF (congestive heart failure) ([facility] )     Hyperlipidemia     Hypertension     Migraine     Polyp of sigmoid colon     Prediabetes     Stroke [facility])        Past Surgical History:   Procedure Laterality Date    APPENDECTOMY      CARDIAC ELECTROPHYSIOLOGY STUDY AND ABLATION      CHOLECYSTECTOMY         Meds/Allergies:    Prior to Admission medications    Medication Sig Start Date End Date Taking?  Authorizing Provider   amiodarone 100 mg tablet Take 1 tablet (100 mg total) by mouth daily 5/23/18  Yes Steve Montiel DO   apixaban (ELIQUIS) 5 mg Take 5 mg by mouth 2 (two) times a day   Yes Historical Provider, MD   clopidogrel (PLAVIX) 75 mg tablet Take 1 tablet (75 mg total) by mouth daily 6/20/18  Yes Tomas Staton MD   escitalopram (LEXAPRO) 10 mg tablet Take 10 mg by mouth daily     Yes Historical Provider, MD   furosemide (LASIX) 40 mg tablet Take 1 tablet (40 mg total) by mouth daily  Patient taking differently: Take 20 mg by mouth daily   10/7/18  Yes Bernie Khan MD   levothyroxine 50 mcg tablet Take 1 tablet (50 mcg total) by mouth daily 8/30/18  Yes Tomas Staton MD   LORazepam (ATIVAN) 1 mg tablet Take 1 tablet (1 mg total) by mouth 3 (three) times a day as needed for anxiety 8/29/18  Yes Juanito Castañeda MD   metoprolol tartrate (LOPRESSOR) 50 mg tablet Take 1 tablet (50 mg total) by mouth every 12 (twelve) hours 10/6/18  Yes Carlene Marcelino MD   Multiple Vitamin (MULTIVITAMIN) tablet Take 1 tablet by mouth daily     Yes Historical Provider, MD   nortriptyline (PAMELOR) 50 mg capsule Take 1 capsule (50 mg total) by mouth daily at bedtime  Patient taking differently: Take 50 mg by mouth daily   8/22/18  Yes Juanito Castañeda MD   oxyCODONE-acetaminophen (PERCOCET) 5-325 mg per tablet Take 1 tablet by mouth every 4 (four) hours as needed for moderate pain Max Daily Amount: 6 tablets 5/22/18  Yes Juanito Castañeda MD   potassium chloride (K-DUR,KLOR-CON) 20 mEq tablet Take 1 tablet (20 mEq total) by mouth 2 (two) times a day 2/13/18  Yes Juanito Castañeda MD   simvastatin (ZOCOR) 10 mg tablet Take 1 tablet (10 mg total) by mouth daily at bedtime 1/31/18  Yes Juanito Castañeda MD   topiramate (TOPAMAX) 25 mg sprinkle capsule Take 1 capsule (25 mg total) by mouth 2 (two) times a day 5/3/18  Yes Juanito Castañeda MD   meclizine (ANTIVERT) 25 mg tablet Take 25 mg by mouth 3 (three) times a day as needed for dizziness     Historical Provider, MD     I have reviewed home medications with a medical source (PCP, Pharmacy, other)      Allergies: No Known Allergies    Social History:     Marital Status:    Occupation:  Retired  Patient Pre-hospital Living Situation:  Lives alone  Patient Pre-hospital Level of Mobility:  Independent, occasionally uses cane  Patient Pre-hospital Diet Restrictions:  Low-salt  Substance Use History:   History   Alcohol Use No     History   Smoking Status    Former Smoker    Quit date: 2/11/2011   Smokeless Tobacco    Never Used     History   Drug Use No       Family History:    Family History   Problem Relation Age of Onset    Diabetes Mother     Heart disease Mother     Hypertension Mother     Arthritis Mother  Coronary artery disease Mother        Physical Exam:     Vitals:   Blood Pressure: 155/68 (11/01/18 1611)  Pulse: 82 (11/01/18 1611)  Temperature: 98 °F (36 7 °C) (11/01/18 1611)  Temp Source: Tympanic (11/01/18 1611)  Respirations: 20 (11/01/18 1611)  Height: 5' 7" (170 2 cm) (11/01/18 1611)  Weight - Scale: 128 kg (281 lb 12 oz) (11/01/18 1611)  SpO2: 100 % (11/01/18 1611)    Physical Exam   Constitutional: She is oriented to person, place, and time  No distress  Obese   HENT:   Head: Normocephalic and atraumatic  Eyes: Conjunctivae are normal    Neck: No JVD present  Cardiovascular: Normal rate and regular rhythm  No murmur heard  Pulmonary/Chest: Effort normal  No respiratory distress  She has no wheezes  She has no rales  Abdominal: Soft  She exhibits no distension  There is no tenderness  There is no guarding  Musculoskeletal: She exhibits no edema  Neurological: She is alert and oriented to person, place, and time  Skin: Skin is warm and dry  Psychiatric: She has a normal mood and affect  Additional Data:     Lab Results: I have personally reviewed pertinent reports  Results from last 7 days  Lab Units 11/01/18  1015   WBC Thousand/uL 6 80   HEMOGLOBIN g/dL 9 9*   HEMATOCRIT % 33 7*   PLATELETS Thousands/uL 267   NEUTROS ABS Thousands/µL 4 00   NEUTROS PCT % 60   LYMPHS PCT % 24   MONOS PCT % 7   EOS PCT % 8*       Results from last 7 days  Lab Units 11/01/18  1015   POTASSIUM mmol/L 3 9   CHLORIDE mmol/L 109*   CO2 mmol/L 25   BUN mg/dL 18   CREATININE mg/dL 1 33*   ANION GAP mmol/L 11   CALCIUM mg/dL 8 6   ALBUMIN g/dL 3 4*   TOTAL BILIRUBIN mg/dL 0 39   ALK PHOS U/L 113   ALT U/L 54   AST U/L 52*       Results from last 7 days  Lab Units 11/01/18  1015   INR  1 34*                   Imaging: I have personally reviewed pertinent reports  CTA ED chest PE study   Final Result by Yulisa Clarke MD (11/01 1425)      1  No evidence of pulmonary embolus     2   Diffuse groundglass attenuation in both lungs, a differential diagnosis of which has been discussed above, most likely pulmonary edema  3   Nonspecific mediastinal lymphadenopathy, increased in extent since a CT from 10/29/2012  4   Hepatic steatosis  Workstation performed: [id number]         X-ray chest 2 views   Final Result by Michael Dey MD (11/01 1035)      Stable cardiomegaly  No active pulmonary disease                  Workstation performed: [id number]             EKG, Pathology, and Other Studies Reviewed on Admission:   · EKG: reviewed report - NSR, first degree block    Allscripts / Epic Records Reviewed: Yes     ** Please Note: This note has been constructed using a voice recognition system   **

## 2018-11-01 NOTE — ASSESSMENT & PLAN NOTE
Patient would benefit from weight loss, and likely contributes to recurrent CHF exacerbations  Nutrition consult

## 2018-11-01 NOTE — ED PROVIDER NOTES
History  Chief Complaint   Patient presents with    Shortness of Breath     patient c/o of SOB that started last night; patient has a hx of CHF and a-fib;     C/o sob since yest  - worse with walking or lying flat   +wheezing  No h/o asthma or COPD  Taking her meds regularly  No cough, no fevers  Has some swelling in her lower ext  Pt  Was on lasix 40mg daily, then she was switched to 20mg daily about a month ago  Prior to Admission Medications   Prescriptions Last Dose Informant Patient Reported? Taking?    LORazepam (ATIVAN) 1 mg tablet Past Month at Unknown time  No Yes   Sig: Take 1 tablet (1 mg total) by mouth 3 (three) times a day as needed for anxiety   Multiple Vitamin (MULTIVITAMIN) tablet 10/31/2018 at Unknown time Self Yes Yes   Sig: Take 1 tablet by mouth daily     amiodarone 100 mg tablet 10/31/2018 at Unknown time  No Yes   Sig: Take 1 tablet (100 mg total) by mouth daily   apixaban (ELIQUIS) 5 mg 10/31/2018 at Unknown time  Yes Yes   Sig: Take 5 mg by mouth 2 (two) times a day   clopidogrel (PLAVIX) 75 mg tablet 10/31/2018 at Unknown time  No Yes   Sig: Take 1 tablet (75 mg total) by mouth daily   escitalopram (LEXAPRO) 10 mg tablet 10/31/2018 at Unknown time Self Yes Yes   Sig: Take 10 mg by mouth daily     furosemide (LASIX) 40 mg tablet 10/31/2018 at Unknown time  No Yes   Sig: Take 1 tablet (40 mg total) by mouth daily   Patient taking differently: Take 20 mg by mouth daily     levothyroxine 50 mcg tablet 10/31/2018 at Unknown time  No Yes   Sig: Take 1 tablet (50 mcg total) by mouth daily   meclizine (ANTIVERT) 25 mg tablet More than a month at Unknown time Self Yes No   Sig: Take 25 mg by mouth 3 (three) times a day as needed for dizziness    metoprolol tartrate (LOPRESSOR) 50 mg tablet 10/31/2018 at Unknown time  No Yes   Sig: Take 1 tablet (50 mg total) by mouth every 12 (twelve) hours   nortriptyline (PAMELOR) 50 mg capsule 10/31/2018 at Unknown time  No Yes   Sig: Take 1 capsule (50 mg total) by mouth daily at bedtime   Patient taking differently: Take 50 mg by mouth daily     oxyCODONE-acetaminophen (PERCOCET) 5-325 mg per tablet Past Month at Unknown time Self No Yes   Sig: Take 1 tablet by mouth every 4 (four) hours as needed for moderate pain Max Daily Amount: 6 tablets   potassium chloride (K-DUR,KLOR-CON) 20 mEq tablet 10/31/2018 at Unknown time Self No Yes   Sig: Take 1 tablet (20 mEq total) by mouth 2 (two) times a day   simvastatin (ZOCOR) 10 mg tablet 10/31/2018 at Unknown time Self No Yes   Sig: Take 1 tablet (10 mg total) by mouth daily at bedtime   topiramate (TOPAMAX) 25 mg sprinkle capsule 10/31/2018 at Unknown time Self No Yes   Sig: Take 1 capsule (25 mg total) by mouth 2 (two) times a day      Facility-Administered Medications: None       Past Medical History:   Diagnosis Date    Atrial fibrillation (HCC)     CHF (congestive heart failure) (HCC)     Hyperlipidemia     Hypertension     Migraine     Polyp of sigmoid colon     Prediabetes     Stroke [facility])        Past Surgical History:   Procedure Laterality Date    APPENDECTOMY      CARDIAC ELECTROPHYSIOLOGY STUDY AND ABLATION      CHOLECYSTECTOMY         Family History   Problem Relation Age of Onset    Diabetes Mother     Heart disease Mother     Hypertension Mother     Arthritis Mother     Coronary artery disease Mother      I have reviewed and agree with the history as documented  Social History   Substance Use Topics    Smoking status: Former Smoker     Quit date: 2/11/2011    Smokeless tobacco: Never Used    Alcohol use No        Review of Systems   Constitutional: Negative for appetite change, fatigue and fever  HENT: Negative for rhinorrhea and sore throat  Respiratory: Positive for shortness of breath  Negative for cough  Cardiovascular: Positive for leg swelling  Negative for chest pain  Gastrointestinal: Negative for abdominal pain, diarrhea and vomiting     Genitourinary: Negative for dysuria and flank pain  Musculoskeletal: Negative for back pain and neck pain  Skin: Negative for rash  Neurological: Negative for syncope and headaches  Psychiatric/Behavioral:        Mood normal       Physical Exam  Physical Exam   Constitutional: She is oriented to person, place, and time  She appears well-developed and well-nourished  HENT:   Head: Normocephalic and atraumatic  Neck: Normal range of motion  Neck supple  Cardiovascular: Normal rate and regular rhythm  Pulmonary/Chest: Effort normal  No respiratory distress  Decreased breath sounds b/l    Abdominal: Soft  There is no tenderness  Musculoskeletal: Normal range of motion  Trace edema b/l LE's   Neurological: She is alert and oriented to person, place, and time  Skin: Skin is warm and dry  Nursing note and vitals reviewed        Vital Signs  ED Triage Vitals   Temperature Pulse Respirations Blood Pressure SpO2   11/01/18 0948 11/01/18 0948 11/01/18 0948 11/01/18 0948 11/01/18 0948   97 7 °F (36 5 °C) 85 20 164/76 98 %      Temp Source Heart Rate Source Patient Position - Orthostatic VS BP Location FiO2 (%)   11/01/18 0948 11/01/18 0948 11/01/18 0948 11/01/18 0948 --   Oral Monitor Sitting Right arm       Pain Score       11/01/18 1534       No Pain           Vitals:    11/01/18 1114 11/01/18 1334 11/01/18 1534 11/01/18 1611   BP: 159/73 111/52 149/54 155/68   Pulse: 76 81 80 82   Patient Position - Orthostatic VS:   Lying Sitting       Visual Acuity      ED Medications  Medications   amiodarone tablet 100 mg (not administered)   apixaban (ELIQUIS) tablet 5 mg (not administered)   clopidogrel (PLAVIX) tablet 75 mg (not administered)   escitalopram (LEXAPRO) tablet 10 mg (not administered)   levothyroxine tablet 50 mcg (not administered)   LORazepam (ATIVAN) tablet 1 mg (not administered)   meclizine (ANTIVERT) tablet 25 mg (not administered)   metoprolol tartrate (LOPRESSOR) tablet 50 mg (not administered) nortriptyline (PAMELOR) capsule 50 mg (not administered)   oxyCODONE-acetaminophen (PERCOCET) 5-325 mg per tablet 1 tablet (not administered)   potassium chloride (K-DUR,KLOR-CON) CR tablet 20 mEq (not administered)   pravastatin (PRAVACHOL) tablet 40 mg (not administered)   topiramate (TOPAMAX) sprinkle capsule 25 mg (not administered)   furosemide (LASIX) injection 40 mg (not administered)   multivitamin-minerals (CENTRUM) tablet 1 tablet (not administered)   iohexol (OMNIPAQUE) 350 MG/ML injection (MULTI-DOSE) 100 mL (100 mL Intravenous Given 11/1/18 1401)   furosemide (LASIX) injection 40 mg (40 mg Intravenous Given 11/1/18 1534)       Diagnostic Studies  Results Reviewed     Procedure Component Value Units Date/Time    D-Dimer [[id number]]  (Abnormal) Collected:  11/01/18 1015    Lab Status:  Final result Specimen:  Blood from Arm, Left Updated:  11/01/18 1229     D-Dimer, Quant 622 (H) ng/ml (FEU)     Comprehensive metabolic panel [[id number]]  (Abnormal) Collected:  11/01/18 1015    Lab Status:  Final result Specimen:  Blood from Arm, Left Updated:  11/01/18 1046     Sodium 145 mmol/L      Potassium 3 9 mmol/L      Chloride 109 (H) mmol/L      CO2 25 mmol/L      ANION GAP 11 mmol/L      BUN 18 mg/dL      Creatinine 1 33 (H) mg/dL      Glucose 127 mg/dL      Calcium 8 6 mg/dL      AST 52 (H) U/L      ALT 54 U/L      Alkaline Phosphatase 113 U/L      Total Protein 7 2 g/dL      Albumin 3 4 (L) g/dL      Total Bilirubin 0 39 mg/dL      eGFR 40 ml/min/1 73sq m     Narrative:         National Kidney Disease Education Program recommendations are as follows:  GFR calculation is accurate only with a steady state creatinine  Chronic Kidney disease less than 60 ml/min/1 73 sq  meters  Kidney failure less than 15 ml/min/1 73 sq  meters      B-type natriuretic peptide [[id number]]  (Abnormal) Collected:  11/01/18 1015    Lab Status:  Final result Specimen:  Blood from Arm, Left Updated:  11/01/18 1046     NT-proBNP 549 (H) pg/mL     Protime-INR [[id number]]  (Abnormal) Collected:  11/01/18 1015    Lab Status:  Final result Specimen:  Blood from Arm, Left Updated:  11/01/18 1044     Protime 16 7 (H) seconds      INR 1 34 (H)    APTT [[id number]]  (Normal) Collected:  11/01/18 1015    Lab Status:  Final result Specimen:  Blood from Arm, Left Updated:  11/01/18 1044     PTT 30 seconds     Troponin I [[id number]]  (Normal) Collected:  11/01/18 1015    Lab Status:  Final result Specimen:  Blood from Arm, Left Updated:  11/01/18 1040     Troponin I 0 02 ng/mL     CBC and differential [[id number]]  (Abnormal) Collected:  11/01/18 1015    Lab Status:  Final result Specimen:  Blood from Arm, Left Updated:  11/01/18 1021     WBC 6 80 Thousand/uL      RBC 3 74 (L) Million/uL      Hemoglobin 9 9 (L) g/dL      Hematocrit 33 7 (L) %      MCV 90 fL      MCH 26 5 (L) pg      MCHC 29 4 (L) g/dL      RDW 16 4 (H) %      MPV 9 5 fL      Platelets 539 Thousands/uL      nRBC 0 /100 WBCs      Neutrophils Relative 60 %      Immat GRANS % 0 %      Lymphocytes Relative 24 %      Monocytes Relative 7 %      Eosinophils Relative 8 (H) %      Basophils Relative 1 %      Neutrophils Absolute 4 00 Thousands/µL      Immature Grans Absolute 0 03 Thousand/uL      Lymphocytes Absolute 1 63 Thousands/µL      Monocytes Absolute 0 50 Thousand/µL      Eosinophils Absolute 0 57 Thousand/µL      Basophils Absolute 0 07 Thousands/µL                  CTA ED chest PE study   Final Result by Jesus Worthy MD (11/01 1425)      1  No evidence of pulmonary embolus  2   Diffuse groundglass attenuation in both lungs, a differential diagnosis of which has been discussed above, most likely pulmonary edema  3   Nonspecific mediastinal lymphadenopathy, increased in extent since a CT from 10/29/2012  4   Hepatic steatosis  Workstation performed: [id number]         X-ray chest 2 views   Final Result by Zaid Estrada MD (11/01 1035)      Stable cardiomegaly    No active pulmonary disease                  Workstation performed: [id number]                    Procedures  Procedures       Phone Contacts  ED Phone Contact    ED Course                               MDM  Number of Diagnoses or Management Options  CHF (congestive heart failure) ([place] [id number] ):      Amount and/or Complexity of Data Reviewed  Clinical lab tests: ordered and reviewed  Tests in the radiology section of CPT®: ordered and reviewed    Risk of Complications, Morbidity, and/or Mortality  Presenting problems: moderate  General comments: CT chest shows pulmonary edema, pt   Admitted for further workup      CritCare Time    Disposition  Final diagnoses:   CHF (congestive heart failure) ([place] [address] )     Time reflects when diagnosis was documented in both MDM as applicable and the Disposition within this note     Time User Action Codes Description Comment    11/1/2018  3:26 PM Opal Alvarado Add [I50 9] CHF (congestive heart failure) ([place] [address] )     11/1/2018  4:59 PM Madeline Gomez Add [I50 33] Acute on chronic diastolic congestive heart failure ([place] [address] )     11/1/2018  5:01 PM Madeline Gomez Add [E66 01,  Z68 41] Class 3 severe obesity due to excess calories with serious comorbidity and body mass index (BMI) of 40 0 to 44 9 in [facility])       ED Disposition     ED Disposition Condition Comment    Admit  Case was discussed with TAMI  and the patient's admission status was agreed to be inpt /tele      Follow-up Information    None         Current Discharge Medication List      CONTINUE these medications which have NOT CHANGED    Details   amiodarone 100 mg tablet Take 1 tablet (100 mg total) by mouth daily  Qty: 90 tablet, Refills: 3    Associated Diagnoses: Paroxysmal atrial fibrillation (HCC)      apixaban (ELIQUIS) 5 mg Take 5 mg by mouth 2 (two) times a day      clopidogrel (PLAVIX) 75 mg tablet Take 1 tablet (75 mg total) by mouth daily  Qty: 90 tablet, Refills: 1    Associated Diagnoses: Cerebrovascular accident (CVA), unspecified mechanism ([facility])      escitalopram (LEXAPRO) 10 mg tablet Take 10 mg by mouth daily        furosemide (LASIX) 40 mg tablet Take 1 tablet (40 mg total) by mouth daily  Qty: 30 tablet, Refills: 3    Associated Diagnoses: Chronic diastolic CHF (congestive heart failure) ([facility])      levothyroxine 50 mcg tablet Take 1 tablet (50 mcg total) by mouth daily  Qty: 90 tablet, Refills: 1    Associated Diagnoses: Hypothyroidism due to medication      LORazepam (ATIVAN) 1 mg tablet Take 1 tablet (1 mg total) by mouth 3 (three) times a day as needed for anxiety  Qty: 30 tablet, Refills: 0    Associated Diagnoses: Depression, unspecified depression type      metoprolol tartrate (LOPRESSOR) 50 mg tablet Take 1 tablet (50 mg total) by mouth every 12 (twelve) hours  Qty: 60 tablet, Refills: 3    Associated Diagnoses: Essential hypertension      Multiple Vitamin (MULTIVITAMIN) tablet Take 1 tablet by mouth daily        nortriptyline (PAMELOR) 50 mg capsule Take 1 capsule (50 mg total) by mouth daily at bedtime  Qty: 90 capsule, Refills: 0    Associated Diagnoses: Migraine without status migrainosus, not intractable, unspecified migraine type      oxyCODONE-acetaminophen (PERCOCET) 5-325 mg per tablet Take 1 tablet by mouth every 4 (four) hours as needed for moderate pain Max Daily Amount: 6 tablets  Qty: 50 tablet, Refills: 0    Associated Diagnoses: Migraine without aura and without status migrainosus, not intractable      potassium chloride (K-DUR,KLOR-CON) 20 mEq tablet Take 1 tablet (20 mEq total) by mouth 2 (two) times a day  Qty: 180 tablet, Refills: 3    Associated Diagnoses: Essential hypertension      simvastatin (ZOCOR) 10 mg tablet Take 1 tablet (10 mg total) by mouth daily at bedtime  Qty: 90 tablet, Refills: 3    Associated Diagnoses: Hypercholesterolemia      topiramate (TOPAMAX) 25 mg sprinkle capsule Take 1 capsule (25 mg total) by mouth 2 (two) times a day  Qty: 180 capsule, Refills: 1    Associated Diagnoses: Migraine      meclizine (ANTIVERT) 25 mg tablet Take 25 mg by mouth 3 (three) times a day as needed for dizziness            No discharge procedures on file      ED Provider  Electronically Signed by           Earlene Bryant MD  11/01/18 3563

## 2018-11-01 NOTE — PLAN OF CARE
CARDIOVASCULAR - ADULT     Maintains optimal cardiac output and hemodynamic stability Progressing     Absence of cardiac dysrhythmias or at baseline rhythm Progressing        DISCHARGE PLANNING     Discharge to home or other facility with appropriate resources Progressing        INFECTION - ADULT     Absence or prevention of progression during hospitalization Progressing     Absence of fever/infection during neutropenic period Progressing        Knowledge Deficit     Patient/family/caregiver demonstrates understanding of disease process, treatment plan, medications, and discharge instructions Progressing        PAIN - ADULT     Verbalizes/displays adequate comfort level or baseline comfort level Progressing        Potential for Falls     Patient will remain free of falls Progressing        RESPIRATORY - ADULT     Achieves optimal ventilation and oxygenation Progressing        SAFETY ADULT     Maintain or return to baseline ADL function Progressing     Maintain or return mobility status to optimal level Progressing

## 2018-11-02 PROBLEM — D64.9 ANEMIA: Status: ACTIVE | Noted: 2018-11-02

## 2018-11-02 LAB
ANION GAP SERPL CALCULATED.3IONS-SCNC: 10 MMOL/L (ref 4–13)
BASOPHILS # BLD AUTO: 0.05 THOUSANDS/ΜL (ref 0–0.1)
BASOPHILS NFR BLD AUTO: 1 % (ref 0–1)
BUN SERPL-MCNC: 17 MG/DL (ref 5–25)
CALCIUM SERPL-MCNC: 8.7 MG/DL (ref 8.3–10.1)
CHLORIDE SERPL-SCNC: 108 MMOL/L (ref 100–108)
CO2 SERPL-SCNC: 24 MMOL/L (ref 21–32)
CREAT SERPL-MCNC: 1.35 MG/DL (ref 0.6–1.3)
EOSINOPHIL # BLD AUTO: 0.53 THOUSAND/ΜL (ref 0–0.61)
EOSINOPHIL NFR BLD AUTO: 10 % (ref 0–6)
ERYTHROCYTE [DISTWIDTH] IN BLOOD BY AUTOMATED COUNT: 16.7 % (ref 11.6–15.1)
FERRITIN SERPL-MCNC: 16 NG/ML (ref 8–388)
FOLATE SERPL-MCNC: >20 NG/ML (ref 3.1–17.5)
GFR SERPL CREATININE-BSD FRML MDRD: 40 ML/MIN/1.73SQ M
GLUCOSE SERPL-MCNC: 113 MG/DL (ref 65–140)
HCT VFR BLD AUTO: 30.7 % (ref 34.8–46.1)
HGB BLD-MCNC: 9.3 G/DL (ref 11.5–15.4)
IMM GRANULOCYTES # BLD AUTO: 0.01 THOUSAND/UL (ref 0–0.2)
IMM GRANULOCYTES NFR BLD AUTO: 0 % (ref 0–2)
IRON SATN MFR SERPL: 12 %
IRON SERPL-MCNC: 47 UG/DL (ref 50–170)
LYMPHOCYTES # BLD AUTO: 1.82 THOUSANDS/ΜL (ref 0.6–4.47)
LYMPHOCYTES NFR BLD AUTO: 33 % (ref 14–44)
MCH RBC QN AUTO: 27 PG (ref 26.8–34.3)
MCHC RBC AUTO-ENTMCNC: 30.3 G/DL (ref 31.4–37.4)
MCV RBC AUTO: 89 FL (ref 82–98)
MONOCYTES # BLD AUTO: 0.48 THOUSAND/ΜL (ref 0.17–1.22)
MONOCYTES NFR BLD AUTO: 9 % (ref 4–12)
NEUTROPHILS # BLD AUTO: 2.59 THOUSANDS/ΜL (ref 1.85–7.62)
NEUTS SEG NFR BLD AUTO: 47 % (ref 43–75)
NRBC BLD AUTO-RTO: 0 /100 WBCS
PLATELET # BLD AUTO: 258 THOUSANDS/UL (ref 149–390)
PMV BLD AUTO: 9.8 FL (ref 8.9–12.7)
POTASSIUM SERPL-SCNC: 3.8 MMOL/L (ref 3.5–5.3)
RBC # BLD AUTO: 3.45 MILLION/UL (ref 3.81–5.12)
SODIUM SERPL-SCNC: 142 MMOL/L (ref 136–145)
TIBC SERPL-MCNC: 382 UG/DL (ref 250–450)
VIT B12 SERPL-MCNC: 869 PG/ML (ref 100–900)
WBC # BLD AUTO: 5.48 THOUSAND/UL (ref 4.31–10.16)

## 2018-11-02 PROCEDURE — 97116 GAIT TRAINING THERAPY: CPT

## 2018-11-02 PROCEDURE — 97166 OT EVAL MOD COMPLEX 45 MIN: CPT

## 2018-11-02 PROCEDURE — 94660 CPAP INITIATION&MGMT: CPT

## 2018-11-02 PROCEDURE — 90662 IIV NO PRSV INCREASED AG IM: CPT | Performed by: FAMILY MEDICINE

## 2018-11-02 PROCEDURE — 80048 BASIC METABOLIC PNL TOTAL CA: CPT | Performed by: FAMILY MEDICINE

## 2018-11-02 PROCEDURE — G8979 MOBILITY GOAL STATUS: HCPCS

## 2018-11-02 PROCEDURE — G8987 SELF CARE CURRENT STATUS: HCPCS

## 2018-11-02 PROCEDURE — 99232 SBSQ HOSP IP/OBS MODERATE 35: CPT | Performed by: FAMILY MEDICINE

## 2018-11-02 PROCEDURE — 82728 ASSAY OF FERRITIN: CPT | Performed by: FAMILY MEDICINE

## 2018-11-02 PROCEDURE — G0008 ADMIN INFLUENZA VIRUS VAC: HCPCS | Performed by: FAMILY MEDICINE

## 2018-11-02 PROCEDURE — 99222 1ST HOSP IP/OBS MODERATE 55: CPT | Performed by: INTERNAL MEDICINE

## 2018-11-02 PROCEDURE — 83540 ASSAY OF IRON: CPT | Performed by: FAMILY MEDICINE

## 2018-11-02 PROCEDURE — 82607 VITAMIN B-12: CPT | Performed by: FAMILY MEDICINE

## 2018-11-02 PROCEDURE — 82746 ASSAY OF FOLIC ACID SERUM: CPT | Performed by: FAMILY MEDICINE

## 2018-11-02 PROCEDURE — G8978 MOBILITY CURRENT STATUS: HCPCS

## 2018-11-02 PROCEDURE — 97163 PT EVAL HIGH COMPLEX 45 MIN: CPT

## 2018-11-02 PROCEDURE — 85025 COMPLETE CBC W/AUTO DIFF WBC: CPT | Performed by: FAMILY MEDICINE

## 2018-11-02 PROCEDURE — 83550 IRON BINDING TEST: CPT | Performed by: FAMILY MEDICINE

## 2018-11-02 PROCEDURE — G8988 SELF CARE GOAL STATUS: HCPCS

## 2018-11-02 RX ADMIN — TOPIRAMATE 25 MG: 25 CAPSULE, COATED PELLETS ORAL at 21:15

## 2018-11-02 RX ADMIN — Medication 1 TABLET: at 08:45

## 2018-11-02 RX ADMIN — CLOPIDOGREL 75 MG: 75 TABLET, FILM COATED ORAL at 08:45

## 2018-11-02 RX ADMIN — POTASSIUM CHLORIDE 20 MEQ: 1500 TABLET, EXTENDED RELEASE ORAL at 17:00

## 2018-11-02 RX ADMIN — METOPROLOL TARTRATE 50 MG: 50 TABLET ORAL at 21:15

## 2018-11-02 RX ADMIN — ESCITALOPRAM 10 MG: 10 TABLET, FILM COATED ORAL at 08:45

## 2018-11-02 RX ADMIN — FUROSEMIDE 40 MG: 10 INJECTION, SOLUTION INTRAMUSCULAR; INTRAVENOUS at 08:45

## 2018-11-02 RX ADMIN — METOPROLOL TARTRATE 50 MG: 50 TABLET ORAL at 08:45

## 2018-11-02 RX ADMIN — INFLUENZA A VIRUS A/MICHIGAN/45/2015 X-275 (H1N1) ANTIGEN (FORMALDEHYDE INACTIVATED), INFLUENZA A VIRUS A/SINGAPORE/INFIMH-16-0019/2016 IVR-186 (H3N2) ANTIGEN (FORMALDEHYDE INACTIVATED), AND INFLUENZA B VIRUS B/MARYLAND/15/2016 BX-69A (A B/COLORADO/6/2017-LIKE VIRUS) ANTIGEN (FORMALDEHYDE INACTIVATED) 0.5 ML: 60; 60; 60 INJECTION, SUSPENSION INTRAMUSCULAR at 08:48

## 2018-11-02 RX ADMIN — TOPIRAMATE 25 MG: 25 CAPSULE, COATED PELLETS ORAL at 08:48

## 2018-11-02 RX ADMIN — APIXABAN 5 MG: 5 TABLET, FILM COATED ORAL at 08:45

## 2018-11-02 RX ADMIN — NORTRIPTYLINE HYDROCHLORIDE 50 MG: 50 CAPSULE ORAL at 21:15

## 2018-11-02 RX ADMIN — LEVOTHYROXINE SODIUM 50 MCG: 50 TABLET ORAL at 05:41

## 2018-11-02 RX ADMIN — PRAVASTATIN SODIUM 40 MG: 40 TABLET ORAL at 17:00

## 2018-11-02 RX ADMIN — AMIODARONE HYDROCHLORIDE 100 MG: 200 TABLET ORAL at 08:45

## 2018-11-02 RX ADMIN — POTASSIUM CHLORIDE 20 MEQ: 1500 TABLET, EXTENDED RELEASE ORAL at 08:45

## 2018-11-02 RX ADMIN — FUROSEMIDE 40 MG: 10 INJECTION, SOLUTION INTRAMUSCULAR; INTRAVENOUS at 17:00

## 2018-11-02 RX ADMIN — APIXABAN 5 MG: 5 TABLET, FILM COATED ORAL at 17:00

## 2018-11-02 NOTE — ASSESSMENT & PLAN NOTE
On amiodarone and metoprolol  On Eliquis for anticoagulation  Telemetry monitoring  Cardiology consulted due to readmission

## 2018-11-02 NOTE — ASSESSMENT & PLAN NOTE
Patient would benefit from weight loss, and likely contributes to recurrent CHF exacerbations  Nutrition consulted

## 2018-11-02 NOTE — PLAN OF CARE
Problem: OCCUPATIONAL THERAPY ADULT  Goal: Performs self-care activities at highest level of function for planned discharge setting  See evaluation for individualized goals  Treatment Interventions: ADL retraining, Functional transfer training, Endurance training, UE strengthening/ROM, Patient/family training, Equipment evaluation/education, Compensatory technique education, Activityengagement, Energy conservation          See flowsheet documentation for full assessment, interventions and recommendations  Limitation: Decreased ADL status, Decreased Safe judgement during ADL, Decreased endurance  Prognosis: Good  Assessment: Pt is a 72 y o  female seen for OT evaluation s/p admit to [facility] on 11/1/2018 w/ Acute on chronic diastolic congestive heart failure ([facility] [address] )  Comorbidities affecting pt's functional performance at time of assessment include: HTN, a-fib, h/o CVA, obesity  Personal factors affecting pt at time of IE include: decreased endurance and SOB  Prior to admission, pt was living w/ alone and reports independent w/ ADLs except increased time for LB Dressing, independent w/ functional transfers and mobility w/ no AD in home, driving, independent w/ IADLs  Upon evaluation: Pt requires MOD I w/ UB ADLs, supervision-min assist LB ADLs (w/ LHAE training pt educated on donning socks w/ sock aide and setup; min assist to thread pants w/ reacher), supervision bed mobility, supervision functional mobility w/ no AD 2* the following deficits impacting occupational performance: dyspnea on exertion, decreased activity tolerance, impaired functional reach, increased back pain  Pt provided w/ hip kit and signed for hip kit; pt reports use of LHAE decreases her SOB  Pt to benefit from continued skilled OT tx while in the hospital to address deficits as defined above and maximize level of functional independence w ADL's and functional mobility   Occupational Performance areas to address include: grooming, bathing/shower, toilet hygiene, dressing, functional mobility and clothing management, education on energy conservation education, further [facility] training  Provided pt on energy conservation education on pacing self, taking seated rest breaks and provided w/ handout  From OT standpoint, recommendation at time of d/c would be home w/ family support when medically stable       OT Discharge Recommendation: Home with family support  OT - OK to Discharge:  (when medically stable)      Comments: Helga Alonzo MS, OTR/L

## 2018-11-02 NOTE — PROGRESS NOTES
Progress Note - Kerin Shone 1945, 72 y o  female MRN: [id number]    Unit/Bed#: E4 -01 Encounter: [id number]    Primary Care Provider: Archana Clemens MD   Date and time admitted to hospital: 11/1/2018  9:49 AM        * Acute on chronic diastolic congestive heart failure [facility])   Assessment & Plan    Patient with recent hospitalization for same  Patient follows with cardiology Dr Oumar Lorenzo  Patient feels her "oral Lasix does not work"  The patient feels she is mostly compliant with low sodium diet, compliant with medications, does not monitor weights  - Admit to Med-Surg with telemetry  - Consult Cardiology due to recurrent admissions  - Continue home regimen  - Received IV Lasix 40 mg x 1 in ED, continue at 40 mg IV BID, appreciate Cardiology recommendations  - Will consider discharge on Torsemide   - Will need VNA/daily weights/strict low Na diet to prevent recurrent hospitalizations           Atrial fibrillation ([facility] [address] )   Assessment & Plan    On amiodarone and metoprolol  On Eliquis for anticoagulation  Telemetry monitoring  Cardiology consulted due to readmission     Essential hypertension   Assessment & Plan    Continue metoprolol and amiodarone  IV Lasix  Monitor blood pressure trend     Hypercholesterolemia   Assessment & Plan    Patient on low dose statin - on Zocor 10 mg daily  Place on pravastatin 40 mg daily while in the hospital     Stage 3 chronic kidney disease ([facility] [address] )   Assessment & Plan    Cr at baseline  Monitor BMP  Avoid nephrotoxins     Chronic bilateral low back pain without sciatica   Assessment & Plan    Patient only feels on ambulation  On Percocet PRN     History of stroke   Assessment & Plan    Previously had left-sided weakness which resolved with no residual deficit  Compliant with home regimen     Obstructive sleep apnea   Assessment & Plan    Overnight CPAP     Class 3 severe obesity due to excess calories with serious comorbidity and body mass index (BMI) of 40 0 to 44 9 in adult [facility])   Assessment & Plan    Patient would benefit from weight loss, and likely contributes to recurrent CHF exacerbations  Nutrition consulted     Mitral regurgitation   Assessment & Plan    Mild, per last echo and May 2018  Will defer to Cardiology need for repeat echocardiogram     Depression   Assessment & Plan    Resumed home regimen       VTE Pharmacologic Prophylaxis:   Pharmacologic: Apixaban (Eliquis)  Mechanical VTE Prophylaxis in Place: Yes    Patient Centered Rounds: I have performed bedside rounds with nursing staff today  Discussions with Specialists or Other Care Team Provider: Cardiology    Education and Discussions with Family / Patient: Patient    Time Spent for Care: 30 minutes  More than 50% of total time spent on counseling and coordination of care as described above  Current Length of Stay: 1 day(s)    Current Patient Status: Inpatient   Certification Statement: The patient will continue to require additional inpatient hospital stay due to Need for close monitoring and IV Lasix    Discharge Plan:  Possibly tomorrow    Code Status: Level 1 - Full Code      Subjective:   Patient seen and examined  She states that she feels better overall but still feel significant exertional dyspnea  She denies chest pain or pressure  She reports good appetite  No overnight events  Objective:     Vitals:   Temp (24hrs), Av 7 °F (36 5 °C), Min:97 5 °F (36 4 °C), Max:97 8 °F (36 6 °C)    Temp:  [97 5 °F (36 4 °C)-97 8 °F (36 6 °C)] 97 5 °F (36 4 °C)  HR:  [77-99] 82  Resp:  [18] 18  BP: (129-161)/(59-90) 161/90  SpO2:  [91 %-97 %] 97 %  Body mass index is 43 4 kg/m²  Input and Output Summary (last 24 hours):        Intake/Output Summary (Last 24 hours) at 18 2694  Last data filed at 18 1701   Gross per 24 hour   Intake              960 ml   Output             1700 ml   Net             -740 ml       Physical Exam:     Physical Exam   Constitutional: She is oriented to person, place, and time  No distress  Overweight    HENT:   Head: Normocephalic and atraumatic  Eyes: Conjunctivae are normal    Neck: No JVD present  Cardiovascular: Normal rate and regular rhythm  No murmur heard  Pulmonary/Chest: Effort normal  No respiratory distress  She has no wheezes  She has no rales  Abdominal: Soft  She exhibits no distension  There is no tenderness  There is no guarding  Musculoskeletal: She exhibits no edema  Neurological: She is alert and oriented to person, place, and time  Skin: Skin is warm and dry  Psychiatric: She has a normal mood and affect  Additional Data:     Labs:      Results from last 7 days  Lab Units 11/02/18  0553   WBC Thousand/uL 5 48   HEMOGLOBIN g/dL 9 3*   HEMATOCRIT % 30 7*   PLATELETS Thousands/uL 258   NEUTROS PCT % 47   LYMPHS PCT % 33   MONOS PCT % 9   EOS PCT % 10*       Results from last 7 days  Lab Units 11/02/18  0553 11/01/18  1015   POTASSIUM mmol/L 3 8 3 9   CHLORIDE mmol/L 108 109*   CO2 mmol/L 24 25   BUN mg/dL 17 18   CREATININE mg/dL 1 35* 1 33*   ANION GAP mmol/L 10 11   CALCIUM mg/dL 8 7 8 6   ALBUMIN g/dL  --  3 4*   TOTAL BILIRUBIN mg/dL  --  0 39   ALK PHOS U/L  --  113   ALT U/L  --  54   AST U/L  --  52*       Results from last 7 days  Lab Units 11/01/18  1015   INR  1 34*                     * I Have Reviewed All Lab Data Listed Above  * Additional Pertinent Lab Tests Reviewed:  [address] Admission Reviewed    Imaging:    Imaging Reports Reviewed Today Include: none today      Recent Cultures (last 7 days):           Last 24 Hours Medication List:     Current Facility-Administered Medications:  amiodarone 100 mg Oral Daily Eliane Mcgovern MD   apixaban 5 mg Oral BID Eliane Mcgovern MD   clopidogrel 75 mg Oral Daily Eliane Mcgovern MD   escitalopram 10 mg Oral Daily Eliane Mcgovern MD   furosemide 40 mg Intravenous BID (diuretic) Eliane Mcgovern MD   levothyroxine 50 mcg Oral Early Morning Eliane Mcgovern MD LORazepam 1 mg Oral TID PRN Arpita Gonzales MD   meclizine 25 mg Oral TID PRN Arpita Gonzales MD   metoprolol tartrate 50 mg Oral Q12H [facility] Arpita Gonzales MD   multivitamin-minerals 1 tablet Oral Daily Arpita Gonzales MD   nortriptyline 50 mg Oral HS Arpita Gonzales MD   oxyCODONE-acetaminophen 1 tablet Oral Q4H PRN Arpita Gonzales MD   potassium chloride 20 mEq Oral BID Arpita Gonzales MD   pravastatin 40 mg Oral Daily With Arun Nava MD   topiramate 25 mg Oral Q12H Lele Manuel MD        Today, Patient Was Seen By: Yaz Sampson MD    ** Please Note: Dictation voice to text software may have been used in the creation of this document   **

## 2018-11-02 NOTE — PHYSICAL THERAPY NOTE
PT EVALUATION    72 y o     [id number]    CHF (congestive heart failure) (HCC) [I50 9]  SOB (shortness of breath) [R06 02]    Past Medical History:   Diagnosis Date    Atrial fibrillation (HCC)     CHF (congestive heart failure) (HCC)     Hyperlipidemia     Hypertension     Migraine     Polyp of sigmoid colon     Prediabetes     Stroke [facility])          Past Surgical History:   Procedure Laterality Date    APPENDECTOMY      CARDIAC ELECTROPHYSIOLOGY STUDY AND ABLATION      CHOLECYSTECTOMY        11/02/18 1415   Note Type   Note type Eval/Treat   Pain Assessment   Pain Assessment No/denies pain   Home Living   Type of Home Apartment   Home Layout One level  (no PETE)   Bathroom Shower/Tub Tub/shower unit   Bathroom Toilet Standard   Bathroom Equipment Shower chair  (interested in getting grab bar)   [address] Walker;Cane  (not using PTA)   Additional Comments   Able to ambulate community distances with support of cart at grocery store  Limited ambulation 2* back pain  Hx of CVA and reports increased attention to balance 2* same  Prior Function   Level of [place] Independent with ADLs and functional mobility   Lives With Alone   Receives Help From Family  (dtr)   ADL Assistance Independent   IADLs Independent   Falls in the last 6 months 0   Vocational Retired   Comments I without AD PTA  Able to ambulate community distances, however limited by back pain and prefers to hold cart in store if going out  Restrictions/Precautions   Weight Bearing Precautions Per Order No   Other Precautions Telemetry; Fall Risk;Pain   General   Additional Pertinent History Pt is 71 y/o female admitted with acute on chronic CHF  Activity as tolerated and PT consulted     Family/Caregiver Present No   Cognition   Overall Cognitive Status WFL   RUE Assessment   RUE Assessment WFL  (4/5)   LUE Assessment   LUE Assessment WFL  (4/5)   RLE Assessment   RLE Assessment WFL  (4/5) LLE Assessment   LLE Assessment WFL  (4/5)   Coordination   Movements are Fluid and Coordinated 1   Light Touch   RLE Light Touch Grossly intact   LLE Light Touch Grossly intact   Bed Mobility   Supine to Sit 5  Supervision   Additional items Increased time required;HOB elevated; Bedrails   Transfers   Sit to Stand 5  Supervision   Additional items Increased time required   Stand to Sit 5  Supervision   Additional items Increased time required   Additional Comments resting O2 sats 96%   Ambulation/Elevation   Gait pattern Decreased foot clearance; Inconsistent zenon; Short stride   Gait Assistance 5  Supervision   Additional items Verbal cues   Assistive Device (none)   Distance Amb without AD total distance of 110 ft with one standing rest   Sats p ambulation 89%, but recovering to 94% with seated rest    Balance   Static Sitting Good   Dynamic Sitting Fair +   Static Standing Fair +   Dynamic Standing Fair   Ambulatory Fair   Endurance Deficit   Endurance Deficit Yes   Endurance Deficit Description fatigue, TRAMMELL  Sats resting 96%, after approximately 110 ft brief drop upon seatd to 89%, but recovers to 91% or greater quickly  RA   Activity Tolerance   Activity Tolerance Patient limited by fatigue;Treatment limited secondary to medical complications (Comment)  (TRAMMELL)   Medical Staff Made Aware NurseHoracio   Nurse Made Aware yes   Assessment   Prognosis Good   Problem List Decreased endurance; Impaired balance;Decreased mobility;Obesity;Pain   Assessment Pt is 71 y/o female admitted with acute on chronic CHF  PT consulted  Activity as tolerated  Reports baseline mobility noted to be independent, residing alone, driving and having ability to ambulate community distances, however limited by back pain and prefers to use cart when in community/grocery store to aide with support/tolerance  No hx of falls  Does report limited activity tolerance  AT present demonstrates mobility at S level    Mild decrease in balance  Decreased activity tolerance and mild TRAMMELL noted  Sats on RA resting 96%, p ambulation distances of 110 ft, seated rest drop to 89%, however recovers to 91% or greater quickly  Anticipate ability to return home with family support upon d/c  OPPT may be beneficial for conditioning p hospitalization  PT will follow in order to assist with optimal outcomes  Barriers to Discharge Decreased caregiver support   Barriers to Discharge Comments lives alone   Goals   Patient Goals get healthier and loose weight  STG Expiration Date 11/09/18   Short Term Goal #1 7 days:  1)  Pt will perform bed mobility with Fany demonstrating appropriate technique 100% of the time in order to improve function  2)  Perform all transfers with Fany demonstrating safe and appropriate technique 100% of the time in order to improve ability to negotiate safely in home environment  3) Amb with least restrictive AD prn > 300'x1 with mod I/I in order to demonstrate ability to negotiate in home environment  4)  Improve overall strength and balance 1/2 grade in order to optimize ability to perform functional tasks and reduce fall risk  5) Increase activity tolerance to 45 minutes in order to improve endurance to functional tasks 6) PT for ongoing patient and family/caregiver education, DME needs and d/c planning in order to promote highest level of function in least restrictive environment  Treatment Day 1   Plan   Treatment/Interventions Functional transfer training;LE strengthening/ROM; Therapeutic exercise; Endurance training;Patient/family training;Equipment eval/education; Bed mobility;Gait training; Compensatory technique education;Continued evaluation;Spoke to nursing   PT Frequency 2-3x/wk   Recommendation   Recommendation Outpatient PT   PT - OK to Discharge Yes  (anticipate safe d/c to home when medically stable )   Modified [place] Scale   Modified [place] Scale 2   Barthel Index   Feeding 10   Bathing 5   Grooming Score 5   Dressing Score 10   Bladder Score 10   Bowels Score 10   Toilet Use Score 10   Transfers (Bed/Chair) Score 10   Mobility (Level Surface) Score 0   Stairs Score 0   Barthel Index Score 70     History: co - morbidities, fall risk, tele, lives alone  Exam: impairments in locomotion, musculoskeletal, balance,posture,  cardiac, barthel 70  Clinical: unstable/unpredictable ( TRAMMELL, fall risk, continuous monitoring, continued medical management of current conditions)  Complexity:high    Shaun Vo PT     Time In: 14:00  Time Out: 14:15  Total Time: 15 minutes      S:  Receptive to education and thankful for therapy advice  O:  Transfers with S   Amb with S 110'x1  SAts p ambulation 90%  Mild TRAMMELL  Gait slowed with fatigue and guarded with last 50 ft  No LOB  Sit to supine with S   Education on pacing, energy conservation, proper breathing techniques  A:  Sats remained 90% or greater with ambulation this session  Education on proper breathing technique and pacing activity  No LOB with ambulation  May benefit from rollator walker for community ambulation/pacing and to alleviate back pain  Will monitor  OPPT  P:  PT 2-3x/wk      Shaun Vo PT

## 2018-11-02 NOTE — CONSULTS
Reviewed low sodium diet information and general healthy eating with pt  She feels she just doesn't feel like preparing meals sometimes so will eat what is on the counter such as cookies or donuts  Provided suggestions of easy to prepare meals or cooking larger quantities and freezing some into single size portions  She drinks a lot of regular soda daily  Discussed healthier, lower calorie beverage options  Provided outpatient MNT information to schedule appointment after discharge  She is concerned about weight gain and would like to try to lose weight  Will continue to monitor during hospital stay

## 2018-11-02 NOTE — SOCIAL WORK
A referral was made to [facility] for RN for CHF  This was NOT discussed with pt yet  CM will f/u with assessment

## 2018-11-02 NOTE — OCCUPATIONAL THERAPY NOTE
[address]  Evaluation     Patient Name: Bo Heaton  [id number]'[id number] Date: 11/2/2018  Problem List  Patient Active Problem List   Diagnosis    Atrial fibrillation ([facility] )    Essential hypertension    Prediabetes    Depression    Hypercholesterolemia    Migraine    Mitral regurgitation    Class 3 severe obesity due to excess calories with serious comorbidity and body mass index (BMI) of 40 0 to 44 9 in adult ([facility] )    Obstructive sleep apnea    Primary localized osteoarthritis of right knee    Stroke ([place] [address] )    Syncope, near    History of stroke    Acute on chronic diastolic congestive heart failure (HCC)    Postural dizziness with presyncope    Symptomatic bradycardia    Chronic bilateral low back pain without sciatica    Stage 3 chronic kidney disease ([place] [address] )     Past Medical History  Past Medical History:   Diagnosis Date    Atrial fibrillation ([place] [address] )     CHF (congestive heart failure) (HCC)     Hyperlipidemia     Hypertension     Migraine     Polyp of sigmoid colon     Prediabetes     Stroke [facility])      Past Surgical History  Past Surgical History:   Procedure Laterality Date    APPENDECTOMY      CARDIAC ELECTROPHYSIOLOGY STUDY AND ABLATION      CHOLECYSTECTOMY             11/02/18 5679   Note Type   Note type Eval/Treat   Restrictions/Precautions   Weight Bearing Precautions Per Order No   Other Precautions Fall Risk;Pain;Telemetry   Pain Assessment   Pain Assessment No/denies pain   Pain Score No Pain   Home Living   Type of Home Apartment   Home Layout One level  (no PETE)   Bathroom Shower/Tub Tub/shower unit   Bathroom Toilet Standard   Bathroom Equipment Shower chair  (plans to obtain a grab bar in shower)   [address] Walker;Cane  (not using PTA)   Additional Comments pt driving PTA; pt report increased time to complete LB ADLs at home w/ use of stool and reports increased back pain and SOB   Prior Function   Level of [place] Independent with ADLs and functional mobility   Lives With Alone   Receives Help From Family  (daughter and grandchildren)   ADL Assistance Independent   IADLs Independent   Falls in the last 6 months 0   Vocational Retired   Comments pt reports increased time to complete IADL tasks   Lifestyle   Autonomy per pt independent w/ ADLs except increased time for LB ADLs, independent w/ IADLs, independent w/ functional transfers and mobility w/ no AD, driving   Reciprocal Relationships daughters and grandkids   Service 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 [address] spending time w/ family and reading   ADL   Where Assessed Chair   Eating Assistance 5  Supervision/Setup   Grooming Assistance 5  Supervision/Setup   [address]  [address] [facility] [address]  [address] cueing;Supervision/safety; Increased time to complete; Don/doff R sock; Don/doff L sock; Use of adaptive equipment; Thread LLE into pants; Thread RLE into pants  (use of reacher and sock aide)   Toileting Assistance  5  Supervision/Setup   Bed Mobility   Supine to Sit 5  Supervision   Additional items Increased time required;HOB elevated   Transfers   Sit to Stand 5  Supervision   Additional items Increased time required   Stand to Sit 5  Supervision   Additional items Increased time required   Toilet transfer 5  Supervision   Additional items Increased time required   Additional Comments increased time to complete   Functional Mobility   Functional Mobility 5  Supervision   Additional Comments increased time to complete w/ no AD   Balance   Static Sitting Good   Dynamic Sitting Fair +   Static Standing Fair +   Dynamic Standing Fair   Ambulatory Fair   Activity Tolerance   Activity Tolerance Patient limited by fatigue;Treatment limited secondary to medical complications (Comment)   Nurse Made Aware appropriate to see per Nona SILVER Assessment   RUE Assessment WFL  (4/5)   LUE Assessment   LUE Assessment WFL  (4/5)   Hand Function   Gross Motor Coordination Functional   Fine Motor Coordination Functional   Sensation   Light Touch No apparent deficits   Sharp/Dull No apparent deficits   Proprioception   Proprioception No apparent deficits   Vision-Basic Assessment   Current Vision Wears glasses all the time   Vision - Complex Assessment   Ocular Range of Motion [facility]   Acuity Able to read clock/calendar on wall without difficulty   Perception   Inattention/Neglect Appears intact   Cognition   Overall Cognitive Status [facility]   Arousal/Participation Alert; Cooperative   Attention Within functional limits   Orientation Level Oriented X4   Memory Within functional limits   Following Commands Follows all commands and directions without difficulty   Comments pt engages in appropriate conversations   Assessment   Limitation Decreased ADL status; Decreased Safe judgement during ADL;Decreased endurance   Prognosis Good   Assessment Pt is a 72 y o  female seen for OT evaluation s/p admit to [facility] on 11/1/2018 w/ Acute on chronic diastolic congestive heart failure ([facility] [address] )  Comorbidities affecting pt's functional performance at time of assessment include: HTN, a-fib, h/o CVA, obesity  Personal factors affecting pt at time of IE include: decreased endurance and SOB  Prior to admission, pt was living w/ alone and reports independent w/ ADLs except increased time for LB Dressing, independent w/ functional transfers and mobility w/ no AD in home, driving, independent w/ IADLs   Upon evaluation: Pt requires MOD I w/ UB ADLs, supervision-min assist LB ADLs (w/ LHAE training pt educated on donning socks w/ sock aide and setup; min assist to thread pants w/ reacher), supervision bed mobility, supervision functional mobility w/ no AD 2* the following deficits impacting occupational performance: dyspnea on exertion, decreased activity tolerance, impaired functional reach, increased back pain  Pt provided w/ hip kit and signed for hip kit; pt reports use of LHAE decreases her SOB  Pt to benefit from continued skilled OT tx while in the hospital to address deficits as defined above and maximize level of functional independence w ADL's and functional mobility  Occupational Performance areas to address include: grooming, bathing/shower, toilet hygiene, dressing, functional mobility and clothing management, education on energy conservation education, further [facility] training  Provided pt on energy conservation education on pacing self, taking seated rest breaks and provided w/ handout  From OT standpoint, recommendation at time of d/c would be home w/ family support when medically stable  Goals   Patient Goals "get better"   LTG Time Frame 7-10   Long Term Goal please see below goal   Plan   Treatment Interventions ADL retraining;Functional transfer training; Endurance training;UE strengthening/ROM; Patient/family training;Equipment evaluation/education; Compensatory technique education; Activityengagement; Energy conservation   Goal Expiration Date 11/12/18   OT Frequency 2-3x/wk   Recommendation   OT Discharge Recommendation Home with family support   OT - OK to Discharge (when medically stable)   Barthel Index   Feeding 10   Bathing 5   Grooming Score 5   Dressing Score 5   Bladder Score 10   Bowels Score 10   Toilet Use Score 10   Transfers (Bed/Chair) Score 10   Mobility (Level Surface) Score 0   Stairs Score 0   Barthel Index Score 65   Modified [place] Scale   Modified [place] Scale 2     Occupational Therapy Goals to be met in 7-10 days:  1) Pt will improve activity tolerance to G for min 30 min txment sessions to enhance ADLs  2) Pt will complete ADLs/self care w/ mod I w/ LHAE  3) Pt will complete toileting w/ mod I w/ G hygiene/thoroughness using DME PRN  4) Pt will improve functional transfers on/off all surfaces using DME PRN w/ G balance/safety including toileting w/ mod I  5) Pt will improve fx'l mobility during I/ADl/leisure tasks using DME PRN w/ g balance/safety w/ mod I  6) Pt will demonstrate 100% carryover of E C  techniques w/ mod I t/o fx'l I/ADL/leisure tasks w/o cues s/p skilled education  7) Pt will demonstrate 100% carryover of LHAE for LB ADLs/self care and leisure s/p skilled education w/ mod I and G participation      Documentation completed by: Alireza Blackmon, MS, OTR/L

## 2018-11-02 NOTE — CONSULTS
Consult - Cardiology   Bo Heaton 72 y o  female MRN: [id number]  Unit/Bed#: E4 -01 Encounter: [id number]        Reason For Consult:  CHF             Assessment and Plan:     1  Chronic and probably acute diastolic CHF:  Seems to be improved  Uncertain precipitant for acuity - perhaps recent inadvertent reduction in maintenance Lasix +/- possible modest indiscretion of sodium   2  Valvular heart disease: Moderate AI, mild TR & MR per echo May 2018  3  History atrial fibrillation and flutter: S/p fibrillation and typical flutter ablation -January 2018  4  Pulmonary hypertension:  Secondary to obesity and untreated DANTE  5  Hypertension:  Controlled  6  Dyslipidemia:  Zocor prior to admission  7  Obstructive sleep apnea:  Does not utilize CPAP reporting mask intolerance  8  Obesity  9  Prior CVA without sequela    · Continue IV Lasix anticipating patient may need to  resume 40 mg maintenance dose at discharge  · Continue amiodarone 200 mg daily  · Anticoagulation with Eliquis ongoing  · Valvular heart disease not in need of imminent intervention      History Of Present Illness:  Ms Mat Avitia is a 81-year-old woman typically cared for by our group - Dr Larisa Campo  Her cardiac history is notable for diagnosis of atrial fibrillation in September of 2017 found via loop recorder which was previously placed in the aftermath of CVA without sequelae  She was then initiated on beta-blocker and Eliquis anticoagulation  In November of that same year because of symptomatic AF refractory to AV blocking therapy she was cardioverted with initiation of amiodarone  Because of recurrent symptomatic AF she ultimately underwent ablation of fibrillation and typical flutter in January 2018  She has since been maintained on amiodarone, beta-blocker, and Eliquis anticoagulation  For uncertain reason she also takes Plavix    Additional cardiac problems include first-degree AV block, moderate aortic insufficiency, hypertension, dyslipidemia  Other noncardiac problems include obesity, obstructive sleep apnea, diabetes mellitus, and   The patient currently reports having been in her normal state of health when on the evening prior to admission she developed some mild dyspnea  Overnight her symptoms had escalated to include worsened dyspnea, orthopnea and PND which had interrupt her sleep  Because of this she came to the hospital and was admitted  She denies recent chest pain, medication noncompliance, cold-type symptoms or other change  It is however noted that following the patient's last hospital discharge on 10/6 she began taking Lasix 20 mg daily rather than the 40 mg which she had previously taken  Though her discharge instructions indicated 40 mg daily dose she believe she may have been told otherwise  And while not obvious, the patient may have had some modest intermittent sodium indiscretion  ProBNP on arrival was 549  A CT scan of the chest was obtained showing no pulmonary embolism with diffuse ground-glass pattern suggestive of pulmonary edema  Since admission she has received diuretic with improvement in her symptoms  Past Medical History:        Past Medical History:   Diagnosis Date    Atrial fibrillation ([address] )     CHF (congestive heart failure) (HCC)     Hyperlipidemia     Hypertension     Migraine     Polyp of sigmoid colon     Prediabetes     Stroke [facility])     Past Surgical History:   Procedure Laterality Date    APPENDECTOMY      CARDIAC ELECTROPHYSIOLOGY STUDY AND ABLATION      CHOLECYSTECTOMY          Allergy:        No Known Allergies    Medications:       Prior to Admission medications    Medication Sig Start Date End Date Taking?  Authorizing Provider   amiodarone 100 mg tablet Take 1 tablet (100 mg total) by mouth daily 5/23/18  Yes Steve Ortega DO   apixaban (ELIQUIS) 5 mg Take 5 mg by mouth 2 (two) times a day   Yes Historical Provider, MD   clopidogrel (PLAVIX) 75 mg tablet Take 1 tablet (75 mg total) by mouth daily 6/20/18  Yes Josiephine Lundborg, MD   escitalopram (LEXAPRO) 10 mg tablet Take 10 mg by mouth daily     Yes Historical Provider, MD   furosemide (LASIX) 40 mg tablet Take 1 tablet (40 mg total) by mouth daily  Patient taking differently: Take 20 mg by mouth daily   10/7/18  Yes Ivonne Marte MD   levothyroxine 50 mcg tablet Take 1 tablet (50 mcg total) by mouth daily 8/30/18  Yes Josiephine Lundborg, MD   LORazepam (ATIVAN) 1 mg tablet Take 1 tablet (1 mg total) by mouth 3 (three) times a day as needed for anxiety 8/29/18  Yes Josiephine Lundborg, MD   metoprolol tartrate (LOPRESSOR) 50 mg tablet Take 1 tablet (50 mg total) by mouth every 12 (twelve) hours 10/6/18  Yes Ivonne Marte MD   Multiple Vitamin (MULTIVITAMIN) tablet Take 1 tablet by mouth daily     Yes Historical Provider, MD   nortriptyline (PAMELOR) 50 mg capsule Take 1 capsule (50 mg total) by mouth daily at bedtime  Patient taking differently: Take 50 mg by mouth daily   8/22/18  Yes Josiephine Lundborg, MD   oxyCODONE-acetaminophen (PERCOCET) 5-325 mg per tablet Take 1 tablet by mouth every 4 (four) hours as needed for moderate pain Max Daily Amount: 6 tablets 5/22/18  Yes Josiephine Lundborg, MD   potassium chloride (K-DUR,KLOR-CON) 20 mEq tablet Take 1 tablet (20 mEq total) by mouth 2 (two) times a day 2/13/18  Yes Josiephine Lundborg, MD   simvastatin (ZOCOR) 10 mg tablet Take 1 tablet (10 mg total) by mouth daily at bedtime 1/31/18  Yes Josiephine Lundborg, MD   topiramate (TOPAMAX) 25 mg sprinkle capsule Take 1 capsule (25 mg total) by mouth 2 (two) times a day 5/3/18  Yes Josiephine Lundborg, MD   meclizine (ANTIVERT) 25 mg tablet Take 25 mg by mouth 3 (three) times a day as needed for dizziness     Historical Provider, MD       Family History:     Family History   Problem Relation Age of Onset    Diabetes Mother     Heart disease Mother     Hypertension Mother     Arthritis Mother     Coronary artery disease Mother         Social History: Social History     Social History    Marital status:      Spouse name: N/A    Number of children: N/A    Years of education: N/A     Social History Main Topics    Smoking status: Former Smoker     Quit date: 2/11/2011    Smokeless tobacco: Never Used    Alcohol use No    Drug use: No    Sexual activity: No     Other Topics Concern    None     Social History Narrative    fhx not asked in triage       ROS:  Symptoms per HPI  Relatively sedentary lifestyle though able to perform activities of daily living comfortably  Remainder review of systems is negative    Exam:  General:  Alert, normally conversant, comfortable appearing  Visually appears to have a BMI of obesity  Head: Normocephalic, atraumatic  Eyes:  EOMI  Pupils - equal, round, reactive to accomodation  No icterus  Normal Conjunctiva  Oropharynx:  Moist without lesion  Neck:  No gross bruit, JVD, thyromegaly, or lymphadenopathy  Heart:  Regular with controlled rate  Somewhat distant heart tones with faint diastolic basilar murmur  Lungs:  Modest excursion with few rales-perhaps under expressed by obesity and limited air exchange  Abdomen:  Soft and nontender with normal bowel sounds  No organomegaly or mass  Lower Limbs:  No edema  Pulses[de-identified]  RLE - DP:  2+                 LLE - DP:  2+  Musculoskeletal: Independent movement of limbs observed, Formal ROM and strength eval not performed  Neurologic:    Oriented to: person , place, situation  Cranial Nerves: grossly intact - vision, smell, taste, and hearing  were not tested       Motor function:grossly normal, symmetric

## 2018-11-02 NOTE — PHYSICIAN ADVISOR
Current patient class: Inpatient  The patient is currently on Hospital Day: 2 at [facility]        The patient was admitted to the hospital  on 11/1/18 at [phone] for the following diagnosis:  CHF (congestive heart failure) (HCC) [I50 9]  SOB (shortness of breath) [R06 02]       There is documentation in the medical record of an expected length of stay of at least 2 midnights  The patient is therefore expected to satisfy the 2 midnight benchmark and given the 2 midnight presumption is appropriate for INPATIENT ADMISSION  Given this expectation of a satisfying stay, CMS instructs us that the patient is most often appropriate for inpatient admission under part A provided medical necessity is documented in the chart  After review of the relevant documentation, labs, vital signs and test results, the patient is appropriate for INPATIENT ADMISSION  Admission to the hospital as an inpatient is a complex decision making process which requires the practitioner to consider the patients presenting complaint, history and physical examination and all relevant testing  With this in mind, in this case, the patient was deemed appropriate for INPATIENT ADMISSION  After review of the documentation and testing available at the time of the admission I concur with this clinical determination of medical necessity  The patient does have an inpatient admission within the previous 30 days  The patient was admitted on 10/5/18 and discharged on 10/6/18 as an inpatient  The patient therefore required readmission review  Rationale is as follows: The patient is a 72 yrs   Female who presented to the ED at 11/1/2018  9:49 AM with a chief complaint of Shortness of Breath (patient c/o of SOB that started last night; patient has a hx of CHF and a-fib;)     Patient admitted with a report of increasing shortness of breath    She has a history of HTN and CHF and was recently hospitalized for symptomatic bradycardia and stable CHF  Her Lasix dosage appeared to be decreased  On this admission, imaging studies reveal pulmonary edema and the patient was seen by Cardiology  They believe that this acute attack of CHF is secondary to the inadvertent reduction of maintenance Lasix and possible some dietary indiscretion by the patient  It would be appropriate to consider this admission as RELATED to the previous admission      The patients vitals on arrival were ED Triage Vitals   Temperature Pulse Respirations Blood Pressure SpO2   11/01/18 0948 11/01/18 0948 11/01/18 0948 11/01/18 0948 11/01/18 0948   97 7 °F (36 5 °C) 85 20 164/76 98 %      Temp Source Heart Rate Source Patient Position - Orthostatic VS BP Location FiO2 (%)   11/01/18 0948 11/01/18 0948 11/01/18 0948 11/01/18 0948 --   Oral Monitor Sitting Right arm       Pain Score       11/01/18 1534       No Pain           Past Medical History:   Diagnosis Date    Atrial fibrillation (HCC)     CHF (congestive heart failure) (HCC)     Hyperlipidemia     Hypertension     Migraine     Polyp of sigmoid colon     Prediabetes     Stroke [facility])      Past Surgical History:   Procedure Laterality Date    APPENDECTOMY      CARDIAC ELECTROPHYSIOLOGY STUDY AND ABLATION      CHOLECYSTECTOMY             Consults have been placed to:   IP CONSULT TO CARDIOLOGY  IP CONSULT TO CASE MANAGEMENT  IP CONSULT TO NUTRITION SERVICES    Vitals:    11/01/18 2300 11/02/18 0600 11/02/18 0833 11/02/18 1219   BP: 135/60  132/59 129/59   BP Location: Right arm  Left arm Right arm   Pulse: 99  82 77   Resp: 18  18 18   Temp: 97 8 °F (36 6 °C)  97 6 °F (36 4 °C)    TempSrc: Tympanic  Tympanic    SpO2: 91%  95%    Weight:  126 kg (277 lb 1 9 oz)     Height:           Most recent labs:    Recent Labs      11/01/18   1015  11/02/18   0553   WBC  6 80  5 48   HGB  9 9*  9 3*   HCT  33 7*  30 7*   PLT  267  258   K  3 9  3 8   CALCIUM  8 6  8 7   BUN  18  17   CREATININE  1 33*  1 35*   INR 1 34*   --    TROPONINI  0 02   --    AST  52*   --    ALT  54   --    ALKPHOS  113   --        Scheduled Meds:  Current Facility-Administered Medications:  amiodarone 100 mg Oral Daily Madeline Gomez MD   apixaban 5 mg Oral BID Raj Solorzano MD   clopidogrel 75 mg Oral Daily Madeline Gomez MD   escitalopram 10 mg Oral Daily Madeline Gomez MD   furosemide 40 mg Intravenous BID (diuretic) Raj Solorzano MD   levothyroxine 50 mcg Oral Early Morning Madeline Gomez MD   LORazepam 1 mg Oral TID PRN Raj Solorzano MD   meclizine 25 mg Oral TID PRN Raj Solorzano MD   metoprolol tartrate 50 mg Oral Q12H [facility] Raj Solorzano MD   multivitamin-minerals 1 tablet Oral Daily Raj Solorzano MD   nortriptyline 50 mg Oral HS Madeline Gomez MD   oxyCODONE-acetaminophen 1 tablet Oral Q4H PRN Raj Solorzano MD   potassium chloride 20 mEq Oral BID Raj Solorzano MD   pravastatin 40 mg Oral Daily With Diaz Bustos MD   topiramate 25 mg Oral Q12H [facility] Raj Solorzano MD     Continuous Infusions:   PRN Meds:  LORazepam    meclizine    oxyCODONE-acetaminophen    Surgical procedures (if appropriate):

## 2018-11-02 NOTE — PLAN OF CARE
Problem: PHYSICAL THERAPY ADULT  Goal: Performs mobility at highest level of function for planned discharge setting  See evaluation for individualized goals  Treatment/Interventions: Functional transfer training, LE strengthening/ROM, Therapeutic exercise, Endurance training, Patient/family training, Equipment eval/education, Bed mobility, Gait training, Compensatory technique education, Continued evaluation, Spoke to nursing          See flowsheet documentation for full assessment, interventions and recommendations  Outcome: Progressing  Prognosis: Good  Problem List: Decreased endurance, Impaired balance, Decreased mobility, Obesity, Pain  Assessment: Pt is 71 y/o female admitted with acute on chronic CHF  PT consulted  Activity as tolerated  Reports baseline mobility noted to be independent, residing alone, driving and having ability to ambulate community distances, however limited by back pain and prefers to use cart when in community/grocery store to aide with support/tolerance  No hx of falls  Does report limited activity tolerance  AT present demonstrates mobility at S level  Mild decrease in balance  Decreased activity tolerance and mild TRAMMELL noted  Sats on RA resting 96%, p ambulation distances of 110 ft, seated rest drop to 89%, however recovers to 91% or greater quickly  Anticipate ability to return home with family support upon d/c  OPPT may be beneficial for conditioning p hospitalization  PT will follow in order to assist with optimal outcomes  Barriers to Discharge: Decreased caregiver support  Barriers to Discharge Comments: lives alone  Recommendation: Outpatient PT     PT - OK to Discharge: Yes (anticipate safe d/c to home when medically stable )    See flowsheet documentation for full assessment

## 2018-11-02 NOTE — ASSESSMENT & PLAN NOTE
Patient on low dose statin - on Zocor 10 mg daily  Place on pravastatin 40 mg daily while in the hospital

## 2018-11-02 NOTE — ASSESSMENT & PLAN NOTE
Patient with recent hospitalization for same  Patient follows with cardiology Dr Kat Ag  Patient feels her "oral Lasix does not work"  The patient feels she is mostly compliant with low sodium diet, compliant with medications, does not monitor weights  - Admit to Med-Surg with telemetry  - Consult Cardiology due to recurrent admissions  - Continue home regimen  - Received IV Lasix 40 mg x 1 in ED, continue at 40 mg IV BID, appreciate Cardiology recommendations  - Will consider discharge on Torsemide   - Will need VNA/daily weights/strict low Na diet to prevent recurrent hospitalizations

## 2018-11-03 LAB
ANION GAP SERPL CALCULATED.3IONS-SCNC: 12 MMOL/L (ref 4–13)
BASOPHILS # BLD AUTO: 0.07 THOUSANDS/ΜL (ref 0–0.1)
BASOPHILS NFR BLD AUTO: 1 % (ref 0–1)
BUN SERPL-MCNC: 21 MG/DL (ref 5–25)
CALCIUM SERPL-MCNC: 8.8 MG/DL (ref 8.3–10.1)
CHLORIDE SERPL-SCNC: 104 MMOL/L (ref 100–108)
CO2 SERPL-SCNC: 24 MMOL/L (ref 21–32)
CREAT SERPL-MCNC: 1.33 MG/DL (ref 0.6–1.3)
EOSINOPHIL # BLD AUTO: 0.61 THOUSAND/ΜL (ref 0–0.61)
EOSINOPHIL NFR BLD AUTO: 9 % (ref 0–6)
ERYTHROCYTE [DISTWIDTH] IN BLOOD BY AUTOMATED COUNT: 16.7 % (ref 11.6–15.1)
GFR SERPL CREATININE-BSD FRML MDRD: 40 ML/MIN/1.73SQ M
GLUCOSE SERPL-MCNC: 105 MG/DL (ref 65–140)
HCT VFR BLD AUTO: 30.4 % (ref 34.8–46.1)
HGB BLD-MCNC: 9.3 G/DL (ref 11.5–15.4)
IMM GRANULOCYTES # BLD AUTO: 0.02 THOUSAND/UL (ref 0–0.2)
IMM GRANULOCYTES NFR BLD AUTO: 0 % (ref 0–2)
LYMPHOCYTES # BLD AUTO: 2.01 THOUSANDS/ΜL (ref 0.6–4.47)
LYMPHOCYTES NFR BLD AUTO: 31 % (ref 14–44)
MCH RBC QN AUTO: 26.9 PG (ref 26.8–34.3)
MCHC RBC AUTO-ENTMCNC: 30.6 G/DL (ref 31.4–37.4)
MCV RBC AUTO: 88 FL (ref 82–98)
MONOCYTES # BLD AUTO: 0.5 THOUSAND/ΜL (ref 0.17–1.22)
MONOCYTES NFR BLD AUTO: 8 % (ref 4–12)
NEUTROPHILS # BLD AUTO: 3.35 THOUSANDS/ΜL (ref 1.85–7.62)
NEUTS SEG NFR BLD AUTO: 51 % (ref 43–75)
NRBC BLD AUTO-RTO: 0 /100 WBCS
PLATELET # BLD AUTO: 271 THOUSANDS/UL (ref 149–390)
PMV BLD AUTO: 10.6 FL (ref 8.9–12.7)
POTASSIUM SERPL-SCNC: 3.5 MMOL/L (ref 3.5–5.3)
RBC # BLD AUTO: 3.46 MILLION/UL (ref 3.81–5.12)
SODIUM SERPL-SCNC: 140 MMOL/L (ref 136–145)
WBC # BLD AUTO: 6.56 THOUSAND/UL (ref 4.31–10.16)

## 2018-11-03 PROCEDURE — 80048 BASIC METABOLIC PNL TOTAL CA: CPT | Performed by: FAMILY MEDICINE

## 2018-11-03 PROCEDURE — 85025 COMPLETE CBC W/AUTO DIFF WBC: CPT | Performed by: FAMILY MEDICINE

## 2018-11-03 PROCEDURE — 99232 SBSQ HOSP IP/OBS MODERATE 35: CPT | Performed by: FAMILY MEDICINE

## 2018-11-03 PROCEDURE — 94660 CPAP INITIATION&MGMT: CPT

## 2018-11-03 RX ORDER — DOCUSATE SODIUM 100 MG/1
100 CAPSULE, LIQUID FILLED ORAL DAILY
Status: DISCONTINUED | OUTPATIENT
Start: 2018-11-03 | End: 2018-11-05 | Stop reason: HOSPADM

## 2018-11-03 RX ORDER — ASCORBIC ACID 500 MG
500 TABLET ORAL DAILY
Status: DISCONTINUED | OUTPATIENT
Start: 2018-11-03 | End: 2018-11-05 | Stop reason: HOSPADM

## 2018-11-03 RX ORDER — FERROUS SULFATE 325(65) MG
325 TABLET ORAL
Status: DISCONTINUED | OUTPATIENT
Start: 2018-11-03 | End: 2018-11-05 | Stop reason: HOSPADM

## 2018-11-03 RX ADMIN — NORTRIPTYLINE HYDROCHLORIDE 50 MG: 50 CAPSULE ORAL at 21:35

## 2018-11-03 RX ADMIN — PRAVASTATIN SODIUM 40 MG: 40 TABLET ORAL at 17:29

## 2018-11-03 RX ADMIN — FERROUS SULFATE TAB 325 MG (65 MG ELEMENTAL FE) 325 MG: 325 (65 FE) TAB at 09:29

## 2018-11-03 RX ADMIN — TOPIRAMATE 25 MG: 25 CAPSULE, COATED PELLETS ORAL at 09:06

## 2018-11-03 RX ADMIN — FUROSEMIDE 40 MG: 10 INJECTION, SOLUTION INTRAMUSCULAR; INTRAVENOUS at 09:04

## 2018-11-03 RX ADMIN — AMIODARONE HYDROCHLORIDE 100 MG: 200 TABLET ORAL at 09:05

## 2018-11-03 RX ADMIN — FUROSEMIDE 40 MG: 10 INJECTION, SOLUTION INTRAMUSCULAR; INTRAVENOUS at 17:28

## 2018-11-03 RX ADMIN — POTASSIUM CHLORIDE 20 MEQ: 1500 TABLET, EXTENDED RELEASE ORAL at 17:29

## 2018-11-03 RX ADMIN — METOPROLOL TARTRATE 50 MG: 50 TABLET ORAL at 09:05

## 2018-11-03 RX ADMIN — APIXABAN 5 MG: 5 TABLET, FILM COATED ORAL at 17:29

## 2018-11-03 RX ADMIN — Medication 1 TABLET: at 09:05

## 2018-11-03 RX ADMIN — TOPIRAMATE 25 MG: 25 CAPSULE, COATED PELLETS ORAL at 21:35

## 2018-11-03 RX ADMIN — APIXABAN 5 MG: 5 TABLET, FILM COATED ORAL at 09:04

## 2018-11-03 RX ADMIN — LEVOTHYROXINE SODIUM 50 MCG: 50 TABLET ORAL at 05:19

## 2018-11-03 RX ADMIN — POTASSIUM CHLORIDE 20 MEQ: 1500 TABLET, EXTENDED RELEASE ORAL at 09:04

## 2018-11-03 RX ADMIN — OXYCODONE HYDROCHLORIDE AND ACETAMINOPHEN 500 MG: 500 TABLET ORAL at 09:29

## 2018-11-03 RX ADMIN — ESCITALOPRAM 10 MG: 10 TABLET, FILM COATED ORAL at 09:05

## 2018-11-03 RX ADMIN — METOPROLOL TARTRATE 50 MG: 50 TABLET ORAL at 21:35

## 2018-11-03 RX ADMIN — CLOPIDOGREL 75 MG: 75 TABLET, FILM COATED ORAL at 09:04

## 2018-11-03 RX ADMIN — DOCUSATE SODIUM 100 MG: 100 CAPSULE, LIQUID FILLED ORAL at 09:29

## 2018-11-03 NOTE — ASSESSMENT & PLAN NOTE
On amiodarone and metoprolol  On Eliquis for anticoagulation  Cardiology consulted due to readmission

## 2018-11-03 NOTE — ASSESSMENT & PLAN NOTE
Patient's symptoms improved but she does not yet feel at her baseline  She is in negative balance  Patient with recent hospitalization for same despite reported compliance with low salt diet and medications, she feels that "oral Lasix does not work" for her  Patient follows with cardiology Dr Evelyn Preciado Cardiology due to recurrent admissions, appreciate input  - Will continue IV Lasix 40 mg and anticipate transition to Torsemide   - Continue cardiac home regimen  - Will need VNA/daily weights/strict low Na diet to prevent recurrent hospitalizations

## 2018-11-03 NOTE — PROGRESS NOTES
Progress Note - Branford Simpler 1945, 72 y o  female MRN: [id number]    Unit/Bed#: E4 -01 Encounter: [id number]    Primary Care Provider: Elie King MD   Date and time admitted to hospital: 11/1/2018  9:49 AM        * Acute on chronic diastolic congestive heart failure ([facility] )   Assessment & Plan    Patient's symptoms improved but she does not yet feel at her baseline  She is in negative balance  Patient with recent hospitalization for same despite reported compliance with low salt diet and medications, she feels that "oral Lasix does not work" for her  Patient follows with cardiology Dr Kristie Baca Cardiology due to recurrent admissions, appreciate input  - Will continue IV Lasix 40 mg and anticipate transition to Torsemide   - Continue cardiac home regimen  - Will need VNA/daily weights/strict low Na diet to prevent recurrent hospitalizations           Atrial fibrillation ([facility] )   Assessment & Plan    On amiodarone and metoprolol  On Eliquis for anticoagulation  Cardiology consulted due to readmission     Essential hypertension   Assessment & Plan    Continue metoprolol and amiodarone  IV Lasix  Monitor blood pressure trend     Hypercholesterolemia   Assessment & Plan    Patient on low dose statin - on Zocor 10 mg daily  Place on pravastatin 40 mg daily while in the hospital     Anemia   Assessment & Plan    Chronic, mild and stable  Start on iron replacement  Monitor CBC  Should have outpatient colonoscopy     Stage 3 chronic kidney disease ([facility] )   Assessment & Plan    Cr remains at baseline despite IV Lasix  Monitor BMP  Avoid nephrotoxins     Chronic bilateral low back pain without sciatica   Assessment & Plan    Patient only feels on ambulation  On Percocet PRN     History of stroke   Assessment & Plan    Previously had left-sided weakness which resolved with no residual deficit  Compliant with home regimen     Obstructive sleep apnea   Assessment & Plan    Overnight CPAP     Class 3 severe obesity due to excess calories with serious comorbidity and body mass index (BMI) of 40 0 to 44 9 in adult [facility])   Assessment & Plan    Patient would benefit from weight loss, and likely contributes to recurrent CHF exacerbations  Nutrition consulted     Mitral regurgitation   Assessment & Plan    Mild, per last echo and May 2018  Will defer to Cardiology need for repeat echocardiogram     Depression   Assessment & Plan    Resumed home regimen       VTE Pharmacologic Prophylaxis:   Pharmacologic: Apixaban (Eliquis)  Mechanical VTE Prophylaxis in Place: Yes    Patient Centered Rounds: I have performed bedside rounds with nursing staff today  Discussions with Specialists or Other Care Team Provider: Cardiology earlier    Education and Discussions with Family / Patient: Patient    Time Spent for Care: 30 minutes  More than 50% of total time spent on counseling and coordination of care as described above  Current Length of Stay: 2 day(s)    Current Patient Status: Inpatient   Certification Statement: The patient will continue to require additional inpatient hospital stay due to need for IV Lasix and close monitoring    Discharge Plan: Possibly Monday    Code Status: Level 1 - Full Code      Subjective:   Patient seen and examined  She states that she feels [employer]  She states that she still has exertional dyspnea and she is not quite at her baseline  She states that she used CPAP overnight  Night events  Objective:     Vitals:   Temp (24hrs), Av 4 °F (36 3 °C), Min:96 °F (35 6 °C), Max:98 °F (36 7 °C)    Temp:  [96 °F (35 6 °C)-98 °F (36 7 °C)] 96 °F (35 6 °C)  HR:  [71-85] 71  Resp:  [18] 18  BP: (129-161)/(59-90) 161/65  SpO2:  [92 %-98 %] 98 %  Body mass index is 43 4 kg/m²  Input and Output Summary (last 24 hours):        Intake/Output Summary (Last 24 hours) at 18 0916  Last data filed at 18 2204   Gross per 24 hour   Intake              540 ml   Output             1950 ml Net            -1410 ml       Physical Exam:     Physical Exam   Constitutional: She is oriented to person, place, and time  No distress  Overweight    HENT:   Head: Normocephalic and atraumatic  Eyes: Conjunctivae are normal    Neck: No JVD present  Cardiovascular: Normal rate and regular rhythm  No murmur heard  Pulmonary/Chest: Effort normal  No respiratory distress  She has decreased breath sounds  She has no wheezes  She has no rales  Abdominal: Soft  There is no tenderness  There is no guarding  Musculoskeletal: She exhibits no edema  Neurological: She is alert and oriented to person, place, and time  Skin: Skin is warm and dry  Psychiatric: She has a normal mood and affect  Additional Data:     Labs:      Results from last 7 days  Lab Units 11/03/18  0509   WBC Thousand/uL 6 56   HEMOGLOBIN g/dL 9 3*   HEMATOCRIT % 30 4*   PLATELETS Thousands/uL 271   NEUTROS PCT % 51   LYMPHS PCT % 31   MONOS PCT % 8   EOS PCT % 9*       Results from last 7 days  Lab Units 11/03/18  0509  11/01/18  1015   POTASSIUM mmol/L 3 5  < > 3 9   CHLORIDE mmol/L 104  < > 109*   CO2 mmol/L 24  < > 25   BUN mg/dL 21  < > 18   CREATININE mg/dL 1 33*  < > 1 33*   ANION GAP mmol/L 12  < > 11   CALCIUM mg/dL 8 8  < > 8 6   ALBUMIN g/dL  --   --  3 4*   TOTAL BILIRUBIN mg/dL  --   --  0 39   ALK PHOS U/L  --   --  113   ALT U/L  --   --  54   AST U/L  --   --  52*   < > = values in this interval not displayed  Results from last 7 days  Lab Units 11/01/18  1015   INR  1 34*                   * I Have Reviewed All Lab Data Listed Above  * Additional Pertinent Lab Tests Reviewed:  [facility] Admission Reviewed    Imaging:    Imaging Reports Reviewed Today Include: no new today      Recent Cultures (last 7 days):           Last 24 Hours Medication List:     Current Facility-Administered Medications:  amiodarone 100 mg Oral Daily Glen Dubin, MD   apixaban 5 mg Oral BID Glen Dubin, MD ascorbic acid 500 mg Oral Daily Cristina Luciano MD   clopidogrel 75 mg Oral Daily Cristina Luciano MD   docusate sodium 100 mg Oral Daily Cristina Luciano MD   escitalopram 10 mg Oral Daily Madeline Gomez MD   ferrous sulfate 325 mg Oral Daily With Breakfast Cristina Luciano MD   furosemide 40 mg Intravenous BID (diuretic) Cristina Luciano MD   levothyroxine 50 mcg Oral Early Morning Madeline Gomez MD   LORazepam 1 mg Oral TID PRN Cristina Luciano MD   meclizine 25 mg Oral TID PRN Cristina Luciano MD   metoprolol tartrate 50 mg Oral Q12H [facility] Cristina Luciano MD   multivitamin-minerals 1 tablet Oral Daily Cristina Luciano MD   nortriptyline 50 mg Oral HS Cristina Luciano MD   oxyCODONE-acetaminophen 1 tablet Oral Q4H PRN Cristina Luciano MD   potassium chloride 20 mEq Oral BID Cristina Luciano MD   pravastatin 40 mg Oral Daily With Ayla Urbano MD   topiramate 25 mg Oral Q12H Aaron Baltazar MD        Today, Patient Was Seen By: Erick Millard MD    ** Please Note: Dictation voice to text software may have been used in the creation of this document   **

## 2018-11-03 NOTE — ASSESSMENT & PLAN NOTE
Chronic, mild and stable  Start on iron replacement  Monitor CBC  Should have outpatient colonoscopy

## 2018-11-04 LAB
ANION GAP SERPL CALCULATED.3IONS-SCNC: 12 MMOL/L (ref 4–13)
BASOPHILS # BLD AUTO: 0.07 THOUSANDS/ΜL (ref 0–0.1)
BASOPHILS NFR BLD AUTO: 1 % (ref 0–1)
BUN SERPL-MCNC: 21 MG/DL (ref 5–25)
CALCIUM SERPL-MCNC: 8.7 MG/DL (ref 8.3–10.1)
CHLORIDE SERPL-SCNC: 105 MMOL/L (ref 100–108)
CO2 SERPL-SCNC: 25 MMOL/L (ref 21–32)
CREAT SERPL-MCNC: 1.44 MG/DL (ref 0.6–1.3)
EOSINOPHIL # BLD AUTO: 0.58 THOUSAND/ΜL (ref 0–0.61)
EOSINOPHIL NFR BLD AUTO: 9 % (ref 0–6)
ERYTHROCYTE [DISTWIDTH] IN BLOOD BY AUTOMATED COUNT: 16.4 % (ref 11.6–15.1)
GFR SERPL CREATININE-BSD FRML MDRD: 37 ML/MIN/1.73SQ M
GLUCOSE SERPL-MCNC: 104 MG/DL (ref 65–140)
HCT VFR BLD AUTO: 31.5 % (ref 34.8–46.1)
HGB BLD-MCNC: 9.4 G/DL (ref 11.5–15.4)
IMM GRANULOCYTES # BLD AUTO: 0.01 THOUSAND/UL (ref 0–0.2)
IMM GRANULOCYTES NFR BLD AUTO: 0 % (ref 0–2)
LYMPHOCYTES # BLD AUTO: 2.06 THOUSANDS/ΜL (ref 0.6–4.47)
LYMPHOCYTES NFR BLD AUTO: 31 % (ref 14–44)
MCH RBC QN AUTO: 26.2 PG (ref 26.8–34.3)
MCHC RBC AUTO-ENTMCNC: 29.8 G/DL (ref 31.4–37.4)
MCV RBC AUTO: 88 FL (ref 82–98)
MONOCYTES # BLD AUTO: 0.51 THOUSAND/ΜL (ref 0.17–1.22)
MONOCYTES NFR BLD AUTO: 8 % (ref 4–12)
NEUTROPHILS # BLD AUTO: 3.41 THOUSANDS/ΜL (ref 1.85–7.62)
NEUTS SEG NFR BLD AUTO: 51 % (ref 43–75)
NRBC BLD AUTO-RTO: 0 /100 WBCS
PLATELET # BLD AUTO: 274 THOUSANDS/UL (ref 149–390)
PMV BLD AUTO: 10.7 FL (ref 8.9–12.7)
POTASSIUM SERPL-SCNC: 3.6 MMOL/L (ref 3.5–5.3)
RBC # BLD AUTO: 3.59 MILLION/UL (ref 3.81–5.12)
SODIUM SERPL-SCNC: 142 MMOL/L (ref 136–145)
WBC # BLD AUTO: 6.64 THOUSAND/UL (ref 4.31–10.16)

## 2018-11-04 PROCEDURE — 99232 SBSQ HOSP IP/OBS MODERATE 35: CPT | Performed by: INTERNAL MEDICINE

## 2018-11-04 PROCEDURE — 94760 N-INVAS EAR/PLS OXIMETRY 1: CPT

## 2018-11-04 PROCEDURE — 85025 COMPLETE CBC W/AUTO DIFF WBC: CPT | Performed by: FAMILY MEDICINE

## 2018-11-04 PROCEDURE — 80048 BASIC METABOLIC PNL TOTAL CA: CPT | Performed by: FAMILY MEDICINE

## 2018-11-04 PROCEDURE — 94660 CPAP INITIATION&MGMT: CPT

## 2018-11-04 RX ADMIN — APIXABAN 5 MG: 5 TABLET, FILM COATED ORAL at 08:29

## 2018-11-04 RX ADMIN — AMIODARONE HYDROCHLORIDE 100 MG: 200 TABLET ORAL at 08:29

## 2018-11-04 RX ADMIN — CLOPIDOGREL 75 MG: 75 TABLET, FILM COATED ORAL at 08:29

## 2018-11-04 RX ADMIN — APIXABAN 5 MG: 5 TABLET, FILM COATED ORAL at 17:11

## 2018-11-04 RX ADMIN — ESCITALOPRAM 10 MG: 10 TABLET, FILM COATED ORAL at 08:28

## 2018-11-04 RX ADMIN — METOPROLOL TARTRATE 50 MG: 50 TABLET ORAL at 21:31

## 2018-11-04 RX ADMIN — OXYCODONE HYDROCHLORIDE AND ACETAMINOPHEN 500 MG: 500 TABLET ORAL at 08:29

## 2018-11-04 RX ADMIN — PRAVASTATIN SODIUM 40 MG: 40 TABLET ORAL at 17:11

## 2018-11-04 RX ADMIN — NORTRIPTYLINE HYDROCHLORIDE 50 MG: 50 CAPSULE ORAL at 21:31

## 2018-11-04 RX ADMIN — DOCUSATE SODIUM 100 MG: 100 CAPSULE, LIQUID FILLED ORAL at 08:29

## 2018-11-04 RX ADMIN — OXYCODONE HYDROCHLORIDE AND ACETAMINOPHEN 1 TABLET: 5; 325 TABLET ORAL at 23:52

## 2018-11-04 RX ADMIN — LEVOTHYROXINE SODIUM 50 MCG: 50 TABLET ORAL at 05:43

## 2018-11-04 RX ADMIN — TOPIRAMATE 25 MG: 25 CAPSULE, COATED PELLETS ORAL at 08:28

## 2018-11-04 RX ADMIN — POTASSIUM CHLORIDE 20 MEQ: 1500 TABLET, EXTENDED RELEASE ORAL at 08:29

## 2018-11-04 RX ADMIN — FERROUS SULFATE TAB 325 MG (65 MG ELEMENTAL FE) 325 MG: 325 (65 FE) TAB at 08:29

## 2018-11-04 RX ADMIN — Medication 1 TABLET: at 08:29

## 2018-11-04 RX ADMIN — FUROSEMIDE 40 MG: 10 INJECTION, SOLUTION INTRAMUSCULAR; INTRAVENOUS at 17:11

## 2018-11-04 RX ADMIN — POTASSIUM CHLORIDE 20 MEQ: 1500 TABLET, EXTENDED RELEASE ORAL at 17:11

## 2018-11-04 RX ADMIN — TOPIRAMATE 25 MG: 25 CAPSULE, COATED PELLETS ORAL at 21:31

## 2018-11-04 NOTE — PROGRESS NOTES
Progress Note - Cardiology   Art David 72 y o  female MRN: [id number]  Unit/Bed#: E4 -01 Encounter: [id number]      Assessment:     1  Acute diastolic heart failure  2  Atrial fibrillation/atrial flutter status post atrial fibrillation and flutter ablation in January 2018   3  Electrocardiogram with possible prior inferior infarct and prolonged QTc  4  Pulmonary hypertension-mild to moderate  5  Systemic hypertension  6  Obstructive sleep apnea  7  Prior CVA  8  Obesity  9  Dyslipidemia   10  Chronic renal insufficiency    Discussion/Recommendations:    Continue IV Lasix today  Likely discharge tomorrow  Would likely send home on increased dose of Lasix-likely at least 40 mg daily  Possible inferior infarct on electrocardiogram-can consider ischemic workup as an outpatient  Follow-up with Dr Radha Huizar or the nurse practitioner Ward Anaya in the near future       Subjective:  Still some shortness of breath when washing in the bathroom      Physical Exam:  GEN:  NAD  HEENT:  MMM, NCAT, pink conjunctiva, EOMI, nonicteric sclera  CV:  NO JVD/HJR, RR, NO M/R/G, +S1/S2, NO PARASTERNAL HEAVE/THRILL, NO LE EDEMA, NO HEPATIC SYSTOLIC PULSATION, WARM EXTREMITIES  RESP:  CTAB/L  ABD:  SOFT, NT, NO GROSS ORGANOMEGALY        Vitals:   BP (!) 101/42 (BP Location: Right arm)   Pulse 71   Temp (!) 96 6 °F (35 9 °C) (Tympanic)   Resp 18   Ht 5' 7" (1 702 m)   Wt 126 kg (277 lb 1 9 oz)   LMP  (LMP Unknown)   SpO2 94%   BMI 43 40 kg/m²   Vitals:    11/02/18 0600 11/03/18 0600   Weight: 126 kg (277 lb 1 9 oz) 126 kg (277 lb 1 9 oz)       Intake/Output Summary (Last 24 hours) at 11/04/18 1045  Last data filed at 11/04/18 0900   Gross per 24 hour   Intake              789 ml   Output             3350 ml   Net            -2561 ml         TELEMETRY:  No events  Lab Results:    Results from last 7 days  Lab Units 11/04/18  0507   WBC Thousand/uL 6 64   HEMOGLOBIN g/dL 9 4*   HEMATOCRIT % 31 5* PLATELETS Thousands/uL 274       Results from last 7 days  Lab Units 11/04/18  0507  11/01/18  1015   POTASSIUM mmol/L 3 6  < > 3 9   CHLORIDE mmol/L 105  < > 109*   CO2 mmol/L 25  < > 25   BUN mg/dL 21  < > 18   CREATININE mg/dL 1 44*  < > 1 33*   CALCIUM mg/dL 8 7  < > 8 6   ALK PHOS U/L  --   --  113   ALT U/L  --   --  54   AST U/L  --   --  52*   < > = values in this interval not displayed      Results from last 7 days  Lab Units 11/04/18  0507   POTASSIUM mmol/L 3 6   CHLORIDE mmol/L 105   CO2 mmol/L 25   BUN mg/dL 21   CREATININE mg/dL 1 44*   CALCIUM mg/dL 8 7             Medications:    Current Facility-Administered Medications:     amiodarone tablet 100 mg, 100 mg, Oral, Daily, Madeline Gomez MD, 100 mg at 11/04/18 0829    apixaban (ELIQUIS) tablet 5 mg, 5 mg, Oral, BID, Ronak Oneal MD, 5 mg at 11/04/18 0829    ascorbic acid (VITAMIN C) tablet 500 mg, 500 mg, Oral, Daily, Madeline Gomez MD, 500 mg at 11/04/18 0829    clopidogrel (PLAVIX) tablet 75 mg, 75 mg, Oral, Daily, Madeline Gomez MD, 75 mg at 11/04/18 0829    docusate sodium (COLACE) capsule 100 mg, 100 mg, Oral, Daily, Madeline Gomez MD, 100 mg at 11/04/18 0829    escitalopram (LEXAPRO) tablet 10 mg, 10 mg, Oral, Daily, Madeline Gomez MD, 10 mg at 11/04/18 7784    ferrous sulfate tablet 325 mg, 325 mg, Oral, Daily With Breakfast, Ronak Oneal MD, 325 mg at 11/04/18 0829    furosemide (LASIX) injection 40 mg, 40 mg, Intravenous, BID (diuretic), Ronak Oneal MD, 40 mg at 11/03/18 1728    levothyroxine tablet 50 mcg, 50 mcg, Oral, Early Morning, Madeline Gomez MD, 50 mcg at 11/04/18 0543    LORazepam (ATIVAN) tablet 1 mg, 1 mg, Oral, TID PRN, Ronak Oneal MD    meclizine (ANTIVERT) tablet 25 mg, 25 mg, Oral, TID PRN, Ronak Oneal MD    metoprolol tartrate (LOPRESSOR) tablet 50 mg, 50 mg, Oral, Q12H [address], Ronak Oneal MD, 50 mg at 11/03/18 6881    multivitamin-minerals (CENTRUM) tablet 1 tablet, 1 tablet, Oral, Daily, Madeline Philipp Swift MD, 1 tablet at 11/04/18 0829    nortriptyline (PAMELOR) capsule 50 mg, 50 mg, Oral, HS, Madeline Gomez MD, 50 mg at 11/03/18 2135    oxyCODONE-acetaminophen (PERCOCET) 5-325 mg per tablet 1 tablet, 1 tablet, Oral, Q4H PRN, Cheyenne Jonas MD, 1 tablet at 11/01/18 1756    potassium chloride (K-DUR,KLOR-CON) CR tablet 20 mEq, 20 mEq, Oral, BID, Cheyenne Jonas MD, 20 mEq at 11/04/18 0829    pravastatin (PRAVACHOL) tablet 40 mg, 40 mg, Oral, Daily With Jasmin Andres MD, 40 mg at 11/03/18 1729    topiramate (TOPAMAX) sprinkle capsule 25 mg, 25 mg, Oral, Q12H [facility], Cheyenne Jonas MD, 25 mg at 11/04/18 1469    Portions of the record may have been created with voice recognition software  Occasional wrong word or "sound a like" substitutions may have occurred due to the inherent limitations of voice recognition software  Read the chart carefully and recognize, using context, where substitutions have occurred

## 2018-11-04 NOTE — PROGRESS NOTES
[facility] Progress Note  Patient: Art David 72 y o  female   MRN: [id number]  PCP: Misha Frederick MD  Unit/Bed#: E4 -01 Encounter: [id number]  Date Of Visit: 11/04/18    Assessment:    Principal Problem:    Acute on chronic diastolic congestive heart failure ([facility] )  Active Problems:    Atrial fibrillation ([facility] )    Essential hypertension    Depression    Hypercholesterolemia    Mitral regurgitation    Class 3 severe obesity due to excess calories with serious comorbidity and body mass index (BMI) of 40 0 to 44 9 in [facility])    Obstructive sleep apnea    History of stroke    Chronic bilateral low back pain without sciatica    Stage 3 chronic kidney disease ([facility] )    Anemia      Plan: This is a 51-year-old female with history of diastolic CHF, atrial fibrillation, hypertension, obesity presenting with dyspnea  Admitted on 11/01/2018 for CHF exacerbation  1 ) Acute on chronic diastolic heart failure  -echocardiogram 05/18/2018 revealed ejection fraction 60%,, mild concentric hypertrophy, grade 1 diastolic dysfunction, mild mitral stenosis, moderate aortic regurgitation  -continue with IV diuresis, cardiology follow-up appreciated  -I&O, daily weight, strict fluid restriction    2 ) Atrial fibrillation  -maintained on amiodarone and metoprolol  -Eliquis for anticoagulation    3 ) Hypertension  -continue with metoprolol    4 ) Hyperlipidemia  -continue with statin    5 ) CKD stage 3  -creatinine is 1 44, will monitor renal function, avoid nephrotoxin    6 ) History of CVA  -no residual deficit    7 ) Depression  -continue home regimen      VTE Pharmacologic Prophylaxis:   Pharmacologic:  Eliquis    Patient Centered Rounds: I have performed bedside rounds with nursing staff today  Time Spent for Care: 20 minutes  More than 50% of total time spent on counseling and coordination of care as described above      Current Length of Stay: 3 day(s)    Current Patient Status: Inpatient Certification Statement: The patient will continue to require additional inpatient hospital stay due to CHF exacerbation    Discharge Plan / Estimated Discharge Date: 2-3 days    Code Status: Level 1 - Full Code      Subjective:   Patient seen and examined at bedside, currently denies any complaints, denies any chest pain, palpitations, dyspnea  Objective:     Vitals:   Temp (24hrs), Av 7 °F (36 5 °C), Min:96 6 °F (35 9 °C), Max:98 6 °F (37 °C)    Temp:  [96 6 °F (35 9 °C)-98 6 °F (37 °C)] 97 7 °F (36 5 °C)  HR:  [71-90] 84  Resp:  [18-20] 18  BP: (101-180)/(42-83) 157/70  SpO2:  [94 %-98 %] 95 %  Body mass index is 43 61 kg/m²  Input and Output Summary (last 24 hours): Intake/Output Summary (Last 24 hours) at 18 1411  Last data filed at 18 0900   Gross per 24 hour   Intake              789 ml   Output             2750 ml   Net            -1961 ml       Physical Exam:    Constitutional: Patient is oriented to person, place and time, no acute distress  HEENT:  Normocephalic, atraumatic, EOMI, PERRLA, no scleral icterus, no pallor, moist oral mucosa  Neck:  Supple, no masses, no thyromegaly, no bruits Normal range of motion  Lymph nodes:  No lymphadenopathy  Cardiovascular: Normal S1S2, RRR, No murmurs/rubs/gallops appreciated  Pulmonary: Clear to auscultation bilaterally, No rhonchi/rales/wheezing appreciated  Abdominal: Soft, Bowel sounds present, Non-tender, Non-distended, No rebound/guarding, no hepatomegaly   Musculoskeletal: No tenderness/abnormality   Extremities:  No cyanosis, clubbing or edema  Peripheral pulses palpable and equal bilaterally  Neurological: Cranial nerves II-XII grossly intact, sensation intact, otherwise no focal neurological symptoms  Skin: Skin is warm and dry, no rashes      Additional Data:     Labs:      Results from last 7 days  Lab Units 18  0507   WBC Thousand/uL 6 64   HEMOGLOBIN g/dL 9 4*   HEMATOCRIT % 31 5*   PLATELETS Thousands/uL 274 NEUTROS PCT % 51   LYMPHS PCT % 31   MONOS PCT % 8   EOS PCT % 9*       Results from last 7 days  Lab Units 11/04/18  0507  11/01/18  1015   POTASSIUM mmol/L 3 6  < > 3 9   CHLORIDE mmol/L 105  < > 109*   CO2 mmol/L 25  < > 25   BUN mg/dL 21  < > 18   CREATININE mg/dL 1 44*  < > 1 33*   CALCIUM mg/dL 8 7  < > 8 6   ALK PHOS U/L  --   --  113   ALT U/L  --   --  54   AST U/L  --   --  52*   < > = values in this interval not displayed  Results from last 7 days  Lab Units 11/01/18  1015   INR  1 34*        I Have Reviewed All Lab Data Listed Above          Recent Cultures (last 7 days):           Last 24 Hours Medication List:     Current Facility-Administered Medications:  amiodarone 100 mg Oral Daily Madeline Gomez MD   apixaban 5 mg Oral BID Virginie Ghotra MD   ascorbic acid 500 mg Oral Daily Virginie Ghotra MD   clopidogrel 75 mg Oral Daily Virginie Ghotra MD   docusate sodium 100 mg Oral Daily Virginie Ghotra MD   escitalopram 10 mg Oral Daily Madeline Gomez MD   ferrous sulfate 325 mg Oral Daily With Breakfast Virginie Ghotra MD   furosemide 40 mg Intravenous BID (diuretic) Virginie Ghotra MD   levothyroxine 50 mcg Oral Early Morning Madeline Gomez MD   LORazepam 1 mg Oral TID PRN Virginie Ghotra MD   meclizine 25 mg Oral TID PRN Virginie Ghotra MD   metoprolol tartrate 50 mg Oral Q12H [address] Virginie Ghotra MD   multivitamin-minerals 1 tablet Oral Daily Virginie Ghotra MD   nortriptyline 50 mg Oral HS Virginie Ghotra MD   oxyCODONE-acetaminophen 1 tablet Oral Q4H PRN Virginie Ghotra MD   potassium chloride 20 mEq Oral BID Virginie Ghotra MD   pravastatin 40 mg Oral Daily With Rafaela Ribera MD   topiramate 25 mg Oral Q12H Caryle Coffin, MD        Today, Patient Was Seen By: Arielle Erickson MD

## 2018-11-05 VITALS
HEART RATE: 68 BPM | DIASTOLIC BLOOD PRESSURE: 60 MMHG | BODY MASS INDEX: 44.29 KG/M2 | OXYGEN SATURATION: 98 % | SYSTOLIC BLOOD PRESSURE: 114 MMHG | TEMPERATURE: 97.5 F | WEIGHT: 282.19 LBS | RESPIRATION RATE: 18 BRPM | HEIGHT: 67 IN

## 2018-11-05 PROBLEM — I50.33 ACUTE ON CHRONIC DIASTOLIC CONGESTIVE HEART FAILURE (HCC): Status: RESOLVED | Noted: 2018-08-30 | Resolved: 2018-11-05

## 2018-11-05 LAB
ANION GAP SERPL CALCULATED.3IONS-SCNC: 12 MMOL/L (ref 4–13)
BASOPHILS # BLD AUTO: 0.07 THOUSANDS/ΜL (ref 0–0.1)
BASOPHILS NFR BLD AUTO: 1 % (ref 0–1)
BUN SERPL-MCNC: 21 MG/DL (ref 5–25)
CALCIUM SERPL-MCNC: 8.8 MG/DL (ref 8.3–10.1)
CHLORIDE SERPL-SCNC: 103 MMOL/L (ref 100–108)
CO2 SERPL-SCNC: 25 MMOL/L (ref 21–32)
CREAT SERPL-MCNC: 1.46 MG/DL (ref 0.6–1.3)
EOSINOPHIL # BLD AUTO: 0.7 THOUSAND/ΜL (ref 0–0.61)
EOSINOPHIL NFR BLD AUTO: 10 % (ref 0–6)
ERYTHROCYTE [DISTWIDTH] IN BLOOD BY AUTOMATED COUNT: 16.8 % (ref 11.6–15.1)
GFR SERPL CREATININE-BSD FRML MDRD: 36 ML/MIN/1.73SQ M
GLUCOSE SERPL-MCNC: 110 MG/DL (ref 65–140)
HCT VFR BLD AUTO: 34.8 % (ref 34.8–46.1)
HGB BLD-MCNC: 10.5 G/DL (ref 11.5–15.4)
IMM GRANULOCYTES # BLD AUTO: 0.02 THOUSAND/UL (ref 0–0.2)
IMM GRANULOCYTES NFR BLD AUTO: 0 % (ref 0–2)
LYMPHOCYTES # BLD AUTO: 2.39 THOUSANDS/ΜL (ref 0.6–4.47)
LYMPHOCYTES NFR BLD AUTO: 32 % (ref 14–44)
MCH RBC QN AUTO: 26.9 PG (ref 26.8–34.3)
MCHC RBC AUTO-ENTMCNC: 30.2 G/DL (ref 31.4–37.4)
MCV RBC AUTO: 89 FL (ref 82–98)
MONOCYTES # BLD AUTO: 0.56 THOUSAND/ΜL (ref 0.17–1.22)
MONOCYTES NFR BLD AUTO: 8 % (ref 4–12)
NEUTROPHILS # BLD AUTO: 3.66 THOUSANDS/ΜL (ref 1.85–7.62)
NEUTS SEG NFR BLD AUTO: 49 % (ref 43–75)
NRBC BLD AUTO-RTO: 0 /100 WBCS
PLATELET # BLD AUTO: 334 THOUSANDS/UL (ref 149–390)
PMV BLD AUTO: 10.6 FL (ref 8.9–12.7)
POTASSIUM SERPL-SCNC: 3.6 MMOL/L (ref 3.5–5.3)
RBC # BLD AUTO: 3.91 MILLION/UL (ref 3.81–5.12)
SODIUM SERPL-SCNC: 140 MMOL/L (ref 136–145)
WBC # BLD AUTO: 7.4 THOUSAND/UL (ref 4.31–10.16)

## 2018-11-05 PROCEDURE — 99239 HOSP IP/OBS DSCHRG MGMT >30: CPT | Performed by: INTERNAL MEDICINE

## 2018-11-05 PROCEDURE — 99232 SBSQ HOSP IP/OBS MODERATE 35: CPT | Performed by: INTERNAL MEDICINE

## 2018-11-05 PROCEDURE — 85025 COMPLETE CBC W/AUTO DIFF WBC: CPT | Performed by: INTERNAL MEDICINE

## 2018-11-05 PROCEDURE — 94660 CPAP INITIATION&MGMT: CPT

## 2018-11-05 PROCEDURE — 80048 BASIC METABOLIC PNL TOTAL CA: CPT | Performed by: INTERNAL MEDICINE

## 2018-11-05 RX ORDER — TORSEMIDE 20 MG/1
20 TABLET ORAL
Qty: 60 TABLET | Refills: 0 | Status: SHIPPED | OUTPATIENT
Start: 2018-11-05 | End: 2018-11-29 | Stop reason: SDUPTHER

## 2018-11-05 RX ORDER — FERROUS SULFATE 325(65) MG
325 TABLET ORAL
Qty: 30 TABLET | Refills: 0 | Status: SHIPPED | OUTPATIENT
Start: 2018-11-06 | End: 2018-11-20 | Stop reason: SINTOL

## 2018-11-05 RX ADMIN — ESCITALOPRAM 10 MG: 10 TABLET, FILM COATED ORAL at 09:08

## 2018-11-05 RX ADMIN — Medication 1 TABLET: at 09:08

## 2018-11-05 RX ADMIN — FUROSEMIDE 40 MG: 10 INJECTION, SOLUTION INTRAMUSCULAR; INTRAVENOUS at 09:08

## 2018-11-05 RX ADMIN — POTASSIUM CHLORIDE 20 MEQ: 1500 TABLET, EXTENDED RELEASE ORAL at 09:08

## 2018-11-05 RX ADMIN — FERROUS SULFATE TAB 325 MG (65 MG ELEMENTAL FE) 325 MG: 325 (65 FE) TAB at 09:08

## 2018-11-05 RX ADMIN — AMIODARONE HYDROCHLORIDE 100 MG: 200 TABLET ORAL at 09:08

## 2018-11-05 RX ADMIN — CLOPIDOGREL 75 MG: 75 TABLET, FILM COATED ORAL at 09:08

## 2018-11-05 RX ADMIN — TOPIRAMATE 25 MG: 25 CAPSULE, COATED PELLETS ORAL at 09:09

## 2018-11-05 RX ADMIN — APIXABAN 5 MG: 5 TABLET, FILM COATED ORAL at 09:08

## 2018-11-05 RX ADMIN — DOCUSATE SODIUM 100 MG: 100 CAPSULE, LIQUID FILLED ORAL at 09:08

## 2018-11-05 RX ADMIN — LEVOTHYROXINE SODIUM 50 MCG: 50 TABLET ORAL at 05:35

## 2018-11-05 RX ADMIN — METOPROLOL TARTRATE 50 MG: 50 TABLET ORAL at 09:08

## 2018-11-05 RX ADMIN — OXYCODONE HYDROCHLORIDE AND ACETAMINOPHEN 500 MG: 500 TABLET ORAL at 09:08

## 2018-11-05 NOTE — SOCIAL WORK
Gave pt the Medicare Coordination of Benefits phone number ([phone]) to call to inform them [employer] has termed and Medicare is primary

## 2018-11-05 NOTE — DISCHARGE SUMMARY
Transition of Care Discharge Summary - [facility]    Patient Information: Lesia Hussein 72 y o  female MRN: [id number]  Unit/Bed#: E4 -01 Encounter: [id number]    Discharging Physician / Practitioner: Eda Locke MD  PCP: Kenyon Altman MD  Admission Date: 11/1/2018  Discharge Date: 11/05/18    Disposition:      Other: home    For Discharges to [facility]:   · Not Applicable to this Patient - Not Applicable to this Patient    Reason for Admission:  Dyspnea    Discharge Diagnoses:     Principal Problem (Resolved):    Acute on chronic diastolic congestive heart failure ([facility] )  Active Problems:    At[facility] fibrillation ([facility] )    Essential hypertension    Depression    Hypercholesterolemia    Mitral regurgitation    Class 3 severe obesity due to excess calories with serious comorbidity and body mass index (BMI) of 40 0 to 44 9 in [facility])    Obstructive sleep apnea    History of stroke    Chronic bilateral low back pain without sciatica    Stage 3 chronic kidney disease ([facility] )    Anemia      Consultations During Hospital Stay:  · Cardiology    Procedures Performed:     · None    Medication Adjustments and Discharge Medications:  · Medication Dosing Tapers - Please refer to Discharge Medication List for details on any medication dosing tapers (if applicable to patient)  · Discharge Medication List: See after visit summary for reconciled discharge medications  Wound Care Recommendations:  When applicable, please see wound care section of After Visit Summary  Diet Recommendations at Discharge:  Diet -        Diet Orders            Start     Ordered    11/01/18 793 [facility]  Room Service  Once     Question:  Type of Service  Answer:  Room Service-Appropriate    11/01/18 1743    11/01/18 1659  Diet Cardiac; Sodium 2 GM  Diet effective now     Question Answer Comment   Diet Type Cardiac    Cardiac Sodium 2 GM    RD to adjust diet per protocol?  Yes        11/01/18 1701        Fluid Restriction - Continue Fluid Restriction as Listed Above at Discharge  Significant Findings / Test Results:     CTA chest: No evidence of pulmonary embolus  2   Diffuse groundglass attenuation in both lungs, a differential diagnosis of which has been discussed above, most likely pulmonary edema  3   Nonspecific mediastinal lymphadenopathy, increased in extent since a CT from 10/29/2012  · 4  Hepatic steatosis  Hospital Course:     Patience Karolyn is a 72 y o  female patient who originally presented to the hospital on 11/1/2018 due to dyspnea  Patient has history of diastolic CHF, atrial fibrillation, hypertension, obesity presenting with dyspnea  Admitted on 11/01/2018 for CHF exacerbation      1 ) Acute on chronic diastolic heart failure  -echocardiogram 05/18/2018 revealed ejection fraction 60%,, mild concentric hypertrophy, grade 1 diastolic dysfunction, mild mitral stenosis, moderate aortic regurgitation  -cardiology follow-up appreciated, patient is cleared for discharge home on torsemide 20 mg b i d      2 ) Atrial fibrillation  -maintained on amiodarone and metoprolol  -Eliquis for anticoagulation     3 ) Hypertension  -continue with metoprolol     4 ) Hyperlipidemia  -continue with statin     5 ) CKD stage 3  -creatinine is 1 46     6 ) History of CVA  -no residual deficit     7 ) Depression  -continue home regimen    Patient is medically stable for discharge home today with outpatient PCP and Cardiology follow-up  Condition at Discharge: good     Discharge Day Visit / Exam:     Subjective:  Patient seen examined at bedside, states she feels better  Denies any chest pain, palpitations, dyspnea      Vitals: Blood Pressure: 114/60 (11/05/18 1100)  Pulse: 68 (11/05/18 1100)  Temperature: 97 5 °F (36 4 °C) (11/05/18 1100)  Temp Source: Tympanic (11/05/18 1100)  Respirations: 18 (11/05/18 1100)  Height: 5' 7" (170 2 cm) (11/01/18 1611)  Weight - Scale: 128 kg (282 lb 3 oz) (11/05/18 0600)  SpO2: 98 % (11/05/18 1100)    Physical Exam:    Constitutional: Patient is oriented to person, place and time, no acute distress  HEENT:  Normocephalic, atraumatic, EOMI, PERRLA, no scleral icterus, no pallor, moist oral mucosa  Neck:  Supple, no masses, no thyromegaly, no bruits Normal range of motion  Lymph nodes:  No lymphadenopathy  Cardiovascular: Normal S1S2, RRR, No murmurs/rubs/gallops appreciated  Pulmonary: Clear to auscultation bilaterally, No rhonchi/rales/wheezing appreciated  Abdominal: Soft, Bowel sounds present, Non-tender, Non-distended, No rebound/guarding, no hepatomegaly   Musculoskeletal: No tenderness/abnormality   Extremities:  No cyanosis, clubbing or edema  Peripheral pulses palpable and equal bilaterally  Neurological: Cranial nerves II-XII grossly intact, sensation intact, otherwise no focal neurological symptoms  Skin: Skin is warm and dry, no rashes  Discharge instructions/Information to patient and family:   See after visit summary section titled Discharge Instructions for information provided to patient and family  Planned Readmission: no      Discharge Statement:  I spent 30 minutes discharging the patient  This time was spent on the day of discharge  I had direct contact with the patient on the day of discharge  Greater than 50% of the total time was spent examining patient, answering all patient questions, arranging and discussing plan of care with patient as well as directly providing post-discharge instructions  Additional time then spent on discharge activities      ** Please Note: This note has been constructed using a voice recognition system **

## 2018-11-05 NOTE — PLAN OF CARE
Problem: DISCHARGE PLANNING - CARE MANAGEMENT  Goal: Discharge to post-acute care or home with appropriate resources  INTERVENTIONS:  - Conduct assessment to determine patient/family and health care team treatment goals, and need for post-acute services based on payer coverage, community resources, and patient preferences, and barriers to discharge  - Address psychosocial, clinical, and financial barriers to discharge as identified in assessment in conjunction with the patient/family and health care team  - Arrange appropriate level of post-acute services according to patients   needs and preference and payer coverage in collaboration with the physician and health care team  - Communicate with and update the patient/family, physician, and health care team regarding progress on the discharge plan  - Arrange appropriate transportation to post-acute venues  Outcome: Progressing  Discuss VNA for RN for CHF and home PT  Pt has decline stating this is the third time she has had CHF  Pt would like to have OP PT  Gave pt the [facility] list for her to call for appointment

## 2018-11-05 NOTE — SOCIAL WORK
Pt is not a 30 day readmission  Pt was seen by Care Coordination  Met with pt and her dtr to complete assessment and explain role  PCP is Dr Fannie Kwong  Pt lives in [place] in apartment alone with 1 step to enter  Pt was independent with ADLs, drove a car and amb with no devices  DME:  RW and SPC  Pt is not open with any VNA  Pt uses [facility] in [facility]  Pt has hx of depression, but no D&A hx  Pt does not have a POA/Living Will and did not want any information   is dtr Lyndsay Thompson at [phone]  Dtr will transport home  Discuss VNA for RN for CHF and home PT  Pt has decline stating this is the third time she has had CHF  Pt would like to have OP PT  Gave pt the [facility] list for her to call for appointment

## 2018-11-06 ENCOUNTER — TRANSITIONAL CARE MANAGEMENT (OUTPATIENT)
Dept: INTERNAL MEDICINE CLINIC | Facility: CLINIC | Age: 73
End: 2018-11-06

## 2018-11-14 DIAGNOSIS — F32.89 OTHER DEPRESSION: Primary | ICD-10-CM

## 2018-11-16 RX ORDER — ESCITALOPRAM OXALATE 10 MG/1
10 TABLET ORAL DAILY
Qty: 90 TABLET | Refills: 1 | Status: SHIPPED | OUTPATIENT
Start: 2018-11-16 | End: 2019-05-20 | Stop reason: SDUPTHER

## 2018-11-20 ENCOUNTER — OFFICE VISIT (OUTPATIENT)
Dept: INTERNAL MEDICINE CLINIC | Facility: CLINIC | Age: 73
End: 2018-11-20
Payer: MEDICARE

## 2018-11-20 VITALS
WEIGHT: 274 LBS | DIASTOLIC BLOOD PRESSURE: 58 MMHG | HEART RATE: 88 BPM | TEMPERATURE: 98.9 F | HEIGHT: 67 IN | RESPIRATION RATE: 15 BRPM | SYSTOLIC BLOOD PRESSURE: 90 MMHG | BODY MASS INDEX: 43 KG/M2

## 2018-11-20 DIAGNOSIS — D50.8 OTHER IRON DEFICIENCY ANEMIA: ICD-10-CM

## 2018-11-20 DIAGNOSIS — I48.19 PERSISTENT ATRIAL FIBRILLATION (HCC): ICD-10-CM

## 2018-11-20 DIAGNOSIS — G43.009 MIGRAINE WITHOUT AURA AND WITHOUT STATUS MIGRAINOSUS, NOT INTRACTABLE: ICD-10-CM

## 2018-11-20 DIAGNOSIS — E78.00 HYPERCHOLESTEROLEMIA: ICD-10-CM

## 2018-11-20 DIAGNOSIS — G47.33 OBSTRUCTIVE SLEEP APNEA: ICD-10-CM

## 2018-11-20 DIAGNOSIS — I50.33 ACUTE ON CHRONIC DIASTOLIC CHF (CONGESTIVE HEART FAILURE) (HCC): Primary | ICD-10-CM

## 2018-11-20 DIAGNOSIS — G89.29 CHRONIC BILATERAL LOW BACK PAIN WITHOUT SCIATICA: ICD-10-CM

## 2018-11-20 DIAGNOSIS — N18.30 STAGE 3 CHRONIC KIDNEY DISEASE (HCC): ICD-10-CM

## 2018-11-20 DIAGNOSIS — M54.50 CHRONIC BILATERAL LOW BACK PAIN WITHOUT SCIATICA: ICD-10-CM

## 2018-11-20 DIAGNOSIS — I10 ESSENTIAL HYPERTENSION: ICD-10-CM

## 2018-11-20 PROCEDURE — 99496 TRANSJ CARE MGMT HIGH F2F 7D: CPT | Performed by: INTERNAL MEDICINE

## 2018-11-20 RX ORDER — GABAPENTIN 100 MG/1
100 CAPSULE ORAL 3 TIMES DAILY
Qty: 90 CAPSULE | Refills: 0 | Status: SHIPPED | OUTPATIENT
Start: 2018-11-20 | End: 2018-12-17 | Stop reason: SDUPTHER

## 2018-11-21 NOTE — PROGRESS NOTES
[facility] [place]      NAME: Phuong Marsh  AGE: 72 y o  SEX: female  : 1945   MRN: [id number]    DATE: 2018  TIME: 3:56 PM    Assessment and Plan   1  Acute on chronic diastolic CHF (congestive heart failure) ([address] )  Currently compensated  Improved after her recent hospitalization   - CBC  - Basic metabolic panel    2  Essential hypertension  Well controlled  3  Persistent atrial fibrillation (HCC)  On amiodarone and Eliquis  4  Obstructive sleep apnea  On CPAP  5  Migraine without aura and without status migrainosus, not intractable  Overall, improved on nortriptyline    6  Stage 3 chronic kidney disease (HCC)  Currently stable  7  Hypercholesterolemia  On simvastatin  8  Chronic bilateral low back pain without sciatica  The patient is not had recent imaging  We will start with x-rays  Trial of gabapentin  - XR spine lumbar minimum 4 views non injury; Future  - gabapentin (NEURONTIN) 100 mg capsule; Take 1 capsule (100 mg total) by mouth 3 (three) times a day  Dispense: 90 capsule; Refill: 0    9  Other iron deficiency anemia  The patient cannot tolerate oral iron  CBC will be repeated  IV iron may be needed  Overall, the patient has improved since her hospitalization  She was started on torsemide instead of furosemide in the seems to be working out well  She will maintain her current cardiac medications  Return to office in:   1 month    Chief Complaint     Chief Complaint   Patient presents with    Transition of Care Management     d/c [facility] , stopped Feosol due to making her sick, vomited  due for Mammogram and colonoscopy       History of Present Illness     The patient returned to the office for re-evaluation following a recent hospitalization at [facility]  She was admitted for an acute exacerbation of chronic diastolic congestive heart failure  She underwent diuresis and lost 10 lb    She was converted from furosemide to torsemide  Since she has been home her weight has been stable  She has had no chest pain or shortness of breath  She denies orthopnea, PND, or peripheral edema  She is tolerating her medications well  She has been having significant problems with back pain  This is mostly in the lower lumbar area  She has had no trauma  She has no sciatica, leg weakness, numbness, etc     The following portions of the patient's history were reviewed and updated as appropriate: allergies, current medications, past family history, past medical history, past social history, past surgical history and problem list     Review of Systems   Review of Systems   Constitutional: Negative  HENT: Negative for congestion, ear pain, postnasal drip, rhinorrhea, sore throat and trouble swallowing  Eyes: Negative for pain, discharge, redness and visual disturbance  Respiratory: Negative for cough, shortness of breath and wheezing  Cardiovascular: Negative  Gastrointestinal: Negative  Endocrine: Negative  Genitourinary: Negative for difficulty urinating, dysuria, frequency, hematuria and urgency  Musculoskeletal: Positive for back pain  Negative for arthralgias, gait problem, joint swelling and myalgias  Skin: Negative for rash  Neurological: Negative for dizziness, speech difficulty, weakness, light-headedness, numbness and headaches  Hematological: Negative  Psychiatric/Behavioral: Negative for confusion, decreased concentration, dysphoric mood and sleep disturbance  The patient is not nervous/anxious          Active Problem List     Patient Active Problem List   Diagnosis    Atrial fibrillation ([facility] )    Essential hypertension    Prediabetes    Depression    Hypercholesterolemia    Migraine    Mitral regurgitation    Class 3 severe obesity due to excess calories with serious comorbidity and body mass index (BMI) of 40 0 to 44 9 in adult ([facility] )    Obstructive sleep apnea    Primary localized osteoarthritis of right knee    Stroke [facility] )    Syncope, near    History of stroke    Acute on chronic diastolic CHF (congestive heart failure) (HCC)    Postural dizziness with presyncope    Symptomatic bradycardia    Chronic bilateral low back pain without sciatica    Stage 3 chronic kidney disease (HCC)    Anemia       Objective   BP 90/58 (BP Location: Left arm, Patient Position: Sitting, Cuff Size: Large)   Pulse 88   Temp 98 9 °F (37 2 °C) (Tympanic)   Resp 15   Ht 5' 7" (1 702 m)   Wt 124 kg (274 lb)   LMP  (LMP Unknown)   BMI 42 91 kg/m²     Physical Exam   Constitutional: She is oriented to person, place, and time  She appears well-developed and well-nourished  No distress  HENT:   Head: Normocephalic and atraumatic  Neck: Neck supple  No JVD present  No tracheal deviation present  No thyromegaly present  Cardiovascular: Normal rate, regular rhythm, normal heart sounds and intact distal pulses  Exam reveals no gallop and no friction rub  No murmur heard  Pulmonary/Chest: Effort normal and breath sounds normal  She has no wheezes  She has no rales  She exhibits no tenderness  Abdominal: Soft  Bowel sounds are normal  She exhibits no distension and no mass  There is no tenderness  There is no rebound  Musculoskeletal: Normal range of motion  She exhibits no edema or tenderness  Lymphadenopathy:     She has no cervical adenopathy  Neurological: She is alert and oriented to person, place, and time  Skin: Skin is warm and dry  Psychiatric: She has a normal mood and affect   Her behavior is normal  Judgment and thought content normal        Pertinent Laboratory/Diagnostic Studies:  Admission on 11/01/2018, Discharged on 11/05/2018   Component Date Value Ref Range Status    Sodium 11/01/2018 145  136 - 145 mmol/L Final    Potassium 11/01/2018 3 9  3 5 - 5 3 mmol/L Final    Chloride 11/01/2018 109* 100 - 108 mmol/L Final    CO2 11/01/2018 25  21 - 32 mmol/L Final    ANION GAP 11/01/2018 11  4 - 13 mmol/L Final    BUN 11/01/2018 18  5 - 25 mg/dL Final    Creatinine 11/01/2018 1 33* 0 60 - 1 30 mg/dL Final    Glucose 11/01/2018 127  65 - 140 mg/dL Final    Calcium 11/01/2018 8 6  8 3 - 10 1 mg/dL Final    AST 11/01/2018 52* 5 - 45 U/L Final    ALT 11/01/2018 54  12 - 78 U/L Final    Alkaline Phosphatase 11/01/2018 113  46 - 116 U/L Final    Total Protein 11/01/2018 7 2  6 4 - 8 2 g/dL Final    Albumin 11/01/2018 3 4* 3 5 - 5 0 g/dL Final    Total Bilirubin 11/01/2018 0 39  0 20 - 1 00 mg/dL Final    eGFR 11/01/2018 40  ml/min/1 73sq m Final    WBC 11/01/2018 6 80  4 31 - 10 16 Thousand/uL Final    RBC 11/01/2018 3 74* 3 81 - 5 12 Million/uL Final    Hemoglobin 11/01/2018 9 9* 11 5 - 15 4 g/dL Final    Hematocrit 11/01/2018 33 7* 34 8 - 46 1 % Final    MCV 11/01/2018 90  82 - 98 fL Final    MCH 11/01/2018 26 5* 26 8 - 34 3 pg Final    MCHC 11/01/2018 29 4* 31 4 - 37 4 g/dL Final    RDW 11/01/2018 16 4* 11 6 - 15 1 % Final    MPV 11/01/2018 9 5  8 9 - 12 7 fL Final    Platelets 87/35/7781 267  149 - 390 Thousands/uL Final    nRBC 11/01/2018 0  /100 WBCs Final    Neutrophils Relative 11/01/2018 60  43 - 75 % Final    Immat GRANS % 11/01/2018 0  0 - 2 % Final    Lymphocytes Relative 11/01/2018 24  14 - 44 % Final    Monocytes Relative 11/01/2018 7  4 - 12 % Final    Eosinophils Relative 11/01/2018 8* 0 - 6 % Final    Basophils Relative 11/01/2018 1  0 - 1 % Final    Neutrophils Absolute 11/01/2018 4 00  1 85 - 7 62 Thousands/µL Final    Immature Grans Absolute 11/01/2018 0 03  0 00 - 0 20 Thousand/uL Final    Lymphocytes Absolute 11/01/2018 1 63  0 60 - 4 47 Thousands/µL Final    Monocytes Absolute 11/01/2018 0 50  0 17 - 1 22 Thousand/µL Final    Eosinophils Absolute 11/01/2018 0 57  0 00 - 0 61 Thousand/µL Final    Basophils Absolute 11/01/2018 0 07  0 00 - 0 10 Thousands/µL Final    Troponin I 11/01/2018 0 02  <=0 04 ng/mL Final    NT-proBNP 11/01/2018 549* <125 pg/mL Final    Protime 11/01/2018 16 7* 11 8 - 14 2 seconds Final    INR 11/01/2018 1 34* 0 86 - 1 17 Final    PTT 11/01/2018 30  24 - 36 seconds Final    D-Dimer, Quant 11/01/2018 622* 0 - 424 ng/ml (FEU) Final    Ventricular Rate 11/01/2018 82  BPM Final    Atrial Rate 11/01/2018 84  BPM Final    ME Interval 11/01/2018 248  ms Final    QRSD Interval 11/01/2018 96  ms Final    QT Interval 11/01/2018 428  ms Final    QTC Interval 11/01/2018 500  ms Final    P Axis 11/01/2018 91  degrees Final    QRS Axis 11/01/2018 55  degrees Final    T Wave Axis 11/01/2018 69  degrees Final    Sodium 11/02/2018 142  136 - 145 mmol/L Final    Potassium 11/02/2018 3 8  3 5 - 5 3 mmol/L Final    Chloride 11/02/2018 108  100 - 108 mmol/L Final    CO2 11/02/2018 24  21 - 32 mmol/L Final    ANION GAP 11/02/2018 10  4 - 13 mmol/L Final    BUN 11/02/2018 17  5 - 25 mg/dL Final    Creatinine 11/02/2018 1 35* 0 60 - 1 30 mg/dL Final    Glucose 11/02/2018 113  65 - 140 mg/dL Final    Calcium 11/02/2018 8 7  8 3 - 10 1 mg/dL Final    eGFR 11/02/2018 40  ml/min/1 73sq m Final    WBC 11/02/2018 5 48  4 31 - 10 16 Thousand/uL Final    RBC 11/02/2018 3 45* 3 81 - 5 12 Million/uL Final    Hemoglobin 11/02/2018 9 3* 11 5 - 15 4 g/dL Final    Hematocrit 11/02/2018 30 7* 34 8 - 46 1 % Final    MCV 11/02/2018 89  82 - 98 fL Final    MCH 11/02/2018 27 0  26 8 - 34 3 pg Final    MCHC 11/02/2018 30 3* 31 4 - 37 4 g/dL Final    RDW 11/02/2018 16 7* 11 6 - 15 1 % Final    MPV 11/02/2018 9 8  8 9 - 12 7 fL Final    Platelets 72/35/9483 258  149 - 390 Thousands/uL Final    nRBC 11/02/2018 0  /100 WBCs Final    Neutrophils Relative 11/02/2018 47  43 - 75 % Final    Immat GRANS % 11/02/2018 0  0 - 2 % Final    Lymphocytes Relative 11/02/2018 33  14 - 44 % Final    Monocytes Relative 11/02/2018 9  4 - 12 % Final    Eosinophils Relative 11/02/2018 10* 0 - 6 % Final    Basophils Relative 11/02/2018 1  0 - 1 % Final    Neutrophils Absolute 11/02/2018 2 59  1 85 - 7 62 Thousands/µL Final    Immature Grans Absolute 11/02/2018 0 01  0 00 - 0 20 Thousand/uL Final    Lymphocytes Absolute 11/02/2018 1 82  0 60 - 4 47 Thousands/µL Final    Monocytes Absolute 11/02/2018 0 48  0 17 - 1 22 Thousand/µL Final    Eosinophils Absolute 11/02/2018 0 53  0 00 - 0 61 Thousand/µL Final    Basophils Absolute 11/02/2018 0 05  0 00 - 0 10 Thousands/µL Final    Folate 11/02/2018 >20 0* 3 1 - 17 5 ng/mL Final    Vitamin B-12 11/02/2018 869  100 - 900 pg/mL Final    Iron Saturation 11/02/2018 12  % Final    TIBC 11/02/2018 382  250 - 450 ug/dL Final    Iron 11/02/2018 47* 50 - 170 ug/dL Final    Ferritin 11/02/2018 16  8 - 388 ng/mL Final    WBC 11/03/2018 6 56  4 31 - 10 16 Thousand/uL Final    RBC 11/03/2018 3 46* 3 81 - 5 12 Million/uL Final    Hemoglobin 11/03/2018 9 3* 11 5 - 15 4 g/dL Final    Hematocrit 11/03/2018 30 4* 34 8 - 46 1 % Final    MCV 11/03/2018 88  82 - 98 fL Final    MCH 11/03/2018 26 9  26 8 - 34 3 pg Final    MCHC 11/03/2018 30 6* 31 4 - 37 4 g/dL Final    RDW 11/03/2018 16 7* 11 6 - 15 1 % Final    MPV 11/03/2018 10 6  8 9 - 12 7 fL Final    Platelets 90/86/0858 271  149 - 390 Thousands/uL Final    nRBC 11/03/2018 0  /100 WBCs Final    Neutrophils Relative 11/03/2018 51  43 - 75 % Final    Immat GRANS % 11/03/2018 0  0 - 2 % Final    Lymphocytes Relative 11/03/2018 31  14 - 44 % Final    Monocytes Relative 11/03/2018 8  4 - 12 % Final    Eosinophils Relative 11/03/2018 9* 0 - 6 % Final    Basophils Relative 11/03/2018 1  0 - 1 % Final    Neutrophils Absolute 11/03/2018 3 35  1 85 - 7 62 Thousands/µL Final    Immature Grans Absolute 11/03/2018 0 02  0 00 - 0 20 Thousand/uL Final    Lymphocytes Absolute 11/03/2018 2 01  0 60 - 4 47 Thousands/µL Final    Monocytes Absolute 11/03/2018 0 50  0 17 - 1 22 Thousand/µL Final    Eosinophils Absolute 11/03/2018 0 61  0 00 - 0 61 Thousand/µL Final    Basophils Absolute 11/03/2018 0 07  0 00 - 0 10 Thousands/µL Final    Sodium 11/03/2018 140  136 - 145 mmol/L Final    Potassium 11/03/2018 3 5  3 5 - 5 3 mmol/L Final    Chloride 11/03/2018 104  100 - 108 mmol/L Final    CO2 11/03/2018 24  21 - 32 mmol/L Final    ANION GAP 11/03/2018 12  4 - 13 mmol/L Final    BUN 11/03/2018 21  5 - 25 mg/dL Final    Creatinine 11/03/2018 1 33* 0 60 - 1 30 mg/dL Final    Glucose 11/03/2018 105  65 - 140 mg/dL Final    Calcium 11/03/2018 8 8  8 3 - 10 1 mg/dL Final    eGFR 11/03/2018 40  ml/min/1 73sq m Final    WBC 11/04/2018 6 64  4 31 - 10 16 Thousand/uL Final    RBC 11/04/2018 3 59* 3 81 - 5 12 Million/uL Final    Hemoglobin 11/04/2018 9 4* 11 5 - 15 4 g/dL Final    Hematocrit 11/04/2018 31 5* 34 8 - 46 1 % Final    MCV 11/04/2018 88  82 - 98 fL Final    MCH 11/04/2018 26 2* 26 8 - 34 3 pg Final    MCHC 11/04/2018 29 8* 31 4 - 37 4 g/dL Final    RDW 11/04/2018 16 4* 11 6 - 15 1 % Final    MPV 11/04/2018 10 7  8 9 - 12 7 fL Final    Platelets 52/51/5209 274  149 - 390 Thousands/uL Final    nRBC 11/04/2018 0  /100 WBCs Final    Neutrophils Relative 11/04/2018 51  43 - 75 % Final    Immat GRANS % 11/04/2018 0  0 - 2 % Final    Lymphocytes Relative 11/04/2018 31  14 - 44 % Final    Monocytes Relative 11/04/2018 8  4 - 12 % Final    Eosinophils Relative 11/04/2018 9* 0 - 6 % Final    Basophils Relative 11/04/2018 1  0 - 1 % Final    Neutrophils Absolute 11/04/2018 3 41  1 85 - 7 62 Thousands/µL Final    Immature Grans Absolute 11/04/2018 0 01  0 00 - 0 20 Thousand/uL Final    Lymphocytes Absolute 11/04/2018 2 06  0 60 - 4 47 Thousands/µL Final    Monocytes Absolute 11/04/2018 0 51  0 17 - 1 22 Thousand/µL Final    Eosinophils Absolute 11/04/2018 0 58  0 00 - 0 61 Thousand/µL Final    Basophils Absolute 11/04/2018 0 07  0 00 - 0 10 Thousands/µL Final    Sodium 11/04/2018 142  136 - 145 mmol/L Final    Potassium 11/04/2018 3 6  3 5 - 5 3 mmol/L Final    Chloride 11/04/2018 105  100 - 108 mmol/L Final    CO2 11/04/2018 25  21 - 32 mmol/L Final    ANION GAP 11/04/2018 12  4 - 13 mmol/L Final    BUN 11/04/2018 21  5 - 25 mg/dL Final    Creatinine 11/04/2018 1 44* 0 60 - 1 30 mg/dL Final    Glucose 11/04/2018 104  65 - 140 mg/dL Final    Calcium 11/04/2018 8 7  8 3 - 10 1 mg/dL Final    eGFR 11/04/2018 37  ml/min/1 73sq m Final    WBC 11/05/2018 7 40  4 31 - 10 16 Thousand/uL Final    RBC 11/05/2018 3 91  3 81 - 5 12 Million/uL Final    Hemoglobin 11/05/2018 10 5* 11 5 - 15 4 g/dL Final    Hematocrit 11/05/2018 34 8  34 8 - 46 1 % Final    MCV 11/05/2018 89  82 - 98 fL Final    MCH 11/05/2018 26 9  26 8 - 34 3 pg Final    MCHC 11/05/2018 30 2* 31 4 - 37 4 g/dL Final    RDW 11/05/2018 16 8* 11 6 - 15 1 % Final    MPV 11/05/2018 10 6  8 9 - 12 7 fL Final    Platelets 48/51/3787 334  149 - 390 Thousands/uL Final    nRBC 11/05/2018 0  /100 WBCs Final    Neutrophils Relative 11/05/2018 49  43 - 75 % Final    Immat GRANS % 11/05/2018 0  0 - 2 % Final    Lymphocytes Relative 11/05/2018 32  14 - 44 % Final    Monocytes Relative 11/05/2018 8  4 - 12 % Final    Eosinophils Relative 11/05/2018 10* 0 - 6 % Final    Basophils Relative 11/05/2018 1  0 - 1 % Final    Neutrophils Absolute 11/05/2018 3 66  1 85 - 7 62 Thousands/µL Final    Immature Grans Absolute 11/05/2018 0 02  0 00 - 0 20 Thousand/uL Final    Lymphocytes Absolute 11/05/2018 2 39  0 60 - 4 47 Thousands/µL Final    Monocytes Absolute 11/05/2018 0 56  0 17 - 1 22 Thousand/µL Final    Eosinophils Absolute 11/05/2018 0 70* 0 00 - 0 61 Thousand/µL Final    Basophils Absolute 11/05/2018 0 07  0 00 - 0 10 Thousands/µL Final    Sodium 11/05/2018 140  136 - 145 mmol/L Final    Potassium 11/05/2018 3 6  3 5 - 5 3 mmol/L Final    Chloride 11/05/2018 103  100 - 108 mmol/L Final    CO2 11/05/2018 25  21 - 32 mmol/L Final    ANION GAP 11/05/2018 12  4 - 13 mmol/L Final    BUN 11/05/2018 21  5 - 25 mg/dL Final    Creatinine 11/05/2018 1 46* 0 60 - 1 30 mg/dL Final    Glucose 11/05/2018 110  65 - 140 mg/dL Final    Calcium 11/05/2018 8 8  8 3 - 10 1 mg/dL Final    eGFR 11/05/2018 36  ml/min/1 73sq m Final   Admission on 10/05/2018, Discharged on 10/06/2018   Component Date Value Ref Range Status    Sodium 10/05/2018 141  136 - 145 mmol/L Final    Potassium 10/05/2018 4 3  3 5 - 5 3 mmol/L Final    Chloride 10/05/2018 105  100 - 108 mmol/L Final    CO2 10/05/2018 26  21 - 32 mmol/L Final    ANION GAP 10/05/2018 10  4 - 13 mmol/L Final    BUN 10/05/2018 12  5 - 25 mg/dL Final    Creatinine 10/05/2018 1 39* 0 60 - 1 30 mg/dL Final    Glucose 10/05/2018 120  65 - 140 mg/dL Final    Calcium 10/05/2018 8 7  8 3 - 10 1 mg/dL Final    AST 10/05/2018 45  5 - 45 U/L Final    ALT 10/05/2018 60  12 - 78 U/L Final    Alkaline Phosphatase 10/05/2018 131* 46 - 116 U/L Final    Total Protein 10/05/2018 7 3  6 4 - 8 2 g/dL Final    Albumin 10/05/2018 3 3* 3 5 - 5 0 g/dL Final    Total Bilirubin 10/05/2018 0 26  0 20 - 1 00 mg/dL Final    eGFR 10/05/2018 38  ml/min/1 73sq m Final    WBC 10/05/2018 7 86  4 31 - 10 16 Thousand/uL Final    RBC 10/05/2018 3 91  3 81 - 5 12 Million/uL Final    Hemoglobin 10/05/2018 10 5* 11 5 - 15 4 g/dL Final    Hematocrit 10/05/2018 34 3* 34 8 - 46 1 % Final    MCV 10/05/2018 88  82 - 98 fL Final    MCH 10/05/2018 26 9  26 8 - 34 3 pg Final    MCHC 10/05/2018 30 6* 31 4 - 37 4 g/dL Final    RDW 10/05/2018 16 1* 11 6 - 15 1 % Final    MPV 10/05/2018 9 6  8 9 - 12 7 fL Final    Platelets 21/08/2367 335  149 - 390 Thousands/uL Final    nRBC 10/05/2018 0  /100 WBCs Final    Neutrophils Relative 10/05/2018 48  43 - 75 % Final    Immat GRANS % 10/05/2018 0  0 - 2 % Final    Lymphocytes Relative 10/05/2018 32  14 - 44 % Final    Monocytes Relative 10/05/2018 9  4 - 12 % Final    Eosinophils Relative 10/05/2018 10* 0 - 6 % Final    Basophils Relative 10/05/2018 1  0 - 1 % Final    Neutrophils Absolute 10/05/2018 3 76  1 85 - 7 62 Thousands/µL Final    Immature Grans Absolute 10/05/2018 0 03  0 00 - 0 20 Thousand/uL Final    Lymphocytes Absolute 10/05/2018 2 54  0 60 - 4 47 Thousands/µL Final    Monocytes Absolute 10/05/2018 0 67  0 17 - 1 22 Thousand/µL Final    Eosinophils Absolute 10/05/2018 0 79* 0 00 - 0 61 Thousand/µL Final    Basophils Absolute 10/05/2018 0 07  0 00 - 0 10 Thousands/µL Final    Troponin I 10/05/2018 <0 02  <=0 04 ng/mL Final    NT-proBNP 10/05/2018 649* <125 pg/mL Final    Ventricular Rate 10/05/2018 79  BPM Final    Atrial Rate 10/05/2018 79  BPM Final    [place] Interval 10/05/2018 280  ms Final    QRSD Interval 10/05/2018 92  ms Final    QT Interval 10/05/2018 432  ms Final    QTC Interval 10/05/2018 495  ms Final    P Axis 10/05/2018 81  degrees Final    QRS Axis 10/05/2018 35  degrees Final    T Wave Axis 10/05/2018 58  degrees Final    Sodium 10/06/2018 140  136 - 145 mmol/L Final    Potassium 10/06/2018 4 0  3 5 - 5 3 mmol/L Final    Chloride 10/06/2018 107  100 - 108 mmol/L Final    CO2 10/06/2018 25  21 - 32 mmol/L Final    ANION GAP 10/06/2018 8  4 - 13 mmol/L Final    BUN 10/06/2018 17  5 - 25 mg/dL Final    Creatinine 10/06/2018 1 49* 0 60 - 1 30 mg/dL Final    Glucose 10/06/2018 113  65 - 140 mg/dL Final    Calcium 10/06/2018 8 8  8 3 - 10 1 mg/dL Final    AST 10/06/2018 33  5 - 45 U/L Final    ALT 10/06/2018 47  12 - 78 U/L Final    Alkaline Phosphatase 10/06/2018 120* 46 - 116 U/L Final    Total Protein 10/06/2018 6 6  6 4 - 8 2 g/dL Final    Albumin 10/06/2018 3 0* 3 5 - 5 0 g/dL Final    Total Bilirubin 10/06/2018 0 24  0 20 - 1 00 mg/dL Final    eGFR 10/06/2018 35  ml/min/1 73sq m Final    Magnesium 10/06/2018 2 3  1 6 - 2 6 mg/dL Final    WBC 10/06/2018 5 59  4 31 - 10 16 Thousand/uL Final    RBC 10/06/2018 3 58* 3 81 - 5 12 Million/uL Final    Hemoglobin 10/06/2018 9 5* 11 5 - 15 4 g/dL Final    Hematocrit 10/06/2018 31 4* 34 8 - 46 1 % Final    MCV 10/06/2018 88  82 - 98 fL Final    MCH 10/06/2018 26 5* 26 8 - 34 3 pg Final    MCHC 10/06/2018 30 3* 31 4 - 37 4 g/dL Final    RDW 10/06/2018 16 1* 11 6 - 15 1 % Final    MPV 10/06/2018 9 9  8 9 - 12 7 fL Final    Platelets 14/32/9284 271  149 - 390 Thousands/uL Final    nRBC 10/06/2018 0  /100 WBCs Final    Neutrophils Relative 10/06/2018 44  43 - 75 % Final    Immat GRANS % 10/06/2018 0  0 - 2 % Final    Lymphocytes Relative 10/06/2018 37  14 - 44 % Final    Monocytes Relative 10/06/2018 8  4 - 12 % Final    Eosinophils Relative 10/06/2018 10* 0 - 6 % Final    Basophils Relative 10/06/2018 1  0 - 1 % Final    Neutrophils Absolute 10/06/2018 2 50  1 85 - 7 62 Thousands/µL Final    Immature Grans Absolute 10/06/2018 0 02  0 00 - 0 20 Thousand/uL Final    Lymphocytes Absolute 10/06/2018 2 04  0 60 - 4 47 Thousands/µL Final    Monocytes Absolute 10/06/2018 0 44  0 17 - 1 22 Thousand/µL Final    Eosinophils Absolute 10/06/2018 0 53  0 00 - 0 61 Thousand/µL Final    Basophils Absolute 10/06/2018 0 06  0 00 - 0 10 Thousands/µL Final    PTT 10/06/2018 30  24 - 36 seconds Final    Protime 10/06/2018 16 1* 11 8 - 14 2 seconds Final    INR 10/06/2018 1 28* 0 86 - 1 17 Final   Appointment on 08/29/2018   Component Date Value Ref Range Status    WBC 08/29/2018 7 72  4 31 - 10 16 Thousand/uL Final    RBC 08/29/2018 4 08  3 81 - 5 12 Million/uL Final    Hemoglobin 08/29/2018 10 9* 11 5 - 15 4 g/dL Final    Hematocrit 08/29/2018 37 1  34 8 - 46 1 % Final    MCV 08/29/2018 91  82 - 98 fL Final    MCH 08/29/2018 26 7* 26 8 - 34 3 pg Final    MCHC 08/29/2018 29 4* 31 4 - 37 4 g/dL Final    RDW 08/29/2018 15 5* 11 6 - 15 1 % Final    MPV 08/29/2018 10 5  8 9 - 12 7 fL Final    Platelets 46/07/2315 300  149 - 390 Thousands/uL Final    nRBC 08/29/2018 0  /100 WBCs Final    Neutrophils Relative 08/29/2018 55  43 - 75 % Final    Immat GRANS % 08/29/2018 0  0 - 2 % Final    Lymphocytes Relative 08/29/2018 29  14 - 44 % Final    Monocytes Relative 08/29/2018 8  4 - 12 % Final    Eosinophils Relative 08/29/2018 7* 0 - 6 % Final    Basophils Relative 08/29/2018 1  0 - 1 % Final    Neutrophils Absolute 08/29/2018 4 24  1 85 - 7 62 Thousands/µL Final    Immature Grans Absolute 08/29/2018 0 02  0 00 - 0 20 Thousand/uL Final    Lymphocytes Absolute 08/29/2018 2 22  0 60 - 4 47 Thousands/µL Final    Monocytes Absolute 08/29/2018 0 61  0 17 - 1 22 Thousand/µL Final    Eosinophils Absolute 08/29/2018 0 56  0 00 - 0 61 Thousand/µL Final    Basophils Absolute 08/29/2018 0 07  0 00 - 0 10 Thousands/µL Final    TSH 3RD GENERATON 08/29/2018 28 601* 0 358 - 3 740 uIU/mL Final   Office Visit on 08/29/2018   Component Date Value Ref Range Status    NT-proBNP 08/29/2018 581* <125 pg/mL Final           Current Medications     Current Outpatient Prescriptions:     amiodarone 100 mg tablet, Take 1 tablet (100 mg total) by mouth daily, Disp: 90 tablet, Rfl: 3    apixaban (ELIQUIS) 5 mg, Take 5 mg by mouth 2 (two) times a day, Disp: , Rfl:     clopidogrel (PLAVIX) 75 mg tablet, Take 1 tablet (75 mg total) by mouth daily, Disp: 90 tablet, Rfl: 1    escitalopram (LEXAPRO) 10 mg tablet, Take 1 tablet (10 mg total) by mouth daily, Disp: 90 tablet, Rfl: 1    levothyroxine 50 mcg tablet, Take 1 tablet (50 mcg total) by mouth daily, Disp: 90 tablet, Rfl: 1    metoprolol tartrate (LOPRESSOR) 50 mg tablet, Take 1 tablet (50 mg total) by mouth every 12 (twelve) hours, Disp: 60 tablet, Rfl: 3    Multiple Vitamin (MULTIVITAMIN) tablet, Take 1 tablet by mouth daily  , Disp: , Rfl:     nortriptyline (PAMELOR) 50 mg capsule, Take 1 capsule (50 mg total) by mouth daily at bedtime (Patient taking differently: Take 50 mg by mouth daily  ), Disp: 90 capsule, Rfl: 0    oxyCODONE-acetaminophen (PERCOCET) 5-325 mg per tablet, Take 1 tablet by mouth every 4 (four) hours as needed for moderate pain Max Daily Amount: 6 tablets, Disp: 50 tablet, Rfl: 0    potassium chloride (K-DUR,KLOR-CON) 20 mEq tablet, Take 1 tablet (20 mEq total) by mouth 2 (two) times a day, Disp: 180 tablet, Rfl: 3    simvastatin (ZOCOR) 10 mg tablet, Take 1 tablet (10 mg total) by mouth daily at bedtime, Disp: 90 tablet, Rfl: 3    torsemide (DEMADEX) 20 mg tablet, Take 1 tablet (20 mg total) by mouth 2 (two) times a day, Disp: 60 tablet, Rfl: 0    gabapentin (NEURONTIN) 100 mg capsule, Take 1 capsule (100 mg total) by mouth 3 (three) times a day, Disp: 90 capsule, Rfl: 0    LORazepam (ATIVAN) 1 mg tablet, Take 1 tablet (1 mg total) by mouth 3 (three) times a day as needed for anxiety, Disp: 30 tablet, Rfl: 0    meclizine (ANTIVERT) 25 mg tablet, Take 25 mg by mouth 3 (three) times a day as needed for dizziness , Disp: , Rfl:       Jeanna Silva MD

## 2018-11-26 ENCOUNTER — OFFICE VISIT (OUTPATIENT)
Dept: CARDIOLOGY CLINIC | Facility: CLINIC | Age: 73
End: 2018-11-26
Payer: MEDICARE

## 2018-11-26 VITALS
WEIGHT: 276.7 LBS | BODY MASS INDEX: 43.43 KG/M2 | DIASTOLIC BLOOD PRESSURE: 78 MMHG | HEART RATE: 86 BPM | HEIGHT: 67 IN | OXYGEN SATURATION: 98 % | SYSTOLIC BLOOD PRESSURE: 126 MMHG

## 2018-11-26 DIAGNOSIS — I48.0 PAROXYSMAL ATRIAL FIBRILLATION (HCC): ICD-10-CM

## 2018-11-26 DIAGNOSIS — G47.33 OBSTRUCTIVE SLEEP APNEA: ICD-10-CM

## 2018-11-26 DIAGNOSIS — I50.32 CHRONIC DIASTOLIC HEART FAILURE (HCC): Primary | ICD-10-CM

## 2018-11-26 DIAGNOSIS — N18.30 STAGE 3 CHRONIC KIDNEY DISEASE (HCC): ICD-10-CM

## 2018-11-26 PROCEDURE — 99215 OFFICE O/P EST HI 40 MIN: CPT | Performed by: INTERNAL MEDICINE

## 2018-11-26 NOTE — PROGRESS NOTES
Heart Failure Office Visit   Chelle Ambrocio 72 y o  female MRN: [id number]    [facility]  Ms Shannon Prabhakar is a 72year old female with a known past medical history of   Atrial fibrillation ablation in 1/2018  DANTE compliant with CPAP she the  Patient Active Problem List   Diagnosis    Atrial fibrillation ([facility] )    Essential hypertension    Prediabetes    Depression    Hypercholesterolemia    Migraine    Mitral regurgitation    Class 3 severe obesity due to excess calories with serious comorbidity and body mass index (BMI) of 40 0 to 44 9 in adult ([facility] )    Obstructive sleep apnea    Primary localized osteoarthritis of right knee    Stroke ([facility] )    Syncope, near    History of stroke    Acute on chronic diastolic CHF (congestive heart failure) ([facility])    Postural dizziness with presyncope    Symptomatic bradycardia    Chronic bilateral low back pain without sciatica    Stage 3 chronic kidney disease ([facility] )    Anemia     Ms Shannon Prabhakar was hospitalized in [phone] with symptomatic bradycardia  The Toprol decreased from 100 mg b i d  To 50 mg b i d    Ms Jame Mcduffie was hospitalized at [facility] on 11/01-11/05/18 with acute on chronic diastolic heart failure  She presented with dyspnea  TTE showed left ventricular ejection fraction 60%, mild concentric hypertrophy, grade 1 diastolic dysfunction  Mild mitral stenosis, moderate aortic valve regurgitation  Review of Systems   Cardiovascular: Positive for dyspnea on exertion  Objective:   Vitals: /80  Vitals:    11/26/18 1449   BP: 126/78   BP Location: Right arm   Patient Position: Sitting   Cuff Size: Large   Pulse: 86   SpO2: 98%   Weight: 126 kg (276 lb 11 2 oz)   Height: 5' 7" (1 702 m)     There is no height or weight on file to calculate BMI      Wt Readings from Last 3 Encounters:   11/20/18 124 kg (274 lb)   11/05/18 128 kg (282 lb 3 oz)   10/05/18 125 kg (275 lb)         Physical Exam:  GEN: Chelle Ambrocio appears well, alert and oriented x 3, pleasant and cooperative   HEENT: pupils equal, round, and reactive to light; extraocular muscles intact  NECK: supple, no carotid bruits   HEART: regular rhythm, normal S1 and S2, no murmurs, clicks, gallops or rubs, JVP is flat   LUNGS: clear to auscultation bilaterally; no wheezes, rales, or rhonchi   ABDOMEN: normal bowel sounds, soft, no tenderness, no distention  EXTREMITIES: peripheral pulses normal; no clubbing, cyanosis, or edema  NEURO: no focal findings   SKIN: normal without suspicious lesions on exposed skin      Current Outpatient Prescriptions:     amiodarone 100 mg tablet, Take 1 tablet (100 mg total) by mouth daily, Disp: 90 tablet, Rfl: 3    apixaban (ELIQUIS) 5 mg, Take 5 mg by mouth 2 (two) times a day, Disp: , Rfl:     clopidogrel (PLAVIX) 75 mg tablet, Take 1 tablet (75 mg total) by mouth daily, Disp: 90 tablet, Rfl: 1    escitalopram (LEXAPRO) 10 mg tablet, Take 1 tablet (10 mg total) by mouth daily, Disp: 90 tablet, Rfl: 1    gabapentin (NEURONTIN) 100 mg capsule, Take 1 capsule (100 mg total) by mouth 3 (three) times a day, Disp: 90 capsule, Rfl: 0    levothyroxine 50 mcg tablet, Take 1 tablet (50 mcg total) by mouth daily, Disp: 90 tablet, Rfl: 1    LORazepam (ATIVAN) 1 mg tablet, Take 1 tablet (1 mg total) by mouth 3 (three) times a day as needed for anxiety, Disp: 30 tablet, Rfl: 0    meclizine (ANTIVERT) 25 mg tablet, Take 25 mg by mouth 3 (three) times a day as needed for dizziness , Disp: , Rfl:     metoprolol tartrate (LOPRESSOR) 50 mg tablet, Take 1 tablet (50 mg total) by mouth every 12 (twelve) hours, Disp: 60 tablet, Rfl: 3    Multiple Vitamin (MULTIVITAMIN) tablet, Take 1 tablet by mouth daily  , Disp: , Rfl:     nortriptyline (PAMELOR) 50 mg capsule, Take 1 capsule (50 mg total) by mouth daily at bedtime (Patient taking differently: Take 50 mg by mouth daily  ), Disp: 90 capsule, Rfl: 0    oxyCODONE-acetaminophen (PERCOCET) 5-325 mg per tablet, Take 1 tablet by mouth every 4 (four) hours as needed for moderate pain Max Daily Amount: 6 tablets, Disp: 50 tablet, Rfl: 0    potassium chloride (K-DUR,KLOR-CON) 20 mEq tablet, Take 1 tablet (20 mEq total) by mouth 2 (two) times a day, Disp: 180 tablet, Rfl: 3    simvastatin (ZOCOR) 10 mg tablet, Take 1 tablet (10 mg total) by mouth daily at bedtime, Disp: 90 tablet, Rfl: 3    torsemide (DEMADEX) 20 mg tablet, Take 1 tablet (20 mg total) by mouth 2 (two) times a day, Disp: 60 tablet, Rfl: 0        Assessment/Plan:   1 Chronic diastolic heart failure LVEF 60%, NYHA class III stage C- On PE eu volemic and compensated, BP and HR controlled, compliant with CPAP  Continue on   Metoprolol tartrate 50 mg q 12 hours, torsemide 20 mg b i d , K-Dur 20 mEq b i d   BMP, CBC, Mag in near future   Maintain a 2 gram daily sodium diet and 1500 ml daily fluid restriction  Check daily weights  If you gain 3 pounds in one day, 5 pounds in one week, or experience worsening shortness of breath or increasing lower leg swelling  Please call the heart failure office at [phone]  Please bring a  list of your current medications and daily weights to the office visit  2  DANTE compliant with CPAP at night  3  Paroxysmal Atrial fibrillation- sp typical flutter ablation 1/2018, continue on the Toprol tartrate 50 mg q 12 hours, amiodarone 100 mg daily, Eliquis 5 mg b i d  4  Prior CVA continue on Plavix 75 mg daily  5  CKD III baseline creat baseline creat 1 33-1 44    Follow up with Maria Alejandra Torres in 3 weeks          ALEJANDRA Man  11/26/2018  8:10 AM

## 2018-11-26 NOTE — PATIENT INSTRUCTIONS
Maintain a 2 gram daily sodium diet and 1500 ml daily fluid restriction  Check daily weights  If you gain 3 pounds in one day, 5 pounds in one week, or experience worsening shortness of breath or increasing lower leg swelling  Please call the heart failure office at [phone]    Please bring a  list of your current medications and daily weights to the office visit

## 2018-11-29 DIAGNOSIS — G43.909 MIGRAINE WITHOUT STATUS MIGRAINOSUS, NOT INTRACTABLE, UNSPECIFIED MIGRAINE TYPE: ICD-10-CM

## 2018-11-29 DIAGNOSIS — I50.33 ACUTE ON CHRONIC DIASTOLIC CONGESTIVE HEART FAILURE (HCC): ICD-10-CM

## 2018-11-29 RX ORDER — NORTRIPTYLINE HYDROCHLORIDE 50 MG/1
50 CAPSULE ORAL DAILY
Qty: 90 CAPSULE | Refills: 1 | Status: SHIPPED | OUTPATIENT
Start: 2018-11-29 | End: 2019-06-11 | Stop reason: SDUPTHER

## 2018-11-29 RX ORDER — TORSEMIDE 20 MG/1
20 TABLET ORAL
Qty: 180 TABLET | Refills: 1 | Status: ON HOLD | OUTPATIENT
Start: 2018-11-29 | End: 2019-02-03

## 2018-12-04 ENCOUNTER — PATIENT OUTREACH (OUTPATIENT)
Dept: OTHER | Facility: HOSPITAL | Age: 73
End: 2018-12-04

## 2018-12-09 NOTE — PROGRESS NOTES
Melissa/ Abdulkadir RT at bedside, bilateral groin stopcocks removed, pressure held pt attempted to wrap curtain around neck and jump from bed. pt was stopped by ED staff and moved to SI precaution room. pt stating SI and anxiety

## 2018-12-17 DIAGNOSIS — M54.50 CHRONIC BILATERAL LOW BACK PAIN WITHOUT SCIATICA: ICD-10-CM

## 2018-12-17 DIAGNOSIS — I48.0 PAROXYSMAL ATRIAL FIBRILLATION (HCC): ICD-10-CM

## 2018-12-17 DIAGNOSIS — G89.29 CHRONIC BILATERAL LOW BACK PAIN WITHOUT SCIATICA: ICD-10-CM

## 2018-12-17 RX ORDER — GABAPENTIN 100 MG/1
100 CAPSULE ORAL 3 TIMES DAILY
Qty: 90 CAPSULE | Refills: 3 | Status: SHIPPED | OUTPATIENT
Start: 2018-12-17 | End: 2019-07-29 | Stop reason: SDUPTHER

## 2018-12-17 NOTE — TELEPHONE ENCOUNTER
pts pharmacy called  Said her amiodarone will only be covered by insurance if we order amiodarone 200mg 1/2 tablet daily

## 2018-12-18 ENCOUNTER — HOSPITAL ENCOUNTER (OUTPATIENT)
Dept: RADIOLOGY | Facility: HOSPITAL | Age: 73
Discharge: HOME | End: 2018-12-18
Attending: INTERNAL MEDICINE
Payer: MEDICARE

## 2018-12-18 ENCOUNTER — APPOINTMENT (OUTPATIENT)
Dept: LAB | Facility: HOSPITAL | Age: 73
End: 2018-12-18
Attending: INTERNAL MEDICINE
Payer: MEDICARE

## 2018-12-18 DIAGNOSIS — G89.29 CHRONIC BILATERAL LOW BACK PAIN WITHOUT SCIATICA: ICD-10-CM

## 2018-12-18 DIAGNOSIS — M54.50 CHRONIC BILATERAL LOW BACK PAIN WITHOUT SCIATICA: ICD-10-CM

## 2018-12-18 LAB
ANION GAP SERPL CALCULATED.3IONS-SCNC: 13 MMOL/L (ref 4–13)
BASOPHILS # BLD AUTO: 0.1 THOUSANDS/ΜL (ref 0–0.1)
BASOPHILS NFR BLD AUTO: 1 % (ref 0–1)
BUN SERPL-MCNC: 17 MG/DL (ref 5–25)
CALCIUM SERPL-MCNC: 9.3 MG/DL (ref 8.3–10.1)
CHLORIDE SERPL-SCNC: 99 MMOL/L (ref 100–108)
CO2 SERPL-SCNC: 29 MMOL/L (ref 21–32)
CREAT SERPL-MCNC: 1.64 MG/DL (ref 0.6–1.3)
EOSINOPHIL # BLD AUTO: 0.56 THOUSAND/ΜL (ref 0–0.61)
EOSINOPHIL NFR BLD AUTO: 6 % (ref 0–6)
ERYTHROCYTE [DISTWIDTH] IN BLOOD BY AUTOMATED COUNT: 15.7 % (ref 11.6–15.1)
GFR SERPL CREATININE-BSD FRML MDRD: 31 ML/MIN/1.73SQ M
GLUCOSE SERPL-MCNC: 127 MG/DL (ref 65–140)
HCT VFR BLD AUTO: 39.3 % (ref 34.8–46.1)
HGB BLD-MCNC: 11.7 G/DL (ref 11.5–15.4)
IMM GRANULOCYTES # BLD AUTO: 0.03 THOUSAND/UL (ref 0–0.2)
IMM GRANULOCYTES NFR BLD AUTO: 0 % (ref 0–2)
LYMPHOCYTES # BLD AUTO: 3.32 THOUSANDS/ΜL (ref 0.6–4.47)
LYMPHOCYTES NFR BLD AUTO: 33 % (ref 14–44)
MAGNESIUM SERPL-MCNC: 2.2 MG/DL (ref 1.6–2.6)
MCH RBC QN AUTO: 27 PG (ref 26.8–34.3)
MCHC RBC AUTO-ENTMCNC: 29.8 G/DL (ref 31.4–37.4)
MCV RBC AUTO: 91 FL (ref 82–98)
MONOCYTES # BLD AUTO: 0.76 THOUSAND/ΜL (ref 0.17–1.22)
MONOCYTES NFR BLD AUTO: 8 % (ref 4–12)
NEUTROPHILS # BLD AUTO: 5.21 THOUSANDS/ΜL (ref 1.85–7.62)
NEUTS SEG NFR BLD AUTO: 52 % (ref 43–75)
NRBC BLD AUTO-RTO: 0 /100 WBCS
PLATELET # BLD AUTO: 357 THOUSANDS/UL (ref 149–390)
PMV BLD AUTO: 10.2 FL (ref 8.9–12.7)
POTASSIUM SERPL-SCNC: 3.4 MMOL/L (ref 3.5–5.3)
RBC # BLD AUTO: 4.33 MILLION/UL (ref 3.81–5.12)
SODIUM SERPL-SCNC: 141 MMOL/L (ref 136–145)
T4 FREE SERPL-MCNC: 0.87 NG/DL (ref 0.76–1.46)
TSH SERPL DL<=0.05 MIU/L-ACNC: 13.02 UIU/ML (ref 0.36–3.74)
WBC # BLD AUTO: 9.98 THOUSAND/UL (ref 4.31–10.16)

## 2018-12-18 PROCEDURE — 72110 X-RAY EXAM L-2 SPINE 4/>VWS: CPT

## 2018-12-18 PROCEDURE — 83735 ASSAY OF MAGNESIUM: CPT | Performed by: NURSE PRACTITIONER

## 2018-12-18 PROCEDURE — 84443 ASSAY THYROID STIM HORMONE: CPT | Performed by: INTERNAL MEDICINE

## 2018-12-18 PROCEDURE — 85025 COMPLETE CBC W/AUTO DIFF WBC: CPT | Performed by: NURSE PRACTITIONER

## 2018-12-18 PROCEDURE — 80048 BASIC METABOLIC PNL TOTAL CA: CPT | Performed by: INTERNAL MEDICINE

## 2018-12-18 PROCEDURE — 36415 COLL VENOUS BLD VENIPUNCTURE: CPT | Performed by: INTERNAL MEDICINE

## 2018-12-18 PROCEDURE — 84439 ASSAY OF FREE THYROXINE: CPT | Performed by: INTERNAL MEDICINE

## 2018-12-18 RX ORDER — AMIODARONE HYDROCHLORIDE 200 MG/1
TABLET ORAL
Qty: 45 TABLET | Refills: 2 | Status: SHIPPED | OUTPATIENT
Start: 2018-12-18 | End: 2019-08-19 | Stop reason: SDUPTHER

## 2018-12-21 ENCOUNTER — TELEPHONE (OUTPATIENT)
Dept: CARDIOLOGY CLINIC | Facility: CLINIC | Age: 73
End: 2018-12-21

## 2018-12-26 ENCOUNTER — OFFICE VISIT (OUTPATIENT)
Dept: INTERNAL MEDICINE CLINIC | Facility: CLINIC | Age: 73
End: 2018-12-26
Payer: MEDICARE

## 2018-12-26 VITALS
SYSTOLIC BLOOD PRESSURE: 144 MMHG | BODY MASS INDEX: 45.52 KG/M2 | TEMPERATURE: 98.7 F | WEIGHT: 290 LBS | HEIGHT: 67 IN | RESPIRATION RATE: 16 BRPM | DIASTOLIC BLOOD PRESSURE: 78 MMHG | HEART RATE: 100 BPM

## 2018-12-26 DIAGNOSIS — N18.30 STAGE 3 CHRONIC KIDNEY DISEASE (HCC): ICD-10-CM

## 2018-12-26 DIAGNOSIS — G89.29 CHRONIC BILATERAL LOW BACK PAIN WITHOUT SCIATICA: ICD-10-CM

## 2018-12-26 DIAGNOSIS — E78.00 HYPERCHOLESTEROLEMIA: ICD-10-CM

## 2018-12-26 DIAGNOSIS — M54.50 CHRONIC BILATERAL LOW BACK PAIN WITHOUT SCIATICA: ICD-10-CM

## 2018-12-26 DIAGNOSIS — I48.0 PAROXYSMAL ATRIAL FIBRILLATION (HCC): ICD-10-CM

## 2018-12-26 DIAGNOSIS — I50.32 CHRONIC DIASTOLIC HEART FAILURE (HCC): ICD-10-CM

## 2018-12-26 DIAGNOSIS — I10 ESSENTIAL HYPERTENSION: Primary | ICD-10-CM

## 2018-12-26 DIAGNOSIS — E03.9 ACQUIRED HYPOTHYROIDISM: ICD-10-CM

## 2018-12-26 DIAGNOSIS — I63.40 CEREBROVASCULAR ACCIDENT (CVA) DUE TO EMBOLISM OF CEREBRAL ARTERY (HCC): ICD-10-CM

## 2018-12-26 DIAGNOSIS — G43.009 MIGRAINE WITHOUT AURA AND WITHOUT STATUS MIGRAINOSUS, NOT INTRACTABLE: ICD-10-CM

## 2018-12-26 DIAGNOSIS — I63.9 CEREBROVASCULAR ACCIDENT (CVA), UNSPECIFIED MECHANISM (HCC): ICD-10-CM

## 2018-12-26 DIAGNOSIS — Z12.39 SCREENING FOR BREAST CANCER: ICD-10-CM

## 2018-12-26 DIAGNOSIS — E03.2 HYPOTHYROIDISM DUE TO MEDICATION: ICD-10-CM

## 2018-12-26 PROCEDURE — G0402 INITIAL PREVENTIVE EXAM: HCPCS | Performed by: INTERNAL MEDICINE

## 2018-12-26 PROCEDURE — 99214 OFFICE O/P EST MOD 30 MIN: CPT | Performed by: INTERNAL MEDICINE

## 2018-12-26 NOTE — PROGRESS NOTES
Assessment and Plan:    Problem List Items Addressed This Visit     None        Health Maintenance Due   Topic Date Due    Medicare Annual Wellness Visit (AWV)  1945    CRC Screening: Colonoscopy  1945    DTaP,Tdap,and Td Vaccines (1 - Tdap) 05/23/1968    MAMMOGRAM  07/03/2018         HPI:  Tessa San is a 72 y o  female here for her Initial Wellness Visit      Patient Active Problem List   Diagnosis    Atrial fibrillation ([facility] )    Essential hypertension    Prediabetes    Depression    Hypercholesterolemia    Migraine    Mitral regurgitation    Class 3 severe obesity due to excess calories with serious comorbidity and body mass index (BMI) of 40 0 to 44 9 in adult ([facility] )    Obstructive sleep apnea    Primary localized osteoarthritis of right knee    Stroke ([place] [address] )    Syncope, near    History of stroke    Chronic diastolic heart failure (HCC)    Postural dizziness with presyncope    Symptomatic bradycardia    Chronic bilateral low back pain without sciatica    Stage 3 chronic kidney disease (HCC)    Anemia     Past Medical History:   Diagnosis Date    Atrial fibrillation ([place] [address] )     CHF (congestive heart failure) (HCC)     Hyperlipidemia     Hypertension     Migraine     Polyp of sigmoid colon     Prediabetes     Stroke [facility])      Past Surgical History:   Procedure Laterality Date    APPENDECTOMY      CARDIAC ELECTROPHYSIOLOGY STUDY AND ABLATION      CHOLECYSTECTOMY       Family History   Problem Relation Age of Onset    Diabetes Mother     Heart disease Mother     Hypertension Mother     Arthritis Mother     Coronary artery disease Mother      History   Smoking Status    Former Smoker    Quit date: 2/11/2011   Smokeless Tobacco    Never Used     History   Alcohol Use No      History   Drug Use No       Current Outpatient Prescriptions   Medication Sig Dispense Refill    amiodarone 200 mg tablet Take 1/2 tablet daily 45 tablet 2    apixaban (ELIQUIS) 5 mg Take 5 mg by mouth 2 (two) times a day      clopidogrel (PLAVIX) 75 mg tablet Take 1 tablet (75 mg total) by mouth daily 90 tablet 1    escitalopram (LEXAPRO) 10 mg tablet Take 1 tablet (10 mg total) by mouth daily 90 tablet 1    gabapentin (NEURONTIN) 100 mg capsule Take 1 capsule (100 mg total) by mouth 3 (three) times a day 90 capsule 3    levothyroxine 50 mcg tablet Take 1 tablet (50 mcg total) by mouth daily 90 tablet 1    LORazepam (ATIVAN) 1 mg tablet Take 1 tablet (1 mg total) by mouth 3 (three) times a day as needed for anxiety 30 tablet 0    meclizine (ANTIVERT) 25 mg tablet Take 25 mg by mouth 3 (three) times a day as needed for dizziness       metoprolol tartrate (LOPRESSOR) 50 mg tablet Take 1 tablet (50 mg total) by mouth every 12 (twelve) hours 60 tablet 3    Multiple Vitamin (MULTIVITAMIN) tablet Take 1 tablet by mouth daily        nortriptyline (PAMELOR) 50 mg capsule Take 1 capsule (50 mg total) by mouth daily 90 capsule 1    oxyCODONE-acetaminophen (PERCOCET) 5-325 mg per tablet Take 1 tablet by mouth every 4 (four) hours as needed for moderate pain Max Daily Amount: 6 tablets 50 tablet 0    potassium chloride (K-DUR,KLOR-CON) 20 mEq tablet Take 1 tablet (20 mEq total) by mouth 2 (two) times a day 180 tablet 3    simvastatin (ZOCOR) 10 mg tablet Take 1 tablet (10 mg total) by mouth daily at bedtime 90 tablet 3    torsemide (DEMADEX) 20 mg tablet Take 1 tablet (20 mg total) by mouth 2 (two) times a day 180 tablet 1     No current facility-administered medications for this visit        No Known Allergies  Immunization History   Administered Date(s) Administered    Influenza Quadrivalent Preservative Free 3 years and older IM 10/11/2017    Influenza TIV (IM) 09/01/2011, 10/01/2012, 10/14/2014, 10/14/2015, 09/26/2016, 10/11/2017    Influenza, high dose seasonal 0 5 mL 11/02/2018    Pneumococcal Conjugate 13-Valent 07/06/2015    Pneumococcal Polysaccharide PPV23 02/16/2011, 01/03/2017 Patient Care Team:  Sonya Knowles MD as PCP - General  DO Sonya Wray MD Sedalia Pais, MD Anita Keener, RN as Lead OP Care Mgr (Care Coordination)    Medicare Screening Tests and Risk Assessments:  Chon Bolaños is here for her Initial Wellness visit  Health Risk Assessment:  Patient rates overall health as fair  Patient feels that their physical health rating is Slightly worse  Eyesight was rated as Same  Hearing was rated as Same  Patient feels that their emotional and mental health rating is Same  Pain experienced by patient in the last 7 days has been A lot  Patient's pain rating has been 8/10  Patient states that she has experienced no weight loss or gain in last 6 months  Emotional/Mental Health:  Patient has been feeling nervous/anxious  PHQ-9 Depression Screening:    Frequency of the following problems over the past two weeks:      1  Little interest or pleasure in doing things: 0 - not at all      2  Feeling down, depressed, or hopeless: 0 - not at all  PHQ-2 Score: 0          Broken Bones/Falls: Fall Risk Assessment:    In the past year, patient has experienced: No history of falling in past year          Bladder/Bowel:  Patient has not leaked urine accidently in the last six months  Patient reports no loss of bowel control  Immunizations:  Patient has had a flu vaccination within the last year  Patient has received a pneumonia shot  Patient has not received a shingles shot  Patient has not received tetanus/diphtheria shot  Home Safety:  Patient does not have trouble with stairs inside or outside of their home  Patient currently reports that there are no safety hazards present in home, working smoke alarms, no working carbon monoxide detectors        Preventative Screenings:   Breast cancer screening performed, no colon cancer screen completed, cholesterol screen completed, no glaucoma eye exam completed    Nutrition:  Current diet: No Added Salt and Limited junk food with servings of the following:    Medications:  Patient is currently taking over-the-counter supplements  List of OTC medications includes: MVI  Patient is able to manage medications  Lifestyle Choices:  Patient reports no tobacco use  Patient has smoked or used tobacco in the past   Patient has stopped her tobacco use  Tobacco use quit date: 2/11/2011  Patient reports no alcohol use  Patient drives a vehicle  Patient wears seat belt  Current level of exercise of physical activity described by patient as: no level of exercise due to back pain  Activities of Daily Living:  Can get out of bed by his or her self, able to dress self, able to make own meals, able to do own shopping, able to bathe self, unable to do laundry/housekeeping, can manage own money, pay bills and track expenses    Previous Hospitalizations:  Hospitalization or ED visit in past 12 months  Number of hospitalizations within the last year: 3-4        Advanced Directives:  Patient has decided on a power of   Patient has spoken to designated power of   Patient has not completed advanced directive  Preventative Screening/Counseling:      Cardiovascular:      General: Screening Current          Diabetes:      General: Screening Current          Colorectal Cancer:      General: Risks and Benefits Discussed          Breast Cancer:      General: Risks and Benefits Discussed          Cervical Cancer:      General: Risks and Benefits Discussed          Osteoporosis:      General: Screening Current          AAA:      General: Screening Not Indicated          Glaucoma:      General: Screening Current          HIV:      General: Screening Not Indicated          Hepatitis C:      General: Screening Not Indicated        Advanced Directives:   Patient has living will for healthcare, has durable POA for healthcare, Information on ACP and/or AD provided  End of life assessment reviewed with patient  Provider agrees with end of life decisions   Immunizations:      Influenza: Influenza UTD This Year      Pneumococcal: Lifetime Vaccine Completed      Shingrix: Risks & Benefits Discussed      TDAP: Risks & Benefits Discussed        Referrals: Additional Comments: The patient is doing fairly well  She is trying to maintain a healthy lifestyle  She continues to struggle with obesity which has been a lifelong problem  She is not able to exercise very much because of her clinical condition  She has no significant symptoms of psychiatric or cognitive dysfunction  She has not fallen  Her home environment is safe  She has not had a Pap test lately  She is scheduling a mammogram   We discussed colorectal cancer screening  She is up-to-date with influenza and pneumococcal vaccines  We discussed herpes zoster vaccination and tetanus booster  I discussed advance care planning with the patient  She does have a living will  Her daughter David Dotson would be her power of

## 2018-12-27 RX ORDER — LEVOTHYROXINE SODIUM 0.05 MG/1
75 TABLET ORAL DAILY
Qty: 90 TABLET | Refills: 0
Start: 2018-12-27 | End: 2019-02-28 | Stop reason: SDUPTHER

## 2018-12-27 RX ORDER — OXYCODONE HYDROCHLORIDE AND ACETAMINOPHEN 5; 325 MG/1; MG/1
1 TABLET ORAL EVERY 4 HOURS PRN
Qty: 50 TABLET | Refills: 0 | Status: ON HOLD | OUTPATIENT
Start: 2018-12-27 | End: 2020-09-04 | Stop reason: SDUPTHER

## 2018-12-27 RX ORDER — CLOPIDOGREL BISULFATE 75 MG/1
75 TABLET ORAL DAILY
Qty: 90 TABLET | Refills: 1 | Status: SHIPPED | OUTPATIENT
Start: 2018-12-27 | End: 2019-01-22 | Stop reason: SDUPTHER

## 2018-12-27 NOTE — PROGRESS NOTES
[facility] [place]      NAME: Severo Mor  AGE: 72 y o  SEX: female  : 1945   MRN: [id number]    DATE: 2018  TIME: 5:06 PM    Assessment and Plan   1  Essential hypertension  Well controlled  2  Paroxysmal atrial fibrillation (HCC)  Currently in sinus rhythm  On metoprolol and Eliquis    3  Cerebrovascular accident (CVA) due to embolism of cerebral artery ([address] )  On Eliquis for stroke prophylaxis  No signs of recurrence  4  Chronic diastolic heart failure ([address] )  Currently compensated  5  Stage 3 chronic kidney disease (HCC)  Stable  - Comprehensive metabolic panel    6  Hypercholesterolemia  On simvastatin  7  Chronic bilateral low back pain without sciatica  The patient is interested in trying physical therapy which I think is an excellent idea  - Ambulatory referral to Physical Therapy; Future    8  Acquired hypothyroidism  Currently somewhat under replaced  Her thyroid hormone dose has been increased  - TSH, 3rd generation  - T4, free    9  Screening for breast cancer  - Mammo screening bilateral w cad; Future    1  Migraine without aura and without status migrainosus, not intractable  Reasonably well controlled  The patient uses oxycodone as necessary   - oxyCODONE-acetaminophen (PERCOCET) 5-325 mg per tablet; Take 1 tablet by mouth every 4 (four) hours as needed for moderate pain Max Daily Amount: 6 tablets  Dispense: 50 tablet; Refill: 0    The patient is doing generally well  Her major active issue is back pain and she will start physical therapy for this  She will increase her thyroid hormone replacement her thyroid function tests will be repeated in about 6 weeks  Return to office in:   3 months      Chief Complaint     Chief Complaint   Patient presents with    Medicare Wellness Visit    Follow-up     1 month       History of Present Illness     The patient returned to the office for re-evaluation of hypertension, hypercholesterolemia, hypothyroidism, paroxysmal atrial fibrillation complicated by previous stroke, and chronic diastolic congestive heart failure  Since her last visit she has been feeling pretty well  She has no chest pain, shortness of breath, palpitations, or dizziness  She has not been having many headaches  She does have back pain which has been flared up over the past couple weeks  She has been using some extra Percocet for this  She cannot use nonsteroidal anti-inflammatories because she is anticoagulated  She had no injury  She has no sciatica  The following portions of the patient's history were reviewed and updated as appropriate: allergies, current medications, past family history, past medical history, past social history, past surgical history and problem list     Review of Systems   Review of Systems   Constitutional: Negative  HENT: Negative for congestion, ear pain, postnasal drip, rhinorrhea, sore throat and trouble swallowing  Eyes: Negative for pain, discharge, redness and visual disturbance  Respiratory: Negative for cough, shortness of breath and wheezing  Cardiovascular: Negative  Gastrointestinal: Negative  Endocrine: Negative  Genitourinary: Negative for difficulty urinating, dysuria, frequency, hematuria and urgency  Musculoskeletal: Positive for back pain  Negative for arthralgias, gait problem, joint swelling and myalgias  Skin: Negative for rash  Neurological: Negative for dizziness, speech difficulty, weakness, light-headedness, numbness and headaches  Hematological: Negative  Psychiatric/Behavioral: Negative for confusion, decreased concentration, dysphoric mood and sleep disturbance  The patient is not nervous/anxious          Active Problem List     Patient Active Problem List   Diagnosis    Atrial fibrillation ([facility] [address] )    Essential hypertension    Prediabetes    Depression    Hypercholesterolemia    Migraine    Mitral regurgitation    Class 3 severe obesity due to excess calories with serious comorbidity and body mass index (BMI) of 40 0 to 44 9 in adult [facility])    Obstructive sleep apnea    Primary localized osteoarthritis of right knee    Stroke ([address] )    Syncope, near    History of stroke    Chronic diastolic heart failure (HCC)    Postural dizziness with presyncope    Symptomatic bradycardia    Chronic bilateral low back pain without sciatica    Stage 3 chronic kidney disease (HCC)    Anemia    Acquired hypothyroidism       Objective   /78 (BP Location: Left arm, Patient Position: Sitting, Cuff Size: Large)   Pulse 100   Temp 98 7 °F (37 1 °C) (Tympanic)   Resp 16   Ht 5' 7" (1 702 m)   Wt 132 kg (290 lb)   LMP  (LMP Unknown)   BMI 45 42 kg/m²     Physical Exam   Constitutional: She is oriented to person, place, and time  She appears well-developed  No distress  Obese   HENT:   Head: Normocephalic and atraumatic  Neck: Neck supple  No JVD present  No tracheal deviation present  No thyromegaly present  Cardiovascular: Normal rate, regular rhythm, normal heart sounds and intact distal pulses  Exam reveals no gallop and no friction rub  No murmur heard  Pulmonary/Chest: Effort normal and breath sounds normal  She has no wheezes  She has no rales  She exhibits no tenderness  Abdominal: Soft  Bowel sounds are normal  She exhibits no distension and no mass  There is no tenderness  There is no rebound  Musculoskeletal: Normal range of motion  She exhibits no edema or tenderness  Lymphadenopathy:     She has no cervical adenopathy  Neurological: She is alert and oriented to person, place, and time  Skin: Skin is warm and dry  Psychiatric: She has a normal mood and affect   Her behavior is normal  Judgment and thought content normal        Pertinent Laboratory/Diagnostic Studies:  Office Visit on 11/26/2018   Component Date Value Ref Range Status    WBC 12/18/2018 9 98  4 31 - 10 16 Thousand/uL Final    RBC 12/18/2018 4 33  3 81 - 5 12 Million/uL Final    Hemoglobin 12/18/2018 11 7  11 5 - 15 4 g/dL Final    Hematocrit 12/18/2018 39 3  34 8 - 46 1 % Final    MCV 12/18/2018 91  82 - 98 fL Final    MCH 12/18/2018 27 0  26 8 - 34 3 pg Final    MCHC 12/18/2018 29 8* 31 4 - 37 4 g/dL Final    RDW 12/18/2018 15 7* 11 6 - 15 1 % Final    MPV 12/18/2018 10 2  8 9 - 12 7 fL Final    Platelets 42/35/0980 357  149 - 390 Thousands/uL Final    nRBC 12/18/2018 0  /100 WBCs Final    Neutrophils Relative 12/18/2018 52  43 - 75 % Final    Immat GRANS % 12/18/2018 0  0 - 2 % Final    Lymphocytes Relative 12/18/2018 33  14 - 44 % Final    Monocytes Relative 12/18/2018 8  4 - 12 % Final    Eosinophils Relative 12/18/2018 6  0 - 6 % Final    Basophils Relative 12/18/2018 1  0 - 1 % Final    Neutrophils Absolute 12/18/2018 5 21  1 85 - 7 62 Thousands/µL Final    Immature Grans Absolute 12/18/2018 0 03  0 00 - 0 20 Thousand/uL Final    Lymphocytes Absolute 12/18/2018 3 32  0 60 - 4 47 Thousands/µL Final    Monocytes Absolute 12/18/2018 0 76  0 17 - 1 22 Thousand/µL Final    Eosinophils Absolute 12/18/2018 0 56  0 00 - 0 61 Thousand/µL Final    Basophils Absolute 12/18/2018 0 10  0 00 - 0 10 Thousands/µL Final    Magnesium 12/18/2018 2 2  1 6 - 2 6 mg/dL Final   Office Visit on 11/20/2018   Component Date Value Ref Range Status    Sodium 12/18/2018 141  136 - 145 mmol/L Final    Potassium 12/18/2018 3 4* 3 5 - 5 3 mmol/L Final    Chloride 12/18/2018 99* 100 - 108 mmol/L Final    CO2 12/18/2018 29  21 - 32 mmol/L Final    ANION GAP 12/18/2018 13  4 - 13 mmol/L Final    BUN 12/18/2018 17  5 - 25 mg/dL Final    Creatinine 12/18/2018 1 64* 0 60 - 1 30 mg/dL Final    Glucose 12/18/2018 127  65 - 140 mg/dL Final    Calcium 12/18/2018 9 3  8 3 - 10 1 mg/dL Final    eGFR 12/18/2018 31  ml/min/1 73sq m Final   Admission on 11/01/2018, Discharged on 11/05/2018   Component Date Value Ref Range Status    Sodium 11/01/2018 145  136 - 145 mmol/L Final    Potassium 11/01/2018 3 9  3 5 - 5 3 mmol/L Final    Chloride 11/01/2018 109* 100 - 108 mmol/L Final    CO2 11/01/2018 25  21 - 32 mmol/L Final    ANION GAP 11/01/2018 11  4 - 13 mmol/L Final    BUN 11/01/2018 18  5 - 25 mg/dL Final    Creatinine 11/01/2018 1 33* 0 60 - 1 30 mg/dL Final    Glucose 11/01/2018 127  65 - 140 mg/dL Final    Calcium 11/01/2018 8 6  8 3 - 10 1 mg/dL Final    AST 11/01/2018 52* 5 - 45 U/L Final    ALT 11/01/2018 54  12 - 78 U/L Final    Alkaline Phosphatase 11/01/2018 113  46 - 116 U/L Final    Total Protein 11/01/2018 7 2  6 4 - 8 2 g/dL Final    Albumin 11/01/2018 3 4* 3 5 - 5 0 g/dL Final    Total Bilirubin 11/01/2018 0 39  0 20 - 1 00 mg/dL Final    eGFR 11/01/2018 40  ml/min/1 73sq m Final    WBC 11/01/2018 6 80  4 31 - 10 16 Thousand/uL Final    RBC 11/01/2018 3 74* 3 81 - 5 12 Million/uL Final    Hemoglobin 11/01/2018 9 9* 11 5 - 15 4 g/dL Final    Hematocrit 11/01/2018 33 7* 34 8 - 46 1 % Final    MCV 11/01/2018 90  82 - 98 fL Final    MCH 11/01/2018 26 5* 26 8 - 34 3 pg Final    MCHC 11/01/2018 29 4* 31 4 - 37 4 g/dL Final    RDW 11/01/2018 16 4* 11 6 - 15 1 % Final    MPV 11/01/2018 9 5  8 9 - 12 7 fL Final    Platelets 26/64/4091 267  149 - 390 Thousands/uL Final    nRBC 11/01/2018 0  /100 WBCs Final    Neutrophils Relative 11/01/2018 60  43 - 75 % Final    Immat GRANS % 11/01/2018 0  0 - 2 % Final    Lymphocytes Relative 11/01/2018 24  14 - 44 % Final    Monocytes Relative 11/01/2018 7  4 - 12 % Final    Eosinophils Relative 11/01/2018 8* 0 - 6 % Final    Basophils Relative 11/01/2018 1  0 - 1 % Final    Neutrophils Absolute 11/01/2018 4 00  1 85 - 7 62 Thousands/µL Final    Immature Grans Absolute 11/01/2018 0 03  0 00 - 0 20 Thousand/uL Final    Lymphocytes Absolute 11/01/2018 1 63  0 60 - 4 47 Thousands/µL Final    Monocytes Absolute 11/01/2018 0 50  0 17 - 1 22 Thousand/µL Final    Eosinophils Absolute 11/01/2018 0 57  0 00 - 0 61 Thousand/µL Final    Basophils Absolute 11/01/2018 0 07  0 00 - 0 10 Thousands/µL Final    Troponin I 11/01/2018 0 02  <=0 04 ng/mL Final    NT-proBNP 11/01/2018 549* <125 pg/mL Final    Protime 11/01/2018 16 7* 11 8 - 14 2 seconds Final    INR 11/01/2018 1 34* 0 86 - 1 17 Final    PTT 11/01/2018 30  24 - 36 seconds Final    D-Dimer, Quant 11/01/2018 622* 0 - 424 ng/ml (FEU) Final    Ventricular Rate 11/01/2018 82  BPM Final    Atrial Rate 11/01/2018 84  BPM Final    [place] Interval 11/01/2018 248  ms Final    QRSD Interval 11/01/2018 96  ms Final    QT Interval 11/01/2018 428  ms Final    QTC Interval 11/01/2018 500  ms Final    P Axis 11/01/2018 91  degrees Final    QRS Axis 11/01/2018 55  degrees Final    T Wave Axis 11/01/2018 69  degrees Final    Sodium 11/02/2018 142  136 - 145 mmol/L Final    Potassium 11/02/2018 3 8  3 5 - 5 3 mmol/L Final    Chloride 11/02/2018 108  100 - 108 mmol/L Final    CO2 11/02/2018 24  21 - 32 mmol/L Final    ANION GAP 11/02/2018 10  4 - 13 mmol/L Final    BUN 11/02/2018 17  5 - 25 mg/dL Final    Creatinine 11/02/2018 1 35* 0 60 - 1 30 mg/dL Final    Glucose 11/02/2018 113  65 - 140 mg/dL Final    Calcium 11/02/2018 8 7  8 3 - 10 1 mg/dL Final    eGFR 11/02/2018 40  ml/min/1 73sq m Final    WBC 11/02/2018 5 48  4 31 - 10 16 Thousand/uL Final    RBC 11/02/2018 3 45* 3 81 - 5 12 Million/uL Final    Hemoglobin 11/02/2018 9 3* 11 5 - 15 4 g/dL Final    Hematocrit 11/02/2018 30 7* 34 8 - 46 1 % Final    MCV 11/02/2018 89  82 - 98 fL Final    MCH 11/02/2018 27 0  26 8 - 34 3 pg Final    MCHC 11/02/2018 30 3* 31 4 - 37 4 g/dL Final    RDW 11/02/2018 16 7* 11 6 - 15 1 % Final    MPV 11/02/2018 9 8  8 9 - 12 7 fL Final    Platelets 72/93/2536 258  149 - 390 Thousands/uL Final    nRBC 11/02/2018 0  /100 WBCs Final    Neutrophils Relative 11/02/2018 47  43 - 75 % Final    Immat GRANS % 11/02/2018 0  0 - 2 % Final    Lymphocytes Relative 11/02/2018 33 14 - 44 % Final    Monocytes Relative 11/02/2018 9  4 - 12 % Final    Eosinophils Relative 11/02/2018 10* 0 - 6 % Final    Basophils Relative 11/02/2018 1  0 - 1 % Final    Neutrophils Absolute 11/02/2018 2 59  1 85 - 7 62 Thousands/µL Final    Immature Grans Absolute 11/02/2018 0 01  0 00 - 0 20 Thousand/uL Final    Lymphocytes Absolute 11/02/2018 1 82  0 60 - 4 47 Thousands/µL Final    Monocytes Absolute 11/02/2018 0 48  0 17 - 1 22 Thousand/µL Final    Eosinophils Absolute 11/02/2018 0 53  0 00 - 0 61 Thousand/µL Final    Basophils Absolute 11/02/2018 0 05  0 00 - 0 10 Thousands/µL Final    Folate 11/02/2018 >20 0* 3 1 - 17 5 ng/mL Final    Vitamin B-12 11/02/2018 869  100 - 900 pg/mL Final    Iron Saturation 11/02/2018 12  % Final    TIBC 11/02/2018 382  250 - 450 ug/dL Final    Iron 11/02/2018 47* 50 - 170 ug/dL Final    Ferritin 11/02/2018 16  8 - 388 ng/mL Final    WBC 11/03/2018 6 56  4 31 - 10 16 Thousand/uL Final    RBC 11/03/2018 3 46* 3 81 - 5 12 Million/uL Final    Hemoglobin 11/03/2018 9 3* 11 5 - 15 4 g/dL Final    Hematocrit 11/03/2018 30 4* 34 8 - 46 1 % Final    MCV 11/03/2018 88  82 - 98 fL Final    MCH 11/03/2018 26 9  26 8 - 34 3 pg Final    MCHC 11/03/2018 30 6* 31 4 - 37 4 g/dL Final    RDW 11/03/2018 16 7* 11 6 - 15 1 % Final    MPV 11/03/2018 10 6  8 9 - 12 7 fL Final    Platelets 60/65/6727 271  149 - 390 Thousands/uL Final    nRBC 11/03/2018 0  /100 WBCs Final    Neutrophils Relative 11/03/2018 51  43 - 75 % Final    Immat GRANS % 11/03/2018 0  0 - 2 % Final    Lymphocytes Relative 11/03/2018 31  14 - 44 % Final    Monocytes Relative 11/03/2018 8  4 - 12 % Final    Eosinophils Relative 11/03/2018 9* 0 - 6 % Final    Basophils Relative 11/03/2018 1  0 - 1 % Final    Neutrophils Absolute 11/03/2018 3 35  1 85 - 7 62 Thousands/µL Final    Immature Grans Absolute 11/03/2018 0 02  0 00 - 0 20 Thousand/uL Final    Lymphocytes Absolute 11/03/2018 2 01  0 60 - 4 47 Thousands/µL Final    Monocytes Absolute 11/03/2018 0 50  0 17 - 1 22 Thousand/µL Final    Eosinophils Absolute 11/03/2018 0 61  0 00 - 0 61 Thousand/µL Final    Basophils Absolute 11/03/2018 0 07  0 00 - 0 10 Thousands/µL Final    Sodium 11/03/2018 140  136 - 145 mmol/L Final    Potassium 11/03/2018 3 5  3 5 - 5 3 mmol/L Final    Chloride 11/03/2018 104  100 - 108 mmol/L Final    CO2 11/03/2018 24  21 - 32 mmol/L Final    ANION GAP 11/03/2018 12  4 - 13 mmol/L Final    BUN 11/03/2018 21  5 - 25 mg/dL Final    Creatinine 11/03/2018 1 33* 0 60 - 1 30 mg/dL Final    Glucose 11/03/2018 105  65 - 140 mg/dL Final    Calcium 11/03/2018 8 8  8 3 - 10 1 mg/dL Final    eGFR 11/03/2018 40  ml/min/1 73sq m Final    WBC 11/04/2018 6 64  4 31 - 10 16 Thousand/uL Final    RBC 11/04/2018 3 59* 3 81 - 5 12 Million/uL Final    Hemoglobin 11/04/2018 9 4* 11 5 - 15 4 g/dL Final    Hematocrit 11/04/2018 31 5* 34 8 - 46 1 % Final    MCV 11/04/2018 88  82 - 98 fL Final    MCH 11/04/2018 26 2* 26 8 - 34 3 pg Final    MCHC 11/04/2018 29 8* 31 4 - 37 4 g/dL Final    RDW 11/04/2018 16 4* 11 6 - 15 1 % Final    MPV 11/04/2018 10 7  8 9 - 12 7 fL Final    Platelets 97/50/5625 274  149 - 390 Thousands/uL Final    nRBC 11/04/2018 0  /100 WBCs Final    Neutrophils Relative 11/04/2018 51  43 - 75 % Final    Immat GRANS % 11/04/2018 0  0 - 2 % Final    Lymphocytes Relative 11/04/2018 31  14 - 44 % Final    Monocytes Relative 11/04/2018 8  4 - 12 % Final    Eosinophils Relative 11/04/2018 9* 0 - 6 % Final    Basophils Relative 11/04/2018 1  0 - 1 % Final    Neutrophils Absolute 11/04/2018 3 41  1 85 - 7 62 Thousands/µL Final    Immature Grans Absolute 11/04/2018 0 01  0 00 - 0 20 Thousand/uL Final    Lymphocytes Absolute 11/04/2018 2 06  0 60 - 4 47 Thousands/µL Final    Monocytes Absolute 11/04/2018 0 51  0 17 - 1 22 Thousand/µL Final    Eosinophils Absolute 11/04/2018 0 58  0 00 - 0 61 Thousand/µL Final    Basophils Absolute 11/04/2018 0 07  0 00 - 0 10 Thousands/µL Final    Sodium 11/04/2018 142  136 - 145 mmol/L Final    Potassium 11/04/2018 3 6  3 5 - 5 3 mmol/L Final    Chloride 11/04/2018 105  100 - 108 mmol/L Final    CO2 11/04/2018 25  21 - 32 mmol/L Final    ANION GAP 11/04/2018 12  4 - 13 mmol/L Final    BUN 11/04/2018 21  5 - 25 mg/dL Final    Creatinine 11/04/2018 1 44* 0 60 - 1 30 mg/dL Final    Glucose 11/04/2018 104  65 - 140 mg/dL Final    Calcium 11/04/2018 8 7  8 3 - 10 1 mg/dL Final    eGFR 11/04/2018 37  ml/min/1 73sq m Final    WBC 11/05/2018 7 40  4 31 - 10 16 Thousand/uL Final    RBC 11/05/2018 3 91  3 81 - 5 12 Million/uL Final    Hemoglobin 11/05/2018 10 5* 11 5 - 15 4 g/dL Final    Hematocrit 11/05/2018 34 8  34 8 - 46 1 % Final    MCV 11/05/2018 89  82 - 98 fL Final    MCH 11/05/2018 26 9  26 8 - 34 3 pg Final    MCHC 11/05/2018 30 2* 31 4 - 37 4 g/dL Final    RDW 11/05/2018 16 8* 11 6 - 15 1 % Final    MPV 11/05/2018 10 6  8 9 - 12 7 fL Final    Platelets 72/23/4427 334  149 - 390 Thousands/uL Final    nRBC 11/05/2018 0  /100 WBCs Final    Neutrophils Relative 11/05/2018 49  43 - 75 % Final    Immat GRANS % 11/05/2018 0  0 - 2 % Final    Lymphocytes Relative 11/05/2018 32  14 - 44 % Final    Monocytes Relative 11/05/2018 8  4 - 12 % Final    Eosinophils Relative 11/05/2018 10* 0 - 6 % Final    Basophils Relative 11/05/2018 1  0 - 1 % Final    Neutrophils Absolute 11/05/2018 3 66  1 85 - 7 62 Thousands/µL Final    Immature Grans Absolute 11/05/2018 0 02  0 00 - 0 20 Thousand/uL Final    Lymphocytes Absolute 11/05/2018 2 39  0 60 - 4 47 Thousands/µL Final    Monocytes Absolute 11/05/2018 0 56  0 17 - 1 22 Thousand/µL Final    Eosinophils Absolute 11/05/2018 0 70* 0 00 - 0 61 Thousand/µL Final    Basophils Absolute 11/05/2018 0 07  0 00 - 0 10 Thousands/µL Final    Sodium 11/05/2018 140  136 - 145 mmol/L Final    Potassium 11/05/2018 3 6  3 5 - 5 3 mmol/L Final    Chloride 11/05/2018 103  100 - 108 mmol/L Final    CO2 11/05/2018 25  21 - 32 mmol/L Final    ANION GAP 11/05/2018 12  4 - 13 mmol/L Final    BUN 11/05/2018 21  5 - 25 mg/dL Final    Creatinine 11/05/2018 1 46* 0 60 - 1 30 mg/dL Final    Glucose 11/05/2018 110  65 - 140 mg/dL Final    Calcium 11/05/2018 8 8  8 3 - 10 1 mg/dL Final    eGFR 11/05/2018 36  ml/min/1 73sq m Final   Admission on 10/05/2018, Discharged on 10/06/2018   Component Date Value Ref Range Status    Sodium 10/05/2018 141  136 - 145 mmol/L Final    Potassium 10/05/2018 4 3  3 5 - 5 3 mmol/L Final    Chloride 10/05/2018 105  100 - 108 mmol/L Final    CO2 10/05/2018 26  21 - 32 mmol/L Final    ANION GAP 10/05/2018 10  4 - 13 mmol/L Final    BUN 10/05/2018 12  5 - 25 mg/dL Final    Creatinine 10/05/2018 1 39* 0 60 - 1 30 mg/dL Final    Glucose 10/05/2018 120  65 - 140 mg/dL Final    Calcium 10/05/2018 8 7  8 3 - 10 1 mg/dL Final    AST 10/05/2018 45  5 - 45 U/L Final    ALT 10/05/2018 60  12 - 78 U/L Final    Alkaline Phosphatase 10/05/2018 131* 46 - 116 U/L Final    Total Protein 10/05/2018 7 3  6 4 - 8 2 g/dL Final    Albumin 10/05/2018 3 3* 3 5 - 5 0 g/dL Final    Total Bilirubin 10/05/2018 0 26  0 20 - 1 00 mg/dL Final    eGFR 10/05/2018 38  ml/min/1 73sq m Final    WBC 10/05/2018 7 86  4 31 - 10 16 Thousand/uL Final    RBC 10/05/2018 3 91  3 81 - 5 12 Million/uL Final    Hemoglobin 10/05/2018 10 5* 11 5 - 15 4 g/dL Final    Hematocrit 10/05/2018 34 3* 34 8 - 46 1 % Final    MCV 10/05/2018 88  82 - 98 fL Final    MCH 10/05/2018 26 9  26 8 - 34 3 pg Final    MCHC 10/05/2018 30 6* 31 4 - 37 4 g/dL Final    RDW 10/05/2018 16 1* 11 6 - 15 1 % Final    MPV 10/05/2018 9 6  8 9 - 12 7 fL Final    Platelets 37/68/3288 335  149 - 390 Thousands/uL Final    nRBC 10/05/2018 0  /100 WBCs Final    Neutrophils Relative 10/05/2018 48  43 - 75 % Final    Immat GRANS % 10/05/2018 0  0 - 2 % Final    Lymphocytes Relative 10/05/2018 32  14 - 44 % Final    Monocytes Relative 10/05/2018 9  4 - 12 % Final    Eosinophils Relative 10/05/2018 10* 0 - 6 % Final    Basophils Relative 10/05/2018 1  0 - 1 % Final    Neutrophils Absolute 10/05/2018 3 76  1 85 - 7 62 Thousands/µL Final    Immature Grans Absolute 10/05/2018 0 03  0 00 - 0 20 Thousand/uL Final    Lymphocytes Absolute 10/05/2018 2 54  0 60 - 4 47 Thousands/µL Final    Monocytes Absolute 10/05/2018 0 67  0 17 - 1 22 Thousand/µL Final    Eosinophils Absolute 10/05/2018 0 79* 0 00 - 0 61 Thousand/µL Final    Basophils Absolute 10/05/2018 0 07  0 00 - 0 10 Thousands/µL Final    Troponin I 10/05/2018 <0 02  <=0 04 ng/mL Final    NT-proBNP 10/05/2018 649* <125 pg/mL Final    Ventricular Rate 10/05/2018 79  BPM Final    Atrial Rate 10/05/2018 79  BPM Final    SC Interval 10/05/2018 280  ms Final    QRSD Interval 10/05/2018 92  ms Final    QT Interval 10/05/2018 432  ms Final    QTC Interval 10/05/2018 495  ms Final    P Axis 10/05/2018 81  degrees Final    QRS Axis 10/05/2018 35  degrees Final    T Wave Axis 10/05/2018 58  degrees Final    Sodium 10/06/2018 140  136 - 145 mmol/L Final    Potassium 10/06/2018 4 0  3 5 - 5 3 mmol/L Final    Chloride 10/06/2018 107  100 - 108 mmol/L Final    CO2 10/06/2018 25  21 - 32 mmol/L Final    ANION GAP 10/06/2018 8  4 - 13 mmol/L Final    BUN 10/06/2018 17  5 - 25 mg/dL Final    Creatinine 10/06/2018 1 49* 0 60 - 1 30 mg/dL Final    Glucose 10/06/2018 113  65 - 140 mg/dL Final    Calcium 10/06/2018 8 8  8 3 - 10 1 mg/dL Final    AST 10/06/2018 33  5 - 45 U/L Final    ALT 10/06/2018 47  12 - 78 U/L Final    Alkaline Phosphatase 10/06/2018 120* 46 - 116 U/L Final    Total Protein 10/06/2018 6 6  6 4 - 8 2 g/dL Final    Albumin 10/06/2018 3 0* 3 5 - 5 0 g/dL Final    Total Bilirubin 10/06/2018 0 24  0 20 - 1 00 mg/dL Final    eGFR 10/06/2018 35  ml/min/1 73sq m Final    Magnesium 10/06/2018 2 3  1 6 - 2 6 mg/dL Final    WBC 10/06/2018 5 59  4 31 - 10 16 Thousand/uL Final    RBC 10/06/2018 3 58* 3 81 - 5 12 Million/uL Final    Hemoglobin 10/06/2018 9 5* 11 5 - 15 4 g/dL Final    Hematocrit 10/06/2018 31 4* 34 8 - 46 1 % Final    MCV 10/06/2018 88  82 - 98 fL Final    MCH 10/06/2018 26 5* 26 8 - 34 3 pg Final    MCHC 10/06/2018 30 3* 31 4 - 37 4 g/dL Final    RDW 10/06/2018 16 1* 11 6 - 15 1 % Final    MPV 10/06/2018 9 9  8 9 - 12 7 fL Final    Platelets 55/47/9976 271  149 - 390 Thousands/uL Final    nRBC 10/06/2018 0  /100 WBCs Final    Neutrophils Relative 10/06/2018 44  43 - 75 % Final    Immat GRANS % 10/06/2018 0  0 - 2 % Final    Lymphocytes Relative 10/06/2018 37  14 - 44 % Final    Monocytes Relative 10/06/2018 8  4 - 12 % Final    Eosinophils Relative 10/06/2018 10* 0 - 6 % Final    Basophils Relative 10/06/2018 1  0 - 1 % Final    Neutrophils Absolute 10/06/2018 2 50  1 85 - 7 62 Thousands/µL Final    Immature Grans Absolute 10/06/2018 0 02  0 00 - 0 20 Thousand/uL Final    Lymphocytes Absolute 10/06/2018 2 04  0 60 - 4 47 Thousands/µL Final    Monocytes Absolute 10/06/2018 0 44  0 17 - 1 22 Thousand/µL Final    Eosinophils Absolute 10/06/2018 0 53  0 00 - 0 61 Thousand/µL Final    Basophils Absolute 10/06/2018 0 06  0 00 - 0 10 Thousands/µL Final    PTT 10/06/2018 30  24 - 36 seconds Final    Protime 10/06/2018 16 1* 11 8 - 14 2 seconds Final    INR 10/06/2018 1 28* 0 86 - 1 17 Final           Current Medications     Current Outpatient Prescriptions:     amiodarone 200 mg tablet, Take 1/2 tablet daily, Disp: 45 tablet, Rfl: 2    apixaban (ELIQUIS) 5 mg, Take 5 mg by mouth 2 (two) times a day, Disp: , Rfl:     clopidogrel (PLAVIX) 75 mg tablet, Take 1 tablet (75 mg total) by mouth daily, Disp: 90 tablet, Rfl: 1    escitalopram (LEXAPRO) 10 mg tablet, Take 1 tablet (10 mg total) by mouth daily, Disp: 90 tablet, Rfl: 1    gabapentin (NEURONTIN) 100 mg capsule, Take 1 capsule (100 mg total) by mouth 3 (three) times a day, Disp: 90 capsule, Rfl: 3    levothyroxine 50 mcg tablet, Take 1 5 tablets (75 mcg total) by mouth daily, Disp: 90 tablet, Rfl: 0    LORazepam (ATIVAN) 1 mg tablet, Take 1 tablet (1 mg total) by mouth 3 (three) times a day as needed for anxiety, Disp: 30 tablet, Rfl: 0    meclizine (ANTIVERT) 25 mg tablet, Take 25 mg by mouth 3 (three) times a day as needed for dizziness , Disp: , Rfl:     metoprolol tartrate (LOPRESSOR) 50 mg tablet, Take 1 tablet (50 mg total) by mouth every 12 (twelve) hours, Disp: 60 tablet, Rfl: 3    Multiple Vitamin (MULTIVITAMIN) tablet, Take 1 tablet by mouth daily  , Disp: , Rfl:     nortriptyline (PAMELOR) 50 mg capsule, Take 1 capsule (50 mg total) by mouth daily, Disp: 90 capsule, Rfl: 1    oxyCODONE-acetaminophen (PERCOCET) 5-325 mg per tablet, Take 1 tablet by mouth every 4 (four) hours as needed for moderate pain Max Daily Amount: 6 tablets, Disp: 50 tablet, Rfl: 0    potassium chloride (K-DUR,KLOR-CON) 20 mEq tablet, Take 1 tablet (20 mEq total) by mouth 2 (two) times a day, Disp: 180 tablet, Rfl: 3    simvastatin (ZOCOR) 10 mg tablet, Take 1 tablet (10 mg total) by mouth daily at bedtime, Disp: 90 tablet, Rfl: 3    torsemide (DEMADEX) 20 mg tablet, Take 1 tablet (20 mg total) by mouth 2 (two) times a day, Disp: 180 tablet, Rfl: 1      Diane Johns MD

## 2019-01-02 ENCOUNTER — OFFICE VISIT (OUTPATIENT)
Dept: CARDIOLOGY CLINIC | Facility: CLINIC | Age: 74
End: 2019-01-02
Payer: MEDICARE

## 2019-01-02 ENCOUNTER — PATIENT OUTREACH (OUTPATIENT)
Dept: INTERNAL MEDICINE CLINIC | Facility: CLINIC | Age: 74
End: 2019-01-02

## 2019-01-02 VITALS
SYSTOLIC BLOOD PRESSURE: 144 MMHG | WEIGHT: 292.6 LBS | DIASTOLIC BLOOD PRESSURE: 100 MMHG | BODY MASS INDEX: 45.92 KG/M2 | HEIGHT: 67 IN | HEART RATE: 89 BPM

## 2019-01-02 DIAGNOSIS — N18.30 CKD (CHRONIC KIDNEY DISEASE) STAGE 3, GFR 30-59 ML/MIN (HCC): ICD-10-CM

## 2019-01-02 DIAGNOSIS — E66.01 CLASS 3 SEVERE OBESITY DUE TO EXCESS CALORIES WITH SERIOUS COMORBIDITY AND BODY MASS INDEX (BMI) OF 40.0 TO 44.9 IN ADULT (HCC): ICD-10-CM

## 2019-01-02 DIAGNOSIS — G47.33 OBSTRUCTIVE SLEEP APNEA: ICD-10-CM

## 2019-01-02 DIAGNOSIS — I48.0 PAROXYSMAL ATRIAL FIBRILLATION (HCC): ICD-10-CM

## 2019-01-02 DIAGNOSIS — I50.32 CHRONIC DIASTOLIC HEART FAILURE (HCC): Primary | ICD-10-CM

## 2019-01-02 PROCEDURE — 99215 OFFICE O/P EST HI 40 MIN: CPT | Performed by: NURSE PRACTITIONER

## 2019-01-02 NOTE — PATIENT INSTRUCTIONS
Maintain a 2 gram daily sodium diet and 1500 ml daily fluid restriction  Check daily weights  If you gained 3 pounds in one day, 5 pounds in one week, or experience worsening shortness of breath or increasing lower leg swelling  Please call the heart failure office at [phone]    Please bring a  list of your current medications and daily weights to the office visit

## 2019-01-02 NOTE — PROGRESS NOTES
Heart Failure Office Visit   Kerin Shone 72 y o  female MRN: [id number]  [facility]    Ms Du Fink is a 72year old female with a known past medical history of   Atrial fibrillation ablation in 1/2018  DANTE compliant with CPAP she the  Patient Active Problem List   Diagnosis    Atrial fibrillation ([facility] )    Essential hypertension    Prediabetes    Depression    Hypercholesterolemia    Migraine    Mitral regurgitation    Class 3 severe obesity due to excess calories with serious comorbidity and body mass index (BMI) of 40 0 to 44 9 in adult ([facility] )    Obstructive sleep apnea    Primary localized osteoarthritis of right knee    Stroke ([facility] )    History of stroke    Chronic diastolic heart failure (HCC)    Postural dizziness with presyncope    Symptomatic bradycardia    Chronic bilateral low back pain without sciatica    Stage 3 chronic kidney disease (HCC)    Anemia    Acquired hypothyroidism       Ms Du Fink was hospitalized in [id number] with symptomatic bradycardia  The Toprol decreased from 100 mg b i d  To 50 mg b i d    Ms Darlene Eden was hospitalized at [facility] on 11/01-11/05/18 with acute on chronic diastolic heart failure  She presented with dyspnea  TTE showed left ventricular ejection fraction 60%, mild concentric hypertrophy, grade 1 diastolic dysfunction  Mild mitral stenosis, moderate aortic valve regurgitation  Manjit Da Silva was seen in our office on 11/26/18 Her weight was 276 pounds  Today Manjit Da Silva presents to our office for a follow up visit  She admits to eating more and is trying to abide by a 2mg sodium diet 1500 cc fluid restriction  She admits to ro LE edema and weight gain  Her weight in the office today is 292 pounds  12/18/18 sodium 141, potassium 3 4, BUN 17, creat 1 64, GFR 31  Review of Systems   Cardiovascular: Positive for dyspnea on exertion         Objective:   Vitals: RUE sitting 130/70  Vitals:    01/02/19 1318   BP: 144/100   BP Location: Right arm   Patient Position: Sitting   Cuff Size: Large   Pulse: 89   Weight: 133 kg (292 lb 9 6 oz)   Height: 5' 7" (1 702 m)     Body mass index is 45 83 kg/m²      Wt Readings from Last 3 Encounters:   01/02/19 133 kg (292 lb 9 6 oz)   12/26/18 132 kg (290 lb)   11/26/18 126 kg (276 lb 11 2 oz)         Physical Exam:  GEN: Chelle Ambrocio appears well, alert and oriented x 3, pleasant and cooperative   HEENT: pupils equal, round, and reactive to light; extraocular muscles intact  NECK: supple, no carotid bruits   HEART: regular rhythm, normal S1 and S2, no murmurs, clicks, gallops or rubs, JVP is flat   LUNGS: clear to auscultation bilaterally; no wheezes, rales, or rhonchi   ABDOMEN: normal bowel sounds, soft, no tenderness, no distention  EXTREMITIES: peripheral pulses normal; no clubbing, cyanosis, or 1+-2+ ro Le edema  NEURO: no focal findings   SKIN: normal without suspicious lesions on exposed skin      Current Outpatient Prescriptions:     amiodarone 200 mg tablet, Take 1/2 tablet daily, Disp: 45 tablet, Rfl: 2    apixaban (ELIQUIS) 5 mg, Take 5 mg by mouth 2 (two) times a day, Disp: , Rfl:     clopidogrel (PLAVIX) 75 mg tablet, Take 1 tablet (75 mg total) by mouth daily, Disp: 90 tablet, Rfl: 1    escitalopram (LEXAPRO) 10 mg tablet, Take 1 tablet (10 mg total) by mouth daily, Disp: 90 tablet, Rfl: 1    gabapentin (NEURONTIN) 100 mg capsule, Take 1 capsule (100 mg total) by mouth 3 (three) times a day, Disp: 90 capsule, Rfl: 3    levothyroxine 50 mcg tablet, Take 1 5 tablets (75 mcg total) by mouth daily, Disp: 90 tablet, Rfl: 0    LORazepam (ATIVAN) 1 mg tablet, Take 1 tablet (1 mg total) by mouth 3 (three) times a day as needed for anxiety, Disp: 30 tablet, Rfl: 0    meclizine (ANTIVERT) 25 mg tablet, Take 25 mg by mouth 3 (three) times a day as needed for dizziness , Disp: , Rfl:     metoprolol tartrate (LOPRESSOR) 50 mg tablet, Take 1 tablet (50 mg total) by mouth every 12 (twelve) hours, Disp: 60 tablet, Rfl: 3    Multiple Vitamin (MULTIVITAMIN) tablet, Take 1 tablet by mouth daily  , Disp: , Rfl:     nortriptyline (PAMELOR) 50 mg capsule, Take 1 capsule (50 mg total) by mouth daily, Disp: 90 capsule, Rfl: 1    oxyCODONE-acetaminophen (PERCOCET) 5-325 mg per tablet, Take 1 tablet by mouth every 4 (four) hours as needed for moderate pain Max Daily Amount: 6 tablets, Disp: 50 tablet, Rfl: 0    potassium chloride (K-DUR,KLOR-CON) 20 mEq tablet, Take 1 tablet (20 mEq total) by mouth 2 (two) times a day, Disp: 180 tablet, Rfl: 3    simvastatin (ZOCOR) 10 mg tablet, Take 1 tablet (10 mg total) by mouth daily at bedtime, Disp: 90 tablet, Rfl: 3    torsemide (DEMADEX) 20 mg tablet, Take 1 tablet (20 mg total) by mouth 2 (two) times a day, Disp: 180 tablet, Rfl: 1        Assessment/Plan:   1 Chronic diastolic heart failure LVEF 60%, NYHA class III stage C- Decompensated, On PE presents with [place] LE edema, BP and HR controlled, compliant with CPAP  Weight up from 276 pounds to 292 pounds with  Mix of calorie weight and fluid weight  Continue on   Metoprolol tartrate 50 mg q 12 hours,  [place] LE edema, Increase dose of Torsemide from  20 mg b i d  To 40mg BID for 3 days then return to 20mg BID, increase K fur from  20 mEq b i d  To 40 meq BID for 3 days then return to 20meq BID   Maintain a 2 gram daily sodium diet and 1500 ml daily fluid restriction  Check daily weights  If you gain 3 pounds in one day, 5 pounds in one week, or experience worsening shortness of breath or increasing lower leg swelling  Please call the heart failure office at [phone]  Please bring a  list of your current medications and daily weights to the office visit  2  DANTE compliant with CPAP at night  3  Paroxysmal Atrial fibrillation- sp typical flutter ablation 1/2018, continue on the Toprol tartrate 50 mg q 12 hours, amiodarone 100 mg daily, Eliquis 5 mg b i d  4  Prior CVA continue on Plavix 75 mg daily  5   CKD III baseline creat baseline creat 1 33-1 44- 12/18/18 BUN 14, creat 1 64, BMP in 2 weeks   6  Morbid obesity- Refusing referral to bariatric surgery  Referred to [facility] Weight loss managment  Follow up with Dr Jay Angel in 4- 6 weeks           ALEJANDRA Banerjee  1/2/2019  1:32 PM

## 2019-01-02 NOTE — PROGRESS NOTES
I spoke with London Perry and she told me that she's feeling 'ok'  As we continued our conversation, she told me that for about the last week, she's been short of breath with exertion, which is new for her  She told me that she does weigh herself daily and that her weight was 287 lbs yesterday & 288 lbs today  Upon further questioning, she noted that she has bilateral calf swelling that makes an indent when she presses on the skin  She will see Cardiology this afternoon and told me that she has all of these concerns written down on an index card, so she can discuss with the provider today  We reviewed nutrition & she told me that she is trying to follow a low sodium diet  The changes that she has made thus far are: decreasing the amount of Pepsi that she drinks & switching to unsalted pretzels  She's been eating chicken & fish and vegetables along with sparkling water without sugar  She has transportation to appointments & we reviewed part of her med list  She is taking the torsemide as prescribed and has made the change to the dose of levothyroxine

## 2019-01-08 ENCOUNTER — PATIENT OUTREACH (OUTPATIENT)
Dept: INTERNAL MEDICINE CLINIC | Facility: CLINIC | Age: 74
End: 2019-01-08

## 2019-01-22 DIAGNOSIS — I63.9 CEREBROVASCULAR ACCIDENT (CVA), UNSPECIFIED MECHANISM (HCC): ICD-10-CM

## 2019-01-23 RX ORDER — CLOPIDOGREL BISULFATE 75 MG/1
TABLET ORAL
Qty: 90 TABLET | Refills: 0 | Status: SHIPPED | OUTPATIENT
Start: 2019-01-23 | End: 2019-07-29 | Stop reason: SDUPTHER

## 2019-01-29 DIAGNOSIS — I48.0 PAROXYSMAL ATRIAL FIBRILLATION (HCC): Primary | ICD-10-CM

## 2019-01-29 RX ORDER — APIXABAN 5 MG/1
TABLET, FILM COATED ORAL
Qty: 60 TABLET | Refills: 10 | Status: SHIPPED | OUTPATIENT
Start: 2019-01-29 | End: 2020-04-07

## 2019-01-31 ENCOUNTER — APPOINTMENT (EMERGENCY)
Dept: RADIOLOGY | Facility: HOSPITAL | Age: 74
DRG: 291 | End: 2019-01-31
Payer: MEDICARE

## 2019-01-31 ENCOUNTER — HOSPITAL ENCOUNTER (INPATIENT)
Facility: HOSPITAL | Age: 74
LOS: 2 days | Discharge: HOME | DRG: 291 | End: 2019-02-03
Attending: EMERGENCY MEDICINE | Admitting: INTERNAL MEDICINE
Payer: MEDICARE

## 2019-01-31 DIAGNOSIS — I50.33 ACUTE ON CHRONIC DIASTOLIC CONGESTIVE HEART FAILURE (HCC): ICD-10-CM

## 2019-01-31 DIAGNOSIS — I50.9 CONGESTIVE HEART FAILURE (CHF) (HCC): Primary | ICD-10-CM

## 2019-01-31 DIAGNOSIS — I50.33 ACUTE ON CHRONIC DIASTOLIC HEART FAILURE (HCC): ICD-10-CM

## 2019-01-31 LAB
ALBUMIN SERPL BCP-MCNC: 3 G/DL (ref 3.5–5)
ALP SERPL-CCNC: 128 U/L (ref 46–116)
ALT SERPL W P-5'-P-CCNC: 56 U/L (ref 12–78)
ANION GAP SERPL CALCULATED.3IONS-SCNC: 8 MMOL/L (ref 4–13)
AST SERPL W P-5'-P-CCNC: 48 U/L (ref 5–45)
BASOPHILS # BLD AUTO: 0.05 THOUSANDS/ΜL (ref 0–0.1)
BASOPHILS NFR BLD AUTO: 1 % (ref 0–1)
BILIRUB DIRECT SERPL-MCNC: 0.13 MG/DL (ref 0–0.2)
BILIRUB SERPL-MCNC: 0.33 MG/DL (ref 0.2–1)
BUN SERPL-MCNC: 14 MG/DL (ref 5–25)
CALCIUM SERPL-MCNC: 8.8 MG/DL (ref 8.3–10.1)
CHLORIDE SERPL-SCNC: 104 MMOL/L (ref 100–108)
CO2 SERPL-SCNC: 28 MMOL/L (ref 21–32)
CREAT SERPL-MCNC: 1.39 MG/DL (ref 0.6–1.3)
EOSINOPHIL # BLD AUTO: 0.36 THOUSAND/ΜL (ref 0–0.61)
EOSINOPHIL NFR BLD AUTO: 5 % (ref 0–6)
ERYTHROCYTE [DISTWIDTH] IN BLOOD BY AUTOMATED COUNT: 15.8 % (ref 11.6–15.1)
GFR SERPL CREATININE-BSD FRML MDRD: 38 ML/MIN/1.73SQ M
GLUCOSE SERPL-MCNC: 130 MG/DL (ref 65–140)
HCT VFR BLD AUTO: 36.5 % (ref 34.8–46.1)
HGB BLD-MCNC: 10.8 G/DL (ref 11.5–15.4)
IMM GRANULOCYTES # BLD AUTO: 0.02 THOUSAND/UL (ref 0–0.2)
IMM GRANULOCYTES NFR BLD AUTO: 0 % (ref 0–2)
LYMPHOCYTES # BLD AUTO: 1.54 THOUSANDS/ΜL (ref 0.6–4.47)
LYMPHOCYTES NFR BLD AUTO: 21 % (ref 14–44)
MCH RBC QN AUTO: 26.9 PG (ref 26.8–34.3)
MCHC RBC AUTO-ENTMCNC: 29.6 G/DL (ref 31.4–37.4)
MCV RBC AUTO: 91 FL (ref 82–98)
MONOCYTES # BLD AUTO: 0.49 THOUSAND/ΜL (ref 0.17–1.22)
MONOCYTES NFR BLD AUTO: 7 % (ref 4–12)
NEUTROPHILS # BLD AUTO: 5.03 THOUSANDS/ΜL (ref 1.85–7.62)
NEUTS SEG NFR BLD AUTO: 66 % (ref 43–75)
NRBC BLD AUTO-RTO: 0 /100 WBCS
NT-PROBNP SERPL-MCNC: 397 PG/ML
PLATELET # BLD AUTO: 269 THOUSANDS/UL (ref 149–390)
PMV BLD AUTO: 10.4 FL (ref 8.9–12.7)
POTASSIUM SERPL-SCNC: 3.9 MMOL/L (ref 3.5–5.3)
PROT SERPL-MCNC: 6.8 G/DL (ref 6.4–8.2)
RBC # BLD AUTO: 4.01 MILLION/UL (ref 3.81–5.12)
SODIUM SERPL-SCNC: 140 MMOL/L (ref 136–145)
TROPONIN I SERPL-MCNC: 0.02 NG/ML
TROPONIN I SERPL-MCNC: 0.02 NG/ML
TROPONIN I SERPL-MCNC: <0.02 NG/ML
TSH SERPL DL<=0.05 MIU/L-ACNC: 6.96 UIU/ML (ref 0.36–3.74)
WBC # BLD AUTO: 7.49 THOUSAND/UL (ref 4.31–10.16)

## 2019-01-31 PROCEDURE — 71046 X-RAY EXAM CHEST 2 VIEWS: CPT

## 2019-01-31 PROCEDURE — 84484 ASSAY OF TROPONIN QUANT: CPT | Performed by: EMERGENCY MEDICINE

## 2019-01-31 PROCEDURE — 99285 EMERGENCY DEPT VISIT HI MDM: CPT

## 2019-01-31 PROCEDURE — 80076 HEPATIC FUNCTION PANEL: CPT | Performed by: EMERGENCY MEDICINE

## 2019-01-31 PROCEDURE — 84484 ASSAY OF TROPONIN QUANT: CPT | Performed by: INTERNAL MEDICINE

## 2019-01-31 PROCEDURE — 99219 PR INITIAL OBSERVATION CARE/DAY 50 MINUTES: CPT | Performed by: INTERNAL MEDICINE

## 2019-01-31 PROCEDURE — 85025 COMPLETE CBC W/AUTO DIFF WBC: CPT | Performed by: EMERGENCY MEDICINE

## 2019-01-31 PROCEDURE — 93005 ELECTROCARDIOGRAM TRACING: CPT

## 2019-01-31 PROCEDURE — 96374 THER/PROPH/DIAG INJ IV PUSH: CPT

## 2019-01-31 PROCEDURE — 83880 ASSAY OF NATRIURETIC PEPTIDE: CPT | Performed by: EMERGENCY MEDICINE

## 2019-01-31 PROCEDURE — 80048 BASIC METABOLIC PNL TOTAL CA: CPT | Performed by: EMERGENCY MEDICINE

## 2019-01-31 PROCEDURE — 84484 ASSAY OF TROPONIN QUANT: CPT | Performed by: NURSE PRACTITIONER

## 2019-01-31 PROCEDURE — 84443 ASSAY THYROID STIM HORMONE: CPT | Performed by: EMERGENCY MEDICINE

## 2019-01-31 PROCEDURE — 36415 COLL VENOUS BLD VENIPUNCTURE: CPT | Performed by: EMERGENCY MEDICINE

## 2019-01-31 RX ORDER — PRAVASTATIN SODIUM 10 MG
20 TABLET ORAL
Status: DISCONTINUED | OUTPATIENT
Start: 2019-01-31 | End: 2019-02-03 | Stop reason: HOSPADM

## 2019-01-31 RX ORDER — CLOPIDOGREL BISULFATE 75 MG/1
75 TABLET ORAL DAILY
Status: DISCONTINUED | OUTPATIENT
Start: 2019-02-01 | End: 2019-02-03 | Stop reason: HOSPADM

## 2019-01-31 RX ORDER — FUROSEMIDE 10 MG/ML
40 INJECTION INTRAMUSCULAR; INTRAVENOUS ONCE
Status: COMPLETED | OUTPATIENT
Start: 2019-01-31 | End: 2019-01-31

## 2019-01-31 RX ORDER — FUROSEMIDE 10 MG/ML
40 INJECTION INTRAMUSCULAR; INTRAVENOUS
Status: DISCONTINUED | OUTPATIENT
Start: 2019-02-01 | End: 2019-02-03 | Stop reason: HOSPADM

## 2019-01-31 RX ORDER — POTASSIUM CHLORIDE 20 MEQ/1
20 TABLET, EXTENDED RELEASE ORAL 2 TIMES DAILY
Status: DISCONTINUED | OUTPATIENT
Start: 2019-01-31 | End: 2019-02-03 | Stop reason: HOSPADM

## 2019-01-31 RX ORDER — GABAPENTIN 100 MG/1
100 CAPSULE ORAL
Status: DISCONTINUED | OUTPATIENT
Start: 2019-01-31 | End: 2019-02-03 | Stop reason: HOSPADM

## 2019-01-31 RX ORDER — ESCITALOPRAM OXALATE 10 MG/1
10 TABLET ORAL DAILY
Status: DISCONTINUED | OUTPATIENT
Start: 2019-02-01 | End: 2019-02-03 | Stop reason: HOSPADM

## 2019-01-31 RX ORDER — NORTRIPTYLINE HYDROCHLORIDE 50 MG/1
50 CAPSULE ORAL DAILY
Status: DISCONTINUED | OUTPATIENT
Start: 2019-02-01 | End: 2019-02-03 | Stop reason: HOSPADM

## 2019-01-31 RX ORDER — FUROSEMIDE 10 MG/ML
20 INJECTION INTRAMUSCULAR; INTRAVENOUS
Status: DISCONTINUED | OUTPATIENT
Start: 2019-01-31 | End: 2019-01-31

## 2019-01-31 RX ORDER — OXYCODONE HYDROCHLORIDE AND ACETAMINOPHEN 5; 325 MG/1; MG/1
1 TABLET ORAL EVERY 6 HOURS PRN
Status: DISCONTINUED | OUTPATIENT
Start: 2019-01-31 | End: 2019-02-03 | Stop reason: HOSPADM

## 2019-01-31 RX ORDER — LORAZEPAM 1 MG/1
1 TABLET ORAL 3 TIMES DAILY PRN
Status: DISCONTINUED | OUTPATIENT
Start: 2019-01-31 | End: 2019-02-03 | Stop reason: HOSPADM

## 2019-01-31 RX ORDER — AMIODARONE HYDROCHLORIDE 200 MG/1
200 TABLET ORAL
Status: DISCONTINUED | OUTPATIENT
Start: 2019-02-01 | End: 2019-02-02

## 2019-01-31 RX ORDER — LEVOTHYROXINE SODIUM 0.07 MG/1
75 TABLET ORAL
Status: DISCONTINUED | OUTPATIENT
Start: 2019-02-01 | End: 2019-02-03 | Stop reason: HOSPADM

## 2019-01-31 RX ORDER — ACETAMINOPHEN 325 MG/1
650 TABLET ORAL EVERY 4 HOURS PRN
Status: DISCONTINUED | OUTPATIENT
Start: 2019-01-31 | End: 2019-02-03 | Stop reason: HOSPADM

## 2019-01-31 RX ORDER — METOPROLOL TARTRATE 50 MG/1
50 TABLET, FILM COATED ORAL EVERY 12 HOURS SCHEDULED
Status: DISCONTINUED | OUTPATIENT
Start: 2019-01-31 | End: 2019-02-03 | Stop reason: HOSPADM

## 2019-01-31 RX ADMIN — FUROSEMIDE 40 MG: 10 INJECTION, SOLUTION INTRAMUSCULAR; INTRAVENOUS at 13:42

## 2019-01-31 RX ADMIN — POTASSIUM CHLORIDE 20 MEQ: 1500 TABLET, EXTENDED RELEASE ORAL at 18:07

## 2019-01-31 RX ADMIN — PRAVASTATIN SODIUM 20 MG: 10 TABLET ORAL at 18:07

## 2019-01-31 RX ADMIN — APIXABAN 5 MG: 5 TABLET, FILM COATED ORAL at 18:07

## 2019-01-31 RX ADMIN — GABAPENTIN 100 MG: 100 CAPSULE ORAL at 21:29

## 2019-01-31 RX ADMIN — FUROSEMIDE 20 MG: 10 INJECTION, SOLUTION INTRAMUSCULAR; INTRAVENOUS at 18:07

## 2019-01-31 RX ADMIN — METOPROLOL TARTRATE 50 MG: 50 TABLET, FILM COATED ORAL at 21:29

## 2019-01-31 NOTE — ASSESSMENT & PLAN NOTE
· Patient was unaware of CKD history  · Appears her creatinine is currently at baseline, 1 3  · Continue to monitor renal function with IV diuretic therapy  · Avoid nephrotoxins/hypotension

## 2019-01-31 NOTE — PLAN OF CARE
Problem: Potential for Falls  Goal: Patient will remain free of falls  INTERVENTIONS:  - Assess patient frequently for physical needs  -  Identify cognitive and physical deficits and behaviors that affect risk of falls    -  [place] fall precautions as indicated by assessment   - Educate patient/family on patient safety including physical limitations  - Instruct patient to call for assistance with activity based on assessment  - Modify environment to reduce risk of injury  - Consider OT/PT consult to assist with strengthening/mobility   Outcome: Progressing      Problem: PAIN - ADULT  Goal: Verbalizes/displays adequate comfort level or baseline comfort level  Interventions:  - Encourage patient to monitor pain and request assistance  - Assess pain using appropriate pain scale  - Administer analgesics based on type and severity of pain and evaluate response  - Implement non-pharmacological measures as appropriate and evaluate response  - Consider cultural and social influences on pain and pain management  - Notify physician/advanced practitioner if interventions unsuccessful or patient reports new pain  Outcome: Progressing      Problem: INFECTION - ADULT  Goal: Absence or prevention of progression during hospitalization  INTERVENTIONS:  - Assess and monitor for signs and symptoms of infection  - Monitor lab/diagnostic results  - Monitor all insertion sites, i e  indwelling lines, tubes, and drains  - Monitor endotracheal (as able) and nasal secretions for changes in amount and color  - [place] appropriate cooling/warming therapies per order  - Administer medications as ordered  - Instruct and encourage patient and family to use good hand hygiene technique  - Identify and instruct in appropriate isolation precautions for identified infection/condition  Outcome: Progressing    Goal: Absence of fever/infection during neutropenic period  INTERVENTIONS:  - Monitor WBC  - Implement neutropenic guidelines  Outcome: Progressing      Problem: SAFETY ADULT  Goal: Patient will remain free of falls  INTERVENTIONS:  - Assess patient frequently for physical needs  -  Identify cognitive and physical deficits and behaviors that affect risk of falls    -  [place] fall precautions as indicated by assessment   - Educate patient/family on patient safety including physical limitations  - Instruct patient to call for assistance with activity based on assessment  - Modify environment to reduce risk of injury  - Consider OT/PT consult to assist with strengthening/mobility   Outcome: Progressing    Goal: Maintain or return to baseline ADL function  INTERVENTIONS:  -  Assess patient's ability to carry out ADLs; assess patient's baseline for ADL function and identify physical deficits which impact ability to perform ADLs (bathing, care of mouth/teeth, toileting, grooming, dressing, etc )  - Assess/evaluate cause of self-care deficits   - Assess range of motion  - Assess patient's mobility; develop plan if impaired  - Assess patient's need for assistive devices and provide as appropriate  - Encourage maximum independence but intervene and supervise when necessary  ¯ Involve family in performance of ADLs  ¯ Assess for home care needs following discharge   ¯ Request OT consult to assist with ADL evaluation and planning for discharge  ¯ Provide patient education as appropriate  Outcome: Progressing    Goal: Maintain or return mobility status to optimal level  INTERVENTIONS:  - Assess patient's baseline mobility status (ambulation, transfers, stairs, etc )    - Identify cognitive and physical deficits and behaviors that affect mobility  - Identify mobility aids required to assist with transfers and/or ambulation (gait belt, sit-to-stand, lift, walker, cane, etc )  - [place] fall precautions as indicated by assessment  - Record patient progress and toleration of activity level on Mobility SBAR; progress patient to next Phase/Stage  - Instruct patient to call for assistance with activity based on assessment  - Request Rehabilitation consult to assist with strengthening/weightbearing, etc   Outcome: Progressing      Problem: DISCHARGE PLANNING  Goal: Discharge to home or other facility with appropriate resources  INTERVENTIONS:  - Identify barriers to discharge w/patient and caregiver  - Arrange for needed discharge resources and transportation as appropriate  - Identify discharge learning needs (meds, wound care, etc )  - Arrange for interpretive services to assist at discharge as needed  - Refer to Case Management Department for coordinating discharge planning if the patient needs post-hospital services based on physician/advanced practitioner order or complex needs related to functional status, cognitive ability, or social support system  Outcome: Progressing      Problem: Knowledge Deficit  Goal: Patient/family/caregiver demonstrates understanding of disease process, treatment plan, medications, and discharge instructions  Complete learning assessment and assess knowledge base  Interventions:  - Provide teaching at level of understanding  - Provide teaching via preferred learning methods  Outcome: Progressing      Problem: CARDIOVASCULAR - ADULT  Goal: Maintains optimal cardiac output and hemodynamic stability  INTERVENTIONS:  - Monitor I/O, vital signs and rhythm  - Monitor for S/S and trends of decreased cardiac output i e  bleeding, hypotension  - Administer and titrate ordered vasoactive medications to optimize hemodynamic stability  - Assess quality of pulses, skin color and temperature  - Assess for signs of decreased coronary artery perfusion - ex   Angina  - Instruct patient to report change in severity of symptoms  Outcome: Progressing    Goal: Absence of cardiac dysrhythmias or at baseline rhythm  INTERVENTIONS:  - Continuous cardiac monitoring, monitor vital signs, obtain 12 lead EKG if indicated  - Administer antiarrhythmic and heart rate control medications as ordered  - Monitor electrolytes and administer replacement therapy as ordered  Outcome: Progressing

## 2019-01-31 NOTE — ASSESSMENT & PLAN NOTE
· Patient with 3 day history of shortness of breath  · Denies significant weight gain or swelling  · Last hospitalization for acute diastolic heart failure was in November 2018  · Maintained on torsemide  · BNP mildly elevated at 397, CXR clear  · Patient was given IV Lasix 40 mg in the ER  She was ambulated to assess for possible discharge and felt continued shortness of breath  · Will continue IV Lasix at 20 mg b i d   Due to underlying CKD  · I&O /daily weights/ monitor BMP

## 2019-01-31 NOTE — ED PROVIDER NOTES
History  Chief Complaint   Patient presents with    Shortness of Breath     Dyspnea with exertion, BLE edema, denies chest pain  Hx afib, CHF     71 YO female presents with increasing TRAMMELL for the last 2 days  States this was gradual in onset, constant  Pt notes increased swelling in the B/L lower extremities which has been constant and worsening  States wasn't feeling too bad yesterday but noticed shortness of breath while leaving from a restaurant; she had to sit down to catch her breath  Pt states symptoms are similar to previous CHF exacerbations  Notes she takes torsemide twice daily for this, has not had a change in there dosage, admits to occasional dietary indiscretions but states she tries to stay generally compliant  Pt denies associated chest pain  Sitting in stretcher she states she feels ok  Pt denies CP/F/C/N/V/D/C, no dysuria, burning on urination or blood in urine  History provided by:  Patient   used: No    Shortness of Breath   Severity:  Moderate  Onset quality:  Gradual  Duration:  2 days  Timing:  Constant  Progression:  Waxing and waning  Chronicity:  New  Context: activity    Relieved by:  Rest  Worsened by:  Exertion  Ineffective treatments:  None tried  Associated symptoms: no abdominal pain, no chest pain, no diaphoresis, no fever, no neck pain, no rash, no sputum production, no vomiting and no wheezing        Prior to Admission Medications   Prescriptions Last Dose Informant Patient Reported? Taking?    ELIQUIS 5 MG   No No   Sig: TAKE ONE TABLET BY MOUTH TWO TIMES A DAY   LORazepam (ATIVAN) 1 mg tablet  Self No No   Sig: Take 1 tablet (1 mg total) by mouth 3 (three) times a day as needed for anxiety   Multiple Vitamin (MULTIVITAMIN) tablet  Self Yes No   Sig: Take 1 tablet by mouth daily     amiodarone 200 mg tablet   No No   Sig: Take 1/2 tablet daily   clopidogrel (PLAVIX) 75 mg tablet   No No   Sig: TAKE ONE TABLET BY MOUTH ONCE DAILY   escitalopram (LEXAPRO) 10 mg tablet  Self No No   Sig: Take 1 tablet (10 mg total) by mouth daily   gabapentin (NEURONTIN) 100 mg capsule   No No   Sig: Take 1 capsule (100 mg total) by mouth 3 (three) times a day   levothyroxine 50 mcg tablet   No No   Sig: Take 1 5 tablets (75 mcg total) by mouth daily   meclizine (ANTIVERT) 25 mg tablet  Self Yes No   Sig: Take 25 mg by mouth 3 (three) times a day as needed for dizziness    metoprolol tartrate (LOPRESSOR) 50 mg tablet  Self No No   Sig: Take 1 tablet (50 mg total) by mouth every 12 (twelve) hours   nortriptyline (PAMELOR) 50 mg capsule   No No   Sig: Take 1 capsule (50 mg total) by mouth daily   oxyCODONE-acetaminophen (PERCOCET) 5-325 mg per tablet   No No   Sig: Take 1 tablet by mouth every 4 (four) hours as needed for moderate pain Max Daily Amount: 6 tablets   potassium chloride (K-DUR,KLOR-CON) 20 mEq tablet  Self No No   Sig: Take 1 tablet (20 mEq total) by mouth 2 (two) times a day   simvastatin (ZOCOR) 10 mg tablet  Self No No   Sig: Take 1 tablet (10 mg total) by mouth daily at bedtime   torsemide (DEMADEX) 20 mg tablet   No No   Sig: Take 1 tablet (20 mg total) by mouth 2 (two) times a day      Facility-Administered Medications: None       Past Medical History:   Diagnosis Date    Acquired hypothyroidism 12/26/2018    Atrial fibrillation (HCC)     CHF (congestive heart failure) (HCC)     Hyperlipidemia     Hypertension     Migraine     Polyp of sigmoid colon     Prediabetes     Stroke [facility])        Past Surgical History:   Procedure Laterality Date    APPENDECTOMY      CARDIAC ELECTROPHYSIOLOGY STUDY AND ABLATION      CHOLECYSTECTOMY         Family History   Problem Relation Age of Onset    Diabetes Mother     Heart disease Mother     Hypertension Mother     Arthritis Mother     Coronary artery disease Mother      I have reviewed and agree with the history as documented      Social History   Substance Use Topics    Smoking status: Former Smoker Quit date: 2/11/2011    Smokeless tobacco: Never Used    Alcohol use No        Review of Systems   Constitutional: Negative for chills, diaphoresis, fatigue and fever  HENT: Negative for dental problem  Eyes: Negative for visual disturbance  Respiratory: Positive for shortness of breath  Negative for sputum production and wheezing  Cardiovascular: Negative for chest pain  Gastrointestinal: Negative for abdominal pain, diarrhea and vomiting  Genitourinary: Negative for dysuria and frequency  Musculoskeletal: Negative for arthralgias and neck pain  Skin: Negative for rash  Neurological: Negative for dizziness, weakness and light-headedness  Psychiatric/Behavioral: Negative for agitation, behavioral problems and confusion  All other systems reviewed and are negative  Physical Exam  Physical Exam   Constitutional: She is oriented to person, place, and time  She appears well-developed and well-nourished  HENT:   Head: Normocephalic and atraumatic  Eyes: EOM are normal    Neck: Normal range of motion  Cardiovascular: Normal rate, regular rhythm and normal heart sounds  Pulmonary/Chest: Effort normal and breath sounds normal    Decreased breath sounds B/L  Abdominal: Soft  Musculoskeletal: Normal range of motion  Neurological: She is alert and oriented to person, place, and time  Skin: Skin is warm and dry  Psychiatric: She has a normal mood and affect  Her behavior is normal  Thought content normal    Nursing note and vitals reviewed        Vital Signs  ED Triage Vitals [01/31/19 1309]   Temperature Pulse Respirations Blood Pressure SpO2   97 7 °F (36 5 °C) 95 (!) 26 153/70 98 %      Temp Source Heart Rate Source Patient Position - Orthostatic VS BP Location FiO2 (%)   Oral Monitor -- Right arm --      Pain Score       No Pain           Vitals:    01/31/19 1309   BP: 153/70   Pulse: 95       Visual Acuity      ED Medications  Medications   furosemide (LASIX) injection 40 mg (not administered)       Diagnostic Studies  Results Reviewed     Procedure Component Value Units Date/Time    CBC and differential [[id number]]     Lab Status:  No result Specimen:  Blood     Basic metabolic panel [[id number]]     Lab Status:  No result Specimen:  Blood     Hepatic function panel [[id number]]     Lab Status:  No result Specimen:  Blood     TSH [[id number]]     Lab Status:  No result Specimen:  Blood     Troponin I [[id number]]     Lab Status:  No result Specimen:  Blood     B-type natriuretic peptide [[id number]]     Lab Status:  No result Specimen:  Blood                  XR chest 2 views    (Results Pending)              Procedures  ECG 12 Lead Documentation  Date/Time: 1/31/2019 1:33 PM  Performed by: Estee Barrett, [address] [place] [address] by: Corina Carlson     ECG reviewed by me, the ED Provider: yes    Patient location:  ED  Interpretation:     Interpretation: normal    Rate:     ECG rate:  86    ECG rate assessment: normal    Rhythm:     Rhythm: sinus rhythm    QRS:     QRS axis:  Normal    QRS intervals:  Normal  Conduction:     Conduction: abnormal      Abnormal conduction: 1st degree    ST segments:     ST segments:  Normal  T waves:     T waves: normal             Phone Contacts  ED Phone Contact    ED Course  ED Course as of Feb 01 0749   Thu Jan 31, 2019   1537 Spoke with nurse, pt ambulated in department  Oxygen saturation maintains at ~93% but pt obviously dyspneic  Will admit for CHF exacerbation  MDM  Number of Diagnoses or Management Options  Congestive heart failure (CHF) [facility]): new and requires workup  Diagnosis management comments: 1  Shortness of breath - Pt states similar to CHF exacerbation, worsening TRAMMELL with increased LE edema  Will check ECG and troponin to rule rule out MI, BNP and CXR for CHF exacerbation, electrolytes for dehydration, CBC  Will give furosemide  Did review pt's previous admission   At that time her BNP was ~600, she had a normal chest x-ray  Pt had a CT at that time which did find vascular congestion consistent with CHF  Feel likely pt is currently having a CHF exacerbation, clinically she has significant dyspnea on exertion  Her kidney function is borderline, will not CT her chest as this may be detrimental to her kidney function  Amount and/or Complexity of Data Reviewed  Clinical lab tests: ordered and reviewed  Tests in the radiology section of CPT®: ordered and reviewed  Review and summarize past medical records: yes  Discuss the patient with other providers: yes  Independent visualization of images, tracings, or specimens: yes        Disposition  Final diagnoses:   None     ED Disposition     None      Follow-up Information    None         Patient's Medications   Discharge Prescriptions    No medications on file     No discharge procedures on file      ED Provider  Electronically Signed by           Elena Casas MD  02/01/19 6357

## 2019-01-31 NOTE — H&P
[address] [facility]  H&P- Dory Mcghee 1945, 72 y o  female MRN: [id number]    Unit/Bed#: ED 28 Encounter: [id number]    Primary Care Provider: Brionna Moreno MD   Date and time admitted to hospital: 1/31/2019  1:05 PM        * Acute on chronic diastolic heart failure ([facility], [place] [address] )   Assessment & Plan    · Patient with 3 day history of shortness of breath  · Denies significant weight gain or swelling  · Last hospitalization for acute diastolic heart failure was in November 2018  · Maintained on torsemide  · BNP mildly elevated at 397, CXR clear  · Patient was given IV Lasix 40 mg in the ER  She was ambulated to assess for possible discharge and felt continued shortness of breath  · Will continue IV Lasix at 20 mg b i d   Due to underlying CKD  · I&O /daily weights/ monitor BMP     Stage 3 chronic kidney disease ([facility] [place] [address] )   Assessment & Plan    · Patient was unaware of CKD history  · Appears her creatinine is currently at baseline, 1 3  · Continue to monitor renal function with IV diuretic therapy  · Avoid nephrotoxins/hypotension     Chronic bilateral low back pain without sciatica   Assessment & Plan    · Maintained on Neurontin and utilizes Percocet as needed     Acquired hypothyroidism   Assessment & Plan    · Iatrogenic hypothyroidism secondary to amiodarone use  · Maintained on thyroid replacement hormone therapy  · TSH elevated at 6 9 today     History of stroke   Assessment & Plan    · Previous CVA in 2011  · Maintained on Plavix and statin therapy  · No deficits     Depression   Assessment & Plan    · Maintained on Lexapro  · Continue while here     Essential hypertension   Assessment & Plan    · Maintained on metoprolol  · Continue while here     Atrial fibrillation (HCC)   Assessment & Plan    · Status post ablation  · Maintained on amiodarone and Eliquis  · Continue while here       VTE Prophylaxis: Apixaban (Eliquis)  / sequential compression device   Code Status: Full code  POLST: POLST is not applicable to this patient  Discussion with family: Plan of care with patient and daughter    Anticipated Length of Stay:  Patient will be admitted on an Observation basis with an anticipated length of stay of less than 2 midnights  Justification for Hospital Stay: IV diuretic therapy    Total Time for Visit, including Counseling / Coordination of Care: 45 minutes  Greater than 50% of this total time spent on direct patient counseling and coordination of care  Chief Complaint:   Shortness of breath    History of Present Illness:    Twin Hernandez is a 72 y o  female who presents with shortness of breath  Patient states that for the last 3 days, she has had shortness of breath  She was able to rest and be sedentary at home but when she attempted to go shopping today, she felt that she was having to much difficulty with her breathing  This is how she felt with her last episode of heart failure which required hospitalization  She denied recent weight gain, increased swelling, chest pain, or palpitations  She is maintained on torsemide  The patient's history includes diastolic heart failure, atrial fibrillation s/p ablation, depression, hypertension, CVA, and iatrogenic hypothyroidism  She also has CKD which was noted in her EMR  Her workup in the ER revealed a mildly elevated BNP and clear CXR  Her creatinine is mildly elevated and at baseline  She reports her baseline weight is 285-290#  She was given IV lasix x 1 and had some diuresis  She was ambulated to assess if she could be discharged but apparently was short of breath and tachypneic  She is admitted and will monitor her on telemetry, with daily weights, I&O, and IV lasix BID with close monitoring of renal function  Review of Systems:    Review of Systems   Constitutional: Negative for chills, fever and unexpected weight change  HENT: Negative for congestion, sore throat and trouble swallowing      Respiratory: Positive for shortness of breath  Negative for cough  Cardiovascular: Positive for leg swelling  Negative for chest pain and palpitations  Gastrointestinal: Negative for abdominal distention, abdominal pain, constipation, diarrhea, nausea and vomiting  Endocrine: Negative  Genitourinary: Negative for difficulty urinating  Musculoskeletal: Positive for back pain  Skin: Negative  Neurological: Positive for headaches  Negative for dizziness, seizures and syncope  Psychiatric/Behavioral: Positive for dysphoric mood  Past Medical and Surgical History:     Past Medical History:   Diagnosis Date    Acquired hypothyroidism 12/26/2018    Atrial fibrillation (HCC)     CHF (congestive heart failure) (HCC)     Hyperlipidemia     Hypertension     Migraine     Polyp of sigmoid colon     Prediabetes     Stroke [facility])        Past Surgical History:   Procedure Laterality Date    APPENDECTOMY      CARDIAC ELECTROPHYSIOLOGY STUDY AND ABLATION      CHOLECYSTECTOMY         Meds/Allergies:    Prior to Admission medications    Medication Sig Start Date End Date Taking?  Authorizing Provider   amiodarone 200 mg tablet Take 1/2 tablet daily 12/18/18  Yes Oscar Issa DO   clopidogrel (PLAVIX) 75 mg tablet TAKE ONE TABLET BY MOUTH ONCE DAILY 1/23/19  Yes Moses Armijo MD   ELIQUIS 5 MG TAKE ONE TABLET BY MOUTH TWO TIMES A DAY 1/29/19  Yes Moses Armijo MD   escitalopram (LEXAPRO) 10 mg tablet Take 1 tablet (10 mg total) by mouth daily 11/16/18  Yes Moses Armijo MD   gabapentin (NEURONTIN) 100 mg capsule Take 1 capsule (100 mg total) by mouth 3 (three) times a day 12/17/18  Yes Moses Armijo MD   levothyroxine 50 mcg tablet Take 1 5 tablets (75 mcg total) by mouth daily 12/27/18  Yes Moses Armijo MD   LORazepam (ATIVAN) 1 mg tablet Take 1 tablet (1 mg total) by mouth 3 (three) times a day as needed for anxiety 8/29/18  Yes Moses Armijo MD   meclizine (ANTIVERT) 25 mg tablet Take 25 mg by mouth 3 (three) times a day as needed for dizziness    Yes Historical Provider, MD   metoprolol tartrate (LOPRESSOR) 50 mg tablet Take 1 tablet (50 mg total) by mouth every 12 (twelve) hours 10/6/18  Yes Krysten Esposito MD   Multiple Vitamin (MULTIVITAMIN) tablet Take 1 tablet by mouth daily     Yes Historical Provider, MD   nortriptyline (PAMELOR) 50 mg capsule Take 1 capsule (50 mg total) by mouth daily 11/29/18  Yes Semaj Isbell MD   oxyCODONE-acetaminophen (PERCOCET) 5-325 mg per tablet Take 1 tablet by mouth every 4 (four) hours as needed for moderate pain Max Daily Amount: 6 tablets 12/27/18  Yes Semaj Isbell MD   potassium chloride (K-DUR,KLOR-CON) 20 mEq tablet Take 1 tablet (20 mEq total) by mouth 2 (two) times a day 2/13/18  Yes Semaj Isbell MD   simvastatin (ZOCOR) 10 mg tablet Take 1 tablet (10 mg total) by mouth daily at bedtime 1/31/18  Yes Semaj Isbell MD   torsemide [facility]) 20 mg tablet Take 1 tablet (20 mg total) by mouth 2 (two) times a day 11/29/18  Yes Semaj Isbell MD     I have reviewed home medications with patient personally      Allergies: No Known Allergies    Social History:     Marital Status:    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  Patient Pre-hospital Living Situation: lives by herself  Patient Pre-hospital Level of Mobility: no limitation  Patient Pre-hospital Diet Restrictions: no limitation (reports watching sodium intake)  Substance Use History:   History   Alcohol Use No     History   Smoking Status    Former Smoker    Quit date: 2/11/2011   Smokeless Tobacco    Never Used     History   Drug Use No       Family History:    Family History   Problem Relation Age of Onset    Diabetes Mother     Heart disease Mother     Hypertension Mother     Arthritis Mother     Coronary artery disease Mother        Physical Exam:     Vitals:   Blood Pressure: 134/62 (01/31/19 1537)  Pulse: 88 (01/31/19 1537)  Temperature: 97 7 °F (36 5 °C) (01/31/19 1309)  Temp Source: Oral (01/31/19 1309)  Respirations: 18 (01/31/19 1537)  Height: 5' 7" (170 2 cm) (01/31/19 1309)  Weight - Scale: 134 kg (295 lb 6 7 oz) (01/31/19 1309)  SpO2: 96 % (01/31/19 1537)    Physical Exam   Constitutional: She is oriented to person, place, and time  She appears well-developed and well-nourished  No distress  HENT:   Head: Normocephalic and atraumatic  Eyes: Conjunctivae are normal  No scleral icterus  Cardiovascular: Normal rate, regular rhythm and normal heart sounds  No murmur heard  Pulmonary/Chest: Effort normal and breath sounds normal  No respiratory distress  She has no wheezes  She has no rales  Abdominal: Soft  Bowel sounds are normal  She exhibits no distension  There is no tenderness  Musculoskeletal: Normal range of motion  She exhibits edema (Mild non-pitting edema in bilateral lower extremities  )  She exhibits no tenderness  Neurological: She is alert and oriented to person, place, and time  Skin: Skin is warm and dry  She is not diaphoretic  Psychiatric: She has a normal mood and affect  Her behavior is normal    Nursing note and vitals reviewed  Additional Data:     Lab Results: I have personally reviewed pertinent reports  Results from last 7 days  Lab Units 01/31/19  1337   WBC Thousand/uL 7 49   HEMOGLOBIN g/dL 10 8*   HEMATOCRIT % 36 5   PLATELETS Thousands/uL 269   NEUTROS PCT % 66   LYMPHS PCT % 21   MONOS PCT % 7   EOS PCT % 5       Results from last 7 days  Lab Units 01/31/19  1337   SODIUM mmol/L 140   POTASSIUM mmol/L 3 9   CHLORIDE mmol/L 104   CO2 mmol/L 28   BUN mg/dL 14   CREATININE mg/dL 1 39*   ANION GAP mmol/L 8   CALCIUM mg/dL 8 8   ALBUMIN g/dL 3 0*   TOTAL BILIRUBIN mg/dL 0 33   ALK PHOS U/L 128*   ALT U/L 56   AST U/L 48*   GLUCOSE RANDOM mg/dL 130                       Imaging: I have personally reviewed pertinent reports  XR chest 2 views   Final Result by Ghazal Man DO (01/31 9959)      No acute cardiopulmonary disease  Workstation performed: [id number]             EKG, Pathology, and Other Studies Reviewed on Admission:   · EKG: ordered    Allscripts / Epic Records Reviewed: Yes     ** Please Note: This note has been constructed using a voice recognition system   **

## 2019-01-31 NOTE — ED NOTES
During ambulation  Patient walked down [address] of [address] maintaining sat of 96% on room air, denying SOB  On the way back to room patients sat dropped to 93 %  Patient visibly dyspneic and stating SOB   Dr Smooth Conde aware       Teodoro Broussard RN  01/31/19 8135

## 2019-01-31 NOTE — ASSESSMENT & PLAN NOTE
· Iatrogenic hypothyroidism secondary to amiodarone use  · Maintained on thyroid replacement hormone therapy  · TSH elevated at 6 9 today

## 2019-02-01 ENCOUNTER — PATIENT OUTREACH (OUTPATIENT)
Dept: INTERNAL MEDICINE CLINIC | Facility: CLINIC | Age: 74
End: 2019-02-01

## 2019-02-01 LAB
ANION GAP SERPL CALCULATED.3IONS-SCNC: 9 MMOL/L (ref 4–13)
BUN SERPL-MCNC: 20 MG/DL (ref 5–25)
CALCIUM SERPL-MCNC: 9 MG/DL (ref 8.3–10.1)
CHLORIDE SERPL-SCNC: 103 MMOL/L (ref 100–108)
CO2 SERPL-SCNC: 26 MMOL/L (ref 21–32)
CREAT SERPL-MCNC: 1.37 MG/DL (ref 0.6–1.3)
ERYTHROCYTE [DISTWIDTH] IN BLOOD BY AUTOMATED COUNT: 16.1 % (ref 11.6–15.1)
GFR SERPL CREATININE-BSD FRML MDRD: 39 ML/MIN/1.73SQ M
GLUCOSE SERPL-MCNC: 110 MG/DL (ref 65–140)
HCT VFR BLD AUTO: 35.8 % (ref 34.8–46.1)
HGB BLD-MCNC: 10.7 G/DL (ref 11.5–15.4)
MAGNESIUM SERPL-MCNC: 2.6 MG/DL (ref 1.6–2.6)
MCH RBC QN AUTO: 27.3 PG (ref 26.8–34.3)
MCHC RBC AUTO-ENTMCNC: 29.9 G/DL (ref 31.4–37.4)
MCV RBC AUTO: 91 FL (ref 82–98)
PLATELET # BLD AUTO: 265 THOUSANDS/UL (ref 149–390)
PMV BLD AUTO: 10.8 FL (ref 8.9–12.7)
POTASSIUM SERPL-SCNC: 4 MMOL/L (ref 3.5–5.3)
RBC # BLD AUTO: 3.92 MILLION/UL (ref 3.81–5.12)
SODIUM SERPL-SCNC: 138 MMOL/L (ref 136–145)
WBC # BLD AUTO: 6.2 THOUSAND/UL (ref 4.31–10.16)

## 2019-02-01 PROCEDURE — 85027 COMPLETE CBC AUTOMATED: CPT | Performed by: NURSE PRACTITIONER

## 2019-02-01 PROCEDURE — 83735 ASSAY OF MAGNESIUM: CPT | Performed by: NURSE PRACTITIONER

## 2019-02-01 PROCEDURE — 99222 1ST HOSP IP/OBS MODERATE 55: CPT | Performed by: INTERNAL MEDICINE

## 2019-02-01 PROCEDURE — 80048 BASIC METABOLIC PNL TOTAL CA: CPT | Performed by: NURSE PRACTITIONER

## 2019-02-01 PROCEDURE — 99232 SBSQ HOSP IP/OBS MODERATE 35: CPT | Performed by: INTERNAL MEDICINE

## 2019-02-01 RX ADMIN — NORTRIPTYLINE HYDROCHLORIDE 50 MG: 50 CAPSULE ORAL at 08:25

## 2019-02-01 RX ADMIN — POTASSIUM CHLORIDE 20 MEQ: 1500 TABLET, EXTENDED RELEASE ORAL at 08:25

## 2019-02-01 RX ADMIN — APIXABAN 5 MG: 5 TABLET, FILM COATED ORAL at 08:26

## 2019-02-01 RX ADMIN — METOPROLOL TARTRATE 50 MG: 50 TABLET, FILM COATED ORAL at 08:26

## 2019-02-01 RX ADMIN — POTASSIUM CHLORIDE 20 MEQ: 1500 TABLET, EXTENDED RELEASE ORAL at 18:05

## 2019-02-01 RX ADMIN — PRAVASTATIN SODIUM 20 MG: 10 TABLET ORAL at 16:47

## 2019-02-01 RX ADMIN — FUROSEMIDE 40 MG: 10 INJECTION, SOLUTION INTRAMUSCULAR; INTRAVENOUS at 08:26

## 2019-02-01 RX ADMIN — LEVOTHYROXINE SODIUM 75 MCG: 75 TABLET ORAL at 05:17

## 2019-02-01 RX ADMIN — ESCITSLOPRAM 10 MG: 10 TABLET ORAL at 08:26

## 2019-02-01 RX ADMIN — GABAPENTIN 100 MG: 100 CAPSULE ORAL at 21:03

## 2019-02-01 RX ADMIN — FUROSEMIDE 40 MG: 10 INJECTION, SOLUTION INTRAMUSCULAR; INTRAVENOUS at 16:47

## 2019-02-01 RX ADMIN — METOPROLOL TARTRATE 50 MG: 50 TABLET, FILM COATED ORAL at 21:03

## 2019-02-01 RX ADMIN — AMIODARONE HYDROCHLORIDE 200 MG: 200 TABLET ORAL at 08:26

## 2019-02-01 RX ADMIN — APIXABAN 5 MG: 5 TABLET, FILM COATED ORAL at 18:05

## 2019-02-01 RX ADMIN — CLOPIDOGREL BISULFATE 75 MG: 75 TABLET ORAL at 08:26

## 2019-02-01 NOTE — SOCIAL WORK
Heart Failure Care Coordination Note  Met with patient to reestablish a relationship and explain role  Heart Talk: Living with Heart Failure booklet had been given to the patient on previous admissions Patient stated she was weighing herself and following a low sodium diet but the shortness of breath came on suddenly  I will continue to follow as an inpatient and telephonically on discharge as needed

## 2019-02-01 NOTE — ASSESSMENT & PLAN NOTE
Acute on chronic diastolic CHF good response with IV furosemide  Continue for now to cardiology evaluation  Can consider metolazone as needed for weight gain    Weight (last 2 days)     Date/Time   Weight    02/01/19 0544  131 (288 8)    01/31/19 1700  134 (295 42)    01/31/19 1309  134 (295 42)

## 2019-02-01 NOTE — PROGRESS NOTES
Progress Note - Chelle Ambrocio 1945, 72 y o  female MRN: [id number]  Unit/Bed#: E4 -01 Encounter: [id number]  Primary Care Provider: Moses Armijo MD   Date and time admitted to hospital: 1/31/2019  1:05 PM        Assessment and Plan  * Acute on chronic diastolic heart failure ([facility] )   Assessment & Plan    Acute on chronic diastolic CHF good response with IV furosemide  Continue for now to cardiology evaluation  Can consider metolazone as needed for weight gain  Weight (last 2 days)     Date/Time   Weight    02/01/19 0544  131 (288 8)    01/31/19 1700  134 (295 42)    01/31/19 1309  134 (295 42)               Acquired hypothyroidism   Assessment & Plan    Continue levothyroxine     Stage 3 chronic kidney disease ([facility] )   Assessment & Plan    CKD 3 at baseline    Results from last 7 days  Lab Units 02/01/19  0508 01/31/19  1337   BUN mg/dL 20 14   CREATININE mg/dL 1 37* 1 39*        Chronic bilateral low back pain without sciatica   Assessment & Plan    Continue gabapentin t i d  History of stroke   Assessment & Plan    History of stroke without residual deficit  Continue DA PT     Depression   Assessment & Plan    Depression stable on escitalopram     Essential hypertension   Assessment & Plan    Blood pressure stable metoprolol     VTE Pharmacologic Prophylaxis: Apixaban (Eliquis)    Patient Centered Rounds: I have performed bedside rounds with nursing staff today  Discussions with Specialists or Other Care Team Provider:   Education and Discussions with Family / Patient:     Time Spent for Care: 25 mins  More than 50% of total time spent on counseling and coordination of care as described above      Current Length of Stay: 0 day(s)  Current Patient Status: Observation     Certification Statement: The patient will continue to require additional inpatient hospital stay due to Acute on chronic diastolic heart failure [facility])  Discharge Plan / Estimated Discharge Date:  Cardiology to evaluate    Code Status: Level 1 - Full Code  ______________________________________________________________________________    Subjective:   Patient seen and examined  Feeling better today  Less short of breath    Objective:   Vitals: Blood pressure 120/75, pulse 69, temperature 98 2 °F (36 8 °C), temperature source Tympanic, resp  rate 18, height 5' 7" (1 702 m), weight 131 kg (288 lb 12 8 oz), SpO2 95 %  Physical Exam:   General appearance: alert, appears stated age and cooperative  Head: Normocephalic, without obvious abnormality, atraumatic  Lungs: clear to auscultation bilaterally  Heart: regular rate and rhythm  Abdomen: soft, non-tender, positive bowel sounds   Back: negative, range of motion normal  Extremities: edema +2 lower extremities bilaterally  Neurologic: Grossly normal    Additional Data:   Labs:    Results from last 7 days  Lab Units 02/01/19  0508 01/31/19  1337   WBC Thousand/uL 6 20 7 49   HEMOGLOBIN g/dL 10 7* 10 8*   HEMATOCRIT % 35 8 36 5   MCV fL 91 91   PLATELETS Thousands/uL 265 269       Results from last 7 days  Lab Units 02/01/19  0508 01/31/19  1337   SODIUM mmol/L 138 140   POTASSIUM mmol/L 4 0 3 9   CHLORIDE mmol/L 103 104   CO2 mmol/L 26 28   ANION GAP mmol/L 9 8   BUN mg/dL 20 14   CREATININE mg/dL 1 37* 1 39*   CALCIUM mg/dL 9 0 8 8   ALBUMIN g/dL  --  3 0*   TOTAL BILIRUBIN mg/dL  --  0 33   ALK PHOS U/L  --  128*   ALT U/L  --  56   AST U/L  --  48*   EGFR ml/min/1 73sq m 39 38   GLUCOSE RANDOM mg/dL 110 130       Results from last 7 days  Lab Units 02/01/19  0508   MAGNESIUM mg/dL 2 6       Results from last 7 days  Lab Units 01/31/19  2124 01/31/19  1805 01/31/19  1337   TROPONIN I ng/mL 0 02 0 02 <0 02       Results from last 7 days  Lab Units 01/31/19  1337   NT-PRO BNP pg/mL 397*                    Results from last 7 days  Lab Units 01/31/19  1337   TSH 3RD GENERATON uIU/mL 6 959*     * I Have Reviewed All Lab Data Listed Above      Cultures: Imaging:  Imaging Reports Reviewed Today Include:   Procedure: Xr Chest 2 Views  Result Date: 1/31/2019  Impression: No acute cardiopulmonary disease  Workstation performed: [id number]     Scheduled Meds:  Current Facility-Administered Medications:  acetaminophen 650 mg Oral Q4H PRN Pop Dumont, ROBERTNP   amiodarone 200 mg Oral Daily With Breakfast Pop Dumont, ALEJANDRA   apixaban 5 mg Oral BID Pop Dumont, ROBERTNP   clopidogrel 75 mg Oral Daily Pop Dumont, CRNP   escitalopram 10 mg Oral Daily Pop Dumont, CRNP   furosemide 40 mg Intravenous BID (diuretic) Sharon Cuellar DO   gabapentin 100 mg Oral HS Pop Dumont, ALEJANDRA   levothyroxine 75 mcg Oral Early Morning ALEJANDRA Kelley   LORazepam 1 mg Oral TID PRN Pop Dumont, ROBERTNP   metoprolol tartrate 50 mg Oral Q12H [address] Pop Dumont, CRNP   nortriptyline 50 mg Oral Daily ALEJANDRA Kelley   oxyCODONE-acetaminophen 1 tablet Oral Q6H PRN Pop Dumont, CRNP   potassium chloride 20 mEq Oral BID Pop Dumont, ALEJANDRA   pravastatin 20 mg Oral Daily With [address] [facility], ALEJANDRA       [address] [facility] Internal Medicine  Hospitalist    ** Please Note: This note has been constructed using a voice recognition system   **

## 2019-02-01 NOTE — UTILIZATION REVIEW
Initial Clinical Review    Admission: Date/Time/Statement: OBSERVATION 1/31/19 @1542  Orders Placed This Encounter   Procedures    Place in Observation (expected length of stay for this patient is less than two midnights)     Standing Status:   Standing     Number of Occurrences:   1     Order Specific Question:   Admitting Physician     Answer:   Cait Argueta [[id number]]     Order Specific Question:   Level of Care     Answer:   Med Surg [16]     ED: Date/Time/Mode of Arrival:   ED Arrival Information     Expected Arrival Acuity Means of Arrival Escorted By Service Admission Type    - 1/31/2019 12:59 Emergent Walk-In Family Member General Medicine Emergency    Arrival Complaint    Shortness of Breath        Chief Complaint:   Chief Complaint   Patient presents with    Shortness of Breath     Dyspnea with exertion, BLE edema, denies chest pain  Hx afib, CHF     History of Illness: 73 yo fem to ED from home due to incr TRAMMELL x 2-3 days  Gradual onset, constant  incr BLE swelling-worsening  Had to sit to catch breath  Sx similar to prior CHF exac     ED Vital Signs:   ED Triage Vitals   Temperature Pulse Respirations Blood Pressure SpO2   01/31/19 1309 01/31/19 1309 01/31/19 1309 01/31/19 1309 01/31/19 1309   97 7 °F (36 5 °C) 95 (!) 26 153/70 98 %      Temp Source Heart Rate Source Patient Position - Orthostatic VS BP Location FiO2 (%)   01/31/19 1309 01/31/19 1309 01/31/19 1433 01/31/19 1309 --   Oral Monitor Lying Right arm       Pain Score       01/31/19 1309       No Pain        Wt Readings from Last 1 Encounters:   02/01/19 131 kg (288 lb 12 8 oz)     Vital Signs (abnormal): RR 24  Pertinent Labs/Diagnostic Test Results:   Creat 1 39   ast 48   Alk phos 128   Alb 3  Trop wnl  probnp 397   hgb 10 8, 10 7   tsh 6 959  CXR: nothing acute  EKG: nsr, 1st degr block  ED Treatment:   Medication Administration from 01/31/2019 1259 to 01/31/2019 1659       Date/Time Order Dose Route Action Action by Comments     01/31/2019 1342 furosemide (LASIX) injection 40 mg 40 mg Intravenous Given Jennyfer Starkey RN         Past Medical/Surgical History: Active Ambulatory Problems     Diagnosis Date Noted    Atrial fibrillation ([facility] ) 10/07/2017    Essential hypertension 10/07/2017    Prediabetes 10/07/2017    Depression 01/13/2015    Hypercholesterolemia 07/30/2012    Migraine 06/18/2012    Mitral regurgitation 10/18/2017    Class 3 severe obesity due to excess calories with serious comorbidity and body mass index (BMI) of 40 0 to 44 9 in adult [facility]) 07/24/2012    Obstructive sleep apnea 01/04/2013    Primary localized osteoarthritis of right knee 08/17/2016    Stroke ([place] [address] ) 11/14/2016    History of stroke 05/18/2018    Acute on chronic diastolic heart failure ([place] [address] ) 08/30/2018    Postural dizziness with presyncope 10/05/2018    Symptomatic bradycardia 10/05/2018    Chronic bilateral low back pain without sciatica 11/01/2018    Stage 3 chronic kidney disease ([place] [address] ) 11/01/2018    Anemia 11/02/2018    Acquired hypothyroidism 12/26/2018       Past Medical History:   Diagnosis Date    Acquired hypothyroidism 12/26/2018    Atrial fibrillation (HCC)     CHF (congestive heart failure) (HCC)     Hyperlipidemia     Hypertension     Migraine     Polyp of sigmoid colon     Prediabetes     Stroke [facility])      Admitting Diagnosis: SOB (shortness of breath) [R06 02]  Congestive heart failure (CHF) ([place] [address] ) [I50 9]  Age/Sex: 72 y o  female  Assessment/Plan: 73 yo fem to ED from home admitted under observation due to acute on chronic CHF  Started with worsening BLE edema and sob 2-3 days pta  decr bilat breath snds  +BLE non pitting edema  IV diuresis in progress, nephrotoxins to be avoided due to ckd  Cardiology consulted  Patient will be admitted on an Observation basis with an anticipated length of stay of less than 2 midnights     Justification for Hospital Stay: IV diuretic therapy     Admission Orders:  Scheduled Meds:   Current Facility-Administered Medications:  acetaminophen 650 mg Oral Q4H PRN   amiodarone 200 mg Oral Daily With Breakfast   apixaban 5 mg Oral BID   clopidogrel 75 mg Oral Daily   escitalopram 10 mg Oral Daily   furosemide 40 mg Intravenous BID (diuretic)   gabapentin 100 mg Oral HS   levothyroxine 75 mcg Oral Early Morning   LORazepam 1 mg Oral TID PRN   metoprolol tartrate 50 mg Oral Q12H PÉREZ   nortriptyline 50 mg Oral Daily   oxyCODONE-acetaminophen 1 tablet Oral Q6H PRN   potassium chloride 20 mEq Oral BID   pravastatin 20 mg Oral Daily With Dinner     Ambulate tid  Activity as alfonzo  Daily wt, I/O  EKG prn chest pain  Tele  Cardiac diet  Cons cardio    2/1: contniues with TRAMMELL, 1st degr block on tele, nonpitting BLE edema

## 2019-02-01 NOTE — ASSESSMENT & PLAN NOTE
CKD 3 at baseline    Results from last 7 days  Lab Units 02/01/19  0508 01/31/19  1337   BUN mg/dL 20 14   CREATININE mg/dL 1 37* 1 39*

## 2019-02-01 NOTE — PROGRESS NOTES
Patient with ongoing congestive heart failure requiring IV diuretics as she is not at goal weight  Due to need of continued IV diuretics she required greater than a two midnight stay and inpatient hospitalization

## 2019-02-01 NOTE — UTILIZATION REVIEW
Initial Clinical Review    Admission: Date/Time/Statement: WAS OBS 1/31 @ 1542 CONVERTED TO INPATIENT ADMISSION ON 2/1 AT 1308 DUE TO >2MN REQUIRED TO  CARE FOR PATIENT WITH CHF WHO NEEDS IV DIURESIS   02/01/19 1309  Inpatient Admission Once     Transfer Service: General Medicine    Expected Discharge Date: 02/03/19       Question Answer Comment   Admitting Physician [id number] [facility]    Level of Care Med Surg    Estimated length of stay More than 2 Midnights    Certification I certify that inpatient services are medically necessary for this patient for a duration of greater than two midnights  See H&P and MD Progress Notes for additional information about the patient's course of treatment  02/01/19 1308       ED: Date/Time/Mode of Arrival:   ED Arrival Information     Expected Arrival Acuity Means of Arrival Escorted By Service Admission Type    - 1/31/2019 12:59 Emergent Walk-In Family Member General Medicine Emergency    Arrival Complaint    Shortness of Breath        Chief Complaint:   Chief Complaint   Patient presents with    Shortness of Breath     Dyspnea with exertion, BLE edema, denies chest pain  Hx afib, CHF     History of Illness: 72year old female comes in with family member from home with gradual onset of TRAMMELL over the past 2 days   and increase in Bilateral low extremity swelling  Shortness of breath while walking, similar to previous CHF exacerbations  On ambulation,   Patient's pulse ox was 93% and she had significant dyspnea      ED Vital Signs:    ED Triage Vitals   Temperature Pulse Respirations Blood Pressure SpO2   01/31/19 1309 01/31/19 1309 01/31/19 1309 01/31/19 1309 01/31/19 1309   97 7 °F (36 5 °C) 95 (!) 26 153/70 98 %      Temp Source Heart Rate Source Patient Position - Orthostatic VS BP Location FiO2 (%)   01/31/19 1309 01/31/19 1309 01/31/19 1433 01/31/19 1309 --   Oral Monitor Lying Right arm       Pain Score       01/31/19 1309       No Pain        Wt Readings from Last 1 Encounters:   02/01/19 131 kg (288 lb 12 8 oz)     Vital Signs (abnormal): RR:  26  Pertinent Labs/Diagnostic Test Results:    Ref Range & Units 01/31/19 1337   Sodium 136 - 145 mmol/L 140    Potassium 3 5 - 5 3 mmol/L 3 9    Chloride 100 - 108 mmol/L 104    CO2 21 - 32 mmol/L 28    ANION GAP 4 - 13 mmol/L 8    BUN 5 - 25 mg/dL 14    Creatinine 0 60 - 1 30 mg/dL 1 39     GFR 38, 39  BNP:  397  TSH:  6 959  Trop: Neg  CXR:  Normal  EKG: NSR    ED Treatment:   Medication Administration from 01/31/2019 1259 to 01/31/2019 1659       Date/Time Order Dose Route Action Action by Comments     01/31/2019 1342 furosemide (LASIX) injection 40 mg 40 mg Intravenous Given Margarita Garcia RN         Past Medical/Surgical History:    Active Ambulatory Problems     Diagnosis Date Noted    Atrial fibrillation ([place] [address] ) 10/07/2017    Essential hypertension 10/07/2017    Prediabetes 10/07/2017    Depression 01/13/2015    Hypercholesterolemia 07/30/2012    Migraine 06/18/2012    Mitral regurgitation 10/18/2017    Class 3 severe obesity due to excess calories with serious comorbidity and body mass index (BMI) of 40 0 to 44 9 in adult [facility]) 07/24/2012    Obstructive sleep apnea 01/04/2013    Primary localized osteoarthritis of right knee 08/17/2016    Stroke ([place] [address] ) 11/14/2016    History of stroke 05/18/2018    Acute on chronic diastolic heart failure ([place] [address] ) 08/30/2018    Postural dizziness with presyncope 10/05/2018    Symptomatic bradycardia 10/05/2018    Chronic bilateral low back pain without sciatica 11/01/2018    Stage 3 chronic kidney disease ([place] [address] ) 11/01/2018    Anemia 11/02/2018    Acquired hypothyroidism 12/26/2018     Past Medical History:   Diagnosis Date    Acquired hypothyroidism 12/26/2018    Atrial fibrillation (HCC)     CHF (congestive heart failure) (HCC)     Hyperlipidemia     Hypertension     Migraine     Polyp of sigmoid colon     Prediabetes     Stroke [facility])    Pulmonary Hypertension    Admitting Diagnosis: SOB (shortness of breath) [R06 02]  Congestive heart failure (CHF) (HCC) [I50 9]  Age/Sex: 72 y o  female  Assessment/Plan: 72year old female to ED from home admitted under observation with CHF having shortness of breath and weight gain  Continue IV   Lasix as was started in ED  Monitor daily weights, kidney function, and I/O  Cardiology to see  Admission Orders:  Ambulate patient with pulse ox and HR monitoring  Card  Cons  Telemetry  Daily weights  I/O  Heart Failure Educ  Diet:  cardiac    Scheduled Meds:   Current Facility-Administered Medications:  acetaminophen 650 mg Oral Q4H PRN   amiodarone 200 mg Oral Daily With Breakfast   apixaban 5 mg Oral BID   clopidogrel 75 mg Oral Daily   escitalopram 10 mg Oral Daily   furosemide 40 mg Intravenous BID (diuretic) times 2 thus far   gabapentin 100 mg Oral HS   levothyroxine 75 mcg Oral Early Morning   LORazepam 1 mg Oral TID PRN   metoprolol tartrate 50 mg Oral Q12H PÉREZ   nortriptyline 50 mg Oral Daily   oxyCODONE-acetaminophen 1 tablet Oral Q6H PRN   potassium chloride 20 mEq Oral BID   pravastatin 20 mg Oral Daily With Dinner     Cardiology Consult (2/1):  Would continue IV diuretics with close monitoring of renal function and potassium

## 2019-02-02 PROCEDURE — 99231 SBSQ HOSP IP/OBS SF/LOW 25: CPT | Performed by: INTERNAL MEDICINE

## 2019-02-02 PROCEDURE — 99232 SBSQ HOSP IP/OBS MODERATE 35: CPT | Performed by: INTERNAL MEDICINE

## 2019-02-02 RX ORDER — AMIODARONE HYDROCHLORIDE 200 MG/1
100 TABLET ORAL
Status: DISCONTINUED | OUTPATIENT
Start: 2019-02-03 | End: 2019-02-03 | Stop reason: HOSPADM

## 2019-02-02 RX ADMIN — NORTRIPTYLINE HYDROCHLORIDE 50 MG: 50 CAPSULE ORAL at 09:55

## 2019-02-02 RX ADMIN — PRAVASTATIN SODIUM 20 MG: 10 TABLET ORAL at 17:45

## 2019-02-02 RX ADMIN — LEVOTHYROXINE SODIUM 75 MCG: 75 TABLET ORAL at 06:07

## 2019-02-02 RX ADMIN — FUROSEMIDE 40 MG: 10 INJECTION, SOLUTION INTRAMUSCULAR; INTRAVENOUS at 17:45

## 2019-02-02 RX ADMIN — POTASSIUM CHLORIDE 20 MEQ: 1500 TABLET, EXTENDED RELEASE ORAL at 17:45

## 2019-02-02 RX ADMIN — METOPROLOL TARTRATE 50 MG: 50 TABLET, FILM COATED ORAL at 21:13

## 2019-02-02 RX ADMIN — POTASSIUM CHLORIDE 20 MEQ: 1500 TABLET, EXTENDED RELEASE ORAL at 09:54

## 2019-02-02 RX ADMIN — GABAPENTIN 100 MG: 100 CAPSULE ORAL at 21:13

## 2019-02-02 RX ADMIN — FUROSEMIDE 40 MG: 10 INJECTION, SOLUTION INTRAMUSCULAR; INTRAVENOUS at 09:55

## 2019-02-02 RX ADMIN — APIXABAN 5 MG: 5 TABLET, FILM COATED ORAL at 17:45

## 2019-02-02 RX ADMIN — ESCITSLOPRAM 10 MG: 10 TABLET ORAL at 09:54

## 2019-02-02 RX ADMIN — METOPROLOL TARTRATE 50 MG: 50 TABLET, FILM COATED ORAL at 09:54

## 2019-02-02 RX ADMIN — APIXABAN 5 MG: 5 TABLET, FILM COATED ORAL at 09:54

## 2019-02-02 RX ADMIN — AMIODARONE HYDROCHLORIDE 200 MG: 200 TABLET ORAL at 09:54

## 2019-02-02 RX ADMIN — CLOPIDOGREL BISULFATE 75 MG: 75 TABLET ORAL at 09:54

## 2019-02-02 NOTE — PROGRESS NOTES
Progress Note - Indy Nelson 1945, 72 y o  female MRN: [id number]    Unit/Bed#: E4 -01 Encounter: [id number]    Primary Care Provider: Eric Warren MD   Date and time admitted to hospital: 1/31/2019  1:05 PM        Assessment and Plan  * Acute on chronic diastolic heart failure ([facility] )   Assessment & Plan    Acute on chronic diastolic CHF good response with IV furosemide  Appreciate cardiology evaluation  Can consider metolazone as needed for weight gain  Weight (last 2 days)     Date/Time   Weight    02/02/19 0954  130 (287 26)    02/01/19 0544  131 (288 8)    01/31/19 1700  134 (295 42)    01/31/19 1309  134 (295 42)               Acquired hypothyroidism   Assessment & Plan    Continue levothyroxine     Stage 3 chronic kidney disease ([facility] )   Assessment & Plan    CKD 3 at baseline    Results from last 7 days  Lab Units 02/01/19  0508 01/31/19  1337   BUN mg/dL 20 14   CREATININE mg/dL 1 37* 1 39*        Chronic bilateral low back pain without sciatica   Assessment & Plan    Continue gabapentin t i d  History of stroke   Assessment & Plan    History of stroke without residual deficit  Continue DA PT     Depression   Assessment & Plan    Depression stable on escitalopram     Essential hypertension   Assessment & Plan    Blood pressure stable metoprolol     Atrial fibrillation [facility])   Assessment & Plan    Status post ablation  Maintained on amiodarone and eliquis     VTE Pharmacologic Prophylaxis: Apixaban (Eliquis)    Patient Centered Rounds: I have performed bedside rounds with nursing staff today  Discussions with Specialists or Other Care Team Provider:   Education and Discussions with Family / Patient:     Time Spent for Care: 25 mins  More than 50% of total time spent on counseling and coordination of care as described above      Current Length of Stay: 1 day(s)  Current Patient Status: Inpatient     Certification Statement: The patient will continue to require additional inpatient hospital stay due to Acute on chronic diastolic heart failure [facility])  Discharge Plan / Estimated Discharge Date: when cleared by cardiology    Code Status: Level 1 - Full Code  ______________________________________________________________________________    Subjective:   Patient seen and examined  Feeling good but still TRAMMELL    Objective:   Vitals: Blood pressure 124/58, pulse 77, temperature 99 2 °F (37 3 °C), temperature source Tympanic, resp  rate 18, height 5' 7" (1 702 m), weight 130 kg (287 lb 4 2 oz), SpO2 90 %    Weight (last 2 days)     Date/Time   Weight    02/02/19 0954  130 (287 26)    02/01/19 0544  131 (288 8)    01/31/19 1700  134 (295 42)    01/31/19 1309  134 (295 42)          Physical Exam:   General appearance: alert, appears stated age and cooperative  Head: Normocephalic, without obvious abnormality, atraumatic  Lungs: clear to auscultation bilaterally  Heart: regular rate and rhythm  Abdomen: obese and soft, non-tender, positive bowel sounds   Back: negative  Extremities: edema +1-2 lower extremities  Neurologic: Grossly normal    Additional Data:   Labs:    Results from last 7 days  Lab Units 02/01/19  0508 01/31/19  1337   WBC Thousand/uL 6 20 7 49   HEMOGLOBIN g/dL 10 7* 10 8*   HEMATOCRIT % 35 8 36 5   MCV fL 91 91   PLATELETS Thousands/uL 265 269       Results from last 7 days  Lab Units 02/01/19  0508 01/31/19  1337   SODIUM mmol/L 138 140   POTASSIUM mmol/L 4 0 3 9   CHLORIDE mmol/L 103 104   CO2 mmol/L 26 28   ANION GAP mmol/L 9 8   BUN mg/dL 20 14   CREATININE mg/dL 1 37* 1 39*   CALCIUM mg/dL 9 0 8 8   ALBUMIN g/dL  --  3 0*   TOTAL BILIRUBIN mg/dL  --  0 33   ALK PHOS U/L  --  128*   ALT U/L  --  56   AST U/L  --  48*   EGFR ml/min/1 73sq m 39 38   GLUCOSE RANDOM mg/dL 110 130       Results from last 7 days  Lab Units 02/01/19  0508   MAGNESIUM mg/dL 2 6       Results from last 7 days  Lab Units 01/31/19  2124 01/31/19  1805 01/31/19  1337   TROPONIN I ng/mL 0 02 0 02 <0 02 Results from last 7 days  Lab Units 01/31/19  1337   NT-PRO BNP pg/mL 397*                    Results from last 7 days  Lab Units 01/31/19  1337   TSH 3RD GENERATON uIU/mL 6 959*     * I Have Reviewed All Lab Data Listed Above  Scheduled Meds:  Current Facility-Administered Medications:  acetaminophen 650 mg Oral Q4H PRN ALEJANDRA Francois   [START ON 2/3/2019] amiodarone 100 mg Oral Daily With Breakfast Simone Abdul MD   apixaban 5 mg Oral BID ALEJANDRA Francois   clopidogrel 75 mg Oral Daily ALEJANDRA Francois   escitalopram 10 mg Oral Daily ALEJANDRA Francois   furosemide 40 mg Intravenous BID (diuretic) Arlene Dee DO   gabapentin 100 mg Oral HS ALEJANDRA Francois   levothyroxine 75 mcg Oral Early Morning ALEJANDRA Francois   LORazepam 1 mg Oral TID PRN ALEJANDRA Francois   metoprolol tartrate 50 mg Oral Q12H [facility] ALEJANDRA Francois   nortriptyline 50 mg Oral Daily ALEJANDRA Francois   oxyCODONE-acetaminophen 1 tablet Oral Q6H PRN ALEJANDRA Francois   potassium chloride 20 mEq Oral BID ALEJANDRA Francois   pravastatin 20 mg Oral Daily With [address] [facility], ALEJANDRA       [address] [place] Internal Medicine  Hospitalist    ** Please Note: This note has been constructed using a voice recognition system   **

## 2019-02-02 NOTE — PHYSICIAN ADVISOR
Current patient class: Inpatient  The patient is currently on Hospital Day: 3      The patient was admitted to the hospital at 1308 on 2/1/19 for the following diagnosis:  SOB (shortness of breath) [R06 02]  Congestive heart failure (CHF) (HCC) [I50 9]     CMS OUTLIER STAY REVIEW    After review of the relevant documentation, labs, vital signs and test results, the patient is appropriate for CONTINUED INPATIENT ADMISSION  The patient continues to remain hospitalized receiving acute medical care  The patient has surpassed the expected duration of stay, however given the clinical condition, need for further acute care management, the patient is appropriate to remain in an inpatient status  The patient still being actively managed, and does have unresolved medical issues requiring further hospitalization  This review is conducted at 10 day intervals, to help satisfy the requirements for significant outlier stay review as per CMS  Given the current condition of this patient, the patient satisfies this review was determination for continued inpatient stay  Rationale is as follows: The patient is a 72 yrs old Female who presented to the ED at 1/31/2019  1:05 PM with a chief complaint of Shortness of Breath (Dyspnea with exertion, BLE edema, denies chest pain   Hx afib, CHF)    The patients vitals on arrival were ED Triage Vitals   Temperature Pulse Respirations Blood Pressure SpO2   01/31/19 1309 01/31/19 1309 01/31/19 1309 01/31/19 1309 01/31/19 1309   97 7 °F (36 5 °C) 95 (!) 26 153/70 98 %      Temp Source Heart Rate Source Patient Position - Orthostatic VS BP Location FiO2 (%)   01/31/19 1309 01/31/19 1309 01/31/19 1433 01/31/19 1309 --   Oral Monitor Lying Right arm       Pain Score       01/31/19 1309       No Pain           Past Medical History:   Diagnosis Date    Acquired hypothyroidism 12/26/2018    Atrial fibrillation (HCC)     CHF (congestive heart failure) (HCC)     Hyperlipidemia     Hypertension     Migraine     Polyp of sigmoid colon     Prediabetes     Stroke [facility])      Past Surgical History:   Procedure Laterality Date    APPENDECTOMY      CARDIAC ELECTROPHYSIOLOGY STUDY AND ABLATION      CHOLECYSTECTOMY             Consults have been placed to:   IP CONSULT TO CASE MANAGEMENT  IP CONSULT TO CARDIOLOGY    Vitals:    02/01/19 1500 02/01/19 1650 02/01/19 1900 02/01/19 2300   BP: 106/51 111/62 113/64 108/50   BP Location: Right arm Left arm Right arm Right arm   Pulse: 75  84 86   Resp: 18  18 18   Temp: 98 3 °F (36 8 °C)  97 8 °F (36 6 °C) 98 8 °F (37 1 °C)   TempSrc: Tympanic  Temporal Tympanic   SpO2: 93%  92% 98%   Weight:       Height:           Most recent labs:    Recent Labs      01/31/19   1337   01/31/19   2124  02/01/19   0508   WBC  7 49   --    --   6 20   HGB  10 8*   --    --   10 7*   HCT  36 5   --    --   35 8   PLT  269   --    --   265   K  3 9   --    --   4 0   CALCIUM  8 8   --    --   9 0   BUN  14   --    --   20   CREATININE  1 39*   --    --   1 37*   TROPONINI  <0 02   < >  0 02   --    AST  48*   --    --    --    ALT  56   --    --    --    ALKPHOS  128*   --    --    --     < > = values in this interval not displayed         Scheduled Meds:  Current Facility-Administered Medications:  acetaminophen 650 mg Oral Q4H PRN Margaret Starcher, CRNP   amiodarone 200 mg Oral Daily With Breakfast Margaret Starcher, CRNP   apixaban 5 mg Oral BID Margaret Starcher, CRNP   clopidogrel 75 mg Oral Daily Margaret Starcher, CRNP   escitalopram 10 mg Oral Daily Margaret Starcher, CRNP   furosemide 40 mg Intravenous BID (diuretic) Blake Ortega, DO   gabapentin 100 mg Oral HS Margaret Starcher, CRNP   levothyroxine 75 mcg Oral Early Morning Margaret Starcher, CRNP   LORazepam 1 mg Oral TID PRN Margaret Starcher, CRNP   metoprolol tartrate 50 mg Oral Q12H [address] Margaret Starcher, CRNP   nortriptyline 50 mg Oral Daily Margaret Starcher, CRNP   oxyCODONE-acetaminophen 1 tablet Oral Q6H PRN ALEJANDRA Lanier   potassium chloride 20 mEq Oral BID ALEJANDRA Lanier   pravastatin 20 mg Oral Daily With Dinner ALEJANDRA Lanier     Continuous Infusions:   PRN Meds:   acetaminophen    LORazepam    oxyCODONE-acetaminophen    Surgical procedures (if appropriate):

## 2019-02-02 NOTE — ASSESSMENT & PLAN NOTE
Acute on chronic diastolic CHF good response with IV furosemide  Appreciate cardiology evaluation  Can consider metolazone as needed for weight gain    Weight (last 2 days)     Date/Time   Weight    02/02/19 0954  130 (287 26)    02/01/19 0544  131 (288 8)    01/31/19 1700  134 (295 42)    01/31/19 1309  134 (295 42)

## 2019-02-02 NOTE — PROGRESS NOTES
Progress Note - Cardiology   [place] Simpler 72 y o  female MRN: [id number]  Unit/Bed#: E4 -01 Encounter: [id number]        Asessment/Plan:  1  Chronic and probably acute diastolic CHF  2  Valvular heart disease: TTE 05/2018-Moderate AI, mild MR/mild MS, mild TR with estimated peak PAP-45mmHg  3  Hx PAFib, S/p fib and typical flutter ablation - 1/2018:  Currently normal sinus rhythm  4  Hypertension:  Controlled    5  CKD 3 - baseline creatinine approximately 1 4:  Currently stable  6  DANTE:  CPAP compliant  7  History CVA without sequelae  8  Obesity    · Check standing weight daily  · Continue diuresis  · Encouraged increased ambulation to assess tolerance  · For atrial fibrillation patient remains on amiodarone/metoprolol/Eliquis anticoagulation  · If effort symptoms remain once euvolemic would consider alternative etiologies-anginal equivalency or worsened structural heart disease  Subjective:  No new complaints  Patient reports increased urine output  Comfortable at rest     Vitals:  Vitals:    01/31/19 1700 02/01/19 0544   Weight: 134 kg (295 lb 6 7 oz) 131 kg (288 lb 12 8 oz)   ,  Vitals:    02/01/19 1650 02/01/19 1900 02/01/19 2300 02/02/19 0826   BP: 111/62 113/64 108/50 141/64   BP Location: Left arm Right arm Right arm Right arm   Pulse:  84 86 78   Resp:  18 18 18   Temp:  97 8 °F (36 6 °C) 98 8 °F (37 1 °C) 99 °F (37 2 °C)   TempSrc:  Temporal Tympanic Tympanic   SpO2:  92% 98% 97%   Weight:       Height:           Exam:  General:  Pleasant, normally conversant, and comfortable-appearing  Heart:  Regular with controlled rate    Distant without gross murmur  Lungs:  Clear with moderate excursion  Lower Limbs:  Mild pitting edema             Medications:    Current Facility-Administered Medications:     acetaminophen (TYLENOL) tablet 650 mg, 650 mg, Oral, Q4H PRN, Margaret Starcher, CRNP    amiodarone tablet 200 mg, 200 mg, Oral, Daily With Breakfast, Margaret Starcher, CRNP, 200 mg at 02/02/19 0954    apixaban (ELIQUIS) tablet 5 mg, 5 mg, Oral, BID, ALEJANDRA Feng, 5 mg at 02/02/19 1522    clopidogrel (PLAVIX) tablet 75 mg, 75 mg, Oral, Daily, ALEJANDRA Feng, 75 mg at 02/02/19 0954    escitalopram (LEXAPRO) tablet 10 mg, 10 mg, Oral, Daily, ALEJANDRA Feng, 10 mg at 02/02/19 3692    furosemide (LASIX) injection 40 mg, 40 mg, Intravenous, BID (diuretic), Meggan Rachel, DO, 40 mg at 02/02/19 0955    gabapentin (NEURONTIN) capsule 100 mg, 100 mg, Oral, HS, ALEJANDRA Feng, 100 mg at 02/01/19 2103    levothyroxine tablet 75 mcg, 75 mcg, Oral, Early Morning, ALEJANDRA Feng, 75 mcg at 02/02/19 4443    LORazepam (ATIVAN) tablet 1 mg, 1 mg, Oral, TID PRN, ALEJANDRA Feng    metoprolol tartrate (LOPRESSOR) tablet 50 mg, 50 mg, Oral, Q12H [facility], ALEJANDRA Feng, 50 mg at 02/02/19 0954    nortriptyline (PAMELOR) capsule 50 mg, 50 mg, Oral, Daily, ALEJANDRA Feng, 50 mg at 02/02/19 0955    oxyCODONE-acetaminophen (PERCOCET) 5-325 mg per tablet 1 tablet, 1 tablet, Oral, Q6H PRN, ALEJANDRA Feng    potassium chloride (K-DUR,KLOR-CON) CR tablet 20 mEq, 20 mEq, Oral, BID, ALEJANDRA Fneg, 20 mEq at 02/02/19 4863    pravastatin (PRAVACHOL) tablet 20 mg, 20 mg, Oral, Daily With ALEJANDRA Alonso, 20 mg at 02/01/19 1647      Labs/Data:          Results from last 7 days  Lab Units 02/01/19  0508 01/31/19  1337   WBC Thousand/uL 6 20 7 49   HEMOGLOBIN g/dL 10 7* 10 8*   HEMATOCRIT % 35 8 36 5   PLATELETS Thousands/uL 265 269     Results from last 7 days  Lab Units 02/01/19  0508 01/31/19  2124 01/31/19  1805 01/31/19  1337   POTASSIUM mmol/L 4 0  --   --  3 9   CHLORIDE mmol/L 103  --   --  104   CO2 mmol/L 26  --   --  28   BUN mg/dL 20  --   --  14   TROPONIN I ng/mL  --  0 02 0 02 <0 02

## 2019-02-03 VITALS
HEART RATE: 74 BPM | TEMPERATURE: 97.8 F | RESPIRATION RATE: 18 BRPM | SYSTOLIC BLOOD PRESSURE: 139 MMHG | BODY MASS INDEX: 44.6 KG/M2 | WEIGHT: 284.17 LBS | OXYGEN SATURATION: 94 % | HEIGHT: 67 IN | DIASTOLIC BLOOD PRESSURE: 71 MMHG

## 2019-02-03 LAB
ANION GAP SERPL CALCULATED.3IONS-SCNC: 9 MMOL/L (ref 4–13)
BUN SERPL-MCNC: 22 MG/DL (ref 5–25)
CALCIUM SERPL-MCNC: 8.3 MG/DL (ref 8.3–10.1)
CHLORIDE SERPL-SCNC: 103 MMOL/L (ref 100–108)
CO2 SERPL-SCNC: 27 MMOL/L (ref 21–32)
CREAT SERPL-MCNC: 1.49 MG/DL (ref 0.6–1.3)
GFR SERPL CREATININE-BSD FRML MDRD: 35 ML/MIN/1.73SQ M
GLUCOSE SERPL-MCNC: 120 MG/DL (ref 65–140)
POTASSIUM SERPL-SCNC: 3.8 MMOL/L (ref 3.5–5.3)
SODIUM SERPL-SCNC: 139 MMOL/L (ref 136–145)

## 2019-02-03 PROCEDURE — 99231 SBSQ HOSP IP/OBS SF/LOW 25: CPT | Performed by: INTERNAL MEDICINE

## 2019-02-03 PROCEDURE — 80048 BASIC METABOLIC PNL TOTAL CA: CPT | Performed by: INTERNAL MEDICINE

## 2019-02-03 PROCEDURE — 99239 HOSP IP/OBS DSCHRG MGMT >30: CPT | Performed by: INTERNAL MEDICINE

## 2019-02-03 RX ORDER — TORSEMIDE 20 MG/1
TABLET ORAL
Qty: 70 TABLET | Refills: 2 | Status: ON HOLD | OUTPATIENT
Start: 2019-02-03 | End: 2019-11-20

## 2019-02-03 RX ADMIN — ACETAMINOPHEN 650 MG: 325 TABLET, FILM COATED ORAL at 10:55

## 2019-02-03 RX ADMIN — ESCITSLOPRAM 10 MG: 10 TABLET ORAL at 09:07

## 2019-02-03 RX ADMIN — AMIODARONE HYDROCHLORIDE 100 MG: 200 TABLET ORAL at 09:07

## 2019-02-03 RX ADMIN — FUROSEMIDE 40 MG: 10 INJECTION, SOLUTION INTRAMUSCULAR; INTRAVENOUS at 09:07

## 2019-02-03 RX ADMIN — APIXABAN 5 MG: 5 TABLET, FILM COATED ORAL at 09:08

## 2019-02-03 RX ADMIN — POTASSIUM CHLORIDE 20 MEQ: 1500 TABLET, EXTENDED RELEASE ORAL at 09:07

## 2019-02-03 RX ADMIN — CLOPIDOGREL BISULFATE 75 MG: 75 TABLET ORAL at 09:07

## 2019-02-03 RX ADMIN — NORTRIPTYLINE HYDROCHLORIDE 50 MG: 50 CAPSULE ORAL at 09:07

## 2019-02-03 RX ADMIN — METOPROLOL TARTRATE 50 MG: 50 TABLET, FILM COATED ORAL at 09:07

## 2019-02-03 RX ADMIN — LEVOTHYROXINE SODIUM 75 MCG: 75 TABLET ORAL at 05:09

## 2019-02-03 NOTE — PROGRESS NOTES
Progress Note - Bo Heaton 72 y o  female MRN: [id number]    Unit/Bed#: E4 -01 Encounter: [id number]  Subjective:   No chest pain    Breathing well    Minimal edema  No palpitations or lightheadedness  Objective:   Vitals: Blood pressure 139/71, pulse 74, temperature 97 8 °F (36 6 °C), temperature source Tympanic, resp  rate 18, height 5' 7" (1 702 m), weight 129 kg (284 lb 2 8 oz), SpO2 94 %  ,Body mass index is 44 51 kg/m²  CMP:   Results from last 7 days  Lab Units 02/03/19  0508  01/31/19  1337   SODIUM mmol/L 139  < > 140   POTASSIUM mmol/L 3 8  < > 3 9   CHLORIDE mmol/L 103  < > 104   CO2 mmol/L 27  < > 28   BUN mg/dL 22  < > 14   CREATININE mg/dL 1 49*  < > 1 39*   CALCIUM mg/dL 8 3  < > 8 8   AST U/L  --   --  48*   ALT U/L  --   --  56   ALK PHOS U/L  --   --  128*   EGFR ml/min/1 73sq m 35  < > 38   < > = values in this interval not displayed  Physical exam:  Lungs-somewhat decreased breath sounds but no wheezing rhonchi rales  Heart-regular without audible murmur rub or gallop  Abdomen-obese  Nontender without mass organomegaly  Extremities-trace edema noted    Assessment:  1  Acute diastolic heart failure  Appears to be euvolemic  Weight has dropped from 295 to 284 lb  On IV Lasix b i d   2  Paroxysmal atrial fibrillation status post ablation  In sinus rhythm  On amiodarone 100 mg daily for suppression of atrial fibrillation  On Eliquis  3  Hypertension  Well controlled on metoprolol  4  Chronic kidney disease  Stable creatinine throughout diuresis  5  Obesity with sleep apnea syndrome  6  History of stroke  Patient on concurrent clopidogrel with Eliquis    Plan:   Okay from my standpoint for discharge home  Recommend torsemide 20 mg BID    But give 40 mg two AMs a week  Continue amiodarone at 100 mg daily-same  Continue Eliquis at 5 mg b i d -same  Continue clopidogrel-same  Continue metoprolol at 50 mg b i d -same  Continue simvastatin at 10 mg daily-same  Continue potassium at 20 mEq b i d -same      Will arrange FU in office in one to two weeks    Cameron Powers MD

## 2019-02-03 NOTE — DISCHARGE SUMMARY
Discharge- Zachary Tenorio 1945, 72 y o  female MRN: [id number]  Unit/Bed#: E4 -01 Encounter: [id number]  Primary Care Provider: Yeni Kasper MD   Date and time admitted to hospital: 1/31/2019  1:05 PM        Admitting Provider:  Arlene Dee DO  Discharge Provider:  Arlene Dee DO  Admission Date: 1/31/2019       Discharge Date: 02/03/19   LOS: 2  Primary Care Physician at Discharge: Yeni Kasper, [address] [place] [address] COURSE:  Zachary Tenorio is a 72 y o  female who presented to the hospital worsening shortness of breath and lower extremity swelling  The patient does have congestive heart failure but states that she has been taking her medications as prescribed  In the emergency department she was found to have acute decompensation of diastolic congestive heart failure  She was diuresed with IV furosemide  She appears closer to baseline weight and will be discharged with increased baseline torsemide dose  DISCHARGE DIAGNOSES  * Acute on chronic diastolic heart failure (HCC)   Assessment & Plan    Acute on chronic diastolic CHF good response with IV furosemide  Appreciate cardiology evaluation  Admission weight 295 lb  Discharge weight 284 lb  Will be discharged with torsemide 20 mg b i d  except for 40 mg in a m  twice weekly  Acquired hypothyroidism   Assessment & Plan    Continue levothyroxine     Stage 3 chronic kidney disease ([facility] [address] )   Assessment & Plan    CKD 3 at baseline    Results from last 7 days  Lab Units 02/03/19  0508 02/01/19  0508 01/31/19  1337   BUN mg/dL 22 20 14   CREATININE mg/dL 1 49* 1 37* 1 39*        Chronic bilateral low back pain without sciatica   Assessment & Plan    Continue gabapentin t i d  History of stroke   Assessment & Plan    History of stroke without residual deficit    Continue anticoagulation and clopidogrel     Depression   Assessment & Plan    Depression stable on escitalopram     Essential hypertension   Assessment & Plan Blood pressure stable metoprolol     Atrial fibrillation [facility])   Assessment & Plan    Status post ablation  Maintained on amiodarone and Javi Person   Dr Courtney Obrien cardiology    RADIOLOGY RESULTS  Xr Chest 2 Views  Result Date: 1/31/2019  Impression: No acute cardiopulmonary disease  Workstation performed: [id number]       LABS  Results from last 7 days  Lab Units 02/01/19  0508 01/31/19  1337   WBC Thousand/uL 6 20 7 49   HEMOGLOBIN g/dL 10 7* 10 8*   HEMATOCRIT % 35 8 36 5   MCV fL 91 91   PLATELETS Thousands/uL 265 269       Results from last 7 days  Lab Units 02/03/19  0508 02/01/19  0508 01/31/19  1337   SODIUM mmol/L 139 138 140   POTASSIUM mmol/L 3 8 4 0 3 9   CHLORIDE mmol/L 103 103 104   CO2 mmol/L 27 26 28   BUN mg/dL 22 20 14   CREATININE mg/dL 1 49* 1 37* 1 39*   CALCIUM mg/dL 8 3 9 0 8 8   ALBUMIN g/dL  --   --  3 0*   TOTAL BILIRUBIN mg/dL  --   --  0 33   ALK PHOS U/L  --   --  128*   ALT U/L  --   --  56   AST U/L  --   --  48*   EGFR ml/min/1 73sq m 35 39 38   GLUCOSE RANDOM mg/dL 120 110 130       Results from last 7 days  Lab Units 01/31/19  2124 01/31/19  1805 01/31/19  1337   TROPONIN I ng/mL 0 02 0 02 <0 02       Results from last 7 days  Lab Units 01/31/19  1337   NT-PRO BNP pg/mL 397*        Results from last 7 days  Lab Units 01/31/19  1337   TSH 3RD GENERATON uIU/mL 6 959*       PHYSICAL EXAM:  Vitals:   Blood Pressure: 139/71 (02/03/19 0739)  Pulse: 74 (02/03/19 0739)  Temperature: 97 8 °F (36 6 °C) (02/03/19 0739)  Temp Source: Tympanic (02/03/19 0739)  Respirations: 18 (02/03/19 0739)  Height: 5' 7" (170 2 cm) (01/31/19 1700)  Weight - Scale: 129 kg (284 lb 2 8 oz) (02/03/19 0600)  SpO2: 94 % (02/03/19 8618)    General appearance: alert, appears stated age and cooperative  Head: Normocephalic, without obvious abnormality, atraumatic  Eyes: conjunctivae/corneas clear  PERRL, EOM's intact    Lungs: clear to auscultation bilaterally  Heart: regular rate and rhythm  Abdomen: soft obese positive bowel sounds  Back: range of motion normal  Extremities: edema +1-2 lower extremities bilaterally  Neurologic: Grossly normal    Planned Re-admission: No  Discharge Disposition: Home/Self Care    Test Results Pending at Discharge: None  Incidental findings: None    Medications   Please see After Visit Summary for reconciled discharge medications provided to patient and family  Diet restrictions: Cardiac/low salt diet   Activity restrictions: No strenuous activity  Discharge Condition: good    Outpatient Follow-Up  1  Nadiya Sandoval MD [address] [facility] / [address] [facility] [phone] - follow-up within one week  2  [facility]  Follow-up for CHF    Code Status: Level 1 - Full Code  Discharge Statement   I spent 35 minutes discharging the patient  This time was spent on the day of discharge  Greater than 50% of total time was spent with the patient and / or family counseling and / or coordination of care  ** Please Note: This note has been constructed using a voice recognition system   **

## 2019-02-03 NOTE — ASSESSMENT & PLAN NOTE
Acute on chronic diastolic CHF good response with IV furosemide  Appreciate cardiology evaluation  Admission weight 295 lb  Discharge weight 284 lb  Will be discharged with torsemide 20 mg b i d  except for 40 mg in a m  twice weekly

## 2019-02-03 NOTE — ASSESSMENT & PLAN NOTE
CKD 3 at baseline    Results from last 7 days  Lab Units 02/03/19  0508 02/01/19  0508 01/31/19  1337   BUN mg/dL 22 20 14   CREATININE mg/dL 1 49* 1 37* 1 39*

## 2019-02-04 LAB
ATRIAL RATE: 86 BPM
P AXIS: 98 DEGREES
PR INTERVAL: 242 MS
QRS AXIS: 58 DEGREES
QRSD INTERVAL: 94 MS
QT INTERVAL: 410 MS
QTC INTERVAL: 490 MS
T WAVE AXIS: 71 DEGREES
VENTRICULAR RATE: 86 BPM

## 2019-02-04 PROCEDURE — 93010 ELECTROCARDIOGRAM REPORT: CPT | Performed by: INTERNAL MEDICINE

## 2019-02-05 ENCOUNTER — TRANSITIONAL CARE MANAGEMENT (OUTPATIENT)
Dept: INTERNAL MEDICINE CLINIC | Facility: CLINIC | Age: 74
End: 2019-02-05

## 2019-02-07 ENCOUNTER — PATIENT OUTREACH (OUTPATIENT)
Dept: INTERNAL MEDICINE CLINIC | Facility: CLINIC | Age: 74
End: 2019-02-07

## 2019-02-07 NOTE — PROGRESS NOTES
I spoke with Chon Bolaños today and she said that she is feeling much better & is very happy to be home  She told met that she had a good experience at the hospital, but she prefers to make changes in her life in order to avoid a hospital stay  The following is a summary of our discussion:  Weight: She is weighing daily and her weight over the last 3 days has been 289 lbs  Respiratory: She said that she is sob with activity and that it has not increased  This resolves with rest   Nutrition: She told me that she has made many changes recently & has been eating more chicken, fish, eggs, tuna, oatmeal & low calorie dressing on salad  She is cooking her own meals  Safety/Pain: She said that she's been dealing with some back pain, which is causing a decrease in her activity level  She expects to start PT next week  We reviewed when to call the CM/PCP: if she has a weight gain of 2-3 lbs in a day, if she has chest pain, if she has increased sob &/or sob that is not relieved with rest  She was able to do a teach-back  She confirmed that her daughter will transport her to her appointment with Dr Natalia Adamson on 2/13 @ [phone]

## 2019-02-18 ENCOUNTER — EVALUATION (OUTPATIENT)
Dept: PHYSICAL THERAPY | Facility: CLINIC | Age: 74
End: 2019-02-18
Payer: MEDICARE

## 2019-02-18 DIAGNOSIS — M54.50 CHRONIC BILATERAL LOW BACK PAIN WITHOUT SCIATICA: Primary | ICD-10-CM

## 2019-02-18 DIAGNOSIS — G89.29 CHRONIC BILATERAL LOW BACK PAIN WITHOUT SCIATICA: Primary | ICD-10-CM

## 2019-02-18 PROCEDURE — 97163 PT EVAL HIGH COMPLEX 45 MIN: CPT | Performed by: PHYSICAL THERAPIST

## 2019-02-18 PROCEDURE — 97110 THERAPEUTIC EXERCISES: CPT | Performed by: PHYSICAL THERAPIST

## 2019-02-18 NOTE — PROGRESS NOTES
PT Evaluation     Today's date: 2019  Patient name: Valeria Solares  : 1945  MRN: [id number]  Referring provider: Sabrina Pandey MD  Dx:   Encounter Diagnosis     ICD-10-CM    1  Chronic bilateral low back pain without sciatica M54 5     G89 29                   Assessment  Assessment details: Valeria Solares is a 72 y o  female with a history of CVA,migraines, sleep apnea, hypothyroid, CHF, depression R knee arthritis, and anemia that presents for a high complexity physical therapy initial evaluation  The patient demonstrates signs and symptoms consistent with low back pain  During the examination the patient demonstrated decreased strength, decreased ROM, gait dysfunction, and pain  The patient's impairments are causing the following functional limitations: difficulty standing/walking>1 min, unable to grocery shop, difficulty standing to cook, clean, and perform ADL's; difficulty going sit to stand, getting in/out of the shower, and in/out of a vehicle; difficulty ambulating up/down steps     The patient's clinical presentation is unstable due to her significant medical history/problems, significant functional limits, and chronicity of condition  The patient will benefit from skilled PT services to address impairments, work towards goals, and restore PLOF  Impairments: abnormal or restricted ROM, activity intolerance, impaired balance, impaired physical strength, pain with function and poor posture   Functional limitations: difficulty standing/walking>1 min, unable to grocery shop, difficulty standing to cook, clean, and perform ADL's; difficulty going sit to stand, getting in/out of the shower, and in/out of a vehicle; difficulty ambulating up/down steps  Symptom irritability: highBarriers to therapy: Medical history/chronicity of condition  Understanding of Dx/Px/POC: good   Prognosis: fair    Goals  STG: Achieve in 4 weeks  1    Decrease lumbar pain at worst by 50% to improve activity tolerance for self/care and leisure activities  2   Improve hip/core strength by 1/2 muscle grade to improve ability to change body positions without difficulty  3   Improve lumbar ROM by 10-15 degrees to facilitate normal movement patterns for ADL's/work tasks  LTG: Achieve in 6 weeks  1  Patient to return to near [facility] as indicated by an increase in FOTO score of: >52%  2   Patient tolerate standing/walking for at least 10 minutes prior to sit/rest to achieve patient specific goal   3   Patient achieve independence with perofrming home exercise plan  Plan  Patient would benefit from: PT eval  Planned modality interventions: ultrasound, unattended electrical stimulation, traction, TENS, cryotherapy and thermotherapy: hydrocollator packs  Planned therapy interventions: joint mobilization, manual therapy, massage, neuromuscular re-education, patient education, postural training, self care, strengthening, stretching, therapeutic activities, therapeutic exercise, home exercise program, gait training, flexibility and balance/weight bearing training  Frequency: 2-3x/wk  Duration in visits: 18  Duration in weeks: 6  Plan of Care beginning date: 2/18/2019  Plan of Care expiration date: 4/1/2019  Treatment plan discussed with: patient        Subjective Evaluation    History of Present Illness  Date of onset: 8/15/2018  Mechanism of injury: Autumn Quintanilla is a 72 y o  female that presents to outpatient physical therapy with complaints of low back pain which started > 6 months ago without distinct MASOOD  The patient reports the pain starts  At the low back, radiates into the hips and down the thighs but remains above the knees  The patient has the most difficulty standing and walking - she can only stand/walk for 1 minute prior to feeling an increase in pain and also an increase in SOB  The patient admits she was discharged from the hospital for a CHF exacerbation 2/3/19    The patient's main goal for physical therapy is to improve her walking tolerance/standing tolerance so she can get into better shape  The patient was prescribed gabapentin for her LBP which she reports did not help to decrease her pain  Patient denies having any other episodes of previous LBP  SPO2: 94% on RA  HR: 82 bpm  BP: 132/80 mmHg      Lumbar X-ray: Facet degenerative changes L4-5 and L5-S1 with grade 1/grade 2 L4 mild anterolisthesis and moderate loss of disc height at L5-S1  Findings are stable from prior lumbar radiographs  Pain  Current pain ratin  At best pain ratin  At worst pain rating: 10  Location: midline L/B to B/L hips to B/L thighs  Quality: dull ache  Aggravating factors: standing and walking  Progression: worsening    Social Support  Steps to enter house: no  Stairs in house: no   Lives in: apartment  Lives with: alone    Hand dominance: right    Treatments  Previous treatment: medication  Current treatment: physical therapy  Discharged from (in last 30 days): inpatient hospitalization  Discharged from (in last 30 days) comments: CHF  Patient Goals  Patient goals for therapy: decreased pain, increased motion, increased strength, independence with ADLs/IADLs and return to sport/leisure activities  Patient goal: stand and walk with less pain; lose weight        Objective     Concurrent Complaints  Negative for bladder dysfunction, bowel dysfunction and saddle (S4) numbness    Postural Observations  Seated posture: poor        Palpation   Left   Tenderness of the lumbar paraspinals  Right   Tenderness of the lumbar paraspinals       Additional Palpation Details  B/L Glute max/med diffuse tenderness    Neurological Testing     Sensation     Lumbar   Left   Intact: light touch    Right   Intact: light touch    Active Range of Motion     Lumbar   Flexion: 40 degrees  with pain Restriction level: minimal  Extension: 5 degrees  with pain Restriction level: maximal  Left lateral flexion: 10 degrees    with pain Restriction level: moderate  Right lateral flexion: 10 degrees  with pain Restriction level: moderate  Left rotation: 25 degrees  Restriction level: minimal  Right rotation: 25 degrees  Restriction level: minimal    Passive Range of Motion     Lumbar   Left lateral flexion:  Restriction level: moderate  Right lateral flexion:  Restriction level: moderate  Left rotation:  Restriction level: minimal  Right rotation:  Restriction level: minimal  Mechanical Assessment    Cervical      Thoracic      Lumbar    Standing flexion: repeated movements   Pain location:no change  Pain intensity: worse  Pain level: increased  Standing extension: repeated movements  Pain location: no change  Lying extension: sustained positions  Pain location: no change  Pain intensity: better  Pain level: decreased    Strength/Myotome Testing     Left Hip   Planes of Motion   Flexion: 3+  Extension: 3-  Abduction: 3  Adduction: 3+    Right Hip   Planes of Motion   Flexion: 3+  Extension: 3-  Abduction: 3  Adduction: 3+    Left Knee   Flexion: 4    Right Knee   Flexion: 4    Left Ankle/Foot   Dorsiflexion: 4    Right Ankle/Foot   Dorsiflexion: 4    Muscle Activation     Additional Muscle Activation Details  Decreased activation of TA and multifidus B/L    Tests     Lumbar     Left   Negative slump test      Right   Negative slump test      Additional Tests Details  Timed up and Go: 14 seconds    Tandem stand: unable to attempt    Lumbar FOTO: 40% function(predict 51%)    + pain with piriformis stretch B/L          Precautions: CHF/SOB/Afib    Daily Treatment Diary   Specialty Daily Treatment Diary     Manual  2/18/19       STM lumbar paraspinals                Hamstring stretch B/L        Piriformis Stretch B/L        Hip Flexor Stretch B/L            Exercise Diary  2/18/19       Treadmill Ambulation        UBE Stand ALT FWD/BACK        Core brace  *      Brace with knee fall outs        Glute sets x10:05       Bridges  *      Self 90-90 hamstring stretch B/L  *towel      Prone press ups  *      Standing back extensions  *      Quad DLS UE  LE  UE+LE        LTR cross legs        Clam shells (back stable)        Quadruped knee lifts        Lean on mat donkey kicks        Prone on elbows X10:10  Pillow at pelvis       SLR extensions/flex  *      Clam shells                                    Modalities        Ultrasound Low Back 1MhZ  2 0 w/cm2                        The patient was given a new home exercise plan with written handout, pictures, and verbal instruction  The patient accepts and understands the new home activities

## 2019-02-19 ENCOUNTER — PATIENT OUTREACH (OUTPATIENT)
Dept: INTERNAL MEDICINE CLINIC | Facility: CLINIC | Age: 74
End: 2019-02-19

## 2019-02-19 NOTE — PROGRESS NOTES
Left message on Lainey's voice mail introducing myself and providing my contact information and that I was calling to see how she was doing  Will call again if no return from Radha Sandoval

## 2019-02-21 ENCOUNTER — APPOINTMENT (OUTPATIENT)
Dept: PHYSICAL THERAPY | Facility: CLINIC | Age: 74
End: 2019-02-21
Payer: MEDICARE

## 2019-02-25 ENCOUNTER — APPOINTMENT (OUTPATIENT)
Dept: PHYSICAL THERAPY | Facility: CLINIC | Age: 74
End: 2019-02-25
Payer: MEDICARE

## 2019-02-27 ENCOUNTER — OFFICE VISIT (OUTPATIENT)
Dept: PHYSICAL THERAPY | Facility: CLINIC | Age: 74
End: 2019-02-27
Payer: MEDICARE

## 2019-02-27 DIAGNOSIS — M54.50 CHRONIC BILATERAL LOW BACK PAIN WITHOUT SCIATICA: Primary | ICD-10-CM

## 2019-02-27 DIAGNOSIS — G89.29 CHRONIC BILATERAL LOW BACK PAIN WITHOUT SCIATICA: Primary | ICD-10-CM

## 2019-02-27 PROCEDURE — 97535 SELF CARE MNGMENT TRAINING: CPT | Performed by: PHYSICAL THERAPIST

## 2019-02-27 PROCEDURE — 97110 THERAPEUTIC EXERCISES: CPT | Performed by: PHYSICAL THERAPIST

## 2019-02-27 NOTE — PROGRESS NOTES
Daily Note     Today's date: 2019  Patient name: Chen Kessler  : 1945  MRN: [id number]  Referring provider: Aj Simeon MD  Dx:   Encounter Diagnosis     ICD-10-CM    1  Chronic bilateral low back pain without sciatica M54 5     G89 29                   Subjective: "My back is really bad today "  The patient thinks the arthritis in her spine is causing her pain  The patient reports feeling an 8/10 back pain today  Objective: See treatment diary below  Precautions: CHF/SOB/Afib  RE-EVAL: 3/18/19  Daily Treatment Diary   Specialty Daily Treatment Diary     Manual  19      STM lumbar paraspinals        IT band stretch B/L  3x:30      Hamstring stretch B/L        Piriformis Stretch B/L                    Exercise Diary  19      Nu Step S=11  L2 [id number]      Treadmill Ambulation        PPT  10x:05      Abdominal hollow  *10x:05      S K->C        Glute sets x10:05       Bridges        Sit HS stretch  *3x:30      Sit lumber flexion  10x:03      Standing back extensions  *x10 :02p!p! In legs      Quad DLS UE  LE  UE+LE        LTR   10x:10      Clam shells (back stable)                        Prone on elbows X10:10  Pillow at pelvis       SLR extensions/flex        Clam shells                                    Modalities 19       Ultrasound Low Back 1MhZ  2 0 w/cm2                        The patient was given a new home exercise plan with written handout, pictures, and verbal instruction  The patient accepts and understands the new home activities  Assessment: The patient experienced increased leg pain with standing back extensions so repeated flexion was attempted to alleviate afterwards  The patient's leg symptoms abolished with seated repeated flexion  The patient does follow a stenosis pattern  The patient will benefit from continued PT to achieve her goals of therapy  Plan: Continue per plan of care  Progress treatment as tolerated

## 2019-02-28 DIAGNOSIS — E03.2 HYPOTHYROIDISM DUE TO MEDICATION: ICD-10-CM

## 2019-02-28 DIAGNOSIS — E78.00 HYPERCHOLESTEROLEMIA: ICD-10-CM

## 2019-02-28 RX ORDER — LEVOTHYROXINE SODIUM 0.05 MG/1
75 TABLET ORAL DAILY
Qty: 90 TABLET | Refills: 0
Start: 2019-02-28 | End: 2019-03-06 | Stop reason: SDUPTHER

## 2019-02-28 RX ORDER — SIMVASTATIN 10 MG
10 TABLET ORAL
Qty: 90 TABLET | Refills: 3 | Status: ON HOLD | OUTPATIENT
Start: 2019-02-28 | End: 2020-05-04 | Stop reason: SDUPTHER

## 2019-03-01 ENCOUNTER — OFFICE VISIT (OUTPATIENT)
Dept: PHYSICAL THERAPY | Facility: CLINIC | Age: 74
End: 2019-03-01
Payer: MEDICARE

## 2019-03-01 DIAGNOSIS — G89.29 CHRONIC BILATERAL LOW BACK PAIN WITHOUT SCIATICA: Primary | ICD-10-CM

## 2019-03-01 DIAGNOSIS — M54.50 CHRONIC BILATERAL LOW BACK PAIN WITHOUT SCIATICA: Primary | ICD-10-CM

## 2019-03-01 PROCEDURE — 97110 THERAPEUTIC EXERCISES: CPT | Performed by: PHYSICAL THERAPIST

## 2019-03-01 NOTE — PROGRESS NOTES
Daily Note     Today's date: 3/1/2019  Patient name: Rach Berry  : 1945  MRN: [id number]  Referring provider: Yaneth Macedo MD  Dx:   Encounter Diagnosis     ICD-10-CM    1  Chronic bilateral low back pain without sciatica M54 5     G89 29                   Subjective: "I'm hurting because of the weather,"  The patient rates her pain an 8/10 in intensity at her low back  The patient complains of muscle soreness at her abdominals from doing her core exercises  Objective: See treatment diary below  Precautions: CHF/SOB/Afib  RE-EVAL: 3/18/19  Daily Treatment Diary   Specialty Daily Treatment Diary     Manual  2/18/19 2/27/19 3/1/19     STM lumbar paraspinals        IT band stretch B/L  3x:30      Hamstring stretch B/L        Piriformis Stretch B/L                    Exercise Diary  2/18/19 2/27/19 3/1/19     Nu Step S=11  L2 [id number] L2 x17 mins     Treadmill Ambulation        PPT  10x:05      Abdominal hollow  *10x:05 x15 :05     S K->C  x10 x10 B/L     Glute sets x10:05       Bridges        Sit HS stretch  *3x:30 3x:30     Sit lumber flexion  10x:03 15x:05     Standing back extensions  *x10 :02p!p! In legs ---------     Quad DLS UE  LE  UE+LE   -------     LTR   10x:10 x15 :05     Clam shells (back stable)   x10:05     Anti rotation    *    Mini Squats    *    Prone on elbows X10:10  Pillow at pelvis --------      Stand SLR extensions/flex/Abd    *                                        Modalities 19       Ultrasound Low Back 1MhZ  2 0 w/cm2                        The patient was given a new home exercise plan with written handout, pictures, and verbal instruction  The patient accepts and understands the new home activities  Assessment: The patient was able to tolerate a significant increase in exercise time on the Nu step without complaints of SOB or increased back/leg pain  The patient continues to follow a stenosis pattern of symptoms    We progressed her core strengthening activities as able  The patient did not complain of increased pain or problems after the session  The patient will benefit from continued PT to improve her function/walking tolerance  Plan: Will progress core strengthening to patient tolerance  Will attempt some standing activities to build tolerance

## 2019-03-04 ENCOUNTER — APPOINTMENT (OUTPATIENT)
Dept: PHYSICAL THERAPY | Facility: CLINIC | Age: 74
End: 2019-03-04
Payer: MEDICARE

## 2019-03-05 ENCOUNTER — APPOINTMENT (OUTPATIENT)
Dept: PHYSICAL THERAPY | Facility: CLINIC | Age: 74
End: 2019-03-05
Payer: MEDICARE

## 2019-03-05 DIAGNOSIS — I10 ESSENTIAL HYPERTENSION: ICD-10-CM

## 2019-03-06 ENCOUNTER — APPOINTMENT (OUTPATIENT)
Dept: PHYSICAL THERAPY | Facility: CLINIC | Age: 74
End: 2019-03-06
Payer: MEDICARE

## 2019-03-06 ENCOUNTER — OFFICE VISIT (OUTPATIENT)
Dept: CARDIOLOGY CLINIC | Facility: CLINIC | Age: 74
End: 2019-03-06
Payer: MEDICARE

## 2019-03-06 VITALS
BODY MASS INDEX: 45.08 KG/M2 | WEIGHT: 287.2 LBS | HEART RATE: 80 BPM | HEIGHT: 67 IN | DIASTOLIC BLOOD PRESSURE: 68 MMHG | SYSTOLIC BLOOD PRESSURE: 110 MMHG

## 2019-03-06 DIAGNOSIS — I48.91 ATRIAL FIBRILLATION, UNSPECIFIED TYPE (HCC): ICD-10-CM

## 2019-03-06 DIAGNOSIS — E03.2 HYPOTHYROIDISM DUE TO MEDICATION: ICD-10-CM

## 2019-03-06 DIAGNOSIS — N18.30 STAGE 3 CHRONIC KIDNEY DISEASE (HCC): ICD-10-CM

## 2019-03-06 DIAGNOSIS — G47.33 OBSTRUCTIVE SLEEP APNEA: ICD-10-CM

## 2019-03-06 DIAGNOSIS — I10 ESSENTIAL HYPERTENSION: ICD-10-CM

## 2019-03-06 DIAGNOSIS — Z86.73 HISTORY OF STROKE: ICD-10-CM

## 2019-03-06 DIAGNOSIS — I50.32 CHRONIC DIASTOLIC HEART FAILURE (HCC): Primary | ICD-10-CM

## 2019-03-06 PROBLEM — I50.33 ACUTE ON CHRONIC DIASTOLIC HEART FAILURE (HCC): Status: RESOLVED | Noted: 2018-08-30 | Resolved: 2019-03-06

## 2019-03-06 PROCEDURE — 99215 OFFICE O/P EST HI 40 MIN: CPT | Performed by: NURSE PRACTITIONER

## 2019-03-06 PROCEDURE — 93000 ELECTROCARDIOGRAM COMPLETE: CPT | Performed by: NURSE PRACTITIONER

## 2019-03-06 RX ORDER — LEVOTHYROXINE SODIUM 0.05 MG/1
75 TABLET ORAL DAILY
Qty: 90 TABLET | Refills: 1 | Status: SHIPPED | OUTPATIENT
Start: 2019-03-06 | End: 2019-03-25 | Stop reason: DRUGHIGH

## 2019-03-06 RX ORDER — POTASSIUM CHLORIDE 20 MEQ/1
20 TABLET, EXTENDED RELEASE ORAL 2 TIMES DAILY
Qty: 180 TABLET | Refills: 1 | Status: SHIPPED | OUTPATIENT
Start: 2019-03-06 | End: 2019-09-18 | Stop reason: SDUPTHER

## 2019-03-06 NOTE — PROGRESS NOTES
Heart Failure Office Visit   Ky Jeffery 72 y o  female MRN: [id number]  Congestive Heart Failure         Ms Quentin Florez is a 72year old female with a known past medical history of   Atrial fibrillation ablation in 1/2018  DANTE compliant with CPAP she the  Patient Active Problem List   Diagnosis    [facility] fibrillation ([facility] )    Essential hypertension    Prediabetes    Depression    Hypercholesterolemia    Migraine    Mitral regurgitation    Class 3 severe obesity due to excess calories with serious comorbidity and body mass index (BMI) of 40 0 to 44 9 in adult ([place] [address] )    Obstructive sleep apnea    Primary localized osteoarthritis of right knee    Stroke ([place] [address] )    History of stroke    Acute on chronic diastolic heart failure (HCC)    Postural dizziness with presyncope    Symptomatic bradycardia    Chronic bilateral low back pain without sciatica    Stage 3 chronic kidney disease (HCC)    Anemia    Acquired hypothyroidism       Ms Quentin Florez was hospitalized in [id number] with symptomatic bradycardia  The Toprol decreased from 100 mg b i d  To 50 mg b i d    Ms Rhoda Marte was hospitalized at [place] [address] on 11/01-11/05/18 with acute on chronic diastolic heart failure  She presented with dyspnea  TTE showed left ventricular ejection fraction 60%, mild concentric hypertrophy, grade 1 diastolic dysfunction  Mild mitral stenosis, moderate aortic valve regurgitation  Sara Dumont was seen in our office on 11/26/18 Her weight was 276 pounds  Today Sara Dumont presents to our office for a follow up visit  She admits to eating more and is trying to abide by a 2mg sodium diet 1500 cc fluid restriction  She admits to ro LE edema and weight gain  Her weight in the office today is 292 pounds  12/18/18 sodium 141, potassium 3 4, BUN 17, creat 1 64, GFR 31  Review of Systems   Cardiovascular: Positive for dyspnea on exertion         Objective:   Vitals: RUE sitting   Vitals:    03/06/19 0845   BP: 110/68   BP Location: Left arm   Patient Position: Sitting   Cuff Size: Large   Pulse: 80   Weight: 130 kg (287 lb 3 2 oz)   Height: 5' 7" (1 702 m)     Body mass index is 44 98 kg/m²      Wt Readings from Last 3 Encounters:   03/06/19 130 kg (287 lb 3 2 oz)   02/03/19 129 kg (284 lb 2 8 oz)   01/02/19 133 kg (292 lb 9 6 oz)         Physical Exam:  GEN: Nicole Christine appears well, alert and oriented x 3, pleasant and cooperative   HEENT: pupils equal, round, and reactive to light; extraocular muscles intact  NECK: supple, no carotid bruits   HEART: regular rhythm, normal S1 and S2, no murmurs, clicks, gallops or rubs, JVP is flat   LUNGS: clear to auscultation bilaterally; no wheezes, rales, or rhonchi   ABDOMEN: normal bowel sounds, soft, no tenderness, no distention  EXTREMITIES: peripheral pulses normal; no clubbing, cyanosis, or 1+-2+ ro Le edema  NEURO: no focal findings   SKIN: normal without suspicious lesions on exposed skin      Current Outpatient Medications:     amiodarone 200 mg tablet, Take 1/2 tablet daily, Disp: 45 tablet, Rfl: 2    clopidogrel (PLAVIX) 75 mg tablet, TAKE ONE TABLET BY MOUTH ONCE DAILY, Disp: 90 tablet, Rfl: 0    ELIQUIS 5 MG, TAKE ONE TABLET BY MOUTH TWO TIMES A DAY, Disp: 60 tablet, Rfl: 10    escitalopram (LEXAPRO) 10 mg tablet, Take 1 tablet (10 mg total) by mouth daily, Disp: 90 tablet, Rfl: 1    gabapentin (NEURONTIN) 100 mg capsule, Take 1 capsule (100 mg total) by mouth 3 (three) times a day, Disp: 90 capsule, Rfl: 3    levothyroxine 50 mcg tablet, Take 1 5 tablets (75 mcg total) by mouth daily, Disp: 90 tablet, Rfl: 0    LORazepam (ATIVAN) 1 mg tablet, Take 1 tablet (1 mg total) by mouth 3 (three) times a day as needed for anxiety, Disp: 30 tablet, Rfl: 0    meclizine (ANTIVERT) 25 mg tablet, Take 25 mg by mouth 3 (three) times a day as needed for dizziness , Disp: , Rfl:     metoprolol tartrate (LOPRESSOR) 50 mg tablet, Take 1 tablet (50 mg total) by mouth every 12 (twelve) hours, Disp: 60 tablet, Rfl: 3    Multiple Vitamin (MULTIVITAMIN) tablet, Take 1 tablet by mouth daily  , Disp: , Rfl:     nortriptyline (PAMELOR) 50 mg capsule, Take 1 capsule (50 mg total) by mouth daily, Disp: 90 capsule, Rfl: 1    oxyCODONE-acetaminophen (PERCOCET) 5-325 mg per tablet, Take 1 tablet by mouth every 4 (four) hours as needed for moderate pain Max Daily Amount: 6 tablets, Disp: 50 tablet, Rfl: 0    potassium chloride (K-DUR,KLOR-CON) 20 mEq tablet, Take 1 tablet (20 mEq total) by mouth 2 (two) times a day, Disp: 180 tablet, Rfl: 3    simvastatin (ZOCOR) 10 mg tablet, Take 1 tablet (10 mg total) by mouth daily at bedtime, Disp: 90 tablet, Rfl: 3    torsemide (DEMADEX) 20 mg tablet, Take 1 tablet (20 mg total) by mouth twice a day except for Monday and Thursdays take 2 tablets (40 mg total) in morning, Disp: 70 tablet, Rfl: 2        Assessment/Plan:   1 Chronic diastolic heart failure LVEF 60%, NYHA class III stage C- Decompensated, On PE presents with [place] LE edema, BP and HR controlled, compliant with CPAP  Weight up from 276 pounds to 292 pounds with  Mix of calorie weight and fluid weight  Continue on   Metoprolol tartrate 50 mg q 12 hours,  Chapito LE edema, Increase dose of Torsemide from  20 mg b i d  To 40mg BID for 3 days then return to 20mg BID, increase K fur from  20 mEq b i d  To 40 meq BID for 3 days then return to 20meq BID   Maintain a 2 gram daily sodium diet and 1500 ml daily fluid restriction  Check daily weights  If you gain 3 pounds in one day, 5 pounds in one week, or experience worsening shortness of breath or increasing lower leg swelling  Please call the heart failure office at [phone]  Please bring a  list of your current medications and daily weights to the office visit  2  DANTE compliant with CPAP at night  3   Paroxysmal Atrial fibrillation- Currently in NSR 1st degree AVB, sp typical flutter ablation 1/2018, continue on the Toprol tartrate 50 mg q 12 hours, amiodarone 100 mg daily, Eliquis 5 mg b i d  4  Prior CVA continue on Plavix 75 mg daily  5  CKD III baseline creat baseline creat 1 33-1 44- 12/18/18 BUN 14, creat 1 64, BMP in 2 weeks   6  Morbid obesity- Refusing referral to bariatric surgery  Referred to [facility] Weight loss managment  Follow up with Dr Nida Trejo in 4- 6 weeks           ALEJANDRA Rogers  3/6/2019  9:04 AM

## 2019-03-06 NOTE — PROGRESS NOTES
Heart Failure Office Visit   Rach Berry 72 y o  female MRN: [id number]    [facility]  Ms Royal Bang is a 72year old female with a known past medical history of   Atrial fibrillation sp ablation in 1/2018  DANTE compliant with CPAP   CKD III baseline creat 1 4  CVA   5/18/18 LVEF 60%, grade 1 DD  Mod AI, mild MR, mild MS, mild TR PAP 45mmHg  Patient Active Problem List   Diagnosis    Atrial fibrillation ([facility] )    Essential hypertension    Prediabetes    Depression    Hypercholesterolemia    Migraine    Mitral regurgitation    Class 3 severe obesity due to excess calories with serious comorbidity and body mass index (BMI) of 40 0 to 44 9 in adult ([facility] )    Obstructive sleep apnea    Primary localized osteoarthritis of right knee    Stroke ([place] [address] )    History of stroke    Acute on chronic diastolic heart failure (HCC)    Postural dizziness with presyncope    Symptomatic bradycardia    Chronic bilateral low back pain without sciatica    Stage 3 chronic kidney disease ([facility] )    Anemia    Acquired hypothyroidism     Ms Royal Bang was hospitalized at [facility] [place] - 2/03/19 with acute on chronic diastolic heart failure  She presented with worsening shortness of breath and ro LE edema  On admission weight 295 pounds,  NT pro   She was treated with IV Lasix  TTE showed left ventricular ejection fraction 60%, mild concentric hypertrophy, grade 1 diastolic dysfunction  Mild mitral stenosis, moderate aortic valve regurgitation  Discharge weight 284 pounds  She was discharged on a slightly higher dose of Torsemide  Lab studies at discharge BUN 22/creat 1 49    Today Yoshi Bryson presents to our office for a recent hospitalization follow up visit  She is accompanied by a family member  She denies dyspnea with rest, orthopnea, CP, palpitations, lightheadedness or dizziness  She admits to dyspnea with moderate exertion and compliance with CPAP at night    Her weight at home is stable 287 pounds  She is trying to abide by a 2 gm sodium diet  She is undergoing PT for back pain  Review of Systems   Cardiovascular: Positive for dyspnea on exertion  Objective:   Vitals:   Vitals:    03/06/19 0845   BP: 110/68   BP Location: Left arm   Patient Position: Sitting   Cuff Size: Large   Pulse: 80   Weight: 130 kg (287 lb 3 2 oz)   Height: 5' 7" (1 702 m)     Body mass index is 44 98 kg/m²      Wt Readings from Last 3 Encounters:   03/06/19 130 kg (287 lb 3 2 oz)   02/03/19 129 kg (284 lb 2 8 oz)   01/02/19 133 kg (292 lb 9 6 oz)         Physical Exam:  GEN: Irasema Roger appears well, alert and oriented x 3, pleasant and cooperative   HEENT: pupils equal, round, and reactive to light; extraocular muscles intact  NECK: supple, no carotid bruits   HEART: regular rhythm, normal S1 and S2,  no murmurs, clicks, gallops or rubs, JVP is flat   LUNGS: clear to auscultation bilaterally; no wheezes, rales, or rhonchi   ABDOMEN: normal bowel sounds, soft, no tenderness, no distention  EXTREMITIES: peripheral pulses normal; no clubbing, cyanosis, trace ro LE edema  NEURO: no focal findings   SKIN: normal without suspicious lesions on exposed skin      Current Outpatient Medications:     amiodarone 200 mg tablet, Take 1/2 tablet daily, Disp: 45 tablet, Rfl: 2    clopidogrel (PLAVIX) 75 mg tablet, TAKE ONE TABLET BY MOUTH ONCE DAILY, Disp: 90 tablet, Rfl: 0   ELIQUIS 5 MG, TAKE ONE TABLET BY MOUTH TWO TIMES A DAY, Disp: 60 tablet, Rfl: 10    escitalopram (LEXAPRO) 10 mg tablet, Take 1 tablet (10 mg total) by mouth daily, Disp: 90 tablet, Rfl: 1    gabapentin (NEURONTIN) 100 mg capsule, Take 1 capsule (100 mg total) by mouth 3 (three) times a day, Disp: 90 capsule, Rfl: 3    levothyroxine 50 mcg tablet, Take 1 5 tablets (75 mcg total) by mouth daily, Disp: 90 tablet, Rfl: 0    LORazepam (ATIVAN) 1 mg tablet, Take 1 tablet (1 mg total) by mouth 3 (three) times a day as needed for anxiety, Disp: 30 tablet, Rfl: 0    meclizine (ANTIVERT) 25 mg tablet, Take 25 mg by mouth 3 (three) times a day as needed for dizziness , Disp: , Rfl:     metoprolol tartrate (LOPRESSOR) 50 mg tablet, Take 1 tablet (50 mg total) by mouth every 12 (twelve) hours, Disp: 60 tablet, Rfl: 3    Multiple Vitamin (MULTIVITAMIN) tablet, Take 1 tablet by mouth daily  , Disp: , Rfl:     nortriptyline (PAMELOR) 50 mg capsule, Take 1 capsule (50 mg total) by mouth daily, Disp: 90 capsule, Rfl: 1    oxyCODONE-acetaminophen (PERCOCET) 5-325 mg per tablet, Take 1 tablet by mouth every 4 (four) hours as needed for moderate pain Max Daily Amount: 6 tablets, Disp: 50 tablet, Rfl: 0    potassium chloride (K-DUR,KLOR-CON) 20 mEq tablet, Take 1 tablet (20 mEq total) by mouth 2 (two) times a day, Disp: 180 tablet, Rfl: 3    simvastatin (ZOCOR) 10 mg tablet, Take 1 tablet (10 mg total) by mouth daily at bedtime, Disp: 90 tablet, Rfl: 3    torsemide (DEMADEX) 20 mg tablet, Take 1 tablet (20 mg total) by mouth twice a day except for Monday and Thursdays take 2 tablets (40 mg total) in morning, Disp: 70 tablet, Rfl: 2        Assessment/Plan:   1 Chronic diastolic heart failure LVEF 60%, NYHA class III stage C- On PE eu volemic and compensated, BP and HR controlled, compliant with CPAP  Continue on   Metoprolol tartrate 50 mg q 12 hours, torsemide 20 mg b i d except on Monday and Thrusday Torsemide 40mg in am/20mg in pm , K-Dur 20 mEq b i d   BMP, CBC, Mag in near future   Maintain a 2 gram daily sodium diet and 1500 ml daily fluid restriction  Check daily weights  If you gain 3 pounds in one day, 5 pounds in one week, or experience worsening shortness of breath or increasing lower leg swelling  Please call the heart failure office at [phone]  Please bring a  list of your current medications and daily weights to the office visit  2  DANTE compliant with CPAP at night  3  Paroxysmal Atrial fibrillation- sp typical flutter ablation 1/2018, continue on the Toprol tartrate 50 mg q 12 hours, amiodarone 100 mg daily, Eliquis 5 mg b i d  4  CKD III baseline creat baseline creat 1 33-1  44- BMP in one week  5  Prior CVA continue on Plavix 75 mg daily  Follow up with Rosa Archer in 2 weeks          ALEJANDRA Garza  3/6/2019  9:01 AM

## 2019-03-06 NOTE — PATIENT INSTRUCTIONS
Maintain a 2 gram daily sodium diet and 1500 ml daily fluid restriction  Check daily weights  If you gained 3 pounds in one day, 5 pounds in one week, or experience worsening shortness of breath or increasing lower leg swelling  Please call the heart failure office at [phone]    Please bring a  list of your current medications and daily weights to the office visit

## 2019-03-08 ENCOUNTER — OFFICE VISIT (OUTPATIENT)
Dept: PHYSICAL THERAPY | Facility: CLINIC | Age: 74
End: 2019-03-08
Payer: MEDICARE

## 2019-03-08 DIAGNOSIS — G89.29 CHRONIC BILATERAL LOW BACK PAIN WITHOUT SCIATICA: Primary | ICD-10-CM

## 2019-03-08 DIAGNOSIS — M54.50 CHRONIC BILATERAL LOW BACK PAIN WITHOUT SCIATICA: Primary | ICD-10-CM

## 2019-03-08 PROCEDURE — 97140 MANUAL THERAPY 1/> REGIONS: CPT

## 2019-03-08 PROCEDURE — 97110 THERAPEUTIC EXERCISES: CPT

## 2019-03-08 NOTE — PROGRESS NOTES
Daily Note     Today's date: 3/8/2019  Patient name: Donna Parr  : 1945  MRN: [id number]  Referring provider: Earlene Owen MD  Dx:   Encounter Diagnosis     ICD-10-CM    1  Chronic bilateral low back pain without sciatica M54 5     G89 29                   Subjective: Patient states she has a 7/10 pain in her back that she woke up with  She does not know why because she usually gets the pain with weather changes  Objective: See treatment diary below  Precautions: CHF/SOB/Afib  RE-EVAL: 3/18/19  Daily Treatment Diary   Specialty Daily Treatment Diary     Manual  2/18/19 2/27/19 3/1/19 3/8/19    STM lumbar paraspinals        IT band stretch B/L  3x:30  3x :30    Hamstring stretch B/L        Piriformis Stretch B/L                    Exercise Diary  2/18/19 2/27/19 3/1/19 3/8/19    Nu Step S=11  L2 [id number] L2 x17 mins L2x 10 min    Treadmill Ambulation        PPT  10x:05  :05x10    Abdominal hollow  *10x:05 x15 :05 :05x20    S K->C  x10 x10 B/L x15 B/L    Glute sets x10:05       Bridges        Sit HS stretch  *3x:30 3x:30 :30x 3 B/L    Sit lumber flexion  10x:03 15x:05 :05x15    Standing back extensions  *x10 :02p!p! In legs ---------     Quad DLS UE  LE  UE+LE   -------     LTR   10x:10 x15 :05 :05x20    Clam shells (back stable)   x10:05 :05x15    Anti rotation    *redx10 ea    Mini Squats    *x10    Prone on elbows X10:10  Pillow at pelvis --------      Stand SLR extensions/flex/Abd    *x10 ea  B/L                                        Modalities 19       Ultrasound Low Back 1MhZ  2 0 w/cm2                          Patient's home exercise program updated to include additional exercises  Handout given and explained  Assessment: Tolerated treatment well  Patient would benefit from continued PT for stretching and strengthening  Patient was able to increase some exercise and add exercises without difficulty  She did not change her pain level by the end of the session   Patient liked the new exercises  805      Plan: Continue per plan of care  Progress treatment as tolerated

## 2019-03-11 ENCOUNTER — OFFICE VISIT (OUTPATIENT)
Dept: PHYSICAL THERAPY | Facility: CLINIC | Age: 74
End: 2019-03-11
Payer: MEDICARE

## 2019-03-11 DIAGNOSIS — G89.29 CHRONIC BILATERAL LOW BACK PAIN WITHOUT SCIATICA: Primary | ICD-10-CM

## 2019-03-11 DIAGNOSIS — M54.50 CHRONIC BILATERAL LOW BACK PAIN WITHOUT SCIATICA: Primary | ICD-10-CM

## 2019-03-11 PROCEDURE — 97110 THERAPEUTIC EXERCISES: CPT

## 2019-03-11 PROCEDURE — 97140 MANUAL THERAPY 1/> REGIONS: CPT

## 2019-03-11 NOTE — PROGRESS NOTES
Daily Note     Today's date: 3/11/2019  Patient name: Chen Kessler  : 1945  MRN: [id number]  Referring provider: Aj Simeon MD  Dx:   Encounter Diagnosis     ICD-10-CM    1  Chronic bilateral low back pain without sciatica M54 5     G89 29                   Subjective: Patient states her back hurts a lot today (8/10 pain)  Patient reports this increase in pain has been for the last 3 to 4 days  She was a little sore in the thighs after last session that subsided by the next day  She feels her legs have been tight and spasm a lot lately  Objective: See treatment diary below    Precautions: CHF/SOB/Afib  RE-EVAL: 3/18/19  Daily Treatment Diary   Specialty Daily Treatment Diary     Manual  2/18/19 2/27/19 3/1/19 3/8/19 3/11/19   STM lumbar paraspinals        IT band stretch B/L  3x:30  3x :30 3x:30   Hamstring stretch B/L     *3x:30   Piriformis Stretch B/L     *3x:30               Exercise Diary  2/18/19 2/27/19 3/1/19 3/8/19 3/11/19   Nu Step S=11  L2 [id number] L2 x17 mins L2x 10 min L2 x10 min with MH   Treadmill Ambulation        PPT  10x:05  :05x10 :05 x15   Abdominal hollow  *10x:05 x15 :05 :05x20 :05x25   S K->C  x10 x10 B/L x15 B/L x20 B/L   Glute sets x10:05       Bridges        Sit HS stretch  *3x:30 3x:30 :30x 3 B/L :30 x3 B/L   Sit lumber flexion  10x:03 15x:05 :05x15 with stool : 05 x15 with stool   Standing back extensions  *x10 :02p!p! In legs ---------     Quad DLS UE  LE  UE+LE   -------     LTR   10x:10 x15 :05 :05x20 :05 x25   Clam shells (back stable)   x10:05 :05x15 hold   Anti rotation    *redx10 ea Red x 15 ea   Mini Squats    *x10 x10   Prone on elbows X10:10  Pillow at pelvis --------      Stand SLR extensions/flex/Abd    *x10 ea  B/L hold                                       Modalities 19       Ultrasound Low Back 1MhZ  2 0 w/cm2                          Assessment: Tolerated treatment well  Patient would benefit from continued PT for stretching and strengthening  Patient was able to increase exercises and add stretches to her program without difficulty  Patient has most increase of back discomfort when she is on her feet  Discussed with patient about talking to MD about possible causes that may be increasing them  Also discussed who she can see about her migraines  Some exercises no performed today due to her back pain levels today  By end of session kezia's pain levels did not change, but she did like the addition of the heat  Plan: Continue per plan of care  Progress treatment as tolerated

## 2019-03-12 ENCOUNTER — APPOINTMENT (OUTPATIENT)
Dept: PHYSICAL THERAPY | Facility: CLINIC | Age: 74
End: 2019-03-12
Payer: MEDICARE

## 2019-03-13 ENCOUNTER — APPOINTMENT (OUTPATIENT)
Dept: PHYSICAL THERAPY | Facility: CLINIC | Age: 74
End: 2019-03-13
Payer: MEDICARE

## 2019-03-13 NOTE — PROGRESS NOTES
PT Re-Evaluation     Today's date: 3/13/2019  Patient name: Rach Berry  : 1945  MRN: [id number]  Referring provider: Yaneth Macedo MD  Dx:   Encounter Diagnosis     ICD-10-CM    1  Chronic bilateral low back pain without sciatica M54 5     G89 29                   Assessment  Assessment details: Rach Berry is a 72 y o  female that has attended 6 sessions of physical therapy  The patient has made functional gains since starting therapy  The patient is now able to stand up longer prior to needing to sit and rest   The patient continues to have difficulty with prolonged standing and walking - she is unable to tolerate 10 minutes of standing/walking to grocery shop or do her dishes at home  The patient will benefit from continued skilled PT to address impairments, work towards goals, and restore patient PLOF  Impairments: abnormal or restricted ROM, activity intolerance, impaired balance, impaired physical strength, pain with function and poor posture   Functional limitations: difficulty standing/walking>1 min, unable to grocery shop, difficulty standing to cook, clean, and perform ADL's; difficulty going sit to stand, getting in/out of the shower, and in/out of a vehicle; difficulty ambulating up/down steps  Symptom irritability: highBarriers to therapy: Medical history/chronicity of condition  Understanding of Dx/Px/POC: good   Prognosis: fair    Goals  STG: Achieve in 4 weeks  1  Decrease lumbar pain at worst by 50% to improve activity tolerance for self/care and leisure activities  PROGRESSING  2  Improve hip/core strength by 1/2 muscle grade to improve ability to change body positions without difficulty  PROGRESSING  3  Improve lumbar ROM by 10-15 degrees to facilitate normal movement patterns for ADL's/work tasks  MET    LTG: Achieve in 6 weeks  1  Patient to return to near [facility] as indicated by an increase in FOTO score of: >52%  PROGRESSING  2    Patient tolerate standing/walking for at least 10 minutes prior to sit/rest to achieve patient specific goal   PROGRESSING  3  Patient achieve independence with perofrming home exercise plan  [facility]      Plan  Patient would benefit from: PT shar  Planned modality interventions: ultrasound, unattended electrical stimulation, traction, TENS, cryotherapy and thermotherapy: hydrocollator packs  Planned therapy interventions: joint mobilization, manual therapy, massage, neuromuscular re-education, patient education, postural training, self care, strengthening, stretching, therapeutic activities, therapeutic exercise, home exercise program, gait training, flexibility and balance/weight bearing training  Frequency: 2-3x/wk  Duration in visits: 8  Duration in weeks: 3  Plan of Care beginning date: 3/14/2019  Plan of Care expiration date: 4/5/2019  Treatment plan discussed with: patient and PTA        Subjective Evaluation    History of Present Illness  Date of onset: 8/15/2018  Mechanism of injury: SUBJECTIVE:  The patient reports she is able to stay on her feet a little longer but notes she is still needing to take multiple seated rests to load the , and is still using the motorized cart to grocery shop  The patient notes she does the exercises periodically at home  INJURY HISTORY:  Janae Donato is a 72 y o  female that presents to outpatient physical therapy with complaints of low back pain which started > 6 months ago without distinct MASOOD  The patient reports the pain starts  At the low back, radiates into the hips and down the thighs but remains above the knees  The patient has the most difficulty standing and walking - she can only stand/walk for 1 minute prior to feeling an increase in pain and also an increase in SOB  The patient admits she was discharged from the hospital for a CHF exacerbation 2/3/19    The patient's main goal for physical therapy is to improve her walking tolerance/standing tolerance so she can get into better shape  The patient was prescribed gabapentin for her LBP which she reports did not help to decrease her pain  Patient denies having any other episodes of previous LBP  SPO2: 94% on RA  HR: 82 bpm  BP: 132/80 mmHg      Lumbar X-ray: Facet degenerative changes L4-5 and L5-S1 with grade 1/grade 2 L4 mild anterolisthesis and moderate loss of disc height at L5-S1  Findings are stable from prior lumbar radiographs  Pain  Current pain ratin  At best pain ratin  At worst pain ratin  Location: midline L/B to B/L hips to B/L thighs  Quality: dull ache  Aggravating factors: standing and walking  Progression: improved    Social Support  Steps to enter house: no  Stairs in house: no   Lives in: apartment  Lives with: alone    Hand dominance: right    Treatments  Previous treatment: medication  Current treatment: physical therapy  Discharged from (in last 30 days): inpatient hospitalization  Discharged from (in last 30 days) comments: CHF       Patient Goals  Patient goals for therapy: decreased pain, increased motion, increased strength, independence with ADLs/IADLs and return to sport/leisure activities  Patient goal: stand and walk with less pain; lose weight        Objective     Concurrent Complaints  Negative for bladder dysfunction, bowel dysfunction and saddle (S4) numbness    Postural Observations  Seated posture: fair        Palpation     Additional Palpation Details  B/L Glute max/med diffuse tenderness    Neurological Testing     Sensation     Lumbar   Left   Intact: light touch    Right   Intact: light touch    Active Range of Motion     Lumbar   Flexion: 50 degrees  Restriction level: minimal  Extension: 15 degrees  Restriction level: moderate  Left lateral flexion: 20 degrees    Restriction level: minimal  Right lateral flexion: 20 degrees  Restriction level: minimal  Left rotation: 25 degrees  Restriction level: minimal  Right rotation: 25 degrees  Restriction level: minimal    Passive Range of Motion     Lumbar   Left lateral flexion:  Restriction level: minimal  Right lateral flexion:  Restriction level: minimal  Left rotation:  Restriction level: minimal  Right rotation:  Restriction level: minimal    Strength/Myotome Testing     Left Hip   Planes of Motion   Flexion: 3+  Extension: 3  Abduction: 3+  Adduction: 4-    Right Hip   Planes of Motion   Flexion: 3+  Extension: 3  Abduction: 3+  Adduction: 4-    Left Knee   Flexion: 4    Right Knee   Flexion: 4    Left Ankle/Foot   Dorsiflexion: 4    Right Ankle/Foot   Dorsiflexion: 4    Muscle Activation     Additional Muscle Activation Details  Improved activation of TrA - still difficulty activating multifidus    Tests     Lumbar     Left   Negative slump test      Right   Negative slump test      Additional Tests Details  Timed up and Go: 10 seconds (improved 4 seconds)    Tandem stand: balanced for 8 seconds prior to LOB (improved)    Lumbar FOTO: 45% function(predict 51%)(improved 5%)           Objective: See treatment diary below    Precautions: CHF/SOB/Afib  RE-EVAL: 3/18/19  Daily Treatment Diary   Specialty Daily Treatment Diary     Manual  3/14/19  3/1/19 3/8/19 3/11/19   STM lumbar paraspinals        IT band stretch B/L np *  3x :30 3x:30   Hamstring stretch B/L np *   *3x:30   Piriformis Stretch B/L np *   *3x:30               Exercise Diary  3/14/19 2/27/19 3/1/19 3/8/19 3/11/19   Nu Step S=11 L2 x10 mins L2 [id number] L2 x17 mins L2x 10 min L2 x10 min with MH   Treadmill Ambulation        PPT x20 10x:05  :05x10 :05 x15   Abdominal hollow x20 *10x:05 x15 :05 :05x20 :05x25   D K--->C x10:03 x10 x10 B/L x15 B/L x20 B/L   Glute sets        Bridges x10       Sit HS stretch 3x:30 *3x:30 3x:30 :30x 3 B/L :30 x3 B/L   Sit lumber flexion x10:05 10x:03 15x:05 :05x15 with stool : 05 x15 with stool   Standing back extensions  *x10 :02p!p!  In legs ---------     Quad DLS UE  LE  UE+LE   -------     Crosslegs LTR  5x:15 10x:10 x15 :05 :05x20 :05 x25   Clam shells (back stable) HEP  x10:05 :05x15 hold   Anti rotation Blue x15(needed to sit/rest in between sets - advised patient to perform seated flexion)   *redx10 ea Red x 15 ea   Mini Squats x10   *x10 x10   Prone on elbows  --------      Stand SLR extensions/flex/Abd x10 ea   *x10 ea  B/L hold                                       Modalities        Ultrasound Low Back 1MhZ  2 0 w/cm2                          The patient was given a new home exercise plan with written handout, pictures, and verbal instruction  The patient accepts and understands the new home activities

## 2019-03-14 ENCOUNTER — EVALUATION (OUTPATIENT)
Dept: PHYSICAL THERAPY | Facility: CLINIC | Age: 74
End: 2019-03-14
Payer: MEDICARE

## 2019-03-14 DIAGNOSIS — G89.29 CHRONIC BILATERAL LOW BACK PAIN WITHOUT SCIATICA: Primary | ICD-10-CM

## 2019-03-14 DIAGNOSIS — M54.50 CHRONIC BILATERAL LOW BACK PAIN WITHOUT SCIATICA: Primary | ICD-10-CM

## 2019-03-14 PROCEDURE — 97110 THERAPEUTIC EXERCISES: CPT | Performed by: PHYSICAL THERAPIST

## 2019-03-15 ENCOUNTER — APPOINTMENT (OUTPATIENT)
Dept: PHYSICAL THERAPY | Facility: CLINIC | Age: 74
End: 2019-03-15
Payer: MEDICARE

## 2019-03-18 ENCOUNTER — APPOINTMENT (OUTPATIENT)
Dept: PHYSICAL THERAPY | Facility: CLINIC | Age: 74
End: 2019-03-18
Payer: MEDICARE

## 2019-03-19 ENCOUNTER — TRANSCRIBE ORDERS (OUTPATIENT)
Dept: ADMINISTRATIVE | Facility: HOSPITAL | Age: 74
End: 2019-03-19

## 2019-03-19 ENCOUNTER — APPOINTMENT (OUTPATIENT)
Dept: LAB | Facility: HOSPITAL | Age: 74
End: 2019-03-19
Attending: INTERNAL MEDICINE
Payer: MEDICARE

## 2019-03-19 ENCOUNTER — APPOINTMENT (OUTPATIENT)
Dept: PHYSICAL THERAPY | Facility: CLINIC | Age: 74
End: 2019-03-19
Payer: MEDICARE

## 2019-03-19 ENCOUNTER — OFFICE VISIT (OUTPATIENT)
Dept: INTERNAL MEDICINE CLINIC | Facility: CLINIC | Age: 74
End: 2019-03-19
Payer: MEDICARE

## 2019-03-19 VITALS
TEMPERATURE: 98.3 F | HEART RATE: 88 BPM | BODY MASS INDEX: 45.99 KG/M2 | RESPIRATION RATE: 18 BRPM | WEIGHT: 293 LBS | DIASTOLIC BLOOD PRESSURE: 74 MMHG | SYSTOLIC BLOOD PRESSURE: 142 MMHG | HEIGHT: 67 IN

## 2019-03-19 DIAGNOSIS — I50.32 CHRONIC DIASTOLIC CONGESTIVE HEART FAILURE (HCC): Primary | ICD-10-CM

## 2019-03-19 DIAGNOSIS — I48.91 ATRIAL FIBRILLATION, UNSPECIFIED TYPE (HCC): ICD-10-CM

## 2019-03-19 DIAGNOSIS — G89.29 CHRONIC BILATERAL LOW BACK PAIN WITHOUT SCIATICA: ICD-10-CM

## 2019-03-19 DIAGNOSIS — E03.9 ACQUIRED HYPOTHYROIDISM: ICD-10-CM

## 2019-03-19 DIAGNOSIS — M54.50 CHRONIC BILATERAL LOW BACK PAIN WITHOUT SCIATICA: ICD-10-CM

## 2019-03-19 DIAGNOSIS — G47.33 OBSTRUCTIVE SLEEP APNEA: ICD-10-CM

## 2019-03-19 DIAGNOSIS — E66.01 CLASS 3 SEVERE OBESITY DUE TO EXCESS CALORIES WITH SERIOUS COMORBIDITY AND BODY MASS INDEX (BMI) OF 40.0 TO 44.9 IN ADULT (HCC): ICD-10-CM

## 2019-03-19 DIAGNOSIS — I48.0 PAROXYSMAL ATRIAL FIBRILLATION (HCC): ICD-10-CM

## 2019-03-19 DIAGNOSIS — N18.30 STAGE 3 CHRONIC KIDNEY DISEASE (HCC): ICD-10-CM

## 2019-03-19 DIAGNOSIS — I10 ESSENTIAL HYPERTENSION: ICD-10-CM

## 2019-03-19 LAB
ALBUMIN SERPL BCP-MCNC: 3.1 G/DL (ref 3.5–5)
ALP SERPL-CCNC: 154 U/L (ref 46–116)
ALT SERPL W P-5'-P-CCNC: 63 U/L (ref 12–78)
ANION GAP SERPL CALCULATED.3IONS-SCNC: 10 MMOL/L (ref 4–13)
AST SERPL W P-5'-P-CCNC: 47 U/L (ref 5–45)
BILIRUB SERPL-MCNC: 0.27 MG/DL (ref 0.2–1)
BUN SERPL-MCNC: 16 MG/DL (ref 5–25)
CALCIUM SERPL-MCNC: 9.2 MG/DL (ref 8.3–10.1)
CHLORIDE SERPL-SCNC: 103 MMOL/L (ref 100–108)
CO2 SERPL-SCNC: 28 MMOL/L (ref 21–32)
CREAT SERPL-MCNC: 1.38 MG/DL (ref 0.6–1.3)
GFR SERPL CREATININE-BSD FRML MDRD: 38 ML/MIN/1.73SQ M
GLUCOSE SERPL-MCNC: 98 MG/DL (ref 65–140)
POTASSIUM SERPL-SCNC: 4.2 MMOL/L (ref 3.5–5.3)
PROT SERPL-MCNC: 7.1 G/DL (ref 6.4–8.2)
SODIUM SERPL-SCNC: 141 MMOL/L (ref 136–145)
T3FREE SERPL-MCNC: 2.61 PG/ML (ref 2.3–4.2)
T4 FREE SERPL-MCNC: 0.77 NG/DL (ref 0.76–1.46)
TSH SERPL DL<=0.05 MIU/L-ACNC: 8.71 UIU/ML (ref 0.36–3.74)

## 2019-03-19 PROCEDURE — 99214 OFFICE O/P EST MOD 30 MIN: CPT | Performed by: INTERNAL MEDICINE

## 2019-03-19 PROCEDURE — 84443 ASSAY THYROID STIM HORMONE: CPT | Performed by: INTERNAL MEDICINE

## 2019-03-19 PROCEDURE — 36415 COLL VENOUS BLD VENIPUNCTURE: CPT | Performed by: INTERNAL MEDICINE

## 2019-03-19 PROCEDURE — 84439 ASSAY OF FREE THYROXINE: CPT | Performed by: INTERNAL MEDICINE

## 2019-03-19 PROCEDURE — 80053 COMPREHEN METABOLIC PANEL: CPT | Performed by: INTERNAL MEDICINE

## 2019-03-19 PROCEDURE — 84481 FREE ASSAY (FT-3): CPT

## 2019-03-20 ENCOUNTER — OFFICE VISIT (OUTPATIENT)
Dept: CARDIOLOGY CLINIC | Facility: CLINIC | Age: 74
End: 2019-03-20
Payer: MEDICARE

## 2019-03-20 ENCOUNTER — OFFICE VISIT (OUTPATIENT)
Dept: PHYSICAL THERAPY | Facility: CLINIC | Age: 74
End: 2019-03-20
Payer: MEDICARE

## 2019-03-20 VITALS
HEIGHT: 67 IN | WEIGHT: 293 LBS | BODY MASS INDEX: 45.99 KG/M2 | DIASTOLIC BLOOD PRESSURE: 84 MMHG | SYSTOLIC BLOOD PRESSURE: 144 MMHG | HEART RATE: 86 BPM

## 2019-03-20 DIAGNOSIS — I10 ESSENTIAL HYPERTENSION: ICD-10-CM

## 2019-03-20 DIAGNOSIS — N18.30 STAGE 3 CHRONIC KIDNEY DISEASE (HCC): ICD-10-CM

## 2019-03-20 DIAGNOSIS — G47.33 OBSTRUCTIVE SLEEP APNEA: ICD-10-CM

## 2019-03-20 DIAGNOSIS — I50.32 CHRONIC DIASTOLIC CONGESTIVE HEART FAILURE (HCC): Primary | ICD-10-CM

## 2019-03-20 DIAGNOSIS — I48.0 PAROXYSMAL ATRIAL FIBRILLATION (HCC): ICD-10-CM

## 2019-03-20 DIAGNOSIS — M54.50 CHRONIC BILATERAL LOW BACK PAIN WITHOUT SCIATICA: Primary | ICD-10-CM

## 2019-03-20 DIAGNOSIS — G89.29 CHRONIC BILATERAL LOW BACK PAIN WITHOUT SCIATICA: Primary | ICD-10-CM

## 2019-03-20 PROCEDURE — 99214 OFFICE O/P EST MOD 30 MIN: CPT | Performed by: NURSE PRACTITIONER

## 2019-03-20 PROCEDURE — 97110 THERAPEUTIC EXERCISES: CPT

## 2019-03-20 NOTE — PROGRESS NOTES
[address] [facility] [place]      NAME: Zachary Tenorio  AGE: 72 y o  SEX: female  : 1945   MRN: [id number]    DATE: 3/20/2019  TIME: 3:42 PM    Assessment and Plan   1  Chronic diastolic congestive heart failure ([address] )  This is currently well compensated  Continue current medications  2  Essential hypertension  Adequately controlled  3  Paroxysmal atrial fibrillation (HCC)  Currently in sinus rhythm  On amiodarone  4  Obstructive sleep apnea  On CPAP  5  Acquired hypothyroidism  Treatment of this is complicated by amiodarone  Thyroid functions are pending  6  Stage 3 chronic kidney disease (HCC)  Creatinine has been generally stable  7  Class 3 severe obesity due to excess calories with serious comorbidity and body mass index (BMI) of 40 0 to 44 9 in [facility])  The patient has not had any success and trying to lose weight  She is handicapped by her ongoing back pain and limited exercise tolerance  8  Chronic bilateral low back pain without sciatica  This has improved somewhat with physical therapy  However, the patient continues to have significant pain and significant disability related to this  She will seek an opinion by pain management  - Ambulatory referral to Pain Management; Future          Return to office in:  3 months    Chief Complaint     Chief Complaint   Patient presents with    Follow-up     d/c hospital 2/3/2019       History of Present Illness     The patient returned to the office after a recent hospitalization at [facility]  She became short of breath and went to the emergency room  She was found to be and mild congestive heart failure  She underwent diuresis with improvement  Since she has been home she has had no chest pain, shortness of breath, palpitations, or dizziness  She continues to have significant back pain  She has been gauged physical therapy which has been somewhat helpful    Unfortunately, she still can't walk very much because of pain  She is having no problems with her current medications  The following portions of the patient's history were reviewed and updated as appropriate: allergies, current medications, past family history, past medical history, past social history, past surgical history and problem list     Review of Systems   Review of Systems   Constitutional: Negative  HENT: Negative for congestion, ear pain, postnasal drip, rhinorrhea, sore throat and trouble swallowing  Eyes: Negative for pain, discharge, redness and visual disturbance  Respiratory: Negative for cough, shortness of breath and wheezing  Cardiovascular: Negative  Gastrointestinal: Negative  Endocrine: Negative  Genitourinary: Negative for difficulty urinating, dysuria, frequency, hematuria and urgency  Musculoskeletal: Positive for back pain  Negative for arthralgias, gait problem, joint swelling and myalgias  Skin: Negative for rash  Neurological: Negative for dizziness, speech difficulty, weakness, light-headedness, numbness and headaches  Hematological: Negative  Psychiatric/Behavioral: Negative for confusion, decreased concentration, dysphoric mood and sleep disturbance  The patient is not nervous/anxious          Active Problem List     Patient Active Problem List   Diagnosis    Atrial fibrillation ([facility] )    Essential hypertension    Prediabetes    Depression    Hypercholesterolemia    Migraine    Mitral regurgitation    Class 3 severe obesity due to excess calories with serious comorbidity and body mass index (BMI) of 40 0 to 44 9 in adult ([facility] )    Obstructive sleep apnea    Primary localized osteoarthritis of right knee    Stroke ([facility] )    History of stroke    Chronic diastolic congestive heart failure (HCC)    Postural dizziness with presyncope    Symptomatic bradycardia    Chronic bilateral low back pain without sciatica    Stage 3 chronic kidney disease (HCC)    Anemia    Acquired hypothyroidism       Objective   /74 (BP Location: Left arm, Patient Position: Sitting, Cuff Size: Large)   Pulse 88   Temp 98 3 °F (36 8 °C) (Tympanic)   Resp 18   Ht 5' 7" (1 702 m)   Wt 134 kg (295 lb)   LMP  (LMP Unknown)   BMI 46 20 kg/m²     Physical Exam   Constitutional: She is oriented to person, place, and time  She appears well-developed  No distress  Obese   HENT:   Head: Normocephalic and atraumatic  Neck: Neck supple  No JVD present  No tracheal deviation present  No thyromegaly present  Cardiovascular: Normal rate, regular rhythm, normal heart sounds and intact distal pulses  Exam reveals no gallop and no friction rub  No murmur heard  Pulmonary/Chest: Effort normal and breath sounds normal  She has no wheezes  She has no rales  She exhibits no tenderness  Abdominal: Soft  Bowel sounds are normal  She exhibits no distension and no mass  There is no tenderness  There is no rebound  Musculoskeletal: Normal range of motion  She exhibits no edema or tenderness  Lymphadenopathy:     She has no cervical adenopathy  Neurological: She is alert and oriented to person, place, and time  Skin: Skin is warm and dry  Psychiatric: She has a normal mood and affect   Her behavior is normal  Judgment and thought content normal        Pertinent Laboratory/Diagnostic Studies:  Appointment on 03/19/2019   Component Date Value Ref Range Status    T3, Free 03/19/2019 2 61  2 30 - 4 20 pg/mL Final   Admission on 01/31/2019, Discharged on 02/03/2019   Component Date Value Ref Range Status    WBC 01/31/2019 7 49  4 31 - 10 16 Thousand/uL Final    RBC 01/31/2019 4 01  3 81 - 5 12 Million/uL Final    Hemoglobin 01/31/2019 10 8* 11 5 - 15 4 g/dL Final    Hematocrit 01/31/2019 36 5  34 8 - 46 1 % Final    MCV 01/31/2019 91  82 - 98 fL Final    MCH 01/31/2019 26 9  26 8 - 34 3 pg Final    MCHC 01/31/2019 29 6* 31 4 - 37 4 g/dL Final    RDW 01/31/2019 15 8* 11 6 - 15 1 % Final    MPV 01/31/2019 10 4  8 9 - 12 7 fL Final    Platelets 67/38/8726 269  149 - 390 Thousands/uL Final    nRBC 01/31/2019 0  /100 WBCs Final    Neutrophils Relative 01/31/2019 66  43 - 75 % Final    Immat GRANS % 01/31/2019 0  0 - 2 % Final    Lymphocytes Relative 01/31/2019 21  14 - 44 % Final    Monocytes Relative 01/31/2019 7  4 - 12 % Final    Eosinophils Relative 01/31/2019 5  0 - 6 % Final    Basophils Relative 01/31/2019 1  0 - 1 % Final    Neutrophils Absolute 01/31/2019 5 03  1 85 - 7 62 Thousands/µL Final    Immature Grans Absolute 01/31/2019 0 02  0 00 - 0 20 Thousand/uL Final    Lymphocytes Absolute 01/31/2019 1 54  0 60 - 4 47 Thousands/µL Final    Monocytes Absolute 01/31/2019 0 49  0 17 - 1 22 Thousand/µL Final    Eosinophils Absolute 01/31/2019 0 36  0 00 - 0 61 Thousand/µL Final    Basophils Absolute 01/31/2019 0 05  0 00 - 0 10 Thousands/µL Final    Sodium 01/31/2019 140  136 - 145 mmol/L Final    Potassium 01/31/2019 3 9  3 5 - 5 3 mmol/L Final    Chloride 01/31/2019 104  100 - 108 mmol/L Final    CO2 01/31/2019 28  21 - 32 mmol/L Final    ANION GAP 01/31/2019 8  4 - 13 mmol/L Final    BUN 01/31/2019 14  5 - 25 mg/dL Final    Creatinine 01/31/2019 1 39* 0 60 - 1 30 mg/dL Final    Glucose 01/31/2019 130  65 - 140 mg/dL Final    Calcium 01/31/2019 8 8  8 3 - 10 1 mg/dL Final    eGFR 01/31/2019 38  ml/min/1 73sq m Final    Total Bilirubin 01/31/2019 0 33  0 20 - 1 00 mg/dL Final    Bilirubin, Direct 01/31/2019 0 13  0 00 - 0 20 mg/dL Final    Alkaline Phosphatase 01/31/2019 128* 46 - 116 U/L Final    AST 01/31/2019 48* 5 - 45 U/L Final    ALT 01/31/2019 56  12 - 78 U/L Final    Total Protein 01/31/2019 6 8  6 4 - 8 2 g/dL Final    Albumin 01/31/2019 3 0* 3 5 - 5 0 g/dL Final    TSH 3RD GENERATON 01/31/2019 6 959* 0 358 - 3 740 uIU/mL Final    Troponin I 01/31/2019 <0 02  <=0 04 ng/mL Final    NT-proBNP 01/31/2019 397* <125 pg/mL Final    Troponin I 01/31/2019 0 02  <=0 04 ng/mL Final    Troponin I 01/31/2019 0 02  <=0 04 ng/mL Final    WBC 02/01/2019 6 20  4  31 - 10 16 Thousand/uL Final    RBC 02/01/2019 3 92  3 81 - 5 12 Million/uL Final    Hemoglobin 02/01/2019 10 7* 11 5 - 15 4 g/dL Final    Hematocrit 02/01/2019 35 8  34 8 - 46 1 % Final    MCV 02/01/2019 91  82 - 98 fL Final    MCH 02/01/2019 27 3  26 8 - 34 3 pg Final    MCHC 02/01/2019 29 9* 31 4 - 37 4 g/dL Final    RDW 02/01/2019 16 1* 11 6 - 15 1 % Final    Platelets 93/23/2271 265  149 - 390 Thousands/uL Final    MPV 02/01/2019 10 8  8 9 - 12 7 fL Final    Sodium 02/01/2019 138  136 - 145 mmol/L Final    Potassium 02/01/2019 4 0  3 5 - 5 3 mmol/L Final    Chloride 02/01/2019 103  100 - 108 mmol/L Final    CO2 02/01/2019 26  21 - 32 mmol/L Final    ANION GAP 02/01/2019 9  4 - 13 mmol/L Final    BUN 02/01/2019 20  5 - 25 mg/dL Final    Creatinine 02/01/2019 1 37* 0 60 - 1 30 mg/dL Final    Glucose 02/01/2019 110  65 - 140 mg/dL Final    Calcium 02/01/2019 9 0  8 3 - 10 1 mg/dL Final    eGFR 02/01/2019 39  ml/min/1 73sq m Final    Magnesium 02/01/2019 2 6  1 6 - 2 6 mg/dL Final    Sodium 02/03/2019 139  136 - 145 mmol/L Final    Potassium 02/03/2019 3 8  3 5 - 5 3 mmol/L Final    Chloride 02/03/2019 103  100 - 108 mmol/L Final    CO2 02/03/2019 27  21 - 32 mmol/L Final    ANION GAP 02/03/2019 9  4 - 13 mmol/L Final    BUN 02/03/2019 22  5 - 25 mg/dL Final    Creatinine 02/03/2019 1 49* 0 60 - 1 30 mg/dL Final    Glucose 02/03/2019 120  65 - 140 mg/dL Final    Calcium 02/03/2019 8 3  8 3 - 10 1 mg/dL Final    eGFR 02/03/2019 35  ml/min/1 73sq m Final    Ventricular Rate 01/31/2019 86  BPM Final    Atrial Rate 01/31/2019 86  BPM Final    DE Interval 01/31/2019 242  ms Final    QRSD Interval 01/31/2019 94  ms Final    QT Interval 01/31/2019 410  ms Final    QTC Interval 01/31/2019 490  ms Final    P Axis 01/31/2019 98  degrees Final    QRS Axis 01/31/2019 58  degrees Final    T Wave [place] 01/31/2019 71  degrees Final   Office Visit on 12/26/2018   Component Date Value Ref Range Status    TSH 3RD GENERATON 03/19/2019 8 715* 0 358 - 3 740 uIU/mL Final    Free T4 03/19/2019 0 77  0 76 - 1 46 ng/dL Final    Sodium 03/19/2019 141  136 - 145 mmol/L Final    Potassium 03/19/2019 4 2  3 5 - 5 3 mmol/L Final    Chloride 03/19/2019 103  100 - 108 mmol/L Final    CO2 03/19/2019 28  21 - 32 mmol/L Final    ANION GAP 03/19/2019 10  4 - 13 mmol/L Final    BUN 03/19/2019 16  5 - 25 mg/dL Final    Creatinine 03/19/2019 1 38* 0 60 - 1 30 mg/dL Final    Glucose 03/19/2019 98  65 - 140 mg/dL Final    Calcium 03/19/2019 9 2  8 3 - 10 1 mg/dL Final    AST 03/19/2019 47* 5 - 45 U/L Final    ALT 03/19/2019 63  12 - 78 U/L Final    Alkaline Phosphatase 03/19/2019 154* 46 - 116 U/L Final    Total Protein 03/19/2019 7 1  6 4 - 8 2 g/dL Final    Albumin 03/19/2019 3 1* 3 5 - 5 0 g/dL Final    Total Bilirubin 03/19/2019 0 27  0 20 - 1 00 mg/dL Final    eGFR 03/19/2019 38  ml/min/1 73sq m Final           Current Medications     Current Outpatient Medications:     amiodarone 200 mg tablet, Take 1/2 tablet daily, Disp: 45 tablet, Rfl: 2    clopidogrel (PLAVIX) 75 mg tablet, TAKE ONE TABLET BY MOUTH ONCE DAILY, Disp: 90 tablet, Rfl: 0    ELIQUIS 5 MG, TAKE ONE TABLET BY MOUTH TWO TIMES A DAY, Disp: 60 tablet, Rfl: 10    escitalopram (LEXAPRO) 10 mg tablet, Take 1 tablet (10 mg total) by mouth daily, Disp: 90 tablet, Rfl: 1    gabapentin (NEURONTIN) 100 mg capsule, Take 1 capsule (100 mg total) by mouth 3 (three) times a day, Disp: 90 capsule, Rfl: 3    levothyroxine 50 mcg tablet, Take 1 5 tablets (75 mcg total) by mouth daily, Disp: 90 tablet, Rfl: 1    LORazepam (ATIVAN) 1 mg tablet, Take 1 tablet (1 mg total) by mouth 3 (three) times a day as needed for anxiety, Disp: 30 tablet, Rfl: 0    metoprolol tartrate (LOPRESSOR) 50 mg tablet, Take 1 tablet (50 mg total) by mouth every 12 (twelve) hours, Disp: 60 tablet, Rfl: 3    Multiple Vitamin (MULTIVITAMIN) tablet, Take 1 tablet by mouth daily  , Disp: , Rfl:     nortriptyline (PAMELOR) 50 mg capsule, Take 1 capsule (50 mg total) by mouth daily, Disp: 90 capsule, Rfl: 1    potassium chloride (K-DUR,KLOR-CON) 20 mEq tablet, Take 1 tablet (20 mEq total) by mouth 2 (two) times a day, Disp: 180 tablet, Rfl: 1    simvastatin (ZOCOR) 10 mg tablet, Take 1 tablet (10 mg total) by mouth daily at bedtime, Disp: 90 tablet, Rfl: 3    torsemide (DEMADEX) 20 mg tablet, Take 1 tablet (20 mg total) by mouth twice a day except for Monday and Thursdays take 2 tablets (40 mg total) in morning, Disp: 70 tablet, Rfl: 2    meclizine (ANTIVERT) 25 mg tablet, Take 25 mg by mouth 3 (three) times a day as needed for dizziness , Disp: , Rfl:     oxyCODONE-acetaminophen (PERCOCET) 5-325 mg per tablet, Take 1 tablet by mouth every 4 (four) hours as needed for moderate pain Max Daily Amount: 6 tablets, Disp: 50 tablet, Rfl: 0      Josh Leo MD

## 2019-03-20 NOTE — PROGRESS NOTES
Heart Failure Office Visit   Vargas Ann 72 y o  female MRN: [id number]    Congestive Heart Failure     Ms Yue Nicholson is a 72year old female with a known past medical history of   Atrial fibrillation sp ablation in 1/2018  DANTE compliant with CPAP   CKD III baseline creat 1 4  CVA   5/18/18 LVEF 60%, grade 1 DD  Mod AI, mild MR, mild MS, mild TR PAP 45mmHg  Patient Active Problem List   Diagnosis    Atrial fibrillation ([place] [address] )    Essential hypertension    Prediabetes    Depression    Hypercholesterolemia    Migraine    Mitral regurgitation    Class 3 severe obesity due to excess calories with serious comorbidity and body mass index (BMI) of 40 0 to 44 9 in adult ([facility] )    Obstructive sleep apnea    Primary localized osteoarthritis of right knee    Stroke ([place] [address] )    History of stroke    Chronic diastolic congestive heart failure (HCC)    Postural dizziness with presyncope    Symptomatic bradycardia    Chronic bilateral low back pain without sciatica    Stage 3 chronic kidney disease ([facility] )    Anemia    Acquired hypothyroidism     Ms Yue Nicholson was hospitalized at [place] [address] on 1/31 - 2/03/19 with acute on chronic diastolic heart failure  She presented with worsening shortness of breath and ro LE edema  On admission weight 295 pounds,  NT pro   She was treated with IV Lasix  TTE showed left ventricular ejection fraction 60%, mild concentric hypertrophy, grade 1 diastolic dysfunction  Mild mitral stenosis, moderate aortic valve regurgitation  Discharge weight 284 pounds  She was discharged on a slightly higher dose of Torsemide  Lab studies at discharge BUN 22/creat 1 49    On 3/06/19 Tonja Stovall was seen in our office for a recent hospitalization follow up visit  She was accompanied by a family member  She denied dyspnea with rest, orthopnea, CP, palpitations, lightheadedness or dizziness  She admits to dyspnea with moderate exertion and compliance with CPAP at night    Her weight at home was stable 287 pounds  She is trying to abide by a 2 gm sodium diet  She is undergoing PT for back pain  Today Juvenal Brito presents to our office for a follow up visit  Juvenal Brito underwent PT this morning for her back  She is currently experiencing low back pain and upper thigh pain  Her PCP referred her to a spine and pain specialist   Her weight in the office is 294 pounds up from 287 pounds  According to Juvenal Brito she has gained calorie weight  She is unable to walk and exercise due to pain  She is compliant with CPAP at night  Juvenal Brito is attempting to abide by a 2 gm sodium diet  Lab studies done on 3/19/19 sodium 141, potassium 4 2, BUN 16, creat 1 38,      Review of Systems   Cardiovascular: Positive for dyspnea on exertion  Objective:    Vitals: LUE sitting 124/68   Vitals:    03/20/19 1351   BP: 144/84   BP Location: Right arm   Patient Position: Sitting   Cuff Size: Large   Pulse: 86   Weight: 133 kg (294 lb)   Height: 5' 7" (1 702 m)     Body mass index is 46 05 kg/m²      Wt Readings from Last 3 Encounters:   03/20/19 133 kg (294 lb)   03/19/19 134 kg (295 lb)   03/06/19 130 kg (287 lb 3 2 oz)         Physical Exam:  GEN: Marin Welch appears well, alert and oriented x 3, pleasant and cooperative   HEENT: pupils equal, round, and reactive to light; extraocular muscles intact  NECK: supple, no carotid bruits   HEART: regular rhythm, normal S1 and S2,  no murmurs, clicks, gallops or rubs, JVP is flat   LUNGS: clear to auscultation bilaterally; no wheezes, rales, or rhonchi ABDOMEN: obese, normal bowel sounds, soft, no tenderness, no distention  EXTREMITIES: peripheral pulses normal; no clubbing, cyanosis, trace ro LE edema  NEURO: no focal findings   SKIN: normal without suspicious lesions on exposed skin      Current Outpatient Medications:     amiodarone 200 mg tablet, Take 1/2 tablet daily, Disp: 45 tablet, Rfl: 2    clopidogrel (PLAVIX) 75 mg tablet, TAKE ONE TABLET BY MOUTH ONCE DAILY, Disp: 90 tablet, Rfl: 0    ELIQUIS 5 MG, TAKE ONE TABLET BY MOUTH TWO TIMES A DAY, Disp: 60 tablet, Rfl: 10    escitalopram (LEXAPRO) 10 mg tablet, Take 1 tablet (10 mg total) by mouth daily, Disp: 90 tablet, Rfl: 1    gabapentin (NEURONTIN) 100 mg capsule, Take 1 capsule (100 mg total) by mouth 3 (three) times a day, Disp: 90 capsule, Rfl: 3    levothyroxine 50 mcg tablet, Take 1 5 tablets (75 mcg total) by mouth daily, Disp: 90 tablet, Rfl: 1    LORazepam (ATIVAN) 1 mg tablet, Take 1 tablet (1 mg total) by mouth 3 (three) times a day as needed for anxiety, Disp: 30 tablet, Rfl: 0    meclizine (ANTIVERT) 25 mg tablet, Take 25 mg by mouth 3 (three) times a day as needed for dizziness , Disp: , Rfl:     metoprolol tartrate (LOPRESSOR) 50 mg tablet, Take 1 tablet (50 mg total) by mouth every 12 (twelve) hours, Disp: 60 tablet, Rfl: 3    Multiple Vitamin (MULTIVITAMIN) tablet, Take 1 tablet by mouth daily  , Disp: , Rfl:     nortriptyline (PAMELOR) 50 mg capsule, Take 1 capsule (50 mg total) by mouth daily, Disp: 90 capsule, Rfl: 1    oxyCODONE-acetaminophen (PERCOCET) 5-325 mg per tablet, Take 1 tablet by mouth every 4 (four) hours as needed for moderate pain Max Daily Amount: 6 tablets, Disp: 50 tablet, Rfl: 0    potassium chloride (K-DUR,KLOR-CON) 20 mEq tablet, Take 1 tablet (20 mEq total) by mouth 2 (two) times a day, Disp: 180 tablet, Rfl: 1    simvastatin (ZOCOR) 10 mg tablet, Take 1 tablet (10 mg total) by mouth daily at bedtime, Disp: 90 tablet, Rfl: 3    torsemide (DEMADEX) 20 mg tablet, Take 1 tablet (20 mg total) by mouth twice a day except for Monday and Thursdays take 2 tablets (40 mg total) in morning, Disp: 70 tablet, Rfl: 2        Assessment/Plan:   1 Chronic diastolic heart failure LVEF 60%, NYHA class III stage C- On PE eu volemic and compensated, BP and HR controlled, compliant with CPAP  Continue on   Metoprolol tartrate 50 mg q 12 hours, torsemide 20 mg b i d except on Monday and Thrusday Torsemide 40mg in am/20mg in pm , K-Dur 20 mEq b i d   BMP, CBC, Mag in near future   Maintain a 2 gram daily sodium diet and 1500 ml daily fluid restriction  Check daily weights  If you gain 3 pounds in one day, 5 pounds in one week, or experience worsening shortness of breath or increasing lower leg swelling  Please call the heart failure office at [phone]  Please bring a  list of your current medications and daily weights to the office visit  2  DANTE compliant with CPAP at night  3  Paroxysmal Atrial fibrillation- sp typical flutter ablation 1/2018, continue on the Toprol tartrate 50 mg q 12 hours, amiodarone 100 mg daily, Eliquis 5 mg b i d  4  CKD III baseline creat baseline creat 1 33-1  44- creat stable 1 38  5    Prior CVA continue on Plavix 75 mg daily and Zocor 10mg daily   Requesting to see Dr Kat Ag, follow up in 3 months or sooner if the need arises          Parmjit Yang  3/20/2019  2:02 PM

## 2019-03-20 NOTE — PROGRESS NOTES
Daily Note     Today's date: 3/20/2019  Patient name: Bo Heaton  : 1945  MRN: [id number]  Referring provider: Titi Fleming MD  Dx:   Encounter Diagnosis     ICD-10-CM    1  Chronic bilateral low back pain without sciatica M54 5     G89 29                   Subjective: Patient states her pain in her low back is a 10/10 and she does not know why  Even her legs hurt a lot today  She brought her cane today to help with her walking  Patient feels the pain always increases with standing/ walking  She still wanted to try the therapy  "Dr Ralph Arnold is sending me a spine doctor " Her appointment is set up for April        Objective: See treatment diary below  Precautions: CHF/SOB/Afib  RE-EVAL: 19  Daily Treatment Diary   Specialty Daily Treatment Diary     Manual  3/14/19 3/20/19  3/8/19 3/11/19   STM lumbar paraspinals        IT band stretch B/L np *3x ;30  3x :30 3x:30   Hamstring stretch B/L np *3x  :30   *3x:30   Piriformis Stretch B/L np *3x :30   *3x:30               Exercise Diary  3/14/19 3/20/19  3/8/19 3/11/19   Nu Step S=11 L2 x10 mins L2x 10 min with MH  L2x 10 min L2 x10 min with MH   Treadmill Ambulation        PPT x20 x10  :05x10 :05 x15   Abdominal hollow x20 x20  :05x20 :05x25   D K--->C x10:03 :03x10  x15 B/L x20 B/L   Glute sets        Bridges x10 x10      Sit HS stretch 3x:30 3x :30  :30x 3 B/L :30 x3 B/L   Sit lumber flexion x10:05 :05x10  :05x15 with stool : 05 x15 with stool   Standing back extensions        Quad DLS UE  LE  UE+LE        Crosslegs LTR  5x:15 L :15x2 p!   :05x20 :05 x25   Clam shells (back stable) HEP   :05x15 hold   Anti rotation Blue x15(needed to sit/rest in between sets - advised patient to perform seated flexion) Red x 10 ea  (needed to sit/rest in between sets - advised patient to perform seated flexion)  *redx10 ea Red x 15 ea   Mini Squats x10 x10  *x10 x10   Prone on elbows        Stand SLR extensions/flex/Abd x10 ea x10 ea  *x10 ea  B/L hold Modalities        Ultrasound Low Back 1MhZ  2 0 w/cm2                            Assessment: Tolerated treatment fair+  Patient would benefit from continued PT for stretching and strengthening  Exercise program adjusted for pain and fatigue levels  Patient was upset with the pain she was having  Plan: Continue per plan of care  Progress treatment as tolerated

## 2019-03-20 NOTE — PATIENT INSTRUCTIONS
Maintain a 2 gram daily sodium diet and 1500 ml daily fluid restriction  Check daily weights  If you gain 3 pounds in one day, 5 pounds in one week, or experience worsening shortness of breath or increasing lower leg swelling  Please call the heart failure office at [phone]    Please bring a  list of your current medications and daily weights to the office visit

## 2019-03-22 ENCOUNTER — APPOINTMENT (OUTPATIENT)
Dept: PHYSICAL THERAPY | Facility: CLINIC | Age: 74
End: 2019-03-22
Payer: MEDICARE

## 2019-03-25 ENCOUNTER — APPOINTMENT (OUTPATIENT)
Dept: PHYSICAL THERAPY | Facility: CLINIC | Age: 74
End: 2019-03-25
Payer: MEDICARE

## 2019-03-25 ENCOUNTER — TELEPHONE (OUTPATIENT)
Dept: INTERNAL MEDICINE CLINIC | Facility: CLINIC | Age: 74
End: 2019-03-25

## 2019-03-25 DIAGNOSIS — E03.9 ACQUIRED HYPOTHYROIDISM: Primary | ICD-10-CM

## 2019-03-25 NOTE — TELEPHONE ENCOUNTER
Spoke to patient regarding TSH results, 8 715, had been taking Synthroid 0 075mg daily, instructed to increase Synthroid to 0 088mg daily, script sent to pharmacy

## 2019-03-27 ENCOUNTER — APPOINTMENT (OUTPATIENT)
Dept: PHYSICAL THERAPY | Facility: CLINIC | Age: 74
End: 2019-03-27
Payer: MEDICARE

## 2019-03-27 RX ORDER — LEVOTHYROXINE SODIUM 88 UG/1
88 TABLET ORAL DAILY
Qty: 90 TABLET | Refills: 0 | Status: SHIPPED | OUTPATIENT
Start: 2019-03-27 | End: 2019-08-19 | Stop reason: SDUPTHER

## 2019-04-01 ENCOUNTER — OFFICE VISIT (OUTPATIENT)
Dept: PHYSICAL THERAPY | Facility: CLINIC | Age: 74
End: 2019-04-01
Payer: MEDICARE

## 2019-04-01 DIAGNOSIS — M54.50 CHRONIC BILATERAL LOW BACK PAIN WITHOUT SCIATICA: Primary | ICD-10-CM

## 2019-04-01 DIAGNOSIS — G89.29 CHRONIC BILATERAL LOW BACK PAIN WITHOUT SCIATICA: Primary | ICD-10-CM

## 2019-04-01 PROCEDURE — 97140 MANUAL THERAPY 1/> REGIONS: CPT

## 2019-04-01 PROCEDURE — 97110 THERAPEUTIC EXERCISES: CPT

## 2019-04-03 ENCOUNTER — EVALUATION (OUTPATIENT)
Dept: PHYSICAL THERAPY | Facility: CLINIC | Age: 74
End: 2019-04-03
Payer: MEDICARE

## 2019-04-03 DIAGNOSIS — M54.50 CHRONIC BILATERAL LOW BACK PAIN WITHOUT SCIATICA: Primary | ICD-10-CM

## 2019-04-03 DIAGNOSIS — G89.29 CHRONIC BILATERAL LOW BACK PAIN WITHOUT SCIATICA: Primary | ICD-10-CM

## 2019-04-03 PROCEDURE — 97110 THERAPEUTIC EXERCISES: CPT | Performed by: PHYSICAL THERAPIST

## 2019-04-03 PROCEDURE — 97140 MANUAL THERAPY 1/> REGIONS: CPT | Performed by: PHYSICAL THERAPIST

## 2019-04-17 ENCOUNTER — PATIENT OUTREACH (OUTPATIENT)
Dept: INTERNAL MEDICINE CLINIC | Facility: CLINIC | Age: 74
End: 2019-04-17

## 2019-04-26 NOTE — PROGRESS NOTES
Progress Note - Cardiology   Chelle Ambrocio 72 y o  female MRN: [id number]  Unit/Bed#: E4 -01 Encounter: [id number]      Assessment/Recommendations/Discussion:   1  Acute on chronic diastolic CHF  2  Afib/flutter s/p ablation 1/2018  3  HTN  4  Prior CVA  5  Obesity    · Euvolemic by exam, ok to d/c on torsemide 20 po BID  · Will  Arrange f/u  · BP/HR controlled, continue amiodarone and metoprolol  · Con't statin  · Will s/o  D/W Dr Hannah Cook        Subjective: Pt seen/examined  Feels well, no CP, SOB      Physical Exam:  GEN:  NAD  HEENT:  MMM, NCAT, pink conjunctiva, EOMI, nonicteric sclera  CV:  NO JVD/HJR, RR, NO M/R/G, +S1/S2, NO PARASTERNAL HEAVE/THRILL, NO LE EDEMA, NO HEPATIC SYSTOLIC PULSATION, WARM EXTREMITIES  RESP:  CTAB/L  ABD:  SOFT, NT, NO GROSS ORGANOMEGALY        Vitals:   /60 (BP Location: Right leg)   Pulse 68   Temp 97 5 °F (36 4 °C) (Tympanic)   Resp 18   Ht 5' 7" (1 702 m)   Wt 128 kg (282 lb 3 oz)   LMP  (LMP Unknown)   SpO2 98%   BMI 44 20 kg/m²   Vitals:    11/04/18 1127 11/05/18 0600   Weight: 126 kg (278 lb 7 1 oz) 128 kg (282 lb 3 oz)       Intake/Output Summary (Last 24 hours) at 11/05/18 1506  Last data filed at 11/05/18 1501   Gross per 24 hour   Intake             1469 ml   Output             3225 ml   Net            -1756 ml       TELEMETRY: no events  Lab Results:    Results from last 7 days  Lab Units 11/05/18  0833   WBC Thousand/uL 7 40   HEMOGLOBIN g/dL 10 5*   HEMATOCRIT % 34 8   PLATELETS Thousands/uL 334       Results from last 7 days  Lab Units 11/05/18  0833  11/01/18  1015   POTASSIUM mmol/L 3 6  < > 3 9   CHLORIDE mmol/L 103  < > 109*   CO2 mmol/L 25  < > 25   BUN mg/dL 21  < > 18   CREATININE mg/dL 1 46*  < > 1 33*   CALCIUM mg/dL 8 8  < > 8 6   ALK PHOS U/L  --   --  113   ALT U/L  --   --  54   AST U/L  --   --  52*   < > = values in this interval not displayed      Results from last 7 days  Lab Units 11/05/18  0833   POTASSIUM mmol/L 3 6   CHLORIDE mmol/L 103   CO2 mmol/L 25   BUN mg/dL 21   CREATININE mg/dL 1 46*   CALCIUM mg/dL 8 8           Medications:    Current Facility-Administered Medications:     amiodarone tablet 100 mg, 100 mg, Oral, Daily, Arpita Gonzales MD, 100 mg at 11/05/18 0908    apixaban (ELIQUIS) tablet 5 mg, 5 mg, Oral, BID, Arpita Gonzales MD, 5 mg at 11/05/18 0908    ascorbic acid (VITAMIN C) tablet 500 mg, 500 mg, Oral, Daily, Arpita Gonzales MD, 500 mg at 11/05/18 0908    clopidogrel (PLAVIX) tablet 75 mg, 75 mg, Oral, Daily, Arpita Gonzales MD, 75 mg at 11/05/18 0908    docusate sodium (COLACE) capsule 100 mg, 100 mg, Oral, Daily, Arpita Gonzales MD, 100 mg at 11/05/18 0908    escitalopram (LEXAPRO) tablet 10 mg, 10 mg, Oral, Daily, Arpita Gonzales MD, 10 mg at 11/05/18 0908    ferrous sulfate tablet 325 mg, 325 mg, Oral, Daily With Breakfast, Arpita Gonzales MD, 325 mg at 11/05/18 0908    furosemide (LASIX) injection 40 mg, 40 mg, Intravenous, BID (diuretic), Arpita Gonzales MD, 40 mg at 11/05/18 0908    levothyroxine tablet 50 mcg, 50 mcg, Oral, Early Morning, Arpita Gonzales MD, 50 mcg at 11/05/18 0535    LORazepam (ATIVAN) tablet 1 mg, 1 mg, Oral, TID PRN, Arpita Gonzales MD    meclizine (ANTIVERT) tablet 25 mg, 25 mg, Oral, TID PRN, Arpita Gonzales MD    metoprolol tartrate (LOPRESSOR) tablet 50 mg, 50 mg, Oral, Q12H [address], Madeline Gomez MD, 50 mg at 11/05/18 0908    multivitamin-minerals (CENTRUM) tablet 1 tablet, 1 tablet, Oral, Daily, Arpita Gonzales MD, 1 tablet at 11/05/18 0908    nortriptyline (PAMELOR) capsule 50 mg, 50 mg, Oral, HS, Madeline Gomez MD, 50 mg at 11/04/18 2131    oxyCODONE-acetaminophen (PERCOCET) 5-325 mg per tablet 1 tablet, 1 tablet, Oral, Q4H PRN, Arpita Gonzales MD, 1 tablet at 11/04/18 2352    potassium chloride (K-DUR,KLOR-CON) CR tablet 20 mEq, 20 mEq, Oral, BID, Madeline Gomez MD, 20 mEq at 11/05/18 0908    pravastatin (PRAVACHOL) tablet 40 mg, 40 mg, Oral, Daily With Cristine Mishra MD, 40 mg at 11/04/18 1711    topiramate (TOPAMAX) sprinkle capsule 25 mg, 25 mg, Oral, Q12H [address], Margy Wright MD, 25 mg at 11/05/18 8592    This note was completed in part utilizing [employer] Direct Software  Grammatical errors, random word insertions, spelling mistakes, and incomplete sentences may be an occasional consequence of this system secondary to software limitations, ambient noise, and hardware issues  If you have any questions or concerns about the content, text, or information contained within the body of this dictation, please contact the provider for clarification  Patent

## 2019-05-04 ENCOUNTER — APPOINTMENT (EMERGENCY)
Dept: CT IMAGING | Facility: HOSPITAL | Age: 74
End: 2019-05-04
Payer: MEDICARE

## 2019-05-04 ENCOUNTER — HOSPITAL ENCOUNTER (EMERGENCY)
Facility: HOSPITAL | Age: 74
Discharge: HOME | End: 2019-05-05
Attending: EMERGENCY MEDICINE | Admitting: EMERGENCY MEDICINE
Payer: MEDICARE

## 2019-05-04 DIAGNOSIS — R51.9 HEADACHE: Primary | ICD-10-CM

## 2019-05-04 LAB
ANION GAP SERPL CALCULATED.3IONS-SCNC: 9 MMOL/L (ref 4–13)
BASOPHILS # BLD AUTO: 0.1 THOUSANDS/ΜL (ref 0–0.1)
BASOPHILS NFR BLD AUTO: 1 % (ref 0–2)
BUN SERPL-MCNC: 25 MG/DL (ref 7–25)
CALCIUM SERPL-MCNC: 8.8 MG/DL (ref 8.6–10.5)
CHLORIDE SERPL-SCNC: 101 MMOL/L (ref 98–107)
CO2 SERPL-SCNC: 26 MMOL/L (ref 21–31)
CREAT SERPL-MCNC: 1.24 MG/DL (ref 0.6–1.2)
EOSINOPHIL # BLD AUTO: 0.5 THOUSAND/ΜL (ref 0–0.61)
EOSINOPHIL NFR BLD AUTO: 7 % (ref 0–5)
ERYTHROCYTE [DISTWIDTH] IN BLOOD BY AUTOMATED COUNT: 16.6 % (ref 11.5–14.5)
GFR SERPL CREATININE-BSD FRML MDRD: 44 ML/MIN/1.73SQ M
GLUCOSE SERPL-MCNC: 104 MG/DL (ref 65–99)
HCT VFR BLD AUTO: 32 % (ref 42–47)
HGB BLD-MCNC: 10.6 G/DL (ref 12–16)
LYMPHOCYTES # BLD AUTO: 1.8 THOUSANDS/ΜL (ref 0.6–4.47)
LYMPHOCYTES NFR BLD AUTO: 25 % (ref 21–51)
MCH RBC QN AUTO: 28.4 PG (ref 26–34)
MCHC RBC AUTO-ENTMCNC: 33 G/DL (ref 31–37)
MCV RBC AUTO: 86 FL (ref 81–99)
MONOCYTES # BLD AUTO: 0.5 THOUSAND/ΜL (ref 0.17–1.22)
MONOCYTES NFR BLD AUTO: 7 % (ref 2–12)
NEUTROPHILS # BLD AUTO: 4.5 THOUSANDS/ΜL (ref 1.4–6.5)
NEUTS SEG NFR BLD AUTO: 61 % (ref 42–75)
PLATELET # BLD AUTO: 262 THOUSANDS/UL (ref 149–390)
PMV BLD AUTO: 8.1 FL (ref 8.6–11.7)
POTASSIUM SERPL-SCNC: 3.8 MMOL/L (ref 3.5–5.5)
RBC # BLD AUTO: 3.73 MILLION/UL (ref 3.9–5.2)
SODIUM SERPL-SCNC: 136 MMOL/L (ref 134–143)
WBC # BLD AUTO: 7.5 THOUSAND/UL (ref 4.8–10.8)

## 2019-05-04 PROCEDURE — 85025 COMPLETE CBC W/AUTO DIFF WBC: CPT | Performed by: EMERGENCY MEDICINE

## 2019-05-04 PROCEDURE — 99284 EMERGENCY DEPT VISIT MOD MDM: CPT

## 2019-05-04 PROCEDURE — 70450 CT HEAD/BRAIN W/O DYE: CPT

## 2019-05-04 PROCEDURE — 80048 BASIC METABOLIC PNL TOTAL CA: CPT | Performed by: EMERGENCY MEDICINE

## 2019-05-04 PROCEDURE — 36415 COLL VENOUS BLD VENIPUNCTURE: CPT | Performed by: EMERGENCY MEDICINE

## 2019-05-05 VITALS
DIASTOLIC BLOOD PRESSURE: 60 MMHG | HEART RATE: 78 BPM | SYSTOLIC BLOOD PRESSURE: 140 MMHG | BODY MASS INDEX: 45.52 KG/M2 | HEIGHT: 67 IN | TEMPERATURE: 98 F | RESPIRATION RATE: 18 BRPM | WEIGHT: 290 LBS | OXYGEN SATURATION: 99 %

## 2019-05-07 ENCOUNTER — TELEPHONE (OUTPATIENT)
Dept: INTERNAL MEDICINE CLINIC | Facility: CLINIC | Age: 74
End: 2019-05-07

## 2019-05-07 ENCOUNTER — PATIENT OUTREACH (OUTPATIENT)
Dept: INTERNAL MEDICINE CLINIC | Facility: CLINIC | Age: 74
End: 2019-05-07

## 2019-05-07 ENCOUNTER — EPISODE CHANGES (OUTPATIENT)
Dept: CASE MANAGEMENT | Facility: HOSPITAL | Age: 74
End: 2019-05-07

## 2019-05-10 DIAGNOSIS — I10 ESSENTIAL HYPERTENSION: ICD-10-CM

## 2019-05-10 RX ORDER — METOPROLOL TARTRATE 50 MG/1
50 TABLET, FILM COATED ORAL EVERY 12 HOURS SCHEDULED
Qty: 60 TABLET | Refills: 4 | Status: SHIPPED | OUTPATIENT
Start: 2019-05-10 | End: 2019-11-04 | Stop reason: SDUPTHER

## 2019-05-20 DIAGNOSIS — F32.89 OTHER DEPRESSION: ICD-10-CM

## 2019-05-20 RX ORDER — ESCITALOPRAM OXALATE 10 MG/1
10 TABLET ORAL DAILY
Qty: 90 TABLET | Refills: 1 | Status: SHIPPED | OUTPATIENT
Start: 2019-05-20 | End: 2019-11-29 | Stop reason: SDUPTHER

## 2019-06-11 DIAGNOSIS — G43.909 MIGRAINE WITHOUT STATUS MIGRAINOSUS, NOT INTRACTABLE, UNSPECIFIED MIGRAINE TYPE: ICD-10-CM

## 2019-06-11 RX ORDER — NORTRIPTYLINE HYDROCHLORIDE 50 MG/1
50 CAPSULE ORAL DAILY
Qty: 90 CAPSULE | Refills: 1 | Status: SHIPPED | OUTPATIENT
Start: 2019-06-11 | End: 2019-12-20 | Stop reason: SDUPTHER

## 2019-07-16 ENCOUNTER — OFFICE VISIT (OUTPATIENT)
Dept: INTERNAL MEDICINE CLINIC | Facility: CLINIC | Age: 74
End: 2019-07-16
Payer: MEDICARE

## 2019-07-16 VITALS
TEMPERATURE: 99.4 F | HEIGHT: 67 IN | SYSTOLIC BLOOD PRESSURE: 140 MMHG | BODY MASS INDEX: 44.48 KG/M2 | RESPIRATION RATE: 15 BRPM | HEART RATE: 82 BPM | WEIGHT: 283.4 LBS | DIASTOLIC BLOOD PRESSURE: 70 MMHG

## 2019-07-16 DIAGNOSIS — R73.03 PREDIABETES: ICD-10-CM

## 2019-07-16 DIAGNOSIS — E78.00 HYPERCHOLESTEROLEMIA: ICD-10-CM

## 2019-07-16 DIAGNOSIS — I48.0 PAROXYSMAL ATRIAL FIBRILLATION (HCC): ICD-10-CM

## 2019-07-16 DIAGNOSIS — N18.30 STAGE 3 CHRONIC KIDNEY DISEASE (HCC): ICD-10-CM

## 2019-07-16 DIAGNOSIS — I63.40 CEREBROVASCULAR ACCIDENT (CVA) DUE TO EMBOLISM OF CEREBRAL ARTERY (HCC): ICD-10-CM

## 2019-07-16 DIAGNOSIS — Z12.39 SCREENING FOR BREAST CANCER: ICD-10-CM

## 2019-07-16 DIAGNOSIS — I50.32 CHRONIC DIASTOLIC CONGESTIVE HEART FAILURE (HCC): ICD-10-CM

## 2019-07-16 DIAGNOSIS — G47.33 OBSTRUCTIVE SLEEP APNEA: ICD-10-CM

## 2019-07-16 DIAGNOSIS — E66.01 CLASS 3 SEVERE OBESITY DUE TO EXCESS CALORIES WITH SERIOUS COMORBIDITY AND BODY MASS INDEX (BMI) OF 40.0 TO 44.9 IN ADULT (HCC): ICD-10-CM

## 2019-07-16 DIAGNOSIS — I10 ESSENTIAL HYPERTENSION: Primary | ICD-10-CM

## 2019-07-16 DIAGNOSIS — E03.9 ACQUIRED HYPOTHYROIDISM: ICD-10-CM

## 2019-07-16 PROBLEM — Z86.73 HISTORY OF STROKE: Status: RESOLVED | Noted: 2018-05-18 | Resolved: 2019-07-16

## 2019-07-16 PROCEDURE — 99214 OFFICE O/P EST MOD 30 MIN: CPT | Performed by: INTERNAL MEDICINE

## 2019-07-16 PROCEDURE — 1124F ACP DISCUSS-NO DSCNMKR DOCD: CPT | Performed by: INTERNAL MEDICINE

## 2019-07-18 NOTE — PROGRESS NOTES
[facility] [place]      NAME: Christa Poe  AGE: 69 y o  SEX: female  : 1945   MRN: [id number]    DATE: 2019  TIME: 12:44 PM    Assessment and Plan   1  Essential hypertension  Adequately controlled at present   - CBC  - Comprehensive metabolic panel    2  Paroxysmal atrial fibrillation (HCC)  On amiodarone and Eliquis  3  Obstructive sleep apnea  On CPAP  4  Acquired hypothyroidism  On appropriate thyroid hormone replacement  - T4, free  - TSH, 3rd generation    5  Cerebrovascular accident (CVA) due to embolism of cerebral artery (HCC)  On clopidogrel, Eliquis, and appropriate risk factor modification  6  Chronic diastolic congestive heart failure ([address] )  Adequately compensated at present  7  Stage 3 chronic kidney disease (HCC)  Stable  8  Class 3 severe obesity due to excess calories with serious comorbidity and body mass index (BMI) of 40 0 to 44 9 in adult ([facility])  BMI Counseling: Body mass index is 44 39 kg/m²  Discussed the patient's BMI with her  The BMI is significantly elevated  I advised the patient to reduce her caloric intake  I encouraged her to become is active as possible  She will consider engaging in cardiac rehab to help her with this effort  9  Hypercholesterolemia  Continue simvastatin  - Lipid panel    10  Prediabetes  Continue diet  - Hemoglobin A1C    11  Screening for breast cancer  - Mammo screening bilateral w cad; Future    The patient is doing reasonably well at present  Her blood pressure is controlled  Her heart failure is compensated  She has been gradually increasing her exercise but is nervous about this because of all her other medical conditions  We will investigate the possibility of cardiac rehab near her home        Return to office in:  3 months    Chief Complaint     Chief Complaint   Patient presents with    Follow-up     3 month       History of Present Illness     The patient returned to the office for re-evaluation of hypertension, hypercholesterolemia, paroxysmal atrial fibrillation, obstructive sleep apnea, past stroke, and chronic diastolic congestive heart failure  Since her last visit she has been feeling pretty well  She denies chest pain, shortness of breath, palpitations, or dizziness  She is tolerating her medications  She is trying to watch her diet  She has lost a few pounds  She is increasing her exercise but is anxious about this because of her all of her medical problems  The following portions of the patient's history were reviewed and updated as appropriate: allergies, current medications, past family history, past medical history, past social history, past surgical history and problem list     Review of Systems   Review of Systems   Constitutional: Negative  HENT: Negative for congestion, ear pain, postnasal drip, rhinorrhea, sore throat and trouble swallowing  Eyes: Negative for pain, discharge, redness and visual disturbance  Respiratory: Negative for cough, shortness of breath and wheezing  Cardiovascular: Negative  Gastrointestinal: Negative  Endocrine: Negative  Genitourinary: Negative for difficulty urinating, dysuria, frequency, hematuria and urgency  Musculoskeletal: Negative for arthralgias, gait problem, joint swelling and myalgias  Skin: Negative for rash  Neurological: Negative for dizziness, speech difficulty, weakness, light-headedness, numbness and headaches  Hematological: Negative  Psychiatric/Behavioral: Negative for confusion, decreased concentration, dysphoric mood and sleep disturbance  The patient is not nervous/anxious          Active Problem List     Patient Active Problem List   Diagnosis    Atrial fibrillation ([address] )    Essential hypertension    Prediabetes    Depression    Hypercholesterolemia    Migraine    Mitral regurgitation    Class 3 severe obesity due to excess calories with serious comorbidity and body mass index (BMI) of 40 0 to 44 9 in adult [facility])    Obstructive sleep apnea    Primary localized osteoarthritis of right knee    Stroke (HCC)    Chronic diastolic congestive heart failure (HCC)    Postural dizziness with presyncope    Symptomatic bradycardia    Chronic bilateral low back pain without sciatica    Stage 3 chronic kidney disease (HCC)    Anemia    Acquired hypothyroidism       Objective   /70 (BP Location: Left arm, Patient Position: Sitting, Cuff Size: Large)   Pulse 82   Temp 99 4 °F (37 4 °C)   Resp 15   Ht 5' 7" (1 702 m)   Wt 129 kg (283 lb 6 4 oz)   LMP  (LMP Unknown)   BMI 44 39 kg/m²     Physical Exam   Constitutional: She is oriented to person, place, and time  She appears well-developed  No distress  Obese   HENT:   Head: Normocephalic and atraumatic  Neck: Neck supple  No JVD present  No tracheal deviation present  No thyromegaly present  Cardiovascular: Normal rate, regular rhythm, normal heart sounds and intact distal pulses  Exam reveals no gallop and no friction rub  No murmur heard  Pulmonary/Chest: Effort normal and breath sounds normal  She has no wheezes  She has no rales  She exhibits no tenderness  Abdominal: Soft  Bowel sounds are normal  She exhibits no distension and no mass  There is no tenderness  There is no rebound  Musculoskeletal: Normal range of motion  She exhibits no edema or tenderness  Lymphadenopathy:     She has no cervical adenopathy  Neurological: She is alert and oriented to person, place, and time  Skin: Skin is warm and dry  Psychiatric: She has a normal mood and affect   Her behavior is normal  Judgment and thought content normal        Pertinent Laboratory/Diagnostic Studies:  Admission on 05/04/2019, Discharged on 05/05/2019   Component Date Value Ref Range Status    WBC 05/04/2019 7 50  4 80 - 10 80 Thousand/uL Final    RBC 05/04/2019 3 73* 3 90 - 5 20 Million/uL Final    Hemoglobin 05/04/2019 10 6* 12 0 - 16 0 g/dL Final  Hematocrit 05/04/2019 32 0* 42 0 - 47 0 % Final    MCV 05/04/2019 86  81 - 99 fL Final    MCH 05/04/2019 28 4  26 0 - 34 0 pg Final    MCHC 05/04/2019 33 0  31 0 - 37 0 g/dL Final    RDW 05/04/2019 16 6* 11 5 - 14 5 % Final    MPV 05/04/2019 8 1* 8 6 - 11 7 fL Final    Platelets 22/61/0775 262  149 - 390 Thousands/uL Final    Neutrophils Relative 05/04/2019 61  42 - 75 % Final    Lymphocytes Relative 05/04/2019 25  21 - 51 % Final    Monocytes Relative 05/04/2019 7  2 - 12 % Final    Eosinophils Relative 05/04/2019 7* 0 - 5 % Final    Basophils Relative 05/04/2019 1  0 - 2 % Final    Neutrophils Absolute 05/04/2019 4 50  1 40 - 6 50 Thousands/µL Final    Lymphocytes Absolute 05/04/2019 1 80  0 60 - 4 47 Thousands/µL Final    Monocytes Absolute 05/04/2019 0 50  0 17 - 1 22 Thousand/µL Final    Eosinophils Absolute 05/04/2019 0 50  0 00 - 0 61 Thousand/µL Final    Basophils Absolute 05/04/2019 0 10  0 00 - 0 10 Thousands/µL Final    Sodium 05/04/2019 136  134 - 143 mmol/L Final    Potassium 05/04/2019 3 8  3 5 - 5 5 mmol/L Final    Chloride 05/04/2019 101  98 - 107 mmol/L Final    CO2 05/04/2019 26  21 - 31 mmol/L Final    ANION GAP 05/04/2019 9  4 - 13 mmol/L Final    BUN 05/04/2019 25  7 - 25 mg/dL Final    Creatinine 05/04/2019 1 24* 0 60 - 1 20 mg/dL Final    Glucose 05/04/2019 104* 65 - 99 mg/dL Final    Calcium 05/04/2019 8 8  8 6 - 10 5 mg/dL Final    eGFR 05/04/2019 44  ml/min/1 73sq m Final           Current Medications     Current Outpatient Medications:     amiodarone 200 mg tablet, Take 1/2 tablet daily, Disp: 45 tablet, Rfl: 2    clopidogrel (PLAVIX) 75 mg tablet, TAKE ONE TABLET BY MOUTH ONCE DAILY, Disp: 90 tablet, Rfl: 0    ELIQUIS 5 MG, TAKE ONE TABLET BY MOUTH TWO TIMES A DAY, Disp: 60 tablet, Rfl: 10    escitalopram (LEXAPRO) 10 mg tablet, Take 1 tablet (10 mg total) by mouth daily, Disp: 90 tablet, Rfl: 1    gabapentin (NEURONTIN) 100 mg capsule, Take 1 capsule (100 mg total) by mouth 3 (three) times a day, Disp: 90 capsule, Rfl: 3    levothyroxine 88 mcg tablet, Take 1 tablet (88 mcg total) by mouth daily, Disp: 90 tablet, Rfl: 0    LORazepam (ATIVAN) 1 mg tablet, Take 1 tablet (1 mg total) by mouth 3 (three) times a day as needed for anxiety, Disp: 30 tablet, Rfl: 0    meclizine (ANTIVERT) 25 mg tablet, Take 25 mg by mouth 3 (three) times a day as needed for dizziness , Disp: , Rfl:     metoprolol tartrate (LOPRESSOR) 50 mg tablet, Take 1 tablet (50 mg total) by mouth every 12 (twelve) hours, Disp: 60 tablet, Rfl: 4    Multiple Vitamin (MULTIVITAMIN) tablet, Take 1 tablet by mouth daily  , Disp: , Rfl:     nortriptyline (PAMELOR) 50 mg capsule, Take 1 capsule (50 mg total) by mouth daily, Disp: 90 capsule, Rfl: 1    oxyCODONE-acetaminophen (PERCOCET) 5-325 mg per tablet, Take 1 tablet by mouth every 4 (four) hours as needed for moderate pain Max Daily Amount: 6 tablets, Disp: 50 tablet, Rfl: 0    potassium chloride (K-DUR,KLOR-CON) 20 mEq tablet, Take 1 tablet (20 mEq total) by mouth 2 (two) times a day, Disp: 180 tablet, Rfl: 1    simvastatin (ZOCOR) 10 mg tablet, Take 1 tablet (10 mg total) by mouth daily at bedtime, Disp: 90 tablet, Rfl: 3    torsemide (DEMADEX) 20 mg tablet, Take 1 tablet (20 mg total) by mouth twice a day except for Monday and Thursdays take 2 tablets (40 mg total) in morning, Disp: 70 tablet, Rfl: 2      Evangelista Esteban MD

## 2019-07-29 DIAGNOSIS — M54.50 CHRONIC BILATERAL LOW BACK PAIN WITHOUT SCIATICA: ICD-10-CM

## 2019-07-29 DIAGNOSIS — G89.29 CHRONIC BILATERAL LOW BACK PAIN WITHOUT SCIATICA: ICD-10-CM

## 2019-07-29 DIAGNOSIS — I63.9 CEREBROVASCULAR ACCIDENT (CVA), UNSPECIFIED MECHANISM (HCC): ICD-10-CM

## 2019-07-29 RX ORDER — CLOPIDOGREL BISULFATE 75 MG/1
75 TABLET ORAL DAILY
Qty: 90 TABLET | Refills: 0 | Status: SHIPPED | OUTPATIENT
Start: 2019-07-29 | End: 2019-11-04 | Stop reason: SDUPTHER

## 2019-07-29 RX ORDER — GABAPENTIN 100 MG/1
100 CAPSULE ORAL 3 TIMES DAILY
Qty: 90 CAPSULE | Refills: 3 | Status: ON HOLD | OUTPATIENT
Start: 2019-07-29 | End: 2020-05-04 | Stop reason: SDUPTHER

## 2019-08-19 DIAGNOSIS — E03.9 ACQUIRED HYPOTHYROIDISM: ICD-10-CM

## 2019-08-19 DIAGNOSIS — I48.0 PAROXYSMAL ATRIAL FIBRILLATION (HCC): ICD-10-CM

## 2019-08-19 RX ORDER — LEVOTHYROXINE SODIUM 88 UG/1
88 TABLET ORAL DAILY
Qty: 90 TABLET | Refills: 0 | Status: SHIPPED | OUTPATIENT
Start: 2019-08-19 | End: 2019-11-29 | Stop reason: SDUPTHER

## 2019-08-20 RX ORDER — AMIODARONE HYDROCHLORIDE 200 MG/1
TABLET ORAL
Qty: 45 TABLET | Refills: 2 | Status: ON HOLD | OUTPATIENT
Start: 2019-08-20 | End: 2020-05-04 | Stop reason: SDUPTHER

## 2019-09-17 DIAGNOSIS — I10 ESSENTIAL HYPERTENSION: ICD-10-CM

## 2019-09-18 DIAGNOSIS — I10 ESSENTIAL HYPERTENSION: ICD-10-CM

## 2019-09-18 RX ORDER — POTASSIUM CHLORIDE 20 MEQ/1
20 TABLET, EXTENDED RELEASE ORAL 2 TIMES DAILY
Qty: 180 TABLET | Refills: 1 | Status: SHIPPED | OUTPATIENT
Start: 2019-09-18 | End: 2020-03-30 | Stop reason: SDUPTHER

## 2019-09-18 RX ORDER — POTASSIUM CHLORIDE 20 MEQ/1
TABLET, EXTENDED RELEASE ORAL
Qty: 180 TABLET | Refills: 0 | Status: SHIPPED | OUTPATIENT
Start: 2019-09-18 | End: 2019-11-19 | Stop reason: SDUPTHER

## 2019-11-04 DIAGNOSIS — I10 ESSENTIAL HYPERTENSION: ICD-10-CM

## 2019-11-04 DIAGNOSIS — I63.9 CEREBROVASCULAR ACCIDENT (CVA), UNSPECIFIED MECHANISM (HCC): ICD-10-CM

## 2019-11-05 RX ORDER — CLOPIDOGREL BISULFATE 75 MG/1
75 TABLET ORAL DAILY
Qty: 90 TABLET | Refills: 0 | Status: ON HOLD | OUTPATIENT
Start: 2019-11-05 | End: 2020-05-04 | Stop reason: SDUPTHER

## 2019-11-05 RX ORDER — METOPROLOL TARTRATE 50 MG/1
50 TABLET, FILM COATED ORAL EVERY 12 HOURS SCHEDULED
Qty: 60 TABLET | Refills: 4 | Status: ON HOLD | OUTPATIENT
Start: 2019-11-05 | End: 2020-05-04 | Stop reason: SDUPTHER

## 2019-11-19 ENCOUNTER — HOSPITAL ENCOUNTER (OUTPATIENT)
Facility: HOSPITAL | Age: 74
Setting detail: OBSERVATION
Discharge: HOME | End: 2019-11-20
Attending: EMERGENCY MEDICINE | Admitting: INTERNAL MEDICINE
Payer: MEDICARE

## 2019-11-19 ENCOUNTER — APPOINTMENT (EMERGENCY)
Dept: RADIOLOGY | Facility: HOSPITAL | Age: 74
End: 2019-11-19
Payer: MEDICARE

## 2019-11-19 DIAGNOSIS — R06.00 DYSPNEA ON EXERTION: Primary | ICD-10-CM

## 2019-11-19 LAB
ALBUMIN SERPL BCP-MCNC: 3.4 G/DL (ref 3.5–5)
ALP SERPL-CCNC: 115 U/L (ref 46–116)
ALT SERPL W P-5'-P-CCNC: 27 U/L (ref 12–78)
ANION GAP SERPL CALCULATED.3IONS-SCNC: 10 MMOL/L (ref 4–13)
AST SERPL W P-5'-P-CCNC: 21 U/L (ref 5–45)
BASOPHILS # BLD AUTO: 0.08 THOUSANDS/ΜL (ref 0–0.1)
BASOPHILS NFR BLD AUTO: 1 % (ref 0–1)
BILIRUB SERPL-MCNC: 0.33 MG/DL (ref 0.2–1)
BUN SERPL-MCNC: 46 MG/DL (ref 5–25)
CALCIUM SERPL-MCNC: 9 MG/DL (ref 8.3–10.1)
CHLORIDE SERPL-SCNC: 108 MMOL/L (ref 100–108)
CO2 SERPL-SCNC: 25 MMOL/L (ref 21–32)
CREAT SERPL-MCNC: 1.16 MG/DL (ref 0.6–1.3)
D DIMER PPP FEU-MCNC: 0.39 UG/ML FEU
EOSINOPHIL # BLD AUTO: 0.42 THOUSAND/ΜL (ref 0–0.61)
EOSINOPHIL NFR BLD AUTO: 4 % (ref 0–6)
ERYTHROCYTE [DISTWIDTH] IN BLOOD BY AUTOMATED COUNT: 17.5 % (ref 11.6–15.1)
GFR SERPL CREATININE-BSD FRML MDRD: 47 ML/MIN/1.73SQ M
GLUCOSE SERPL-MCNC: 112 MG/DL (ref 65–140)
HCT VFR BLD AUTO: 32.8 % (ref 34.8–46.1)
HGB BLD-MCNC: 9.6 G/DL (ref 11.5–15.4)
IMM GRANULOCYTES # BLD AUTO: 0.03 THOUSAND/UL (ref 0–0.2)
IMM GRANULOCYTES NFR BLD AUTO: 0 % (ref 0–2)
LYMPHOCYTES # BLD AUTO: 2.5 THOUSANDS/ΜL (ref 0.6–4.47)
LYMPHOCYTES NFR BLD AUTO: 21 % (ref 14–44)
MCH RBC QN AUTO: 26.1 PG (ref 26.8–34.3)
MCHC RBC AUTO-ENTMCNC: 29.3 G/DL (ref 31.4–37.4)
MCV RBC AUTO: 89 FL (ref 82–98)
MONOCYTES # BLD AUTO: 0.85 THOUSAND/ΜL (ref 0.17–1.22)
MONOCYTES NFR BLD AUTO: 7 % (ref 4–12)
NEUTROPHILS # BLD AUTO: 7.85 THOUSANDS/ΜL (ref 1.85–7.62)
NEUTS SEG NFR BLD AUTO: 67 % (ref 43–75)
NRBC BLD AUTO-RTO: 0 /100 WBCS
NT-PROBNP SERPL-MCNC: 292 PG/ML
PLATELET # BLD AUTO: 408 THOUSANDS/UL (ref 149–390)
PMV BLD AUTO: 11 FL (ref 8.9–12.7)
POTASSIUM SERPL-SCNC: 4.5 MMOL/L (ref 3.5–5.3)
PROT SERPL-MCNC: 7 G/DL (ref 6.4–8.2)
RBC # BLD AUTO: 3.68 MILLION/UL (ref 3.81–5.12)
SODIUM SERPL-SCNC: 143 MMOL/L (ref 136–145)
TROPONIN I SERPL-MCNC: <0.02 NG/ML
TROPONIN I SERPL-MCNC: <0.02 NG/ML
WBC # BLD AUTO: 11.73 THOUSAND/UL (ref 4.31–10.16)

## 2019-11-19 PROCEDURE — 80053 COMPREHEN METABOLIC PANEL: CPT | Performed by: EMERGENCY MEDICINE

## 2019-11-19 PROCEDURE — 84484 ASSAY OF TROPONIN QUANT: CPT | Performed by: NURSE PRACTITIONER

## 2019-11-19 PROCEDURE — 36415 COLL VENOUS BLD VENIPUNCTURE: CPT | Performed by: EMERGENCY MEDICINE

## 2019-11-19 PROCEDURE — 99220 PR INITIAL OBSERVATION CARE/DAY 70 MINUTES: CPT | Performed by: NURSE PRACTITIONER

## 2019-11-19 PROCEDURE — 85379 FIBRIN DEGRADATION QUANT: CPT | Performed by: EMERGENCY MEDICINE

## 2019-11-19 PROCEDURE — 85025 COMPLETE CBC W/AUTO DIFF WBC: CPT | Performed by: EMERGENCY MEDICINE

## 2019-11-19 PROCEDURE — 83880 ASSAY OF NATRIURETIC PEPTIDE: CPT | Performed by: EMERGENCY MEDICINE

## 2019-11-19 PROCEDURE — 99285 EMERGENCY DEPT VISIT HI MDM: CPT | Performed by: EMERGENCY MEDICINE

## 2019-11-19 PROCEDURE — 71046 X-RAY EXAM CHEST 2 VIEWS: CPT

## 2019-11-19 PROCEDURE — 84484 ASSAY OF TROPONIN QUANT: CPT | Performed by: EMERGENCY MEDICINE

## 2019-11-19 PROCEDURE — 99285 EMERGENCY DEPT VISIT HI MDM: CPT

## 2019-11-19 PROCEDURE — 93005 ELECTROCARDIOGRAM TRACING: CPT

## 2019-11-19 RX ORDER — TORSEMIDE 20 MG/1
20 TABLET ORAL DAILY
Status: DISCONTINUED | OUTPATIENT
Start: 2019-11-20 | End: 2019-11-20 | Stop reason: HOSPADM

## 2019-11-19 RX ORDER — NORTRIPTYLINE HYDROCHLORIDE 50 MG/1
50 CAPSULE ORAL
Status: DISCONTINUED | OUTPATIENT
Start: 2019-11-19 | End: 2019-11-20 | Stop reason: HOSPADM

## 2019-11-19 RX ORDER — GABAPENTIN 100 MG/1
100 CAPSULE ORAL 3 TIMES DAILY
Status: DISCONTINUED | OUTPATIENT
Start: 2019-11-19 | End: 2019-11-20 | Stop reason: HOSPADM

## 2019-11-19 RX ORDER — CLOPIDOGREL BISULFATE 75 MG/1
75 TABLET ORAL DAILY
Status: DISCONTINUED | OUTPATIENT
Start: 2019-11-20 | End: 2019-11-20 | Stop reason: HOSPADM

## 2019-11-19 RX ORDER — LEVOTHYROXINE SODIUM 88 UG/1
88 TABLET ORAL
Status: DISCONTINUED | OUTPATIENT
Start: 2019-11-20 | End: 2019-11-20 | Stop reason: HOSPADM

## 2019-11-19 RX ORDER — ONDANSETRON 2 MG/ML
4 INJECTION INTRAMUSCULAR; INTRAVENOUS EVERY 6 HOURS PRN
Status: DISCONTINUED | OUTPATIENT
Start: 2019-11-19 | End: 2019-11-20 | Stop reason: HOSPADM

## 2019-11-19 RX ORDER — ACETAMINOPHEN 325 MG/1
650 TABLET ORAL EVERY 6 HOURS PRN
Status: DISCONTINUED | OUTPATIENT
Start: 2019-11-19 | End: 2019-11-20 | Stop reason: HOSPADM

## 2019-11-19 RX ORDER — ESCITALOPRAM OXALATE 10 MG/1
10 TABLET ORAL DAILY
Status: DISCONTINUED | OUTPATIENT
Start: 2019-11-20 | End: 2019-11-20 | Stop reason: HOSPADM

## 2019-11-19 RX ORDER — POTASSIUM CHLORIDE 20 MEQ/1
20 TABLET, EXTENDED RELEASE ORAL 2 TIMES DAILY
Status: DISCONTINUED | OUTPATIENT
Start: 2019-11-19 | End: 2019-11-20 | Stop reason: HOSPADM

## 2019-11-19 RX ORDER — METOPROLOL TARTRATE 50 MG/1
50 TABLET, FILM COATED ORAL EVERY 12 HOURS SCHEDULED
Status: DISCONTINUED | OUTPATIENT
Start: 2019-11-19 | End: 2019-11-20 | Stop reason: HOSPADM

## 2019-11-19 RX ORDER — LORAZEPAM 1 MG/1
1 TABLET ORAL 3 TIMES DAILY PRN
Status: DISCONTINUED | OUTPATIENT
Start: 2019-11-19 | End: 2019-11-20 | Stop reason: HOSPADM

## 2019-11-19 RX ORDER — AMIODARONE HYDROCHLORIDE 100 MG/1
100 TABLET ORAL
Status: DISCONTINUED | OUTPATIENT
Start: 2019-11-20 | End: 2019-11-20 | Stop reason: HOSPADM

## 2019-11-19 RX ADMIN — METOPROLOL TARTRATE 50 MG: 50 TABLET, FILM COATED ORAL at 22:15

## 2019-11-19 RX ADMIN — POTASSIUM CHLORIDE 20 MEQ: 1500 TABLET, EXTENDED RELEASE ORAL at 22:15

## 2019-11-19 RX ADMIN — APIXABAN 5 MG: 5 TABLET, FILM COATED ORAL at 22:15

## 2019-11-19 RX ADMIN — GABAPENTIN 100 MG: 100 CAPSULE ORAL at 22:15

## 2019-11-19 RX ADMIN — NORTRIPTYLINE HYDROCHLORIDE 50 MG: 50 CAPSULE ORAL at 22:15

## 2019-11-19 RX ADMIN — ACETAMINOPHEN 650 MG: 325 TABLET ORAL at 22:15

## 2019-11-19 NOTE — ED PROVIDER NOTES
History  Chief Complaint   Patient presents with    Shortness of Breath     Pt  reports SOB since this afternoon  Pt  reports SOB especially with excertion  Pt  has CHF hx      17-year-old female presenting for evaluation of acute onset of shortness of breath  Symptoms started around 1:00 p m , 5 5 hours ago while doing nothing in particular  She reports feeling very short of breath with any sort of exertion as minimal as putting on the a hospital gown  Improved with rest   Reports this feels similar to her CHF exacerbations in the past   She states that her weight is actually down and she has been compliant with her Lasix, taking 20-40 mg depending on the day, today she took 20 mg this morning  No worsening lower extremity edema  Denies any recent illness, fevers, chills, cough/URI symptoms  Denies any associated chest pain, abdominal pain, back pain, leg pain  Patient with history of paroxysmal AFib on Eliquis    17-year-old female with acute-onset shortness of breath, will get cardiac workup, CXR to assess for CHF/pneumonia, reassess, dispo accordingly           History of HTN, paroxysmal atrial fibrillation on amiodarone and Eliquis, hypothyroidism, CHF, history of prior CVA, CKD    Echo on 5/18/18 with EF of 60%, no regional wall motion abnormalities    Prior to Admission Medications   Prescriptions Last Dose Informant Patient Reported? Taking?    ELIQUIS 5 MG  Self No Yes   Sig: TAKE ONE TABLET BY MOUTH TWO TIMES A DAY   LORazepam (ATIVAN) 1 mg tablet  Self No Yes   Sig: Take 1 tablet (1 mg total) by mouth 3 (three) times a day as needed for anxiety   Multiple Vitamin (MULTIVITAMIN) tablet  Self Yes Yes   Sig: Take 1 tablet by mouth daily     amiodarone 200 mg tablet   No Yes   Sig: Take 1/2 tablet daily   clopidogrel (PLAVIX) 75 mg tablet   No Yes   Sig: Take 1 tablet (75 mg total) by mouth daily   escitalopram (LEXAPRO) 10 mg tablet   No Yes   Sig: Take 1 tablet (10 mg total) by mouth daily gabapentin (NEURONTIN) 100 mg capsule   No Yes   Sig: Take 1 capsule (100 mg total) by mouth 3 (three) times a day   levothyroxine 88 mcg tablet   No Yes   Sig: Take 1 tablet (88 mcg total) by mouth daily   meclizine (ANTIVERT) 25 mg tablet  Self Yes Yes   Sig: Take 25 mg by mouth 3 (three) times a day as needed for dizziness    metoprolol tartrate (LOPRESSOR) 50 mg tablet   No Yes   Sig: Take 1 tablet (50 mg total) by mouth every 12 (twelve) hours   nortriptyline (PAMELOR) 50 mg capsule   No Yes   Sig: Take 1 capsule (50 mg total) by mouth daily   oxyCODONE-acetaminophen (PERCOCET) 5-325 mg per tablet  Self No Yes   Sig: Take 1 tablet by mouth every 4 (four) hours as needed for moderate pain Max Daily Amount: 6 tablets   potassium chloride (K-DUR,KLOR-CON) 20 mEq tablet   No Yes   Sig: Take 1 tablet (20 mEq total) by mouth 2 (two) times a day   simvastatin (ZOCOR) 10 mg tablet   No Yes   Sig: Take 1 tablet (10 mg total) by mouth daily at bedtime   torsemide (DEMADEX) 20 mg tablet  Self No Yes   Sig: Take 1 tablet (20 mg total) by mouth twice a day except for Monday and Thursdays take 2 tablets (40 mg total) in morning      Facility-Administered Medications: None       Past Medical History:   Diagnosis Date    Acquired hypothyroidism 12/26/2018    Acute on chronic diastolic heart failure (HCC) 8/30/2018    Atrial fibrillation (HCC)     CHF (congestive heart failure) (HCC)     Hyperlipidemia     Hypertension     Migraine     Polyp of sigmoid colon     Prediabetes     Stroke [facility])        Past Surgical History:   Procedure Laterality Date    APPENDECTOMY      CARDIAC ELECTROPHYSIOLOGY STUDY AND ABLATION      CHOLECYSTECTOMY         Family History   Problem Relation Age of Onset    Diabetes Mother     Heart disease Mother     Hypertension Mother     Arthritis Mother     Coronary artery disease Mother      I have reviewed and agree with the history as documented      Social History     Tobacco Use    Smoking status: Former Smoker     Last attempt to quit: 2011     Years since quittin 7    Smokeless tobacco: Never Used   Substance Use Topics    Alcohol use: No    Drug use: No        Review of Systems   Constitutional: Negative for chills, fever and unexpected weight change  HENT: Negative for ear pain, rhinorrhea and sore throat  Eyes: Negative for pain and visual disturbance  Respiratory: Positive for shortness of breath  Negative for cough  Cardiovascular: Negative for chest pain and leg swelling  Gastrointestinal: Negative for abdominal pain, constipation, diarrhea, nausea and vomiting  Endocrine: Negative for polydipsia, polyphagia and polyuria  Genitourinary: Negative for dysuria, frequency, hematuria and urgency  Musculoskeletal: Negative for back pain, myalgias and neck pain  Skin: Negative for color change and rash  Allergic/Immunologic: Negative for environmental allergies and immunocompromised state  Neurological: Negative for dizziness, weakness, light-headedness, numbness and headaches  Hematological: Negative for adenopathy  Does not bruise/bleed easily  Psychiatric/Behavioral: Negative for agitation and confusion  All other systems reviewed and are negative  Physical Exam  Physical Exam   Constitutional: She is oriented to person, place, and time  She appears well-developed and well-nourished  HENT:   Head: Normocephalic and atraumatic  Nose: Nose normal    Mouth/Throat: Oropharynx is clear and moist    Eyes: Conjunctivae and EOM are normal    Neck: Normal range of motion  Neck supple  Cardiovascular: Normal rate, regular rhythm, normal heart sounds and intact distal pulses  Pulmonary/Chest: Breath sounds normal  No stridor  She has no wheezes  She has no rales  She exhibits no tenderness  Tachypneic, increased work of breathing, mild conversational dyspnea, lungs clear to auscultation bilaterally   Abdominal: Soft  She exhibits no distension  There is no tenderness  There is no rebound and no guarding  Musculoskeletal: She exhibits no edema or deformity  No calf swelling/tenderness, no peripheral edema   Neurological: She is alert and oriented to person, place, and time  She exhibits normal muscle tone  Coordination normal    Skin: Skin is warm and dry  No rash noted  Psychiatric: She has a normal mood and affect  Judgment and thought content normal    Nursing note and vitals reviewed        Vital Signs  ED Triage Vitals   Temperature Pulse Respirations Blood Pressure SpO2   11/19/19 1756 11/19/19 1756 11/19/19 1756 11/19/19 1756 11/19/19 1756   97 9 °F (36 6 °C) (!) 116 (!) 26 159/70 96 %      Temp Source Heart Rate Source Patient Position - Orthostatic VS BP Location FiO2 (%)   11/19/19 1756 11/19/19 1756 11/19/19 1756 11/19/19 1756 --   Oral Monitor Sitting Right arm       Pain Score       11/19/19 1918       No Pain           Vitals:    11/19/19 1918 11/19/19 2013 11/19/19 2040 11/19/19 2110   BP: 117/56 120/78  146/83   Pulse: 88 102 72 91   Patient Position - Orthostatic VS: Sitting   Sitting         Visual Acuity      ED Medications  Medications   clopidogrel (PLAVIX) tablet 75 mg (has no administration in time range)   apixaban (ELIQUIS) tablet 5 mg (has no administration in time range)   escitalopram (LEXAPRO) tablet 10 mg (has no administration in time range)   gabapentin (NEURONTIN) capsule 100 mg (has no administration in time range)   levothyroxine tablet 88 mcg (has no administration in time range)   LORazepam (ATIVAN) tablet 1 mg (has no administration in time range)   metoprolol tartrate (LOPRESSOR) tablet 50 mg (has no administration in time range)   nortriptyline (PAMELOR) capsule 50 mg (has no administration in time range)   amiodarone tablet 100 mg (has no administration in time range)   torsemide (DEMADEX) tablet 20 mg (has no administration in time range)   potassium chloride (K-DUR,KLOR-CON) CR tablet 20 mEq (has no administration in time range)       Diagnostic Studies  Results Reviewed     Procedure Component Value Units Date/Time    Troponin I [[id number]]     Lab Status:  No result Specimen:  Blood     D-dimer, quantitative [[id number]]  (Normal) Collected:  11/19/19 1916    Lab Status:  Final result Specimen:  Blood from Arm, Left Updated:  11/19/19 1943     D-Dimer, Quant 0 39 ug/ml FEU     NT-BNP PRO [[id number]]  (Abnormal) Collected:  11/19/19 1832    Lab Status:  Final result Specimen:  Blood from Arm, Left Updated:  11/19/19 1910     NT-proBNP 292 pg/mL     Troponin I [[id number]]  (Normal) Collected:  11/19/19 1832    Lab Status:  Final result Specimen:  Blood from Arm, Left Updated:  11/19/19 1905     Troponin I <0 02 ng/mL     Comprehensive metabolic panel [[id number]]  (Abnormal) Collected:  11/19/19 1832    Lab Status:  Final result Specimen:  Blood from Arm, Left Updated:  11/19/19 1903     Sodium 143 mmol/L      Potassium 4 5 mmol/L      Chloride 108 mmol/L      CO2 25 mmol/L      ANION GAP 10 mmol/L      BUN 46 mg/dL      Creatinine 1 16 mg/dL      Glucose 112 mg/dL      Calcium 9 0 mg/dL      AST 21 U/L      ALT 27 U/L      Alkaline Phosphatase 115 U/L      Total Protein 7 0 g/dL      Albumin 3 4 g/dL      Total Bilirubin 0 33 mg/dL      eGFR 47 ml/min/1 73sq m     Narrative:       Patience guidelines for Chronic Kidney Disease (CKD):     Stage 1 with normal or high GFR (GFR > 90 mL/min/1 73 square meters)    Stage 2 Mild CKD (GFR = 60-89 mL/min/1 73 square meters)    Stage 3A Moderate CKD (GFR = 45-59 mL/min/1 73 square meters)    Stage 3B Moderate CKD (GFR = 30-44 mL/min/1 73 square meters)    Stage 4 Severe CKD (GFR = 15-29 mL/min/1 73 square meters)    Stage 5 End Stage CKD (GFR <15 mL/min/1 73 square meters)  Note: GFR calculation is accurate only with a steady state creatinine    CBC and differential [[id number]]  (Abnormal) Collected:  11/19/19 1832    Lab Status:  Final result Specimen:  Blood from Arm, Left Updated:  19     WBC 11 73 Thousand/uL      RBC 3 68 Million/uL      Hemoglobin 9 6 g/dL      Hematocrit 32 8 %      MCV 89 fL      MCH 26 1 pg      MCHC 29 3 g/dL      RDW 17 5 %      MPV 11 0 fL      Platelets 689 Thousands/uL      nRBC 0 /100 WBCs      Neutrophils Relative 67 %      Immat GRANS % 0 %      Lymphocytes Relative 21 %      Monocytes Relative 7 %      Eosinophils Relative 4 %      Basophils Relative 1 %      Neutrophils Absolute 7 85 Thousands/µL      Immature Grans Absolute 0 03 Thousand/uL      Lymphocytes Absolute 2 50 Thousands/µL      Monocytes Absolute 0 85 Thousand/µL      Eosinophils Absolute 0 42 Thousand/µL      Basophils Absolute 0 08 Thousands/µL                  XR chest 2 views   ED Interpretation by Jovon Davila DO (1932)       IMPRESSION:       No acute cardiopulmonary disease  Final Result by Birtha Essex, MD (1922)      No acute cardiopulmonary disease  Workstation performed: [id number]                    Procedures  Procedures       ED Course  ED Course as of 2139   Tue 2019   1817 Pulse(!): 116   1817 Respirations(!): 26   1900 EKst degree heart block, normal axis, normal intervals, nonspecific ST changes       10 6  6 months ago   Hemoglobin(!): 9 6   5 D-Dimer, Quant: 0 39    Delta trop @    Troponin I: <0 02    Discussed unremarkable workup with patient, vitals are normal   Will ambulate patient with ambulatory pulse ox to assess symptoms       Patient ambulated around the emergency department, O2 saturation stayed around 96-97% on room air, however patient became tachypneic, dyspneic and is trying to catch her breath while sitting in the stretcher         Pt with acute onset of worsening SOB, repeat EKG performed and unchanged, NSR @ 94, no ST/ T changes          [id number] SCORE      Most Recent Value   [id number]   Age  1 Filed at: 2019 Sex  1 Filed at: 11/19/2019 2109   CHF History  1 Filed at: 11/19/2019 2109   HTN History  1 Filed at: 11/19/2019 2109   Stroke or TIA Symptoms Previously     Vascular Disease History     Diabetes History     [id number] Score  4 Filed at: 11/19/2019 2109        HEART Risk Score      Most Recent Value   History  0 Filed at: 11/19/2019 1931   ECG  0 Filed at: 11/19/2019 1931   Age  2 Filed at: 11/19/2019 1931   Risk Factors  1 Filed at: 11/19/2019 1931   Troponin  0 Filed at: 11/19/2019 1931   Heart Score Risk Calculator   History  0 Filed at: 11/19/2019 1931   ECG  0 Filed at: 11/19/2019 1931   Age  2 Filed at: 11/19/2019 1931   Risk Factors  1 Filed at: 11/19/2019 1931   Troponin  0 Filed at: 11/19/2019 1931   HEART Score  3 Filed at: 11/19/2019 1931   HEART Score  3 Filed at: 11/19/2019 1931                      Wells' Criteria for PE      Most Recent Value   Wells' Criteria for PE   Clinical signs and symptoms of DVT  0 Filed at: 11/19/2019 1931   PE is primary diagnosis or equally likely  0 Filed at: 11/19/2019 1931   HR >100  1 5 Filed at: 11/19/2019 1931   Immobilization at least 3 days or Surgery in the previous 4 weeks  0 Filed at: 11/19/2019 1931   Previous, objectively diagnosed PE or DVT  0 Filed at: 11/19/2019 1931   Hemoptysis  0 Filed at: 11/19/2019 1931   Malignancy with treatment within 6 months or palliative  0 Filed at: 11/19/2019 1931   Tyrone' Criteria Total  1 5 Filed at: 11/19/2019 1931            MDM  Number of Diagnoses or Management Options  Dyspnea on exertion:   Diagnosis management comments: 53-year-old female with acute onset of dyspnea on exertion today, unclear etiology    Unremarkable ED workup, however patient becomes extremely tachypneic with minimal exertion, admitted for further workup       Amount and/or Complexity of Data Reviewed  Clinical lab tests: ordered and reviewed  Tests in the radiology section of CPT®: ordered and reviewed  Tests in the medicine section of CPT®: ordered and reviewed  Review and summarize past medical records: yes  Independent visualization of images, tracings, or specimens: yes        Disposition  Final diagnoses:   Dyspnea on exertion     Time reflects when diagnosis was documented in both MDM as applicable and the Disposition within this note     Time User Action Codes Description Comment    11/19/2019  8:30 PM Sandy COOK Add [[id number]] Dyspnea on exertion       ED Disposition     ED Disposition Condition Date/Time Comment    Admit Stable Tue Nov 19, 2019  8:30 PM Case was discussed with TAMI and the patient's admission status was agreed to be Admission Status: observation status to the service of Dr Mary Iqbal           Follow-up Information    None         Current Discharge Medication List      CONTINUE these medications which have NOT CHANGED    Details   amiodarone 200 mg tablet Take 1/2 tablet daily  Qty: 45 tablet, Refills: 2    Associated Diagnoses: Paroxysmal atrial fibrillation (HCC)      clopidogrel (PLAVIX) 75 mg tablet Take 1 tablet (75 mg total) by mouth daily  Qty: 90 tablet, Refills: 0    Associated Diagnoses: Cerebrovascular accident (CVA), unspecified mechanism ([address] )      ELIQUIS 5 MG TAKE ONE TABLET BY MOUTH TWO TIMES A DAY  Qty: 60 tablet, Refills: 10    Associated Diagnoses: Paroxysmal atrial fibrillation (HCC)      escitalopram (LEXAPRO) 10 mg tablet Take 1 tablet (10 mg total) by mouth daily  Qty: 90 tablet, Refills: 1    Associated Diagnoses: Other depression      gabapentin (NEURONTIN) 100 mg capsule Take 1 capsule (100 mg total) by mouth 3 (three) times a day  Qty: 90 capsule, Refills: 3    Associated Diagnoses: Chronic bilateral low back pain without sciatica      levothyroxine 88 mcg tablet Take 1 tablet (88 mcg total) by mouth daily  Qty: 90 tablet, Refills: 0    Associated Diagnoses: Acquired hypothyroidism      LORazepam (ATIVAN) 1 mg tablet Take 1 tablet (1 mg total) by mouth 3 (three) times a day as needed for anxiety  Qty: 30 tablet, Refills: 0    Associated Diagnoses: Depression, unspecified depression type      meclizine (ANTIVERT) 25 mg tablet Take 25 mg by mouth 3 (three) times a day as needed for dizziness       metoprolol tartrate (LOPRESSOR) 50 mg tablet Take 1 tablet (50 mg total) by mouth every 12 (twelve) hours  Qty: 60 tablet, Refills: 4    Associated Diagnoses: Essential hypertension      Multiple Vitamin (MULTIVITAMIN) tablet Take 1 tablet by mouth daily        nortriptyline (PAMELOR) 50 mg capsule Take 1 capsule (50 mg total) by mouth daily  Qty: 90 capsule, Refills: 1    Associated Diagnoses: Migraine without status migrainosus, not intractable, unspecified migraine type      oxyCODONE-acetaminophen (PERCOCET) 5-325 mg per tablet Take 1 tablet by mouth every 4 (four) hours as needed for moderate pain Max Daily Amount: 6 tablets  Qty: 50 tablet, Refills: 0    Associated Diagnoses: Migraine without aura and without status migrainosus, not intractable      potassium chloride (K-DUR,KLOR-CON) 20 mEq tablet Take 1 tablet (20 mEq total) by mouth 2 (two) times a day  Qty: 180 tablet, Refills: 1    Associated Diagnoses: Essential hypertension      simvastatin (ZOCOR) 10 mg tablet Take 1 tablet (10 mg total) by mouth daily at bedtime  Qty: 90 tablet, Refills: 3    Associated Diagnoses: Hypercholesterolemia      torsemide (DEMADEX) 20 mg tablet Take 1 tablet (20 mg total) by mouth twice a day except for Monday and Thursdays take 2 tablets (40 mg total) in morning  Qty: 70 tablet, Refills: 2    Associated Diagnoses: Acute on chronic diastolic congestive heart failure (HCC)           No discharge procedures on file      ED Provider  Electronically Signed by           Sheree Campbell DO  11/19/19 5890

## 2019-11-20 VITALS
RESPIRATION RATE: 18 BRPM | WEIGHT: 265.65 LBS | SYSTOLIC BLOOD PRESSURE: 112 MMHG | HEIGHT: 67 IN | DIASTOLIC BLOOD PRESSURE: 68 MMHG | BODY MASS INDEX: 41.7 KG/M2 | TEMPERATURE: 97.9 F | OXYGEN SATURATION: 97 % | HEART RATE: 80 BPM

## 2019-11-20 LAB
ANION GAP SERPL CALCULATED.3IONS-SCNC: 14 MMOL/L (ref 4–13)
ATRIAL RATE: 92 BPM
ATRIAL RATE: 94 BPM
BASOPHILS # BLD AUTO: 0.07 THOUSANDS/ΜL (ref 0–0.1)
BASOPHILS NFR BLD AUTO: 1 % (ref 0–1)
BUN SERPL-MCNC: 55 MG/DL (ref 5–25)
CALCIUM SERPL-MCNC: 9 MG/DL (ref 8.3–10.1)
CHLORIDE SERPL-SCNC: 107 MMOL/L (ref 100–108)
CO2 SERPL-SCNC: 22 MMOL/L (ref 21–32)
CREAT SERPL-MCNC: 1.26 MG/DL (ref 0.6–1.3)
EOSINOPHIL # BLD AUTO: 0.2 THOUSAND/ΜL (ref 0–0.61)
EOSINOPHIL NFR BLD AUTO: 2 % (ref 0–6)
ERYTHROCYTE [DISTWIDTH] IN BLOOD BY AUTOMATED COUNT: 17.9 % (ref 11.6–15.1)
GFR SERPL CREATININE-BSD FRML MDRD: 43 ML/MIN/1.73SQ M
GLUCOSE P FAST SERPL-MCNC: 102 MG/DL (ref 65–99)
GLUCOSE SERPL-MCNC: 102 MG/DL (ref 65–140)
HCT VFR BLD AUTO: 30.9 % (ref 34.8–46.1)
HGB BLD-MCNC: 9.2 G/DL (ref 11.5–15.4)
IMM GRANULOCYTES # BLD AUTO: 0.03 THOUSAND/UL (ref 0–0.2)
IMM GRANULOCYTES NFR BLD AUTO: 0 % (ref 0–2)
LYMPHOCYTES # BLD AUTO: 3.06 THOUSANDS/ΜL (ref 0.6–4.47)
LYMPHOCYTES NFR BLD AUTO: 31 % (ref 14–44)
MCH RBC QN AUTO: 26.1 PG (ref 26.8–34.3)
MCHC RBC AUTO-ENTMCNC: 29.8 G/DL (ref 31.4–37.4)
MCV RBC AUTO: 88 FL (ref 82–98)
MONOCYTES # BLD AUTO: 0.69 THOUSAND/ΜL (ref 0.17–1.22)
MONOCYTES NFR BLD AUTO: 7 % (ref 4–12)
NEUTROPHILS # BLD AUTO: 5.76 THOUSANDS/ΜL (ref 1.85–7.62)
NEUTS SEG NFR BLD AUTO: 59 % (ref 43–75)
NRBC BLD AUTO-RTO: 0 /100 WBCS
P AXIS: 47 DEGREES
P AXIS: 48 DEGREES
PLATELET # BLD AUTO: 362 THOUSANDS/UL (ref 149–390)
PMV BLD AUTO: 11.3 FL (ref 8.9–12.7)
POTASSIUM SERPL-SCNC: 4.3 MMOL/L (ref 3.5–5.3)
PR INTERVAL: 216 MS
PR INTERVAL: 222 MS
QRS AXIS: 20 DEGREES
QRS AXIS: 21 DEGREES
QRSD INTERVAL: 82 MS
QRSD INTERVAL: 84 MS
QT INTERVAL: 372 MS
QT INTERVAL: 376 MS
QTC INTERVAL: 460 MS
QTC INTERVAL: 470 MS
RBC # BLD AUTO: 3.53 MILLION/UL (ref 3.81–5.12)
SODIUM SERPL-SCNC: 143 MMOL/L (ref 136–145)
T WAVE AXIS: 31 DEGREES
T WAVE AXIS: 43 DEGREES
TSH SERPL DL<=0.05 MIU/L-ACNC: 3.59 UIU/ML (ref 0.36–3.74)
VENTRICULAR RATE: 92 BPM
VENTRICULAR RATE: 94 BPM
WBC # BLD AUTO: 9.81 THOUSAND/UL (ref 4.31–10.16)

## 2019-11-20 PROCEDURE — G0008 ADMIN INFLUENZA VIRUS VAC: HCPCS | Performed by: HOSPITALIST

## 2019-11-20 PROCEDURE — 93010 ELECTROCARDIOGRAM REPORT: CPT | Performed by: INTERNAL MEDICINE

## 2019-11-20 PROCEDURE — 80048 BASIC METABOLIC PNL TOTAL CA: CPT | Performed by: NURSE PRACTITIONER

## 2019-11-20 PROCEDURE — 85025 COMPLETE CBC W/AUTO DIFF WBC: CPT | Performed by: NURSE PRACTITIONER

## 2019-11-20 PROCEDURE — 84443 ASSAY THYROID STIM HORMONE: CPT | Performed by: NURSE PRACTITIONER

## 2019-11-20 PROCEDURE — 99217 PR OBSERVATION CARE DISCHARGE MANAGEMENT: CPT | Performed by: INTERNAL MEDICINE

## 2019-11-20 PROCEDURE — 90662 IIV NO PRSV INCREASED AG IM: CPT | Performed by: HOSPITALIST

## 2019-11-20 RX ORDER — TORSEMIDE 20 MG/1
20 TABLET ORAL SEE ADMIN INSTRUCTIONS
COMMUNITY
End: 2019-12-23 | Stop reason: SDUPTHER

## 2019-11-20 RX ADMIN — LEVOTHYROXINE SODIUM 88 MCG: 88 TABLET ORAL at 06:34

## 2019-11-20 RX ADMIN — APIXABAN 5 MG: 5 TABLET, FILM COATED ORAL at 08:37

## 2019-11-20 RX ADMIN — INFLUENZA A VIRUS A/MICHIGAN/45/2015 X-275 (H1N1) ANTIGEN (FORMALDEHYDE INACTIVATED), INFLUENZA A VIRUS A/SINGAPORE/INFIMH-16-0019/2016 IVR-186 (H3N2) ANTIGEN (FORMALDEHYDE INACTIVATED), AND INFLUENZA B VIRUS B/MARYLAND/15/2016 BX-69A (A B/COLORADO/6/2017-LIKE VIRUS) ANTIGEN (FORMALDEHYDE INACTIVATED) 0.5 ML: 60; 60; 60 INJECTION, SUSPENSION INTRAMUSCULAR at 06:31

## 2019-11-20 RX ADMIN — CLOPIDOGREL BISULFATE 75 MG: 75 TABLET ORAL at 08:36

## 2019-11-20 RX ADMIN — APIXABAN 5 MG: 5 TABLET, FILM COATED ORAL at 17:24

## 2019-11-20 RX ADMIN — ESCITALOPRAM OXALATE 10 MG: 10 TABLET ORAL at 08:37

## 2019-11-20 RX ADMIN — METOPROLOL TARTRATE 50 MG: 50 TABLET, FILM COATED ORAL at 08:37

## 2019-11-20 RX ADMIN — TORSEMIDE 20 MG: 20 TABLET ORAL at 08:36

## 2019-11-20 RX ADMIN — GABAPENTIN 100 MG: 100 CAPSULE ORAL at 17:24

## 2019-11-20 RX ADMIN — POTASSIUM CHLORIDE 20 MEQ: 1500 TABLET, EXTENDED RELEASE ORAL at 17:25

## 2019-11-20 RX ADMIN — GABAPENTIN 100 MG: 100 CAPSULE ORAL at 08:37

## 2019-11-20 RX ADMIN — POTASSIUM CHLORIDE 20 MEQ: 1500 TABLET, EXTENDED RELEASE ORAL at 08:37

## 2019-11-20 RX ADMIN — AMIODARONE HYDROCHLORIDE 100 MG: 100 TABLET ORAL at 08:36

## 2019-11-20 NOTE — PLAN OF CARE
Problem: Potential for Falls  Goal: Patient will remain free of falls  Description  INTERVENTIONS:  - Assess patient frequently for physical needs  -  Identify cognitive and physical deficits and behaviors that affect risk of falls    -  [place] fall precautions as indicated by assessment   - Educate patient/family on patient safety including physical limitations  - Instruct patient to call for assistance with activity based on assessment  - Modify environment to reduce risk of injury  - Consider OT/PT consult to assist with strengthening/mobility  Outcome: Progressing     Problem: PAIN - ADULT  Goal: Verbalizes/displays adequate comfort level or baseline comfort level  Description  Interventions:  - Encourage patient to monitor pain and request assistance  - Assess pain using appropriate pain scale  - Administer analgesics based on type and severity of pain and evaluate response  - Implement non-pharmacological measures as appropriate and evaluate response  - Consider cultural and social influences on pain and pain management  - Notify physician/advanced practitioner if interventions unsuccessful or patient reports new pain  Outcome: Progressing     Problem: DISCHARGE PLANNING  Goal: Discharge to home or other facility with appropriate resources  Description  INTERVENTIONS:  - Identify barriers to discharge w/patient and caregiver  - Arrange for needed discharge resources and transportation as appropriate  - Identify discharge learning needs (meds, wound care, etc )  - Arrange for interpretive services to assist at discharge as needed  - Refer to Case Management Department for coordinating discharge planning if the patient needs post-hospital services based on physician/advanced practitioner order or complex needs related to functional status, cognitive ability, or social support system  Outcome: Progressing     Problem: Knowledge Deficit  Goal: Patient/family/caregiver demonstrates understanding of disease process, treatment plan, medications, and discharge instructions  Description  Complete learning assessment and assess knowledge base    Interventions:  - Provide teaching at level of understanding  - Provide teaching via preferred learning methods  Outcome: Progressing     Problem: CARDIOVASCULAR - ADULT  Goal: Maintains optimal cardiac output and hemodynamic stability  Description  INTERVENTIONS:  - Monitor I/O, vital signs and rhythm  - Monitor for S/S and trends of decreased cardiac output  - Administer and titrate ordered vasoactive medications to optimize hemodynamic stability  - Assess quality of pulses, skin color and temperature  - Assess for signs of decreased coronary artery perfusion  - Instruct patient to report change in severity of symptoms  Outcome: Progressing     Problem: RESPIRATORY - ADULT  Goal: Achieves optimal ventilation and oxygenation  Description  INTERVENTIONS:  - Assess for changes in respiratory status  - Assess for changes in mentation and behavior  - Position to facilitate oxygenation and minimize respiratory effort  - Oxygen administered by appropriate delivery if ordered  - Initiate smoking cessation education as indicated  - Encourage broncho-pulmonary hygiene including cough, deep breathe, Incentive Spirometry  - Assess the need for suctioning and aspirate as needed  - Assess and instruct to report SOB or any respiratory difficulty  - Respiratory Therapy support as indicated  Outcome: Progressing

## 2019-11-20 NOTE — ED NOTES
Patient c/o sudden SOB and diaphoresis  Denies CP  ED provider notified        Jeffrey Barber RN  11/19/19 2052

## 2019-11-20 NOTE — ASSESSMENT & PLAN NOTE
· Sudden onset of dyspnea on minimal exertion  · Differentials include but are not limited to:  CHF, PE, ACS, pneumonia, COPD  · Chest x-ray:  No acute cardiopulmonary disease  · EKG: NSR rate of 94, with 1st degree AV block  No ectopy or evidence of ischemia or infarct  · BNP:  292  · D-dimer 0 39  · Troponin: <0 02  · WBC:  11 73  Afebrile     · Monitor temp and fever curve  · PT OT consult

## 2019-11-20 NOTE — ASSESSMENT & PLAN NOTE
Wt Readings from Last 3 Encounters:   11/20/19 121 kg (265 lb 10 5 oz)   07/16/19 129 kg (283 lb 6 4 oz)   05/04/19 132 kg (290 lb)     Chronic diastolic CHF appears compensated  Was admitted for shortness of breath which was more subjective and patient remained asymptomatic without any hypoxia during remainder of hospitalization  Can resume torsemide 20 mg daily except for Mondays and Thursdays 40 mg

## 2019-11-20 NOTE — ASSESSMENT & PLAN NOTE
· Last TSH was March of 2019:  8 715    Free T4 and T3 were normal at that time  · Recheck TSH  · If abnormal recheck T4 and T3  · Continue home dose of levothyroxine 88 mcg

## 2019-11-20 NOTE — H&P
[facility]    H&P- Geraldine Honer 1945, 69 y o  female MRN: [id number]    Unit/Bed#: E4 -01 Encounter: [id number]    Primary Care Provider: Moses Johansen MD   Date and time admitted to hospital: 11/19/2019  5:59 PM        * Dyspnea on minimal exertion  Assessment & Plan  · Sudden onset of dyspnea on minimal exertion  · Differentials include but are not limited to:  CHF, PE, ACS, pneumonia, COPD  · Chest x-ray:  No acute cardiopulmonary disease  · EKG: NSR rate of 94, with 1st degree AV block  No ectopy or evidence of ischemia or infarct  · BNP:  292  · D-dimer 0 39  · Troponin: <0 02  · WBC:  11 73  Afebrile  · Monitor temp and fever curve  · PT OT consult    Chronic diastolic congestive heart failure [facility])  Assessment & Plan  Wt Readings from Last 3 Encounters:   11/19/19 122 kg (268 lb 1 3 oz)   07/16/19 129 kg (283 lb 6 4 oz)   05/04/19 132 kg (290 lb)     · Does not appear volume overloaded  · Daily weights and I&Os  · Last echo 05/2018  EF 60%, G1DD  The mild mitral stenosis and regurgitation  Moderate aortic regurgitation  No aortic stenosis  · Recheck echo  · Continue torsemide  If she is still here on 11/21, she will need 40 mg on that day  (40 mg Mondays and Thursdays)          Atrial fibrillation [facility])  Assessment & Plan  · Currently in sinus rhythm  · Anticoagulated with Eliquis  · Continue amiodarone and metoprolol    Essential hypertension  Assessment & Plan  · BP reviewed and acceptable  · Continue metoprolol  · Monitor blood pressures    Acquired hypothyroidism  Assessment & Plan  · Last TSH was March of 2019:  8 715    Free T4 and T3 were normal at that time  · Recheck TSH  · If abnormal recheck T4 and T3  · Continue home dose of levothyroxine 88 mcg    Anemia, unspecified  Assessment & Plan  · Hgb 9 6  5/2018 was 10 6  · Hemoccult stool  · Monitor Hgb      VTE Prophylaxis: Apixaban (Eliquis)  / sequential compression device   Code Status:  Full  POLST: POLST form is not discussed and not completed at this time  Discussion with family:  Patient    Anticipated Length of Stay:  Patient will be admitted on an Observation basis with an anticipated length of stay of  less than 2 midnights  Justification for Hospital Stay:  As described above    Total Time for Visit, including Counseling / Coordination of Care: 30 minutes  Greater than 50% of this total time spent on direct patient counseling and coordination of care  Chief Complaint:   Dyspnea on exertion    History of Present Illness:    Lisa Jimenez is a 69 y o  female with a history of atrial fibrillation on Eliquis, essential hypertension, and chronic diastolic heart failure who presents with sudden onset of dyspnea on minimal exertion  This occurred today and returns every time patient expends any energy, she says she even became short of breath while changing into the hospital gown  She denies chest pain, nausea, sweating, abdominal pain, nausea or vomiting, hematemesis, melena, dizziness or focal weakness  She states, "I just feel off " When she attempted to ambulate in the ER, she became acutely short of breath and tachypneic  Review of Systems:    Review of Systems   Constitutional: Negative for chills and fever  Respiratory: Positive for shortness of breath  Negative for cough  Cardiovascular: Negative for chest pain, palpitations and leg swelling  Gastrointestinal: Negative for abdominal distention, abdominal pain, constipation, diarrhea, nausea and vomiting  Genitourinary: Negative for dysuria and frequency  Musculoskeletal: Negative for arthralgias and myalgias  Neurological: Negative for dizziness, syncope, weakness and headaches  All other systems reviewed and are negative        Past Medical and Surgical History:     Past Medical History:   Diagnosis Date    Acquired hypothyroidism 12/26/2018    Acute on chronic diastolic heart failure ([facility] ) 8/30/2018    Atrial fibrillation ([facility] )  CHF (congestive heart failure) (HCC)     Hyperlipidemia     Hypertension     Migraine     Polyp of sigmoid colon     Prediabetes     Stroke [facility])        Past Surgical History:   Procedure Laterality Date    APPENDECTOMY      CARDIAC ELECTROPHYSIOLOGY STUDY AND ABLATION      CHOLECYSTECTOMY         Meds/Allergies:    Prior to Admission medications    Medication Sig Start Date End Date Taking?  Authorizing Provider   amiodarone 200 mg tablet Take 1/2 tablet daily 8/20/19  Yes [facility],    clopidogrel (PLAVIX) 75 mg tablet Take 1 tablet (75 mg total) by mouth daily 11/5/19  Yes Rella Saint, MD   ELIQUIS 5 MG TAKE ONE TABLET BY MOUTH TWO TIMES A DAY 1/29/19  Yes Rella Saint, MD   escitalopram (LEXAPRO) 10 mg tablet Take 1 tablet (10 mg total) by mouth daily 5/20/19  Yes Rella Saint, MD   gabapentin (NEURONTIN) 100 mg capsule Take 1 capsule (100 mg total) by mouth 3 (three) times a day 7/29/19  Yes Jake Hardy MD   levothyroxine 88 mcg tablet Take 1 tablet (88 mcg total) by mouth daily 8/19/19  Yes Rella Saint, MD   LORazepam (ATIVAN) 1 mg tablet Take 1 tablet (1 mg total) by mouth 3 (three) times a day as needed for anxiety 8/29/18  Yes Rella Saint, MD   meclizine (ANTIVERT) 25 mg tablet Take 25 mg by mouth 3 (three) times a day as needed for dizziness    Yes Historical Provider, MD   metoprolol tartrate (LOPRESSOR) 50 mg tablet Take 1 tablet (50 mg total) by mouth every 12 (twelve) hours 11/5/19  Yes Rella Saint, MD   Multiple Vitamin (MULTIVITAMIN) tablet Take 1 tablet by mouth daily     Yes Historical Provider, MD   nortriptyline (PAMELOR) 50 mg capsule Take 1 capsule (50 mg total) by mouth daily 6/11/19  Yes Rella Saint, MD   oxyCODONE-acetaminophen (PERCOCET) 5-325 mg per tablet Take 1 tablet by mouth every 4 (four) hours as needed for moderate pain Max Daily Amount: 6 tablets 12/27/18  Yes Rella Saint, MD   potassium chloride (K-DUR,KLOR-CON) 20 mEq tablet Take 1 tablet (20 mEq total) by mouth 2 (two) times a day 19  Yes Grace Reilly MD   simvastatin (ZOCOR) 10 mg tablet Take 1 tablet (10 mg total) by mouth daily at bedtime 19  Yes Grace Reilly MD   torsemide [facility]) 20 mg tablet Take 1 tablet (20 mg total) by mouth twice a day except for Monday and  take 2 tablets (40 mg total) in morning 2/3/19  Yes Tonny Trinidad DO   potassium chloride (K-DUR,KLOR-CON) 20 mEq tablet TAKE ONE TABLET BY MOUTH TWICE DAILY 19  Grace Reilly MD     I have reviewed home medications with patient personally  Allergies: No Known Allergies    Social History:     Marital Status:    Occupation:  Retired   Patient Pre-hospital Living Situation:  Home  Patient Pre-hospital Level of Mobility:  Independent  Patient Pre-hospital Diet Restrictions:  Heart healthy low-salt  Substance Use History:   Social History     Substance and Sexual Activity   Alcohol Use Never    Frequency: Never    Binge frequency: Never     Social History     Tobacco Use   Smoking Status Former Smoker    Last attempt to quit: 2011    Years since quittin 7   Smokeless Tobacco Never Used     Social History     Substance and Sexual Activity   Drug Use No       Family History:    Family History   Problem Relation Age of Onset    Diabetes Mother     Heart disease Mother     Hypertension Mother     Arthritis Mother     Coronary artery disease Mother     Drug abuse Paternal Grandmother        Physical Exam:     Vitals:   Blood Pressure: 135/79 (19)  Pulse: 90 (19)  Temperature: 98 1 °F (36 7 °C) (19)  Temp Source: Temporal (19)  Respirations: 18 (19)  Height: 5' 7" (170 2 cm) (19)  Weight - Scale: 122 kg (269 lb 6 4 oz) (19)  SpO2: 95 % (19)    Physical Exam   Constitutional: She is oriented to person, place, and time  She appears well-developed and well-nourished     HENT:   Head: Normocephalic and atraumatic  Mouth/Throat: Oropharynx is clear and moist    Eyes: Pupils are equal, round, and reactive to light  EOM are normal    Neck: Normal range of motion  Neck supple  Cardiovascular: Normal rate, regular rhythm, normal heart sounds and intact distal pulses  Exam reveals no gallop and no friction rub  No murmur heard  Pulmonary/Chest: Effort normal and breath sounds normal  No respiratory distress  Abdominal: Soft  Bowel sounds are normal  She exhibits no distension and no mass  There is no tenderness  There is no guarding  Musculoskeletal: Normal range of motion  She exhibits no edema, tenderness or deformity  Neurological: She is alert and oriented to person, place, and time  Skin: Skin is warm and dry  Capillary refill takes less than 2 seconds  Nursing note and vitals reviewed  Additional Data:     Lab Results: I have personally reviewed pertinent reports  Results from last 7 days   Lab Units 11/19/19  1832   WBC Thousand/uL 11 73*   HEMOGLOBIN g/dL 9 6*   HEMATOCRIT % 32 8*   PLATELETS Thousands/uL 408*   NEUTROS PCT % 67   LYMPHS PCT % 21   MONOS PCT % 7   EOS PCT % 4     Results from last 7 days   Lab Units 11/19/19  1832   SODIUM mmol/L 143   POTASSIUM mmol/L 4 5   CHLORIDE mmol/L 108   CO2 mmol/L 25   BUN mg/dL 46*   CREATININE mg/dL 1 16   ANION GAP mmol/L 10   CALCIUM mg/dL 9 0   ALBUMIN g/dL 3 4*   TOTAL BILIRUBIN mg/dL 0 33   ALK PHOS U/L 115   ALT U/L 27   AST U/L 21   GLUCOSE RANDOM mg/dL 112                       Imaging: I have personally reviewed pertinent reports  XR chest 2 views   ED Interpretation by Zina Painting DO (11/19 1932)       IMPRESSION:       No acute cardiopulmonary disease  Final Result by Jessica Coleman MD (11/19 1922)      No acute cardiopulmonary disease              Workstation performed: [id number]             EKG, Pathology, and Other Studies Reviewed on Admission:   · EKG:    Allscripts / Epic Records Reviewed: Yes     ** Please Note: This note has been constructed using a voice recognition system   **

## 2019-11-20 NOTE — DISCHARGE SUMMARY
Discharge- Jimmy Sullivan 1945, 69 y o  female MRN: [id number]  Unit/Bed#: E4 -01 Encounter: [id number]  Primary Care Provider: Arnav Argueta MD   Date and time admitted to hospital: 11/19/2019  5:59 PM        Admitting Provider:  Bibi George DO  Discharge Provider:  Bibi George DO  Admission Date: 11/19/2019       Discharge Date: 11/20/19   LOS: 0  Primary Care Physician at Discharge: Arnav Argueta, [address] [place] [address] COURSE:  Jimmy Sullivan is a 69 y o  female with past medical history of diastolic CHF atrial fibrillation and hypertension who presented to the hospital with shortness of breath  The patient states that she lives in a small apartment and when she was walking she developed shortness of breath  She was brought to the emergency department where she remained stable without any evidence of hypoxia  She was admitted for observation  She had no recurrence of symptoms  She did not have any URI symptoms or any evidence of significant volume overload  She will be discharged home in good condition without changes to her medication regimen  DISCHARGE DIAGNOSES  * Chronic diastolic congestive heart failure [facility])  Assessment & Plan  Wt Readings from Last 3 Encounters:   11/20/19 121 kg (265 lb 10 5 oz)   07/16/19 129 kg (283 lb 6 4 oz)   05/04/19 132 kg (290 lb)     Chronic diastolic CHF appears compensated  Was admitted for shortness of breath which was more subjective and patient remained asymptomatic without any hypoxia during remainder of hospitalization  Can resume torsemide 20 mg daily except for Mondays and Thursdays 40 mg  Acquired hypothyroidism  Assessment & Plan  Hypothyroidism continue levothyroxine    Depression  Assessment & Plan  Depression    Mood stable on lexapro nortriptyline and lorazepam    Essential hypertension  Assessment & Plan  Essential hypertension stable metoprolol    Atrial fibrillation [facility])  Assessment & Plan  Paroxysmal atrial fibrillation on amiodarone and metoprolol  Anticoagulate with Eliquis  CONSULTING PROVIDERS   None    RADIOLOGY RESULTS  Xr Chest 2 Views  Result Date: 11/19/2019  Impression: No acute cardiopulmonary disease  Workstation performed: Luke Waller  Results from last 7 days   Lab Units 11/20/19  0436 11/19/19  1832   WBC Thousand/uL 9 81 11 73*   HEMOGLOBIN g/dL 9 2* 9 6*   HEMATOCRIT % 30 9* 32 8*   MCV fL 88 89   PLATELETS Thousands/uL 362 408*     Results from last 7 days   Lab Units 11/20/19  0436 11/19/19  1832   SODIUM mmol/L 143 143   POTASSIUM mmol/L 4 3 4 5   CHLORIDE mmol/L 107 108   CO2 mmol/L 22 25   BUN mg/dL 55* 46*   CREATININE mg/dL 1 26 1 16   CALCIUM mg/dL 9 0 9 0   ALBUMIN g/dL  --  3 4*   TOTAL BILIRUBIN mg/dL  --  0 33   ALK PHOS U/L  --  115   ALT U/L  --  27   AST U/L  --  21   EGFR ml/min/1 73sq m 43 47   GLUCOSE RANDOM mg/dL 102 112     Results from last 7 days   Lab Units 11/19/19  2146 11/19/19  1832   TROPONIN I ng/mL <0 02 <0 02     Results from last 7 days   Lab Units 11/19/19  1832   NT-PRO BNP pg/mL 292*      Results from last 7 days   Lab Units 11/19/19  1916   D-DIMER QUANTITATIVE ug/ml FEU 0 39             Results from last 7 days   Lab Units 11/20/19  0436   TSH 3RD GENERATON uIU/mL 3 592       PHYSICAL EXAM:  Vitals:   Blood Pressure: 112/68 (11/20/19 1152)  Pulse: 80 (11/20/19 1152)  Temperature: 97 9 °F (36 6 °C) (11/20/19 1152)  Temp Source: Tympanic (11/20/19 1152)  Respirations: 18 (11/20/19 1152)  Height: 5' 7" (170 2 cm) (11/19/19 2110)  Weight - Scale: 121 kg (265 lb 10 5 oz) (11/20/19 5939)  SpO2: 97 % (11/20/19 1152)    General appearance: alert, appears stated age and cooperative  Head: atraumatic  Eyes: conjunctivae/corneas clear  PERRL, EOM's intact  Lungs: clear to auscultation bilaterally  Heart: regular rate and rhythm  Abdomen: soft, non-tender; bowel sounds normal; no masses,  no organomegaly  Back: symmetric, no curvature   ROM normal  No CVA tenderness  Extremities: extremities normal, atraumatic, no cyanosis or edema  Neurologic: Grossly normal    Planned Re-admission:  No  Discharge Disposition: Home/Self Care    Test Results Pending at Discharge:  None  Incidental findings:  None    Medications   · Discharge Medication List: See after visit summary for reconciled discharge medications  Diet restrictions:  Cardiac diet   Activity restrictions: No strenuous activity  Discharge Condition: stable    Outpatient Follow-Up and Discharge Instructions  See after visit summary section titled Discharge Instructions for information provided to patient and family  Code Status: Level 1 - Full Code  Discharge Statement   I spent 35 minutes discharging the patient  This time was spent on the day of discharge  Greater than 50% of total time was spent with the patient and / or family counseling and / or coordination of care  ** Please Note: This note has been constructed using a voice recognition system   **

## 2019-11-20 NOTE — ED NOTES
Pt ambulatory around nurses's station  Oxygen was 98 at start  Decreased to 97, then 96  Oxygen increased to 97 at end  RN and Dr Pasquale Severin notified           Noel Moscoso  11/19/19 2029

## 2019-11-20 NOTE — ASSESSMENT & PLAN NOTE
Wt Readings from Last 3 Encounters:   11/19/19 122 kg (268 lb 1 3 oz)   07/16/19 129 kg (283 lb 6 4 oz)   05/04/19 132 kg (290 lb)     · Does not appear volume overloaded  · Daily weights and I&Os  · Last echo 05/2018  EF 60%, G1DD  The mild mitral stenosis and regurgitation  Moderate aortic regurgitation  No aortic stenosis  · Recheck echo  · Continue torsemide  If she is still here on 11/21, she will need 40 mg on that day   (40 mg Mondays and Thursdays)

## 2019-11-20 NOTE — UTILIZATION REVIEW
Initial Clinical Review    Admission: Date/Time/Statement:  11/19 @ [id number] to Lukasz Washington This Encounter   Procedures    Place in Observation     Standing Status:   Standing     Number of Occurrences:   1     Order Specific Question:   Admitting Physician     Answer:   Claudia Abdullahi [[id number]]     Order Specific Question:   Level of Care     Answer:   Med Surg [[id number]]     ED Arrival Information     Expected Arrival Acuity Means of Arrival Escorted By Service Admission Type    - 11/19/2019 17:54 Emergent Wheelchair Family Member Hospitalist Emergency    Arrival Complaint    shortness of breath        Chief Complaint   Patient presents with    Shortness of Breath     Pt  reports SOB since this afternoon  Pt  reports SOB especially with excertion  Pt  has CHF hx      Assessment/Plan: 68 yo female presents to ED for eval of acute onset SOB  She reports feeling very short of breath with any sort of exertion as minimal as putting on the a hospital gown  Tachypneic, increased work of breathing, mild conversational dyspnea, lungs clear to auscultation bilaterally  Patient ambulated around the emergency department, O2 saturation stayed around 96-97% on room air, however patient became tachypneic, dyspneic and is trying to catch her breath while sitting in the stretcher  Repeat EKG unchanged  Admitted to OBS with:    * Dyspnea on minimal exertion  Assessment & Plan  · Sudden onset of dyspnea on minimal exertion  · Differentials include but are not limited to:  CHF, PE, ACS, pneumonia, COPD  · Chest x-ray:  No acute cardiopulmonary disease  · EKG: NSR rate of 94, with 1st degree AV block  No ectopy or evidence of ischemia or infarct  · BNP:  292  · D-dimer 0 39  · Troponin: <0 02  · WBC:  11 73  Afebrile     · Monitor temp and fever curve  · PT OT consult     Chronic diastolic congestive heart failure (HCC)  Assessment & Plan      Wt Readings from Last 3 Encounters:   11/19/19 122 kg (268 lb 1 3 oz) 07/16/19 129 kg (283 lb 6 4 oz)   05/04/19 132 kg (290 lb)      · Does not appear volume overloaded  · Daily weights and I&Os  · Last echo 05/2018  EF 60%, G1DD  The mild mitral stenosis and regurgitation  Moderate aortic regurgitation  No aortic stenosis  · Recheck echo  · Continue torsemide  If she is still here on 11/21, she will need 40 mg on that day  (40 mg Mondays and Thursdays)      Atrial fibrillation [facility])  Assessment & Plan  · Currently in sinus rhythm  · Anticoagulated with Eliquis  · Continue amiodarone and metoprolol     Essential hypertension  Assessment & Plan  · BP reviewed and acceptable  · Continue metoprolol  · Monitor blood pressures     Acquired hypothyroidism  Assessment & Plan  · Last TSH was March of 2019:  8 715  Free T4 and T3 were normal at that time  · Recheck TSH  · If abnormal recheck T4 and T3  · Continue home dose of levothyroxine 88 mcg     Anemia, unspecified  Assessment & Plan  · Hgb 9 6  5/2018 was 10 6  · Hemoccult stool  · Monitor Hgb      Anticipated Length of Stay:  Patient will be admitted on an Observation basis with an anticipated length of stay of  less than 2 midnights     Justification for Hospital Stay:  As described above         ED Triage Vitals   Temperature Pulse Respirations Blood Pressure SpO2   11/19/19 1756 11/19/19 1756 11/19/19 1756 11/19/19 1756 11/19/19 1756   97 9 °F (36 6 °C) (!) 116 (!) 26 159/70 96 %      Temp Source Heart Rate Source Patient Position - Orthostatic VS BP Location FiO2 (%)   11/19/19 1756 11/19/19 1756 11/19/19 1756 11/19/19 1756 --   Oral Monitor Sitting Right arm       Pain Score       11/19/19 1918       No Pain        Wt Readings from Last 1 Encounters:   11/20/19 121 kg (265 lb 10 5 oz)     Additional Vital Signs:   11/20/19 0822  97 4 °F (36 3 °C)Abnormal   97  18  123/77  93 %  None (Room air)  Lying   11/19/19 2255  98 1 °F (36 7 °C)  90  18  135/79  95 %  None (Room air)  Lying   11/19/19 2013    102  18  120/78  98 % None (Room air)     11/19/19 1918    88  22  117/56  97 %  None (Room air)  Sitting       Pertinent Labs/Diagnostic Test Results:   Results from last 7 days   Lab Units 11/20/19  0436 11/19/19  1832   WBC Thousand/uL 9 81 11 73*   HEMOGLOBIN g/dL 9 2* 9 6*   HEMATOCRIT % 30 9* 32 8*   PLATELETS Thousands/uL 362 408*   NEUTROS ABS Thousands/µL 5 76 7 85*       Results from last 7 days   Lab Units 11/20/19  0436 11/19/19  1832   SODIUM mmol/L 143 143   POTASSIUM mmol/L 4 3 4 5   CHLORIDE mmol/L 107 108   CO2 mmol/L 22 25   ANION GAP mmol/L 14* 10   BUN mg/dL 55* 46*   CREATININE mg/dL 1 26 1 16   EGFR ml/min/1 73sq m 43 47   CALCIUM mg/dL 9 0 9 0     Results from last 7 days   Lab Units 11/19/19  1832   AST U/L 21   ALT U/L 27   ALK PHOS U/L 115   TOTAL PROTEIN g/dL 7 0   ALBUMIN g/dL 3 4*   TOTAL BILIRUBIN mg/dL 0 33         Results from last 7 days   Lab Units 11/20/19  0436 11/19/19  1832   GLUCOSE RANDOM mg/dL 102 112     Results from last 7 days   Lab Units 11/19/19  2146 11/19/19  1832   TROPONIN I ng/mL <0 02 <0 02     Results from last 7 days   Lab Units 11/19/19  1916   D-DIMER QUANTITATIVE ug/ml FEU 0 39     Results from last 7 days   Lab Units 11/20/19  0436   TSH 3RD GENERATON uIU/mL 3 592     Results from last 7 days   Lab Units 11/19/19  1832   NT-PRO BNP pg/mL 292*     ED Treatment:   Medication Administration from 11/19/2019 1754 to 11/19/2019 2103     None        Past Medical History:   Diagnosis Date    Acquired hypothyroidism 12/26/2018    [facility] 8/30/2018    Atrial fibrillation (HCC)     CHF [facility]congestive heart failure) ([facility] )     Hyperlipidemia     Hypertension     Migraine     Polyp of sigmoid colon     Prediabetes     Stroke [facility])      Present on Admission:   Atrial fibrillation ([facility] )   Essential hypertension   [facility]   Acquired hypothyroidism      Admitting Diagnosis: SOB (shortness of breath) [R06 02]  Dyspnea on exertion [R06 09]  Age/Sex: 69 y o  female  Admission Orders:  Scheduled Medications:  Medications:  amiodarone 100 mg Oral Daily With Breakfast   apixaban 5 mg Oral BID   clopidogrel 75 mg Oral Daily   escitalopram 10 mg Oral Daily   gabapentin 100 mg Oral TID   levothyroxine 88 mcg Oral Early Morning   metoprolol tartrate 50 mg Oral Q12H PÉREZ   nortriptyline 50 mg Oral HS   potassium chloride 20 mEq Oral BID   torsemide 20 mg Oral Daily     Continuous IV Infusions:  PRN Meds:    acetaminophen 650 mg Oral Q6H PRN   LORazepam 1 mg Oral TID PRN   ondansetron 4 mg Intravenous Q6H PRN     TELE  ECHO  SCD's    Network Utilization Review Department  [email or web address]  ATTENTION: Please call with any questions or concerns to [phone] and carefully listen to the prompts so that you are directed to the right person  All voicemails are confidential   Yobani Los all requests for admission clinical reviews, approved or denied determinations and any other requests to dedicated fax number below belonging to the campus where the patient is receiving treatment    FACILITY NAME UR FAX NUMBER   ADMISSION DENIALS (Administrative/Medical Necessity) [address] [facility] (Maternity/NICU/Pediatrics) [phone]   [facility] [address] [facility] [address] [facility] [phone]   [address] [place] [address] [address] [facility] [phone]   [facility] [address] [facility] [address] [phone]

## 2019-11-25 ENCOUNTER — TRANSITIONAL CARE MANAGEMENT (OUTPATIENT)
Dept: INTERNAL MEDICINE CLINIC | Facility: CLINIC | Age: 74
End: 2019-11-25

## 2019-11-26 ENCOUNTER — APPOINTMENT (EMERGENCY)
Dept: RADIOLOGY | Facility: HOSPITAL | Age: 74
DRG: 812 | End: 2019-11-26
Payer: MEDICARE

## 2019-11-26 ENCOUNTER — HOSPITAL ENCOUNTER (INPATIENT)
Facility: HOSPITAL | Age: 74
LOS: 1 days | Discharge: HOME | DRG: 812 | End: 2019-11-27
Attending: EMERGENCY MEDICINE | Admitting: INTERNAL MEDICINE
Payer: MEDICARE

## 2019-11-26 ENCOUNTER — ANESTHESIA EVENT (INPATIENT)
Dept: GASTROENTEROLOGY | Facility: HOSPITAL | Age: 74
DRG: 812 | End: 2019-11-26
Payer: MEDICARE

## 2019-11-26 DIAGNOSIS — D64.9 ANEMIA: Primary | ICD-10-CM

## 2019-11-26 DIAGNOSIS — R06.00 DYSPNEA: ICD-10-CM

## 2019-11-26 LAB
ABO GROUP BLD: NORMAL
ANION GAP BLD CALC-SCNC: 13 MMOL/L (ref 4–13)
ANION GAP SERPL CALCULATED.3IONS-SCNC: 9 MMOL/L (ref 4–13)
APTT PPP: 27 SECONDS (ref 23–37)
ATRIAL RATE: 67 BPM
BASOPHILS # BLD AUTO: 0.06 THOUSANDS/ΜL (ref 0–0.1)
BASOPHILS NFR BLD AUTO: 1 % (ref 0–1)
BLD GP AB SCN SERPL QL: NEGATIVE
BUN BLD-MCNC: 30 MG/DL (ref 5–25)
BUN SERPL-MCNC: 28 MG/DL (ref 5–25)
CA-I BLD-SCNC: 1.13 MMOL/L (ref 1.12–1.32)
CALCIUM SERPL-MCNC: 9 MG/DL (ref 8.3–10.1)
CHLORIDE BLD-SCNC: 107 MMOL/L (ref 100–108)
CHLORIDE SERPL-SCNC: 106 MMOL/L (ref 100–108)
CO2 SERPL-SCNC: 25 MMOL/L (ref 21–32)
CREAT BLD-MCNC: 1.6 MG/DL (ref 0.6–1.3)
CREAT SERPL-MCNC: 1.51 MG/DL (ref 0.6–1.3)
EOSINOPHIL # BLD AUTO: 0.27 THOUSAND/ΜL (ref 0–0.61)
EOSINOPHIL NFR BLD AUTO: 3 % (ref 0–6)
ERYTHROCYTE [DISTWIDTH] IN BLOOD BY AUTOMATED COUNT: 17.3 % (ref 11.6–15.1)
FERRITIN SERPL-MCNC: 8 NG/ML (ref 8–388)
FOLATE SERPL-MCNC: >20 NG/ML (ref 3.1–17.5)
GFR SERPL CREATININE-BSD FRML MDRD: 32 ML/MIN/1.73SQ M
GFR SERPL CREATININE-BSD FRML MDRD: 34 ML/MIN/1.73SQ M
GLUCOSE SERPL-MCNC: 112 MG/DL (ref 65–140)
GLUCOSE SERPL-MCNC: 133 MG/DL (ref 65–140)
HCT VFR BLD AUTO: 23.1 % (ref 34.8–46.1)
HCT VFR BLD CALC: 20 % (ref 34.8–46.1)
HGB BLD-MCNC: 6.7 G/DL (ref 11.5–15.4)
HGB BLDA-MCNC: 6.8 G/DL (ref 11.5–15.4)
IMM GRANULOCYTES # BLD AUTO: 0.07 THOUSAND/UL (ref 0–0.2)
IMM GRANULOCYTES NFR BLD AUTO: 1 % (ref 0–2)
INR PPP: 1.7 (ref 0.84–1.19)
IRON SATN MFR SERPL: 5 %
IRON SERPL-MCNC: 20 UG/DL (ref 50–170)
LYMPHOCYTES # BLD AUTO: 1.66 THOUSANDS/ΜL (ref 0.6–4.47)
LYMPHOCYTES NFR BLD AUTO: 16 % (ref 14–44)
MAGNESIUM SERPL-MCNC: 2.5 MG/DL (ref 1.6–2.6)
MCH RBC QN AUTO: 25.3 PG (ref 26.8–34.3)
MCHC RBC AUTO-ENTMCNC: 29 G/DL (ref 31.4–37.4)
MCV RBC AUTO: 87 FL (ref 82–98)
MONOCYTES # BLD AUTO: 0.67 THOUSAND/ΜL (ref 0.17–1.22)
MONOCYTES NFR BLD AUTO: 7 % (ref 4–12)
NEUTROPHILS # BLD AUTO: 7.48 THOUSANDS/ΜL (ref 1.85–7.62)
NEUTS SEG NFR BLD AUTO: 72 % (ref 43–75)
NRBC BLD AUTO-RTO: 1 /100 WBCS
NT-PROBNP SERPL-MCNC: 388 PG/ML
P AXIS: 67 DEGREES
PCO2 BLD: 25 MMOL/L (ref 21–32)
PLATELET # BLD AUTO: 399 THOUSANDS/UL (ref 149–390)
PMV BLD AUTO: 11.3 FL (ref 8.9–12.7)
POTASSIUM BLD-SCNC: 4.4 MMOL/L (ref 3.5–5.3)
POTASSIUM SERPL-SCNC: 4 MMOL/L (ref 3.5–5.3)
PR INTERVAL: 254 MS
PROTHROMBIN TIME: 20.3 SECONDS (ref 11.6–14.5)
QRS AXIS: 28 DEGREES
QRSD INTERVAL: 90 MS
QT INTERVAL: 450 MS
QTC INTERVAL: 475 MS
RBC # BLD AUTO: 2.65 MILLION/UL (ref 3.81–5.12)
RETICS # AUTO: ABNORMAL 10*3/UL (ref 14097–95744)
RETICS # CALC: 4.14 % (ref 0.37–1.87)
RH BLD: POSITIVE
SODIUM BLD-SCNC: 139 MMOL/L (ref 136–145)
SODIUM SERPL-SCNC: 140 MMOL/L (ref 136–145)
SPECIMEN EXPIRATION DATE: NORMAL
SPECIMEN SOURCE: ABNORMAL
T WAVE AXIS: 48 DEGREES
TIBC SERPL-MCNC: 440 UG/DL (ref 250–450)
TROPONIN I SERPL-MCNC: <0.02 NG/ML
VENTRICULAR RATE: 67 BPM
VIT B12 SERPL-MCNC: 886 PG/ML (ref 100–900)
WBC # BLD AUTO: 10.21 THOUSAND/UL (ref 4.31–10.16)

## 2019-11-26 PROCEDURE — 80047 BASIC METABLC PNL IONIZED CA: CPT

## 2019-11-26 PROCEDURE — P9016 RBC LEUKOCYTES REDUCED: HCPCS

## 2019-11-26 PROCEDURE — 71046 X-RAY EXAM CHEST 2 VIEWS: CPT

## 2019-11-26 PROCEDURE — 99223 1ST HOSP IP/OBS HIGH 75: CPT | Performed by: INTERNAL MEDICINE

## 2019-11-26 PROCEDURE — 30233N1 TRANSFUSION OF NONAUTOLOGOUS RED BLOOD CELLS INTO PERIPHERAL VEIN, PERCUTANEOUS APPROACH: ICD-10-PCS | Performed by: EMERGENCY MEDICINE

## 2019-11-26 PROCEDURE — 82607 VITAMIN B-12: CPT | Performed by: EMERGENCY MEDICINE

## 2019-11-26 PROCEDURE — 82728 ASSAY OF FERRITIN: CPT | Performed by: EMERGENCY MEDICINE

## 2019-11-26 PROCEDURE — 83550 IRON BINDING TEST: CPT | Performed by: EMERGENCY MEDICINE

## 2019-11-26 PROCEDURE — 80048 BASIC METABOLIC PNL TOTAL CA: CPT | Performed by: EMERGENCY MEDICINE

## 2019-11-26 PROCEDURE — 36415 COLL VENOUS BLD VENIPUNCTURE: CPT | Performed by: EMERGENCY MEDICINE

## 2019-11-26 PROCEDURE — 85014 HEMATOCRIT: CPT

## 2019-11-26 PROCEDURE — C9113 INJ PANTOPRAZOLE SODIUM, VIA: HCPCS | Performed by: STUDENT IN AN ORGANIZED HEALTH CARE EDUCATION/TRAINING PROGRAM

## 2019-11-26 PROCEDURE — 93005 ELECTROCARDIOGRAM TRACING: CPT

## 2019-11-26 PROCEDURE — 94660 CPAP INITIATION&MGMT: CPT

## 2019-11-26 PROCEDURE — 85025 COMPLETE CBC W/AUTO DIFF WBC: CPT | Performed by: EMERGENCY MEDICINE

## 2019-11-26 PROCEDURE — 93010 ELECTROCARDIOGRAM REPORT: CPT | Performed by: INTERNAL MEDICINE

## 2019-11-26 PROCEDURE — 86900 BLOOD TYPING SEROLOGIC ABO: CPT | Performed by: EMERGENCY MEDICINE

## 2019-11-26 PROCEDURE — 85610 PROTHROMBIN TIME: CPT | Performed by: EMERGENCY MEDICINE

## 2019-11-26 PROCEDURE — 99285 EMERGENCY DEPT VISIT HI MDM: CPT

## 2019-11-26 PROCEDURE — 83540 ASSAY OF IRON: CPT | Performed by: EMERGENCY MEDICINE

## 2019-11-26 PROCEDURE — 82746 ASSAY OF FOLIC ACID SERUM: CPT | Performed by: EMERGENCY MEDICINE

## 2019-11-26 PROCEDURE — 86901 BLOOD TYPING SEROLOGIC RH(D): CPT | Performed by: EMERGENCY MEDICINE

## 2019-11-26 PROCEDURE — 86920 COMPATIBILITY TEST SPIN: CPT

## 2019-11-26 PROCEDURE — 99291 CRITICAL CARE FIRST HOUR: CPT | Performed by: EMERGENCY MEDICINE

## 2019-11-26 PROCEDURE — 83880 ASSAY OF NATRIURETIC PEPTIDE: CPT | Performed by: EMERGENCY MEDICINE

## 2019-11-26 PROCEDURE — 84484 ASSAY OF TROPONIN QUANT: CPT | Performed by: EMERGENCY MEDICINE

## 2019-11-26 PROCEDURE — 83735 ASSAY OF MAGNESIUM: CPT | Performed by: EMERGENCY MEDICINE

## 2019-11-26 PROCEDURE — 85730 THROMBOPLASTIN TIME PARTIAL: CPT | Performed by: EMERGENCY MEDICINE

## 2019-11-26 PROCEDURE — 86850 RBC ANTIBODY SCREEN: CPT | Performed by: EMERGENCY MEDICINE

## 2019-11-26 PROCEDURE — 85045 AUTOMATED RETICULOCYTE COUNT: CPT | Performed by: EMERGENCY MEDICINE

## 2019-11-26 PROCEDURE — 99223 1ST HOSP IP/OBS HIGH 75: CPT | Performed by: STUDENT IN AN ORGANIZED HEALTH CARE EDUCATION/TRAINING PROGRAM

## 2019-11-26 RX ORDER — GABAPENTIN 100 MG/1
100 CAPSULE ORAL 3 TIMES DAILY
Status: DISCONTINUED | OUTPATIENT
Start: 2019-11-26 | End: 2019-11-27 | Stop reason: HOSPADM

## 2019-11-26 RX ORDER — MECLIZINE HYDROCHLORIDE 25 MG/1
25 TABLET ORAL 3 TIMES DAILY PRN
Status: DISCONTINUED | OUTPATIENT
Start: 2019-11-26 | End: 2019-11-27 | Stop reason: HOSPADM

## 2019-11-26 RX ORDER — AMIODARONE HYDROCHLORIDE 100 MG/1
100 TABLET ORAL
Status: DISCONTINUED | OUTPATIENT
Start: 2019-11-27 | End: 2019-11-27 | Stop reason: HOSPADM

## 2019-11-26 RX ORDER — SODIUM CHLORIDE 9 MG/ML
75 INJECTION, SOLUTION INTRAVENOUS CONTINUOUS
Status: DISCONTINUED | OUTPATIENT
Start: 2019-11-26 | End: 2019-11-27 | Stop reason: HOSPADM

## 2019-11-26 RX ORDER — NORTRIPTYLINE HYDROCHLORIDE 25 MG/1
50 CAPSULE ORAL
Status: DISCONTINUED | OUTPATIENT
Start: 2019-11-26 | End: 2019-11-27 | Stop reason: HOSPADM

## 2019-11-26 RX ORDER — METOPROLOL TARTRATE 50 MG/1
50 TABLET, FILM COATED ORAL EVERY 12 HOURS SCHEDULED
Status: DISCONTINUED | OUTPATIENT
Start: 2019-11-26 | End: 2019-11-27 | Stop reason: HOSPADM

## 2019-11-26 RX ORDER — LEVOTHYROXINE SODIUM 88 UG/1
88 TABLET ORAL
Status: DISCONTINUED | OUTPATIENT
Start: 2019-11-27 | End: 2019-11-27 | Stop reason: HOSPADM

## 2019-11-26 RX ORDER — POTASSIUM CHLORIDE 20 MEQ/1
20 TABLET, EXTENDED RELEASE ORAL 2 TIMES DAILY
Status: DISCONTINUED | OUTPATIENT
Start: 2019-11-26 | End: 2019-11-27 | Stop reason: HOSPADM

## 2019-11-26 RX ORDER — OXYCODONE HYDROCHLORIDE AND ACETAMINOPHEN 5; 325 MG/1; MG/1
1 TABLET ORAL EVERY 4 HOURS PRN
Status: DISCONTINUED | OUTPATIENT
Start: 2019-11-26 | End: 2019-11-27 | Stop reason: HOSPADM

## 2019-11-26 RX ORDER — ESCITALOPRAM OXALATE 10 MG/1
10 TABLET ORAL DAILY
Status: DISCONTINUED | OUTPATIENT
Start: 2019-11-27 | End: 2019-11-27 | Stop reason: HOSPADM

## 2019-11-26 RX ORDER — NORTRIPTYLINE HYDROCHLORIDE 25 MG/1
50 CAPSULE ORAL DAILY
Status: DISCONTINUED | OUTPATIENT
Start: 2019-11-26 | End: 2019-11-26

## 2019-11-26 RX ORDER — LORAZEPAM 1 MG/1
1 TABLET ORAL 3 TIMES DAILY PRN
Status: DISCONTINUED | OUTPATIENT
Start: 2019-11-26 | End: 2019-11-27 | Stop reason: HOSPADM

## 2019-11-26 RX ORDER — PRAVASTATIN SODIUM 20 MG
20 TABLET ORAL
Status: DISCONTINUED | OUTPATIENT
Start: 2019-11-26 | End: 2019-11-27 | Stop reason: HOSPADM

## 2019-11-26 RX ADMIN — GABAPENTIN 100 MG: 100 CAPSULE ORAL at 21:15

## 2019-11-26 RX ADMIN — GABAPENTIN 100 MG: 100 CAPSULE ORAL at 17:19

## 2019-11-26 RX ADMIN — POTASSIUM CHLORIDE 20 MEQ: 1500 TABLET, EXTENDED RELEASE ORAL at 17:19

## 2019-11-26 RX ADMIN — SODIUM CHLORIDE 8 MG/HR: 9 INJECTION, SOLUTION INTRAVENOUS at 14:02

## 2019-11-26 RX ADMIN — SODIUM CHLORIDE 75 ML/HR: 0.9 INJECTION, SOLUTION INTRAVENOUS at 13:38

## 2019-11-26 RX ADMIN — PRAVASTATIN SODIUM 20 MG: 20 TABLET ORAL at 17:19

## 2019-11-26 RX ADMIN — NORTRIPTYLINE HYDROCHLORIDE 50 MG: 25 CAPSULE ORAL at 21:15

## 2019-11-26 NOTE — ASSESSMENT & PLAN NOTE
Patient presented with complaining of dyspnea on exertion, generalized fatigue, dizziness at times  Denies chest pain, diaphoresis, nausea, vomiting, abdominal pain, hematuria, melena, hematochezia, hematemesis  Last colonoscopy was approximately 10 years ago with some polyps removed  Former smoker  Currently on Plavix due to history of CVA as well as Eliquis due to paroxysmal AFib  On my encounter patient appears comfortable  Not in distress  O2 saturation well on room air  Hemodynamically stable  Hemoglobin was around 9 on 11/20/2019  Baseline hemoglobin stays around 10-11  Further evaluation patient noted with hemoglobin of 6 7  Symptoms likely due to anemia  Iron panel consistent with iron deficiency  No obvious signs of active overt bleeding noted  Hemodynamically stable  Hold Plavix and Eliquis for now  F/u FOBT  IV Protonix drip, IV hydration  Currently 2 units PRBC requested per ED and consent is in the chart  Continue to Monitor CBC and transfuse below 7  Start on p o  Iron supplement once on diet  Follow-up with GI consultation  Continue supportive care

## 2019-11-26 NOTE — PLAN OF CARE
Problem: Potential for Falls  Goal: Patient will remain free of falls  Description  INTERVENTIONS:  - Assess patient frequently for physical needs  -  Identify cognitive and physical deficits and behaviors that affect risk of falls    -  [place] fall precautions as indicated by assessment   - Educate patient/family on patient safety including physical limitations  - Instruct patient to call for assistance with activity based on assessment  - Modify environment to reduce risk of injury  - Consider OT/PT consult to assist with strengthening/mobility  Outcome: Progressing     Problem: CARDIOVASCULAR - ADULT  Goal: Maintains optimal cardiac output and hemodynamic stability  Description  INTERVENTIONS:  - Monitor I/O, vital signs and rhythm  - Monitor for S/S and trends of decreased cardiac output  - Administer and titrate ordered vasoactive medications to optimize hemodynamic stability  - Assess quality of pulses, skin color and temperature  - Assess for signs of decreased coronary artery perfusion  - Instruct patient to report change in severity of symptoms  Outcome: Progressing  Goal: Absence of cardiac dysrhythmias or at baseline rhythm  Description  INTERVENTIONS:  - Continuous cardiac monitoring, vital signs, obtain 12 lead EKG if ordered  - Administer antiarrhythmic and heart rate control medications as ordered  - Monitor electrolytes and administer replacement therapy as ordered  Outcome: Progressing     Problem: RESPIRATORY - ADULT  Goal: Achieves optimal ventilation and oxygenation  Description  INTERVENTIONS:  - Assess for changes in respiratory status  - Assess for changes in mentation and behavior  - Position to facilitate oxygenation and minimize respiratory effort  - Oxygen administered by appropriate delivery if ordered  - Initiate smoking cessation education as indicated  - Encourage broncho-pulmonary hygiene including cough, deep breathe, Incentive Spirometry  - Assess the need for suctioning and aspirate as needed  - Assess and instruct to report SOB or any respiratory difficulty  - Respiratory Therapy support as indicated  Outcome: Progressing     Problem: GASTROINTESTINAL - ADULT  Goal: Minimal or absence of nausea and/or vomiting  Description  INTERVENTIONS:  - Administer IV fluids if ordered to ensure adequate hydration  - Maintain NPO status until nausea and vomiting are resolved  - Nasogastric tube if ordered  - Administer ordered antiemetic medications as needed  - Provide nonpharmacologic comfort measures as appropriate  - Advance diet as tolerated, if ordered  - Consider nutrition services referral to assist patient with adequate nutrition and appropriate food choices  Outcome: Progressing  Goal: Maintains or returns to baseline bowel function  Description  INTERVENTIONS:  - Assess bowel function  - Encourage oral fluids to ensure adequate hydration  - Administer IV fluids if ordered to ensure adequate hydration  - Administer ordered medications as needed  - Encourage mobilization and activity  - Consider nutritional services referral to assist patient with adequate nutrition and appropriate food choices  Outcome: Progressing  Goal: Maintains adequate nutritional intake  Description  INTERVENTIONS:  - Monitor percentage of each meal consumed  - Identify factors contributing to decreased intake, treat as appropriate  - Assist with meals as needed  - Monitor I&O, weight, and lab values if indicated  - Obtain nutrition services referral as needed  Outcome: Progressing     Problem: METABOLIC, FLUID AND ELECTROLYTES - ADULT  Goal: Electrolytes maintained within normal limits  Description  INTERVENTIONS:  - Monitor labs and assess patient for signs and symptoms of electrolyte imbalances  - Administer electrolyte replacement as ordered  - Monitor response to electrolyte replacements, including repeat lab results as appropriate  - Instruct patient on fluid and nutrition as appropriate  Outcome: Progressing  Goal: Fluid balance maintained  Description  INTERVENTIONS:  - Monitor labs   - Monitor I/O and WT  - Instruct patient on fluid and nutrition as appropriate  - Assess for signs & symptoms of volume excess or deficit  Outcome: Progressing     Problem: HEMATOLOGIC - ADULT  Goal: Maintains hematologic stability  Description  INTERVENTIONS  - Assess for signs and symptoms of bleeding or hemorrhage  - Monitor labs  - Administer supportive blood products/factors as ordered and appropriate  Outcome: Progressing     Problem: MUSCULOSKELETAL - ADULT  Goal: Maintain or return mobility to safest level of function  Description  INTERVENTIONS:  - Assess patient's ability to carry out ADLs; assess patient's baseline for ADL function and identify physical deficits which impact ability to perform ADLs (bathing, care of mouth/teeth, toileting, grooming, dressing, etc )  - Assess/evaluate cause of self-care deficits   - Assess range of motion  - Assess patient's mobility  - Assess patient's need for assistive devices and provide as appropriate  - Encourage maximum independence but intervene and supervise when necessary  - Involve family in performance of ADLs  - Assess for home care needs following discharge   - Consider OT consult to assist with ADL evaluation and planning for discharge  - Provide patient education as appropriate  Outcome: Progressing  Goal: Maintain proper alignment of affected body part  Description  INTERVENTIONS:  - Support, maintain and protect limb and body alignment  - Provide patient/ family with appropriate education  Outcome: Progressing     Problem: PAIN - ADULT  Goal: Verbalizes/displays adequate comfort level or baseline comfort level  Description  Interventions:  - Encourage patient to monitor pain and request assistance  - Assess pain using appropriate pain scale  - Administer analgesics based on type and severity of pain and evaluate response  - Implement non-pharmacological measures as appropriate and evaluate response  - Consider cultural and social influences on pain and pain management  - Notify physician/advanced practitioner if interventions unsuccessful or patient reports new pain  Outcome: Progressing     Problem: INFECTION - ADULT  Goal: Absence or prevention of progression during hospitalization  Description  INTERVENTIONS:  - Assess and monitor for signs and symptoms of infection  - Monitor lab/diagnostic results  - Monitor all insertion sites, i e  indwelling lines, tubes, and drains  - Monitor endotracheal if appropriate and nasal secretions for changes in amount and color  - [place] appropriate cooling/warming therapies per order  - Administer medications as ordered  - Instruct and encourage patient and family to use good hand hygiene technique  - Identify and instruct in appropriate isolation precautions for identified infection/condition  Outcome: Progressing     Problem: SAFETY ADULT  Goal: Maintain or return to baseline ADL function  Description  INTERVENTIONS:  -  Assess patient's ability to carry out ADLs; assess patient's baseline for ADL function and identify physical deficits which impact ability to perform ADLs (bathing, care of mouth/teeth, toileting, grooming, dressing, etc )  - Assess/evaluate cause of self-care deficits   - Assess range of motion  - Assess patient's mobility; develop plan if impaired  - Assess patient's need for assistive devices and provide as appropriate  - Encourage maximum independence but intervene and supervise when necessary  - Involve family in performance of ADLs  - Assess for home care needs following discharge   - Consider OT consult to assist with ADL evaluation and planning for discharge  - Provide patient education as appropriate  Outcome: Progressing  Goal: Maintain or return mobility status to optimal level  Description  INTERVENTIONS:  - Assess patient's baseline mobility status (ambulation, transfers, stairs, etc )    - Identify cognitive and physical deficits and behaviors that affect mobility  - Identify mobility aids required to assist with transfers and/or ambulation (gait belt, sit-to-stand, lift, walker, cane, etc )  - [place] fall precautions as indicated by assessment  - Record patient progress and toleration of activity level on Mobility SBAR; progress patient to next Phase/Stage  - Instruct patient to call for assistance with activity based on assessment  - Consider rehabilitation consult to assist with strengthening/weightbearing, etc   Outcome: Progressing     Problem: DISCHARGE PLANNING  Goal: Discharge to home or other facility with appropriate resources  Description  INTERVENTIONS:  - Identify barriers to discharge w/patient and caregiver  - Arrange for needed discharge resources and transportation as appropriate  - Identify discharge learning needs (meds, wound care, etc )  - Arrange for interpretive services to assist at discharge as needed  - Refer to Case Management Department for coordinating discharge planning if the patient needs post-hospital services based on physician/advanced practitioner order or complex needs related to functional status, cognitive ability, or social support system  Outcome: Progressing     Problem: Knowledge Deficit  Goal: Patient/family/caregiver demonstrates understanding of disease process, treatment plan, medications, and discharge instructions  Description  Complete learning assessment and assess knowledge base    Interventions:  - Provide teaching at level of understanding  - Provide teaching via preferred learning methods  Outcome: Progressing

## 2019-11-26 NOTE — ED PROVIDER NOTES
History  Chief Complaint   Patient presents with    Shortness of Breath     Pt brought in by EMS, dyspnea on exertion while ambulating to vehicle, also had chest tightness, recent discharge, 324 ASA and 1 SL nitro PH  History provided by:  Patient   used: No    Medical Problem - Major   Location:  TR[facility] for the last 1-2 weeks which was much worse today after 75 feet and took her a whike to catch her breath  No CP  No swelling  Has a h/o chf, weights have been stable  Severity:  Severe  Onset quality:  Gradual  Duration: 1-2 weeks  Timing:  Constant  Progression:  Worsening  Chronicity:  New  Context:  Was admitted las week with nos specific cause for her dyspnea  She is on aspirin and plavix for prior stroke and h/o afib s/p ablation  No fever or chills  No bleeding noted  Relieved by:  Rest  Worsened by:  Exertion  Ineffective treatments:  None tried   Associated symptoms: shortness of breath    Associated symptoms: no abdominal pain, no chest pain, no congestion, no cough, no diarrhea, no fever, no headaches, no nausea, no sore throat and no vomiting    She was admitted 1 week ago had repeat troponins which were negative no changes in her EKG and her chest x-ray was unremarkable  Her BNP was only very slightly elevated she was discharged for follow-up with her family doctor today and she was on her way to her appointment when she was walking to the car became very short of breath and had to sit in the car and called the ambulance  On arrival she feels much better is not having any chest pain and shortness of breath has improved  She is on Plavix and Eliquis and has noted no bleeding  Prior to Admission Medications   Prescriptions Last Dose Informant Patient Reported? Taking?    ELIQUIS 5 MG 11/26/2019 at Unknown time Self No Yes   Sig: TAKE ONE TABLET BY MOUTH TWO TIMES A DAY   LORazepam (ATIVAN) 1 mg tablet More than a month at Unknown time Self No No   Sig: Take 1 tablet (1 mg total) by mouth 3 (three) times a day as needed for anxiety   Multiple Vitamin (MULTIVITAMIN) tablet 11/26/2019 at Unknown time Self Yes Yes   Sig: Take 1 tablet by mouth daily     amiodarone 200 mg tablet 11/26/2019 at Unknown time  No Yes   Sig: Take 1/2 tablet daily   clopidogrel (PLAVIX) 75 mg tablet 11/26/2019 at Unknown time  No Yes   Sig: Take 1 tablet (75 mg total) by mouth daily   escitalopram (LEXAPRO) 10 mg tablet 11/26/2019 at Unknown time  No Yes   Sig: Take 1 tablet (10 mg total) by mouth daily   gabapentin (NEURONTIN) 100 mg capsule 11/26/2019 at Unknown time  No Yes   Sig: Take 1 capsule (100 mg total) by mouth 3 (three) times a day   levothyroxine 88 mcg tablet 11/26/2019 at Unknown time  No Yes   Sig: Take 1 tablet (88 mcg total) by mouth daily   meclizine (ANTIVERT) 25 mg tablet More than a month at Unknown time Self Yes No   Sig: Take 25 mg by mouth 3 (three) times a day as needed for dizziness    metoprolol tartrate (LOPRESSOR) 50 mg tablet 11/26/2019 at Unknown time  No Yes   Sig: Take 1 tablet (50 mg total) by mouth every 12 (twelve) hours   nortriptyline (PAMELOR) 50 mg capsule 11/25/2019 at Unknown time  No Yes   Sig: Take 1 capsule (50 mg total) by mouth daily   oxyCODONE-acetaminophen (PERCOCET) 5-325 mg per tablet More than a month at Unknown time Self No No   Sig: Take 1 tablet by mouth every 4 (four) hours as needed for moderate pain Max Daily Amount: 6 tablets   potassium chloride (K-DUR,KLOR-CON) 20 mEq tablet 11/26/2019 at Unknown time  No Yes   Sig: Take 1 tablet (20 mEq total) by mouth 2 (two) times a day   simvastatin (ZOCOR) 10 mg tablet 11/25/2019 at Unknown time  No Yes   Sig: Take 1 tablet (10 mg total) by mouth daily at bedtime   torsemide (DEMADEX) 20 mg tablet 11/26/2019 at Unknown time  Yes Yes   Sig: Take 20 mg by mouth see administration instructions Take 1 tablet (20 mg total) by mouth daily except for Monday and Thursdays take 2 tablets (40 mg total) in morning Facility-Administered Medications: None       Past Medical History:   Diagnosis Date    Acquired hypothyroidism 2018    Acute on chronic diastolic heart failure ([facility] [address] ) 2018    Atrial fibrillation (HCC)     CHF (congestive heart failure) (HCC)     Hyperlipidemia     Hypertension     Migraine     Polyp of sigmoid colon     Prediabetes     Stroke [facility])        Past Surgical History:   Procedure Laterality Date    APPENDECTOMY      CARDIAC ELECTROPHYSIOLOGY STUDY AND ABLATION      CHOLECYSTECTOMY         Family History   Problem Relation Age of Onset    Diabetes Mother     Heart disease Mother     Hypertension Mother     Arthritis Mother     Coronary artery disease Mother     Drug abuse Paternal Grandmother      I have reviewed and agree with the history as documented  Social History     Tobacco Use    Smoking status: Former Smoker     Last attempt to quit: 2011     Years since quittin 7    Smokeless tobacco: Never Used   Substance Use Topics    Alcohol use: Never     Frequency: Never     Binge frequency: Never    Drug use: No        Review of Systems   Constitutional: Negative for chills and fever  HENT: Negative for congestion, nosebleeds and sore throat  Respiratory: Positive for shortness of breath  Negative for cough and chest tightness  Cardiovascular: Negative for chest pain and leg swelling  Gastrointestinal: Negative for abdominal pain, blood in stool, diarrhea, nausea and vomiting  Genitourinary: Negative for hematuria  Neurological: Negative for light-headedness and headaches  All other systems reviewed and are negative  Physical Exam  Physical Exam   Constitutional: She appears well-developed and well-nourished  She is cooperative  Non-toxic appearance  She does not have a sickly appearance  She does not appear ill  No distress  HENT:   Head: Normocephalic and atraumatic  Right Ear: Hearing normal  No drainage or swelling     Left Ear: Hearing normal  No drainage or swelling  Mouth/Throat: Mucous membranes are normal    Eyes: Conjunctivae and lids are normal  Right eye exhibits no discharge  Left eye exhibits no discharge  Neck: Trachea normal and normal range of motion  No JVD present  Cardiovascular: Normal rate, regular rhythm, normal heart sounds, intact distal pulses and normal pulses  Exam reveals no gallop and no friction rub  No murmur heard  Pulmonary/Chest: Effort normal and breath sounds normal  No stridor  No respiratory distress  She has no wheezes  She has no rales  Abdominal: Soft  Normal appearance  There is no tenderness  There is no rebound, no guarding and no CVA tenderness  Musculoskeletal: Normal range of motion  She exhibits no edema or tenderness  Lymphadenopathy:     She has no cervical adenopathy  Neurological: She is alert  She has normal strength  She exhibits normal muscle tone  GCS eye subscore is 4  GCS verbal subscore is 5  GCS motor subscore is 6  Skin: Skin is warm, dry and intact  No rash noted  She is not diaphoretic  No pallor  Psychiatric: She has a normal mood and affect  Her speech is normal  Cognition and memory are normal    Nursing note and vitals reviewed        Vital Signs  ED Triage Vitals [11/26/19 1055]   Temperature Pulse Respirations Blood Pressure SpO2   97 9 °F (36 6 °C) 67 22 120/57 93 %      Temp Source Heart Rate Source Patient Position - Orthostatic VS BP Location FiO2 (%)   Oral Monitor Lying Right arm --      Pain Score       6           Vitals:    11/26/19 1055 11/26/19 1200 11/26/19 1237 11/26/19 1316   BP: 120/57 130/56 122/56 125/57   Pulse: 67 72 69 68   Patient Position - Orthostatic VS: Lying Lying Lying Lying         Visual Acuity      ED Medications  Medications - No data to display    Diagnostic Studies  Results Reviewed     Procedure Component Value Units Date/Time    Reticulocytes [[id number]]  (Abnormal) Collected:  11/26/19 1234    Lab Status:  Final result Specimen:  Blood from Arm, Right Updated:  11/26/19 1246     Retic Ct Abs 105,600     Retic Ct Pct 4 14 %     POCT Chem 8+ [[id number]]  (Abnormal) Collected:  11/26/19 1239    Lab Status:  Final result Specimen:  Venous Updated:  11/26/19 1244     SODIUM, I-STAT 139 mmol/l      Potassium, i-STAT 4 4 mmol/L      Chloride, istat 107 mmol/L      CO2, i-STAT 25 mmol/L      Anion Gap, i-STAT 13 mmol/L      Calcium, Ionized i-STAT 1 13 mmol/L      BUN, I-STAT 30 mg/dl      Creatinine, i-STAT 1 6 mg/dl      eGFR 32 ml/min/1 73sq m      Glucose, i-STAT 112 mg/dl      Hct, i-STAT 20 %      Hgb, i-STAT 6 8 g/dl      Specimen Type VENOUS    Narrative:       National Kidney Disease Foundation guidelines for Chronic Kidney Disease (CKD):     Stage 1 with normal or high GFR (GFR > 90 mL/min/1 73 square meters)    Stage 2 Mild CKD (GFR = 60-89 mL/min/1 73 square meters)    Stage 3A Moderate CKD (GFR = 45-59 mL/min/1 73 square meters)    Stage 3B Moderate CKD (GFR = 30-44 mL/min/1 73 square meters)    Stage 4 Severe CKD (GFR = 15-29 mL/min/1 73 square meters)    Stage 5 End Stage CKD (GFR <15 mL/min/1 73 square meters)  Note: GFR calculation is accurate only with a steady state creatinine    Folate [[id number]] Collected:  11/26/19 1234    Lab Status: In process Specimen:  Blood from Arm, Right Updated:  11/26/19 1238    Vitamin B12 [[id number]] Collected:  11/26/19 1234    Lab Status: In process Specimen:  Blood from Arm, Right Updated:  11/26/19 1238    Iron Saturation % [[id number]] Collected:  11/26/19 1234    Lab Status: In process Specimen:  Blood from Arm, Right Updated:  11/26/19 1238    Ferritin [[id number]] Collected:  11/26/19 1234    Lab Status:   In process Specimen:  Blood from Arm, Right Updated:  11/26/19 4898    Basic metabolic panel [[id number]]  (Abnormal) Collected:  11/26/19 1116    Lab Status:  Final result Specimen:  Blood from Arm, Left Updated:  11/26/19 1145     Sodium 140 mmol/L      Potassium 4 0 mmol/L      Chloride 106 mmol/L      CO2 25 mmol/L      ANION GAP 9 mmol/L      BUN 28 mg/dL      Creatinine 1 51 mg/dL      Glucose 133 mg/dL      Calcium 9 0 mg/dL      eGFR 34 ml/min/1 73sq m     Narrative:       [place] guidelines for Chronic Kidney Disease (CKD):     Stage 1 with normal or high GFR (GFR > 90 mL/min/1 73 square meters)    Stage 2 Mild CKD (GFR = 60-89 mL/min/1 73 square meters)    Stage 3A Moderate CKD (GFR = 45-59 mL/min/1 73 square meters)    Stage 3B Moderate CKD (GFR = 30-44 mL/min/1 73 square meters)    Stage 4 Severe CKD (GFR = 15-29 mL/min/1 73 square meters)    Stage 5 End Stage CKD (GFR <15 mL/min/1 73 square meters)  Note: GFR calculation is accurate only with a steady state creatinine    Magnesium [[id number]]  (Normal) Collected:  11/26/19 1116    Lab Status:  Final result Specimen:  Blood from Arm, Left Updated:  11/26/19 1145     Magnesium 2 5 mg/dL     NT-BNP PRO [[id number]]  (Abnormal) Collected:  11/26/19 1116    Lab Status:  Final result Specimen:  Blood from Arm, Left Updated:  11/26/19 1145     NT-proBNP 388 pg/mL     Troponin I [[id number]]  (Normal) Collected:  11/26/19 1116    Lab Status:  Final result Specimen:  Blood from Arm, Left Updated:  11/26/19 1139     Troponin I <0 02 ng/mL     CBC and differential [[id number]]  (Abnormal) Collected:  11/26/19 1116    Lab Status:  Final result Specimen:  Blood from Arm, Left Updated:  11/26/19 1138     WBC 10 21 Thousand/uL      RBC 2 65 Million/uL      Hemoglobin 6 7 g/dL      Hematocrit 23 1 %      MCV 87 fL      MCH 25 3 pg      MCHC 29 0 g/dL      RDW 17 3 %      MPV 11 3 fL      Platelets 175 Thousands/uL      nRBC 1 /100 WBCs      Neutrophils Relative 72 %      Immat GRANS % 1 %      Lymphocytes Relative 16 %      Monocytes Relative 7 %      Eosinophils Relative 3 %      Basophils Relative 1 %      Neutrophils Absolute 7 48 Thousands/µL      Immature Grans Absolute 0 07 Thousand/uL Lymphocytes Absolute 1 66 Thousands/µL      Monocytes Absolute 0 67 Thousand/µL      Eosinophils Absolute 0 27 Thousand/µL      Basophils Absolute 0 06 Thousands/µL     Protime-INR [[id number]] Collected:  11/26/19 1116    Lab Status: In process Specimen:  Blood from Arm, Left Updated:  11/26/19 1120    APTT [[id number]] Collected:  11/26/19 1116    Lab Status: In process Specimen:  Blood from Arm, Left Updated:  11/26/19 1120                 XR chest 2 views   ED Interpretation by Anatoly Hough MD (11/26 122)   I have personally reviewed the x-ray and my findings are: no acute disease  Final Result by Kendall Messer MD (11/26 8007)   Stable compared to prior  No acute cardiopulmonary disease              Workstation performed: [id number]                    Procedures  ECG 12 Lead Documentation Only  Date/Time: 11/26/2019 11:17 AM  Performed by: Anatoly Hough MD  Authorized by: Anatoly Hough MD     Indications / Diagnosis:  Dyspnea  ECG reviewed by me, the ED Provider: yes    Patient location:  ED  Previous ECG:     Previous ECG:  Compared to current    Comparison ECG info:  11/19/2019    Similarity:  No change  Interpretation:     Interpretation: non-specific    Rate:     ECG rate:  67    ECG rate assessment: normal    Rhythm:     Rhythm: sinus rhythm    Ectopy:     Ectopy: none    QRS:     QRS axis:  Normal    QRS intervals:  Normal  Conduction:     Conduction: normal    ST segments:     ST segments:  Normal  T waves:     T waves: normal    Q waves:     Q waves:  III and aVF    CriticalCare Time  Performed by: Anatoly Hough MD  Authorized by: Anatoly Hough MD     Critical care provider statement:     Critical care time (minutes):  40    Critical care time was exclusive of:  Separately billable procedures and treating other patients and teaching time    Critical care was necessary to treat or prevent imminent or life-threatening deterioration of the following conditions: Symptomatic anemia requiring blood transfusion  Critical care was time spent personally by me on the following activities:  Obtaining history from patient or surrogate, development of treatment plan with patient or surrogate, discussions with consultants, examination of patient, review of old charts, re-evaluation of patient's condition, ordering and review of radiographic studies and ordering and review of laboratory studies    I assumed direction of critical care for this patient from another provider in my specialty: no             ED Course                               MDM  Number of Diagnoses or Management Options  Anemia:   Dyspnea:   Diagnosis management comments: No acute EKG changes  Chest x-ray is unremarkable  New finding is patient has a significant drop in her blood counts  I read did repeat this and it was corroborated  I consented the patient for blood transfusion and we will transfuse 2 units for his symptomatic anemia  It could be that her last admission her hemoglobin of 9 could make her symptomatic as this was a drop from 10 in the past and she could have some subclinical coronary artery disease that we do not know about  Her troponins were negative and there is no acute EKG changes  She notes no acute bleeding presently but has not really been checking  She did consent for blood transfusion and I did discuss the case with Internal Medicine regarding her admission         Amount and/or Complexity of Data Reviewed  Clinical lab tests: ordered and reviewed  Tests in the radiology section of CPT®: ordered and reviewed  Tests in the medicine section of CPT®: ordered and reviewed  Review and summarize past medical records: yes  Discuss the patient with other providers: yes  Independent visualization of images, tracings, or specimens: yes    Patient Progress  Patient progress: stable      Disposition  Final diagnoses:   Anemia   Dyspnea     Time reflects when diagnosis was documented in both MDM as applicable and the Disposition within this note     Time User Action Codes Description Comment    11/26/2019  1:02 PM Momo Dunn Add [D64 9] Anemia     11/26/2019  1:02 PM Momo Dunn Add [R06 00] Dyspnea       ED Disposition     ED Disposition Condition Date/Time Comment    Admit Stable Tue Nov 26, 2019  1:02 PM Case was discussed with Neema and the patient's admission status was agreed to be Admission Status: inpatient status to the service of Dr Abran Briceno   Follow-up Information    None         Patient's Medications   Discharge Prescriptions    No medications on file     No discharge procedures on file      ED Provider  Electronically Signed by           Rowdy Jenkins MD  11/26/19 8662

## 2019-11-26 NOTE — ASSESSMENT & PLAN NOTE
Wt Readings from Last 3 Encounters:   11/26/19 121 kg (267 lb 13 7 oz)   11/20/19 121 kg (265 lb 10 5 oz)   07/16/19 129 kg (283 lb 6 4 oz)       Admission with history of diastolic CHF  Currently not in acute exacerbation  Patient does complain of shortness of breath especially on exertion but likely due to symptomatic anemia since NT proBNP 388 and no signs of fluid overload noted on chest x-ray  Continue metoprolol, continue torsemide 20 mg daily except Monday and Thursday takes 40 mg  Hold diuretics now with the settings of slightly elevated creatinine from her baseline  Low-salt, fluid-restricted diet

## 2019-11-26 NOTE — ASSESSMENT & PLAN NOTE
Present on admission with history of paroxysmal AFib  On amiodarone and metoprolol  Eliquis for anticoagulation  Currently rate controlled  Will hold Eliquis for now in the settings of acute on chronic anemia

## 2019-11-26 NOTE — PROGRESS NOTES
Patient's IV infiltrated while running first unit of blood  IV removed and blood administration changed to IV on R AC  Protonix drip and NS infusions placed on hold in order to run blood  3 RNs on floor attempted to insert IV without success  RN from ICU able to insert IV with US in R AC next to previous IV; all infusions, including second unit of blood, now running within required time period  IV in R AC was placed by EMS and therefore must be removed by 11/27/19 during AM shift  Will leave in place at present and inform next RN of need to remove IV  Patient tolerating infusions well  Vitals stable  Will continue to monitor      Regis Gandhi RN 11/26/2019 6:32 PM

## 2019-11-26 NOTE — ANESTHESIA PREPROCEDURE EVALUATION
Review of Systems/Medical History  Patient summary reviewed  Chart reviewed      Cardiovascular  Exercise tolerance (METS): <4,  Hyperlipidemia, Hypertension , Dysrhythmias (tx with ablation) , atrial fibrillation, CHF ,    Pulmonary  Smoker ex-smoker  , Shortness of breath, Sleep apnea CPAP,        GI/Hepatic       Negative  ROS        Endo/Other  History of thyroid disease , hypothyroidism,   Obesity  super morbid obesity   GYN       Hematology  Anemia ,     Musculoskeletal    Arthritis     Neurology    CVA , no residual symptoms, Headaches,    Psychology   Depression ,              Physical Exam    Airway    Mallampati score: III  TM Distance: <3 FB  Neck ROM: full     Dental   lower dentures and upper dentures,     Cardiovascular  Rhythm: regular, Rate: normal,     Pulmonary  Decreased breath sounds,     Other Findings        Anesthesia Plan  ASA Score- 3     Anesthesia Type- IV sedation with anesthesia with ASA Monitors  Additional Monitors:   Airway Plan:         Plan Factors-Patient not instructed to abstain from smoking on day of procedure  Patient did not smoke on day of surgery  Induction- intravenous  Postoperative Plan-     Informed Consent- Anesthetic plan and risks discussed with patient

## 2019-11-26 NOTE — ASSESSMENT & PLAN NOTE
Present on Admission with history of hypothyroidism  TSH and free T4 on 11/20/2019 within normal limits  Continue home dose Synthroid of 88 mcg daily

## 2019-11-26 NOTE — ASSESSMENT & PLAN NOTE
Present on admission with history of CKD stage 3  Baseline creatinine is around 1 point 1 3 range  Currently presents with slightly higher creatinine 1 5  Avoiding nephrotoxin changes  Holding diuretics for now  Monitor renal function while inpatient  Consider Nephrology consult if worsening

## 2019-11-26 NOTE — H&P
H&P- Jacinta Avendano 1945, 69 y o  female MRN: [id number]    Unit/Bed#: [place] [address] -01 Encounter: [id number]    Primary Care Provider: Mary Conteh MD   Date and time admitted to hospital: 11/26/2019 10:51 AM        * Symptomatic anemia  Assessment & Plan  Patient presented with complaining of dyspnea on exertion, generalized fatigue, dizziness at times  Denies chest pain, diaphoresis, nausea, vomiting, abdominal pain, hematuria, melena, hematochezia, hematemesis  Last colonoscopy was approximately 10 years ago with some polyps removed  Former smoker  Currently on Plavix due to history of CVA as well as Eliquis due to paroxysmal AFib  On my encounter patient appears comfortable  Not in distress  O2 saturation well on room air  Hemodynamically stable  Hemoglobin was around 9 on 11/20/2019  Baseline hemoglobin stays around 10-11  Further evaluation patient noted with hemoglobin of 6 7  Symptoms likely due to anemia  Iron panel consistent with iron deficiency  No obvious signs of active overt bleeding noted  Hemodynamically stable  Hold Plavix and Eliquis for now  F/u FOBT  IV Protonix drip, IV hydration  Currently 2 units PRBC requested per ED and consent is in the chart  Continue to Monitor CBC and transfuse below 7  Start on p o  Iron supplement once on diet  Follow-up with GI consultation  Continue supportive care  Acquired hypothyroidism  Assessment & Plan  Present on Admission with history of hypothyroidism  TSH and free T4 on 11/20/2019 within normal limits  Continue home dose Synthroid of 88 mcg daily  Stage 3 chronic kidney disease [facility])  Assessment & Plan  Present on admission with history of CKD stage 3  Baseline creatinine is around 1 point 1 3 range  Currently presents with slightly higher creatinine 1 5  Avoiding nephrotoxin changes  Holding diuretics for now  Monitor renal function while inpatient  Consider Nephrology consult if worsening      Chronic diastolic congestive heart failure [facility])  Assessment & Plan  Wt Readings from Last 3 Encounters:   11/26/19 121 kg (267 lb 13 7 oz)   11/20/19 121 kg (265 lb 10 5 oz)   07/16/19 129 kg (283 lb 6 4 oz)       Admission with history of diastolic CHF  Currently not in acute exacerbation  Patient does complain of shortness of breath especially on exertion but likely due to symptomatic anemia since NT proBNP 388 and no signs of fluid overload noted on chest x-ray  Continue metoprolol, continue torsemide 20 mg daily except Monday and Thursday takes 40 mg  Hold diuretics now with the settings of slightly elevated creatinine from her baseline  Low-salt, fluid-restricted diet  Obstructive sleep apnea  Assessment & Plan  Present on admission with history of sleep apnea  Reports noncompliance with CPAP at home  CPAP QHS  Class 3 severe obesity due to excess calories with serious comorbidity and body mass index (BMI) of 40 0 to 44 9 in adult [facility])  Assessment & Plan  Presentation with morbid obesity with BMI 41  Counseled on lifestyle modification and weight loss  Essential hypertension  Assessment & Plan  Present on admission with history of hypertension  Home medication includes Lopressor, torsemide  Currently stable  Resume home medications except diuretics for now with settings of slightly elevated creatinine  Continue to monitor vitals frequently in the settings of acute on chronic anemia  Atrial fibrillation [facility])  Assessment & Plan  Present on admission with history of paroxysmal AFib  On amiodarone and metoprolol  EKG noted normal sinus rhythm with first-degree AV block  Eliquis for anticoagulation  Will hold Eliquis for now in the settings of acute on chronic anemia  VTE Prophylaxis: Pharmacologic VTE Prophylaxis contraindicated due to Anemia  / sequential compression device   Code Status:level 1  POLST: POLST form is not discussed and not completed at this time    Discussion with family: Daughter at bedside  Anticipated Length of Stay:  Patient will be admitted on an Inpatient basis with an anticipated length of stay of  > 2 midnights  Justification for Hospital Stay:  Symptomatic anemia  Total Time for Visit, including Counseling / Coordination of Care: 1 hour  Greater than 50% of this total time spent on direct patient counseling and coordination of care  Chief Complaint:   Shortness of breath upon exertion, fatigue, dizziness at times  History of Present Illness:    Dewayne Srivastava is a 69 y o  female with past medical history of CVA without residual weakness currently on Plavix, paroxysmal AFib status post ablation about 2 years ago currently on Eliquis, morbid obesity , obstructive sleep apnea , CKD stage 3, diastolic heart failure, hypothyroidism, who presents with complaining of worsening dyspnea on exertion associated with generalized fatigue  Patient was recently presented for similar complaints and was discharged after negative workup  Patient reports that she has been noticing worsening shortness of breath and feels tired all day long  On my encounter patient appears comfortable and not in distress  O2 saturation well on room air  Patient denies chest pain, nausea, diaphoreses, back pain, abdominal pain, hematuria, melena, hematochezia, hematemesis, headache, visual disturbances  Patient reports that she does not really look at her stool after wiping so does not know if any melena or hematochezia  Reports she has been on Plavix due to history of CVA in the past   Reports last colonoscopy was approximately 10 years ago with some polyps removed  Lives with daughter  Ambulates independently  Former smoker  Level 1 full code  No other events reported  Review of Systems:    Review of Systems   Constitutional: Positive for fatigue  Negative for chills, diaphoresis and fever  HENT: Negative for congestion  Eyes: Negative for photophobia     Respiratory: Positive for shortness of breath  Negative for apnea, cough, chest tightness, wheezing and stridor  Cardiovascular: Negative for chest pain, palpitations and leg swelling  Gastrointestinal: Negative for abdominal distention, abdominal pain, constipation, diarrhea, nausea and vomiting  Endocrine: Negative for polyuria  Genitourinary: Negative for dysuria and hematuria  Musculoskeletal: Negative for neck stiffness  Neurological: Negative for syncope  Psychiatric/Behavioral: Negative for agitation  Past Medical and Surgical History:     Past Medical History:   Diagnosis Date    Acquired hypothyroidism 12/26/2018    Acute on chronic diastolic heart failure ([facility] [address] ) 8/30/2018    Atrial fibrillation (HCC)     CHF (congestive heart failure) (HCC)     Hyperlipidemia     Hypertension     Migraine     Polyp of sigmoid colon     Prediabetes     Stroke [facility])        Past Surgical History:   Procedure Laterality Date    APPENDECTOMY      CARDIAC ELECTROPHYSIOLOGY STUDY AND ABLATION      CHOLECYSTECTOMY         Meds/Allergies:    Prior to Admission medications    Medication Sig Start Date End Date Taking?  Authorizing Provider   amiodarone 200 mg tablet Take 1/2 tablet daily 8/20/19  Yes Alley Noguera DO   clopidogrel (PLAVIX) 75 mg tablet Take 1 tablet (75 mg total) by mouth daily 11/5/19  Yes Marcus Wu MD   ELIQUIS 5 MG TAKE ONE TABLET BY MOUTH TWO TIMES A DAY 1/29/19  Yes Marcus Wu MD   escitalopram (LEXAPRO) 10 mg tablet Take 1 tablet (10 mg total) by mouth daily 5/20/19  Yes Marcus Wu MD   gabapentin (NEURONTIN) 100 mg capsule Take 1 capsule (100 mg total) by mouth 3 (three) times a day 7/29/19  Yes Myrtle Marie MD   levothyroxine 88 mcg tablet Take 1 tablet (88 mcg total) by mouth daily 8/19/19  Yes Marcus Wu MD   metoprolol tartrate (LOPRESSOR) 50 mg tablet Take 1 tablet (50 mg total) by mouth every 12 (twelve) hours 11/5/19  Yes Marcus Wu MD   Multiple Vitamin (MULTIVITAMIN) tablet Take 1 tablet by mouth daily     Yes Historical Provider, MD   nortriptyline (PAMELOR) 50 mg capsule Take 1 capsule (50 mg total) by mouth daily 19  Yes Cinthia Prieto MD   potassium chloride (K-DUR,KLOR-CON) 20 mEq tablet Take 1 tablet (20 mEq total) by mouth 2 (two) times a day 19  Yes Citnhia Prieto MD   simvastatin (ZOCOR) 10 mg tablet Take 1 tablet (10 mg total) by mouth daily at bedtime 19  Yes Cinthia Prieto MD   torsemide [facility]) 20 mg tablet Take 20 mg by mouth see administration instructions Take 1 tablet (20 mg total) by mouth daily except for Monday and  take 2 tablets (40 mg total) in morning   Yes Historical Provider, MD   LORazepam (ATIVAN) 1 mg tablet Take 1 tablet (1 mg total) by mouth 3 (three) times a day as needed for anxiety 18   Cinthia Prieto MD   meclizine (ANTIVERT) 25 mg tablet Take 25 mg by mouth 3 (three) times a day as needed for dizziness     Historical Provider, MD   oxyCODONE-acetaminophen (PERCOCET) 5-325 mg per tablet Take 1 tablet by mouth every 4 (four) hours as needed for moderate pain Max Daily Amount: 6 tablets 18   Cinthia Prieto MD     I have reviewed home medications with patient personally  Allergies: No Known Allergies    Social History:     Marital Status:    Patient Pre-hospital Living Situation:  With daughter    Patient Pre-hospital Level of Mobility:  Independent  Patient Pre-hospital Diet Restrictions:  None reported  Substance Use History:   Social History     Substance and Sexual Activity   Alcohol Use Never    Frequency: Never    Binge frequency: Never     Social History     Tobacco Use   Smoking Status Former Smoker    Last attempt to quit: 2011    Years since quittin 7   Smokeless Tobacco Never Used     Social History     Substance and Sexual Activity   Drug Use No       Family History:    non-contributory    Physical Exam:     Vitals:   Blood Pressure: 137/64 (19 1354)  Pulse: 76 (11/26/19 1354)  Temperature: (!) 96 °F (35 6 °C) (11/26/19 1354)  Temp Source: Oral (11/26/19 1316)  Respirations: 20 (11/26/19 1354)  Weight - Scale: 121 kg (267 lb 13 7 oz) (11/26/19 1055)  SpO2: 97 % (11/26/19 1354)    Physical Exam   Constitutional: She is oriented to person, place, and time  No distress  Obese female not in acute distress  HENT:   Head: Normocephalic and atraumatic  Eyes: Pupils are equal, round, and reactive to light  EOM are normal    Pale conjunctiva noted  Neck: Normal range of motion  Neck supple  No JVD present  Cardiovascular: Normal rate and regular rhythm  Pulmonary/Chest: Effort normal and breath sounds normal  No stridor  No respiratory distress  Abdominal: Soft  Bowel sounds are normal  She exhibits no distension  There is no tenderness  There is no guarding  Musculoskeletal: Normal range of motion  She exhibits no edema  Neurological: She is alert and oriented to person, place, and time  Skin: She is not diaphoretic  There is pallor  Psychiatric: Her behavior is normal        Additional Data:     Lab Results: I have personally reviewed pertinent reports        Results from last 7 days   Lab Units 11/26/19  1239 11/26/19  1116   WBC Thousand/uL  --  10 21*   HEMOGLOBIN g/dL  --  6 7*   I STAT HEMOGLOBIN g/dl 6 8*  --    HEMATOCRIT %  --  23 1*   HEMATOCRIT, ISTAT % 20*  --    PLATELETS Thousands/uL  --  399*   NEUTROS PCT %  --  72   LYMPHS PCT %  --  16   MONOS PCT %  --  7   EOS PCT %  --  3     Results from last 7 days   Lab Units 11/26/19  1239 11/26/19  1116  11/19/19  1832   SODIUM mmol/L  --  140   < > 143   POTASSIUM mmol/L  --  4 0   < > 4 5   CHLORIDE mmol/L  --  106   < > 108   CO2 mmol/L  --  25   < > 25   CO2, I-STAT mmol/L 25  --   --   --    BUN mg/dL  --  28*   < > 46*   CREATININE mg/dL  --  1 51*   < > 1 16   AGAP mmol/L 13  --   --   --    ANION GAP mmol/L  --  9   < > 10   CALCIUM mg/dL  --  9 0   < > 9 0   ALBUMIN g/dL  -- --   --  3 4*   TOTAL BILIRUBIN mg/dL  --   --   --  0 33   ALK PHOS U/L  --   --   --  115   ALT U/L  --   --   --  27   AST U/L  --   --   --  21   GLUCOSE RANDOM mg/dL  --  133   < > 112    < > = values in this interval not displayed  Results from last 7 days   Lab Units 11/26/19  1116   INR  1 70*                   Imaging: I have personally reviewed pertinent reports  XR chest 2 views   ED Interpretation by Mark Parnell MD (11/26 0292)   I have personally reviewed the x-ray and my findings are: no acute disease  Final Result by James Ceballos MD (11/26 0190)   Stable compared to prior  No acute cardiopulmonary disease  Workstation performed: [id number]             EKG, Pathology, and Other Studies Reviewed on Admission:   · EKG:  Normal sinus rhythm with first-degree AV block  Allscripts / Epic Records Reviewed: Yes     ** Please Note: This note has been constructed using a voice recognition system   **

## 2019-11-26 NOTE — ASSESSMENT & PLAN NOTE
Present on admission with history of hypertension  Home medication includes Lopressor, torsemide  Currently stable  Resume home medications except diuretics for now with settings of slightly elevated creatinine  Continue to monitor vitals frequently in the settings of acute on chronic anemia

## 2019-11-26 NOTE — ASSESSMENT & PLAN NOTE
Present on admission with history of sleep apnea  Reports noncompliance with CPAP at home  CPAP QHS

## 2019-11-26 NOTE — CONSULTS
Consultation -  [facility]  Mario Chung 69 y o  female MRN: [id number]  Unit/Bed#: [place] [address] -01 Encounter: [id number]        Consults    ASSESSMENT/ PLAN:    68 yo female with pmh stroke on plavix, migraines, HTN, HLD, CHF, a-fib on eliquis, hypothyroidism who presented to the ED with SOB being evaluated for anemia    1  Symptomatic anemia: baseline Hgb of 9-10  Admits to Bob Pugh recently acutely worsened today  On presentation to the ED, found to have Hgb of 6 7  Denies known melena or hematochezia  BUN elevated and previously normal  She admits to NSAID use, her daughter states she takes it daily  This is likely secondary to PUD in setting of NSAID use  Given eliquis and plavix, will plan for diagnostic EGD tomorrow  -agree with 2 units PRBCs   -continue to monitor H&H and transfuse as necessary  -continue PPI gtt for now  -clear liquid diet  -NPO after midnight   -monitor for signs of active bleeding  -plan for diagnostic EGD tomorrow for further evaluation  -will need outpatient colonoscopy   -discussed with primary team     Reason for Consult / Principal Problem: symptomatic anemia    HPI: Anita Marte is a 68 yo female with pmh stroke on plavix, migraines, HTN, HLD, CHF, a-fib on eliquis, hypothyroidism who presented to the ED with SOB  GI consulted for anemia  She admits to worsening SOB over the past few days and TRAMMELL acutely worsened today while on her way to PCP visit  She states she was unable to walk 50-75 feet today  On presentation her Hgb was found to be 6 7 with a baseline of 9-10  She denies signs of active bleeding with melena or hematochezia  She denies acid reflux, abdominal pain, diarrhea, constipation  She has a history of appendectomy, cholecystectomy  She is a former smoker  Denies alcohol or illicit drug use  Admits to EGD 20 years ago  Colonoscopy 10 years ago with polyps removed, states she is due for one   She does admit to occasional NSAID use but her daughter states she takes multiple pills on a daily basis  Denies family hx of colon or other GI cancers  REVIEW OF SYSTEMS:     CONSTITUTIONAL: Denies any fever, chills, or rigors  Good appetite, and no recent weight loss  HEENT: No earache or tinnitus  Denies hearing loss or visual disturbances  CARDIOVASCULAR: No chest pain or palpitations  RESPIRATORY: Denies any cough, hemoptysis  +SOB and TRAMMELL  GASTROINTESTINAL: As noted in the History of Present Illness  GENITOURINARY: No problems with urination  Denies any hematuria or dysuria  NEUROLOGIC: No dizziness or vertigo, denies headaches  MUSCULOSKELETAL: Denies any muscle or joint pain  SKIN: Denies skin rashes or itching  ENDOCRINE: Denies excessive thirst  Denies intolerance to heat or cold  PSYCHOSOCIAL: Denies depression or anxiety  Denies any recent memory loss         Historical Information   Past Medical History:   Diagnosis Date    Acquired hypothyroidism 2018    Acute on chronic diastolic heart failure ([facility] [address] ) 2018    Atrial fibrillation (HCC)     CHF (congestive heart failure) (HCC)     Hyperlipidemia     Hypertension     Migraine     Polyp of sigmoid colon     Prediabetes     Stroke [facility])      Past Surgical History:   Procedure Laterality Date    APPENDECTOMY      CARDIAC ELECTROPHYSIOLOGY STUDY AND ABLATION      CHOLECYSTECTOMY       Social History   Social History     Substance and Sexual Activity   Alcohol Use Never    Frequency: Never    Binge frequency: Never     Social History     Substance and Sexual Activity   Drug Use No     Social History     Tobacco Use   Smoking Status Former Smoker    Last attempt to quit: 2011    Years since quittin 7   Smokeless Tobacco Never Used     Family History   Problem Relation Age of Onset    Diabetes Mother     Heart disease Mother     Hypertension Mother     Arthritis Mother     Coronary artery disease Mother     Drug abuse Paternal Grandmother        Meds/Allergies Medications Prior to Admission   Medication    amiodarone 200 mg tablet    clopidogrel (PLAVIX) 75 mg tablet    ELIQUIS 5 MG    escitalopram (LEXAPRO) 10 mg tablet    gabapentin (NEURONTIN) 100 mg capsule    levothyroxine 88 mcg tablet    metoprolol tartrate (LOPRESSOR) 50 mg tablet    Multiple Vitamin (MULTIVITAMIN) tablet    nortriptyline (PAMELOR) 50 mg capsule    potassium chloride (K-DUR,KLOR-CON) 20 mEq tablet    simvastatin (ZOCOR) 10 mg tablet    torsemide (DEMADEX) 20 mg tablet    LORazepam (ATIVAN) 1 mg tablet    meclizine (ANTIVERT) 25 mg tablet    oxyCODONE-acetaminophen (PERCOCET) 5-325 mg per tablet     Current Facility-Administered Medications   Medication Dose Route Frequency    [START ON 11/27/2019] amiodarone tablet 100 mg  100 mg Oral Daily With Breakfast    [START ON 11/27/2019] escitalopram (LEXAPRO) tablet 10 mg  10 mg Oral Daily    gabapentin (NEURONTIN) capsule 100 mg  100 mg Oral TID    [START ON 11/27/2019] levothyroxine tablet 88 mcg  88 mcg Oral Early Morning    LORazepam (ATIVAN) tablet 1 mg  1 mg Oral TID PRN    meclizine (ANTIVERT) tablet 25 mg  25 mg Oral TID PRN    metoprolol tartrate (LOPRESSOR) tablet 50 mg  50 mg Oral Q12H PÉREZ    nortriptyline (PAMELOR) capsule 50 mg  50 mg Oral Daily    oxyCODONE-acetaminophen (PERCOCET) 5-325 mg per tablet 1 tablet  1 tablet Oral Q4H PRN    pantoprazole (PROTONIX) 80 mg in sodium chloride 0 9 % 100 mL infusion  8 mg/hr Intravenous Continuous    potassium chloride (K-DUR,KLOR-CON) CR tablet 20 mEq  20 mEq Oral BID    pravastatin (PRAVACHOL) tablet 20 mg  20 mg Oral Daily With Dinner    sodium chloride 0 9 % infusion  75 mL/hr Intravenous Continuous       No Known Allergies        Objective     Blood pressure 137/64, pulse 76, temperature (!) 96 °F (35 6 °C), resp  rate 20, weight 121 kg (267 lb 13 7 oz), SpO2 97 %        Intake/Output Summary (Last 24 hours) at 11/26/2019 1429  Last data filed at 11/26/2019 1102  Gross per 24 hour   Intake 50 ml   Output    Net 50 ml         PHYSICAL EXAM:      General Appearance:   A&Ox3, cooperative, no distress, appears stated age, obese    HEENT:   Normocephalic, atraumatic  Right eye exhibits no discharge  Left eye exhibits no discharge  No scleral icterus    Neck:  Supple, symmetrical, trachea midline, no stridor    Lungs:   Clear to auscultation bilaterally; no rales, rhonchi or wheezing    Heart[de-identified]   S1 and S2 normal; regular rate and rhythm; no murmur, rub, or gallop     Abdomen:   Soft, non-tender, non-distended; normal bowel sounds; no masses, no organomegaly    Genitalia:   Deferred    Rectal:   Deferred    Extremities:  No cyanosis, clubbing or edema        Skin:  Skin color, texture, turgor normal, no rashes or lesions          Lab Results:   Admission on 11/26/2019   Component Date Value    WBC 11/26/2019 10 21*    RBC 11/26/2019 2 65*    Hemoglobin 11/26/2019 6 7*    Hematocrit 11/26/2019 23 1*    MCV 11/26/2019 87     MCH 11/26/2019 25 3*    MCHC 11/26/2019 29 0*    RDW 11/26/2019 17 3*    MPV 11/26/2019 11 3     Platelets 04/71/2182 399*    nRBC 11/26/2019 1     Neutrophils Relative 11/26/2019 72     Immat GRANS % 11/26/2019 1     Lymphocytes Relative 11/26/2019 16     Monocytes Relative 11/26/2019 7     Eosinophils Relative 11/26/2019 3     Basophils Relative 11/26/2019 1     Neutrophils Absolute 11/26/2019 7 48     Immature Grans Absolute 11/26/2019 0 07     Lymphocytes Absolute 11/26/2019 1 66     Monocytes Absolute 11/26/2019 0 67     Eosinophils Absolute 11/26/2019 0 27     Basophils Absolute 11/26/2019 0 06     Protime 11/26/2019 20 3*    INR 11/26/2019 1 70*    PTT 11/26/2019 27     Sodium 11/26/2019 140     Potassium 11/26/2019 4 0     Chloride 11/26/2019 106     CO2 11/26/2019 25     ANION GAP 11/26/2019 9     BUN 11/26/2019 28*    Creatinine 11/26/2019 1 51*    Glucose 11/26/2019 133     Calcium 11/26/2019 9 0     eGFR 11/26/2019 34     Magnesium 11/26/2019 2 5     Troponin I 11/26/2019 <0 02     NT-proBNP 11/26/2019 388*    Retic Ct Abs 11/26/2019 105,600*    Retic Ct Pct 11/26/2019 4 14*    ABO Grouping 11/26/2019 B     Rh Factor 11/26/2019 Positive     Antibody Screen 11/26/2019 Negative     Specimen Expiration Date 11/26/2019 22190574     Unit Product Code 11/26/2019 [id number]     Unit Number 11/26/2019 [id number]     Unit ABO 11/26/2019 B     Unit [facility] 11/26/2019 POS     Crossmatch 11/26/2019 Compatible     Unit Dispense Status 11/26/2019 Crossmatched     Unit Product Code 11/26/2019 C4895D54     Unit Number 11/26/2019 [id number]     Unit ABO 11/26/2019 B     Unit RH 11/26/2019 POS     Crossmatch 11/26/2019 Compatible     Unit Dispense Status 11/26/2019 Issued     SODIUM, I-STAT 11/26/2019 139     Potassium, i-STAT 11/26/2019 4 4     Chloride, istat 11/26/2019 107     CO2, i-STAT 11/26/2019 25     Anion Gap, i-STAT 11/26/2019 13     Calcium, Ionized i-STAT 11/26/2019 1 13     BUN, I-STAT 11/26/2019 30*    Creatinine, i-STAT 11/26/2019 1 6*    eGFR 11/26/2019 32     Glucose, i-STAT 11/26/2019 112     Hct, i-STAT 11/26/2019 20*    Hgb, i-STAT 11/26/2019 6 8*    Specimen Type 11/26/2019 VENOUS     Ventricular Rate 11/26/2019 67     Atrial Rate 11/26/2019 67     ME Interval 11/26/2019 254     QRSD Interval 11/26/2019 90     QT Interval 11/26/2019 450     QTC Interval 11/26/2019 475     P Axis 11/26/2019 67     QRS Axis 11/26/2019 28     T Wave Axis 11/26/2019 48        Imaging Studies: I have personally reviewed pertinent imaging studies  Xr Chest 2 Views    Result Date: 11/26/2019  Impression: Stable compared to prior  No acute cardiopulmonary disease  This patient was seen and examined by Dr Kavon Anderson  All gentile medical decisions were made by Dr Kavon Anderson  Thank you for allowing us to participate in the care of this patient  We will follow up closely with you

## 2019-11-27 ENCOUNTER — ANESTHESIA (INPATIENT)
Dept: GASTROENTEROLOGY | Facility: HOSPITAL | Age: 74
DRG: 812 | End: 2019-11-27
Payer: MEDICARE

## 2019-11-27 ENCOUNTER — APPOINTMENT (INPATIENT)
Dept: GASTROENTEROLOGY | Facility: HOSPITAL | Age: 74
DRG: 812 | End: 2019-11-27
Payer: MEDICARE

## 2019-11-27 VITALS
WEIGHT: 267.86 LBS | RESPIRATION RATE: 17 BRPM | DIASTOLIC BLOOD PRESSURE: 53 MMHG | SYSTOLIC BLOOD PRESSURE: 111 MMHG | HEART RATE: 73 BPM | BODY MASS INDEX: 41.95 KG/M2 | TEMPERATURE: 97.5 F | OXYGEN SATURATION: 96 %

## 2019-11-27 LAB
ABO GROUP BLD BPU: NORMAL
ABO GROUP BLD BPU: NORMAL
ANION GAP SERPL CALCULATED.3IONS-SCNC: 10 MMOL/L (ref 4–13)
BASOPHILS # BLD AUTO: 0.06 THOUSANDS/ΜL (ref 0–0.1)
BASOPHILS NFR BLD AUTO: 1 % (ref 0–1)
BPU ID: NORMAL
BPU ID: NORMAL
BUN SERPL-MCNC: 24 MG/DL (ref 5–25)
CALCIUM SERPL-MCNC: 8.4 MG/DL (ref 8.3–10.1)
CHLORIDE SERPL-SCNC: 109 MMOL/L (ref 100–108)
CO2 SERPL-SCNC: 22 MMOL/L (ref 21–32)
CREAT SERPL-MCNC: 1.33 MG/DL (ref 0.6–1.3)
CROSSMATCH: NORMAL
CROSSMATCH: NORMAL
EOSINOPHIL # BLD AUTO: 0.21 THOUSAND/ΜL (ref 0–0.61)
EOSINOPHIL NFR BLD AUTO: 3 % (ref 0–6)
ERYTHROCYTE [DISTWIDTH] IN BLOOD BY AUTOMATED COUNT: 18 % (ref 11.6–15.1)
GFR SERPL CREATININE-BSD FRML MDRD: 40 ML/MIN/1.73SQ M
GLUCOSE SERPL-MCNC: 92 MG/DL (ref 65–140)
HCT VFR BLD AUTO: 27.1 % (ref 34.8–46.1)
HGB BLD-MCNC: 8 G/DL (ref 11.5–15.4)
IMM GRANULOCYTES # BLD AUTO: 0.03 THOUSAND/UL (ref 0–0.2)
IMM GRANULOCYTES NFR BLD AUTO: 0 % (ref 0–2)
LYMPHOCYTES # BLD AUTO: 2.48 THOUSANDS/ΜL (ref 0.6–4.47)
LYMPHOCYTES NFR BLD AUTO: 34 % (ref 14–44)
MCH RBC QN AUTO: 25.6 PG (ref 26.8–34.3)
MCHC RBC AUTO-ENTMCNC: 29.5 G/DL (ref 31.4–37.4)
MCV RBC AUTO: 87 FL (ref 82–98)
MONOCYTES # BLD AUTO: 0.45 THOUSAND/ΜL (ref 0.17–1.22)
MONOCYTES NFR BLD AUTO: 6 % (ref 4–12)
NEUTROPHILS # BLD AUTO: 4 THOUSANDS/ΜL (ref 1.85–7.62)
NEUTS SEG NFR BLD AUTO: 56 % (ref 43–75)
NRBC BLD AUTO-RTO: 1 /100 WBCS
PLATELET # BLD AUTO: 310 THOUSANDS/UL (ref 149–390)
PMV BLD AUTO: 11.3 FL (ref 8.9–12.7)
POTASSIUM SERPL-SCNC: 3.8 MMOL/L (ref 3.5–5.3)
RBC # BLD AUTO: 3.12 MILLION/UL (ref 3.81–5.12)
SODIUM SERPL-SCNC: 141 MMOL/L (ref 136–145)
UNIT DISPENSE STATUS: NORMAL
UNIT DISPENSE STATUS: NORMAL
UNIT PRODUCT CODE: NORMAL
UNIT PRODUCT CODE: NORMAL
UNIT RH: NORMAL
UNIT RH: NORMAL
WBC # BLD AUTO: 7.23 THOUSAND/UL (ref 4.31–10.16)

## 2019-11-27 PROCEDURE — C9113 INJ PANTOPRAZOLE SODIUM, VIA: HCPCS | Performed by: STUDENT IN AN ORGANIZED HEALTH CARE EDUCATION/TRAINING PROGRAM

## 2019-11-27 PROCEDURE — 94660 CPAP INITIATION&MGMT: CPT

## 2019-11-27 PROCEDURE — 97163 PT EVAL HIGH COMPLEX 45 MIN: CPT

## 2019-11-27 PROCEDURE — 0DJ08ZZ INSPECTION OF UPPER INTESTINAL TRACT, VIA NATURAL OR ARTIFICIAL OPENING ENDOSCOPIC: ICD-10-PCS | Performed by: INTERNAL MEDICINE

## 2019-11-27 PROCEDURE — G8979 MOBILITY GOAL STATUS: HCPCS

## 2019-11-27 PROCEDURE — 80048 BASIC METABOLIC PNL TOTAL CA: CPT | Performed by: STUDENT IN AN ORGANIZED HEALTH CARE EDUCATION/TRAINING PROGRAM

## 2019-11-27 PROCEDURE — G8978 MOBILITY CURRENT STATUS: HCPCS

## 2019-11-27 PROCEDURE — 43235 EGD DIAGNOSTIC BRUSH WASH: CPT | Performed by: INTERNAL MEDICINE

## 2019-11-27 PROCEDURE — 85025 COMPLETE CBC W/AUTO DIFF WBC: CPT | Performed by: STUDENT IN AN ORGANIZED HEALTH CARE EDUCATION/TRAINING PROGRAM

## 2019-11-27 PROCEDURE — G8980 MOBILITY D/C STATUS: HCPCS

## 2019-11-27 PROCEDURE — 99239 HOSP IP/OBS DSCHRG MGMT >30: CPT | Performed by: INTERNAL MEDICINE

## 2019-11-27 RX ORDER — ONDANSETRON 8 MG/1
8 TABLET, ORALLY DISINTEGRATING ORAL EVERY 8 HOURS PRN
Qty: 20 TABLET | Refills: 0 | Status: SHIPPED | OUTPATIENT
Start: 2019-11-27 | End: 2020-07-15 | Stop reason: HOSPADM

## 2019-11-27 RX ORDER — PROPOFOL 10 MG/ML
INJECTION, EMULSION INTRAVENOUS AS NEEDED
Status: DISCONTINUED | OUTPATIENT
Start: 2019-11-27 | End: 2019-11-27 | Stop reason: SURG

## 2019-11-27 RX ORDER — ONDANSETRON 2 MG/ML
4 INJECTION INTRAMUSCULAR; INTRAVENOUS EVERY 4 HOURS PRN
Status: DISCONTINUED | OUTPATIENT
Start: 2019-11-27 | End: 2019-11-27 | Stop reason: HOSPADM

## 2019-11-27 RX ORDER — ONDANSETRON 2 MG/ML
4 INJECTION INTRAMUSCULAR; INTRAVENOUS ONCE
Status: DISCONTINUED | OUTPATIENT
Start: 2019-11-27 | End: 2019-11-27 | Stop reason: HOSPADM

## 2019-11-27 RX ORDER — PANTOPRAZOLE SODIUM 40 MG/1
40 TABLET, DELAYED RELEASE ORAL
Status: DISCONTINUED | OUTPATIENT
Start: 2019-11-27 | End: 2019-11-27 | Stop reason: HOSPADM

## 2019-11-27 RX ORDER — PANTOPRAZOLE SODIUM 40 MG/1
40 TABLET, DELAYED RELEASE ORAL
Qty: 60 TABLET | Refills: 0 | Status: SHIPPED | OUTPATIENT
Start: 2019-11-27 | End: 2020-05-04 | Stop reason: HOSPADM

## 2019-11-27 RX ADMIN — PROPOFOL 110 MG: 10 INJECTION, EMULSION INTRAVENOUS at 13:35

## 2019-11-27 RX ADMIN — ESCITALOPRAM OXALATE 10 MG: 10 TABLET ORAL at 08:15

## 2019-11-27 RX ADMIN — GABAPENTIN 100 MG: 100 CAPSULE ORAL at 08:15

## 2019-11-27 RX ADMIN — LIDOCAINE HYDROCHLORIDE 80 MG: 20 INJECTION, SOLUTION INTRAVENOUS at 13:35

## 2019-11-27 RX ADMIN — SODIUM CHLORIDE 75 ML/HR: 0.9 INJECTION, SOLUTION INTRAVENOUS at 06:31

## 2019-11-27 RX ADMIN — SODIUM CHLORIDE 8 MG/HR: 9 INJECTION, SOLUTION INTRAVENOUS at 12:14

## 2019-11-27 RX ADMIN — SODIUM CHLORIDE 8 MG/HR: 9 INJECTION, SOLUTION INTRAVENOUS at 01:58

## 2019-11-27 RX ADMIN — LEVOTHYROXINE SODIUM 88 MCG: 88 TABLET ORAL at 06:29

## 2019-11-27 RX ADMIN — METOPROLOL TARTRATE 50 MG: 50 TABLET, FILM COATED ORAL at 08:14

## 2019-11-27 RX ADMIN — POTASSIUM CHLORIDE 20 MEQ: 1500 TABLET, EXTENDED RELEASE ORAL at 08:15

## 2019-11-27 RX ADMIN — AMIODARONE HYDROCHLORIDE 100 MG: 100 TABLET ORAL at 08:15

## 2019-11-27 NOTE — PLAN OF CARE
Problem: Potential for Falls  Goal: Patient will remain free of falls  Description  INTERVENTIONS:  - Assess patient frequently for physical needs  -  Identify cognitive and physical deficits and behaviors that affect risk of falls    -  [place] fall precautions as indicated by assessment   - Educate patient/family on patient safety including physical limitations  - Instruct patient to call for assistance with activity based on assessment  - Modify environment to reduce risk of injury  - Consider OT/PT consult to assist with strengthening/mobility  Outcome: Adequate for Discharge     Problem: CARDIOVASCULAR - ADULT  Goal: Maintains optimal cardiac output and hemodynamic stability  Description  INTERVENTIONS:  - Monitor I/O, vital signs and rhythm  - Monitor for S/S and trends of decreased cardiac output  - Administer and titrate ordered vasoactive medications to optimize hemodynamic stability  - Assess quality of pulses, skin color and temperature  - Assess for signs of decreased coronary artery perfusion  - Instruct patient to report change in severity of symptoms  Outcome: Adequate for Discharge  Goal: Absence of cardiac dysrhythmias or at baseline rhythm  Description  INTERVENTIONS:  - Continuous cardiac monitoring, vital signs, obtain 12 lead EKG if ordered  - Administer antiarrhythmic and heart rate control medications as ordered  - Monitor electrolytes and administer replacement therapy as ordered  Outcome: Adequate for Discharge     Problem: RESPIRATORY - ADULT  Goal: Achieves optimal ventilation and oxygenation  Description  INTERVENTIONS:  - Assess for changes in respiratory status  - Assess for changes in mentation and behavior  - Position to facilitate oxygenation and minimize respiratory effort  - Oxygen administered by appropriate delivery if ordered  - Initiate smoking cessation education as indicated  - Encourage broncho-pulmonary hygiene including cough, deep breathe, Incentive Spirometry  - Assess the need for suctioning and aspirate as needed  - Assess and instruct to report SOB or any respiratory difficulty  - Respiratory Therapy support as indicated  Outcome: Adequate for Discharge     Problem: GASTROINTESTINAL - ADULT  Goal: Minimal or absence of nausea and/or vomiting  Description  INTERVENTIONS:  - Administer IV fluids if ordered to ensure adequate hydration  - Maintain NPO status until nausea and vomiting are resolved  - Nasogastric tube if ordered  - Administer ordered antiemetic medications as needed  - Provide nonpharmacologic comfort measures as appropriate  - Advance diet as tolerated, if ordered  - Consider nutrition services referral to assist patient with adequate nutrition and appropriate food choices  Outcome: Adequate for Discharge  Goal: Maintains or returns to baseline bowel function  Description  INTERVENTIONS:  - Assess bowel function  - Encourage oral fluids to ensure adequate hydration  - Administer IV fluids if ordered to ensure adequate hydration  - Administer ordered medications as needed  - Encourage mobilization and activity  - Consider nutritional services referral to assist patient with adequate nutrition and appropriate food choices  Outcome: Adequate for Discharge  Goal: Maintains adequate nutritional intake  Description  INTERVENTIONS:  - Monitor percentage of each meal consumed  - Identify factors contributing to decreased intake, treat as appropriate  - Assist with meals as needed  - Monitor I&O, weight, and lab values if indicated  - Obtain nutrition services referral as needed  Outcome: Adequate for Discharge     Problem: METABOLIC, FLUID AND ELECTROLYTES - ADULT  Goal: Electrolytes maintained within normal limits  Description  INTERVENTIONS:  - Monitor labs and assess patient for signs and symptoms of electrolyte imbalances  - Administer electrolyte replacement as ordered  - Monitor response to electrolyte replacements, including repeat lab results as appropriate  - Instruct patient on fluid and nutrition as appropriate  Outcome: Adequate for Discharge  Goal: Fluid balance maintained  Description  INTERVENTIONS:  - Monitor labs   - Monitor I/O and WT  - Instruct patient on fluid and nutrition as appropriate  - Assess for signs & symptoms of volume excess or deficit  Outcome: Adequate for Discharge     Problem: HEMATOLOGIC - ADULT  Goal: Maintains hematologic stability  Description  INTERVENTIONS  - Assess for signs and symptoms of bleeding or hemorrhage  - Monitor labs  - Administer supportive blood products/factors as ordered and appropriate  Outcome: Adequate for Discharge     Problem: MUSCULOSKELETAL - ADULT  Goal: Maintain or return mobility to safest level of function  Description  INTERVENTIONS:  - Assess patient's ability to carry out ADLs; assess patient's baseline for ADL function and identify physical deficits which impact ability to perform ADLs (bathing, care of mouth/teeth, toileting, grooming, dressing, etc )  - Assess/evaluate cause of self-care deficits   - Assess range of motion  - Assess patient's mobility  - Assess patient's need for assistive devices and provide as appropriate  - Encourage maximum independence but intervene and supervise when necessary  - Involve family in performance of ADLs  - Assess for home care needs following discharge   - Consider OT consult to assist with ADL evaluation and planning for discharge  - Provide patient education as appropriate  Outcome: Adequate for Discharge  Goal: Maintain proper alignment of affected body part  Description  INTERVENTIONS:  - Support, maintain and protect limb and body alignment  - Provide patient/ family with appropriate education  Outcome: Adequate for Discharge     Problem: PAIN - ADULT  Goal: Verbalizes/displays adequate comfort level or baseline comfort level  Description  Interventions:  - Encourage patient to monitor pain and request assistance  - Assess pain using appropriate pain scale  - Administer analgesics based on type and severity of pain and evaluate response  - Implement non-pharmacological measures as appropriate and evaluate response  - Consider cultural and social influences on pain and pain management  - Notify physician/advanced practitioner if interventions unsuccessful or patient reports new pain  Outcome: Adequate for Discharge     Problem: INFECTION - ADULT  Goal: Absence or prevention of progression during hospitalization  Description  INTERVENTIONS:  - Assess and monitor for signs and symptoms of infection  - Monitor lab/diagnostic results  - Monitor all insertion sites, i e  indwelling lines, tubes, and drains  - Monitor endotracheal if appropriate and nasal secretions for changes in amount and color  - Loop appropriate cooling/warming therapies per order  - Administer medications as ordered  - Instruct and encourage patient and family to use good hand hygiene technique  - Identify and instruct in appropriate isolation precautions for identified infection/condition  Outcome: Adequate for Discharge     Problem: SAFETY ADULT  Goal: Maintain or return to baseline ADL function  Description  INTERVENTIONS:  -  Assess patient's ability to carry out ADLs; assess patient's baseline for ADL function and identify physical deficits which impact ability to perform ADLs (bathing, care of mouth/teeth, toileting, grooming, dressing, etc )  - Assess/evaluate cause of self-care deficits   - Assess range of motion  - Assess patient's mobility; develop plan if impaired  - Assess patient's need for assistive devices and provide as appropriate  - Encourage maximum independence but intervene and supervise when necessary  - Involve family in performance of ADLs  - Assess for home care needs following discharge   - Consider OT consult to assist with ADL evaluation and planning for discharge  - Provide patient education as appropriate  Outcome: Adequate for Discharge  Goal: Maintain or return mobility status to optimal level  Description  INTERVENTIONS:  - Assess patient's baseline mobility status (ambulation, transfers, stairs, etc )    - Identify cognitive and physical deficits and behaviors that affect mobility  - Identify mobility aids required to assist with transfers and/or ambulation (gait belt, sit-to-stand, lift, walker, cane, etc )  - [place] fall precautions as indicated by assessment  - Record patient progress and toleration of activity level on Mobility SBAR; progress patient to next Phase/Stage  - Instruct patient to call for assistance with activity based on assessment  - Consider rehabilitation consult to assist with strengthening/weightbearing, etc   Outcome: Adequate for Discharge     Problem: DISCHARGE PLANNING  Goal: Discharge to home or other facility with appropriate resources  Description  INTERVENTIONS:  - Identify barriers to discharge w/patient and caregiver  - Arrange for needed discharge resources and transportation as appropriate  - Identify discharge learning needs (meds, wound care, etc )  - Arrange for interpretive services to assist at discharge as needed  - Refer to Case Management Department for coordinating discharge planning if the patient needs post-hospital services based on physician/advanced practitioner order or complex needs related to functional status, cognitive ability, or social support system  Outcome: Adequate for Discharge     Problem: Knowledge Deficit  Goal: Patient/family/caregiver demonstrates understanding of disease process, treatment plan, medications, and discharge instructions  Description  Complete learning assessment and assess knowledge base    Interventions:  - Provide teaching at level of understanding  - Provide teaching via preferred learning methods  Outcome: Adequate for Discharge

## 2019-11-27 NOTE — ASSESSMENT & PLAN NOTE
Wt Readings from Last 3 Encounters:   11/26/19 121 kg (267 lb 13 7 oz)   11/20/19 121 kg (265 lb 10 5 oz)   07/16/19 129 kg (283 lb 6 4 oz)       Admission with history of diastolic CHF  Currently not in acute exacerbation    Patient does complain of shortness of breath especially on exertion but likely due to symptomatic anemia

## 2019-11-27 NOTE — DISCHARGE INSTR - AVS FIRST PAGE
Mrs Gibson Gustafson,    You have been diagnosed with symptomatic anemia from gastric ulcers from NSAID use  You will require and outpatient EGD in 2-3 months  You have been placed on Protonix twice a day to help protect your stomach  Please come get a CBC in one week with your PCP to monitor your blood counts  Also please review the list of foods to avoid with gastric ulcers      Thank you for choosing [facility] for your healthcare needs  If I can be of any assistance in the future, please feel to contact me      Dr Torsten Plummer, DO

## 2019-11-27 NOTE — PLAN OF CARE
Problem: Potential for Falls  Goal: Patient will remain free of falls  Description  INTERVENTIONS:  - Assess patient frequently for physical needs  -  Identify cognitive and physical deficits and behaviors that affect risk of falls    -  [place] fall precautions as indicated by assessment   - Educate patient/family on patient safety including physical limitations  - Instruct patient to call for assistance with activity based on assessment  - Modify environment to reduce risk of injury  - Consider OT/PT consult to assist with strengthening/mobility  Outcome: Progressing     Problem: CARDIOVASCULAR - ADULT  Goal: Maintains optimal cardiac output and hemodynamic stability  Description  INTERVENTIONS:  - Monitor I/O, vital signs and rhythm  - Monitor for S/S and trends of decreased cardiac output  - Administer and titrate ordered vasoactive medications to optimize hemodynamic stability  - Assess quality of pulses, skin color and temperature  - Assess for signs of decreased coronary artery perfusion  - Instruct patient to report change in severity of symptoms  Outcome: Progressing  Goal: Absence of cardiac dysrhythmias or at baseline rhythm  Description  INTERVENTIONS:  - Continuous cardiac monitoring, vital signs, obtain 12 lead EKG if ordered  - Administer antiarrhythmic and heart rate control medications as ordered  - Monitor electrolytes and administer replacement therapy as ordered  Outcome: Progressing     Problem: RESPIRATORY - ADULT  Goal: Achieves optimal ventilation and oxygenation  Description  INTERVENTIONS:  - Assess for changes in respiratory status  - Assess for changes in mentation and behavior  - Position to facilitate oxygenation and minimize respiratory effort  - Oxygen administered by appropriate delivery if ordered  - Initiate smoking cessation education as indicated  - Encourage broncho-pulmonary hygiene including cough, deep breathe, Incentive Spirometry  - Assess the need for suctioning and aspirate as needed  - Assess and instruct to report SOB or any respiratory difficulty  - Respiratory Therapy support as indicated  Outcome: Progressing     Problem: GASTROINTESTINAL - ADULT  Goal: Minimal or absence of nausea and/or vomiting  Description  INTERVENTIONS:  - Administer IV fluids if ordered to ensure adequate hydration  - Maintain NPO status until nausea and vomiting are resolved  - Nasogastric tube if ordered  - Administer ordered antiemetic medications as needed  - Provide nonpharmacologic comfort measures as appropriate  - Advance diet as tolerated, if ordered  - Consider nutrition services referral to assist patient with adequate nutrition and appropriate food choices  Outcome: Progressing  Goal: Maintains or returns to baseline bowel function  Description  INTERVENTIONS:  - Assess bowel function  - Encourage oral fluids to ensure adequate hydration  - Administer IV fluids if ordered to ensure adequate hydration  - Administer ordered medications as needed  - Encourage mobilization and activity  - Consider nutritional services referral to assist patient with adequate nutrition and appropriate food choices  Outcome: Progressing  Goal: Maintains adequate nutritional intake  Description  INTERVENTIONS:  - Monitor percentage of each meal consumed  - Identify factors contributing to decreased intake, treat as appropriate  - Assist with meals as needed  - Monitor I&O, weight, and lab values if indicated  - Obtain nutrition services referral as needed  Outcome: Progressing     Problem: METABOLIC, FLUID AND ELECTROLYTES - ADULT  Goal: Electrolytes maintained within normal limits  Description  INTERVENTIONS:  - Monitor labs and assess patient for signs and symptoms of electrolyte imbalances  - Administer electrolyte replacement as ordered  - Monitor response to electrolyte replacements, including repeat lab results as appropriate  - Instruct patient on fluid and nutrition as appropriate  Outcome: Progressing  Goal: Fluid balance maintained  Description  INTERVENTIONS:  - Monitor labs   - Monitor I/O and WT  - Instruct patient on fluid and nutrition as appropriate  - Assess for signs & symptoms of volume excess or deficit  Outcome: Progressing     Problem: HEMATOLOGIC - ADULT  Goal: Maintains hematologic stability  Description  INTERVENTIONS  - Assess for signs and symptoms of bleeding or hemorrhage  - Monitor labs  - Administer supportive blood products/factors as ordered and appropriate  Outcome: Progressing     Problem: MUSCULOSKELETAL - ADULT  Goal: Maintain or return mobility to safest level of function  Description  INTERVENTIONS:  - Assess patient's ability to carry out ADLs; assess patient's baseline for ADL function and identify physical deficits which impact ability to perform ADLs (bathing, care of mouth/teeth, toileting, grooming, dressing, etc )  - Assess/evaluate cause of self-care deficits   - Assess range of motion  - Assess patient's mobility  - Assess patient's need for assistive devices and provide as appropriate  - Encourage maximum independence but intervene and supervise when necessary  - Involve family in performance of ADLs  - Assess for home care needs following discharge   - Consider OT consult to assist with ADL evaluation and planning for discharge  - Provide patient education as appropriate  Outcome: Progressing  Goal: Maintain proper alignment of affected body part  Description  INTERVENTIONS:  - Support, maintain and protect limb and body alignment  - Provide patient/ family with appropriate education  Outcome: Progressing     Problem: PAIN - ADULT  Goal: Verbalizes/displays adequate comfort level or baseline comfort level  Description  Interventions:  - Encourage patient to monitor pain and request assistance  - Assess pain using appropriate pain scale  - Administer analgesics based on type and severity of pain and evaluate response  - Implement non-pharmacological measures as appropriate and evaluate response  - Consider cultural and social influences on pain and pain management  - Notify physician/advanced practitioner if interventions unsuccessful or patient reports new pain  Outcome: Progressing     Problem: INFECTION - ADULT  Goal: Absence or prevention of progression during hospitalization  Description  INTERVENTIONS:  - Assess and monitor for signs and symptoms of infection  - Monitor lab/diagnostic results  - Monitor all insertion sites, i e  indwelling lines, tubes, and drains  - Monitor endotracheal if appropriate and nasal secretions for changes in amount and color  - [place] appropriate cooling/warming therapies per order  - Administer medications as ordered  - Instruct and encourage patient and family to use good hand hygiene technique  - Identify and instruct in appropriate isolation precautions for identified infection/condition  Outcome: Progressing     Problem: SAFETY ADULT  Goal: Maintain or return to baseline ADL function  Description  INTERVENTIONS:  -  Assess patient's ability to carry out ADLs; assess patient's baseline for ADL function and identify physical deficits which impact ability to perform ADLs (bathing, care of mouth/teeth, toileting, grooming, dressing, etc )  - Assess/evaluate cause of self-care deficits   - Assess range of motion  - Assess patient's mobility; develop plan if impaired  - Assess patient's need for assistive devices and provide as appropriate  - Encourage maximum independence but intervene and supervise when necessary  - Involve family in performance of ADLs  - Assess for home care needs following discharge   - Consider OT consult to assist with ADL evaluation and planning for discharge  - Provide patient education as appropriate  Outcome: Progressing  Goal: Maintain or return mobility status to optimal level  Description  INTERVENTIONS:  - Assess patient's baseline mobility status (ambulation, transfers, stairs, etc )    - Identify cognitive and physical deficits and behaviors that affect mobility  - Identify mobility aids required to assist with transfers and/or ambulation (gait belt, sit-to-stand, lift, walker, cane, etc )  - [place] fall precautions as indicated by assessment  - Record patient progress and toleration of activity level on Mobility SBAR; progress patient to next Phase/Stage  - Instruct patient to call for assistance with activity based on assessment  - Consider rehabilitation consult to assist with strengthening/weightbearing, etc   Outcome: Progressing     Problem: DISCHARGE PLANNING  Goal: Discharge to home or other facility with appropriate resources  Description  INTERVENTIONS:  - Identify barriers to discharge w/patient and caregiver  - Arrange for needed discharge resources and transportation as appropriate  - Identify discharge learning needs (meds, wound care, etc )  - Arrange for interpretive services to assist at discharge as needed  - Refer to Case Management Department for coordinating discharge planning if the patient needs post-hospital services based on physician/advanced practitioner order or complex needs related to functional status, cognitive ability, or social support system  Outcome: Progressing     Problem: Knowledge Deficit  Goal: Patient/family/caregiver demonstrates understanding of disease process, treatment plan, medications, and discharge instructions  Description  Complete learning assessment and assess knowledge base    Interventions:  - Provide teaching at level of understanding  - Provide teaching via preferred learning methods  Outcome: Progressing

## 2019-11-27 NOTE — PHYSICAL THERAPY NOTE
PT EVALUATION          Patient Name: Mariaelena Burdick  BVVGT'I Date: 11/27/2019   69 y o   [id number]    Dyspnea [R06 00]  Anemia [D64 9]  Shortness of breath [R06 02]    Past Medical History:   Diagnosis Date    Acquired hypothyroidism 12/26/2018    Acute on chronic diastolic heart failure (HCC) 8/30/2018    Atrial fibrillation (HCC)     CHF (congestive heart failure) (HCC)     Hyperlipidemia     Hypertension     Migraine     Polyp of sigmoid colon     Prediabetes     Stroke [facility])          Past Surgical History:   Procedure Laterality Date    APPENDECTOMY      CARDIAC ELECTROPHYSIOLOGY STUDY AND ABLATION      CHOLECYSTECTOMY          11/27/19 1050   Note Type   Note type Eval only   Pain Assessment   Pain Assessment No/denies pain   Pain Score No Pain   Home Living   Type of [address] One level  (first floor apt no PETE)   Bathroom Shower/Tub Tub/shower unit   Bathroom Toilet Standard   Bathroom Equipment Shower chair   Bathroom Accessibility Accessible   Home Equipment Walker;Cane  (pt owns [facility])   Prior Function   Level of [place] Independent with ADLs and functional mobility   Lives With Alone   Receives Help From Family  (dtr and son are local  dtr assist prn)   ADL Assistance Independent   IADLs Independent   Falls in the last 6 months 0   Vocational Retired   Comments pt reports being indep pta  She was amb w/o an AD most of the time however reports she does use the cane prn when feeling SOB, unsteady  +driving  enjoys doing stitch work, playing computer games  dtr available to assist prn   Restrictions/Precautions   Weight Bearing Precautions Per Order No   Other Precautions Telemetry;Multiple lines;Pain;Visual impairment  (glasses for distance and reading)   General   Additional Pertinent History pt presents to [facility] ED via EMS for symptomatic anemia on 11/26/19   pt report recent ED visit on 11/19 for same issue w d/c home   Family/Caregiver Present No   Cognition   Overall Cognitive Status WFL   Arousal/Participation Alert   Orientation Level Oriented X4   Memory Within functional limits   Following Commands Follows all commands and directions without difficulty   RUE Assessment   RUE Assessment WFL   LUE Assessment   LUE Assessment WFL   RLE Assessment   RLE Assessment WFL  (grossly 4/5)   LLE Assessment   LLE Assessment WFL  (grossly 4/5)   Coordination   Movements are Fluid and Coordinated 1   Sensation WFL   Light Touch   RLE Light Touch Grossly intact   LLE Light Touch Grossly intact   Bed Mobility   Supine to Sit 6  Modified independent   Additional items Bedrails   Sit to Supine 6  Modified independent   Additional items Bedrails   Transfers   Sit to Stand 6  Modified independent   Stand to Sit 6  Modified independent   Stand pivot 5  Supervision  (distant supervision)   Toilet transfer 7  Independent   Additional items Standard toilet   Ambulation/Elevation   Gait pattern Wide LORI; Short stride; Excessively slow   Gait Assistance 5  Supervision   Additional items Other (Comment)  (amb w and w/o support of IV pole)   Assistive Device None   Distance 60', 20'x2   Stair Management Assistance Not tested   Balance   Dynamic Sitting Fair +   Static Standing Fair   Dynamic Standing Fair   Ambulatory Fair -   Endurance Deficit   Endurance Deficit No   Activity Tolerance   Activity Tolerance Patient tolerated treatment well   Nurse Made Aware NADEEM Artis   Assessment   Prognosis Good   Problem List Decreased strength; Impaired balance; Impaired vision;Decreased mobility   Assessment Eric Juárez is a 70 yo female presenting to [facility] ED via EMS on 11/26/19 for SOB, TRAMMELL  She was admitted same day with diagnosis of symptomatic anemia, dyspnea, SOB  She recently presented to ED on 11/19 for same chief c/o w d/c home  Current medical needs include PIV, SCDs, telemetry, monitoring abn vitals, CPAP, monitoring abn labs  PMHx significant for afib, HTN, DANTE, diastolic CHF, CKD stage 3, hypothyroidism, hx CVA 2016, postural dizziness w presyncope, depression  Pta pt was living alone in an apartment, first floor with no PETE  Pt was prev Indep and amb w/o an AD  She demonstrates good judgement and awareness of sx stating she keeps [facility] in house, car and dtr's car in case she needs it secondary to feeling SOB and unsteady  Upon examination pt perform bed mob, transf mod I  She amb 60', 20'x2 distant supervision w ability to manipulate IV pole safely  Pt does admit to being fearful of falling and reports amb at slower speeds as a result  Pt demonstrates fair standing balance and states she is amb at baseline levels  Pt states no concerns with d/c home and her dtr will be available to assist prn  Based on current functional levels and social/environmental factors, recommendation is for d/c home w family support when medically cleared  Pt demonstrates no need for skilled PT during hospital stay however PT will be available for further consult should it be needed      Barriers to Discharge None   Goals   Patient Goals not to fall   PT Treatment Day 0   Recommendation   Recommendation Home with family support   Equipment Recommended Walker;Cane  (pt owns)   PT - OK to Discharge Yes   Additional Comments when medically stable   Modified [place] Scale   Modified [place] Scale 1   Barthel Index   Feeding 10   Bathing 5   Grooming Score 5   Dressing Score 10   Bladder Score 10   Bowels Score 10   Toilet Use Score 10   Transfers (Bed/Chair) Score 15   Mobility (Level Surface) Score 0   Stairs Score 0   Barthel Index Score 75   History: co - morbidities, age, coping styles, recent hospital admissions, fall risk, use of assistive device, multiple lines  Exam: impairments in systems including musculoskeletal (ROM, strength, posture), neuromuscular (balance,locomotion, gait, transfers, motor function and learning), integumentary (skin integrity, presence of scars or wounds), cardiopulmonary, cognition  Clinical: unstable/unpredictable- pending medical procedures (endoscopy)  Complexity:high      Mame Bautista, PT

## 2019-11-27 NOTE — DISCHARGE SUMMARY
Discharge- Beth Bhavana 1945, 69 y o  female MRN: [id number]    Unit/Bed#: [address] 87 206-01 Encounter: [id number]    Primary Care Provider: Fatemeh Zuleta MD   Date and time admitted to hospital: 11/26/2019 10:51 AM        Acquired hypothyroidism  Assessment & Plan    Continue home dose Synthroid of 88 mcg daily  Stage 3 chronic kidney disease [facility])  Assessment & Plan  Stable      Chronic diastolic congestive heart failure [facility])  Assessment & Plan  Wt Readings from Last 3 Encounters:   11/26/19 121 kg (267 lb 13 7 oz)   11/20/19 121 kg (265 lb 10 5 oz)   07/16/19 129 kg (283 lb 6 4 oz)       Admission with history of diastolic CHF  Currently not in acute exacerbation  Patient does complain of shortness of breath especially on exertion but likely due to symptomatic anemia    Obstructive sleep apnea  Assessment & Plan  Present on admission with history of sleep apnea  Reports noncompliance with CPAP at home  CPAP QHS  Class 3 severe obesity due to excess calories with serious comorbidity and body mass index (BMI) of 40 0 to 44 9 in adult [facility])  Assessment & Plan  Presentation with morbid obesity with BMI 41  Counseled on lifestyle modification and weight loss  Essential hypertension  Assessment & Plan  Resume home medications on discharge  Atrial fibrillation [facility])  Assessment & Plan  Ventricular rate controlled, continue home medications         * Symptomatic anemia  Assessment & Plan  Stable after 2 units of blood  Upper endoscopy with gastric ulcer - From NSAIDs, thought to be source    Will discharge on Protonix 40 mg BID  Resume diet  F/U with GI in 2-3 months for repeat EGD    No NSAIDS  Discharged with anti nausea medications       Admitting Provider:  Matthew Moody MD  Discharge Provider:  Liza Infante DO  Admission Date: 11/26/2019       Discharge Date: 11/27/19   LOS: 1  Primary Care Physician at Discharge: Fatemeh Zuleta, [facility] COURSE:  Beth Bhavana is a 69 y o  female who presented with shortness of breath difficulty breathing, and was found to have symptomatic anemia  She was subsequently transfused 2 units of packed red blood cells with resultant hemoglobin of 8 0  The patient did undergo upper endoscopy with evidence of gastric ulcer which was felt to be likely secondary to NSAID use  The patient had no evidence of active bleeding and was resumed back on her home medication with plans for twice daily PPI therapy and follow-up in 2-3 months for repeat EGD  The patient did have evidence of acute renal insufficiency with creatinine of 1 33, she should get a repeat basic metabolic profile as well as CBC within 1 week with her primary care doctor  At the time of discharge the patient was tolerating an oral diet, she was without acute complaints and subsequently was discharged home with family members  The patient was given a list of foods to avoid given her gastric ulcers, and will maintain a low-sodium cardiac diet at discharge given her history of congestive heart failure  The patient was counseled on lifestyle modification and weight loss given her elevated BMI  Thank you for choosing [facility] for your healthcare needs  If I can be of any assistance in the future, please feel to contact me  The patient, initially admitted to the hospital as inpatient, was discharged earlier than expected given the following: Rapid unexpected improvement  DISCHARGE DIAGNOSES  Acquired hypothyroidism  Assessment & Plan    Continue home dose Synthroid of 88 mcg daily  Stage 3 chronic kidney disease [facility])  Assessment & Plan  Stable      Chronic diastolic congestive heart failure [facility])  Assessment & Plan  Wt Readings from Last 3 Encounters:   11/26/19 121 kg (267 lb 13 7 oz)   11/20/19 121 kg (265 lb 10 5 oz)   07/16/19 129 kg (283 lb 6 4 oz)       Admission with history of diastolic CHF  Currently not in acute exacerbation    Patient does complain of shortness of breath especially on exertion but likely due to symptomatic anemia    Obstructive sleep apnea  Assessment & Plan  Present on admission with history of sleep apnea  Reports noncompliance with CPAP at home  CPAP QHS  Class 3 severe obesity due to excess calories with serious comorbidity and body mass index (BMI) of 40 0 to 44 9 in adult [facility])  Assessment & Plan  Presentation with morbid obesity with BMI 41  Counseled on lifestyle modification and weight loss  Essential hypertension  Assessment & Plan  Resume home medications on discharge  Atrial fibrillation [facility])  Assessment & Plan  Ventricular rate controlled, continue home medications  * Symptomatic anemia  Assessment & Plan  Stable after 2 units of blood  Upper endoscopy with gastric ulcer - From NSAIDs, thought to be source    Will discharge on Protonix 40 mg BID  Resume diet  F/U with GI in 2-3 months for repeat EGD    No NSAIDS  Discharged with anti nausea medications       CONSULTING PROVIDERS   IP CONSULT TO GASTROENTEROLOGY    PROCEDURES PERFORMED  * No surgery found *    RADIOLOGY RESULTS  Xr Chest 2 Views    Result Date: 11/26/2019  Narrative: CHEST INDICATION:   dyspnea  COMPARISON:  11/19/2019 EXAM PERFORMED/VIEWS:  XR CHEST PA & LATERAL FINDINGS: Mild cardiomegaly, stable  The lungs are clear  No pneumothorax or pleural effusion  Osseous structures appear within normal limits for patient age  Impression: Stable compared to prior  No acute cardiopulmonary disease  Workstation performed: [id number]     Xr Chest 2 Views    Result Date: 11/19/2019  Narrative: CHEST INDICATION:   SOB  COMPARISON:  1/31/2019 EXAM PERFORMED/VIEWS:  XR CHEST PA & LATERAL FINDINGS: Heart shadow appears unremarkable  Atherosclerotic vascular calcifications are noted  Surgical clips noted in the right upper quadrant, likely related to prior cholecystectomy  The lungs are clear  No pneumothorax or pleural effusion   Thoracic vertebral compression deformities are noted, unchanged when compared to prior examination  Impression: No acute cardiopulmonary disease   Workstation performed: Marciavel Daniel  Results from last 7 days   Lab Units 11/27/19  0448 11/26/19  1239 11/26/19  1116   WBC Thousand/uL 7 23  --  10 21*   HEMOGLOBIN g/dL 8 0*  --  6 7*   I STAT HEMOGLOBIN g/dl  --  6 8*  --    HEMATOCRIT % 27 1*  --  23 1*   HEMATOCRIT, ISTAT %  --  20*  --    MCV fL 87  --  87   PLATELETS Thousands/uL 310  --  399*   INR   --   --  1 70*     Results from last 7 days   Lab Units 11/27/19  0448 11/26/19  1239 11/26/19  1116   SODIUM mmol/L 141  --  140   POTASSIUM mmol/L 3 8  --  4 0   CHLORIDE mmol/L 109*  --  106   CO2 mmol/L 22  --  25   CO2, I-STAT mmol/L  --  25  --    BUN mg/dL 24  --  28*   CREATININE mg/dL 1 33*  --  1 51*   CALCIUM mg/dL 8 4  --  9 0   EGFR ml/min/1 73sq m 40 32 34   GLUCOSE RANDOM mg/dL 92  --  133     Results from last 7 days   Lab Units 11/26/19  1116   TROPONIN I ng/mL <0 02     Results from last 7 days   Lab Units 11/26/19  1116   NT-PRO BNP pg/mL 388*                                Cultures:         Invalid input(s): URIBILINOGEN              PHYSICAL EXAM:  Vitals:   Blood Pressure: 111/53 (11/27/19 1526)  Pulse: 73 (11/27/19 1526)  Temperature: 97 5 °F (36 4 °C) (11/27/19 1526)  Temp Source: Temporal (11/27/19 1258)  Respirations: 17 (11/27/19 1526)  Weight - Scale: 121 kg (267 lb 13 7 oz) (11/26/19 1055)  SpO2: 96 % (11/27/19 1526)      General: well appearing, no acute distress  HEENT: atraumatic, PERRLA, moist mucosa, normal pharynx, normal tonsils and adenoids, normal tongue, no fluid in sinuses  Neck: Trachea midline, no carotid bruit, no masses  Respiratory: normal chest wall expansion, CTA B, no r/r/w, no rubs  Cardiovascular: RRR, no m/r/g, Normal S1 and S2  Abdomen: Soft, non-tender, non-distended, normal bowel sounds in all quadrants, no hepatosplenomegaly, no tympany  Rectal: deferred  Musculoskeletal: normal ROM in upper and lower extremities  Integumentary: warm, dry, and pink, with no rash, purpura, or petechia  Heme/Lymph: no lymphadenopathy, no bruises  Neurological: Cranial Nerves II-XII grossly intact, no tics, normal sensation to pressure and light touch  Psychiatric: cooperative with normal mood, affect, and cognition      Discharge Disposition: Home/Self Care      Test Results Pending at Discharge:           Medications   · Summary of Medication Adjustments made as a result of this hospitalization:  Protonix twice a day  · Medication Dosing Tapers - Please refer to Discharge Medication List for details on any medication dosing tapers (if applicable to patient)  · Discharge Medication List: See after visit summary for reconciled discharge medications  Diet restrictions:         Diet Orders   (From admission, onward)             Start     Ordered    11/27/19 1349  Diet [place]/CHO Controlled; Consistent Carbohydrate Diet Level 3 (6 carb servings/90 grams CHO/meal)  Diet effective now     Question Answer Comment   Diet Type [place]/CHO Controlled    [place]/CHO Controlled Consistent Carbohydrate Diet Level 3 (6 carb servings/90 grams CHO/meal)    RD to adjust diet per protocol? Yes        11/27/19 1348              Cardiac, low-sodium    Activity restrictions: No strenuous activity  Discharge Condition: good    Outpatient Follow-Up and Discharge Instructions  See after visit summary section titled Discharge Instructions for information provided to patient and family  Code Status: Level 1 - Full Code  Discharge Statement   I spent 35 minutes discharging the patient  This time was spent on the day of discharge  Greater than 50% of total time was spent with the patient and / or family counseling and / or coordination of care  ** Please Note: This note has been constructed using a voice recognition system   **

## 2019-11-27 NOTE — ASSESSMENT & PLAN NOTE
Stable after 2 units of blood  Upper endoscopy with gastric ulcer - From NSAIDs, thought to be source    Will discharge on Protonix 40 mg BID  Resume diet  F/U with GI in 2-3 months for repeat EGD    No NSAIDS  Discharged with anti nausea medications

## 2019-11-27 NOTE — DISCHARGE INSTRUCTIONS
Diet for Stomach Ulcers and Gastritis   WHAT YOU NEED TO KNOW:   What is a diet for stomach ulcers and gastritis? A diet for ulcers and gastritis is a meal plan that limits foods that irritate your stomach  Certain foods may worsen symptoms such as stomach pain, bloating, heartburn, or indigestion  Which foods should I limit or avoid? You may need to avoid acidic, spicy, or high-fat foods  Not all foods affect everyone the same way  You will need to learn which foods worsen your symptoms and limit those foods  The following are some foods that may worsen ulcer or gastritis symptoms:  · Beverages:      ¨ Whole milk and chocolate milk    ¨ Hot cocoa and cola    ¨ Any beverage with caffeine    ¨ Regular and decaffeinated coffee    ¨ Peppermint and spearmint tea    ¨ Green and black tea, with or without caffeine    ¨ Orange and grapefruit juices    ¨ Drinks that contain alcohol    · Spices and seasonings:      ¨ Black and red pepper    ¨ Chili powder    ¨ Mustard seed and nutmeg    · Other foods:      ¨ Dairy foods made from whole milk or cream    ¨ Chocolate    ¨ Spicy or strongly flavored cheeses, such as jalapeno or black pepper    ¨ Highly seasoned, high-fat meats, such as sausage, salami, magdaleno, ham, and cold cuts    ¨ Hot chiles and peppers    ¨ Tomato products, such as tomato paste, tomato sauce, or tomato juice  Which foods can I eat and drink? Eat a variety of healthy foods from all the food groups  Eat fruits, vegetables, whole grains, and fat-free or low-fat dairy foods  Whole grains include whole-wheat breads, cereals, pasta, and brown rice  Choose lean meats, poultry (chicken and turkey), fish, beans, eggs, and nuts  A healthy meal plan is low in unhealthy fats, salt, and added sugar  Healthy fats include olive oil and canola oil  Ask your dietitian for more information about a healthy meal plan  What other guidelines may be helpful? · Do not eat right before bedtime    Stop eating at least 2 hours before bedtime  · Eat small, frequent meals  Your stomach may tolerate small, frequent meals better than large meals  CARE AGREEMENT:   You have the right to help plan your care  Discuss treatment options with your caregivers to decide what care you want to receive  You always have the right to refuse treatment  The above information is an  only  It is not intended as medical advice for individual conditions or treatments  Talk to your doctor, nurse or pharmacist before following any medical regimen to see if it is safe and effective for you  © 2017 [address] [employer] Information is for End User's use only and may not be sold, redistributed or otherwise used for commercial purposes  All illustrations and images included in CareNotes® are the copyrighted property of [employer]  or Rajiv [employer]

## 2019-11-27 NOTE — SOCIAL WORK
CM obtained all of the following info about the pt  HOME: Pt lives in a 1st floor apartment; no PETE    LIVES W/: Alone  : BeronicaNicolasa Suzette, [phone]  INDEPENDENT: Yes  DME: Primarily no, but on occassion she will resort to a cane as needed  VNA: No  I/P REHAB: Yes  HHC: No  MENTAL HEALTH ISSUES: No  D&A ISSUES: No  PCP: Dr Samantha Noel: [place] in [address]: Retired  INSURANCE: [address]: DtrHarish 40 AT D/C: Dtr     CM reviewed d/c planning process including the following: identifying help at home, patient preference for d/c planning needs, [facility] Meds to Bed program, availability of treatment team to discuss questions or concerns patient and/or family may have regarding understanding medications and recognizing signs and symptoms once discharged  CM also encouraged patient to follow up with all recommended appointments after discharge  Patient advised of importance for patient and family to participate in managing patients medical well being  Patient/caregiver received discharge checklist  Content reviewed  Patient/caregiver encouraged to participate in discharge plan of care prior to discharge home      TIERA Price  11/27/2019  [id number]

## 2019-11-27 NOTE — PLAN OF CARE
Problem: Potential for Falls  Goal: Patient will remain free of falls  Description  INTERVENTIONS:  - Assess patient frequently for physical needs  -  Identify cognitive and physical deficits and behaviors that affect risk of falls    -  [place] fall precautions as indicated by assessment   - Educate patient/family on patient safety including physical limitations  - Instruct patient to call for assistance with activity based on assessment  - Modify environment to reduce risk of injury  - Consider OT/PT consult to assist with strengthening/mobility  Outcome: Progressing     Problem: CARDIOVASCULAR - ADULT  Goal: Maintains optimal cardiac output and hemodynamic stability  Description  INTERVENTIONS:  - Monitor I/O, vital signs and rhythm  - Monitor for S/S and trends of decreased cardiac output  - Administer and titrate ordered vasoactive medications to optimize hemodynamic stability  - Assess quality of pulses, skin color and temperature  - Assess for signs of decreased coronary artery perfusion  - Instruct patient to report change in severity of symptoms  Outcome: Progressing  Goal: Absence of cardiac dysrhythmias or at baseline rhythm  Description  INTERVENTIONS:  - Continuous cardiac monitoring, vital signs, obtain 12 lead EKG if ordered  - Administer antiarrhythmic and heart rate control medications as ordered  - Monitor electrolytes and administer replacement therapy as ordered  Outcome: Progressing     Problem: RESPIRATORY - ADULT  Goal: Achieves optimal ventilation and oxygenation  Description  INTERVENTIONS:  - Assess for changes in respiratory status  - Assess for changes in mentation and behavior  - Position to facilitate oxygenation and minimize respiratory effort  - Oxygen administered by appropriate delivery if ordered  - Initiate smoking cessation education as indicated  - Encourage broncho-pulmonary hygiene including cough, deep breathe, Incentive Spirometry  - Assess the need for suctioning and aspirate as needed  - Assess and instruct to report SOB or any respiratory difficulty  - Respiratory Therapy support as indicated  Outcome: Progressing     Problem: GASTROINTESTINAL - ADULT  Goal: Minimal or absence of nausea and/or vomiting  Description  INTERVENTIONS:  - Administer IV fluids if ordered to ensure adequate hydration  - Maintain NPO status until nausea and vomiting are resolved  - Nasogastric tube if ordered  - Administer ordered antiemetic medications as needed  - Provide nonpharmacologic comfort measures as appropriate  - Advance diet as tolerated, if ordered  - Consider nutrition services referral to assist patient with adequate nutrition and appropriate food choices  Outcome: Progressing  Goal: Maintains or returns to baseline bowel function  Description  INTERVENTIONS:  - Assess bowel function  - Encourage oral fluids to ensure adequate hydration  - Administer IV fluids if ordered to ensure adequate hydration  - Administer ordered medications as needed  - Encourage mobilization and activity  - Consider nutritional services referral to assist patient with adequate nutrition and appropriate food choices  Outcome: Progressing  Goal: Maintains adequate nutritional intake  Description  INTERVENTIONS:  - Monitor percentage of each meal consumed  - Identify factors contributing to decreased intake, treat as appropriate  - Assist with meals as needed  - Monitor I&O, weight, and lab values if indicated  - Obtain nutrition services referral as needed  Outcome: Progressing     Problem: METABOLIC, FLUID AND ELECTROLYTES - ADULT  Goal: Electrolytes maintained within normal limits  Description  INTERVENTIONS:  - Monitor labs and assess patient for signs and symptoms of electrolyte imbalances  - Administer electrolyte replacement as ordered  - Monitor response to electrolyte replacements, including repeat lab results as appropriate  - Instruct patient on fluid and nutrition as appropriate  Outcome: Progressing  Goal: Fluid balance maintained  Description  INTERVENTIONS:  - Monitor labs   - Monitor I/O and WT  - Instruct patient on fluid and nutrition as appropriate  - Assess for signs & symptoms of volume excess or deficit  Outcome: Progressing     Problem: HEMATOLOGIC - ADULT  Goal: Maintains hematologic stability  Description  INTERVENTIONS  - Assess for signs and symptoms of bleeding or hemorrhage  - Monitor labs  - Administer supportive blood products/factors as ordered and appropriate  Outcome: Progressing     Problem: MUSCULOSKELETAL - ADULT  Goal: Maintain or return mobility to safest level of function  Description  INTERVENTIONS:  - Assess patient's ability to carry out ADLs; assess patient's baseline for ADL function and identify physical deficits which impact ability to perform ADLs (bathing, care of mouth/teeth, toileting, grooming, dressing, etc )  - Assess/evaluate cause of self-care deficits   - Assess range of motion  - Assess patient's mobility  - Assess patient's need for assistive devices and provide as appropriate  - Encourage maximum independence but intervene and supervise when necessary  - Involve family in performance of ADLs  - Assess for home care needs following discharge   - Consider OT consult to assist with ADL evaluation and planning for discharge  - Provide patient education as appropriate  Outcome: Progressing  Goal: Maintain proper alignment of affected body part  Description  INTERVENTIONS:  - Support, maintain and protect limb and body alignment  - Provide patient/ family with appropriate education  Outcome: Progressing     Problem: INFECTION - ADULT  Goal: Absence or prevention of progression during hospitalization  Description  INTERVENTIONS:  - Assess and monitor for signs and symptoms of infection  - Monitor lab/diagnostic results  - Monitor all insertion sites, i e  indwelling lines, tubes, and drains  - Monitor endotracheal if appropriate and nasal secretions for changes in amount and color  - [place] appropriate cooling/warming therapies per order  - Administer medications as ordered  - Instruct and encourage patient and family to use good hand hygiene technique  - Identify and instruct in appropriate isolation precautions for identified infection/condition  Outcome: Progressing     Problem: SAFETY ADULT  Goal: Maintain or return to baseline ADL function  Description  INTERVENTIONS:  -  Assess patient's ability to carry out ADLs; assess patient's baseline for ADL function and identify physical deficits which impact ability to perform ADLs (bathing, care of mouth/teeth, toileting, grooming, dressing, etc )  - Assess/evaluate cause of self-care deficits   - Assess range of motion  - Assess patient's mobility; develop plan if impaired  - Assess patient's need for assistive devices and provide as appropriate  - Encourage maximum independence but intervene and supervise when necessary  - Involve family in performance of ADLs  - Assess for home care needs following discharge   - Consider OT consult to assist with ADL evaluation and planning for discharge  - Provide patient education as appropriate  Outcome: Progressing  Goal: Maintain or return mobility status to optimal level  Description  INTERVENTIONS:  - Assess patient's baseline mobility status (ambulation, transfers, stairs, etc )    - Identify cognitive and physical deficits and behaviors that affect mobility  - Identify mobility aids required to assist with transfers and/or ambulation (gait belt, sit-to-stand, lift, walker, cane, etc )  - [place] fall precautions as indicated by assessment  - Record patient progress and toleration of activity level on Mobility SBAR; progress patient to next Phase/Stage  - Instruct patient to call for assistance with activity based on assessment  - Consider rehabilitation consult to assist with strengthening/weightbearing, etc   Outcome: Progressing     Problem: DISCHARGE PLANNING  Goal: Discharge to home or other facility with appropriate resources  Description  INTERVENTIONS:  - Identify barriers to discharge w/patient and caregiver  - Arrange for needed discharge resources and transportation as appropriate  - Identify discharge learning needs (meds, wound care, etc )  - Arrange for interpretive services to assist at discharge as needed  - Refer to Case Management Department for coordinating discharge planning if the patient needs post-hospital services based on physician/advanced practitioner order or complex needs related to functional status, cognitive ability, or social support system  Outcome: Progressing     Problem: Knowledge Deficit  Goal: Patient/family/caregiver demonstrates understanding of disease process, treatment plan, medications, and discharge instructions  Description  Complete learning assessment and assess knowledge base    Interventions:  - Provide teaching at level of understanding  - Provide teaching via preferred learning methods  Outcome: Progressing

## 2019-11-27 NOTE — NURSING NOTE
Pt was given dc instructions - denied any questions  IV's dc'd  Pt ambulated to vehicle with daughter

## 2019-11-29 ENCOUNTER — TRANSITIONAL CARE MANAGEMENT (OUTPATIENT)
Dept: INTERNAL MEDICINE CLINIC | Facility: CLINIC | Age: 74
End: 2019-11-29

## 2019-11-29 DIAGNOSIS — F32.89 OTHER DEPRESSION: ICD-10-CM

## 2019-11-29 DIAGNOSIS — E03.9 ACQUIRED HYPOTHYROIDISM: ICD-10-CM

## 2019-12-02 ENCOUNTER — TRANSITIONAL CARE MANAGEMENT (OUTPATIENT)
Dept: INTERNAL MEDICINE CLINIC | Facility: CLINIC | Age: 74
End: 2019-12-02

## 2019-12-03 RX ORDER — ESCITALOPRAM OXALATE 10 MG/1
10 TABLET ORAL DAILY
Qty: 90 TABLET | Refills: 1 | Status: ON HOLD | OUTPATIENT
Start: 2019-12-03 | End: 2020-05-04 | Stop reason: SDUPTHER

## 2019-12-03 RX ORDER — LEVOTHYROXINE SODIUM 88 UG/1
88 TABLET ORAL DAILY
Qty: 90 TABLET | Refills: 1 | Status: ON HOLD | OUTPATIENT
Start: 2019-12-03 | End: 2020-05-04 | Stop reason: SDUPTHER

## 2019-12-03 NOTE — PHYSICIAN ADVISOR
Current patient class: Inpatient  The patient is currently on Hospital Day: 2 at [address]      The patient was admitted to the hospital at [phone] on 11/26/19 for the following diagnosis:  Dyspnea [R06 00]  Anemia [D64 9]  Shortness of breath [R06 02]       There is documentation in the medical record of an expected length of stay of at least 2 midnights  The patient is therefore expected to satisfy the 2 midnight benchmark and given the 2 midnight presumption is appropriate for INPATIENT ADMISSION  Given this expectation of a satisfying stay, CMS instructs us that the patient is most often appropriate for inpatient admission under part A provided medical necessity is documented in the chart  After review of the relevant documentation, labs, vital signs and test results, the patient is appropriate for INPATIENT ADMISSION  Admission to the hospital as an inpatient is a complex decision making process which requires the practitioner to consider the patients presenting complaint, history and physical examination and all relevant testing  With this in mind, in this case, the patient was deemed appropriate for INPATIENT ADMISSION  After review of the documentation and testing available at the time of the admission I concur with this clinical determination of medical necessity  Rationale is as follows: The patient is a 69 yrs old Female who presented to the ED at 11/26/2019 10:51 AM with a chief complaint of Shortness of Breath (Pt brought in by EMS, dyspnea on exertion while ambulating to vehicle, also had chest tightness, recent discharge, 324 ASA and 1 SL nitro PH  )  Patient was thought to have symptomatic anemia  Her hemoglobin was 6 7  Her baseline hemoglobin was around 10-11  Patient is on both Plavix as well as Eliquis  Patient was taken for an EGD and upper endoscopy was done which revealed a gastric ulcer likely secondary to NSAID use    As per the documentation in the chart the patient was admitted to the hospital as inpatient but was discharged earlier than anticipated given rapid unexpected improvement  Patient is inpatient status appropriate  The patients vitals on arrival were ED Triage Vitals [11/26/19 1055]   Temperature Pulse Respirations Blood Pressure SpO2   97 9 °F (36 6 °C) 67 22 120/57 93 %      Temp Source Heart Rate Source Patient Position - Orthostatic VS BP Location FiO2 (%)   Oral Monitor Lying Right arm --      Pain Score       6           Past Medical History:   Diagnosis Date    Acquired hypothyroidism 12/26/2018    Acute on chronic diastolic heart failure ([facility] [address] ) 8/30/2018    Anemia     Atrial fibrillation (HCC)     CHF (congestive heart failure) (HCC)     History of transfusion     Hyperlipidemia     Hypertension     Migraine     Polyp of sigmoid colon     Prediabetes     Stroke ([facility] [address] ) 2011     Past Surgical History:   Procedure Laterality Date    APPENDECTOMY      CARDIAC ELECTROPHYSIOLOGY STUDY AND ABLATION      CHOLECYSTECTOMY      COLONOSCOPY             Consults have been placed to:   IP CONSULT TO GASTROENTEROLOGY    Vitals:    11/27/19 1342 11/27/19 1352 11/27/19 1405 11/27/19 1526   BP: 111/51 122/55 117/57 111/53   Pulse: 72 72 69 73   Resp: 16 18 18 17   Temp:    97 5 °F (36 4 °C)   TempSrc:       SpO2: 99% 98% 98% 96%   Weight:           Most recent labs:    No results for input(s): WBC, HGB, HCT, PLT, K, NA, CALCIUM, BUN, CREATININE, LIPASE, AMYLASE, INR, TROPONINI, CKTOTAL, AST, ALT, ALKPHOS, BILITOT in the last 72 hours  Scheduled Meds:  Continuous Infusions:  No current facility-administered medications for this encounter  PRN Meds:      Surgical procedures (if appropriate):

## 2019-12-05 ENCOUNTER — OFFICE VISIT (OUTPATIENT)
Dept: INTERNAL MEDICINE CLINIC | Facility: CLINIC | Age: 74
End: 2019-12-05
Payer: MEDICARE

## 2019-12-05 VITALS
OXYGEN SATURATION: 99 % | HEIGHT: 67 IN | SYSTOLIC BLOOD PRESSURE: 124 MMHG | TEMPERATURE: 99.3 F | BODY MASS INDEX: 41.59 KG/M2 | WEIGHT: 265 LBS | HEART RATE: 85 BPM | DIASTOLIC BLOOD PRESSURE: 78 MMHG

## 2019-12-05 DIAGNOSIS — I48.0 PAROXYSMAL ATRIAL FIBRILLATION (HCC): ICD-10-CM

## 2019-12-05 DIAGNOSIS — D64.9 SYMPTOMATIC ANEMIA: ICD-10-CM

## 2019-12-05 DIAGNOSIS — I63.40 CEREBROVASCULAR ACCIDENT (CVA) DUE TO EMBOLISM OF CEREBRAL ARTERY (HCC): ICD-10-CM

## 2019-12-05 DIAGNOSIS — K25.0 ACUTE GASTRIC ULCER WITH HEMORRHAGE: Primary | ICD-10-CM

## 2019-12-05 DIAGNOSIS — E78.00 HYPERCHOLESTEROLEMIA: ICD-10-CM

## 2019-12-05 DIAGNOSIS — I50.32 CHRONIC DIASTOLIC CONGESTIVE HEART FAILURE (HCC): ICD-10-CM

## 2019-12-05 DIAGNOSIS — N18.30 STAGE 3 CHRONIC KIDNEY DISEASE (HCC): ICD-10-CM

## 2019-12-05 DIAGNOSIS — I10 ESSENTIAL HYPERTENSION: ICD-10-CM

## 2019-12-05 PROCEDURE — 99496 TRANSJ CARE MGMT HIGH F2F 7D: CPT | Performed by: INTERNAL MEDICINE

## 2019-12-06 NOTE — PROGRESS NOTES
[facility] [place]      NAME: Gael Ledezma  AGE: 69 y o  SEX: female  : 1945   MRN: [id number]    DATE: 2019  TIME: 9:51 AM    Assessment and Plan   1  Acute gastric ulcer with hemorrhage  There is no clinical evidence of further bleeding  The patient is on pantoprazole twice daily  She has follow-up arranged with GI in about 2 weeks  - CBC    2  Symptomatic anemia  Asymptomatic following transfusions  Check CBC    3  Essential hypertension  Adequately controlled  4  Paroxysmal atrial fibrillation (HCC)  Currently in sinus rhythm  Anticoagulation has been resumed  5  Chronic diastolic congestive heart failure ([address] )  Well compensated  6  Stage 3 chronic kidney disease (HCC)  Creatinine has been stable  7  Hypercholesterolemia  Controlled on simvastatin  8  Cerebrovascular accident (CVA) due to embolism of cerebral artery (HCC)  On Eliquis and appropriate risk factor modification  The patient appears to be doing well  She will continue her current medications for now  She will have a CBC repeated before her next visit with GI  Return to office in:  6 week    Chief Complaint     Chief Complaint   Patient presents with    Transition of Care Management     [facility] 11 26-11 27 dx: sob, anemia       History of Present Illness     The patient returned to the office after recent hospitalization at [facility]  She was admitted with shortness of breath  She was found to have a hemoglobin of 6  She was transfused and felt quite a bit better  Upper endoscopy showed an ulcer which was not actively bleeding  She was started on pantoprazole 40 mg twice daily  She is back on anticoagulants  She has had no sign of GI bleeding including melena, hematochezia, or hematemesis  She is feeling pretty well  There was no evidence of exacerbation of congestive heart failure during her hospitalization      The following portions of the patient's history were reviewed and updated as appropriate: allergies, current medications, past family history, past medical history, past social history, past surgical history and problem list     Review of Systems   Review of Systems   Constitutional: Negative  HENT: Negative for congestion, ear pain, postnasal drip, rhinorrhea, sore throat and trouble swallowing  Eyes: Negative for pain, discharge, redness and visual disturbance  Respiratory: Negative for cough, shortness of breath and wheezing  Cardiovascular: Negative  Gastrointestinal: Negative  Endocrine: Negative  Genitourinary: Negative for difficulty urinating, dysuria, frequency, hematuria and urgency  Musculoskeletal: Negative for arthralgias, gait problem, joint swelling and myalgias  Skin: Negative for rash  Neurological: Negative for dizziness, speech difficulty, weakness, light-headedness, numbness and headaches  Hematological: Negative  Psychiatric/Behavioral: Negative for confusion, decreased concentration, dysphoric mood and sleep disturbance  The patient is not nervous/anxious          Active Problem List     Patient Active Problem List   Diagnosis    Atrial fibrillation ([facility] )    Essential hypertension    Prediabetes    Dyspnea on minimal exertion    Depression    Hypercholesterolemia    Migraine    Mitral regurgitation    Class 3 severe obesity due to excess calories with serious comorbidity and body mass index (BMI) of 40 0 to 44 9 in adult [facility])    Obstructive sleep apnea    Primary localized osteoarthritis of right knee    Stroke ([facility] )    Chronic diastolic congestive heart failure (HCC)    Postural dizziness with presyncope    Symptomatic bradycardia    Chronic bilateral low back pain without sciatica    Stage 3 chronic kidney disease (HCC)    Symptomatic anemia    Acquired hypothyroidism    Acute gastric ulcer with hemorrhage       Objective   /78 (BP Location: Left arm, Patient Position: Sitting, Cuff Size: Standard)   Pulse 85   Temp 99 3 °F (37 4 °C) (Tympanic)   Ht 5' 7" (1 702 m)   Wt 120 kg (265 lb)   LMP  (LMP Unknown)   SpO2 99%   BMI 41 50 kg/m²     Physical Exam   Constitutional: She is oriented to person, place, and time  She appears well-developed  No distress  Obese   HENT:   Head: Normocephalic and atraumatic  Neck: Neck supple  No JVD present  No tracheal deviation present  No thyromegaly present  Cardiovascular: Normal rate, regular rhythm, normal heart sounds and intact distal pulses  Exam reveals no gallop and no friction rub  No murmur heard  Pulmonary/Chest: Effort normal and breath sounds normal  She has no wheezes  She has no rales  She exhibits no tenderness  Abdominal: Soft  Bowel sounds are normal  She exhibits no distension and no mass  There is no tenderness  There is no rebound  Musculoskeletal: Normal range of motion  She exhibits no edema or tenderness  Lymphadenopathy:     She has no cervical adenopathy  Neurological: She is alert and oriented to person, place, and time  Skin: Skin is warm and dry  Psychiatric: She has a normal mood and affect   Her behavior is normal  Judgment and thought content normal        Pertinent Laboratory/Diagnostic Studies:  Admission on 11/26/2019, Discharged on 11/27/2019   Component Date Value Ref Range Status    WBC 11/26/2019 10 21* 4 31 - 10 16 Thousand/uL Final    RBC 11/26/2019 2 65* 3 81 - 5 12 Million/uL Final    Hemoglobin 11/26/2019 6 7* 11 5 - 15 4 g/dL Final    Hematocrit 11/26/2019 23 1* 34 8 - 46 1 % Final    MCV 11/26/2019 87  82 - 98 fL Final    MCH 11/26/2019 25 3* 26 8 - 34 3 pg Final    MCHC 11/26/2019 29 0* 31 4 - 37 4 g/dL Final    RDW 11/26/2019 17 3* 11 6 - 15 1 % Final    MPV 11/26/2019 11 3  8 9 - 12 7 fL Final    Platelets 41/26/8388 399* 149 - 390 Thousands/uL Final    nRBC 11/26/2019 1  /100 WBCs Final    Neutrophils Relative 11/26/2019 72  43 - 75 % Final    Immat GRANS % 11/26/2019 1  0 - 2 % Final    Lymphocytes Relative 11/26/2019 16  14 - 44 % Final    Monocytes Relative 11/26/2019 7  4 - 12 % Final    Eosinophils Relative 11/26/2019 3  0 - 6 % Final    Basophils Relative 11/26/2019 1  0 - 1 % Final    Neutrophils Absolute 11/26/2019 7 48  1 85 - 7 62 Thousands/µL Final    Immature Grans Absolute 11/26/2019 0 07  0 00 - 0 20 Thousand/uL Final    Lymphocytes Absolute 11/26/2019 1 66  0 60 - 4 47 Thousands/µL Final    Monocytes Absolute 11/26/2019 0 67  0 17 - 1 22 Thousand/µL Final    Eosinophils Absolute 11/26/2019 0 27  0 00 - 0 61 Thousand/µL Final    Basophils Absolute 11/26/2019 0 06  0 00 - 0 10 Thousands/µL Final    Protime 11/26/2019 20 3* 11 6 - 14 5 seconds Final    INR 11/26/2019 1 70* 0 84 - 1 19 Final    PTT 11/26/2019 27  23 - 37 seconds Final    Sodium 11/26/2019 140  136 - 145 mmol/L Final    Potassium 11/26/2019 4 0  3 5 - 5 3 mmol/L Final    Chloride 11/26/2019 106  100 - 108 mmol/L Final    CO2 11/26/2019 25  21 - 32 mmol/L Final    ANION GAP 11/26/2019 9  4 - 13 mmol/L Final    BUN 11/26/2019 28* 5 - 25 mg/dL Final    Creatinine 11/26/2019 1 51* 0 60 - 1 30 mg/dL Final    Glucose 11/26/2019 133  65 - 140 mg/dL Final    Calcium 11/26/2019 9 0  8 3 - 10 1 mg/dL Final    eGFR 11/26/2019 34  ml/min/1 73sq m Final    Magnesium 11/26/2019 2 5  1 6 - 2 6 mg/dL Final    Troponin I 11/26/2019 <0 02  <=0 04 ng/mL Final    NT-proBNP 11/26/2019 388* <125 pg/mL Final    Retic Ct Abs 11/26/2019 105,600* 28,206-49,245 Final    Retic Ct Pct 11/26/2019 4 14* 0 37 - 1 87 % Final    ABO Grouping 11/26/2019 B   Final    Rh Factor 11/26/2019 Positive   Final    Antibody Screen 11/26/2019 Negative   Final    Specimen Expiration Date 11/26/2019 [id number]   Final    Unit Product Code 11/27/2019 [id number]   Final-Edited    Unit Number 11/27/2019 [id number]   Final-Edited    Unit ABO 11/27/2019 B   Final-Edited    [facility]   Final-Edited   Teressa Hastings 11/27/2019 Compatible   Final    Unit Dispense Status 11/27/2019 Presumed Trans   Final-Edited    Unit Product Code 11/27/2019 G3481X02   Final-Edited    Unit Number 11/27/2019 [id number]   Final-Edited    Unit ABO 11/27/2019 B   Final-Edited    Unit [facility] 11/27/2019 POS   Final-Edited    Crossmatch 11/27/2019 Compatible   Final    Unit Dispense Status 11/27/2019 Presumed Trans   Final-Edited    Folate 11/26/2019 >20 0* 3 1 - 17 5 ng/mL Final    Vitamin B-12 11/26/2019 886  100 - 900 pg/mL Final    Iron Saturation 11/26/2019 5  % Final    TIBC 11/26/2019 440  250 - 450 ug/dL Final    Iron 11/26/2019 20* 50 - 170 ug/dL Final    Ferritin 11/26/2019 8  8 - 388 ng/mL Final    SODIUM, I-STAT 11/26/2019 139  136 - 145 mmol/l Final    Potassium, i-STAT 11/26/2019 4 4  3 5 - 5 3 mmol/L Final    Chloride, istat 11/26/2019 107  100 - 108 mmol/L Final    CO2, i-STAT 11/26/2019 25  21 - 32 mmol/L Final    Anion Gap, i-STAT 11/26/2019 13  4 - 13 mmol/L Final    Calcium, Ionized i-STAT 11/26/2019 1 13  1 12 - 1 32 mmol/L Final    BUN, I-STAT 11/26/2019 30* 5 - 25 mg/dl Final    Creatinine, i-STAT 11/26/2019 1 6* 0 6 - 1 3 mg/dl Final    eGFR 11/26/2019 32  ml/min/1 73sq m Final    Glucose, i-STAT 11/26/2019 112  65 - 140 mg/dl Final    Hct, i-STAT 11/26/2019 20* 34 8 - 46 1 % Final    Hgb, i-STAT 11/26/2019 6 8* 11 5 - 15 4 g/dl Final    Specimen Type 11/26/2019 VENOUS   Final    Ventricular Rate 11/26/2019 67  BPM Final    Atrial Rate 11/26/2019 67  BPM Final    NE Interval 11/26/2019 254  ms Final    QRSD Interval 11/26/2019 90  ms Final    QT Interval 11/26/2019 450  ms Final    QTC Interval 11/26/2019 475  ms Final    P Axis 11/26/2019 67  degrees Final    QRS Axis 11/26/2019 28  degrees Final    T Wave [place] 11/26/2019 48  degrees Final    WBC 11/27/2019 7 23  4 31 - 10 16 Thousand/uL Final    RBC 11/27/2019 3 12* 3 81 - 5 12 Million/uL Final    Hemoglobin 11/27/2019 8 0* 11 5 - 15 4 g/dL Final    Hematocrit 11/27/2019 27 1* 34 8 - 46 1 % Final    MCV 11/27/2019 87  82 - 98 fL Final    MCH 11/27/2019 25 6* 26 8 - 34 3 pg Final    MCHC 11/27/2019 29 5* 31 4 - 37 4 g/dL Final    RDW 11/27/2019 18 0* 11 6 - 15 1 % Final    MPV 11/27/2019 11 3  8 9 - 12 7 fL Final    Platelets 52/88/4862 310  149 - 390 Thousands/uL Final    nRBC 11/27/2019 1  /100 WBCs Final    Neutrophils Relative 11/27/2019 56  43 - 75 % Final    Immat GRANS % 11/27/2019 0  0 - 2 % Final    Lymphocytes Relative 11/27/2019 34  14 - 44 % Final    Monocytes Relative 11/27/2019 6  4 - 12 % Final    Eosinophils Relative 11/27/2019 3  0 - 6 % Final    Basophils Relative 11/27/2019 1  0 - 1 % Final    Neutrophils Absolute 11/27/2019 4 00  1 85 - 7 62 Thousands/µL Final    Immature Grans Absolute 11/27/2019 0 03  0 00 - 0 20 Thousand/uL Final    Lymphocytes Absolute 11/27/2019 2 48  0 60 - 4 47 Thousands/µL Final    Monocytes Absolute 11/27/2019 0 45  0 17 - 1 22 Thousand/µL Final    Eosinophils Absolute 11/27/2019 0 21  0 00 - 0 61 Thousand/µL Final    Basophils Absolute 11/27/2019 0 06  0 00 - 0 10 Thousands/µL Final    Sodium 11/27/2019 141  136 - 145 mmol/L Final    Potassium 11/27/2019 3 8  3 5 - 5 3 mmol/L Final    Chloride 11/27/2019 109* 100 - 108 mmol/L Final    CO2 11/27/2019 22  21 - 32 mmol/L Final    ANION GAP 11/27/2019 10  4 - 13 mmol/L Final    BUN 11/27/2019 24  5 - 25 mg/dL Final    Creatinine 11/27/2019 1 33* 0 60 - 1 30 mg/dL Final    Glucose 11/27/2019 92  65 - 140 mg/dL Final    Calcium 11/27/2019 8 4  8 3 - 10 1 mg/dL Final    eGFR 11/27/2019 40  ml/min/1 73sq m Final   Admission on 11/19/2019, Discharged on 11/20/2019   Component Date Value Ref Range Status    WBC 11/19/2019 11 73* 4 31 - 10 16 Thousand/uL Final    RBC 11/19/2019 3 68* 3 81 - 5 12 Million/uL Final    Hemoglobin 11/19/2019 9 6* 11 5 - 15 4 g/dL Final    Hematocrit 11/19/2019 32 8* 34 8 - 46 1 % Final    MCV 11/19/2019 89  82 - 98 fL Final    MCH 11/19/2019 26 1* 26 8 - 34 3 pg Final    MCHC 11/19/2019 29 3* 31 4 - 37 4 g/dL Final    RDW 11/19/2019 17 5* 11 6 - 15 1 % Final    MPV 11/19/2019 11 0  8 9 - 12 7 fL Final    Platelets 84/89/8487 408* 149 - 390 Thousands/uL Final    nRBC 11/19/2019 0  /100 WBCs Final    Neutrophils Relative 11/19/2019 67  43 - 75 % Final    Immat GRANS % 11/19/2019 0  0 - 2 % Final    Lymphocytes Relative 11/19/2019 21  14 - 44 % Final    Monocytes Relative 11/19/2019 7  4 - 12 % Final    Eosinophils Relative 11/19/2019 4  0 - 6 % Final    Basophils Relative 11/19/2019 1  0 - 1 % Final    Neutrophils Absolute 11/19/2019 7 85* 1 85 - 7 62 Thousands/µL Final    Immature Grans Absolute 11/19/2019 0 03  0 00 - 0 20 Thousand/uL Final    Lymphocytes Absolute 11/19/2019 2 50  0 60 - 4 47 Thousands/µL Final    Monocytes Absolute 11/19/2019 0 85  0 17 - 1 22 Thousand/µL Final    Eosinophils Absolute 11/19/2019 0 42  0 00 - 0 61 Thousand/µL Final    Basophils Absolute 11/19/2019 0 08  0 00 - 0 10 Thousands/µL Final    Sodium 11/19/2019 143  136 - 145 mmol/L Final    Potassium 11/19/2019 4 5  3 5 - 5 3 mmol/L Final    Chloride 11/19/2019 108  100 - 108 mmol/L Final    CO2 11/19/2019 25  21 - 32 mmol/L Final    ANION GAP 11/19/2019 10  4 - 13 mmol/L Final    BUN 11/19/2019 46* 5 - 25 mg/dL Final    Creatinine 11/19/2019 1 16  0 60 - 1 30 mg/dL Final    Glucose 11/19/2019 112  65 - 140 mg/dL Final    Calcium 11/19/2019 9 0  8 3 - 10 1 mg/dL Final    AST 11/19/2019 21  5 - 45 U/L Final    ALT 11/19/2019 27  12 - 78 U/L Final    Alkaline Phosphatase 11/19/2019 115  46 - 116 U/L Final    Total Protein 11/19/2019 7 0  6 4 - 8 2 g/dL Final    Albumin 11/19/2019 3 4* 3 5 - 5 0 g/dL Final    Total Bilirubin 11/19/2019 0 33  0 20 - 1 00 mg/dL Final    eGFR 11/19/2019 47  ml/min/1 73sq m Final    NT-proBNP 11/19/2019 292* <125 pg/mL Final    Troponin I 11/19/2019 <0 02  <=0 04 ng/mL Final    D-Dimer, Quant 11/19/2019 0 39  <0 50 ug/ml FEU Final    Troponin I 11/19/2019 <0 02  <=0 04 ng/mL Final    TSH 3RD GENERATON 11/20/2019 3 592  0 358 - 3 740 uIU/mL Final    Sodium 11/20/2019 143  136 - 145 mmol/L Final    Potassium 11/20/2019 4 3  3 5 - 5 3 mmol/L Final    Chloride 11/20/2019 107  100 - 108 mmol/L Final    CO2 11/20/2019 22  21 - 32 mmol/L Final    ANION GAP 11/20/2019 14* 4 - 13 mmol/L Final    BUN 11/20/2019 55* 5 - 25 mg/dL Final    Creatinine 11/20/2019 1 26  0 60 - 1 30 mg/dL Final    Glucose 11/20/2019 102  65 - 140 mg/dL Final    Glucose, Fasting 11/20/2019 102* 65 - 99 mg/dL Final    Calcium 11/20/2019 9 0  8 3 - 10 1 mg/dL Final    eGFR 11/20/2019 43  ml/min/1 73sq m Final    WBC 11/20/2019 9 81  4 31 - 10 16 Thousand/uL Final    RBC 11/20/2019 3 53* 3 81 - 5 12 Million/uL Final    Hemoglobin 11/20/2019 9 2* 11 5 - 15 4 g/dL Final    Hematocrit 11/20/2019 30 9* 34 8 - 46 1 % Final    MCV 11/20/2019 88  82 - 98 fL Final    MCH 11/20/2019 26 1* 26 8 - 34 3 pg Final    MCHC 11/20/2019 29 8* 31 4 - 37 4 g/dL Final    RDW 11/20/2019 17 9* 11 6 - 15 1 % Final    MPV 11/20/2019 11 3  8 9 - 12 7 fL Final    Platelets 00/55/4377 362  149 - 390 Thousands/uL Final    nRBC 11/20/2019 0  /100 WBCs Final    Neutrophils Relative 11/20/2019 59  43 - 75 % Final    Immat GRANS % 11/20/2019 0  0 - 2 % Final    Lymphocytes Relative 11/20/2019 31  14 - 44 % Final    Monocytes Relative 11/20/2019 7  4 - 12 % Final    Eosinophils Relative 11/20/2019 2  0 - 6 % Final    Basophils Relative 11/20/2019 1  0 - 1 % Final    Neutrophils Absolute 11/20/2019 5 76  1 85 - 7 62 Thousands/µL Final    Immature Grans Absolute 11/20/2019 0 03  0 00 - 0 20 Thousand/uL Final    Lymphocytes Absolute 11/20/2019 3 06  0 60 - 4 47 Thousands/µL Final    Monocytes Absolute 11/20/2019 0 69  0 17 - 1 22 Thousand/µL Final    Eosinophils Absolute 11/20/2019 0 20  0 00 - 0 61 Thousand/µL Final    Basophils Absolute 11/20/2019 0 07  0 00 - 0 10 Thousands/µL Final    Ventricular Rate 11/19/2019 92  BPM Final    Atrial Rate 11/19/2019 92  BPM Final    ID Interval 11/19/2019 222  ms Final    QRSD Interval 11/19/2019 84  ms Final    QT Interval 11/19/2019 372  ms Final    QTC Interval 11/19/2019 460  ms Final    P Axis 11/19/2019 48  degrees Final    QRS Axis 11/19/2019 21  degrees Final    T Wave Axis 11/19/2019 31  degrees Final    Ventricular Rate 11/19/2019 94  BPM Final    Atrial Rate 11/19/2019 94  BPM Final    ID Interval 11/19/2019 216  ms Final    QRSD Interval 11/19/2019 82  ms Final    QT Interval 11/19/2019 376  ms Final    QTC Interval 11/19/2019 470  ms Final    P Axis 11/19/2019 47  degrees Final    QRS Axis 11/19/2019 20  degrees Final    T Wave Axis 11/19/2019 43  degrees Final           Current Medications     Current Outpatient Medications:     amiodarone 200 mg tablet, Take 1/2 tablet daily, Disp: 45 tablet, Rfl: 2    clopidogrel (PLAVIX) 75 mg tablet, Take 1 tablet (75 mg total) by mouth daily, Disp: 90 tablet, Rfl: 0    ELIQUIS 5 MG, TAKE ONE TABLET BY MOUTH TWO TIMES A DAY, Disp: 60 tablet, Rfl: 10    escitalopram (LEXAPRO) 10 mg tablet, Take 1 tablet (10 mg total) by mouth daily, Disp: 90 tablet, Rfl: 1    gabapentin (NEURONTIN) 100 mg capsule, Take 1 capsule (100 mg total) by mouth 3 (three) times a day, Disp: 90 capsule, Rfl: 3    levothyroxine 88 mcg tablet, Take 1 tablet (88 mcg total) by mouth daily, Disp: 90 tablet, Rfl: 1    LORazepam (ATIVAN) 1 mg tablet, Take 1 tablet (1 mg total) by mouth 3 (three) times a day as needed for anxiety, Disp: 30 tablet, Rfl: 0    meclizine (ANTIVERT) 25 mg tablet, Take 25 mg by mouth 3 (three) times a day as needed for dizziness , Disp: , Rfl:     metoprolol tartrate (LOPRESSOR) 50 mg tablet, Take 1 tablet (50 mg total) by mouth every 12 (twelve) hours, Disp: 60 tablet, Rfl: 4    Multiple Vitamin (MULTIVITAMIN) tablet, Take 1 tablet by mouth daily  , Disp: , Rfl:     nortriptyline (PAMELOR) 50 mg capsule, Take 1 capsule (50 mg total) by mouth daily, Disp: 90 capsule, Rfl: 1    ondansetron (ZOFRAN-ODT) 8 mg disintegrating tablet, Take 1 tablet (8 mg total) by mouth every 8 (eight) hours as needed for nausea or vomiting, Disp: 20 tablet, Rfl: 0    oxyCODONE-acetaminophen (PERCOCET) 5-325 mg per tablet, Take 1 tablet by mouth every 4 (four) hours as needed for moderate pain Max Daily Amount: 6 tablets, Disp: 50 tablet, Rfl: 0    pantoprazole (PROTONIX) 40 mg tablet, Take 1 tablet (40 mg total) by mouth 2 (two) times a day before meals, Disp: 60 tablet, Rfl: 0    potassium chloride (K-DUR,KLOR-CON) 20 mEq tablet, Take 1 tablet (20 mEq total) by mouth 2 (two) times a day, Disp: 180 tablet, Rfl: 1    simvastatin (ZOCOR) 10 mg tablet, Take 1 tablet (10 mg total) by mouth daily at bedtime, Disp: 90 tablet, Rfl: 3    torsemide (DEMADEX) 20 mg tablet, Take 20 mg by mouth see administration instructions Take 1 tablet (20 mg total) by mouth daily except for Monday and Thursdays take 2 tablets (40 mg total) in morning, Disp: , Rfl:       Fabrice Guevara MD

## 2019-12-09 ENCOUNTER — TELEPHONE (OUTPATIENT)
Dept: GASTROENTEROLOGY | Facility: MEDICAL CENTER | Age: 74
End: 2019-12-09

## 2019-12-09 NOTE — TELEPHONE ENCOUNTER
----- Message from Valente Ann PA-C sent at 11/27/2019  3:52 PM EST -----  Please schedule for hospital follow up in 2-4 weeks   Thank you

## 2019-12-12 ENCOUNTER — TELEPHONE (OUTPATIENT)
Dept: GASTROENTEROLOGY | Facility: MEDICAL CENTER | Age: 74
End: 2019-12-12

## 2019-12-12 NOTE — TELEPHONE ENCOUNTER
----- Message from Sparkle Lombardi PA-C sent at 11/27/2019  3:52 PM EST -----  Please schedule for hospital follow up in 2-4 weeks   Thank you

## 2019-12-20 DIAGNOSIS — G43.909 MIGRAINE WITHOUT STATUS MIGRAINOSUS, NOT INTRACTABLE, UNSPECIFIED MIGRAINE TYPE: ICD-10-CM

## 2019-12-20 RX ORDER — NORTRIPTYLINE HYDROCHLORIDE 50 MG/1
50 CAPSULE ORAL DAILY
Qty: 90 CAPSULE | Refills: 0 | Status: ON HOLD | OUTPATIENT
Start: 2019-12-20 | End: 2020-05-04 | Stop reason: SDUPTHER

## 2019-12-20 RX ORDER — NORTRIPTYLINE HYDROCHLORIDE 50 MG/1
50 CAPSULE ORAL DAILY
Qty: 90 CAPSULE | Refills: 0 | Status: SHIPPED | OUTPATIENT
Start: 2019-12-20 | End: 2019-12-20

## 2019-12-23 DIAGNOSIS — I10 ESSENTIAL HYPERTENSION: Primary | ICD-10-CM

## 2019-12-24 RX ORDER — TORSEMIDE 20 MG/1
20 TABLET ORAL SEE ADMIN INSTRUCTIONS
Qty: 110 TABLET | Refills: 3 | Status: ON HOLD | OUTPATIENT
Start: 2019-12-24 | End: 2020-05-04 | Stop reason: SDUPTHER

## 2020-02-12 ENCOUNTER — HOSPITAL ENCOUNTER (EMERGENCY)
Facility: HOSPITAL | Age: 75
Discharge: HOME | End: 2020-02-12
Attending: EMERGENCY MEDICINE | Admitting: EMERGENCY MEDICINE
Payer: MEDICARE

## 2020-02-12 ENCOUNTER — APPOINTMENT (EMERGENCY)
Dept: RADIOLOGY | Facility: HOSPITAL | Age: 75
End: 2020-02-12
Payer: MEDICARE

## 2020-02-12 ENCOUNTER — TELEPHONE (OUTPATIENT)
Dept: INTERNAL MEDICINE CLINIC | Facility: CLINIC | Age: 75
End: 2020-02-12

## 2020-02-12 ENCOUNTER — TELEPHONE (OUTPATIENT)
Dept: CARDIOLOGY CLINIC | Facility: CLINIC | Age: 75
End: 2020-02-12

## 2020-02-12 VITALS
HEART RATE: 80 BPM | HEIGHT: 67 IN | RESPIRATION RATE: 16 BRPM | WEIGHT: 285 LBS | TEMPERATURE: 98.3 F | SYSTOLIC BLOOD PRESSURE: 163 MMHG | DIASTOLIC BLOOD PRESSURE: 72 MMHG | BODY MASS INDEX: 44.73 KG/M2 | OXYGEN SATURATION: 94 %

## 2020-02-12 DIAGNOSIS — R00.0 TACHYCARDIA: ICD-10-CM

## 2020-02-12 DIAGNOSIS — R06.02 SHORTNESS OF BREATH: ICD-10-CM

## 2020-02-12 DIAGNOSIS — M79.672 FOOT PAIN, LEFT: Primary | ICD-10-CM

## 2020-02-12 LAB
ALBUMIN SERPL BCP-MCNC: 3.9 G/DL (ref 3.5–5.7)
ALP SERPL-CCNC: 105 U/L (ref 55–165)
ALT SERPL W P-5'-P-CCNC: 12 U/L (ref 7–52)
ANION GAP SERPL CALCULATED.3IONS-SCNC: 13 MMOL/L (ref 4–13)
ANISOCYTOSIS BLD QL SMEAR: PRESENT
APTT PPP: 30 SECONDS (ref 23–37)
AST SERPL W P-5'-P-CCNC: 18 U/L (ref 13–39)
ATRIAL RATE: 91 BPM
BASOPHILS # BLD AUTO: 0.1 THOUSANDS/ΜL (ref 0–0.1)
BASOPHILS NFR BLD AUTO: 1 % (ref 0–2)
BILIRUB SERPL-MCNC: 0.3 MG/DL (ref 0.2–1)
BNP SERPL-MCNC: 129 PG/ML (ref 1–100)
BUN SERPL-MCNC: 18 MG/DL (ref 7–25)
CALCIUM SERPL-MCNC: 9.3 MG/DL (ref 8.6–10.5)
CHLORIDE SERPL-SCNC: 101 MMOL/L (ref 98–107)
CO2 SERPL-SCNC: 23 MMOL/L (ref 21–31)
CREAT SERPL-MCNC: 1.29 MG/DL (ref 0.6–1.2)
EOSINOPHIL # BLD AUTO: 0.2 THOUSAND/ΜL (ref 0–0.61)
EOSINOPHIL NFR BLD AUTO: 2 % (ref 0–5)
ERYTHROCYTE [DISTWIDTH] IN BLOOD BY AUTOMATED COUNT: 19.9 % (ref 11.5–14.5)
FLUAV RNA NPH QL NAA+PROBE: NORMAL
FLUBV RNA NPH QL NAA+PROBE: NORMAL
GFR SERPL CREATININE-BSD FRML MDRD: 41 ML/MIN/1.73SQ M
GLUCOSE SERPL-MCNC: 135 MG/DL (ref 65–99)
HCT VFR BLD AUTO: 29.9 % (ref 42–47)
HGB BLD-MCNC: 8.9 G/DL (ref 12–16)
HYPERCHROMIA BLD QL SMEAR: PRESENT
INR PPP: 0.97 (ref 0.9–1.5)
LYMPHOCYTES # BLD AUTO: 1.4 THOUSANDS/ΜL (ref 0.6–4.47)
LYMPHOCYTES NFR BLD AUTO: 15 % (ref 21–51)
MCH RBC QN AUTO: 20.3 PG (ref 26–34)
MCHC RBC AUTO-ENTMCNC: 29.7 G/DL (ref 31–37)
MCV RBC AUTO: 69 FL (ref 81–99)
MICROCYTES BLD QL AUTO: PRESENT
MONOCYTES # BLD AUTO: 0.5 THOUSAND/ΜL (ref 0.17–1.22)
MONOCYTES NFR BLD AUTO: 6 % (ref 2–12)
NEUTROPHILS # BLD AUTO: 7.3 THOUSANDS/ΜL (ref 1.4–6.5)
NEUTS SEG NFR BLD AUTO: 76 % (ref 42–75)
P AXIS: 67 DEGREES
PLATELET # BLD AUTO: 388 THOUSANDS/UL (ref 149–390)
PLATELET BLD QL SMEAR: ADEQUATE
PMV BLD AUTO: 9.2 FL (ref 8.6–11.7)
POLYCHROMASIA BLD QL SMEAR: PRESENT
POTASSIUM SERPL-SCNC: 3.3 MMOL/L (ref 3.5–5.5)
PR INTERVAL: 232 MS
PROT SERPL-MCNC: 7.1 G/DL (ref 6.4–8.9)
PROTHROMBIN TIME: 11.3 SECONDS (ref 10.2–13)
QRS AXIS: 10 DEGREES
QRSD INTERVAL: 84 MS
QT INTERVAL: 396 MS
QTC INTERVAL: 487 MS
RBC # BLD AUTO: 4.37 MILLION/UL (ref 3.9–5.2)
RBC MORPH BLD: PRESENT
RSV RNA NPH QL NAA+PROBE: NORMAL
SODIUM SERPL-SCNC: 137 MMOL/L (ref 134–143)
T WAVE AXIS: 55 DEGREES
TROPONIN I SERPL-MCNC: <0.03 NG/ML
TROPONIN I SERPL-MCNC: <0.03 NG/ML
TSH SERPL DL<=0.05 MIU/L-ACNC: 1.68 UIU/ML (ref 0.45–5.33)
VENTRICULAR RATE: 91 BPM
WBC # BLD AUTO: 9.6 THOUSAND/UL (ref 4.8–10.8)

## 2020-02-12 PROCEDURE — 85730 THROMBOPLASTIN TIME PARTIAL: CPT | Performed by: EMERGENCY MEDICINE

## 2020-02-12 PROCEDURE — 80053 COMPREHEN METABOLIC PANEL: CPT | Performed by: EMERGENCY MEDICINE

## 2020-02-12 PROCEDURE — 85025 COMPLETE CBC W/AUTO DIFF WBC: CPT | Performed by: EMERGENCY MEDICINE

## 2020-02-12 PROCEDURE — 93005 ELECTROCARDIOGRAM TRACING: CPT

## 2020-02-12 PROCEDURE — 85610 PROTHROMBIN TIME: CPT | Performed by: EMERGENCY MEDICINE

## 2020-02-12 PROCEDURE — 71046 X-RAY EXAM CHEST 2 VIEWS: CPT

## 2020-02-12 PROCEDURE — 93010 ELECTROCARDIOGRAM REPORT: CPT | Performed by: INTERNAL MEDICINE

## 2020-02-12 PROCEDURE — 84484 ASSAY OF TROPONIN QUANT: CPT | Performed by: EMERGENCY MEDICINE

## 2020-02-12 PROCEDURE — 73630 X-RAY EXAM OF FOOT: CPT

## 2020-02-12 PROCEDURE — 36415 COLL VENOUS BLD VENIPUNCTURE: CPT | Performed by: EMERGENCY MEDICINE

## 2020-02-12 PROCEDURE — 99285 EMERGENCY DEPT VISIT HI MDM: CPT | Performed by: EMERGENCY MEDICINE

## 2020-02-12 PROCEDURE — 84443 ASSAY THYROID STIM HORMONE: CPT | Performed by: EMERGENCY MEDICINE

## 2020-02-12 PROCEDURE — 99285 EMERGENCY DEPT VISIT HI MDM: CPT

## 2020-02-12 PROCEDURE — 87631 RESP VIRUS 3-5 TARGETS: CPT | Performed by: EMERGENCY MEDICINE

## 2020-02-12 PROCEDURE — 83880 ASSAY OF NATRIURETIC PEPTIDE: CPT | Performed by: EMERGENCY MEDICINE

## 2020-02-12 PROCEDURE — 94640 AIRWAY INHALATION TREATMENT: CPT

## 2020-02-12 RX ORDER — ASPIRIN 81 MG/1
324 TABLET, CHEWABLE ORAL ONCE
Status: COMPLETED | OUTPATIENT
Start: 2020-02-12 | End: 2020-02-12

## 2020-02-12 RX ORDER — OXYCODONE HYDROCHLORIDE AND ACETAMINOPHEN 5; 325 MG/1; MG/1
1 TABLET ORAL EVERY 8 HOURS PRN
Qty: 6 TABLET | Refills: 0 | Status: SHIPPED | OUTPATIENT
Start: 2020-02-12 | End: 2020-02-22

## 2020-02-12 RX ORDER — ACETAMINOPHEN 325 MG/1
650 TABLET ORAL ONCE
Status: COMPLETED | OUTPATIENT
Start: 2020-02-12 | End: 2020-02-12

## 2020-02-12 RX ORDER — AMOXICILLIN AND CLAVULANATE POTASSIUM 875; 125 MG/1; MG/1
1 TABLET, FILM COATED ORAL ONCE
Status: COMPLETED | OUTPATIENT
Start: 2020-02-12 | End: 2020-02-12

## 2020-02-12 RX ORDER — AMOXICILLIN AND CLAVULANATE POTASSIUM 875; 125 MG/1; MG/1
1 TABLET, FILM COATED ORAL EVERY 12 HOURS
Qty: 14 TABLET | Refills: 0 | Status: SHIPPED | OUTPATIENT
Start: 2020-02-12 | End: 2020-02-19

## 2020-02-12 RX ORDER — OXYCODONE HYDROCHLORIDE AND ACETAMINOPHEN 5; 325 MG/1; MG/1
1 TABLET ORAL ONCE
Status: COMPLETED | OUTPATIENT
Start: 2020-02-12 | End: 2020-02-12

## 2020-02-12 RX ORDER — SODIUM CHLORIDE 9 MG/ML
3 INJECTION INTRAVENOUS AS NEEDED
Status: DISCONTINUED | OUTPATIENT
Start: 2020-02-12 | End: 2020-02-12 | Stop reason: HOSPADM

## 2020-02-12 RX ORDER — POTASSIUM CHLORIDE 20 MEQ/1
20 TABLET, EXTENDED RELEASE ORAL ONCE
Status: COMPLETED | OUTPATIENT
Start: 2020-02-12 | End: 2020-02-12

## 2020-02-12 RX ORDER — IPRATROPIUM BROMIDE AND ALBUTEROL SULFATE 2.5; .5 MG/3ML; MG/3ML
3 SOLUTION RESPIRATORY (INHALATION) ONCE
Status: COMPLETED | OUTPATIENT
Start: 2020-02-12 | End: 2020-02-12

## 2020-02-12 RX ADMIN — APIXABAN 5 MG: 2.5 TABLET, FILM COATED ORAL at 07:26

## 2020-02-12 RX ADMIN — POTASSIUM CHLORIDE 20 MEQ: 1500 TABLET, EXTENDED RELEASE ORAL at 06:59

## 2020-02-12 RX ADMIN — IPRATROPIUM BROMIDE AND ALBUTEROL SULFATE 3 ML: 2.5; .5 SOLUTION RESPIRATORY (INHALATION) at 05:58

## 2020-02-12 RX ADMIN — ASPIRIN 81 MG 324 MG: 81 TABLET ORAL at 05:55

## 2020-02-12 RX ADMIN — ACETAMINOPHEN 650 MG: 325 TABLET ORAL at 05:57

## 2020-02-12 RX ADMIN — METOPROLOL TARTRATE 50 MG: 25 TABLET ORAL at 07:27

## 2020-02-12 RX ADMIN — OXYCODONE HYDROCHLORIDE AND ACETAMINOPHEN 1 TABLET: 5; 325 TABLET ORAL at 07:27

## 2020-02-12 RX ADMIN — AMOXICILLIN AND CLAVULANATE POTASSIUM 1 TABLET: 875; 125 TABLET, FILM COATED ORAL at 07:27

## 2020-02-12 NOTE — TELEPHONE ENCOUNTER
Phone call from Dr Giovany Mendoza  Patient seen in [address] [place] [address] Ke[place]  Patient stated she had not taken her medications, daughter, Lelo Pinzon contacted Dr Giovany Mendoza is requesting if we would have any samples of Eliquis 5mg  Patient has moved to [place], and has not kept recent appt  Spoke with daughter, Lelo Jeromy Reyna has been missing appointments and not taking medications  Explained to her we could contact [address] [place] [address] office to see if they could help out with samples this time  Sarah Reyna needs to be seen by Cardiology  Gave Monalisa phone# for [employer] and [employer] can provide 4 week supply of eliquis samples  Lelo Pinzon notified she can  samples in [address] [place] [address]  She understands and will pick them up today or tomorrow

## 2020-02-12 NOTE — TELEPHONE ENCOUNTER
Daughter called with concerns  States her moms is having financial issues, she is unable to afford her medications, she has not taken her eliquis in days, she was just seen at the ED this morning  I spoke to Dr Tay Phillips who reached out to [facility] cardiology who will help the patient out with some samples  I advised the daughter to give them a call to pick those up  Daughter is very concerned, states her mom is not taking care of herself medically or her hygiene  Patient complaints that she can not see well but has no issues doing other things  She is not sure what else she can do to help at this point she is very concerned for her well being  I advised Fozia Lawson I would send a message to our care coordinator to see if we can do anything to help the patient, but we do encourage her to encourage her mom to follow up with all her medical providers as soon as possible

## 2020-02-12 NOTE — ED CARE HANDOFF
Emergency Department Sign Out Note        Sign out and transfer of care from Dr Pieter Martinez  See Separate Emergency Department note  The patient, Quinten Duarte, was evaluated by the previous provider for see below  Workup Completed:  Xrays and initial labs    ED Course / Workup Pending (followup):  Repeat troponin                          ED Course as of Mar 03 1401   Wed Feb 12, 2020   0715 S/o from Dr Pieter Martinez  Patient with left foot pain  Has been off metoprolol and eliquis for 2-3 days  Was coming in for foot pain  When walking became SOB  Was tachycardic  Now no SOB and 99% on RA  Dorsal aspect left foot is warm and tender  No erythema  Questionable infiltrate on CXR except no URI symptoms  No trauma of foot  No chest pain at any point  Currently sinus tachycardia  Plan: augmentin, pain control, repeat trop  Patient to get daily meds now  Plan to d/c if troponin negative  0908 Hemoglobin has improved since last which was 8 0  Lowest was 6 7 after GI bleed  Significantly improved  6455 Troponin x2 is negative  8589 Discussed with patient  She has a cane and walker at home  Offered case management consult for assisted living evaluation or home resources evaluation  She declines at this time  She states that she has a small apartment with no stairs going into it  She feels comfortable at home and feels that this is not needed at this time  4755 Patient normally takes oxycodone as needed for migraine  She does not have any at home  Will give small amount for foot pain  Suspect early cellulitis as patient is tender over the skin  Mild warmth      0915 Patient ambulated with minimal assistance  Procedures  MDM    Disposition  Final diagnoses:    Foot pain, left   Tachycardia   Shortness of breath     Time reflects when diagnosis was documented in both MDM as applicable and the Disposition within this note     Time User Action Codes Description Comment    2/12/2020  9:21 AM Cornel Form Add [R23 607] Foot pain, left     2/12/2020  9:21 AM Cornel Form Add [R00 0] Tachycardia     2/12/2020  9:22 AM Cornel Form Add [R06 02] Shortness of breath       ED Disposition     ED Disposition Condition Date/Time Comment    Discharge Stable Wed Feb 12, 2020  9:21 AM Umair Soria discharge to home/self care  Follow-up Information     Follow up With Specialties Details Why Contact Info    Kevin Gonzales MD Internal Medicine In 5 days  [address]          Discharge Medication List as of 2/12/2020  9:52 AM      START taking these medications    Details   amoxicillin-clavulanate (AUGMENTIN) 875-125 mg per tablet Take 1 tablet by mouth every 12 (twelve) hours for 7 days, Starting Wed 2/12/2020, Until Wed 2/19/2020, Normal      !! oxyCODONE-acetaminophen (PERCOCET) 5-325 mg per tablet Take 1 tablet by mouth every 8 (eight) hours as needed for moderate pain for up to 10 daysMax Daily Amount: 3 tablets, Starting Wed 2/12/2020, Until Sat 2/22/2020, Normal       !! - Potential duplicate medications found  Please discuss with provider        CONTINUE these medications which have NOT CHANGED    Details   amiodarone 200 mg tablet Take 1/2 tablet daily, Normal      clopidogrel (PLAVIX) 75 mg tablet Take 1 tablet (75 mg total) by mouth daily, Starting Tue 11/5/2019, Normal      ELIQUIS 5 MG TAKE ONE TABLET BY MOUTH TWO TIMES A DAY, Normal      escitalopram (LEXAPRO) 10 mg tablet Take 1 tablet (10 mg total) by mouth daily, Starting Tue 12/3/2019, Phone In      gabapentin (NEURONTIN) 100 mg capsule Take 1 capsule (100 mg total) by mouth 3 (three) times a day, Starting Mon 7/29/2019, Normal      levothyroxine 88 mcg tablet Take 1 tablet (88 mcg total) by mouth daily, Starting Tue 12/3/2019, Phone In      LORazepam (ATIVAN) 1 mg tablet Take 1 tablet (1 mg total) by mouth 3 (three) times a day as needed for anxiety, Starting Wed 8/29/2018, Normal      meclizine (ANTIVERT) 25 mg tablet Take 25 mg by mouth 3 (three) times a day as needed for dizziness , Historical Med      metoprolol tartrate (LOPRESSOR) 50 mg tablet Take 1 tablet (50 mg total) by mouth every 12 (twelve) hours, Starting Tue 11/5/2019, Normal      Multiple Vitamin (MULTIVITAMIN) tablet Take 1 tablet by mouth daily  , Historical Med      nortriptyline (PAMELOR) 50 mg capsule Take 1 capsule (50 mg total) by mouth daily, Starting Fri 12/20/2019, Normal      ondansetron (ZOFRAN-ODT) 8 mg disintegrating tablet Take 1 tablet (8 mg total) by mouth every 8 (eight) hours as needed for nausea or vomiting, Starting Wed 11/27/2019, Normal      !! oxyCODONE-acetaminophen (PERCOCET) 5-325 mg per tablet Take 1 tablet by mouth every 4 (four) hours as needed for moderate pain Max Daily Amount: 6 tablets, Starting Thu 12/27/2018, Normal      pantoprazole (PROTONIX) 40 mg tablet Take 1 tablet (40 mg total) by mouth 2 (two) times a day before meals, Starting Wed 11/27/2019, Until Fri 12/27/2019, Normal      potassium chloride (K-DUR,KLOR-CON) 20 mEq tablet Take 1 tablet (20 mEq total) by mouth 2 (two) times a day, Starting Wed 9/18/2019, Normal      simvastatin (ZOCOR) 10 mg tablet Take 1 tablet (10 mg total) by mouth daily at bedtime, Starting Thu 2/28/2019, Normal      torsemide (DEMADEX) 20 mg tablet Take 1 tablet (20 mg total) by mouth see administration instructions Take 1 tablet (20 mg total) by mouth daily except for Monday and Thursdays take 2 tablets (40 mg total) in morning, Starting Tue 12/24/2019, Print       !! - Potential duplicate medications found  Please discuss with provider  No discharge procedures on file         ED Provider  Electronically Signed by     Kuldip Durbin MD  03/03/20 5061

## 2020-02-12 NOTE — ED PROVIDER NOTES
History  Chief Complaint   Patient presents with    Shortness of Breath    Foot Pain     LEFT    Headache     s/p SL NTG     This is a 81-year-old female who was brought in for shortness of breath  Patient ran out of her medications approximately 2 days ago and was not able to refill them  Medications include Lasix, potassium, metoprolol, amiodarone, and Eliquis  Patient states that starting on the morning of 02/11/2020 she became significantly short of breath, this worsened throughout the day  Patient called EMS at on the early hours of to 12 21 and was found to be hypoxic at 80% on room air  Patient quickly responded to nitroglycerin and nasal cannula  Patient also notes she is having severe left foot pain  No trauma to the area  She has never had pain in this left foot previously  Prior to Admission Medications   Prescriptions Last Dose Informant Patient Reported? Taking?    ELIQUIS 5 MG Past Month at 0800 Self No Yes   Sig: TAKE ONE TABLET BY MOUTH TWO TIMES A DAY   LORazepam (ATIVAN) 1 mg tablet More than a month at Unknown time Self No No   Sig: Take 1 tablet (1 mg total) by mouth 3 (three) times a day as needed for anxiety   Multiple Vitamin (MULTIVITAMIN) tablet 2/11/2020 at 0800 Self Yes Yes   Sig: Take 1 tablet by mouth daily     amiodarone 200 mg tablet 2/11/2020 at 0800 Self No Yes   Sig: Take 1/2 tablet daily   clopidogrel (PLAVIX) 75 mg tablet 2/11/2020 at 0800 Self No Yes   Sig: Take 1 tablet (75 mg total) by mouth daily   escitalopram (LEXAPRO) 10 mg tablet 2/11/2020 at 0800 Self No Yes   Sig: Take 1 tablet (10 mg total) by mouth daily   gabapentin (NEURONTIN) 100 mg capsule 2/11/2020 at 2000 Self No Yes   Sig: Take 1 capsule (100 mg total) by mouth 3 (three) times a day   levothyroxine 88 mcg tablet 2/11/2020 at 0600 Self No Yes   Sig: Take 1 tablet (88 mcg total) by mouth daily   meclizine (ANTIVERT) 25 mg tablet More than a month at Unknown time Self Yes No   Sig: Take 25 mg by mouth 3 (three) times a day as needed for dizziness    metoprolol tartrate (LOPRESSOR) 50 mg tablet Past Week at Unknown time Self No Yes   Sig: Take 1 tablet (50 mg total) by mouth every 12 (twelve) hours   nortriptyline (PAMELOR) 50 mg capsule 2/11/2020 at 2100  No Yes   Sig: Take 1 capsule (50 mg total) by mouth daily   ondansetron (ZOFRAN-ODT) 8 mg disintegrating tablet More than a month at Unknown time Self No No   Sig: Take 1 tablet (8 mg total) by mouth every 8 (eight) hours as needed for nausea or vomiting   oxyCODONE-acetaminophen (PERCOCET) 5-325 mg per tablet Past Month at Unknown time Self No Yes   Sig: Take 1 tablet by mouth every 4 (four) hours as needed for moderate pain Max Daily Amount: 6 tablets   pantoprazole (PROTONIX) 40 mg tablet  Self No No   Sig: Take 1 tablet (40 mg total) by mouth 2 (two) times a day before meals   potassium chloride (K-DUR,KLOR-CON) 20 mEq tablet Past Month at Unknown time Self No Yes   Sig: Take 1 tablet (20 mEq total) by mouth 2 (two) times a day   simvastatin (ZOCOR) 10 mg tablet 2/11/2020 at 2100 Self No Yes   Sig: Take 1 tablet (10 mg total) by mouth daily at bedtime   torsemide (DEMADEX) 20 mg tablet 2/11/2020 at 0800  No Yes   Sig: Take 1 tablet (20 mg total) by mouth see administration instructions Take 1 tablet (20 mg total) by mouth daily except for Monday and Thursdays take 2 tablets (40 mg total) in morning      Facility-Administered Medications: None       Past Medical History:   Diagnosis Date    Acquired hypothyroidism 12/26/2018    Acute on chronic diastolic heart failure (HCC) 8/30/2018    Anemia     Atrial fibrillation (HCC)     CHF (congestive heart failure) (HCC)     History of transfusion     Hyperlipidemia     Hypertension     Migraine     Polyp of sigmoid colon     Prediabetes     Stroke ([facility] [address] ) 2011       Past Surgical History:   Procedure Laterality Date    APPENDECTOMY      CARDIAC ELECTROPHYSIOLOGY STUDY AND ABLATION      CHOLECYSTECTOMY      COLONOSCOPY         Family History   Problem Relation Age of Onset    Diabetes Mother     Heart disease Mother     Hypertension Mother     Arthritis Mother     Coronary artery disease Mother     Drug abuse Paternal Grandmother      I have reviewed and agree with the history as documented  Social History     Tobacco Use    Smoking status: Former Smoker     Types: Cigarettes     Last attempt to quit: 2011     Years since quittin 0    Smokeless tobacco: Never Used   Substance Use Topics    Alcohol use: Never     Alcohol/week: 0 0 standard drinks     Frequency: Never     Binge frequency: Never    Drug use: No       Review of Systems   Constitutional: Positive for activity change and fatigue  Negative for chills and fever  HENT: Negative for congestion  Eyes: Negative for visual disturbance  Respiratory: Positive for shortness of breath  Negative for cough and chest tightness  Cardiovascular: Negative for chest pain  Gastrointestinal: Negative for abdominal pain, diarrhea and vomiting  Genitourinary: Negative for dysuria  Skin: Negative for rash  Neurological: Negative for dizziness, weakness and numbness  Physical Exam  Physical Exam   Constitutional: She is oriented to person, place, and time  She appears well-developed and well-nourished  HENT:   Head: Normocephalic and atraumatic  Eyes: Pupils are equal, round, and reactive to light  Conjunctivae and EOM are normal    Neck: Normal range of motion  Neck supple  Cardiovascular: Normal rate, regular rhythm and normal heart sounds  Pulmonary/Chest: Effort normal  No respiratory distress  She has decreased breath sounds  Bibasilar rales  6 L nasal cannula in place  Abdominal: Soft  Bowel sounds are normal    Musculoskeletal: Normal range of motion  Right lower leg: She exhibits no tenderness and no edema  Left lower leg: She exhibits tenderness  She exhibits no edema  Tenderness over the left midfoot on the dorsal aspect  No obvious wounds, bruising, erythema etc    Neurological: She is alert and oriented to person, place, and time  Skin: Skin is warm and dry  Capillary refill takes less than 2 seconds  No rash noted  No erythema  No pallor  Psychiatric: She has a normal mood and affect   Her behavior is normal        Vital Signs  ED Triage Vitals   Temperature Pulse Respirations Blood Pressure SpO2   02/12/20 0436 02/12/20 0436 02/12/20 0436 02/12/20 0436 02/12/20 0436   98 °F (36 7 °C) 87 16 153/69 100 %      Temp Source Heart Rate Source Patient Position - Orthostatic VS BP Location FiO2 (%)   02/12/20 0436 02/12/20 0436 02/12/20 0436 02/12/20 0436 --   Temporal Monitor Lying Right arm       Pain Score       02/12/20 0438       Worst Possible Pain           Vitals:    02/12/20 0815 02/12/20 0830 02/12/20 0845 02/12/20 0900   BP: 143/66 164/72  163/72   Pulse: 85 90 86 80   Patient Position - Orthostatic VS: Sitting            Visual Acuity      ED Medications  Medications   aspirin chewable tablet 324 mg (324 mg Oral Given 2/12/20 0555)   ipratropium-albuterol (DUO-NEB) 0 5-2 5 mg/3 mL inhalation solution 3 mL (3 mL Nebulization Given 2/12/20 0558)   acetaminophen (TYLENOL) tablet 650 mg (650 mg Oral Given 2/12/20 0557)   potassium chloride (K-DUR,KLOR-CON) CR tablet 20 mEq (20 mEq Oral Given 2/12/20 0659)   metoprolol tartrate (LOPRESSOR) tablet 50 mg (50 mg Oral Given 2/12/20 0727)   amoxicillin-clavulanate (AUGMENTIN) 875-125 mg per tablet 1 tablet (1 tablet Oral Given 2/12/20 0727)   oxyCODONE-acetaminophen (PERCOCET) 5-325 mg per tablet 1 tablet (1 tablet Oral Given 2/12/20 0727)       Diagnostic Studies  Results Reviewed     Procedure Component Value Units Date/Time    Troponin I repeat in 3 hrs [[id number]]  (Normal) Collected:  02/12/20 0824    Lab Status:  Final result Specimen:  Blood from Arm, Left Updated:  02/12/20 0853     Troponin I <0 03 ng/mL Influenza A/B and RSV PCR [[id number]]  (Normal) Collected:  02/12/20 0616    Lab Status:  Final result Specimen:  Nasopharyngeal Swab Updated:  02/12/20 0621     INFLUENZA A PCR None Detected     INFLUENZA B PCR None Detected     RSV PCR None Detected    Protime-INR [[id number]]  (Normal) Collected:  02/12/20 0616    Lab Status:  Final result Specimen:  Blood Updated:  02/12/20 0620     Protime 11 3 seconds      INR 0 97    APTT [[id number]]  (Normal) Collected:  02/12/20 0616    Lab Status:  Final result Specimen:  Blood Updated:  02/12/20 0620     PTT 30 seconds     TSH, 3rd generation [[id number]]  (Normal) Collected:  02/12/20 0445    Lab Status:  Final result Specimen:  Blood from Arm, Left Updated:  02/12/20 0611     TSH 3RD GENERATON 1 680 uIU/mL     Narrative:       Patients undergoing fluorescein dye angiography may retain small amounts of fluorescein in the body for 48-72 hours post procedure  Samples containing fluorescein can produce falsely depressed TSH values  If the patient had this procedure,a specimen should be resubmitted post fluorescein clearance        Troponin I [[id number]]  (Normal) Collected:  02/12/20 0445    Lab Status:  Final result Specimen:  Blood from Arm, Left Updated:  02/12/20 0611     Troponin I <0 03 ng/mL     B-Type Natriuretic Peptide ([address] [place] [address]) [[id number]]  (Abnormal) Collected:  02/12/20 0445    Lab Status:  Final result Specimen:  Blood from Arm, Left Updated:  02/12/20 0611      pg/mL     Comprehensive metabolic panel [[id number]]  (Abnormal) Collected:  02/12/20 0445    Lab Status:  Final result Specimen:  Blood from Arm, Left Updated:  02/12/20 0611     Sodium 137 mmol/L      Potassium 3 3 mmol/L      Chloride 101 mmol/L      CO2 23 mmol/L      ANION GAP 13 mmol/L      BUN 18 mg/dL      Creatinine 1 29 mg/dL      Glucose 135 mg/dL      Calcium 9 3 mg/dL      AST 18 U/L      ALT 12 U/L      Alkaline Phosphatase 105 U/L      Total Protein 7 1 g/dL      Albumin 3 9 g/dL      Total Bilirubin 0 30 mg/dL      eGFR 41 ml/min/1 73sq m     Narrative:       National Kidney Disease Foundation guidelines for Chronic Kidney Disease (CKD):     Stage 1 with normal or high GFR (GFR > 90 mL/min/1 73 square meters)    Stage 2 Mild CKD (GFR = 60-89 mL/min/1 73 square meters)    Stage 3A Moderate CKD (GFR = 45-59 mL/min/1 73 square meters)    Stage 3B Moderate CKD (GFR = 30-44 mL/min/1 73 square meters)    Stage 4 Severe CKD (GFR = 15-29 mL/min/1 73 square meters)    Stage 5 End Stage CKD (GFR <15 mL/min/1 73 square meters)  Note: GFR calculation is accurate only with a steady state creatinine    CBC and differential [[id number]]  (Abnormal) Collected:  02/12/20 0445    Lab Status:  Final result Specimen:  Blood from Arm, Left Updated:  02/12/20 0609     WBC 9 60 Thousand/uL      RBC 4 37 Million/uL      Hemoglobin 8 9 g/dL      Hematocrit 29 9 %      MCV 69 fL      MCH 20 3 pg      MCHC 29 7 g/dL      RDW 19 9 %      MPV 9 2 fL      Platelets 758 Thousands/uL      Neutrophils Relative 76 %      Lymphocytes Relative 15 %      Monocytes Relative 6 %      Eosinophils Relative 2 %      Basophils Relative 1 %      Neutrophils Absolute 7 30 Thousands/µL      Lymphocytes Absolute 1 40 Thousands/µL      Monocytes Absolute 0 50 Thousand/µL      Eosinophils Absolute 0 20 Thousand/µL      Basophils Absolute 0 10 Thousands/µL                  XR foot 3+ vw left   ED Interpretation by Oscar Verde MD (02/12 0280)   NAF      Final Result by Akosua Sarah MD (02/12 1303)      No acute osseous abnormality              Workstation performed: [id number]         X-ray chest 2 views   Final Result by Akosua Sarah MD (43/00 4697)      No acute abnormality in the chest             Workstation performed: [id number]                    Procedures  ECG 12 Lead Documentation Only  Date/Time: 2/12/2020 6:06 AM  Performed by: Oscar Verde MD  Authorized by: Oscar Verde MD     Patient location: ED  Previous ECG:     Comparison to cardiac monitor: Yes    Interpretation:     Interpretation: non-specific    Quality:     Tracing quality:  Limited by artifact  Rate:     ECG rate:  91    ECG rate assessment: normal    Rhythm:     Rhythm: sinus rhythm    Ectopy:     Ectopy: none               ED Course                               MDM  Number of Diagnoses or Management Options  Foot pain, left: new and requires workup  Shortness of breath: new and requires workup  Tachycardia: new and requires workup  Diagnosis management comments: This is a 82-year-old female multiple complaints  Patient has questionable infiltrate on x-ray as well as some question of whether she was desaturating would initially brought in by EMS  Patient given initial dose of Augmentin  Patient given a DuoNeb which did not seem to significantly improve her  Patient given metoprolol and Eliquis as she had run out of these  Stressed the importance of taking her cardiac medications as these could be contributing to her shortness of breath  No ST changes initial troponin is negative  Second troponin is pending  Patient having pain in the left foot  X-ray ordered  Some significant amount of tenderness diffusely over the dorsum of the foot  Patient appears clinically well and will likely go home if 2nd troponin is negative and she passes walking test without difficulty  Case signed out to Dr Joseline Knight will continue the evaluation and management of the patient  At the time of transfer of care patient is stable and states understanding and agreement with plan  Amount and/or Complexity of Data Reviewed  Clinical lab tests: ordered and reviewed  Tests in the radiology section of CPT®: ordered and reviewed  Decide to obtain previous medical records or to obtain history from someone other than the patient: yes  Discuss the patient with other providers: yes          Disposition  Final diagnoses:    Foot pain, left   Tachycardia   Shortness of breath     Time reflects when diagnosis was documented in both MDM as applicable and the Disposition within this note     Time User Action Codes Description Comment    2/12/2020  9:21 AM Co[place]ho [place] Add [L24 107] Foot pain, left     2/12/2020  9:21 AM Coelho [place] Add [R00 0] Tachycardia     2/12/2020  9:22 AM Coelho [place] Add [R06 02] Shortness of breath       ED Disposition     ED Disposition Condition Date/Time Comment    Discharge Stable Wed Feb 12, 2020  9:21 AM Quinten Senters discharge to home/self care  Follow-up Information     Follow up With Specialties Details Why Contact Info    Amanda Ospina MD Internal Medicine In 5 days  [address]  [address] [facility]            Discharge Medication List as of 2/12/2020  9:52 AM      START taking these medications    Details   amoxicillin-clavulanate (AUGMENTIN) 875-125 mg per tablet Take 1 tablet by mouth every 12 (twelve) hours for 7 days, Starting Wed 2/12/2020, Until Wed 2/19/2020, Normal      !! oxyCODONE-acetaminophen (PERCOCET) 5-325 mg per tablet Take 1 tablet by mouth every 8 (eight) hours as needed for moderate pain for up to 10 daysMax Daily Amount: 3 tablets, Starting Wed 2/12/2020, Until Sat 2/22/2020, Normal       !! - Potential duplicate medications found  Please discuss with provider        CONTINUE these medications which have NOT CHANGED    Details   amiodarone 200 mg tablet Take 1/2 tablet daily, Normal      clopidogrel (PLAVIX) 75 mg tablet Take 1 tablet (75 mg total) by mouth daily, Starting Tue 11/5/2019, Normal      ELIQUIS 5 MG TAKE ONE TABLET BY MOUTH TWO TIMES A DAY, Normal      escitalopram (LEXAPRO) 10 mg tablet Take 1 tablet (10 mg total) by mouth daily, Starting Tue 12/3/2019, Phone In      gabapentin (NEURONTIN) 100 mg capsule Take 1 capsule (100 mg total) by mouth 3 (three) times a day, Starting Mon 7/29/2019, Normal      levothyroxine 88 mcg tablet Take 1 tablet (88 mcg total) by mouth daily, Starting Tue 12/3/2019, Phone In      LORazepam (ATIVAN) 1 mg tablet Take 1 tablet (1 mg total) by mouth 3 (three) times a day as needed for anxiety, Starting Wed 8/29/2018, Normal      meclizine (ANTIVERT) 25 mg tablet Take 25 mg by mouth 3 (three) times a day as needed for dizziness , Historical Med      metoprolol tartrate (LOPRESSOR) 50 mg tablet Take 1 tablet (50 mg total) by mouth every 12 (twelve) hours, Starting Tue 11/5/2019, Normal      Multiple Vitamin (MULTIVITAMIN) tablet Take 1 tablet by mouth daily  , Historical Med      nortriptyline (PAMELOR) 50 mg capsule Take 1 capsule (50 mg total) by mouth daily, Starting Fri 12/20/2019, Normal      ondansetron (ZOFRAN-ODT) 8 mg disintegrating tablet Take 1 tablet (8 mg total) by mouth every 8 (eight) hours as needed for nausea or vomiting, Starting Wed 11/27/2019, Normal      !! oxyCODONE-acetaminophen (PERCOCET) 5-325 mg per tablet Take 1 tablet by mouth every 4 (four) hours as needed for moderate pain Max Daily Amount: 6 tablets, Starting Thu 12/27/2018, Normal      pantoprazole (PROTONIX) 40 mg tablet Take 1 tablet (40 mg total) by mouth 2 (two) times a day before meals, Starting Wed 11/27/2019, Until Fri 12/27/2019, Normal      potassium chloride (K-DUR,KLOR-CON) 20 mEq tablet Take 1 tablet (20 mEq total) by mouth 2 (two) times a day, Starting Wed 9/18/2019, Normal      simvastatin (ZOCOR) 10 mg tablet Take 1 tablet (10 mg total) by mouth daily at bedtime, Starting Thu 2/28/2019, Normal      torsemide (DEMADEX) 20 mg tablet Take 1 tablet (20 mg total) by mouth see administration instructions Take 1 tablet (20 mg total) by mouth daily except for Monday and Thursdays take 2 tablets (40 mg total) in morning, Starting Tue 12/24/2019, Print       !! - Potential duplicate medications found  Please discuss with provider  No discharge procedures on file      PDMP Review     None          ED Provider  Electronically Signed by Verena Ahumada, MD  02/13/20 0646

## 2020-02-18 ENCOUNTER — PATIENT OUTREACH (OUTPATIENT)
Dept: INTERNAL MEDICINE CLINIC | Facility: CLINIC | Age: 75
End: 2020-02-18

## 2020-02-18 ENCOUNTER — PATIENT OUTREACH (OUTPATIENT)
Dept: CASE MANAGEMENT | Facility: OTHER | Age: 75
End: 2020-02-18

## 2020-02-18 DIAGNOSIS — Z71.89 COMPLEX CARE COORDINATION: Primary | ICD-10-CM

## 2020-02-18 NOTE — PROGRESS NOTES
[facility] SW received referral from RN SHIN in regards to patient's needs and resources  Concerns for patient's welfare  Referral was made to [facility], [facility] and resources provided for patient to obtain behavioral health services  No other needs at this time  Will be available if needed in the future

## 2020-02-18 NOTE — PROGRESS NOTES
Outpatient Nurse Care Manager received message from Dr Bettie Martins MA regarding a phone call she had with Lainey's daughter last week and her concern for her mother's welfare  I reached out to her daughter, ST AUGUSTE, who is on Lainey's Communication form  She is concerned that her mom is not taking care of herself, her hygiene, her apartment, or her finances  She says that Antionette Hatfield has no physical limitations, other than a fairly new report of saying she can't see and thinks she is going blind, but she ambulates without difficulty and can use all extremities  Daughter feels underlying cause for self neglect may be depression as she states her mom has been "bitter and depressed" since she and her father split up over 20 years ago  Daughter reports that her mom is not low income, but does not take care of her finances  Daughter made a budget for her but she does not follow it and is late paying bills and frequently in overdraft  Daughter states Antionette Hatfield has an odor and she does not know when she last bathed  Her hair is very greasy  She does not wash her clothing; she throws them in bags  The apartment is a mess and she hoards trash  There are bugs, feces on bathroom floor, toilet is black, dishes are dirty with mold growing on them  She does not want to socialize  Antionette Hatfield is canceling her doctor appointments and is not taking her meds unless daughter or son get them for her  Daughter has offered to clean for Ganos many times, but Ganos has refused  Now daughter fears even going in to her home that she may take bugs back to her own house  Daughter works nights and is a single parent, but offers to drive [employer] to appointments, but [employer]anos cancels  Daughter has tried to complete [employer] application for mom, but Ganos will not give her her drivers license for proof of age to complete the application  Son and daughter feel SheilaSlack Alysia would be much better cared for in an assisted living environment, but [employer] does not wish to move   Daughter questioning her mom's competency and would like her evaluated; Message sent to Dr Angela Moulton to see if he would be willing to refer to Ambulatory NeuroPsych  After speaking at length, daughter agreed to my placing a referral to [address] [place] [address] on [address] for self neglect and they will contact daughter and perform an outreach to assess the situation  I left message with [facility] for referral hoping they can provide her with low sodium healthy meals as Gerson Fink is ordering snacks on [employer] and eating fast foods  She does not eat food her daughter has prepared, but daughter thinks she may eat the [facility] Whe[employer] foods  I consulted Outpatient Care Management , Tameka Lorenz, who concurred with my actions taken  Daughter stated that Gerson Fink can afford her meds if she follows the budget provided  I did explain that we can check into [employer] Patient Assistance if she would like, if cost does not go down now that deductible has been met  Daughter did get her her Eliquis  I strongly encouraged daughter to schedule Behavioral Health appointment for Gerson Fink as soon as possible, as this may be underlying issue  She will look at [facility] number on back of [employer] card and schedule  She agreed to also reschedule appointment with Dr Angela Moulton and with Cardiology and she or her brother will take Gerson Fink to appointments  Daughter will schedule eye doctor appointment also  I also explained to daughter that depending on AAA evaluation, if HHA coverage is not started, it may be something she wants to look into for a few hours per week privately, and to ask the agencies when she contacts them, if their HHA's can provide transportation to Dr appointments  I did reiterate that HHA's do not do heavy housework  She verbalized understanding of all

## 2020-02-19 ENCOUNTER — PATIENT OUTREACH (OUTPATIENT)
Dept: INTERNAL MEDICINE CLINIC | Facility: CLINIC | Age: 75
End: 2020-02-19

## 2020-02-19 NOTE — PROGRESS NOTES
Outpatient Nurse Care Manager left second message with [facility], using the second number listed on their website this time   Awaiting return call to generate new referral

## 2020-02-21 ENCOUNTER — TELEPHONE (OUTPATIENT)
Dept: INTERNAL MEDICINE CLINIC | Facility: CLINIC | Age: 75
End: 2020-02-21

## 2020-02-24 ENCOUNTER — PATIENT OUTREACH (OUTPATIENT)
Dept: INTERNAL MEDICINE CLINIC | Facility: CLINIC | Age: 75
End: 2020-02-24

## 2020-02-24 NOTE — PROGRESS NOTES
Outpatient Nurse Care  attempt to follow up with daughter Sushil Tamayo  Left voice mail with contact information for return call  Want to notify her that I did speak to our SW who feels [address] appointment crucial for mom  Also that I did ask Dr Megan Coleman about his thoughts on NeuroPsych eval, and he would like to evaluate Ham Kohli himself first, so an appointment should be made with him  Also that I left third message with [facility] [place] to try to place new referral for her, as they could provide her with heart healthy meals  2/24/20 2253  Received message from PCP office to call Sushil Tamayo  I called back and got voice mail again  Again provided her with my direct phone number in my message  2/24/20 1295  Left another message for Sushil Tamayo with my contact information

## 2020-02-25 ENCOUNTER — PATIENT OUTREACH (OUTPATIENT)
Dept: INTERNAL MEDICINE CLINIC | Facility: CLINIC | Age: 75
End: 2020-02-25

## 2020-02-25 NOTE — PROGRESS NOTES
Outpatient Nurse Care  attempt to reach daughter for follow up, though I believe she works night shift  Therefore, I left a message with my contact information, but also explained that I continue to await a return call from [facility] in hopes of placing a referral so Jatin Altman can be delivered heart healthy meals as opposed to fast foods she has been eating  I also explained that Dr Alfreda Oneal would like to see Jatin Duonghilary first to determine necessity for neuropsych eval for competency, which daughter wished for in hopes of being able to help Jatin Altman get the care she needs as she is not caring for self well according to daughter  Provided PCP phone number and encouraged her in message to please schedule that appointment

## 2020-02-26 ENCOUNTER — PATIENT OUTREACH (OUTPATIENT)
Dept: INTERNAL MEDICINE CLINIC | Facility: CLINIC | Age: 75
End: 2020-02-26

## 2020-02-26 NOTE — PROGRESS NOTES
Outpatient Nurse Care Manager follow up on this socially complex care patient who office asked me to reach out to, due to daughter's concerns  Daughter, Julio C Stein, aware that Dr Linnea Andrea would like to see Bisi Jung for follow up for concerns regarding competency and self neglect  Daughter will be making appointments for mom to see Dr Linnea Andrea, Cardiologist, and Eye Dr  I explained to her that I did discuss case with outpatient care management Alejandra Barrera, and she also strongly recommended [facility] appointment, as we did discuss on first call  Daughter verbalized understanding and will set that up as well  She had not yet heard from [employer] [facility] [address] or [employer] on [address], so I said I would reach out to them again  I called and spoke with [facility] , Mae Jasmine, who was assigned to Lainey's case  He plans on seeing her tomorrow  I explained that the daughter would like to be there and provided contact information for him to reach her  I had also given AAA phone number to daughter to reach out to them  Mae Jasmine and I reviewed reason for referral and he said that AAA has services specifically for mental health and if that is what is determined that she needs, they can help to set up services  He also said they can work on [employer] referral after I explained the reason I have been attempting to place that referral  I called daughter back and shared what he said with her  She is actually going to call Mae Jasmine because she and brother have met with  to draw up a power of  for their mom, and they would like to get that completed before Rambo's visit, so she is not overwhelmed with everything  2/26/20 0955  Rich from [facility] on [employer] called back and referral made  Provided him with Bisi Jung and daughter's contact information

## 2020-03-02 ENCOUNTER — PATIENT OUTREACH (OUTPATIENT)
Dept: INTERNAL MEDICINE CLINIC | Facility: CLINIC | Age: 75
End: 2020-03-02

## 2020-03-02 NOTE — PROGRESS NOTES
Outpatient Nurse Care Manager received a message from Lainey's daughter that she set up eye dr appointment for her mom on 2/28/20, an appointment with Dr Nemo Smith, and an appointment with cardiology  I also received a message from [employer] on [address] that he visited Maryan Obrien on 2/27/20 but that she would not let him enter her home  She said she was taking her medications, bathing, and keeping a clean house  He offered the services that AAA could provide but she declined  He said she was alert and oriented for conversation  He said he did speak with her daughter for follow up as well  I called daughter, Lyndon Spence, back and left message thanking her for making the dr appointments, and reminding her to make [facility] appointment, and to call if she needs assist with that  I also explained that mom refused AAA services, but that we can reach out again if mom changes mind and agrees to them  In my message, I also notified Lyndon Spence that [facility] [employer] [facility] did call me back and hopefully she has heard from them as well as I gave Sruthisumeetsal Spence as the  in hopes of getting Maryan Obrien a low sodium, heart healthy diet  Provided my contact information if she needs anything in the future  If no response to the message I left her today, will sign off as care manager as Maryan Obrien was referred to me for socially complex reasons  3/2/20 1400  Daughter called back to say that she is upset that Maryan Obrien is refusing all services offered and she believes she was not honest with [employer]  in telling him she was ok with finances and taking her medications  Daughter told me about unpaid bill with [employer], and that her mom told her she is only taking half of her medication doses until her intermediate check comes  For instance, is only taking one eliquis instead of two  Daughter again says Maryan Obrien can afford it, but does not manage her finances even though daughter made a budget for her   Daughter shared that AAA  also said she is not eligible for medication assistance  I shared with her also that there are patient assistance programs through many pharmaceutical companies such as with eliquis, but patients are required to put out a certain amount of money on their own each calendar year before they will be eligible, and Rory Hudson was given samples of eliquis  Rory Hudson also canceled the eye dr appointment that her daughter made for her  She said she made one on her own, where she preferred to go, but daughter may not be able to drive her to that one, and Lainey's vision is such that daughter feels she should not be driving  Daughter will ask her if she is willing to see [facility] specialist  Daughter is concerned as Thensantiago Hudson also told her she is thinking about skipping out and moving back to [place], where Lainey's brother lives, so daughter is worried she may not be paying her rent  Monalisa aware that Thena Becca not deemed incompetent and we cannot force her to accept services  Jesu Herman knows she can contact me or outpatient care management  or AAA with future questions

## 2020-03-30 DIAGNOSIS — I10 ESSENTIAL HYPERTENSION: ICD-10-CM

## 2020-03-31 RX ORDER — POTASSIUM CHLORIDE 20 MEQ/1
20 TABLET, EXTENDED RELEASE ORAL 2 TIMES DAILY
Qty: 180 TABLET | Refills: 0 | Status: SHIPPED | OUTPATIENT
Start: 2020-03-31 | End: 2020-07-26 | Stop reason: SDUPTHER

## 2020-04-05 DIAGNOSIS — I48.0 PAROXYSMAL ATRIAL FIBRILLATION (HCC): ICD-10-CM

## 2020-04-07 RX ORDER — APIXABAN 5 MG/1
TABLET, FILM COATED ORAL
Qty: 60 TABLET | Refills: 5 | Status: ON HOLD | OUTPATIENT
Start: 2020-04-07 | End: 2020-05-04 | Stop reason: SDUPTHER

## 2020-05-02 ENCOUNTER — APPOINTMENT (EMERGENCY)
Dept: CT IMAGING | Facility: HOSPITAL | Age: 75
DRG: 291 | End: 2020-05-02
Payer: MEDICARE

## 2020-05-02 ENCOUNTER — HOSPITAL ENCOUNTER (INPATIENT)
Facility: HOSPITAL | Age: 75
LOS: 2 days | Discharge: HOME | DRG: 291 | End: 2020-05-04
Attending: EMERGENCY MEDICINE | Admitting: HOSPITALIST
Payer: MEDICARE

## 2020-05-02 ENCOUNTER — APPOINTMENT (EMERGENCY)
Dept: RADIOLOGY | Facility: HOSPITAL | Age: 75
DRG: 291 | End: 2020-05-02
Payer: MEDICARE

## 2020-05-02 DIAGNOSIS — I50.33 DIASTOLIC CHF, ACUTE ON CHRONIC (HCC): Primary | ICD-10-CM

## 2020-05-02 DIAGNOSIS — I48.0 PAROXYSMAL ATRIAL FIBRILLATION (HCC): ICD-10-CM

## 2020-05-02 DIAGNOSIS — E78.00 HYPERCHOLESTEROLEMIA: ICD-10-CM

## 2020-05-02 DIAGNOSIS — M54.50 CHRONIC BILATERAL LOW BACK PAIN WITHOUT SCIATICA: ICD-10-CM

## 2020-05-02 DIAGNOSIS — F32.89 OTHER DEPRESSION: ICD-10-CM

## 2020-05-02 DIAGNOSIS — I50.33 ACUTE ON CHRONIC DIASTOLIC CONGESTIVE HEART FAILURE (HCC): ICD-10-CM

## 2020-05-02 DIAGNOSIS — I10 ESSENTIAL HYPERTENSION: ICD-10-CM

## 2020-05-02 DIAGNOSIS — G43.909 MIGRAINE WITHOUT STATUS MIGRAINOSUS, NOT INTRACTABLE, UNSPECIFIED MIGRAINE TYPE: ICD-10-CM

## 2020-05-02 DIAGNOSIS — E03.9 ACQUIRED HYPOTHYROIDISM: ICD-10-CM

## 2020-05-02 DIAGNOSIS — G89.29 CHRONIC BILATERAL LOW BACK PAIN WITHOUT SCIATICA: ICD-10-CM

## 2020-05-02 DIAGNOSIS — I63.9 CEREBROVASCULAR ACCIDENT (CVA), UNSPECIFIED MECHANISM (HCC): ICD-10-CM

## 2020-05-02 PROBLEM — R06.03 ACUTE RESPIRATORY DISTRESS: Status: ACTIVE | Noted: 2017-12-25

## 2020-05-02 PROBLEM — N18.30 ANEMIA DUE TO STAGE 3 CHRONIC KIDNEY DISEASE (HCC): Status: ACTIVE | Noted: 2018-11-02

## 2020-05-02 PROBLEM — D63.1 ANEMIA DUE TO STAGE 3 CHRONIC KIDNEY DISEASE (HCC): Status: ACTIVE | Noted: 2018-11-02

## 2020-05-02 PROBLEM — K21.9 GASTROESOPHAGEAL REFLUX DISEASE WITHOUT ESOPHAGITIS: Status: ACTIVE | Noted: 2020-05-02

## 2020-05-02 PROBLEM — Z91.19 HISTORY OF NONCOMPLIANCE WITH MEDICAL TREATMENT: Status: ACTIVE | Noted: 2020-05-02

## 2020-05-02 PROBLEM — H53.8 BLURRED VISION, RIGHT EYE: Status: ACTIVE | Noted: 2020-05-02

## 2020-05-02 PROBLEM — Z20.822 SUSPECTED COVID-19 VIRUS INFECTION: Status: ACTIVE | Noted: 2020-05-02

## 2020-05-02 LAB
ALBUMIN SERPL BCP-MCNC: 3.7 G/DL (ref 3.5–5.7)
ALP SERPL-CCNC: 103 U/L (ref 55–165)
ALT SERPL W P-5'-P-CCNC: 23 U/L (ref 7–52)
ANION GAP SERPL CALCULATED.3IONS-SCNC: 7 MMOL/L (ref 4–13)
ANISOCYTOSIS BLD QL SMEAR: PRESENT
AST SERPL W P-5'-P-CCNC: 19 U/L (ref 13–39)
BASOPHILS # BLD AUTO: 0.1 THOUSANDS/ΜL (ref 0–0.1)
BASOPHILS NFR BLD AUTO: 1 % (ref 0–2)
BILIRUB SERPL-MCNC: 0.3 MG/DL (ref 0.2–1)
BNP SERPL-MCNC: 422 PG/ML (ref 1–100)
BUN SERPL-MCNC: 10 MG/DL (ref 7–25)
CALCIUM SERPL-MCNC: 8.7 MG/DL (ref 8.6–10.5)
CHLORIDE SERPL-SCNC: 109 MMOL/L (ref 98–107)
CO2 SERPL-SCNC: 25 MMOL/L (ref 21–31)
CREAT SERPL-MCNC: 1.08 MG/DL (ref 0.6–1.2)
D DIMER PPP FEU-MCNC: 1.64 UG/ML FEU
EOSINOPHIL # BLD AUTO: 0.3 THOUSAND/ΜL (ref 0–0.61)
EOSINOPHIL NFR BLD AUTO: 5 % (ref 0–5)
ERYTHROCYTE [DISTWIDTH] IN BLOOD BY AUTOMATED COUNT: 22.3 % (ref 11.5–14.5)
GFR SERPL CREATININE-BSD FRML MDRD: 51 ML/MIN/1.73SQ M
GLUCOSE SERPL-MCNC: 107 MG/DL (ref 65–99)
HCT VFR BLD AUTO: 28.8 % (ref 42–47)
HGB BLD-MCNC: 8.7 G/DL (ref 12–16)
HYPERCHROMIA BLD QL SMEAR: PRESENT
LYMPHOCYTES # BLD AUTO: 2.2 THOUSANDS/ΜL (ref 0.6–4.47)
LYMPHOCYTES NFR BLD AUTO: 30 % (ref 21–51)
MCH RBC QN AUTO: 21.6 PG (ref 26–34)
MCHC RBC AUTO-ENTMCNC: 30.3 G/DL (ref 31–37)
MCV RBC AUTO: 71 FL (ref 81–99)
MICROCYTES BLD QL AUTO: PRESENT
MONOCYTES # BLD AUTO: 0.5 THOUSAND/ΜL (ref 0.17–1.22)
MONOCYTES NFR BLD AUTO: 7 % (ref 2–12)
NEUTROPHILS # BLD AUTO: 4.2 THOUSANDS/ΜL (ref 1.4–6.5)
NEUTS SEG NFR BLD AUTO: 58 % (ref 42–75)
PLATELET # BLD AUTO: 374 THOUSANDS/UL (ref 149–390)
PLATELET BLD QL SMEAR: ADEQUATE
PMV BLD AUTO: 8.2 FL (ref 8.6–11.7)
POTASSIUM SERPL-SCNC: 4.2 MMOL/L (ref 3.5–5.5)
PROT SERPL-MCNC: 6.5 G/DL (ref 6.4–8.9)
RBC # BLD AUTO: 4.04 MILLION/UL (ref 3.9–5.2)
RBC MORPH BLD: PRESENT
SODIUM SERPL-SCNC: 141 MMOL/L (ref 134–143)
WBC # BLD AUTO: 7.3 THOUSAND/UL (ref 4.8–10.8)

## 2020-05-02 PROCEDURE — 80053 COMPREHEN METABOLIC PANEL: CPT | Performed by: EMERGENCY MEDICINE

## 2020-05-02 PROCEDURE — 71045 X-RAY EXAM CHEST 1 VIEW: CPT

## 2020-05-02 PROCEDURE — 94760 N-INVAS EAR/PLS OXIMETRY 1: CPT

## 2020-05-02 PROCEDURE — 85379 FIBRIN DEGRADATION QUANT: CPT | Performed by: EMERGENCY MEDICINE

## 2020-05-02 PROCEDURE — 36415 COLL VENOUS BLD VENIPUNCTURE: CPT | Performed by: EMERGENCY MEDICINE

## 2020-05-02 PROCEDURE — U0003 INFECTIOUS AGENT DETECTION BY NUCLEIC ACID (DNA OR RNA); SEVERE ACUTE RESPIRATORY SYNDROME CORONAVIRUS 2 (SARS-COV-2) (CORONAVIRUS DISEASE [COVID-19]), AMPLIFIED PROBE TECHNIQUE, MAKING USE OF HIGH THROUGHPUT TECHNOLOGIES AS DESCRIBED BY CMS-2020-01-R: HCPCS | Performed by: EMERGENCY MEDICINE

## 2020-05-02 PROCEDURE — 71275 CT ANGIOGRAPHY CHEST: CPT

## 2020-05-02 PROCEDURE — 87040 BLOOD CULTURE FOR BACTERIA: CPT | Performed by: EMERGENCY MEDICINE

## 2020-05-02 PROCEDURE — 94664 DEMO&/EVAL PT USE INHALER: CPT

## 2020-05-02 PROCEDURE — 99285 EMERGENCY DEPT VISIT HI MDM: CPT

## 2020-05-02 PROCEDURE — 99285 EMERGENCY DEPT VISIT HI MDM: CPT | Performed by: EMERGENCY MEDICINE

## 2020-05-02 PROCEDURE — 85025 COMPLETE CBC W/AUTO DIFF WBC: CPT | Performed by: EMERGENCY MEDICINE

## 2020-05-02 PROCEDURE — 99223 1ST HOSP IP/OBS HIGH 75: CPT | Performed by: NURSE PRACTITIONER

## 2020-05-02 PROCEDURE — 83880 ASSAY OF NATRIURETIC PEPTIDE: CPT | Performed by: EMERGENCY MEDICINE

## 2020-05-02 RX ORDER — NORTRIPTYLINE HYDROCHLORIDE 25 MG/1
50 CAPSULE ORAL DAILY
Status: DISCONTINUED | OUTPATIENT
Start: 2020-05-03 | End: 2020-05-04 | Stop reason: HOSPADM

## 2020-05-02 RX ORDER — ESCITALOPRAM OXALATE 10 MG/1
10 TABLET ORAL DAILY
Status: DISCONTINUED | OUTPATIENT
Start: 2020-05-03 | End: 2020-05-04 | Stop reason: HOSPADM

## 2020-05-02 RX ORDER — TORSEMIDE 20 MG/1
20 TABLET ORAL SEE ADMIN INSTRUCTIONS
Status: DISCONTINUED | OUTPATIENT
Start: 2020-05-02 | End: 2020-05-02

## 2020-05-02 RX ORDER — TORSEMIDE 20 MG/1
20 TABLET ORAL DAILY
Status: DISCONTINUED | OUTPATIENT
Start: 2020-05-03 | End: 2020-05-02

## 2020-05-02 RX ORDER — LORAZEPAM 1 MG/1
1 TABLET ORAL 3 TIMES DAILY PRN
Status: DISCONTINUED | OUTPATIENT
Start: 2020-05-02 | End: 2020-05-04 | Stop reason: HOSPADM

## 2020-05-02 RX ORDER — FUROSEMIDE 10 MG/ML
40 INJECTION INTRAMUSCULAR; INTRAVENOUS
Status: DISCONTINUED | OUTPATIENT
Start: 2020-05-02 | End: 2020-05-04

## 2020-05-02 RX ORDER — PRAVASTATIN SODIUM 20 MG
20 TABLET ORAL
Status: DISCONTINUED | OUTPATIENT
Start: 2020-05-02 | End: 2020-05-04 | Stop reason: HOSPADM

## 2020-05-02 RX ORDER — ACETAMINOPHEN 325 MG/1
650 TABLET ORAL EVERY 6 HOURS PRN
Status: DISCONTINUED | OUTPATIENT
Start: 2020-05-02 | End: 2020-05-04 | Stop reason: HOSPADM

## 2020-05-02 RX ORDER — ONDANSETRON 2 MG/ML
4 INJECTION INTRAMUSCULAR; INTRAVENOUS EVERY 6 HOURS PRN
Status: DISCONTINUED | OUTPATIENT
Start: 2020-05-02 | End: 2020-05-04 | Stop reason: HOSPADM

## 2020-05-02 RX ORDER — METOPROLOL TARTRATE 50 MG/1
50 TABLET, FILM COATED ORAL EVERY 12 HOURS SCHEDULED
Status: DISCONTINUED | OUTPATIENT
Start: 2020-05-02 | End: 2020-05-04 | Stop reason: HOSPADM

## 2020-05-02 RX ORDER — POTASSIUM CHLORIDE 20 MEQ/1
20 TABLET, EXTENDED RELEASE ORAL 2 TIMES DAILY WITH MEALS
Status: DISCONTINUED | OUTPATIENT
Start: 2020-05-02 | End: 2020-05-04 | Stop reason: HOSPADM

## 2020-05-02 RX ORDER — LEVOTHYROXINE SODIUM 88 UG/1
88 TABLET ORAL
Status: DISCONTINUED | OUTPATIENT
Start: 2020-05-03 | End: 2020-05-04 | Stop reason: HOSPADM

## 2020-05-02 RX ORDER — AMIODARONE HYDROCHLORIDE 100 MG/1
100 TABLET ORAL
Status: DISCONTINUED | OUTPATIENT
Start: 2020-05-03 | End: 2020-05-04 | Stop reason: HOSPADM

## 2020-05-02 RX ORDER — GABAPENTIN 100 MG/1
100 CAPSULE ORAL 3 TIMES DAILY
Status: DISCONTINUED | OUTPATIENT
Start: 2020-05-02 | End: 2020-05-04 | Stop reason: HOSPADM

## 2020-05-02 RX ORDER — TORSEMIDE 20 MG/1
20 TABLET ORAL
Status: DISCONTINUED | OUTPATIENT
Start: 2020-05-04 | End: 2020-05-02

## 2020-05-02 RX ORDER — CLOPIDOGREL BISULFATE 75 MG/1
75 TABLET ORAL DAILY
Status: DISCONTINUED | OUTPATIENT
Start: 2020-05-03 | End: 2020-05-04 | Stop reason: HOSPADM

## 2020-05-02 RX ORDER — PANTOPRAZOLE SODIUM 40 MG/1
40 TABLET, DELAYED RELEASE ORAL
Status: DISCONTINUED | OUTPATIENT
Start: 2020-05-02 | End: 2020-05-04 | Stop reason: HOSPADM

## 2020-05-02 RX ADMIN — PANTOPRAZOLE SODIUM 40 MG: 40 TABLET, DELAYED RELEASE ORAL at 17:06

## 2020-05-02 RX ADMIN — APIXABAN 5 MG: 5 TABLET, FILM COATED ORAL at 17:06

## 2020-05-02 RX ADMIN — GABAPENTIN 100 MG: 100 CAPSULE ORAL at 17:06

## 2020-05-02 RX ADMIN — IOHEXOL 75 ML: 350 INJECTION, SOLUTION INTRAVENOUS at 14:07

## 2020-05-02 RX ADMIN — GABAPENTIN 100 MG: 100 CAPSULE ORAL at 22:16

## 2020-05-02 RX ADMIN — PRAVASTATIN SODIUM 20 MG: 20 TABLET ORAL at 17:07

## 2020-05-02 RX ADMIN — METOPROLOL TARTRATE 50 MG: 50 TABLET, FILM COATED ORAL at 22:16

## 2020-05-03 PROBLEM — Z20.822 COVID-19 RULED OUT: Status: RESOLVED | Noted: 2020-05-02 | Resolved: 2020-05-03

## 2020-05-03 LAB
ALBUMIN SERPL BCP-MCNC: 3.7 G/DL (ref 3.5–5.7)
ALP SERPL-CCNC: 102 U/L (ref 55–165)
ALT SERPL W P-5'-P-CCNC: 21 U/L (ref 7–52)
ANION GAP SERPL CALCULATED.3IONS-SCNC: 11 MMOL/L (ref 4–13)
AST SERPL W P-5'-P-CCNC: 19 U/L (ref 13–39)
BASOPHILS # BLD AUTO: 0.1 THOUSANDS/ΜL (ref 0–0.1)
BASOPHILS NFR BLD AUTO: 1 % (ref 0–2)
BILIRUB SERPL-MCNC: 0.4 MG/DL (ref 0.2–1)
BUN SERPL-MCNC: 11 MG/DL (ref 7–25)
CALCIUM SERPL-MCNC: 8.6 MG/DL (ref 8.6–10.5)
CHLORIDE SERPL-SCNC: 109 MMOL/L (ref 98–107)
CHOLEST SERPL-MCNC: 99 MG/DL (ref 0–200)
CO2 SERPL-SCNC: 24 MMOL/L (ref 21–31)
CREAT SERPL-MCNC: 1.03 MG/DL (ref 0.6–1.2)
EOSINOPHIL # BLD AUTO: 0.3 THOUSAND/ΜL (ref 0–0.61)
EOSINOPHIL NFR BLD AUTO: 4 % (ref 0–5)
ERYTHROCYTE [DISTWIDTH] IN BLOOD BY AUTOMATED COUNT: 22.1 % (ref 11.5–14.5)
GFR SERPL CREATININE-BSD FRML MDRD: 54 ML/MIN/1.73SQ M
GLUCOSE SERPL-MCNC: 89 MG/DL (ref 65–99)
HCT VFR BLD AUTO: 28.4 % (ref 42–47)
HDLC SERPL-MCNC: 44 MG/DL
HGB BLD-MCNC: 8.7 G/DL (ref 12–16)
LDLC SERPL CALC-MCNC: 40 MG/DL (ref 0–100)
LYMPHOCYTES # BLD AUTO: 2.2 THOUSANDS/ΜL (ref 0.6–4.47)
LYMPHOCYTES NFR BLD AUTO: 31 % (ref 21–51)
MAGNESIUM SERPL-MCNC: 2.4 MG/DL (ref 1.9–2.7)
MCH RBC QN AUTO: 21.9 PG (ref 26–34)
MCHC RBC AUTO-ENTMCNC: 30.6 G/DL (ref 31–37)
MCV RBC AUTO: 72 FL (ref 81–99)
MONOCYTES # BLD AUTO: 0.5 THOUSAND/ΜL (ref 0.17–1.22)
MONOCYTES NFR BLD AUTO: 6 % (ref 2–12)
NEUTROPHILS # BLD AUTO: 4.2 THOUSANDS/ΜL (ref 1.4–6.5)
NEUTS SEG NFR BLD AUTO: 59 % (ref 42–75)
PLATELET # BLD AUTO: 346 THOUSANDS/UL (ref 149–390)
PMV BLD AUTO: 8.5 FL (ref 8.6–11.7)
POTASSIUM SERPL-SCNC: 4.1 MMOL/L (ref 3.5–5.5)
PROT SERPL-MCNC: 6.6 G/DL (ref 6.4–8.9)
RBC # BLD AUTO: 3.96 MILLION/UL (ref 3.9–5.2)
SARS-COV-2 RNA RESP QL NAA+PROBE: NEGATIVE
SODIUM SERPL-SCNC: 144 MMOL/L (ref 134–143)
TRIGL SERPL-MCNC: 75 MG/DL (ref 44–166)
WBC # BLD AUTO: 7.1 THOUSAND/UL (ref 4.8–10.8)

## 2020-05-03 PROCEDURE — 94760 N-INVAS EAR/PLS OXIMETRY 1: CPT

## 2020-05-03 PROCEDURE — 99222 1ST HOSP IP/OBS MODERATE 55: CPT | Performed by: INTERNAL MEDICINE

## 2020-05-03 PROCEDURE — 83735 ASSAY OF MAGNESIUM: CPT | Performed by: NURSE PRACTITIONER

## 2020-05-03 PROCEDURE — 80053 COMPREHEN METABOLIC PANEL: CPT | Performed by: NURSE PRACTITIONER

## 2020-05-03 PROCEDURE — 99233 SBSQ HOSP IP/OBS HIGH 50: CPT | Performed by: NURSE PRACTITIONER

## 2020-05-03 PROCEDURE — 97166 OT EVAL MOD COMPLEX 45 MIN: CPT

## 2020-05-03 PROCEDURE — 85025 COMPLETE CBC W/AUTO DIFF WBC: CPT | Performed by: NURSE PRACTITIONER

## 2020-05-03 PROCEDURE — 94660 CPAP INITIATION&MGMT: CPT

## 2020-05-03 PROCEDURE — 97163 PT EVAL HIGH COMPLEX 45 MIN: CPT

## 2020-05-03 PROCEDURE — 80061 LIPID PANEL: CPT | Performed by: NURSE PRACTITIONER

## 2020-05-03 RX ADMIN — LEVOTHYROXINE SODIUM 88 MCG: 88 TABLET ORAL at 05:15

## 2020-05-03 RX ADMIN — GABAPENTIN 100 MG: 100 CAPSULE ORAL at 16:43

## 2020-05-03 RX ADMIN — METOPROLOL TARTRATE 50 MG: 50 TABLET, FILM COATED ORAL at 08:56

## 2020-05-03 RX ADMIN — ESCITALOPRAM OXALATE 10 MG: 10 TABLET ORAL at 08:55

## 2020-05-03 RX ADMIN — POTASSIUM CHLORIDE 20 MEQ: 1500 TABLET, EXTENDED RELEASE ORAL at 16:43

## 2020-05-03 RX ADMIN — APIXABAN 5 MG: 5 TABLET, FILM COATED ORAL at 08:55

## 2020-05-03 RX ADMIN — APIXABAN 5 MG: 5 TABLET, FILM COATED ORAL at 17:59

## 2020-05-03 RX ADMIN — FUROSEMIDE 40 MG: 10 INJECTION, SOLUTION INTRAMUSCULAR; INTRAVENOUS at 16:43

## 2020-05-03 RX ADMIN — CLOPIDOGREL BISULFATE 75 MG: 75 TABLET ORAL at 08:57

## 2020-05-03 RX ADMIN — PANTOPRAZOLE SODIUM 40 MG: 40 TABLET, DELAYED RELEASE ORAL at 16:43

## 2020-05-03 RX ADMIN — PRAVASTATIN SODIUM 20 MG: 20 TABLET ORAL at 16:43

## 2020-05-03 RX ADMIN — POTASSIUM CHLORIDE 20 MEQ: 1500 TABLET, EXTENDED RELEASE ORAL at 08:57

## 2020-05-03 RX ADMIN — AMIODARONE HYDROCHLORIDE 100 MG: 100 TABLET ORAL at 08:56

## 2020-05-03 RX ADMIN — GABAPENTIN 100 MG: 100 CAPSULE ORAL at 21:36

## 2020-05-03 RX ADMIN — GABAPENTIN 100 MG: 100 CAPSULE ORAL at 08:56

## 2020-05-03 RX ADMIN — METOPROLOL TARTRATE 50 MG: 50 TABLET, FILM COATED ORAL at 21:36

## 2020-05-03 RX ADMIN — FUROSEMIDE 40 MG: 10 INJECTION, SOLUTION INTRAMUSCULAR; INTRAVENOUS at 08:57

## 2020-05-03 RX ADMIN — PANTOPRAZOLE SODIUM 40 MG: 40 TABLET, DELAYED RELEASE ORAL at 08:56

## 2020-05-03 RX ADMIN — NORTRIPTYLINE HYDROCHLORIDE 50 MG: 25 CAPSULE ORAL at 08:56

## 2020-05-04 ENCOUNTER — APPOINTMENT (INPATIENT)
Dept: NON INVASIVE DIAGNOSTICS | Facility: HOSPITAL | Age: 75
DRG: 291 | End: 2020-05-04
Payer: MEDICARE

## 2020-05-04 VITALS
HEIGHT: 67 IN | HEART RATE: 74 BPM | WEIGHT: 264.11 LBS | RESPIRATION RATE: 18 BRPM | OXYGEN SATURATION: 94 % | BODY MASS INDEX: 41.45 KG/M2 | SYSTOLIC BLOOD PRESSURE: 129 MMHG | DIASTOLIC BLOOD PRESSURE: 60 MMHG | TEMPERATURE: 97.4 F

## 2020-05-04 LAB
ANION GAP SERPL CALCULATED.3IONS-SCNC: 11 MMOL/L (ref 4–13)
BASOPHILS # BLD AUTO: 0.1 THOUSANDS/ΜL (ref 0–0.1)
BASOPHILS NFR BLD AUTO: 1 % (ref 0–2)
BUN SERPL-MCNC: 19 MG/DL (ref 7–25)
CALCIUM SERPL-MCNC: 8.3 MG/DL (ref 8.6–10.5)
CHLORIDE SERPL-SCNC: 104 MMOL/L (ref 98–107)
CO2 SERPL-SCNC: 23 MMOL/L (ref 21–31)
CREAT SERPL-MCNC: 1.27 MG/DL (ref 0.6–1.2)
EOSINOPHIL # BLD AUTO: 0.3 THOUSAND/ΜL (ref 0–0.61)
EOSINOPHIL NFR BLD AUTO: 4 % (ref 0–5)
ERYTHROCYTE [DISTWIDTH] IN BLOOD BY AUTOMATED COUNT: 21.9 % (ref 11.5–14.5)
GFR SERPL CREATININE-BSD FRML MDRD: 42 ML/MIN/1.73SQ M
GLUCOSE SERPL-MCNC: 99 MG/DL (ref 65–99)
HCT VFR BLD AUTO: 31.4 % (ref 42–47)
HGB BLD-MCNC: 9.6 G/DL (ref 12–16)
LYMPHOCYTES # BLD AUTO: 2.3 THOUSANDS/ΜL (ref 0.6–4.47)
LYMPHOCYTES NFR BLD AUTO: 26 % (ref 21–51)
MCH RBC QN AUTO: 21.7 PG (ref 26–34)
MCHC RBC AUTO-ENTMCNC: 30.6 G/DL (ref 31–37)
MCV RBC AUTO: 71 FL (ref 81–99)
MONOCYTES # BLD AUTO: 0.7 THOUSAND/ΜL (ref 0.17–1.22)
MONOCYTES NFR BLD AUTO: 8 % (ref 2–12)
NEUTROPHILS # BLD AUTO: 5.4 THOUSANDS/ΜL (ref 1.4–6.5)
NEUTS SEG NFR BLD AUTO: 62 % (ref 42–75)
PLATELET # BLD AUTO: 394 THOUSANDS/UL (ref 149–390)
PMV BLD AUTO: 8.3 FL (ref 8.6–11.7)
POTASSIUM SERPL-SCNC: 3.8 MMOL/L (ref 3.5–5.5)
RBC # BLD AUTO: 4.42 MILLION/UL (ref 3.9–5.2)
SODIUM SERPL-SCNC: 138 MMOL/L (ref 134–143)
WBC # BLD AUTO: 8.8 THOUSAND/UL (ref 4.8–10.8)

## 2020-05-04 PROCEDURE — 93306 TTE W/DOPPLER COMPLETE: CPT | Performed by: INTERNAL MEDICINE

## 2020-05-04 PROCEDURE — 80048 BASIC METABOLIC PNL TOTAL CA: CPT | Performed by: NURSE PRACTITIONER

## 2020-05-04 PROCEDURE — 99239 HOSP IP/OBS DSCHRG MGMT >30: CPT | Performed by: NURSE PRACTITIONER

## 2020-05-04 PROCEDURE — 85025 COMPLETE CBC W/AUTO DIFF WBC: CPT | Performed by: NURSE PRACTITIONER

## 2020-05-04 PROCEDURE — 93306 TTE W/DOPPLER COMPLETE: CPT

## 2020-05-04 RX ORDER — FUROSEMIDE 10 MG/ML
40 INJECTION INTRAMUSCULAR; INTRAVENOUS
Status: DISCONTINUED | OUTPATIENT
Start: 2020-05-04 | End: 2020-05-04 | Stop reason: HOSPADM

## 2020-05-04 RX ORDER — METOPROLOL TARTRATE 50 MG/1
50 TABLET, FILM COATED ORAL EVERY 12 HOURS SCHEDULED
Qty: 60 TABLET | Refills: 0 | Status: SHIPPED | OUTPATIENT
Start: 2020-05-04 | End: 2020-07-07 | Stop reason: HOSPADM

## 2020-05-04 RX ORDER — GABAPENTIN 100 MG/1
100 CAPSULE ORAL 3 TIMES DAILY
Qty: 90 CAPSULE | Refills: 0 | Status: ON HOLD | OUTPATIENT
Start: 2020-05-04 | End: 2020-09-04 | Stop reason: SDUPTHER

## 2020-05-04 RX ORDER — TORSEMIDE 20 MG/1
40 TABLET ORAL DAILY
Status: DISCONTINUED | OUTPATIENT
Start: 2020-05-05 | End: 2020-05-04 | Stop reason: HOSPADM

## 2020-05-04 RX ORDER — CLOPIDOGREL BISULFATE 75 MG/1
75 TABLET ORAL DAILY
Qty: 30 TABLET | Refills: 0 | Status: SHIPPED | OUTPATIENT
Start: 2020-05-04 | End: 2020-08-02

## 2020-05-04 RX ORDER — LEVOTHYROXINE SODIUM 88 UG/1
88 TABLET ORAL DAILY
Qty: 30 TABLET | Refills: 0 | Status: ON HOLD | OUTPATIENT
Start: 2020-05-04 | End: 2020-09-04 | Stop reason: SDUPTHER

## 2020-05-04 RX ORDER — ESCITALOPRAM OXALATE 10 MG/1
10 TABLET ORAL DAILY
Qty: 30 TABLET | Refills: 0 | Status: ON HOLD | OUTPATIENT
Start: 2020-05-04 | End: 2020-09-04 | Stop reason: SDUPTHER

## 2020-05-04 RX ORDER — SIMVASTATIN 10 MG
10 TABLET ORAL
Qty: 30 TABLET | Refills: 0 | Status: ON HOLD | OUTPATIENT
Start: 2020-05-04 | End: 2020-09-04 | Stop reason: SDUPTHER

## 2020-05-04 RX ORDER — NORTRIPTYLINE HYDROCHLORIDE 50 MG/1
50 CAPSULE ORAL DAILY
Qty: 30 CAPSULE | Refills: 0 | Status: ON HOLD | OUTPATIENT
Start: 2020-05-04 | End: 2020-09-04 | Stop reason: SDUPTHER

## 2020-05-04 RX ORDER — AMIODARONE HYDROCHLORIDE 200 MG/1
TABLET ORAL
Qty: 45 TABLET | Refills: 0 | Status: SHIPPED | OUTPATIENT
Start: 2020-05-04 | End: 2020-07-07 | Stop reason: HOSPADM

## 2020-05-04 RX ORDER — TORSEMIDE 20 MG/1
20 TABLET ORAL SEE ADMIN INSTRUCTIONS
Qty: 60 TABLET | Refills: 0 | Status: ON HOLD | OUTPATIENT
Start: 2020-05-04 | End: 2020-07-15 | Stop reason: SDUPTHER

## 2020-05-04 RX ADMIN — LEVOTHYROXINE SODIUM 88 MCG: 88 TABLET ORAL at 06:19

## 2020-05-04 RX ADMIN — ESCITALOPRAM OXALATE 10 MG: 10 TABLET ORAL at 08:01

## 2020-05-04 RX ADMIN — CLOPIDOGREL BISULFATE 75 MG: 75 TABLET ORAL at 08:01

## 2020-05-04 RX ADMIN — GABAPENTIN 100 MG: 100 CAPSULE ORAL at 15:18

## 2020-05-04 RX ADMIN — APIXABAN 5 MG: 5 TABLET, FILM COATED ORAL at 08:01

## 2020-05-04 RX ADMIN — PANTOPRAZOLE SODIUM 40 MG: 40 TABLET, DELAYED RELEASE ORAL at 06:19

## 2020-05-04 RX ADMIN — NORTRIPTYLINE HYDROCHLORIDE 50 MG: 25 CAPSULE ORAL at 08:02

## 2020-05-04 RX ADMIN — AMIODARONE HYDROCHLORIDE 100 MG: 100 TABLET ORAL at 07:55

## 2020-05-04 RX ADMIN — FUROSEMIDE 40 MG: 10 INJECTION, SOLUTION INTRAMUSCULAR; INTRAVENOUS at 07:56

## 2020-05-04 RX ADMIN — GABAPENTIN 100 MG: 100 CAPSULE ORAL at 08:01

## 2020-05-04 RX ADMIN — PRAVASTATIN SODIUM 20 MG: 20 TABLET ORAL at 16:13

## 2020-05-04 RX ADMIN — POTASSIUM CHLORIDE 20 MEQ: 1500 TABLET, EXTENDED RELEASE ORAL at 16:12

## 2020-05-04 RX ADMIN — PANTOPRAZOLE SODIUM 40 MG: 40 TABLET, DELAYED RELEASE ORAL at 15:18

## 2020-05-04 RX ADMIN — METOPROLOL TARTRATE 50 MG: 50 TABLET, FILM COATED ORAL at 08:02

## 2020-05-04 RX ADMIN — POTASSIUM CHLORIDE 20 MEQ: 1500 TABLET, EXTENDED RELEASE ORAL at 07:56

## 2020-05-05 ENCOUNTER — TRANSITIONAL CARE MANAGEMENT (OUTPATIENT)
Dept: INTERNAL MEDICINE CLINIC | Facility: CLINIC | Age: 75
End: 2020-05-05

## 2020-05-07 LAB
BACTERIA BLD CULT: NORMAL
BACTERIA BLD CULT: NORMAL

## 2020-05-11 ENCOUNTER — PATIENT OUTREACH (OUTPATIENT)
Dept: INTERNAL MEDICINE CLINIC | Facility: CLINIC | Age: 75
End: 2020-05-11

## 2020-05-29 ENCOUNTER — HOSPITAL ENCOUNTER (EMERGENCY)
Facility: HOSPITAL | Age: 75
Discharge: HOME | End: 2020-05-29
Attending: EMERGENCY MEDICINE
Payer: MEDICARE

## 2020-05-29 ENCOUNTER — APPOINTMENT (EMERGENCY)
Dept: CT IMAGING | Facility: HOSPITAL | Age: 75
End: 2020-05-29
Payer: MEDICARE

## 2020-05-29 ENCOUNTER — TELEPHONE (OUTPATIENT)
Dept: INTERNAL MEDICINE CLINIC | Facility: CLINIC | Age: 75
End: 2020-05-29

## 2020-05-29 VITALS
HEART RATE: 93 BPM | SYSTOLIC BLOOD PRESSURE: 137 MMHG | DIASTOLIC BLOOD PRESSURE: 63 MMHG | TEMPERATURE: 98.7 F | OXYGEN SATURATION: 96 % | WEIGHT: 272.93 LBS | BODY MASS INDEX: 42.75 KG/M2 | RESPIRATION RATE: 16 BRPM

## 2020-05-29 DIAGNOSIS — D64.9 ANEMIA: ICD-10-CM

## 2020-05-29 DIAGNOSIS — R51.9 HEADACHE: Primary | ICD-10-CM

## 2020-05-29 LAB
ANION GAP SERPL CALCULATED.3IONS-SCNC: 7 MMOL/L (ref 4–13)
BASOPHILS # BLD AUTO: 0.05 THOUSANDS/ΜL (ref 0–0.1)
BASOPHILS NFR BLD AUTO: 1 % (ref 0–1)
BUN SERPL-MCNC: 8 MG/DL (ref 5–25)
CALCIUM SERPL-MCNC: 8.8 MG/DL (ref 8.3–10.1)
CHLORIDE SERPL-SCNC: 107 MMOL/L (ref 100–108)
CO2 SERPL-SCNC: 28 MMOL/L (ref 21–32)
CREAT SERPL-MCNC: 1.1 MG/DL (ref 0.6–1.3)
EOSINOPHIL # BLD AUTO: 0.17 THOUSAND/ΜL (ref 0–0.61)
EOSINOPHIL NFR BLD AUTO: 3 % (ref 0–6)
ERYTHROCYTE [DISTWIDTH] IN BLOOD BY AUTOMATED COUNT: 18.3 % (ref 11.6–15.1)
GFR SERPL CREATININE-BSD FRML MDRD: 50 ML/MIN/1.73SQ M
GLUCOSE SERPL-MCNC: 119 MG/DL (ref 65–140)
HCT VFR BLD AUTO: 30.6 % (ref 34.8–46.1)
HGB BLD-MCNC: 8.6 G/DL (ref 11.5–15.4)
IMM GRANULOCYTES # BLD AUTO: 0.02 THOUSAND/UL (ref 0–0.2)
IMM GRANULOCYTES NFR BLD AUTO: 0 % (ref 0–2)
LYMPHOCYTES # BLD AUTO: 1.52 THOUSANDS/ΜL (ref 0.6–4.47)
LYMPHOCYTES NFR BLD AUTO: 23 % (ref 14–44)
MAGNESIUM SERPL-MCNC: 2.4 MG/DL (ref 1.6–2.6)
MCH RBC QN AUTO: 21.6 PG (ref 26.8–34.3)
MCHC RBC AUTO-ENTMCNC: 28.1 G/DL (ref 31.4–37.4)
MCV RBC AUTO: 77 FL (ref 82–98)
MONOCYTES # BLD AUTO: 0.42 THOUSAND/ΜL (ref 0.17–1.22)
MONOCYTES NFR BLD AUTO: 7 % (ref 4–12)
NEUTROPHILS # BLD AUTO: 4.32 THOUSANDS/ΜL (ref 1.85–7.62)
NEUTS SEG NFR BLD AUTO: 66 % (ref 43–75)
NRBC BLD AUTO-RTO: 0 /100 WBCS
PLATELET # BLD AUTO: 325 THOUSANDS/UL (ref 149–390)
PMV BLD AUTO: 10.2 FL (ref 8.9–12.7)
POTASSIUM SERPL-SCNC: 3.5 MMOL/L (ref 3.5–5.3)
RBC # BLD AUTO: 3.98 MILLION/UL (ref 3.81–5.12)
SODIUM SERPL-SCNC: 142 MMOL/L (ref 136–145)
WBC # BLD AUTO: 6.5 THOUSAND/UL (ref 4.31–10.16)

## 2020-05-29 PROCEDURE — 99284 EMERGENCY DEPT VISIT MOD MDM: CPT | Performed by: EMERGENCY MEDICINE

## 2020-05-29 PROCEDURE — 96365 THER/PROPH/DIAG IV INF INIT: CPT

## 2020-05-29 PROCEDURE — 70450 CT HEAD/BRAIN W/O DYE: CPT

## 2020-05-29 PROCEDURE — 99284 EMERGENCY DEPT VISIT MOD MDM: CPT

## 2020-05-29 PROCEDURE — 96361 HYDRATE IV INFUSION ADD-ON: CPT

## 2020-05-29 PROCEDURE — 80048 BASIC METABOLIC PNL TOTAL CA: CPT | Performed by: EMERGENCY MEDICINE

## 2020-05-29 PROCEDURE — 36415 COLL VENOUS BLD VENIPUNCTURE: CPT | Performed by: EMERGENCY MEDICINE

## 2020-05-29 PROCEDURE — 85025 COMPLETE CBC W/AUTO DIFF WBC: CPT | Performed by: EMERGENCY MEDICINE

## 2020-05-29 PROCEDURE — 96375 TX/PRO/DX INJ NEW DRUG ADDON: CPT

## 2020-05-29 PROCEDURE — 83735 ASSAY OF MAGNESIUM: CPT | Performed by: EMERGENCY MEDICINE

## 2020-05-29 RX ORDER — METOCLOPRAMIDE HYDROCHLORIDE 5 MG/ML
10 INJECTION INTRAMUSCULAR; INTRAVENOUS ONCE
Status: COMPLETED | OUTPATIENT
Start: 2020-05-29 | End: 2020-05-29

## 2020-05-29 RX ORDER — MAGNESIUM SULFATE HEPTAHYDRATE 40 MG/ML
2 INJECTION, SOLUTION INTRAVENOUS ONCE
Status: COMPLETED | OUTPATIENT
Start: 2020-05-29 | End: 2020-05-29

## 2020-05-29 RX ORDER — DIPHENHYDRAMINE HYDROCHLORIDE 50 MG/ML
25 INJECTION INTRAMUSCULAR; INTRAVENOUS ONCE
Status: COMPLETED | OUTPATIENT
Start: 2020-05-29 | End: 2020-05-29

## 2020-05-29 RX ADMIN — METOCLOPRAMIDE 10 MG: 5 INJECTION, SOLUTION INTRAMUSCULAR; INTRAVENOUS at 14:31

## 2020-05-29 RX ADMIN — MAGNESIUM SULFATE HEPTAHYDRATE 2 G: 40 INJECTION, SOLUTION INTRAVENOUS at 14:34

## 2020-05-29 RX ADMIN — SODIUM CHLORIDE 500 ML: 0.9 INJECTION, SOLUTION INTRAVENOUS at 14:28

## 2020-05-29 RX ADMIN — DIPHENHYDRAMINE HYDROCHLORIDE 25 MG: 50 INJECTION, SOLUTION INTRAMUSCULAR; INTRAVENOUS at 14:30

## 2020-07-06 ENCOUNTER — HOSPITAL ENCOUNTER (INPATIENT)
Facility: HOSPITAL | Age: 75
LOS: 1 days | Discharge: HOME | DRG: 309 | End: 2020-07-07
Attending: EMERGENCY MEDICINE | Admitting: INTERNAL MEDICINE
Payer: MEDICARE

## 2020-07-06 ENCOUNTER — APPOINTMENT (EMERGENCY)
Dept: CT IMAGING | Facility: HOSPITAL | Age: 75
DRG: 309 | End: 2020-07-06
Payer: MEDICARE

## 2020-07-06 DIAGNOSIS — I48.92 ATRIAL FLUTTER (HCC): ICD-10-CM

## 2020-07-06 DIAGNOSIS — G47.33 OSA ON CPAP: ICD-10-CM

## 2020-07-06 DIAGNOSIS — I48.92 ATRIAL FLUTTER WITH RAPID VENTRICULAR RESPONSE (HCC): ICD-10-CM

## 2020-07-06 DIAGNOSIS — R06.02 SOB (SHORTNESS OF BREATH): Primary | ICD-10-CM

## 2020-07-06 DIAGNOSIS — R07.9 CHEST PAIN: ICD-10-CM

## 2020-07-06 DIAGNOSIS — Z99.89 OSA ON CPAP: ICD-10-CM

## 2020-07-06 PROBLEM — R79.89 ELEVATED LACTIC ACID LEVEL: Status: ACTIVE | Noted: 2020-07-06

## 2020-07-06 LAB
ALBUMIN SERPL BCP-MCNC: 3.8 G/DL (ref 3.5–5.7)
ALP SERPL-CCNC: 99 U/L (ref 55–165)
ALT SERPL W P-5'-P-CCNC: 17 U/L (ref 7–52)
ANION GAP SERPL CALCULATED.3IONS-SCNC: 11 MMOL/L (ref 4–13)
ANISOCYTOSIS BLD QL SMEAR: PRESENT
AST SERPL W P-5'-P-CCNC: 23 U/L (ref 13–39)
ATRIAL RATE: 267 BPM
ATRIAL RATE: 268 BPM
BASOPHILS # BLD AUTO: 0.1 THOUSANDS/ΜL (ref 0–0.1)
BASOPHILS NFR BLD AUTO: 1 % (ref 0–2)
BILIRUB SERPL-MCNC: 0.5 MG/DL (ref 0.2–1)
BNP SERPL-MCNC: 311 PG/ML (ref 1–100)
BUN SERPL-MCNC: 9 MG/DL (ref 7–25)
CALCIUM SERPL-MCNC: 8.8 MG/DL (ref 8.6–10.5)
CHLORIDE SERPL-SCNC: 106 MMOL/L (ref 98–107)
CO2 SERPL-SCNC: 22 MMOL/L (ref 21–31)
CREAT SERPL-MCNC: 1.13 MG/DL (ref 0.6–1.2)
EOSINOPHIL # BLD AUTO: 0.4 THOUSAND/ΜL (ref 0–0.61)
EOSINOPHIL NFR BLD AUTO: 3 % (ref 0–5)
ERYTHROCYTE [DISTWIDTH] IN BLOOD BY AUTOMATED COUNT: 19 % (ref 11.5–14.5)
GFR SERPL CREATININE-BSD FRML MDRD: 48 ML/MIN/1.73SQ M
GLUCOSE SERPL-MCNC: 121 MG/DL (ref 65–99)
HCT VFR BLD AUTO: 30.7 % (ref 42–47)
HGB BLD-MCNC: 9.2 G/DL (ref 12–16)
HYPERCHROMIA BLD QL SMEAR: PRESENT
LACTATE SERPL-SCNC: 1.8 MMOL/L (ref 0.5–2)
LACTATE SERPL-SCNC: 2.3 MMOL/L (ref 0.5–2)
LYMPHOCYTES # BLD AUTO: 1.6 THOUSANDS/ΜL (ref 0.6–4.47)
LYMPHOCYTES NFR BLD AUTO: 15 % (ref 21–51)
MAGNESIUM SERPL-MCNC: 2.1 MG/DL (ref 1.9–2.7)
MCH RBC QN AUTO: 21.4 PG (ref 26–34)
MCHC RBC AUTO-ENTMCNC: 29.9 G/DL (ref 31–37)
MCV RBC AUTO: 72 FL (ref 81–99)
MICROCYTES BLD QL AUTO: PRESENT
MONOCYTES # BLD AUTO: 0.5 THOUSAND/ΜL (ref 0.17–1.22)
MONOCYTES NFR BLD AUTO: 4 % (ref 2–12)
NEUTROPHILS # BLD AUTO: 7.8 THOUSANDS/ΜL (ref 1.4–6.5)
NEUTS SEG NFR BLD AUTO: 76 % (ref 42–75)
OVALOCYTES BLD QL SMEAR: PRESENT
P AXIS: 229 DEGREES
PLATELET # BLD AUTO: 346 THOUSANDS/UL (ref 149–390)
PLATELET BLD QL SMEAR: ADEQUATE
PMV BLD AUTO: 8.5 FL (ref 8.6–11.7)
POTASSIUM SERPL-SCNC: 3.9 MMOL/L (ref 3.5–5.5)
PROT SERPL-MCNC: 6.5 G/DL (ref 6.4–8.9)
QRS AXIS: 31 DEGREES
QRS AXIS: 36 DEGREES
QRSD INTERVAL: 80 MS
QRSD INTERVAL: 82 MS
QT INTERVAL: 322 MS
QT INTERVAL: 376 MS
QTC INTERVAL: 480 MS
QTC INTERVAL: 492 MS
RBC # BLD AUTO: 4.29 MILLION/UL (ref 3.9–5.2)
RBC MORPH BLD: PRESENT
SARS-COV-2 RNA RESP QL NAA+PROBE: NEGATIVE
SODIUM SERPL-SCNC: 139 MMOL/L (ref 134–143)
T WAVE AXIS: 236 DEGREES
T WAVE AXIS: 47 DEGREES
TROPONIN I SERPL-MCNC: <0.03 NG/ML
TSH SERPL DL<=0.05 MIU/L-ACNC: 4.74 UIU/ML (ref 0.45–5.33)
VENTRICULAR RATE: 103 BPM
VENTRICULAR RATE: 134 BPM
WBC # BLD AUTO: 10.3 THOUSAND/UL (ref 4.8–10.8)

## 2020-07-06 PROCEDURE — 99285 EMERGENCY DEPT VISIT HI MDM: CPT

## 2020-07-06 PROCEDURE — 83605 ASSAY OF LACTIC ACID: CPT | Performed by: EMERGENCY MEDICINE

## 2020-07-06 PROCEDURE — 96365 THER/PROPH/DIAG IV INF INIT: CPT

## 2020-07-06 PROCEDURE — 36415 COLL VENOUS BLD VENIPUNCTURE: CPT | Performed by: EMERGENCY MEDICINE

## 2020-07-06 PROCEDURE — 94760 N-INVAS EAR/PLS OXIMETRY 1: CPT

## 2020-07-06 PROCEDURE — 84484 ASSAY OF TROPONIN QUANT: CPT | Performed by: INTERNAL MEDICINE

## 2020-07-06 PROCEDURE — 84443 ASSAY THYROID STIM HORMONE: CPT | Performed by: INTERNAL MEDICINE

## 2020-07-06 PROCEDURE — 93005 ELECTROCARDIOGRAM TRACING: CPT

## 2020-07-06 PROCEDURE — 99223 1ST HOSP IP/OBS HIGH 75: CPT | Performed by: INTERNAL MEDICINE

## 2020-07-06 PROCEDURE — 87635 SARS-COV-2 COVID-19 AMP PRB: CPT | Performed by: EMERGENCY MEDICINE

## 2020-07-06 PROCEDURE — 80053 COMPREHEN METABOLIC PANEL: CPT | Performed by: EMERGENCY MEDICINE

## 2020-07-06 PROCEDURE — 83735 ASSAY OF MAGNESIUM: CPT | Performed by: EMERGENCY MEDICINE

## 2020-07-06 PROCEDURE — 93010 ELECTROCARDIOGRAM REPORT: CPT | Performed by: INTERNAL MEDICINE

## 2020-07-06 PROCEDURE — 85025 COMPLETE CBC W/AUTO DIFF WBC: CPT | Performed by: EMERGENCY MEDICINE

## 2020-07-06 PROCEDURE — 84484 ASSAY OF TROPONIN QUANT: CPT | Performed by: EMERGENCY MEDICINE

## 2020-07-06 PROCEDURE — 83880 ASSAY OF NATRIURETIC PEPTIDE: CPT | Performed by: EMERGENCY MEDICINE

## 2020-07-06 PROCEDURE — 99291 CRITICAL CARE FIRST HOUR: CPT | Performed by: EMERGENCY MEDICINE

## 2020-07-06 PROCEDURE — 71275 CT ANGIOGRAPHY CHEST: CPT

## 2020-07-06 PROCEDURE — 96376 TX/PRO/DX INJ SAME DRUG ADON: CPT

## 2020-07-06 RX ORDER — POTASSIUM CHLORIDE 20 MEQ/1
20 TABLET, EXTENDED RELEASE ORAL 2 TIMES DAILY
Status: DISCONTINUED | OUTPATIENT
Start: 2020-07-06 | End: 2020-07-07 | Stop reason: HOSPADM

## 2020-07-06 RX ORDER — LEVALBUTEROL 1.25 MG/.5ML
1.25 SOLUTION, CONCENTRATE RESPIRATORY (INHALATION)
Status: DISCONTINUED | OUTPATIENT
Start: 2020-07-06 | End: 2020-07-06

## 2020-07-06 RX ORDER — METOPROLOL TARTRATE 50 MG/1
100 TABLET, FILM COATED ORAL EVERY 12 HOURS SCHEDULED
Status: DISCONTINUED | OUTPATIENT
Start: 2020-07-06 | End: 2020-07-07 | Stop reason: HOSPADM

## 2020-07-06 RX ORDER — GABAPENTIN 100 MG/1
100 CAPSULE ORAL 3 TIMES DAILY
Status: DISCONTINUED | OUTPATIENT
Start: 2020-07-06 | End: 2020-07-07 | Stop reason: HOSPADM

## 2020-07-06 RX ORDER — ACETAMINOPHEN 325 MG/1
650 TABLET ORAL EVERY 6 HOURS PRN
Status: DISCONTINUED | OUTPATIENT
Start: 2020-07-06 | End: 2020-07-07 | Stop reason: HOSPADM

## 2020-07-06 RX ORDER — CLOPIDOGREL BISULFATE 75 MG/1
75 TABLET ORAL DAILY
Status: DISCONTINUED | OUTPATIENT
Start: 2020-07-06 | End: 2020-07-06

## 2020-07-06 RX ORDER — ESCITALOPRAM OXALATE 10 MG/1
10 TABLET ORAL DAILY
Status: DISCONTINUED | OUTPATIENT
Start: 2020-07-06 | End: 2020-07-07 | Stop reason: HOSPADM

## 2020-07-06 RX ORDER — AMIODARONE HYDROCHLORIDE 100 MG/1
200 TABLET ORAL
Status: DISCONTINUED | OUTPATIENT
Start: 2020-07-06 | End: 2020-07-07 | Stop reason: HOSPADM

## 2020-07-06 RX ORDER — LORAZEPAM 1 MG/1
1 TABLET ORAL 3 TIMES DAILY PRN
Status: DISCONTINUED | OUTPATIENT
Start: 2020-07-06 | End: 2020-07-07 | Stop reason: HOSPADM

## 2020-07-06 RX ORDER — PRAVASTATIN SODIUM 20 MG
20 TABLET ORAL
Status: DISCONTINUED | OUTPATIENT
Start: 2020-07-06 | End: 2020-07-07 | Stop reason: HOSPADM

## 2020-07-06 RX ORDER — TORSEMIDE 20 MG/1
20 TABLET ORAL SEE ADMIN INSTRUCTIONS
Status: DISCONTINUED | OUTPATIENT
Start: 2020-07-06 | End: 2020-07-07 | Stop reason: HOSPADM

## 2020-07-06 RX ORDER — DILTIAZEM HYDROCHLORIDE 5 MG/ML
10 INJECTION INTRAVENOUS ONCE
Status: COMPLETED | OUTPATIENT
Start: 2020-07-06 | End: 2020-07-06

## 2020-07-06 RX ORDER — ONDANSETRON 2 MG/ML
4 INJECTION INTRAMUSCULAR; INTRAVENOUS EVERY 6 HOURS PRN
Status: DISCONTINUED | OUTPATIENT
Start: 2020-07-06 | End: 2020-07-07 | Stop reason: HOSPADM

## 2020-07-06 RX ORDER — AMIODARONE HYDROCHLORIDE 100 MG/1
200 TABLET ORAL
Status: DISCONTINUED | OUTPATIENT
Start: 2020-07-06 | End: 2020-07-06

## 2020-07-06 RX ORDER — SODIUM CHLORIDE FOR INHALATION 0.9 %
3 VIAL, NEBULIZER (ML) INHALATION
Status: DISCONTINUED | OUTPATIENT
Start: 2020-07-06 | End: 2020-07-06

## 2020-07-06 RX ORDER — LEVOTHYROXINE SODIUM 88 UG/1
88 TABLET ORAL
Status: DISCONTINUED | OUTPATIENT
Start: 2020-07-06 | End: 2020-07-07 | Stop reason: HOSPADM

## 2020-07-06 RX ORDER — NORTRIPTYLINE HYDROCHLORIDE 25 MG/1
50 CAPSULE ORAL DAILY
Status: DISCONTINUED | OUTPATIENT
Start: 2020-07-06 | End: 2020-07-07 | Stop reason: HOSPADM

## 2020-07-06 RX ADMIN — PRAVASTATIN SODIUM 20 MG: 20 TABLET ORAL at 16:25

## 2020-07-06 RX ADMIN — Medication 10 MG/HR: at 02:12

## 2020-07-06 RX ADMIN — SODIUM CHLORIDE: 0.9 INJECTION, SOLUTION INTRAVENOUS at 02:09

## 2020-07-06 RX ADMIN — AMIODARONE HYDROCHLORIDE 200 MG: 100 TABLET ORAL at 12:00

## 2020-07-06 RX ADMIN — GABAPENTIN 100 MG: 100 CAPSULE ORAL at 16:26

## 2020-07-06 RX ADMIN — APIXABAN 5 MG: 5 TABLET, FILM COATED ORAL at 09:30

## 2020-07-06 RX ADMIN — POTASSIUM CHLORIDE 20 MEQ: 1500 TABLET, EXTENDED RELEASE ORAL at 17:37

## 2020-07-06 RX ADMIN — NORTRIPTYLINE HYDROCHLORIDE 50 MG: 25 CAPSULE ORAL at 09:28

## 2020-07-06 RX ADMIN — AMIODARONE HYDROCHLORIDE 200 MG: 100 TABLET ORAL at 16:26

## 2020-07-06 RX ADMIN — GABAPENTIN 100 MG: 100 CAPSULE ORAL at 09:29

## 2020-07-06 RX ADMIN — METOPROLOL TARTRATE 75 MG: 50 TABLET, FILM COATED ORAL at 09:29

## 2020-07-06 RX ADMIN — POTASSIUM CHLORIDE 20 MEQ: 1500 TABLET, EXTENDED RELEASE ORAL at 09:30

## 2020-07-06 RX ADMIN — LEVOTHYROXINE SODIUM 88 MCG: 88 TABLET ORAL at 08:01

## 2020-07-06 RX ADMIN — ESCITALOPRAM OXALATE 10 MG: 10 TABLET ORAL at 09:29

## 2020-07-06 RX ADMIN — ACETAMINOPHEN 650 MG: 325 TABLET ORAL at 09:34

## 2020-07-06 RX ADMIN — CLOPIDOGREL BISULFATE 75 MG: 75 TABLET ORAL at 09:29

## 2020-07-06 RX ADMIN — AMIODARONE HYDROCHLORIDE 200 MG: 100 TABLET ORAL at 08:02

## 2020-07-06 RX ADMIN — IOHEXOL 100 ML: 350 INJECTION, SOLUTION INTRAVENOUS at 02:02

## 2020-07-06 RX ADMIN — LORAZEPAM 1 MG: 1 TABLET ORAL at 09:25

## 2020-07-06 RX ADMIN — GABAPENTIN 100 MG: 100 CAPSULE ORAL at 21:19

## 2020-07-06 RX ADMIN — DILTIAZEM HYDROCHLORIDE 10 MG: 5 INJECTION INTRAVENOUS at 01:11

## 2020-07-06 RX ADMIN — METOPROLOL TARTRATE 100 MG: 50 TABLET, FILM COATED ORAL at 21:19

## 2020-07-06 RX ADMIN — APIXABAN 5 MG: 5 TABLET, FILM COATED ORAL at 17:37

## 2020-07-06 RX ADMIN — Medication 1 TABLET: at 09:30

## 2020-07-06 NOTE — RESPIRATORY THERAPY NOTE
RT Protocol Note  Garland Arnett 70 y o  female MRN: [id number]  Unit/Bed#: ICU 05 Encounter: [id number]    Assessment    Principal Problem:    Chest pain in adult  Active Problems:    Essential hypertension    DANTE on CPAP    History of stroke    Chronic diastolic congestive heart failure (HCC)    Stage 3 chronic kidney disease (HCC)    Acquired hypothyroidism    Atrial flutter with rapid ventricular response (HCC)    Elevated lactic acid level      Home Pulmonary Medications:    Home Devices/Therapy: (P) BiPAP/CPAP(only home CPAP/settings unknown to pt )    Past Medical History:   Diagnosis Date    Acquired hypothyroidism 2018    [facility]    Anemia     Atrial fibrillation (HCC)     CHF (congestive heart failure) ([facility] )     History of transfusion     Hyperlipidemia     Hypertension     Migraine     Polyp of sigmoid colon     Prediabetes     Renal disorder     Stroke [facility])      Social History     Socioeconomic History    Marital status:      Spouse name: None    Number of children: None    Years of education: None    Highest education level: None   Occupational History    None   Social Needs    Financial resource strain: None    Food insecurity:     Worry: None     Inability: None    Transportation needs:     Medical: None     Non-medical: None   Tobacco Use    Smoking status: Former Smoker     Types: Cigarettes     Last attempt to quit: 2011     Years since quittin 4    Smokeless tobacco: Never Used   Substance and Sexual Activity    Alcohol use: Never     Alcohol/week: 0 0 standard drinks     Frequency: Never     Binge frequency: Never    Drug use: No    Sexual activity: Not Currently   Lifestyle    Physical activity:     Days per week: None     Minutes per session: None    Stress: None   Relationships    Social connections:     Talks on phone: None     Gets together: None     Attends Zoroastrian service: None Active member of club or organization: None     Attends meetings of clubs or organizations: None     Relationship status: None    Intimate partner violence:     Fear of current or ex partner: None     Emotionally abused: None     Physically abused: None     Forced sexual activity: None   Other Topics Concern    None   Social History Narrative    fhx not asked in triage       Subjective Pulmonary HX: DANTE  Pt  Denies any other pulmonary HX  Pt  Denies the use of home respiratory meds  Pt  Does have home BiPAP (setting unknown to pt )  Pt  Was placed on 10 cm H20 per protocol with 2lpm titrated into our unit  Will DC xopenex minineb/protocol         Objective    Physical Exam:   Assessment Type: (P) Assess only  General Appearance: (P) Alert, Awake  Respiratory Pattern: (P) Dyspnea with exertion  Chest Assessment: (P) Chest expansion symmetrical  Bilateral Breath Sounds: (P) Clear, Diminished    Vitals:  Blood pressure 126/70, pulse (!) 114, temperature 98 °F (36 7 °C), resp  rate 18, height 5' 7" (1 702 m), weight 129 kg (285 lb), SpO2 97 %, not currently breastfeeding  Imaging and other studies: I have personally reviewed pertinent reports              Plan    Respiratory Plan: (P) Discontinue Protocol

## 2020-07-06 NOTE — SOCIAL WORK
Evaluated the pt at the bedside  Pt was admitted to the hospital for AFIB  Explained the role of CM and the options of discharge planning with the pt  Pt states she lives alone in a first floor apartment  Pt is independent except for driving d/t poor eyesight  Pt daughter proivides driving to the MD and grocery store  Pt PCP is in [facility] and pt would like a more local PCP  Provided the pt with the [employer] card to obtain a new PCP  Pt gets her medications at [address]  Pt has a walker, cane and shower chair  Uses the cane when going outside to get the mail  Pt is active with the OP CM  Daughter will transport home upon discharge  Patient/caregiver received discharge checklist   Content reviewed  Patient/caregiver encouraged to participate in discharge plan of care prior to discharge home  CM reviewed d/c planning process including the following: identifying help at home, patient preference for d/c planning needs, availability of treatment team to discuss questions or concerns patient and/or family may have regarding understanding medications and recognizing signs and symptoms once discharged  CM also encouraged patient to follow up with all recommended appointments after discharge  Patient advised of importance for patient and family to participate in managing patients medical well being

## 2020-07-06 NOTE — ASSESSMENT & PLAN NOTE
· Chest pain in adult likely secondary to tachycardia    · Trend cardiac enzymes    Results from last 7 days   Lab Units 07/06/20  0101   TROPONIN I ng/mL <0 03

## 2020-07-06 NOTE — ASSESSMENT & PLAN NOTE
Persistent  Rate is now better controlled  She has had prior ablation  My plan at present would be to increase the amiodarone and then ultimately consider ablation verses cardioversion  Will discuss further with my EP colleagues at some point  The other issue regarding this is sleep apnea which does not sound well treated

## 2020-07-06 NOTE — ASSESSMENT & PLAN NOTE
Not well treated by her description  I think she needs to follow-up with a sleep specialist to look in to other modalities  She does not seem to tolerate CPAP

## 2020-07-06 NOTE — ASSESSMENT & PLAN NOTE
· CKD 3 stable and at baseline    Results from last 7 days   Lab Units 07/06/20  0101   BUN mg/dL 9   CREATININE mg/dL 1 13

## 2020-07-06 NOTE — ED PROCEDURE NOTE
PROCEDURE  ECG 12 Lead Documentation Only  Date/Time: 7/6/2020 1:10 AM  Performed by: Lisa Sharma MD  Authorized by: Lisa Sharma MD     Indications / Diagnosis:  Sob, cp  ECG reviewed by me, the ED Provider: yes    Patient location:  ED  Previous ECG:     Comparison to cardiac monitor: Yes    Interpretation:     Interpretation: abnormal    Rate:     ECG rate:  103    ECG rate assessment: tachycardic    Rhythm:     Rhythm: atrial flutter    Ectopy:     Ectopy: none    QRS:     QRS axis:  Normal    QRS intervals:  Normal  Conduction:     Conduction: normal    ST segments:     ST segments:  Non-specific  T waves:     T waves: non-specific           Lisa Sharma MD  07/06/20 7514

## 2020-07-06 NOTE — QUICK NOTE
Patient admitted earlier today by my partner Dr Dm Da Silva  Patient seen, is comfortable, denies any chest pain, wants to go home  At this time we are awaiting  formal cardiology input  Will follow up with the patient as the day progresses, will order repeat blood work for tomorrow    Patient is hemodynamically stable and otherwise doing better than prior to arrival

## 2020-07-06 NOTE — ASSESSMENT & PLAN NOTE
· Elevated lactic acid without any signs of infection or sepsis    Results from last 7 days   Lab Units 07/06/20  0101   LACTIC ACID mmol/L 2 3*

## 2020-07-06 NOTE — ED NOTES
MD at bedside to assess for admission to hospital - patient questioned if she needed to stay in hospital - thought she might be going home     Néstor Ramírez RN  07/06/20 6537

## 2020-07-06 NOTE — H&P
H&P- Irasema Profit 1945, 70 y o  female MRN: [id number]  Unit/Bed#: ED 06 Encounter: [id number]  Primary Care Provider: Sonya Knowles MD   Date and time admitted to hospital: 7/6/2020 12:45 AM        Assessment and Plan  * Chest pain in adult  Assessment & Plan  · Chest pain in adult likely secondary to tachycardia  · Trend cardiac enzymes    Results from last 7 days   Lab Units 07/06/20  0101   TROPONIN I ng/mL <0 03       Atrial flutter with rapid ventricular response (HCC)  Assessment & Plan  · Atrial flutter with tachycardia  Does have history of atrial fibrillation status post ablation  · Patient does have sleep apnea and not always compliant with CPAP  · Historically follows with Dr Judson Alford  · Continue diltiazem infusion  Increase amiodarone to 200 mg daily  Increase metoprolol tartrate to 75 mg b i d  Hoang Amaya · Cardiology to evaluate  Chronic diastolic congestive heart failure [facility])  Assessment & Plan  Wt Readings from Last 3 Encounters:   07/06/20 129 kg (285 lb)   05/29/20 124 kg (272 lb 14 9 oz)   05/04/20 120 kg (264 lb 1 8 oz)     Results from last 7 days   Lab Units 07/06/20  0101   BNP pg/mL 311*     · Chronic diastolic CHF    Ground-glass opacities are noted on CTA however this is chronic and seen on previous images likely pulmonary edema  · Continue torsemide 20 mg daily except for 40 mg Monday and Thursday    Elevated lactic acid level  Assessment & Plan  · Elevated lactic acid without any signs of infection or sepsis    Results from last 7 days   Lab Units 07/06/20  0101   LACTIC ACID mmol/L 2 3*       Acquired hypothyroidism  Assessment & Plan  · Hypothyroidism given tachycardia check TSH  · Continue levothyroxine    Stage 3 chronic kidney disease ([facility] [address] )  Assessment & Plan  · CKD 3 stable and at baseline    Results from last 7 days   Lab Units 07/06/20  0101   BUN mg/dL 9   CREATININE mg/dL 1 13       History of stroke  Assessment & Plan  · History of stroke continue clopidogrel    DANTE on CPAP  Assessment & Plan  · DANTE order CPAP  Of note patient not always compliant    Essential hypertension  Assessment & Plan  · Essential hypertension  Metoprolol tartrate being increased for tachycardia to 75 mg b i d     VTE Prophylaxis: Apixaban (Eliquis)  Code Status:  Level one full code  Anticipated Length of Stay:  Patient will be admitted on an Inpatient basis with an anticipated length of stay of  greater than 2 midnights  Justification for Hospital Stay: Chest pain in adult  Total Time for Visit, including Counseling / Coordination of Care: x mins  Greater than 50% of this total time spent on direct patient counseling and coordination of care  Chief Complaint:     Chief Complaint   Patient presents with    Shortness of Breath     SOB today  History of ablation for afib  History of Present Illness:    Art David is a 70 y o  female who presents with chest pain and shortness of breath  Patient states that throughout the day she has had worsening shortness of breath especially with exertion  She states that she has been compliant with all her medications with exception of CPAP which she uses as much possible  Unfortunately this evening shortness of breath progressed to midline substernal chest pain without radiation and the patient became alarmed and called EMS and was brought to the hospital where she was found to have atrial flutter with tachycardia  She does follow with Dr Radha Huizar and has history of atrial fibrillation status post ablation and is maintained on amiodarone metoprolol and Eliquis  She was started on diltiazem infusion and shortness of breath/chest pain started to improve      Review of Systems:  History obtained from chart review and the patient  General ROS: negative for - chills or fever  Psychological ROS: negative for - hallucinations or hostility  Ophthalmic ROS: negative for - loss of vision  Respiratory ROS: positive for - shortness of breath  Cardiovascular ROS: positive for - chest pain  Gastrointestinal ROS: negative for - melena  Genito-Urinary ROS: negative for - dysuria  Musculoskeletal ROS: negative for - muscle pain  Neurological ROS: negative for - seizures  Otherwise, all other 12 point review of systems normal     Past Medical and Surgical History:   Past Medical History:   Diagnosis Date    Acquired hypothyroidism 12/26/2018    Acute on chronic diastolic heart failure ([facility] ) 8/30/2018    Anemia     Atrial fibrillation (HCC)     [facility]congest[facility] failure) ([facility])     History of transfusion     Hyperlipidemia     Hypertension     Migraine     Polyp of sigmoid colon     Prediabetes     Renal disorder     Stroke ([place] [address] ) 2011     Past Surgical History:   Procedure Laterality Date    APPENDECTOMY      CARDIAC ELECTROPHYSIOLOGY STUDY AND ABLATION      CHOLECYSTECTOMY      COLONOSCOPY       Meds/Allergies: Allergies: No Known Allergies  Prior to Admission Medications   Prescriptions Last Dose Informant Patient Reported? Taking?    LORazepam (ATIVAN) 1 mg tablet  Self No No   Sig: Take 1 tablet (1 mg total) by mouth 3 (three) times a day as needed for anxiety   Multiple Vitamin (MULTIVITAMIN) tablet  Self Yes No   Sig: Take 1 tablet by mouth daily     amiodarone 200 mg tablet 7/5/2020 at Unknown time  No Yes   Sig: Take 1/2 tablet daily   apixaban (Eliquis) 5 mg 7/5/2020 at Unknown time  No Yes   Sig: Take 1 tablet (5 mg total) by mouth 2 (two) times a day   clopidogrel (PLAVIX) 75 mg tablet 7/5/2020 at Unknown time  No Yes   Sig: Take 1 tablet (75 mg total) by mouth daily   escitalopram (LEXAPRO) 10 mg tablet 7/5/2020 at Unknown time  No Yes   Sig: Take 1 tablet (10 mg total) by mouth daily   gabapentin (NEURONTIN) 100 mg capsule 7/5/2020 at Unknown time  No Yes   Sig: Take 1 capsule (100 mg total) by mouth 3 (three) times a day   levothyroxine 88 mcg tablet 7/5/2020 at Unknown time  No Yes   Sig: Take 1 tablet (88 mcg total) by mouth daily   meclizine (ANTIVERT) 25 mg tablet  Self Yes No   Sig: Take 25 mg by mouth 3 (three) times a day as needed for dizziness    metoprolol tartrate (LOPRESSOR) 50 mg tablet 2020 at Unknown time  No Yes   Sig: Take 1 tablet (50 mg total) by mouth every 12 (twelve) hours   nortriptyline (PAMELOR) 50 mg capsule   No Yes   Sig: Take 1 capsule (50 mg total) by mouth daily   ondansetron (ZOFRAN-ODT) 8 mg disintegrating tablet  Self No No   Sig: Take 1 tablet (8 mg total) by mouth every 8 (eight) hours as needed for nausea or vomiting   oxyCODONE-acetaminophen (PERCOCET) 5-325 mg per tablet  Self No No   Sig: Take 1 tablet by mouth every 4 (four) hours as needed for moderate pain Max Daily Amount: 6 tablets   potassium chloride (K-DUR,KLOR-CON) 20 mEq tablet 2020 at Unknown time  No Yes   Sig: Take 1 tablet (20 mEq total) by mouth 2 (two) times a day   simvastatin (ZOCOR) 10 mg tablet 2020 at Unknown time  No Yes   Sig: Take 1 tablet (10 mg total) by mouth daily at bedtime   torsemide (DEMADEX) 20 mg tablet 2020 at Unknown time  No Yes   Sig: Take 1 tablet (20 mg total) by mouth see administration instructions Take 1 tablet (20 mg total) by mouth daily except for Monday and  take 2 tablets (40 mg total) in morning      Facility-Administered Medications: None     Social History:     Social History     Socioeconomic History    Marital status:      Spouse name: Not on file    Number of children: Not on file    Years of education: Not on file    Highest education level: Not on file   Occupational History    Not on file   Social Needs    Financial resource strain: Not on file    Food insecurity:     Worry: Not on file     Inability: Not on file    Transportation needs:     Medical: Not on file     Non-medical: Not on file   Tobacco Use    Smoking status: Former Smoker     Types: Cigarettes     Last attempt to quit: 2011     Years since quittin 4    Smokeless tobacco: Never Used   Substance and Sexual Activity    Alcohol use: Never     Alcohol/week: 0 0 standard drinks     Frequency: Never     Binge frequency: Never    Drug use: No    Sexual activity: Not Currently   Lifestyle    Physical activity:     Days per week: Not on file     Minutes per session: Not on file    Stress: Not on file   Relationships    Social connections:     Talks on phone: Not on file     Gets together: Not on file     Attends Sabianist service: Not on file     Active member of club or organization: Not on file     Attends meetings of clubs or organizations: Not on file     Relationship status: Not on file    Intimate partner violence:     Fear of current or ex partner: Not on file     Emotionally abused: Not on file     Physically abused: Not on file     Forced sexual activity: Not on file   Other Topics Concern    Not on file   Social History Narrative    fhx not asked in triage     Patient Pre-hospital Living Situation:   Patient Pre-hospital Level of Mobility:   Patient Pre-hospital Diet Restrictions:     Family History:  Family History   Problem Relation Age of Onset    Diabetes Mother     Heart disease Mother     Hypertension Mother     Arthritis Mother     Coronary artery disease Mother     Drug abuse Paternal Grandmother      Physical Exam:   Vitals:   Blood Pressure: 126/70 (07/06/20 0310)  Pulse: (!) 114 (07/06/20 0310)  Temperature: (!) 97 3 °F (36 3 °C) (07/06/20 0310)  Temp Source: Temporal (07/06/20 0048)  Respirations: 18 (07/06/20 0310)  Height: 5' 7" (170 2 cm) (07/06/20 0048)  Weight - Scale: 129 kg (285 lb) (07/06/20 0048)  SpO2: 97 % (07/06/20 0310)    General appearance: alert, appears stated age and cooperative  Skin: Skin color, texture, turgor normal  No rashes or lesions  Head: Normocephalic, without obvious abnormality, atraumatic  Eyes: conjunctivae/corneas clear  PERRL, EOM's intact    Lungs: diminished breath sounds  Heart: irregularly irregular rhythm  Abdomen: soft, non-tender; bowel sounds normal; no masses,  no organomegaly  Back: symmetric, no curvature  ROM normal  No CVA tenderness  Extremities: edema trace to 1+ lower extremities bilaterally  Neurologic: Grossly normal  Psychiatric:  Appropriate mood    Lab Results: I have personally reviewed pertinent reports  Results from last 7 days   Lab Units 07/06/20  0101   WBC Thousand/uL 10 30   HEMOGLOBIN g/dL 9 2*   HEMATOCRIT % 30 7*   PLATELETS Thousands/uL 346   NEUTROS PCT % 76*   LYMPHS PCT % 15*   MONOS PCT % 4   EOS PCT % 3     Results from last 7 days   Lab Units 07/06/20  0101   SODIUM mmol/L 139   POTASSIUM mmol/L 3 9   CHLORIDE mmol/L 106   CO2 mmol/L 22   ANION GAP mmol/L 11   BUN mg/dL 9   CREATININE mg/dL 1 13   CALCIUM mg/dL 8 8   ALBUMIN g/dL 3 8   TOTAL BILIRUBIN mg/dL 0 50   ALK PHOS U/L 99   ALT U/L 17   AST U/L 23   EGFR ml/min/1 73sq m 48   GLUCOSE RANDOM mg/dL 121*         Results from last 7 days   Lab Units 07/06/20  0101   TROPONIN I ng/mL <0 03     Results from last 7 days   Lab Units 07/06/20  0101   LACTIC ACID mmol/L 2 3*                         Imaging: I have personally reviewed pertinent films in PACS  Cta Ed Chest Pe Study  Result Date: 7/6/2020  Impression: No evidence of pulmonary embolus Scattered airspace opacities within the lungs which may represent infection  Short-term follow-up chest CT scan in 3 months is recommended to evaluate for resolution Mediastinal lymphadenopathy Workstation performed: [id number]       EKG, Pathology, and Other Studies Reviewed on Admission:   EKG  Result Date: 07/06/20  Personally reviewed strips with impression of:  Atrial flutter 132 bpm    Allscripts/ Epic Records Reviewed: Yes    ** Please Note: This note has been constructed using a voice recognition system   **

## 2020-07-06 NOTE — ED PROCEDURE NOTE
PROCEDURE  CriticalCare Time  Performed by: Amy Live MD  Authorized by: Amy Live MD     Critical care provider statement:     Critical care time (minutes):  65    Critical care start time:  7/6/2020 12:50 AM    Critical care end time:  7/6/2020 2:49 AM    Critical care time was exclusive of:  Separately billable procedures and treating other patients    Critical care was necessary to treat or prevent imminent or life-threatening deterioration of the following conditions:  Cardiac failure    Critical care was time spent personally by me on the following activities:  Examination of patient, evaluation of patient's response to treatment, obtaining history from patient or surrogate, ordering and review of radiographic studies, ordering and review of laboratory studies, ordering and performing treatments and interventions, review of old charts and re-evaluation of patient's condition         Amy Live MD  07/06/20 7391

## 2020-07-06 NOTE — CONSULTS
Consult- Barbara Ho 1945, 70 y o  female MRN: [id number]    Unit/Bed#: ICU 05 Encounter: [id number]    Primary Care Provider: Theresa Gipson MD   Date and time admitted to hospital: 7/6/2020 12:45 AM      Inpatient consult to Cardiology  Consult performed by: Dana Peters MD  Consult ordered by: Zoey castrejon, DO          Atrial flutter with rapid ventricular response [facility])  Assessment & Plan  Persistent  Rate is now better controlled  She has had prior ablation  My plan at present would be to increase the amiodarone and then ultimately consider ablation verses cardioversion  Will discuss further with my EP colleagues at some point  The other issue regarding this is sleep apnea which does not sound well treated  History of stroke  Assessment & Plan  As she is on Eliquis Plavix will be discontinued  The stroke was in 2011  DNATE on CPAP  Assessment & Plan  Not well treated by her description  I think she needs to follow-up with a sleep specialist to look in to other modalities  She does not seem to tolerate CPAP  * Chest pain in adult  Assessment & Plan  Precipitated by persistent fast heart rate  No troponin elevation  Other summary comments:         HPI: Barbara Ho is a 70y o  year old female who yesterday was short of breath and weak with very minor effort  She was awakened in that manner  At 1st she blamed on the heat but later there was chest pain and she came to the hospital   Here she was found to be in atrial flutter and admitted to further care  Overnight she has been on IV diltiazem  Heart rate is now well controlled and symptoms resolved  In 2018 she underwent atrial flutter ablation  It was thought that she could avoid future atrial fibrillation with this procedure  She also suffers from sleep apnea  There was a prior stroke in 2011  She has remained on amiodarone and Eliquis since the procedure  She formally lived further [facility] in the [facility]  Eyesight has been getting difficult and she has not return to see her EP physician in quite some time and struggles even to see her primary care physician  She is on CPAP for sleep apnea but rarely wakes up with the device on her  She feels very claustrophobic  EKG:   Initially atrial for flutter with 2-1 conduction  Now atrial flutter with controlled ventricular response  MOST  RECENT CARDIAC IMAGING:   Echocardiogram 2020:  Normal left ventricular systolic function  LVH is mild  Review of Systems: a 10 point review of systems was conducted and is negative except for as mentioned in the HPI or as below  Historical Information   Past Medical History:   Diagnosis Date    Acquired hypothyroidism 2018    Acute on chronic diastolic heart failure ([facility] ) 2018    Anemia     Atrial fibrillation (HCC)     CHF (congestive heart failure) (HCC)     History of transfusion     Hyperlipidemia     Hypertension     Migraine     Polyp of sigmoid colon     Prediabetes     Renal disorder     Stroke ([place] [address] )      Past Surgical History:   Procedure Laterality Date    APPENDECTOMY      CARDIAC ELECTROPHYSIOLOGY STUDY AND ABLATION      CHOLECYSTECTOMY      COLONOSCOPY       Social History     Substance and Sexual Activity   Alcohol Use Never    Alcohol/week: 0 0 standard drinks    Frequency: Never    Binge frequency: Never     Social History     Substance and Sexual Activity   Drug Use No     Social History     Tobacco Use   Smoking Status Former Smoker    Types: Cigarettes    Last attempt to quit: 2011    Years since quittin 4   Smokeless Tobacco Never Used       Family History:   Negative for early CAD      Meds/Allergies   all current active meds have been reviewed  Medications Prior to Admission   Medication    amiodarone 200 mg tablet    apixaban (Eliquis) 5 mg    nortriptyline (PAMELOR) 50 mg capsule    simvastatin (ZOCOR) 10 mg tablet    torsemide (DEMADEX) 20 mg tablet    clopidogrel (PLAVIX) 75 mg tablet    escitalopram (LEXAPRO) 10 mg tablet    gabapentin (NEURONTIN) 100 mg capsule    levothyroxine 88 mcg tablet    LORazepam (ATIVAN) 1 mg tablet    meclizine (ANTIVERT) 25 mg tablet    metoprolol tartrate (LOPRESSOR) 50 mg tablet    Multiple Vitamin (MULTIVITAMIN) tablet    ondansetron (ZOFRAN-ODT) 8 mg disintegrating tablet    oxyCODONE-acetaminophen (PERCOCET) 5-325 mg per tablet    potassium chloride (K-DUR,KLOR-CON) 20 mEq tablet       No Known Allergies    Objective   Vitals: Blood pressure 143/62, pulse 104, temperature 99 °F (37 2 °C), temperature source Tympanic, resp  rate (!) 28, height 5' 7" (1 702 m), weight 129 kg (285 lb), SpO2 98 %, not currently breastfeeding , Body mass index is 44 64 kg/m² ,   Orthostatic Blood Pressures      Most Recent Value   Blood Pressure  143/62 filed at 2020 0929   Patient Position - Orthostatic VS  Lying filed at 2020 2786          Systolic (21OUH), PDJ:769 , Min:126 , IU     Diastolic (70NVG), PXZ:81, Min:62, Max:93              Physical Exam:    General:  Normal appearance in no distress  Eyes:  Anicteric  Oral mucosa:  Moist   Neck:  No JVD  Carotid upstrokes are brisk without bruits  No masses  Chest:  Clear to auscultation and percussion  Cardiac:  Normal PMI  Normal S1 and S2  No murmur gallop or rub  Abdomen:  Soft and nontender  No palpable organomegaly or aortic enlargement  Extremities:  No peripheral edema  Musculoskeletal:  Symmetric  Vascular:  Femoral pulses are brisk without bruits  Popliteal  pulses are intact bilaterally  Pedal pulses are intact  Neuro:  Grossly symmetric  Psych:  Alert and oriented x3          Lab Results:     Troponins:   Results from last 7 days   Lab Units 20  0659 20  0308 20  0101   TROPONIN I ng/mL <0 03 <0 03 <0 03     BNP:   Results from last 6 Months   Lab Units 20  0101 20  1205   BNP pg/mL 311* 422* CBC :   Results from last 7 days   Lab Units 07/06/20  0101   WBC Thousand/uL 10 30   HEMOGLOBIN g/dL 9 2*   HEMATOCRIT % 30 7*   MCV fL 72*   PLATELETS Thousands/uL 346     TSH:     CMP:   Results from last 7 days   Lab Units 07/06/20  0101   POTASSIUM mmol/L 3 9   CHLORIDE mmol/L 106   CO2 mmol/L 22   BUN mg/dL 9   CREATININE mg/dL 1 13   AST U/L 23   ALT U/L 17   EGFR ml/min/1 73sq m 48     Lipid Profile:     Coags:

## 2020-07-06 NOTE — RESPIRATORY THERAPY NOTE
Pt  Could not tolerate CPAP therapy tonight  She was placed back on 2lpm via nasal cannula  She states that some nights go better then others"if she could just fall asleep" Explained to pt  What could happen by not wearing her CPAP unit  She sdtated she already had a stroke  CPAP left at the bedside

## 2020-07-06 NOTE — ED PROCEDURE NOTE
PROCEDURE  ECG 12 Lead Documentation Only  Date/Time: 7/6/2020 12:51 AM  Performed by: Lucina Fraga MD  Authorized by: Lucina Fraga MD     Indications / Diagnosis:  Cp, sob  ECG reviewed by me, the ED Provider: yes    Patient location:  ED  Previous ECG:     Comparison to cardiac monitor: Yes    Interpretation:     Interpretation: abnormal    Rate:     ECG rate:  134    ECG rate assessment: tachycardic    Rhythm:     Rhythm: atrial flutter    Ectopy:     Ectopy: none    QRS:     QRS axis:  Normal    QRS intervals:  Normal  Conduction:     Conduction: normal    ST segments:     ST segments:  Non-specific  T waves:     T waves: non-specific           Lucina Fraga MD  07/06/20 5175

## 2020-07-06 NOTE — RESPIRATORY THERAPY NOTE
Pt  Placed on "our" ResMed VPAP 3 CPAP unit with 2lpm titrated into the unit  Pt  Didn't know her home settings  Pt  Was placed on [id number] per [facility] protocol/proceedure  Pt  Seems to be tolerating the CPAP well

## 2020-07-06 NOTE — PLAN OF CARE
Problem: Potential for Falls  Goal: Patient will remain free of falls  Description  INTERVENTIONS:  - Assess patient frequently for physical needs  -  Identify cognitive and physical deficits and behaviors that affect risk of falls  -  [place] fall precautions as indicated by assessment   - Educate patient/family on patient safety including physical limitations  - Instruct patient to call for assistance with activity based on assessment  - Modify environment to reduce risk of injury  - Consider OT/PT consult to assist with strengthening/mobility  Outcome: Progressing     Problem: Nutrition/Hydration-ADULT  Goal: Nutrient/Hydration intake appropriate for improving, restoring or maintaining nutritional needs  Description  Monitor and assess patient's nutrition/hydration status for malnutrition  Collaborate with interdisciplinary team and initiate plan and interventions as ordered  Monitor patient's weight and dietary intake as ordered or per policy  Utilize nutrition screening tool and intervene as necessary  Determine patient's food preferences and provide high-protein, high-caloric foods as appropriate       INTERVENTIONS:  - Monitor oral intake, urinary output, labs, and treatment plans  - Assess nutrition and hydration status and recommend course of action  - Evaluate amount of meals eaten  - Assist patient with eating if necessary   - Allow adequate time for meals  - Recommend/ encourage appropriate diets, oral nutritional supplements, and vitamin/mineral supplements  - Order, calculate, and assess calorie counts as needed  - Recommend, monitor, and adjust tube feedings and TPN/PPN based on assessed needs  - Assess need for intravenous fluids  - Provide specific nutrition/hydration education as appropriate  - Include patient/family/caregiver in decisions related to nutrition  Outcome: Progressing     Problem: PAIN - ADULT  Goal: Verbalizes/displays adequate comfort level or baseline comfort level  Description  Interventions:  - Encourage patient to monitor pain and request assistance  - Assess pain using appropriate pain scale  - Administer analgesics based on type and severity of pain and evaluate response  - Implement non-pharmacological measures as appropriate and evaluate response  - Consider cultural and social influences on pain and pain management  - Notify physician/advanced practitioner if interventions unsuccessful or patient reports new pain  Outcome: Progressing

## 2020-07-06 NOTE — ED PROVIDER NOTES
History  Chief Complaint   Patient presents with    Shortness of Breath     SOB today  History of ablation for afib  70-YEAR-OLD FEMALE    PAST MEDICAL HISTORY:    Prediabetes  Obesity  Afib w/ ablation 8 years ago, pt is on Eliquis  HTN  HLD      MODE OF TRANSPORT:   EMS    CHIEF COMPLAINT:  SOB all day    Patient states feels like when she had A fib and her only symptoms were shortness of breath    States he had some lower substernal chest discomfort for the last couple of hours  It is nonradiating to the jaw, neck, arms      VTE  RISK FACTORS:  NONE   NO HISTORY OF PE OR DVT   NO LONG CAR RIDES OR PLANE RIDES  NO IMMOBILIZATION  NO RECENT SURGERY OR TRAUMA  NO HEMOPTYSIS  INFECTIOUS SYMPTOMS:  PATIENT DENIES FEVERS, REPORTS CHILLS  NO SINUS SYMPTOMS  NO HEADACHE OR NECK PAIN, NO DIZZINESS    OTHER ASSOCIATED SYMPTOMS:  NO ABDOMINAL PAIN  NO ACUTE BACK PAIN  NO NAUSEA OR VOMITING  NO STOOL CHANGES  DENIES HEADACHE OR DIZZINESS  NO NECK PAIN  SHE DENIES SYNCOPE OR NEAR SYNCOPE  SHE DENIES PALPITATIONS  SHE DENIES UNILATERAL WEAKNESS OR NUMBNESS  NO SPEECH DIFFICULTY  NO VISUAL CHANGES    NO URINARY COMPLAINTS:    NO DYSURIA, NO HEMATURIA, NO FREQUENCY    INTERVENTIONS:   NONE        History provided by:  Patient  Shortness of Breath   Severity:  Moderate  Onset quality:  Gradual  Timing:  Constant  Progression:  Worsening  Chronicity:  New  Relieved by:  Nothing  Worsened by:  Nothing  Ineffective treatments:  None tried  Associated symptoms: chest pain    Associated symptoms: no abdominal pain, no claudication, no cough, no diaphoresis, no fever, no headaches, no hemoptysis, no neck pain, no rash, no sore throat, no sputum production, no syncope, no vomiting and no wheezing        Prior to Admission Medications   Prescriptions Last Dose Informant Patient Reported? Taking?    LORazepam (ATIVAN) 1 mg tablet Not Taking at Unknown time Self No No   Sig: Take 1 tablet (1 mg total) by mouth 3 (three) times a day as needed for anxiety   Patient not taking: Reported on 7/6/2020   Multiple Vitamin (MULTIVITAMIN) tablet 7/5/2020 at 0900 Self Yes No   Sig: Take 1 tablet by mouth daily     amiodarone 200 mg tablet 7/5/2020 at 0900  No Yes   Sig: Take 1/2 tablet daily   apixaban (Eliquis) 5 mg 7/5/2020 at 2100  No Yes   Sig: Take 1 tablet (5 mg total) by mouth 2 (two) times a day   clopidogrel (PLAVIX) 75 mg tablet 7/5/2020 at 0900  No No   Sig: Take 1 tablet (75 mg total) by mouth daily   escitalopram (LEXAPRO) 10 mg tablet 7/5/2020 at 0900  No No   Sig: Take 1 tablet (10 mg total) by mouth daily   gabapentin (NEURONTIN) 100 mg capsule 7/5/2020 at 2100  No No   Sig: Take 1 capsule (100 mg total) by mouth 3 (three) times a day   levothyroxine 88 mcg tablet 7/5/2020 at 0530  No No   Sig: Take 1 tablet (88 mcg total) by mouth daily   meclizine (ANTIVERT) 25 mg tablet More than a month at Unknown time Self Yes No   Sig: Take 25 mg by mouth 3 (three) times a day as needed for dizziness    metoprolol tartrate (LOPRESSOR) 50 mg tablet 7/5/2020 at 2100  No No   Sig: Take 1 tablet (50 mg total) by mouth every 12 (twelve) hours   nortriptyline (PAMELOR) 50 mg capsule 7/5/2020 at 2100  No Yes   Sig: Take 1 capsule (50 mg total) by mouth daily   ondansetron (ZOFRAN-ODT) 8 mg disintegrating tablet Not Taking at Unknown time Self No No   Sig: Take 1 tablet (8 mg total) by mouth every 8 (eight) hours as needed for nausea or vomiting   Patient not taking: Reported on 7/6/2020   oxyCODONE-acetaminophen (PERCOCET) 5-325 mg per tablet Unknown at Unknown time Self No No   Sig: Take 1 tablet by mouth every 4 (four) hours as needed for moderate pain Max Daily Amount: 6 tablets   potassium chloride (K-DUR,KLOR-CON) 20 mEq tablet   No No   Sig: Take 1 tablet (20 mEq total) by mouth 2 (two) times a day   simvastatin (ZOCOR) 10 mg tablet 7/5/2020 at 2100  No Yes   Sig: Take 1 tablet (10 mg total) by mouth daily at bedtime   torsemide [facility]) 20 mg tablet 2020 at 0900  No Yes   Sig: Take 1 tablet (20 mg total) by mouth see administration instructions Take 1 tablet (20 mg total) by mouth daily except for Monday and  take 2 tablets (40 mg total) in morning      Facility-Administered Medications: None       Past Medical History:   Diagnosis Date    Acquired hypothyroidism 2018    Acute on chronic diastolic heart failure ([facility] ) 2018    Anemia     Atrial fibrillation (HCC)     CHF (congestive heart failure) ([place] [address] )     History of transfusion     Hyperlipidemia     Hypertension     Migraine     Polyp of sigmoid colon     Prediabetes     Renal disorder     Stroke ([place] [address] )        Past Surgical History:   Procedure Laterality Date    APPENDECTOMY      CARDIAC ELECTROPHYSIOLOGY STUDY AND ABLATION      CHOLECYSTECTOMY      COLONOSCOPY         Family History   Problem Relation Age of Onset    Diabetes Mother     Heart disease Mother     Hypertension Mother     Arthritis Mother     Coronary artery disease Mother     Drug abuse Paternal Grandmother      I have reviewed and agree with the history as documented  E-Cigarette/Vaping    E-Cigarette Use Never User      E-Cigarette/Vaping Substances    Nicotine No     THC No     CBD No     Flavoring No     Other No     Unknown No      Social History     Tobacco Use    Smoking status: Former Smoker     Types: Cigarettes     Last attempt to quit: 2011     Years since quittin 4    Smokeless tobacco: Never Used   Substance Use Topics    Alcohol use: Never     Alcohol/week: 0 0 standard drinks     Frequency: Never     Binge frequency: Never    Drug use: No       Review of Systems   Constitutional: Negative for chills, diaphoresis, fatigue and fever  HENT: Negative for sore throat  Respiratory: Positive for shortness of breath  Negative for cough, hemoptysis, sputum production, wheezing and stridor  Cardiovascular: Positive for chest pain  Negative for palpitations, claudication, leg swelling and syncope  Gastrointestinal: Negative for abdominal pain, blood in stool, nausea and vomiting  Genitourinary: Negative for difficulty urinating, dysuria, flank pain and frequency  Musculoskeletal: Negative for arthralgias, back pain, gait problem, joint swelling, myalgias, neck pain and neck stiffness  Skin: Negative for rash and wound  Neurological: Negative for dizziness, light-headedness and headaches  All other systems reviewed and are negative  Physical Exam  Physical Exam   Constitutional: She is oriented to person, place, and time  She appears well-developed and well-nourished  Non-toxic appearance  She does not appear ill  No distress  HENT:   Head: Normocephalic and atraumatic  Nose: Nose normal    Mouth/Throat: Oropharynx is clear and moist  No oropharyngeal exudate  Eyes: Pupils are equal, round, and reactive to light  Conjunctivae and EOM are normal  Right eye exhibits no discharge  Left eye exhibits no discharge  No scleral icterus  Neck: Normal range of motion  Neck supple  No JVD present  No tracheal deviation present  Cardiovascular: Regular rhythm, normal heart sounds and intact distal pulses  Exam reveals no gallop and no friction rub  No murmur heard  135 beats per minute, a flutter   Pulmonary/Chest: Effort normal and breath sounds normal  No accessory muscle usage or stridor  No tachypnea  No respiratory distress  She has no decreased breath sounds  She has no wheezes  She has no rhonchi  She has no rales  She exhibits no tenderness  Abdominal: Soft  Bowel sounds are normal  She exhibits no distension and no mass  There is no tenderness  There is no rebound and no guarding  No hernia  Musculoskeletal: Normal range of motion  She exhibits no tenderness or deformity  Right lower leg: She exhibits edema (1+)  She exhibits no tenderness  Left lower leg: She exhibits edema (1+)   She exhibits no tenderness  Lymphadenopathy:     She has no cervical adenopathy  Neurological: She is alert and oriented to person, place, and time  No cranial nerve deficit or sensory deficit  She exhibits normal muscle tone  Coordination normal    Skin: Skin is warm  Capillary refill takes less than 2 seconds  No rash noted  She is not diaphoretic  No erythema  No pallor  Psychiatric: She has a normal mood and affect   Her behavior is normal  Judgment and thought content normal        Vital Signs  ED Triage Vitals   Temperature Pulse Respirations Blood Pressure SpO2   07/06/20 0048 07/06/20 0048 07/06/20 0048 07/06/20 0048 07/06/20 0048   (!) 97 3 °F (36 3 °C) (!) 136 22 (!) 173/84 96 %      Temp Source Heart Rate Source Patient Position - Orthostatic VS BP Location FiO2 (%)   07/06/20 0048 07/06/20 0048 07/06/20 0048 07/06/20 0048 --   Temporal Monitor Sitting Left arm       Pain Score       07/06/20 0350       No Pain           Vitals:    07/06/20 0230 07/06/20 0245 07/06/20 0300 07/06/20 0310   BP:  140/90  126/70   Pulse: (!) 117 (!) 112 (!) 118 (!) 114   Patient Position - Orthostatic VS:    Sitting         Visual Acuity      ED Medications  Medications   amiodarone tablet 200 mg (has no administration in time range)   clopidogrel (PLAVIX) tablet 75 mg (has no administration in time range)   apixaban (ELIQUIS) tablet 5 mg (has no administration in time range)   escitalopram (LEXAPRO) tablet 10 mg (has no administration in time range)   gabapentin (NEURONTIN) capsule 100 mg (has no administration in time range)   levothyroxine tablet 88 mcg (has no administration in time range)   LORazepam (ATIVAN) tablet 1 mg (has no administration in time range)   metoprolol tartrate (LOPRESSOR) tablet 75 mg (has no administration in time range)   nortriptyline (PAMELOR) capsule 50 mg (has no administration in time range)   multivitamin-minerals (CENTRUM) tablet 1 tablet (has no administration in time range)   potassium chloride (K-DUR,KLOR-CON) CR tablet 20 mEq (has no administration in time range)   pravastatin (PRAVACHOL) tablet 20 mg (has no administration in time range)   torsemide (DEMADEX) tablet 20 mg (has no administration in time range)   ondansetron (ZOFRAN) injection 4 mg (has no administration in time range)   acetaminophen (TYLENOL) tablet 650 mg (has no administration in time range)   levalbuterol (XOPENEX) inhalation solution 1 25 mg (has no administration in time range)   sodium chloride 0 9 % inhalation solution 3 mL (has no administration in time range)   diltiazem (CARDIZEM) 125 mg in sodium chloride 0 9 % 125 mL infusion (10 mg/hr Intravenous New Bag 7/6/20 0212)   diltiazem (CARDIZEM) injection 10 mg (10 mg Intravenous Given 7/6/20 0111)   sodium chloride 0 9 % bolus 250 mL ( Intravenous New Bag 7/6/20 0209)   iohexol (OMNIPAQUE) 350 MG/ML injection (MULTI-DOSE) 100 mL (100 mL Intravenous Given 7/6/20 0202)       Diagnostic Studies  Results Reviewed     Procedure Component Value Units Date/Time    TSH, 3rd generation [[id number]] Collected:  07/06/20 0308    Lab Status: In process Specimen:  Blood from Arm, Left Updated:  07/06/20 0424    Troponin I [[id number]]     Lab Status:  No result Specimen:  Blood     Troponin I [[id number]]     Lab Status:  No result Specimen:  Blood     Lactic acid 2 Hours [[id number]]  (Normal) Collected:  07/06/20 0308    Lab Status:  Final result Specimen:  Blood from Arm, Left Updated:  07/06/20 0335     LACTIC ACID 1 8 mmol/L     Narrative:       Result may be elevated if tourniquet was used during collection      Troponin I [[id number]]  (Normal) Collected:  07/06/20 0308    Lab Status:  Final result Specimen:  Blood from Arm, Left Updated:  07/06/20 0335     Troponin I <0 03 ng/mL     Novel Coronavirus (Covid-19),PCR [facility] [[id number]]  (Normal) Collected:  07/06/20 0101    Lab Status:  Final result Specimen:  Nares from Nose Updated:  07/06/20 0158     SARS-CoV-2 Negative    Narrative: The specimen collection materials, transport medium, and/or testing methodology utilized in the production of these test results have been proven to be reliable in a limited validation with an abbreviated program under the Emergency Utilization Authorization provided by the FDA  Testing reported as "Presumptive positive" will be confirmed with secondary testing with a reference laboratory to ensure result accuracy  Clinical caution and judgement should be used with the interpretation of these results with consideration of the clinical impression and other laboratory testing  Testing reported as "Positive" or "Negative" has been proven to be accurate according to standard laboratory validation requirements  All testing is performed with control materials showing appropriate reactivity at standard intervals  B-Type Natriuretic Peptide [facility] ONLY) [[id number]]  (Abnormal) Collected:  07/06/20 0101    Lab Status:  Final result Specimen:  Blood from Arm, Right Updated:  07/06/20 0134      pg/mL     Lactic acid [[id number]]  (Abnormal) Collected:  07/06/20 0101    Lab Status:  Final result Specimen:  Blood from Arm, Right Updated:  07/06/20 0128     LACTIC ACID 2 3 mmol/L     Narrative:       Result may be elevated if tourniquet was used during collection      Magnesium [[id number]]  (Normal) Collected:  07/06/20 0101    Lab Status:  Final result Specimen:  Blood from Arm, Right Updated:  07/06/20 0127     Magnesium 2 1 mg/dL     Comprehensive metabolic panel [[id number]]  (Abnormal) Collected:  07/06/20 0101    Lab Status:  Final result Specimen:  Blood from Arm, Right Updated:  07/06/20 0127     Sodium 139 mmol/L      Potassium 3 9 mmol/L      Chloride 106 mmol/L      CO2 22 mmol/L      ANION GAP 11 mmol/L      BUN 9 mg/dL      Creatinine 1 13 mg/dL      Glucose 121 mg/dL      Calcium 8 8 mg/dL      AST 23 U/L      ALT 17 U/L      Alkaline Phosphatase 99 U/L      Total Protein 6 5 g/dL Albumin 3 8 g/dL      Total Bilirubin 0 50 mg/dL      eGFR 48 ml/min/1 73sq m     Narrative:       National Kidney Disease Foundation guidelines for Chronic Kidney Disease (CKD):     Stage 1 with normal or high GFR (GFR > 90 mL/min/1 73 square meters)    Stage 2 Mild CKD (GFR = 60-89 mL/min/1 73 square meters)    Stage 3A Moderate CKD (GFR = 45-59 mL/min/1 73 square meters)    Stage 3B Moderate CKD (GFR = 30-44 mL/min/1 73 square meters)    Stage 4 Severe CKD (GFR = 15-29 mL/min/1 73 square meters)    Stage 5 End Stage CKD (GFR <15 mL/min/1 73 square meters)  Note: GFR calculation is accurate only with a steady state creatinine    Troponin I [[id number]]  (Normal) Collected:  07/06/20 0101    Lab Status:  Final result Specimen:  Blood from Arm, Right Updated:  07/06/20 0126     Troponin I <0 03 ng/mL     CBC and differential [[id number]]  (Abnormal) Collected:  07/06/20 0101    Lab Status:  Final result Specimen:  Blood from Arm, Right Updated:  07/06/20 0112     WBC 10 30 Thousand/uL      RBC 4 29 Million/uL      Hemoglobin 9 2 g/dL      Hematocrit 30 7 %      MCV 72 fL      MCH 21 4 pg      MCHC 29 9 g/dL      RDW 19 0 %      MPV 8 5 fL      Platelets 788 Thousands/uL      Neutrophils Relative 76 %      Lymphocytes Relative 15 %      Monocytes Relative 4 %      Eosinophils Relative 3 %      Basophils Relative 1 %      Neutrophils Absolute 7 80 Thousands/µL      Lymphocytes Absolute 1 60 Thousands/µL      Monocytes Absolute 0 50 Thousand/µL      Eosinophils Absolute 0 40 Thousand/µL      Basophils Absolute 0 10 Thousands/µL                  CTA ED chest PE Study   Final Result by Mason Freeman DO (07/06 0230)      No evidence of pulmonary embolus      Scattered airspace opacities within the lungs which may represent infection    Short-term follow-up chest CT scan in 3 months is recommended to evaluate for resolution      Mediastinal lymphadenopathy                  Workstation performed: [id number] Procedures  Procedures         ED Course  ED Course as of Jul 06 0445   Mon Jul 06, 2020   0111 Patient received 10 mg of Cardizem bolus  She improved from heart rate of 136 to 115  Repeat EKG in progress  Cardizem drip in progress  Patient remains clinically stable      0117 WBC: 10 30   0117 Hemoglobin(!): 9 2   0117 HCT(!): 30 7   0118 Platelet Count: 490   0118 Neutrophils %(!): 76   0118 Lymphocytes Relative(!): 15   0118 Monocytes Relative: 4   0118 Eosinophils: 3   0118 Cbc at baseline       0138 Pt's Lactic acid is likely secondary to HR and dehydration rather than sepsis  LACTIC ACID(!!): 2 3   0145 Pt stable and improved on cardizem drip    She is en route to CT now       0204 EXT SARS-COV-2: Negative   0204 BNP(!): 311   0204 Troponin I: <0 03   0204 Sodium: 139   0204 Potassium: 3 9   0204 Chloride: 106   0204 BUN: 9   0204 Creatinine: 1 13   0205 Glucose, Random(!): 121   0226 Hr down to 117 on drip  vss      0235 CTA - CHEST WITH IV CONTRAST - PULMONARY ANGIOGRAM     INDICATION:   PE suspected, high pretest prob      COMPARISON: The 2nd 2020     TECHNIQUE: CTA examination of the chest was performed using angiographic technique according to a protocol specifically tailored to evaluate for pulmonary embolism  Axial, sagittal, and coronal 2D reformatted images were created from the source data and   submitted for interpretation  In addition, coronal 3D MIP postprocessing was performed on the acquisition scanner        Radiation dose length product (DLP) for this visit:  708 mGy-cm     This examination, like all CT scans performed in the [facility], was performed utilizing techniques to minimize radiation dose exposure, including the use of iterative   reconstruction and automated exposure control      IV Contrast:  100 mL of iohexol (OMNIPAQUE)     FINDINGS:     PULMONARY ARTERIAL TREE:  No evidence of pulmonary embolus     LUNGS:  The trachea and central bronchial tree are patent  Scattered airspace opacities are seen within the lungs      PLEURA:  Unremarkable      HEART/GREAT VESSELS:  Unremarkable for patient's age      MEDIASTINUM AND JESSE:  Mediastinal lymph nodes measuring up to 2 2 cm is seen      CHEST WALL AND LOWER NECK:   Unremarkable      VISUALIZED STRUCTURES IN THE UPPER ABDOMEN:  Status post cholecystectomy  Fatty infiltration of the liver is seen      OSSEOUS STRUCTURES:  No acute fracture or destructive osseous lesion      IMPRESSION:     No evidence of pulmonary embolus     Scattered airspace opacities within the lungs which may represent infection  Short-term follow-up chest CT scan in 3 months is recommended to evaluate for resolution     Mediastinal lymphadenopathy      0242 D/w Dr Pat Gibson  Will admit to ICU      0244 Repeat Troponin and Lactic ordered for 45min           US AUDIT      Most Recent Value   Initial Alcohol Screen: US AUDIT-C    1  How often do you have a drink containing alcohol?  0 Filed at: 07/06/2020 0050   2  How many drinks containing alcohol do you have on a typical day you are drinking? 0 Filed at: 07/06/2020 0050   3a  Male UNDER 65: How often do you have five or more drinks on one occasion? 0 Filed at: 07/06/2020 0050   3b  FEMALE Any Age, or MALE 65+: How often do you have 4 or more drinks on one occassion? 0 Filed at: 07/06/2020 0050   Audit-C Score  0 Filed at: 07/06/2020 0050            HEART Risk Score      Most Recent Value   Heart Score Risk Calculator   History  2 Filed at: 07/06/2020 0119   ECG  1 Filed at: 07/06/2020 0119   Age  2 Filed at: 07/06/2020 0119   Risk Factors  2 Filed at: 07/06/2020 0119   Troponin  0 Filed at: 07/06/2020 0119   HEART Score  7 Filed at: 07/06/2020 0119            DYAN/DAST-10      Most Recent Value   How many times in the past year have you    Used an illegal drug or used a prescription medication for non-medical reasons?   Never Filed at: 07/06/2020 0050                    Wells' Criteria for PE Most Recent Value   Wells' Criteria for PE   Clinical signs and symptoms of DVT  3 Filed at: 07/06/2020 0118   PE is primary diagnosis or equally likely  3 Filed at: 07/06/2020 0118   HR >100  1 5 Filed at: 07/06/2020 0118   Immobilization at least 3 days or Surgery in the previous 4 weeks  1 5 Filed at: 07/06/2020 0118   Previous, objectively diagnosed PE or DVT  1 5 Filed at: 07/06/2020 0118   Hemoptysis  0 Filed at: 07/06/2020 0118   Malignancy with treatment within 6 months or palliative  0 Filed at: 07/06/2020 0118   Wells' Criteria Total  10 5 Filed at: 07/06/2020 0118                  MDM      Disposition  Final diagnoses:   SOB (shortness of breath)   Chest pain   Atrial flutter ([address] )     Time reflects when diagnosis was documented in both MDM as applicable and the Disposition within this note     Time User Action Codes Description Comment    7/6/2020  1:19 AM Norlene [place] Add [R06 02] SOB (shortness of breath)     7/6/2020  1:19 AM Norlene [place] Add [R07 9] Chest pain     7/6/2020  2:45 AM Norlene [place] Add [I48 92] Atrial flutter [facility])       ED Disposition     ED Disposition Condition Date/Time Comment    Admit Stable Mon Jul 6, 2020  2:45 AM Case was discussed with Dr Pal Campos and the patient's admission status was agreed to be Admission Status: inpatient status to the service of Dr Pal Campos           Follow-up Information    None         Current Discharge Medication List      CONTINUE these medications which have NOT CHANGED    Details   amiodarone 200 mg tablet Take 1/2 tablet daily  Qty: 45 tablet, Refills: 0    Associated Diagnoses: Paroxysmal atrial fibrillation (HCC)      apixaban (Eliquis) 5 mg Take 1 tablet (5 mg total) by mouth 2 (two) times a day  Qty: 60 tablet, Refills: 0    Associated Diagnoses: Paroxysmal atrial fibrillation (HCC)      nortriptyline (PAMELOR) 50 mg capsule Take 1 capsule (50 mg total) by mouth daily  Qty: 30 capsule, Refills: 0    Associated Diagnoses: Migraine without status migrainosus, not intractable, unspecified migraine type      simvastatin (ZOCOR) 10 mg tablet Take 1 tablet (10 mg total) by mouth daily at bedtime  Qty: 30 tablet, Refills: 0    Associated Diagnoses: Hypercholesterolemia      torsemide (DEMADEX) 20 mg tablet Take 1 tablet (20 mg total) by mouth see administration instructions Take 1 tablet (20 mg total) by mouth daily except for Monday and Thursdays take 2 tablets (40 mg total) in morning  Qty: 60 tablet, Refills: 0    Associated Diagnoses: Essential hypertension      clopidogrel (PLAVIX) 75 mg tablet Take 1 tablet (75 mg total) by mouth daily  Qty: 30 tablet, Refills: 0    Associated Diagnoses: Cerebrovascular accident (CVA), unspecified mechanism (HCC)      escitalopram (LEXAPRO) 10 mg tablet Take 1 tablet (10 mg total) by mouth daily  Qty: 30 tablet, Refills: 0    Associated Diagnoses: Other depression      gabapentin (NEURONTIN) 100 mg capsule Take 1 capsule (100 mg total) by mouth 3 (three) times a day  Qty: 90 capsule, Refills: 0    Associated Diagnoses: Chronic bilateral low back pain without sciatica      levothyroxine 88 mcg tablet Take 1 tablet (88 mcg total) by mouth daily  Qty: 30 tablet, Refills: 0    Associated Diagnoses: Acquired hypothyroidism      LORazepam (ATIVAN) 1 mg tablet Take 1 tablet (1 mg total) by mouth 3 (three) times a day as needed for anxiety  Qty: 30 tablet, Refills: 0    Associated Diagnoses: Depression, unspecified depression type      meclizine (ANTIVERT) 25 mg tablet Take 25 mg by mouth 3 (three) times a day as needed for dizziness       metoprolol tartrate (LOPRESSOR) 50 mg tablet Take 1 tablet (50 mg total) by mouth every 12 (twelve) hours  Qty: 60 tablet, Refills: 0    Associated Diagnoses: Essential hypertension      Multiple Vitamin (MULTIVITAMIN) tablet Take 1 tablet by mouth daily        ondansetron (ZOFRAN-ODT) 8 mg disintegrating tablet Take 1 tablet (8 mg total) by mouth every 8 (eight) hours as needed for nausea or vomiting  Qty: 20 tablet, Refills: 0    Associated Diagnoses: Anemia      oxyCODONE-acetaminophen (PERCOCET) 5-325 mg per tablet Take 1 tablet by mouth every 4 (four) hours as needed for moderate pain Max Daily Amount: 6 tablets  Qty: 50 tablet, Refills: 0    Associated Diagnoses: Migraine without aura and without status migrainosus, not intractable      potassium chloride (K-DUR,KLOR-CON) 20 mEq tablet Take 1 tablet (20 mEq total) by mouth 2 (two) times a day  Qty: 180 tablet, Refills: 0    Associated Diagnoses: Essential hypertension           No discharge procedures on file      PDMP Review     None          ED Provider  Electronically Signed by           Jian Fonseca MD  07/06/20 [address], [place]  07/06/20 7359

## 2020-07-06 NOTE — ASSESSMENT & PLAN NOTE
Wt Readings from Last 3 Encounters:   07/06/20 129 kg (285 lb)   05/29/20 124 kg (272 lb 14 9 oz)   05/04/20 120 kg (264 lb 1 8 oz)     Results from last 7 days   Lab Units 07/06/20  0101   BNP pg/mL 311*     · Chronic diastolic CHF    Ground-glass opacities are noted on CTA however this is chronic and seen on previous images likely pulmonary edema  · Continue torsemide 20 mg daily except for 40 mg Monday and Thursday

## 2020-07-06 NOTE — ASSESSMENT & PLAN NOTE
· Atrial flutter with tachycardia  Does have history of atrial fibrillation status post ablation  · Patient does have sleep apnea and not always compliant with CPAP  · Historically follows with Dr Sheree Cam  · Continue diltiazem infusion  Increase amiodarone to 200 mg daily  Increase metoprolol tartrate to 75 mg b i d  Dwayne Mcdaniel · Cardiology to evaluate

## 2020-07-07 VITALS
HEART RATE: 112 BPM | SYSTOLIC BLOOD PRESSURE: 136 MMHG | WEIGHT: 285 LBS | BODY MASS INDEX: 44.73 KG/M2 | RESPIRATION RATE: 18 BRPM | OXYGEN SATURATION: 95 % | DIASTOLIC BLOOD PRESSURE: 65 MMHG | HEIGHT: 67 IN | TEMPERATURE: 98.1 F

## 2020-07-07 DIAGNOSIS — I48.92 ATRIAL FLUTTER WITH RAPID VENTRICULAR RESPONSE (HCC): ICD-10-CM

## 2020-07-07 LAB
ALBUMIN SERPL BCP-MCNC: 3.6 G/DL (ref 3.5–5.7)
ALP SERPL-CCNC: 96 U/L (ref 55–165)
ALT SERPL W P-5'-P-CCNC: 23 U/L (ref 7–52)
ANION GAP SERPL CALCULATED.3IONS-SCNC: 7 MMOL/L (ref 4–13)
AST SERPL W P-5'-P-CCNC: 34 U/L (ref 13–39)
BILIRUB SERPL-MCNC: 0.5 MG/DL (ref 0.2–1)
BUN SERPL-MCNC: 12 MG/DL (ref 7–25)
CALCIUM SERPL-MCNC: 8.5 MG/DL (ref 8.6–10.5)
CHLORIDE SERPL-SCNC: 105 MMOL/L (ref 98–107)
CO2 SERPL-SCNC: 26 MMOL/L (ref 21–31)
CREAT SERPL-MCNC: 1.14 MG/DL (ref 0.6–1.2)
ERYTHROCYTE [DISTWIDTH] IN BLOOD BY AUTOMATED COUNT: 19 % (ref 11.5–14.5)
GFR SERPL CREATININE-BSD FRML MDRD: 48 ML/MIN/1.73SQ M
GLUCOSE SERPL-MCNC: 111 MG/DL (ref 65–99)
HCT VFR BLD AUTO: 28.2 % (ref 42–47)
HGB BLD-MCNC: 8.6 G/DL (ref 12–16)
MAGNESIUM SERPL-MCNC: 2.4 MG/DL (ref 1.9–2.7)
MCH RBC QN AUTO: 21.8 PG (ref 26–34)
MCHC RBC AUTO-ENTMCNC: 30.6 G/DL (ref 31–37)
MCV RBC AUTO: 71 FL (ref 81–99)
PHOSPHATE SERPL-MCNC: 2.8 MG/DL (ref 3–5.5)
PLATELET # BLD AUTO: 308 THOUSANDS/UL (ref 149–390)
PMV BLD AUTO: 8.8 FL (ref 8.6–11.7)
POTASSIUM SERPL-SCNC: 4.8 MMOL/L (ref 3.5–5.5)
PROT SERPL-MCNC: 6.7 G/DL (ref 6.4–8.9)
RBC # BLD AUTO: 3.96 MILLION/UL (ref 3.9–5.2)
SODIUM SERPL-SCNC: 138 MMOL/L (ref 134–143)
WBC # BLD AUTO: 9.6 THOUSAND/UL (ref 4.8–10.8)

## 2020-07-07 PROCEDURE — 99239 HOSP IP/OBS DSCHRG MGMT >30: CPT | Performed by: HOSPITALIST

## 2020-07-07 PROCEDURE — 80053 COMPREHEN METABOLIC PANEL: CPT | Performed by: INTERNAL MEDICINE

## 2020-07-07 PROCEDURE — 85027 COMPLETE CBC AUTOMATED: CPT | Performed by: INTERNAL MEDICINE

## 2020-07-07 PROCEDURE — 94760 N-INVAS EAR/PLS OXIMETRY 1: CPT

## 2020-07-07 PROCEDURE — 84100 ASSAY OF PHOSPHORUS: CPT | Performed by: HOSPITALIST

## 2020-07-07 PROCEDURE — 99232 SBSQ HOSP IP/OBS MODERATE 35: CPT | Performed by: INTERNAL MEDICINE

## 2020-07-07 PROCEDURE — 83735 ASSAY OF MAGNESIUM: CPT | Performed by: HOSPITALIST

## 2020-07-07 RX ORDER — AMIODARONE HYDROCHLORIDE 200 MG/1
TABLET ORAL
Qty: 90 TABLET | Refills: 0 | Status: ON HOLD | OUTPATIENT
Start: 2020-07-07 | End: 2020-07-15 | Stop reason: SDUPTHER

## 2020-07-07 RX ORDER — AMIODARONE HYDROCHLORIDE 200 MG/1
200 TABLET ORAL
Qty: 90 TABLET | Refills: 0 | Status: SHIPPED | OUTPATIENT
Start: 2020-07-07 | End: 2020-07-07

## 2020-07-07 RX ORDER — METOPROLOL TARTRATE 100 MG/1
100 TABLET ORAL EVERY 12 HOURS SCHEDULED
Qty: 60 TABLET | Refills: 0 | Status: ON HOLD | OUTPATIENT
Start: 2020-07-07 | End: 2020-11-09 | Stop reason: SDUPTHER

## 2020-07-07 RX ADMIN — GABAPENTIN 100 MG: 100 CAPSULE ORAL at 08:57

## 2020-07-07 RX ADMIN — Medication 1 TABLET: at 08:56

## 2020-07-07 RX ADMIN — METOPROLOL TARTRATE 100 MG: 50 TABLET, FILM COATED ORAL at 08:56

## 2020-07-07 RX ADMIN — ACETAMINOPHEN 650 MG: 325 TABLET ORAL at 09:01

## 2020-07-07 RX ADMIN — LEVOTHYROXINE SODIUM 88 MCG: 88 TABLET ORAL at 05:21

## 2020-07-07 RX ADMIN — ESCITALOPRAM OXALATE 10 MG: 10 TABLET ORAL at 08:56

## 2020-07-07 RX ADMIN — AMIODARONE HYDROCHLORIDE 200 MG: 100 TABLET ORAL at 08:57

## 2020-07-07 RX ADMIN — APIXABAN 5 MG: 5 TABLET, FILM COATED ORAL at 08:57

## 2020-07-07 RX ADMIN — POTASSIUM CHLORIDE 20 MEQ: 1500 TABLET, EXTENDED RELEASE ORAL at 08:56

## 2020-07-07 NOTE — ASSESSMENT & PLAN NOTE
Precipitated by persistent fast heart rate  No troponin elevation  No return since heart rate controlled

## 2020-07-07 NOTE — DISCHARGE INSTR - AVS FIRST PAGE
Dear Bo Heaton,     It was our pleasure to care for you here at [facility]  It is our hope that we were always able to exceed the expected standards for your care during your stay  You were hospitalized due to atrial flutter  You were cared for on the ICU/medical surgical floor by Alondra Garcia MD with the [facility] who covers for your primary care physician (PCP), Semaj Isbell MD, and also by [facility] Cardiology - Dr Teresa Martinez while you were hospitalized  If you have any questions or concerns related to this hospitalization, you may contact us at [phone]  For follow up as well as any medication refills, we recommend that you follow up with your primary care physician  A registered nurse will reach out to you by phone within a few days after your discharge to answer any additional questions that you may have after going home    However, at this time we provide for you here, the most important instructions / recommendations at discharge:     · Notable Medication Adjustments -   · Amiodarone was increased to 200 mg 3 times a day - please take this dosage for 30 days, dose will be further optimized by Dr Teresa Martinez, if for some reason you can not get in to see Dr Teresa Martinez within the scheduled 30 days, on day 31 please reduce the dose to 1 tablet once a day  · Metoprolol dosing was increased to 100 mg  twice a day from 50 mg twice a day  · Testing Required after Discharge -   · To be further decided by your primary care physician and or cardiology  · Important follow up information -   · Please ensure you follow-up with the physicians as outlined in this discharge package  · Other Instructions -   · Please maintain a healthy diet  · Please review this entire after visit summary as additional general instructions including medication list, appointments, activity, diet, any pertinent wound care, and other additional recommendations from your care team that may be provided for you        Sincerely,     Coleman Price MD

## 2020-07-07 NOTE — PLAN OF CARE
Problem: Potential for Falls  Goal: Patient will remain free of falls  Description  INTERVENTIONS:  - Assess patient frequently for physical needs  -  Identify cognitive and physical deficits and behaviors that affect risk of falls  -  Declo fall precautions as indicated by assessment   - Educate patient/family on patient safety including physical limitations  - Instruct patient to call for assistance with activity based on assessment  - Modify environment to reduce risk of injury  - Consider OT/PT consult to assist with strengthening/mobility  Outcome: Progressing     Problem: Nutrition/Hydration-ADULT  Goal: Nutrient/Hydration intake appropriate for improving, restoring or maintaining nutritional needs  Description  Monitor and assess patient's nutrition/hydration status for malnutrition  Collaborate with interdisciplinary team and initiate plan and interventions as ordered  Monitor patient's weight and dietary intake as ordered or per policy  Utilize nutrition screening tool and intervene as necessary  Determine patient's food preferences and provide high-protein, high-caloric foods as appropriate       INTERVENTIONS:  - Monitor oral intake, urinary output, labs, and treatment plans  - Assess nutrition and hydration status and recommend course of action  - Evaluate amount of meals eaten  - Assist patient with eating if necessary   - Allow adequate time for meals  - Recommend/ encourage appropriate diets, oral nutritional supplements, and vitamin/mineral supplements  - Order, calculate, and assess calorie counts as needed  - Recommend, monitor, and adjust tube feedings and TPN/PPN based on assessed needs  - Assess need for intravenous fluids  - Provide specific nutrition/hydration education as appropriate  - Include patient/family/caregiver in decisions related to nutrition  Outcome: Progressing     Problem: PAIN - ADULT  Goal: Verbalizes/displays adequate comfort level or baseline comfort level  Description  Interventions:  - Encourage patient to monitor pain and request assistance  - Assess pain using appropriate pain scale  - Administer analgesics based on type and severity of pain and evaluate response  - Implement non-pharmacological measures as appropriate and evaluate response  - Consider cultural and social influences on pain and pain management  - Notify physician/advanced practitioner if interventions unsuccessful or patient reports new pain  Outcome: Progressing     Problem: INFECTION - ADULT  Goal: Absence or prevention of progression during hospitalization  Description  INTERVENTIONS:  - Assess and monitor for signs and symptoms of infection  - Monitor lab/diagnostic results  - Monitor all insertion sites, i e  indwelling lines, tubes, and drains  - Monitor endotracheal if appropriate and nasal secretions for changes in amount and color  - [place] appropriate cooling/warming therapies per order  - Administer medications as ordered  - Instruct and encourage patient and family to use good hand hygiene technique  - Identify and instruct in appropriate isolation precautions for identified infection/condition  Outcome: Progressing  Goal: Absence of fever/infection during neutropenic period  Description  INTERVENTIONS:  - Monitor WBC    Outcome: Progressing     Problem: SAFETY ADULT  Goal: Maintain or return to baseline ADL function  Description  INTERVENTIONS:  -  Assess patient's ability to carry out ADLs; assess patient's baseline for ADL function and identify physical deficits which impact ability to perform ADLs (bathing, care of mouth/teeth, toileting, grooming, dressing, etc )  - Assess/evaluate cause of self-care deficits   - Assess range of motion  - Assess patient's mobility; develop plan if impaired  - Assess patient's need for assistive devices and provide as appropriate  - Encourage maximum independence but intervene and supervise when necessary  - Involve family in performance of ADLs  - Assess for home care needs following discharge   - Consider OT consult to assist with ADL evaluation and planning for discharge  - Provide patient education as appropriate  Outcome: Progressing  Goal: Maintain or return mobility status to optimal level  Description  INTERVENTIONS:  - Assess patient's baseline mobility status (ambulation, transfers, stairs, etc )    - Identify cognitive and physical deficits and behaviors that affect mobility  - Identify mobility aids required to assist with transfers and/or ambulation (gait belt, sit-to-stand, lift, walker, cane, etc )  - [place] fall precautions as indicated by assessment  - Record patient progress and toleration of activity level on Mobility SBAR; progress patient to next Phase/Stage  - Instruct patient to call for assistance with activity based on assessment  - Consider rehabilitation consult to assist with strengthening/weightbearing, etc   Outcome: Progressing     Problem: DISCHARGE PLANNING  Goal: Discharge to home or other facility with appropriate resources  Description  INTERVENTIONS:  - Identify barriers to discharge w/patient and caregiver  - Arrange for needed discharge resources and transportation as appropriate  - Identify discharge learning needs (meds, wound care, etc )  - Arrange for interpretive services to assist at discharge as needed  - Refer to Case Management Department for coordinating discharge planning if the patient needs post-hospital services based on physician/advanced practitioner order or complex needs related to functional status, cognitive ability, or social support system  Outcome: Progressing     Problem: Knowledge Deficit  Goal: Patient/family/caregiver demonstrates understanding of disease process, treatment plan, medications, and discharge instructions  Description  Complete learning assessment and assess knowledge base    Interventions:  - Provide teaching at level of understanding  - Provide teaching via preferred learning methods  Outcome: Progressing     Problem: Prexisting or High Potential for Compromised Skin Integrity  Goal: Skin integrity is maintained or improved  Description  INTERVENTIONS:  - Identify patients at risk for skin breakdown  - Assess and monitor skin integrity  - Assess and monitor nutrition and hydration status  - Monitor labs   - Assess for incontinence   - Turn and reposition patient  - Assist with mobility/ambulation  - Relieve pressure over bony prominences  - Avoid friction and shearing  - Provide appropriate hygiene as needed including keeping skin clean and dry  - Evaluate need for skin moisturizer/barrier cream  - Collaborate with interdisciplinary team   - Patient/family teaching  - Consider wound care consult   Outcome: Progressing

## 2020-07-07 NOTE — ASSESSMENT & PLAN NOTE
· CKD 3 stable and at baseline    Results from last 7 days   Lab Units 07/07/20  0519 07/06/20  0101   BUN mg/dL 12 9   CREATININE mg/dL 1 14 1 13

## 2020-07-07 NOTE — ASSESSMENT & PLAN NOTE
· Atrial flutter with tachycardia  Does have history of atrial fibrillation status post ablation  · Status post a diltiazem drip  · Status post a Cardiology evaluation  · Medications have been optimized for discharge  · New medication regimen will be in increasing the metoprolol from 50 mg twice a day to 100 mg twice a day, and amiodarone has also been increased to 200 mg p o  T i d  For at least 30 days with further optimization by Cardiology as an outpatient  · Okay to continue Eliquis for full-blown anticoagulation  · Okay for DC home

## 2020-07-07 NOTE — DISCHARGE SUMMARY
Discharge- Vargas [place] 1945, 70 y o  female MRN: [id number]    Unit/Bed#: [address] 116-02 Encounter: [id number]    Primary Care Provider: Parvez Noyola MD   Date and time admitted to hospital: 7/6/2020 12:45 AM        * Chest pain in adult  Assessment & Plan    Results from last 7 days   Lab Units 07/06/20  0659 07/06/20  0308 07/06/20  0101   TROPONIN I ng/mL <0 03 <0 03 <0 03     · Chest pain has resolved  · ACS has been ruled out with normal troponins  · Most likely secondary to a flutter  · Status post a Cardiology evaluation  · Okay for discharge home  · Outpatient follow-up with PCP and Cardiology    Atrial flutter with rapid ventricular response ([facility] [address] )  Assessment & Plan  · Atrial flutter with tachycardia  Does have history of atrial fibrillation status post ablation  · Status post a diltiazem drip  · Status post a Cardiology evaluation  · Medications have been optimized for discharge  · New medication regimen will be in increasing the metoprolol from 50 mg twice a day to 100 mg twice a day, and amiodarone has also been increased to 200 mg p o  T i d  For at least 30 days with further optimization by Cardiology as an outpatient  · Okay to continue Eliquis for full-blown anticoagulation  · Okay for DC home      Chronic diastolic congestive heart failure (HCC)  Assessment & Plan  Wt Readings from Last 3 Encounters:   07/06/20 129 kg (285 lb)   05/29/20 124 kg (272 lb 14 9 oz)   05/04/20 120 kg (264 lb 1 8 oz)     Results from last 7 days   Lab Units 07/06/20  0101   BNP pg/mL 311*     · Chronic diastolic dysfunction CHF without acute exacerbation from a clinical standpoint  · Okay for DC home on pre-admit meds at pre-admit dosages    Acquired hypothyroidism  Assessment & Plan  · DC home on levothyroxine    Elevated lactic acid level  Assessment & Plan  · Elevated lactic acid without any signs of infection or sepsis    Results from last 7 days   Lab Units 07/06/20  0308 07/06/20  0101   LACTIC ACID mmol/L 1 8 2 3*   Resolved-no further workup    Essential hypertension  Assessment & Plan  · DC home on medications with changes as outlined above and on  all of her other pre-admission medications at the preadmission dosages    History of stroke  Assessment & Plan  · DC home on her Plavix and statin    DANTE on CPAP  Assessment & Plan  · CPAP at home    Stage 3 chronic kidney disease ([address] )  Assessment & Plan  · CKD 3 stable and at baseline    Results from last 7 days   Lab Units 07/07/20  0519 07/06/20  0101   BUN mg/dL 12 9   CREATININE mg/dL 1 14 1 13         Morbid obesity-BMI 44 64-weight loss, dietary and lifestyle modification counseling was provided prior to discharge    Discharging Physician / Practitioner: Becca Kerr MD  PCP: Jeanna Silva MD  Admission Date:   Admission Orders (From admission, onward)     Ordered        07/06/20 0246  Inpatient Admission  Once                   Discharge Date: 07/07/20    Resolved Problems  Date Reviewed: 7/6/2020    None          Consultations During Hospital Stay:  · Cardiology    Procedures Performed:   · None    Significant Findings / Test Results:   · CTA chest PE protocol-No evidence of pulmonary embolus  Scattered airspace opacities within the lungs which may represent infection   Short-term follow-up chest CT scan in 3 months is recommended to evaluate for resolution  Mediastinal lymphadenopathy      Incidental Findings:   · None     Test Results Pending at Discharge (will require follow up): · None     Outpatient Tests Requested:  · None    Complications:     None    Reason for Admission:  Chest pain/a flutter with rapid ventricular response    Hospital Course:     Jimena Coker is a 70 y o  female patient who originally presented to the hospital on 7/6/2020 due to chest pain  Please refer to the initial history and physical examination completed by Dr Eric Wilburn for the initial presenting features and complaints    In brief, the patient is a 60-year-old female who was admitted to the ICU after she presented to the emergency room with chest pain  She ruled out for the possibility of an acute coronary event with 3 sets of troponins that were normal   She was found to be in a state of a flutter with RVR  A cardiology consultation was obtained  Patient initially required a Cardizem drip  Medication adjustments were made by Cardiology  Adjustments included increasing her metoprolol from 50 mg twice a day to 100 mg twice a day, and by increasing her amiodarone to 200 mg 3 times a day  At time of arrival she also had a CT scan of the chest PE protocol with the results as outlined above  She was initially also found to have a mild lactic acidosis which subsequently resolved  She was deemed stable for discharge on the morning of 07/07/2020  She will follow up in the outpatient setting with her primary care physician and Cardiology  Prescriptions were provided for the dose modifications in regards to the metoprolol and amiodarone  Patient verbalized understanding of all discharge instructions  She was cleared by Dr Rekha Amador from Cardiology for discharge also  She remained fully anticoagulated with apixaban  Please see above list of diagnoses and related plan for additional information  Condition at Discharge: good     Discharge Day Visit / Exam:     Subjective:  Patient seen and examined, no new complaints no distress  Vitals: Blood Pressure: 146/81 (07/07/20 0700)  Pulse: (!) 115 (07/07/20 0700)  Temperature: 98 1 °F (36 7 °C) (07/07/20 0400)  Temp Source: Temporal (07/07/20 0400)  Respirations: 18 (07/07/20 0700)  Height: 5' 7" (170 2 cm) (07/06/20 0048)  Weight - Scale: 129 kg (285 lb) (07/06/20 0048)  SpO2: 95 % (07/07/20 0722)  Exam:   Physical Exam   Constitutional: She is oriented to person, place, and time  She appears well-developed and well-nourished  HENT:   Head: Normocephalic and atraumatic     Nose: Nose normal    Mouth/Throat: Oropharynx is clear and moist    Eyes: Pupils are equal, round, and reactive to light  Conjunctivae and EOM are normal    Neck: Normal range of motion  Neck supple  No JVD present  No thyromegaly present  Cardiovascular: Intact distal pulses  Exam reveals no gallop and no friction rub  No murmur heard  S1 plus S2, irregularly irregular   Pulmonary/Chest: Effort normal and breath sounds normal  No respiratory distress  Abdominal: Soft  Bowel sounds are normal  She exhibits no distension and no mass  There is no tenderness  There is no guarding  Musculoskeletal: Normal range of motion  She exhibits no edema  Lymphadenopathy:     She has no cervical adenopathy  Neurological: She is alert and oriented to person, place, and time  No cranial nerve deficit  Skin: Skin is warm  No rash noted  No erythema  Psychiatric: She has a normal mood and affect  Her behavior is normal    Vitals reviewed  Discussion with Family:  No family members were present at the time of my discharge evaluation, nor did the patient want me to call anyone to update them    Discharge instructions/Information to patient and family:   See after visit summary for information provided to patient and family  Provisions for Follow-Up Care:  See after visit summary for information related to follow-up care and any pertinent home health orders  Disposition:     Home    For Discharges to [facility]:   · Not Applicable to this Patient - Not Applicable to this Patient    Planned Readmission:    None     Discharge Statement:  I spent 40 minutes discharging the patient  This time was spent on the day of discharge  I had direct contact with the patient on the day of discharge  Greater than 50% of the total time was spent examining patient, answering all patient questions, arranging and discussing plan of care with patient as well as directly providing post-discharge instructions    Additional time then spent on discharge activities  Discharge Medications:  See after visit summary for reconciled discharge medications provided to patient and family        ** Please Note: This note has been constructed using a voice recognition system **

## 2020-07-07 NOTE — NURSING NOTE
Remains on 1L/min  Sats stable  Intermittently sitting @ bedside d/t "feeling short of breath"  Mild dyspnea on exertion  Denies pain

## 2020-07-07 NOTE — NURSING NOTE
NC continues  1L/min  Respiratory Care offered CPAP, but pt declined  Intermittently sits up @ side of bed d/t dyspnea

## 2020-07-07 NOTE — NURSING NOTE
Patient transferred via wheelchair to Medical floor with possible discharge to home today; Handoff report given to accepting RN on med-Surgical floor  Vital signs stable at time of transfer

## 2020-07-07 NOTE — ASSESSMENT & PLAN NOTE
· Elevated lactic acid without any signs of infection or sepsis    Results from last 7 days   Lab Units 07/06/20  0308 07/06/20  0101   LACTIC ACID mmol/L 1 8 2 3*   Resolved-no further workup

## 2020-07-07 NOTE — PROGRESS NOTES
Progress Note - Irasema Profit 1945, 70 y o  female MRN: [id number]    Unit/Bed#: ICU 05 Encounter: [id number]    Primary Care Provider: Sonya Knowles MD   Date and time admitted to hospital: 7/6/2020 12:45 AM        Atrial flutter with rapid ventricular response [facility])  Assessment & Plan  Persistent at this point  Rate is now better controlled  She has had prior ablation  Amiodarone increased to 3 times daily for the next few weeks and then decrease to 200 mg once daily  Will be discussing further with my EP colleague as well  The other issue regarding this is sleep apnea which does not sound well treated  Outpatient follow-up being arranged  DANTE on CPAP  Assessment & Plan  Not well treated by her description  She does not seem to tolerate CPAP  Visit with [facility] Center locally has been ordered  * Chest pain in adult  Assessment & Plan  Precipitated by persistent fast heart rate  No troponin elevation  No return since heart rate controlled  In 2018 she underwent atrial flutter ablation  It was thought that she could avoid future atrial fibrillation with this procedure  She also suffers from sleep apnea  There was a prior stroke in 2011  She has remained on amiodarone and Eliquis since the procedure  Subjective:   Patient seen and examined  No significant events overnight  Summary comments:  Now off diltiazem drip  Rate reasonably controlled  Telemetry/ECG/Cardiac testing:   Atrial flutter with controlled rate  Echocardiogram 05/04/2020:  Normal left ventricular systolic function  LVH is mild  Vitals: Blood pressure 146/81, pulse (!) 115, temperature 98 1 °F (36 7 °C), temperature source Temporal, resp   rate 18, height 5' 7" (1 702 m), weight 129 kg (285 lb), SpO2 95 %, not currently breastfeeding ,   Orthostatic Blood Pressures      Most Recent Value   Blood Pressure  146/81 filed at 07/07/2020 0700   Patient Position - Orthostatic VS  Lying filed at 07/07/2020 0700      ,   Weight (last 2 days)     Date/Time   Weight    07/06/20 0334       Comment rows:    OBSERV: -- (report to Micaela Arenas RN in icu) at 07/06/20 0334    07/06/20 0048   129 (285)              Physical Exam:    General:  Normal appearance in no distress  Eyes:  Anicteric  Oral mucosa:  Moist   Neck:  No JVD  Carotid upstrokes are brisk without bruits  No masses  Chest:  Clear to auscultation and percussion  Cardiac:  No palpable PMI  Normal S1 and S2  No murmur gallop or rub  Abdomen:  Soft and nontender  No palpable organomegaly or aortic enlargement  Extremities:  No peripheral edema  Neuro:  Grossly symmetric  Psych:  Alert and oriented x3        Medications:      Current Facility-Administered Medications:     acetaminophen (TYLENOL) tablet 650 mg, 650 mg, Oral, Q6H PRN, Tonny Trinidad, DO, 650 mg at 07/06/20 3471    amiodarone tablet 200 mg, 200 mg, Oral, TID With Meals, Oscar Hathaway MD, 200 mg at 07/06/20 1626    apixaban (ELIQUIS) tablet 5 mg, 5 mg, Oral, BID, Tonny Trinidad, DO, 5 mg at 07/06/20 1737    escitalopram (LEXAPRO) tablet 10 mg, 10 mg, Oral, Daily, Tonny Trinidad, DO, 10 mg at 07/06/20 4679    gabapentin (NEURONTIN) capsule 100 mg, 100 mg, Oral, TID, Tonny Trinidad, DO, 100 mg at 07/06/20 2119    levothyroxine tablet 88 mcg, 88 mcg, Oral, Early Morning, Tonny Trinidad DO, 88 mcg at 07/07/20 0521    LORazepam (ATIVAN) tablet 1 mg, 1 mg, Oral, TID PRN, Gonzalo Diallo DO, 1 mg at 07/06/20 0925    metoprolol tartrate (LOPRESSOR) tablet 100 mg, 100 mg, Oral, Q12H [facility], Oscar Hathaway MD, 100 mg at 07/06/20 2119    multivitamin-minerals (CENTRUM) tablet 1 tablet, 1 tablet, Oral, Daily, Gonzalo Diallo DO, 1 tablet at 07/06/20 0930    nortriptyline (PAMELOR) capsule 50 mg, 50 mg, Oral, Daily, Tonny Trinidad DO, 50 mg at 07/06/20 0928    ondansetron (ZOFRAN) injection 4 mg, 4 mg, Intravenous, Q6H PRN, Gonzalo Diallo DO    potassium chloride (K-DUR,KLOR-CON) CR tablet 20 mEq, 20 mEq, Oral, BID, Tonny Trinidad DO, 20 mEq at 07/06/20 1737    pravastatin (PRAVACHOL) tablet 20 mg, 20 mg, Oral, Daily With Dinner, Tonny Trinidad DO, 20 mg at 07/06/20 1625    torsemide (DEMADEX) tablet 20 mg, 20 mg, Oral, See Admin Instructions, Gonzalo Diallo DO     Labs & Results:    Troponins:   Results from last 7 days   Lab Units 07/06/20  0659 07/06/20  0308 07/06/20  0101   TROPONIN I ng/mL <0 03 <0 03 <0 03      BNP:   Results from last 6 Months   Lab Units 07/06/20  0101 05/02/20  1205   BNP pg/mL 311* 422*       CBC with diff:   Results from last 7 days   Lab Units 07/07/20  0519 07/06/20  0101   WBC Thousand/uL 9 60 10 30   HEMOGLOBIN g/dL 8 6* 9 2*   HEMATOCRIT % 28 2* 30 7*   MCV fL 71* 72*   PLATELETS Thousands/uL 308 346     TSH:     CMP:   Results from last 7 days   Lab Units 07/07/20  0519 07/06/20  0101   POTASSIUM mmol/L 4 8 3 9   CHLORIDE mmol/L 105 106   CO2 mmol/L 26 22   BUN mg/dL 12 9   CREATININE mg/dL 1 14 1 13   AST U/L 34 23   ALT U/L 23 17   EGFR ml/min/1 73sq m 48 48     Lipid Profile:     Coags:

## 2020-07-07 NOTE — ASSESSMENT & PLAN NOTE
· DC home on medications with changes as outlined above and on  all of her other pre-admission medications at the preadmission dosages

## 2020-07-07 NOTE — ASSESSMENT & PLAN NOTE
Wt Readings from Last 3 Encounters:   07/06/20 129 kg (285 lb)   05/29/20 124 kg (272 lb 14 9 oz)   05/04/20 120 kg (264 lb 1 8 oz)     Results from last 7 days   Lab Units 07/06/20  0101   BNP pg/mL 311*     · Chronic diastolic dysfunction CHF without acute exacerbation from a clinical standpoint  · Okay for DC home on pre-admit meds at pre-admit dosages

## 2020-07-07 NOTE — ASSESSMENT & PLAN NOTE
Persistent at this point  Rate is now better controlled  She has had prior ablation  Amiodarone increased to 3 times daily for the next few weeks and then decrease to 200 mg once daily  Will be discussing further with my EP colleague as well  The other issue regarding this is sleep apnea which does not sound well treated  Outpatient follow-up being arranged

## 2020-07-07 NOTE — ASSESSMENT & PLAN NOTE
Results from last 7 days   Lab Units 07/06/20  0659 07/06/20  0308 07/06/20  0101   TROPONIN I ng/mL <0 03 <0 03 <0 03     · Chest pain has resolved  · ACS has been ruled out with normal troponins  · Most likely secondary to a flutter  · Status post a Cardiology evaluation  · Okay for discharge home  · Outpatient follow-up with PCP and Cardiology

## 2020-07-07 NOTE — ASSESSMENT & PLAN NOTE
Not well treated by her description  She does not seem to tolerate CPAP  Visit with Sleep Center locally has been ordered

## 2020-07-08 ENCOUNTER — PATIENT OUTREACH (OUTPATIENT)
Dept: INTERNAL MEDICINE CLINIC | Facility: CLINIC | Age: 75
End: 2020-07-08

## 2020-07-08 ENCOUNTER — TRANSITIONAL CARE MANAGEMENT (OUTPATIENT)
Dept: INTERNAL MEDICINE CLINIC | Facility: CLINIC | Age: 75
End: 2020-07-08

## 2020-07-08 NOTE — ED NOTES
Dr Marni Gibson [facility] for patient evaluation          Becky Caal RN  01/31/19 0146 Repair Anesthesia Method: local infiltration

## 2020-07-08 NOTE — PROGRESS NOTES
Lizett Robleor is managing well at home post hospitalization and has good family support  She denies needing additional assistance  She doesn't drive due to poor eyesight and is looking to find primary physician closer to her home  Provided her with the number for info link and encouraged her to schedule a f/u soon  She does have cardiology f/u scheduled for August  No transportation issues  She did not weigh today, instructed her to start weighing first thing in am and record  Reviewed weight parameters to monitor and report  She verbalized understanding  She denies chest pain, LE edema  She is sob with exertion  Nutritional intake adequate, she is following low sodium diet  She is taking all medications as prescribed  I explained the bundle program and she agreed to participate  Provided her with my contact information and she is agreeable to continued outreach

## 2020-07-13 ENCOUNTER — TELEPHONE (OUTPATIENT)
Dept: CARDIOLOGY CLINIC | Facility: CLINIC | Age: 75
End: 2020-07-13

## 2020-07-13 NOTE — TELEPHONE ENCOUNTER
Daughter Trinidad Salome called  Nikki De Leon was in the hospital one week ago and thought she was supposed to be set up for ablation but has not heard from anyone regarding this

## 2020-07-14 ENCOUNTER — APPOINTMENT (EMERGENCY)
Dept: RADIOLOGY | Facility: HOSPITAL | Age: 75
End: 2020-07-14
Payer: MEDICARE

## 2020-07-14 ENCOUNTER — HOSPITAL ENCOUNTER (OUTPATIENT)
Facility: HOSPITAL | Age: 75
Setting detail: OBSERVATION
Discharge: HOME | End: 2020-07-15
Attending: FAMILY MEDICINE | Admitting: INTERNAL MEDICINE
Payer: MEDICARE

## 2020-07-14 DIAGNOSIS — I49.9 ARRHYTHMIA: ICD-10-CM

## 2020-07-14 DIAGNOSIS — I48.0 PAROXYSMAL ATRIAL FIBRILLATION (HCC): ICD-10-CM

## 2020-07-14 DIAGNOSIS — I48.91 ATRIAL FIBRILLATION (HCC): ICD-10-CM

## 2020-07-14 DIAGNOSIS — I48.92 ATRIAL FLUTTER WITH RAPID VENTRICULAR RESPONSE (HCC): ICD-10-CM

## 2020-07-14 DIAGNOSIS — R06.02 SOB (SHORTNESS OF BREATH): Primary | ICD-10-CM

## 2020-07-14 DIAGNOSIS — N18.30 STAGE 3 CHRONIC KIDNEY DISEASE (HCC): ICD-10-CM

## 2020-07-14 DIAGNOSIS — I10 ESSENTIAL HYPERTENSION: ICD-10-CM

## 2020-07-14 LAB
ANION GAP SERPL CALCULATED.3IONS-SCNC: 13 MMOL/L (ref 4–13)
ANISOCYTOSIS BLD QL SMEAR: PRESENT
ATRIAL RATE: 77 BPM
BASOPHILS # BLD AUTO: 0.1 THOUSANDS/ΜL (ref 0–0.1)
BASOPHILS NFR BLD AUTO: 1 % (ref 0–2)
BUN SERPL-MCNC: 14 MG/DL (ref 7–25)
CALCIUM SERPL-MCNC: 8.8 MG/DL (ref 8.6–10.5)
CHLORIDE SERPL-SCNC: 105 MMOL/L (ref 98–107)
CO2 SERPL-SCNC: 24 MMOL/L (ref 21–31)
CREAT SERPL-MCNC: 1.3 MG/DL (ref 0.6–1.2)
DACRYOCYTES BLD QL SMEAR: PRESENT
EOSINOPHIL # BLD AUTO: 0.3 THOUSAND/ΜL (ref 0–0.61)
EOSINOPHIL NFR BLD AUTO: 5 % (ref 0–5)
ERYTHROCYTE [DISTWIDTH] IN BLOOD BY AUTOMATED COUNT: 20 % (ref 11.5–14.5)
GFR SERPL CREATININE-BSD FRML MDRD: 41 ML/MIN/1.73SQ M
GIANT PLATELETS BLD QL SMEAR: PRESENT
GLUCOSE SERPL-MCNC: 102 MG/DL (ref 65–99)
HCT VFR BLD AUTO: 29.9 % (ref 42–47)
HGB BLD-MCNC: 9.1 G/DL (ref 12–16)
LYMPHOCYTES # BLD AUTO: 1.8 THOUSANDS/ΜL (ref 0.6–4.47)
LYMPHOCYTES NFR BLD AUTO: 26 % (ref 21–51)
MACROCYTES BLD QL AUTO: PRESENT
MAGNESIUM SERPL-MCNC: 2.3 MG/DL (ref 1.9–2.7)
MCH RBC QN AUTO: 21.6 PG (ref 26–34)
MCHC RBC AUTO-ENTMCNC: 30.4 G/DL (ref 31–37)
MCV RBC AUTO: 71 FL (ref 81–99)
MICROCYTES BLD QL AUTO: PRESENT
MONOCYTES # BLD AUTO: 0.4 THOUSAND/ΜL (ref 0.17–1.22)
MONOCYTES NFR BLD AUTO: 6 % (ref 2–12)
NEUTROPHILS # BLD AUTO: 4.3 THOUSANDS/ΜL (ref 1.4–6.5)
NEUTS SEG NFR BLD AUTO: 63 % (ref 42–75)
OVALOCYTES BLD QL SMEAR: PRESENT
PLATELET # BLD AUTO: 376 THOUSANDS/UL (ref 149–390)
PLATELET BLD QL SMEAR: ADEQUATE
PMV BLD AUTO: 8.7 FL (ref 8.6–11.7)
POTASSIUM SERPL-SCNC: 3.4 MMOL/L (ref 3.5–5.5)
QRS AXIS: 25 DEGREES
QRSD INTERVAL: 88 MS
QT INTERVAL: 446 MS
QTC INTERVAL: 504 MS
RBC # BLD AUTO: 4.21 MILLION/UL (ref 3.9–5.2)
RBC MORPH BLD: NORMAL
SODIUM SERPL-SCNC: 142 MMOL/L (ref 134–143)
T WAVE AXIS: 23 DEGREES
TROPONIN I SERPL-MCNC: <0.03 NG/ML
TSH SERPL DL<=0.05 MIU/L-ACNC: 6.63 UIU/ML (ref 0.45–5.33)
VENTRICULAR RATE: 77 BPM
WBC # BLD AUTO: 6.9 THOUSAND/UL (ref 4.8–10.8)

## 2020-07-14 PROCEDURE — 94640 AIRWAY INHALATION TREATMENT: CPT

## 2020-07-14 PROCEDURE — 99220 PR INITIAL OBSERVATION CARE/DAY 70 MINUTES: CPT | Performed by: NURSE PRACTITIONER

## 2020-07-14 PROCEDURE — 85025 COMPLETE CBC W/AUTO DIFF WBC: CPT | Performed by: PHYSICIAN ASSISTANT

## 2020-07-14 PROCEDURE — 96374 THER/PROPH/DIAG INJ IV PUSH: CPT

## 2020-07-14 PROCEDURE — 93010 ELECTROCARDIOGRAM REPORT: CPT | Performed by: INTERNAL MEDICINE

## 2020-07-14 PROCEDURE — 36415 COLL VENOUS BLD VENIPUNCTURE: CPT | Performed by: PHYSICIAN ASSISTANT

## 2020-07-14 PROCEDURE — 99285 EMERGENCY DEPT VISIT HI MDM: CPT

## 2020-07-14 PROCEDURE — 93005 ELECTROCARDIOGRAM TRACING: CPT

## 2020-07-14 PROCEDURE — 84484 ASSAY OF TROPONIN QUANT: CPT | Performed by: PHYSICIAN ASSISTANT

## 2020-07-14 PROCEDURE — 83735 ASSAY OF MAGNESIUM: CPT | Performed by: PHYSICIAN ASSISTANT

## 2020-07-14 PROCEDURE — 84443 ASSAY THYROID STIM HORMONE: CPT | Performed by: NURSE PRACTITIONER

## 2020-07-14 PROCEDURE — 71045 X-RAY EXAM CHEST 1 VIEW: CPT

## 2020-07-14 PROCEDURE — 99214 OFFICE O/P EST MOD 30 MIN: CPT | Performed by: INTERNAL MEDICINE

## 2020-07-14 PROCEDURE — 99285 EMERGENCY DEPT VISIT HI MDM: CPT | Performed by: PHYSICIAN ASSISTANT

## 2020-07-14 PROCEDURE — 80048 BASIC METABOLIC PNL TOTAL CA: CPT | Performed by: PHYSICIAN ASSISTANT

## 2020-07-14 RX ORDER — CLOPIDOGREL BISULFATE 75 MG/1
75 TABLET ORAL DAILY
Status: DISCONTINUED | OUTPATIENT
Start: 2020-07-15 | End: 2020-07-15 | Stop reason: HOSPADM

## 2020-07-14 RX ORDER — OXYCODONE HYDROCHLORIDE AND ACETAMINOPHEN 5; 325 MG/1; MG/1
1 TABLET ORAL EVERY 4 HOURS PRN
Status: DISCONTINUED | OUTPATIENT
Start: 2020-07-14 | End: 2020-07-15 | Stop reason: HOSPADM

## 2020-07-14 RX ORDER — ESCITALOPRAM OXALATE 10 MG/1
10 TABLET ORAL DAILY
Status: DISCONTINUED | OUTPATIENT
Start: 2020-07-15 | End: 2020-07-15 | Stop reason: HOSPADM

## 2020-07-14 RX ORDER — ASPIRIN 81 MG/1
324 TABLET, CHEWABLE ORAL ONCE
Status: COMPLETED | OUTPATIENT
Start: 2020-07-14 | End: 2020-07-14

## 2020-07-14 RX ORDER — GABAPENTIN 100 MG/1
100 CAPSULE ORAL 3 TIMES DAILY
Status: DISCONTINUED | OUTPATIENT
Start: 2020-07-14 | End: 2020-07-15 | Stop reason: HOSPADM

## 2020-07-14 RX ORDER — FUROSEMIDE 10 MG/ML
80 INJECTION INTRAMUSCULAR; INTRAVENOUS ONCE
Status: COMPLETED | OUTPATIENT
Start: 2020-07-14 | End: 2020-07-14

## 2020-07-14 RX ORDER — METOPROLOL TARTRATE 50 MG/1
100 TABLET, FILM COATED ORAL EVERY 12 HOURS SCHEDULED
Status: DISCONTINUED | OUTPATIENT
Start: 2020-07-14 | End: 2020-07-15 | Stop reason: HOSPADM

## 2020-07-14 RX ORDER — DILTIAZEM HYDROCHLORIDE 180 MG/1
180 CAPSULE, COATED, EXTENDED RELEASE ORAL DAILY
Status: DISCONTINUED | OUTPATIENT
Start: 2020-07-14 | End: 2020-07-14

## 2020-07-14 RX ORDER — SODIUM CHLORIDE 9 MG/ML
3 INJECTION INTRAVENOUS
Status: DISCONTINUED | OUTPATIENT
Start: 2020-07-14 | End: 2020-07-15 | Stop reason: HOSPADM

## 2020-07-14 RX ORDER — NORTRIPTYLINE HYDROCHLORIDE 25 MG/1
50 CAPSULE ORAL DAILY
Status: DISCONTINUED | OUTPATIENT
Start: 2020-07-15 | End: 2020-07-15 | Stop reason: HOSPADM

## 2020-07-14 RX ORDER — AMIODARONE HYDROCHLORIDE 100 MG/1
200 TABLET ORAL
Status: DISCONTINUED | OUTPATIENT
Start: 2020-07-14 | End: 2020-07-15 | Stop reason: HOSPADM

## 2020-07-14 RX ORDER — LEVOTHYROXINE SODIUM 88 UG/1
88 TABLET ORAL
Status: DISCONTINUED | OUTPATIENT
Start: 2020-07-15 | End: 2020-07-15 | Stop reason: HOSPADM

## 2020-07-14 RX ORDER — DILTIAZEM HYDROCHLORIDE 180 MG/1
180 CAPSULE, COATED, EXTENDED RELEASE ORAL DAILY
Status: DISCONTINUED | OUTPATIENT
Start: 2020-07-15 | End: 2020-07-14 | Stop reason: SDUPTHER

## 2020-07-14 RX ORDER — DILTIAZEM HYDROCHLORIDE 240 MG/1
240 CAPSULE, COATED, EXTENDED RELEASE ORAL DAILY
Status: DISCONTINUED | OUTPATIENT
Start: 2020-07-15 | End: 2020-07-15 | Stop reason: HOSPADM

## 2020-07-14 RX ORDER — ACETAMINOPHEN 325 MG/1
650 TABLET ORAL EVERY 6 HOURS PRN
Status: DISCONTINUED | OUTPATIENT
Start: 2020-07-14 | End: 2020-07-15 | Stop reason: HOSPADM

## 2020-07-14 RX ORDER — POTASSIUM CHLORIDE 20 MEQ/1
20 TABLET, EXTENDED RELEASE ORAL 2 TIMES DAILY
Status: DISCONTINUED | OUTPATIENT
Start: 2020-07-14 | End: 2020-07-15 | Stop reason: HOSPADM

## 2020-07-14 RX ORDER — TORSEMIDE 20 MG/1
40 TABLET ORAL DAILY
Status: DISCONTINUED | OUTPATIENT
Start: 2020-07-15 | End: 2020-07-15 | Stop reason: HOSPADM

## 2020-07-14 RX ORDER — DILTIAZEM HYDROCHLORIDE 120 MG/1
120 CAPSULE, COATED, EXTENDED RELEASE ORAL DAILY
Status: DISCONTINUED | OUTPATIENT
Start: 2020-07-14 | End: 2020-07-14

## 2020-07-14 RX ORDER — PRAVASTATIN SODIUM 20 MG
20 TABLET ORAL
Status: DISCONTINUED | OUTPATIENT
Start: 2020-07-14 | End: 2020-07-15 | Stop reason: HOSPADM

## 2020-07-14 RX ADMIN — POTASSIUM CHLORIDE 20 MEQ: 1500 TABLET, EXTENDED RELEASE ORAL at 18:23

## 2020-07-14 RX ADMIN — AMIODARONE HYDROCHLORIDE 200 MG: 100 TABLET ORAL at 18:23

## 2020-07-14 RX ADMIN — ALBUTEROL SULFATE 5 MG: 2.5 SOLUTION RESPIRATORY (INHALATION) at 11:08

## 2020-07-14 RX ADMIN — FUROSEMIDE 80 MG: 10 INJECTION, SOLUTION INTRAMUSCULAR; INTRAVENOUS at 12:37

## 2020-07-14 RX ADMIN — ASPIRIN 81 MG 324 MG: 81 TABLET ORAL at 11:08

## 2020-07-14 RX ADMIN — GABAPENTIN 100 MG: 100 CAPSULE ORAL at 22:45

## 2020-07-14 RX ADMIN — METOPROLOL TARTRATE 100 MG: 50 TABLET, FILM COATED ORAL at 22:47

## 2020-07-14 RX ADMIN — DILTIAZEM HYDROCHLORIDE 180 MG: 180 CAPSULE, COATED, EXTENDED RELEASE ORAL at 14:31

## 2020-07-14 RX ADMIN — PRAVASTATIN SODIUM 20 MG: 20 TABLET ORAL at 18:23

## 2020-07-14 RX ADMIN — IPRATROPIUM BROMIDE 0.5 MG: 0.5 SOLUTION RESPIRATORY (INHALATION) at 11:09

## 2020-07-14 RX ADMIN — APIXABAN 5 MG: 5 TABLET, FILM COATED ORAL at 18:23

## 2020-07-14 NOTE — ED PROVIDER NOTES
History  Chief Complaint   Patient presents with    Shortness of Breath     68-year-old female history of AFib on Eliquis and Plavix presents complaining of shortness of breath  She was recently diagnosed with atrial flutter approximately 1 week ago and is on Lasix daily  She reports that her shortness of breath is constant and is progressively worsening  She denies any chest pain  Admits to compliance with her medications and admits to a cardiology appointment scheduled for next week  Prior to Admission Medications   Prescriptions Last Dose Informant Patient Reported? Taking?    LORazepam (ATIVAN) 1 mg tablet  Self No No   Sig: Take 1 tablet (1 mg total) by mouth 3 (three) times a day as needed for anxiety   Patient not taking: Reported on 7/6/2020   Multiple Vitamin (MULTIVITAMIN) tablet  Self Yes No   Sig: Take 1 tablet by mouth daily     amiodarone 200 mg tablet   No No   Sig: TAKE 1 TABLET BY MOUTH THREE TIMES DAILY WITH MEALS   apixaban (Eliquis) 5 mg   No No   Sig: Take 1 tablet (5 mg total) by mouth 2 (two) times a day   clopidogrel (PLAVIX) 75 mg tablet   No No   Sig: Take 1 tablet (75 mg total) by mouth daily   escitalopram (LEXAPRO) 10 mg tablet   No No   Sig: Take 1 tablet (10 mg total) by mouth daily   gabapentin (NEURONTIN) 100 mg capsule   No No   Sig: Take 1 capsule (100 mg total) by mouth 3 (three) times a day   levothyroxine 88 mcg tablet   No No   Sig: Take 1 tablet (88 mcg total) by mouth daily   meclizine (ANTIVERT) 25 mg tablet  Self Yes No   Sig: Take 25 mg by mouth 3 (three) times a day as needed for dizziness    metoprolol tartrate (LOPRESSOR) 100 mg tablet   No No   Sig: Take 1 tablet (100 mg total) by mouth every 12 (twelve) hours   nortriptyline (PAMELOR) 50 mg capsule   No No   Sig: Take 1 capsule (50 mg total) by mouth daily   ondansetron (ZOFRAN-ODT) 8 mg disintegrating tablet  Self No No   Sig: Take 1 tablet (8 mg total) by mouth every 8 (eight) hours as needed for nausea or vomiting   Patient not taking: Reported on 2020   oxyCODONE-acetaminophen (PERCOCET) 5-325 mg per tablet  Self No No   Sig: Take 1 tablet by mouth every 4 (four) hours as needed for moderate pain Max Daily Amount: 6 tablets   potassium chloride (K-DUR,KLOR-CON) 20 mEq tablet   No No   Sig: Take 1 tablet (20 mEq total) by mouth 2 (two) times a day   simvastatin (ZOCOR) 10 mg tablet   No No   Sig: Take 1 tablet (10 mg total) by mouth daily at bedtime   torsemide (DEMADEX) 20 mg tablet   No No   Sig: Take 1 tablet (20 mg total) by mouth see administration instructions Take 1 tablet (20 mg total) by mouth daily except for Monday and  take 2 tablets (40 mg total) in morning      Facility-Administered Medications: None       Past Medical History:   Diagnosis Date    Acquired hypothyroidism 2018    Acute on chronic diastolic heart failure ([facility] ) 2018    Anemia     Atrial fibrillation (HCC)     CHF (congestive heart failure) ([place] [address] )     History of transfusion     Hyperlipidemia     Hypertension     Migraine     Polyp of sigmoid colon     Prediabetes     Renal disorder     Stroke ([facility] )        Past Surgical History:   Procedure Laterality Date    APPENDECTOMY      CARDIAC ELECTROPHYSIOLOGY STUDY AND ABLATION      CHOLECYSTECTOMY      COLONOSCOPY         Family History   Problem Relation Age of Onset    Diabetes Mother     Heart disease Mother     Hypertension Mother     Arthritis Mother     Coronary artery disease Mother     Drug abuse Paternal Grandmother      I have reviewed and agree with the history as documented      E-Cigarette/Vaping    E-Cigarette Use Never User      E-Cigarette/Vaping Substances    Nicotine No     THC No     CBD No     Flavoring No     Other No     Unknown No      Social History     Tobacco Use    Smoking status: Former Smoker     Types: Cigarettes     Last attempt to quit: 2011     Years since quittin 4    Smokeless tobacco: Never Used   Substance Use Topics    Alcohol use: Never     Alcohol/week: 0 0 standard drinks     Frequency: Never     Binge frequency: Never    Drug use: No       Review of Systems   Constitutional: Negative for chills, fatigue and fever  HENT: Negative for ear pain and sore throat  Eyes: Negative for pain  Respiratory: Positive for shortness of breath  Negative for cough and wheezing  Cardiovascular: Negative for chest pain, palpitations and leg swelling  Gastrointestinal: Negative for abdominal pain, constipation, diarrhea, nausea and vomiting  Endocrine: Negative for polyuria  Genitourinary: Negative for dysuria and pelvic pain  Musculoskeletal: Negative for arthralgias, myalgias, neck pain and neck stiffness  Skin: Negative for rash  Neurological: Negative for dizziness, syncope, light-headedness and headaches  All other systems reviewed and are negative  Physical Exam  Physical Exam   Constitutional: She is oriented to person, place, and time  She appears well-developed and well-nourished  HENT:   Head: Normocephalic and atraumatic  Eyes: EOM are normal    Neck: Normal range of motion  Cardiovascular: Normal rate, regular rhythm and normal heart sounds  Pulmonary/Chest: Effort normal  She has rhonchi in the right upper field and the left upper field  Abdominal: Soft  Bowel sounds are normal  There is no tenderness  Musculoskeletal: Normal range of motion  Neurological: She is alert and oriented to person, place, and time  Skin: Skin is warm  Capillary refill takes less than 2 seconds  Psychiatric: She has a normal mood and affect         Vital Signs  ED Triage Vitals [07/14/20 1004]   Temperature Pulse Respirations Blood Pressure SpO2   (!) 97 3 °F (36 3 °C) 79 22 150/68 98 %      Temp Source Heart Rate Source Patient Position - Orthostatic VS BP Location FiO2 (%)   Temporal Monitor Sitting Left arm --      Pain Score       7           Vitals:    07/14/20 1430 07/14/20 1500 07/14/20 1530 07/14/20 1615   BP: 122/69 140/61 146/66 161/79   Pulse: (!) 114 (!) 113 104 (!) 115   Patient Position - Orthostatic VS:    Lying         Visual Acuity      ED Medications  Medications   sodium chloride (PF) 0 9 % injection 3 mL (has no administration in time range)   torsemide (DEMADEX) tablet 40 mg (has no administration in time range)   diltiazem (CARDIZEM CD) 24 hr capsule 180 mg (180 mg Oral Given 7/14/20 1431)   aspirin chewable tablet 324 mg (324 mg Oral Given 7/14/20 1108)   albuterol inhalation solution 5 mg (5 mg Nebulization Given 7/14/20 1108)   ipratropium (ATROVENT) 0 02 % inhalation solution 0 5 mg (0 5 mg Nebulization Given 7/14/20 1109)   furosemide (LASIX) injection 80 mg (80 mg Intravenous Given 7/14/20 1237)       Diagnostic Studies  Results Reviewed     Procedure Component Value Units Date/Time    Troponin I [[id number]]  (Normal) Collected:  07/14/20 1050    Lab Status:  Final result Specimen:  Blood from Arm, Left Updated:  07/14/20 1117     Troponin I <0 03 ng/mL     Magnesium [[id number]]  (Normal) Collected:  07/14/20 1050    Lab Status:  Final result Specimen:  Blood from Arm, Left Updated:  07/14/20 1114     Magnesium 2 3 mg/dL     Basic metabolic panel [[id number]]  (Abnormal) Collected:  07/14/20 1050    Lab Status:  Final result Specimen:  Blood from Arm, Left Updated:  07/14/20 1114     Sodium 142 mmol/L      Potassium 3 4 mmol/L      Chloride 105 mmol/L      CO2 24 mmol/L      ANION GAP 13 mmol/L      BUN 14 mg/dL      Creatinine 1 30 mg/dL      Glucose 102 mg/dL      Calcium 8 8 mg/dL      eGFR 41 ml/min/1 73sq m     Narrative:       Patience guidelines for Chronic Kidney Disease (CKD):     Stage 1 with normal or high GFR (GFR > 90 mL/min/1 73 square meters)    Stage 2 Mild CKD (GFR = 60-89 mL/min/1 73 square meters)    Stage 3A Moderate CKD (GFR = 45-59 mL/min/1 73 square meters)    Stage 3B Moderate CKD (GFR = 30-44 mL/min/1 73 square meters)    Stage 4 Severe CKD (GFR = 15-29 mL/min/1 73 square meters)    Stage 5 End Stage CKD (GFR <15 mL/min/1 73 square meters)  Note: GFR calculation is accurate only with a steady state creatinine    CBC and differential [[id number]]  (Abnormal) Collected:  07/14/20 1050    Lab Status:  Final result Specimen:  Blood from Arm, Left Updated:  07/14/20 1101     WBC 6 90 Thousand/uL      RBC 4 21 Million/uL      Hemoglobin 9 1 g/dL      Hematocrit 29 9 %      MCV 71 fL      MCH 21 6 pg      MCHC 30 4 g/dL      RDW 20 0 %      MPV 8 7 fL      Platelets 309 Thousands/uL      Neutrophils Relative 63 %      Lymphocytes Relative 26 %      Monocytes Relative 6 %      Eosinophils Relative 5 %      Basophils Relative 1 %      Neutrophils Absolute 4 30 Thousands/µL      Lymphocytes Absolute 1 80 Thousands/µL      Monocytes Absolute 0 40 Thousand/µL      Eosinophils Absolute 0 30 Thousand/µL      Basophils Absolute 0 10 Thousands/µL                  X-ray chest 1 view portable   Final Result by Jose Pete MD (07/14 1241)      Worsening opacity in the right upper lung since the chest CT from 7/6/2020, likely pneumonia  The study was marked in [facility] for immediate notification  Workstation performed: [id number]                    Procedures  ECG 12 Lead Documentation Only  Date/Time: 7/14/2020 4:50 PM  Performed by: Ayaz Bolivar PA-C  Authorized by:  Ayaz Bolivar PA-C     ECG reviewed by me, the ED Provider: yes    Patient location:  ED  Previous ECG:     Previous ECG:  Compared to current    Similarity:  No change    Comparison to cardiac monitor: Yes    Interpretation:     Interpretation: normal    Rate:     ECG rate:  77    ECG rate assessment: normal    Rhythm:     Rhythm: atrial fibrillation    Ectopy:     Ectopy: none    QRS:     QRS axis:  Normal  Conduction:     Conduction: normal    ST segments:     ST segments:  Normal  T waves:     T waves: normal    Comments:      No evidence of acute cardiac ischemia             ED Course       US AUDIT      Most Recent Value   Initial Alcohol Screen: US AUDIT-C    1  How often do you have a drink containing alcohol?  0 Filed at: 07/14/2020 1005   2  How many drinks containing alcohol do you have on a typical day you are drinking? 0 Filed at: 07/14/2020 1005   3a  Male UNDER 65: How often do you have five or more drinks on one occasion? 0 Filed at: 07/14/2020 1005   3b  FEMALE Any Age, or MALE 65+: How often do you have 4 or more drinks on one occassion? 0 Filed at: 07/14/2020 1005   Audit-C Score  0 Filed at: 07/14/2020 1005                  DYAN/DAST-10      Most Recent Value   How many times in the past year have you    Used an illegal drug or used a prescription medication for non-medical reasons? Never Filed at: 07/14/2020 1005                                MDM  Number of Diagnoses or Management Options  Atrial fibrillation ([facility] ):   SOB (shortness of breath):   Diagnosis management comments: Patient presented with shortness of breath  She has failed cardioversion in previous cardiac of lesions  She improved with Lasix after being seen by Cardiology  Initially attempted to discharge the patient however she still did not feel well and did not believe that her daughter would  her new medications  Therefore felt admission for observation was necessary to not only be observed by Medicine but have further follow-up with cardiology and case management consult to get her help at home as she is not able to drive anymore secondary to decline in visual acuity  Patient is agreeable to plan          Disposition  Final diagnoses:   SOB (shortness of breath)   Atrial fibrillation ([facility] )     Time reflects when diagnosis was documented in both MDM as applicable and the Disposition within this note     Time User Action Codes Description Comment    7/14/2020  3:39 PM Finesse Perez Add [R06 02] SOB (shortness of breath)     7/14/2020  3:39 PM Dannie Maryjo Haskins [[id number]] Atrial fibrillation [facility])       ED Disposition     ED Disposition Condition Date/Time Comment    Admit Stable T[place]y Jul 14, 2020  3:39 PM Case was discussed with Danny Urena and the patient's admission status was agreed to be Admission Status: observation status to the service of Dr Debbie Salinas          Follow-up Information    None         Current Discharge Medication List      CONTINUE these medications which have NOT CHANGED    Details   amiodarone 200 mg tablet TAKE 1 TABLET BY MOUTH THREE TIMES DAILY WITH MEALS  Qty: 90 tablet, Refills: 0    Associated Diagnoses: Atrial flutter with rapid ventricular response (HCC)      apixaban (Eliquis) 5 mg Take 1 tablet (5 mg total) by mouth 2 (two) times a day  Qty: 60 tablet, Refills: 0    Associated Diagnoses: Paroxysmal atrial fibrillation (HCC)      clopidogrel (PLAVIX) 75 mg tablet Take 1 tablet (75 mg total) by mouth daily  Qty: 30 tablet, Refills: 0    Associated Diagnoses: Cerebrovascular accident (CVA), unspecified mechanism (HCC)      escitalopram (LEXAPRO) 10 mg tablet Take 1 tablet (10 mg total) by mouth daily  Qty: 30 tablet, Refills: 0    Associated Diagnoses: Other depression      gabapentin (NEURONTIN) 100 mg capsule Take 1 capsule (100 mg total) by mouth 3 (three) times a day  Qty: 90 capsule, Refills: 0    Associated Diagnoses: Chronic bilateral low back pain without sciatica      levothyroxine 88 mcg tablet Take 1 tablet (88 mcg total) by mouth daily  Qty: 30 tablet, Refills: 0    Associated Diagnoses: Acquired hypothyroidism      LORazepam (ATIVAN) 1 mg tablet Take 1 tablet (1 mg total) by mouth 3 (three) times a day as needed for anxiety  Qty: 30 tablet, Refills: 0    Associated Diagnoses: Depression, unspecified depression type      meclizine (ANTIVERT) 25 mg tablet Take 25 mg by mouth 3 (three) times a day as needed for dizziness       metoprolol tartrate (LOPRESSOR) 100 mg tablet Take 1 tablet (100 mg total) by mouth every 12 (twelve) hours  Qty: 60 tablet, Refills: 0    Associated Diagnoses: Atrial flutter with rapid ventricular response (HCC)      Multiple Vitamin (MULTIVITAMIN) tablet Take 1 tablet by mouth daily        nortriptyline (PAMELOR) 50 mg capsule Take 1 capsule (50 mg total) by mouth daily  Qty: 30 capsule, Refills: 0    Associated Diagnoses: Migraine without status migrainosus, not intractable, unspecified migraine type      ondansetron (ZOFRAN-ODT) 8 mg disintegrating tablet Take 1 tablet (8 mg total) by mouth every 8 (eight) hours as needed for nausea or vomiting  Qty: 20 tablet, Refills: 0    Associated Diagnoses: Anemia      oxyCODONE-acetaminophen (PERCOCET) 5-325 mg per tablet Take 1 tablet by mouth every 4 (four) hours as needed for moderate pain Max Daily Amount: 6 tablets  Qty: 50 tablet, Refills: 0    Associated Diagnoses: Migraine without aura and without status migrainosus, not intractable      potassium chloride (K-DUR,KLOR-CON) 20 mEq tablet Take 1 tablet (20 mEq total) by mouth 2 (two) times a day  Qty: 180 tablet, Refills: 0    Associated Diagnoses: Essential hypertension      simvastatin (ZOCOR) 10 mg tablet Take 1 tablet (10 mg total) by mouth daily at bedtime  Qty: 30 tablet, Refills: 0    Associated Diagnoses: Hypercholesterolemia      torsemide (DEMADEX) 20 mg tablet Take 1 tablet (20 mg total) by mouth see administration instructions Take 1 tablet (20 mg total) by mouth daily except for Monday and Thursdays take 2 tablets (40 mg total) in morning  Qty: 60 tablet, Refills: 0    Associated Diagnoses: Essential hypertension           No discharge procedures on file      PDMP Review     None          ED Provider  Electronically Signed by           Marietta Nice PA-C  07/14/20 3846

## 2020-07-14 NOTE — ASSESSMENT & PLAN NOTE
· Moderately controlled  · Continue with diuresis  · Continue beta-blocker and diltiazem  · Monitor blood pressure closely

## 2020-07-14 NOTE — TELEPHONE ENCOUNTER
Daughter called today to tell us Santos Sanchez is back in the hospital with difficulty breathing and is concerned because they want to send her home again  Daughter wants her to have ablation done

## 2020-07-14 NOTE — ASSESSMENT & PLAN NOTE
· With atrial fibrillation/flutter with RVR with variable block  · The patient was evaluated by Cardiology in the ED  · She underwent an ablation in 2018; stating that she has been feeling relatively well up until to 2 weeks ago when she developed acute dyspnea and was admitted and subsequently found be in atrial fibrillation  · Consult Cardiology  · Continue with Eliquis for anticoagulation  · Continue amiodarone  Cardiology has added Cardizem to help with rate controlled

## 2020-07-14 NOTE — CONSULTS
Consult- Ky Jeffery 1945, 70 y o  female MRN: [id number]    Unit/Bed#: ED 08 Encounter: [id number]    Primary Care Provider: Gurdeep Manning MD   Date and time admitted to hospital: 7/14/2020 10:04 AM      Consults    History of stroke  Assessment & Plan  See comments    Mitral regurgitation  Assessment & Plan  Stable     Acute respiratory distress  Assessment & Plan  Better with HR control    Noted to be in atrial fibrillation       Essential hypertension  Assessment & Plan  Moderate control   Cardizem will help control BP as well as HR     Paroxysmal atrial fibrillation (HCC)  Assessment & Plan  Presents with SOB   S/P Afib/Flutter ablation 2018   CV about 7 years prior   Continues on Eliquis for stroke risk reduction    High CHADSVASC2 score   Sex, Age, dHF, Hx of Stroke and HTN  Will add Cardizem for rate and symptom control and will follow up in the office as scheduled     Thank you for allowing us to see this pleasant patient in consult  We will follow up with Outpatient plans outlined above and make all arrangements  Chief Complaint:  Chief Complaint   Patient presents with    Shortness of Breath        History of Present Illness: The patient comes to the ED with complaints of SOB  She has a history of CVA and Afib/flutter for which she had an ablation in 2018  She has HTN, HLD and dHF  Prior to her ablation the only symptom she had with her PAF was SOB  That is the only indication she was in afib at the time  Otherwise, From a cardiac perspective, she is doing well without complaints of chest pain or exertional dyspnea  She has no palpitations syncope or near syncope, and denies edema orthopnea or PND  She does not complain of TIA or CVA symptoms  She is given breathing treatments in the ED and they are not really helping her breathing  Despite being on amiodarone she is in Afib/alfutter today  She continues to feel SOB         Review of Systems: a 10 point review of systems was conducted and is negative except for as mentioned in the HPI or as below  Review of Systems   Constitution: Negative  HENT: Negative  Eyes: Negative  Cardiovascular: Negative for chest pain, claudication, cyanosis, dyspnea on exertion, irregular heartbeat, leg swelling, near-syncope, orthopnea, palpitations, paroxysmal nocturnal dyspnea and syncope  Respiratory: Positive for shortness of breath  Negative for cough, hemoptysis, sleep disturbances due to breathing, snoring, sputum production and wheezing  Endocrine: Negative  Hematologic/Lymphatic: Negative  Skin: Negative  Musculoskeletal: Negative  Gastrointestinal: Negative  Genitourinary: Negative  Neurological: Negative  Psychiatric/Behavioral: Negative  Allergic/Immunologic: Negative          Past Medical and Surgical History:  Past Medical History:   Diagnosis Date    Acquired hypothyroidism 2018    Acute on chronic diastolic heart failure ([facility] ) 2018    Anemia     Atrial fibrillation (HCC)     CHF (congestive heart failure) (HCC)     History of transfusion     Hyperlipidemia     Hypertension     Migraine     Polyp of sigmoid colon     Prediabetes     Renal disorder     Stroke ([facility] )      Past Surgical History:   Procedure Laterality Date    APPENDECTOMY      CARDIAC ELECTROPHYSIOLOGY STUDY AND ABLATION      CHOLECYSTECTOMY      COLONOSCOPY       Social History     Substance and Sexual Activity   Alcohol Use Never    Alcohol/week: 0 0 standard drinks    Frequency: Never    Binge frequency: Never     Social History     Substance and Sexual Activity   Drug Use No     Social History     Tobacco Use   Smoking Status Former Smoker    Types: Cigarettes    Last attempt to quit: 2011    Years since quittin 4   Smokeless Tobacco Never Used       Family History:  Family History   Problem Relation Age of Onset    Diabetes Mother     Heart disease Mother     Hypertension Mother    [address] [facility] Arthritis Mother     Coronary artery disease Mother     Drug abuse Paternal Grandmother        Medication:    (Not in a hospital admission)     Allergies:  No Known Allergies    Physical Exam:  Vitals: Blood pressure 150/68, pulse 79, temperature (!) 97 3 °F (36 3 °C), temperature source Temporal, resp  rate 22, weight 129 kg (285 lb), SpO2 98 %, not currently breastfeeding , Body mass index is 44 64 kg/m² ,   Orthostatic Blood Pressures      Most Recent Value   Blood Pressure  150/68 filed at 07/14/2020 1004   Patient Position - Orthostatic VS  Sitting filed at 07/14/2020 2026      , Systolic (92ZHB), LZD:050 , Min:150 , IOU:372   , Diastolic (02GFP), RDQ:23, Min:68, Max:68    Physical Exam   Constitutional: She is oriented to person, place, and time  She appears well-developed and well-nourished  HENT:   Head: Normocephalic and atraumatic  Mouth/Throat: Oropharynx is clear and moist    Eyes: Conjunctivae and EOM are normal  No scleral icterus  Neck: Normal range of motion  Neck supple  No JVD present  No tracheal deviation present  Cardiovascular: Normal rate, S1 normal, S2 normal and intact distal pulses  An irregularly irregular rhythm present  Exam reveals no gallop and no friction rub  Murmur heard  Systolic murmur is present with a grade of 2/6 radiating to the apex  Pulmonary/Chest: Effort normal and breath sounds normal  No respiratory distress  She has no wheezes  She has no rales  She exhibits no tenderness  Abdominal: Soft  Bowel sounds are normal  She exhibits no distension  There is no tenderness  Aorta not palpable    Musculoskeletal: Normal range of motion  She exhibits no edema or tenderness  Neurological: She is alert and oriented to person, place, and time  Skin: Skin is warm and dry  No rash noted  No erythema  No pallor  Psychiatric: She has a normal mood and affect  Her behavior is normal    Nursing note and vitals reviewed          Most Recent Cardiac Imaging:   ECHO 5-4-2020: LEFT VENTRICLE:  Systolic function was normal  Ejection fraction was estimated to be 60 %  Although no diagnostic regional wall motion abnormality was identified, this possibility cannot be completely excluded on the basis of this study  The changes were consistent with concentric remodeling (increased wall thickness with normal wall mass)  AORTIC VALVE:There was mild regurgitation      EKG: A-flutter with variable A-V block     Lab Results:   Troponins:   Results from last 7 days   Lab Units 07/14/20  1050   TROPONIN I ng/mL <0 03     BNP:  Results from last 6 Months   Lab Units 07/06/20  0101 05/02/20  1205   BNP pg/mL 311* 422*     CBC with diff:   Results from last 7 days   Lab Units 07/14/20  1050   WBC Thousand/uL 6 90   HEMOGLOBIN g/dL 9 1*   HEMATOCRIT % 29 9*   PLATELETS Thousands/uL 376   RBC Million/uL 4 21     CMP:  Results from last 7 days   Lab Units 07/14/20  1050   POTASSIUM mmol/L 3 4*   CHLORIDE mmol/L 105   BUN mg/dL 14   CREATININE mg/dL 1 30*   CALCIUM mg/dL 8 8   EGFR ml/min/1 73sq m 41     Lipid Profile:

## 2020-07-14 NOTE — ASSESSMENT & PLAN NOTE
· Baseline creatinine appears to be 1 0-1 1 mg/dL  · Slight elevation from baseline  · Will monitor renal function closely with diuresis

## 2020-07-14 NOTE — ASSESSMENT & PLAN NOTE
· The patient states that she has been somewhat compliant with wearing CPAP at home  · She does not have home machine with her however the last time she was admitted to the hospital she was unable to tolerate CPAP here and is requesting to use oxygen at nighttime instead

## 2020-07-14 NOTE — ASSESSMENT & PLAN NOTE
Wt Readings from Last 3 Encounters:   07/14/20 117 kg (258 lb 4 3 oz)   07/06/20 129 kg (285 lb)   05/29/20 124 kg (272 lb 14 9 oz)       · Suspected that acute exacerbation is related to rapid heart rates  · Patient has orthopnea and dyspnea on exertion  · She received 1 dose of 80 mg of IV Lasix in the ED; cardiology recommend to continue with torsemide 40 mg daily  · Will monitor daily weights and strict intake and output  · Will obtain cardiology consult while in house

## 2020-07-14 NOTE — PLAN OF CARE
Problem: CARDIOVASCULAR - ADULT  Goal: Maintains optimal cardiac output and hemodynamic stability  Description  INTERVENTIONS:  - Monitor I/O, vital signs and rhythm  - Monitor for S/S and trends of decreased cardiac output  - Administer and titrate ordered vasoactive medications to optimize hemodynamic stability  - Assess quality of pulses, skin color and temperature  - Assess for signs of decreased coronary artery perfusion  - Instruct patient to report change in severity of symptoms  Outcome: Progressing  Goal: Absence of cardiac dysrhythmias or at baseline rhythm  Description  INTERVENTIONS:  - Continuous cardiac monitoring, vital signs, obtain 12 lead EKG if ordered  - Administer antiarrhythmic and heart rate control medications as ordered  - Monitor electrolytes and administer replacement therapy as ordered  Outcome: Progressing     Problem: RESPIRATORY - ADULT  Goal: Achieves optimal ventilation and oxygenation  Description  INTERVENTIONS:  - Assess for changes in respiratory status  - Assess for changes in mentation and behavior  - Position to facilitate oxygenation and minimize respiratory effort  - Oxygen administered by appropriate delivery if ordered  - Initiate smoking cessation education as indicated  - Encourage broncho-pulmonary hygiene including cough, deep breathe, Incentive Spirometry  - Assess the need for suctioning and aspirate as needed  - Assess and instruct to report SOB or any respiratory difficulty  - Respiratory Therapy support as indicated  Outcome: Progressing     Problem: METABOLIC, FLUID AND ELECTROLYTES - ADULT  Goal: Electrolytes maintained within normal limits  Description  INTERVENTIONS:  - Monitor labs and assess patient for signs and symptoms of electrolyte imbalances  - Administer electrolyte replacement as ordered  - Monitor response to electrolyte replacements, including repeat lab results as appropriate  - Instruct patient on fluid and nutrition as appropriate  Outcome: Progressing  Goal: Fluid balance maintained  Description  INTERVENTIONS:  - Monitor labs   - Monitor I/O and WT  - Instruct patient on fluid and nutrition as appropriate  - Assess for signs & symptoms of volume excess or deficit  Outcome: Progressing  Goal: Glucose maintained within target range  Description  INTERVENTIONS:  - Monitor Blood Glucose as ordered  - Assess for signs and symptoms of hyperglycemia and hypoglycemia  - Administer ordered medications to maintain glucose within target range  - Assess nutritional intake and initiate nutrition service referral as needed  Outcome: Progressing     Problem: PAIN - ADULT  Goal: Verbalizes/displays adequate comfort level or baseline comfort level  Description  Interventions:  - Encourage patient to monitor pain and request assistance  - Assess pain using appropriate pain scale  - Administer analgesics based on type and severity of pain and evaluate response  - Implement non-pharmacological measures as appropriate and evaluate response  - Consider cultural and social influences on pain and pain management  - Notify physician/advanced practitioner if interventions unsuccessful or patient reports new pain  Outcome: Progressing     Problem: SAFETY ADULT  Goal: Patient will remain free of falls  Description  INTERVENTIONS:  - Assess patient frequently for physical needs  -  Identify cognitive and physical deficits and behaviors that affect risk of falls    -  [place] fall precautions as indicated by assessment   - Educate patient/family on patient safety including physical limitations  - Instruct patient to call for assistance with activity based on assessment  - Modify environment to reduce risk of injury  - Consider OT/PT consult to assist with strengthening/mobility  Outcome: Progressing  Goal: Maintain or return to baseline ADL function  Description  INTERVENTIONS:  -  Assess patient's ability to carry out ADLs; assess patient's baseline for ADL function and identify physical deficits which impact ability to perform ADLs (bathing, care of mouth/teeth, toileting, grooming, dressing, etc )  - Assess/evaluate cause of self-care deficits   - Assess range of motion  - Assess patient's mobility; develop plan if impaired  - Assess patient's need for assistive devices and provide as appropriate  - Encourage maximum independence but intervene and supervise when necessary  - Involve family in performance of ADLs  - Assess for home care needs following discharge   - Consider OT consult to assist with ADL evaluation and planning for discharge  - Provide patient education as appropriate  Outcome: Progressing  Goal: Maintain or return mobility status to optimal level  Description  INTERVENTIONS:  - Assess patient's baseline mobility status (ambulation, transfers, stairs, etc )    - Identify cognitive and physical deficits and behaviors that affect mobility  - Identify mobility aids required to assist with transfers and/or ambulation (gait belt, sit-to-stand, lift, walker, cane, etc )  - [place] fall precautions as indicated by assessment  - Record patient progress and toleration of activity level on Mobility SBAR; progress patient to next Phase/Stage  - Instruct patient to call for assistance with activity based on assessment  - Consider rehabilitation consult to assist with strengthening/weightbearing, etc   Outcome: Progressing     Problem: DISCHARGE PLANNING  Goal: Discharge to home or other facility with appropriate resources  Description  INTERVENTIONS:  - Identify barriers to discharge w/patient and caregiver  - Arrange for needed discharge resources and transportation as appropriate  - Identify discharge learning needs (meds, wound care, etc )  - Arrange for interpretive services to assist at discharge as needed  - Refer to Case Management Department for coordinating discharge planning if the patient needs post-hospital services based on physician/advanced practitioner order or complex needs related to functional status, cognitive ability, or social support system  Outcome: Progressing     Problem: Knowledge Deficit  Goal: Patient/family/caregiver demonstrates understanding of disease process, treatment plan, medications, and discharge instructions  Description  Complete learning assessment and assess knowledge base    Interventions:  - Provide teaching at level of understanding  - Provide teaching via preferred learning methods  Outcome: Progressing

## 2020-07-14 NOTE — ASSESSMENT & PLAN NOTE
Presents with SOB with breakthrough PAF, Despite being on Amiodarone    S/P Afib/Flutter ablation 2018   CV about 7 years prior   Continues on Eliquis for stroke risk reduction    High CHADSVASC2 score   Sex, Age, dHF, Hx of Stroke and HTN  Will add Cardizem for rate and symptom control and will follow up in the office as scheduled

## 2020-07-15 VITALS
HEART RATE: 67 BPM | SYSTOLIC BLOOD PRESSURE: 126 MMHG | TEMPERATURE: 97.5 F | OXYGEN SATURATION: 97 % | BODY MASS INDEX: 40.48 KG/M2 | HEIGHT: 67 IN | WEIGHT: 257.94 LBS | RESPIRATION RATE: 18 BRPM | DIASTOLIC BLOOD PRESSURE: 60 MMHG

## 2020-07-15 PROBLEM — I49.9 ARRHYTHMIA: Status: ACTIVE | Noted: 2017-10-07

## 2020-07-15 LAB
ALBUMIN SERPL BCP-MCNC: 3.8 G/DL (ref 3.5–5.7)
ALP SERPL-CCNC: 94 U/L (ref 55–165)
ALT SERPL W P-5'-P-CCNC: 20 U/L (ref 7–52)
ANION GAP SERPL CALCULATED.3IONS-SCNC: 9 MMOL/L (ref 4–13)
AST SERPL W P-5'-P-CCNC: 20 U/L (ref 13–39)
ATRIAL RATE: 241 BPM
BILIRUB SERPL-MCNC: 0.6 MG/DL (ref 0.2–1)
BUN SERPL-MCNC: 14 MG/DL (ref 7–25)
CALCIUM SERPL-MCNC: 8.8 MG/DL (ref 8.6–10.5)
CHLORIDE SERPL-SCNC: 104 MMOL/L (ref 98–107)
CO2 SERPL-SCNC: 28 MMOL/L (ref 21–31)
CREAT SERPL-MCNC: 1.32 MG/DL (ref 0.6–1.2)
ERYTHROCYTE [DISTWIDTH] IN BLOOD BY AUTOMATED COUNT: 19.8 % (ref 11.5–14.5)
GFR SERPL CREATININE-BSD FRML MDRD: 40 ML/MIN/1.73SQ M
GLUCOSE SERPL-MCNC: 102 MG/DL (ref 65–99)
HCT VFR BLD AUTO: 29.9 % (ref 42–47)
HGB BLD-MCNC: 9.2 G/DL (ref 12–16)
MAGNESIUM SERPL-MCNC: 2.4 MG/DL (ref 1.9–2.7)
MCH RBC QN AUTO: 21.9 PG (ref 26–34)
MCHC RBC AUTO-ENTMCNC: 30.8 G/DL (ref 31–37)
MCV RBC AUTO: 71 FL (ref 81–99)
PLATELET # BLD AUTO: 399 THOUSANDS/UL (ref 149–390)
PMV BLD AUTO: 8.7 FL (ref 8.6–11.7)
POTASSIUM SERPL-SCNC: 3.7 MMOL/L (ref 3.5–5.5)
PROT SERPL-MCNC: 7.2 G/DL (ref 6.4–8.9)
QRS AXIS: 25 DEGREES
QRSD INTERVAL: 86 MS
QT INTERVAL: 448 MS
QTC INTERVAL: 520 MS
RBC # BLD AUTO: 4.22 MILLION/UL (ref 3.9–5.2)
SODIUM SERPL-SCNC: 141 MMOL/L (ref 134–143)
T WAVE AXIS: 54 DEGREES
TSH SERPL DL<=0.05 MIU/L-ACNC: 4.63 UIU/ML (ref 0.45–5.33)
VENTRICULAR RATE: 81 BPM
WBC # BLD AUTO: 7.6 THOUSAND/UL (ref 4.8–10.8)

## 2020-07-15 PROCEDURE — 99214 OFFICE O/P EST MOD 30 MIN: CPT | Performed by: INTERNAL MEDICINE

## 2020-07-15 PROCEDURE — 93010 ELECTROCARDIOGRAM REPORT: CPT | Performed by: INTERNAL MEDICINE

## 2020-07-15 PROCEDURE — 85027 COMPLETE CBC AUTOMATED: CPT | Performed by: NURSE PRACTITIONER

## 2020-07-15 PROCEDURE — 84443 ASSAY THYROID STIM HORMONE: CPT | Performed by: NURSE PRACTITIONER

## 2020-07-15 PROCEDURE — 83735 ASSAY OF MAGNESIUM: CPT | Performed by: NURSE PRACTITIONER

## 2020-07-15 PROCEDURE — NC001 PR NO CHARGE: Performed by: INTERNAL MEDICINE

## 2020-07-15 PROCEDURE — 99217 PR OBSERVATION CARE DISCHARGE MANAGEMENT: CPT | Performed by: NURSE PRACTITIONER

## 2020-07-15 PROCEDURE — 80053 COMPREHEN METABOLIC PANEL: CPT | Performed by: NURSE PRACTITIONER

## 2020-07-15 RX ORDER — TORSEMIDE 20 MG/1
20 TABLET ORAL SEE ADMIN INSTRUCTIONS
Qty: 90 TABLET | Refills: 0 | Status: SHIPPED | OUTPATIENT
Start: 2020-07-15 | End: 2020-07-15 | Stop reason: SDUPTHER

## 2020-07-15 RX ORDER — TORSEMIDE 20 MG/1
20 TABLET ORAL SEE ADMIN INSTRUCTIONS
Qty: 90 TABLET | Refills: 0 | Status: ON HOLD | OUTPATIENT
Start: 2020-07-15 | End: 2020-09-04 | Stop reason: HOSPADM

## 2020-07-15 RX ORDER — AMIODARONE HYDROCHLORIDE 200 MG/1
200 TABLET ORAL DAILY
Qty: 90 TABLET | Refills: 0 | Status: ON HOLD | OUTPATIENT
Start: 2020-07-16 | End: 2020-09-04 | Stop reason: SDUPTHER

## 2020-07-15 RX ORDER — DILTIAZEM HYDROCHLORIDE 240 MG/1
240 CAPSULE, COATED, EXTENDED RELEASE ORAL DAILY
Qty: 30 CAPSULE | Refills: 0 | Status: ON HOLD | OUTPATIENT
Start: 2020-07-16 | End: 2020-11-09 | Stop reason: SDUPTHER

## 2020-07-15 RX ADMIN — METOPROLOL TARTRATE 100 MG: 50 TABLET, FILM COATED ORAL at 08:57

## 2020-07-15 RX ADMIN — CLOPIDOGREL BISULFATE 75 MG: 75 TABLET ORAL at 08:55

## 2020-07-15 RX ADMIN — APIXABAN 5 MG: 5 TABLET, FILM COATED ORAL at 08:55

## 2020-07-15 RX ADMIN — OXYCODONE HYDROCHLORIDE AND ACETAMINOPHEN 1 TABLET: 5; 325 TABLET ORAL at 06:11

## 2020-07-15 RX ADMIN — ESCITALOPRAM OXALATE 10 MG: 10 TABLET ORAL at 08:55

## 2020-07-15 RX ADMIN — DILTIAZEM HYDROCHLORIDE 240 MG: 240 CAPSULE, COATED, EXTENDED RELEASE ORAL at 08:56

## 2020-07-15 RX ADMIN — AMIODARONE HYDROCHLORIDE 200 MG: 100 TABLET ORAL at 07:32

## 2020-07-15 RX ADMIN — TORSEMIDE 40 MG: 20 TABLET ORAL at 08:56

## 2020-07-15 RX ADMIN — GABAPENTIN 100 MG: 100 CAPSULE ORAL at 08:55

## 2020-07-15 RX ADMIN — POTASSIUM CHLORIDE 20 MEQ: 1500 TABLET, EXTENDED RELEASE ORAL at 08:55

## 2020-07-15 RX ADMIN — LEVOTHYROXINE SODIUM 88 MCG: 88 TABLET ORAL at 06:12

## 2020-07-15 RX ADMIN — NORTRIPTYLINE HYDROCHLORIDE 50 MG: 25 CAPSULE ORAL at 09:02

## 2020-07-15 NOTE — DISCHARGE SUMMARY
Discharge- Garland Arnett 1945, 70 y o  female MRN: [id number]    Unit/Bed#: -02 Encounter: [id number]    Primary Care Provider: Lucas Shaffer MD   Date and time admitted to hospital: 7/14/2020 10:04 AM        * Acute on chronic diastolic congestive heart failure ([facility] )  Assessment & Plan  Wt Readings from Last 3 Encounters:   07/15/20 117 kg (257 lb 15 oz)   07/06/20 129 kg (285 lb)   05/29/20 124 kg (272 lb 14 9 oz)       · Suspected that acute exacerbation is related to rapid heart rates  · Patient has orthopnea and dyspnea on exertion  · Cardiology input appreciated  · She received 1 dose of 80 mg of IV Lasix in the ED; cardiology recommend to continue with torsemide but increase to 40 mg 3 times per week and 20 mg 4 times per week  Patient will stay on 40 mg daily for the 3 days after discharge  · Recommend to monitor daily weights and strict intake and output        Arrhythmia  Assessment & Plan  · With atrial fibrillation/flutter with RVR with variable block despite being on amiodarone  · The patient was evaluated by Cardiology in the ED  · She underwent an ablation in 2018; stating that she has been feeling relatively well up until to 2 weeks ago when she developed acute dyspnea and was admitted and subsequently found be in atrial fibrillation  · Cardiology input appreciated  · Continue with Eliquis for anticoagulation  · Continue amiodarone- frequency decreased to daily  Cardiology has added Cardizem to help with rate controlled     · Will follow-up outpatient with Cardiology next week; may be a possibility to discuss LARRY cardioversion    Stage 3 chronic kidney disease ([facility] )  Assessment & Plan  · Baseline creatinine appears to be 1 0-1 1 mg/dL  · Slight elevation from baseline  · Recommend to follow up with PCP to check BMP in a week     History of stroke  Assessment & Plan  · Will continue with Eliquis and Plavix for secondary stroke prevention  · Continue with statin      DANTE on CPAP  Assessment & Plan  · The patient states that she has been somewhat compliant with wearing CPAP at home  · Recommend to continue with CPAP at home     Essential hypertension  Assessment & Plan  · Moderately controlled  · Continue with diuresis  · Continue beta-blocker and diltiazem          Discharging Physician / Practitioner: ALEJANDRA Schaeffer  PCP: Brendan Hunter MD  Admission Date:   Admission Orders (From admission, onward)     Ordered        07/14/20 1540  Place in Observation  Once                   Discharge Date: 07/15/20    Resolved Problems  Date Reviewed: 7/15/2020    None          Consultations During Hospital Stay:  · Cardiology     Procedures Performed:   · None     Significant Findings / Test Results:   · cxr-   Worsening opacity in the right upper lung since the chest CT from 7/6/2020, likely pneumonia  Incidental Findings:   · None      Test Results Pending at Discharge (will require follow up): · None      Outpatient Tests Requested:  · Follow up with cardiology   · Follow up with PCP     Complications:     None     Reason for Admission: shortness of breath     Hospital Course:     Roosevelt Carpenter is a 70 y o  female patient who originally presented to the hospital on 7/14/2020 due to shortness of breath  Please refer to H&P for initial presenting complaint  In brief the patient presented with increasing dyspnea worse with exertion and orthopnea and subsequently was admitted for acute on chronic diastolic congestive heart failure with a atrial fibrillation/flutter  Cardiology evaluation was done  The patient received IV diuresis in the ED  Clinically is much improved  Dyspnea is currently resolved  Cardiology recommends to follow-up as an outpatient for possible LARRY with cardioversion next week  At this time the patient will be discharged home on amiodarone and diltiazem was added during the hospital stay  Recommend to continue with Eliquis and Plavix for anticoagulations  The patient was noted to have mildly elevation of creatinine slightly above her baseline  Recommend to repeat BMP in 1 week  Additionally, her chest x-ray done on admission shows worsening opacity on the right upper lobe suspected pneumonia  I do not believe that clinically the patient has any symptoms of pneumonia as she has been afebrile, no hypoxia, no productive cough, and no leukocytosis  Please see above list of diagnoses and related plan for additional information  Condition at Discharge: good     Discharge Day Visit / Exam:     Subjective:  Feeling much improved today  Denies any dyspnea or chest pain  Vitals: Blood Pressure: 126/60 (07/15/20 1109)  Pulse: 67 (07/15/20 1109)  Temperature: 97 5 °F (36 4 °C) (07/15/20 1109)  Temp Source: Temporal (07/15/20 1109)  Respirations: 18 (07/15/20 1109)  Height: 5' 7" (170 2 cm)(Stated) (07/14/20 1615)  Weight - Scale: 117 kg (257 lb 15 oz) (07/15/20 0600)  SpO2: 97 % (07/15/20 1109)  Exam:   Physical Exam   Constitutional: She is oriented to person, place, and time  She appears well-developed and well-nourished  No distress  HENT:   Head: Normocephalic and atraumatic  Mouth/Throat: Oropharynx is clear and moist    Eyes: Pupils are equal, round, and reactive to light  Conjunctivae and EOM are normal    Neck: Normal range of motion  Neck supple  No thyromegaly present  Cardiovascular: Normal heart sounds  Exam reveals no gallop and no friction rub  No murmur heard  Irregularly irregular    Pulmonary/Chest: Effort normal and breath sounds normal  She has no wheezes  She has no rales  Abdominal: Soft  Bowel sounds are normal  She exhibits no distension  There is no tenderness  There is no rebound  Musculoskeletal: Normal range of motion  She exhibits no edema, tenderness or deformity  Neurological: She is alert and oriented to person, place, and time  No cranial nerve deficit  Skin: Skin is warm and dry  No rash noted  No erythema  Psychiatric: She has a normal mood and affect  Her behavior is normal  Judgment and thought content normal    Vitals reviewed  Discussion with Family: none present during exam     Discharge instructions/Information to patient and family:   See after visit summary for information provided to patient and family  Provisions for Follow-Up Care:  See after visit summary for information related to follow-up care and any pertinent home health orders  Disposition:     Home    For Discharges to [facility] SNF:   · Not Applicable to this Patient - Not Applicable to this Patient    Planned Readmission:    No      Discharge Statement:  I spent 37 minutes discharging the patient  This time was spent on the day of discharge  I had direct contact with the patient on the day of discharge  Greater than 50% of the total time was spent examining patient, answering all patient questions, arranging and discussing plan of care with patient as well as directly providing post-discharge instructions  Additional time then spent on discharge activities  Discharge Medications:  See after visit summary for reconciled discharge medications provided to patient and family        ** Please Note: This note has been constructed using a voice recognition system **

## 2020-07-15 NOTE — SOCIAL WORK
D/c order written, I met again wit the pt, she stated she has no d/c needs, pt called her daughter and she will be coming to transport the pt home, pt has a follow up appointment already set up with Dr Lila Schwab office and I called to check, pt stated she wants to make her own pcp appointment since here daughter will be driving her, she stated she is planning to switch her pcp to Dr Yony Jose, she requested that she make her own appointment, pt denies any additional d/c needs, pt in agreement with the d/c and d/c plan home,

## 2020-07-15 NOTE — DISCHARGE INSTR - AVS FIRST PAGE
Dear Indy Nelson,     It was our pleasure to care for you here at [facility], [facility]  It is our hope that we were always able to exceed the expected standards for your care during your stay  You were hospitalized due to dyspnea  You were cared for on the 1st floor by ALEJANDRA Bahena with the [address] [place] [address] [facility] who covers for your primary care physician (PCP), Eric Warren MD, while you were hospitalized  If you have any questions or concerns related to this hospitalization, you may contact us at [phone]  For follow up as well as any medication refills, we recommend that you follow up with your primary care physician  A registered nurse will reach out to you by phone within a few days after your discharge to answer any additional questions that you may have after going home  However, at this time we provide for you here, the most important instructions / recommendations at discharge:     · Notable Medication Adjustments -   · Amiodorone 200 mg daily; diltiazem  mg daily; torsemide  · Testing Required after Discharge -   · [facility] 7/22  · Important follow up information -   · Follow-up with cardiology and PCP  · Other Instructions -   · Please refer to discharge summary  · Please review this entire after visit summary as additional general instructions including medication list, appointments, activity, diet, any pertinent wound care, and other additional recommendations from your care team that may be provided for you        Sincerely,     ALEJANDRA Bahena and Deanna Swift RN

## 2020-07-15 NOTE — PLAN OF CARE
Problem: CARDIOVASCULAR - ADULT  Goal: Maintains optimal cardiac output and hemodynamic stability  Description  INTERVENTIONS:  - Monitor I/O, vital signs and rhythm  - Monitor for S/S and trends of decreased cardiac output  - Administer and titrate ordered vasoactive medications to optimize hemodynamic stability  - Assess quality of pulses, skin color and temperature  - Assess for signs of decreased coronary artery perfusion  - Instruct patient to report change in severity of symptoms  Outcome: Progressing  Goal: Absence of cardiac dysrhythmias or at baseline rhythm  Description  INTERVENTIONS:  - Continuous cardiac monitoring, vital signs, obtain 12 lead EKG if ordered  - Administer antiarrhythmic and heart rate control medications as ordered  - Monitor electrolytes and administer replacement therapy as ordered  Outcome: Progressing     Problem: RESPIRATORY - ADULT  Goal: Achieves optimal ventilation and oxygenation  Description  INTERVENTIONS:  - Assess for changes in respiratory status  - Assess for changes in mentation and behavior  - Position to facilitate oxygenation and minimize respiratory effort  - Oxygen administered by appropriate delivery if ordered  - Initiate smoking cessation education as indicated  - Encourage broncho-pulmonary hygiene including cough, deep breathe, Incentive Spirometry  - Assess the need for suctioning and aspirate as needed  - Assess and instruct to report SOB or any respiratory difficulty  - Respiratory Therapy support as indicated  Outcome: Progressing     Problem: METABOLIC, FLUID AND ELECTROLYTES - ADULT  Goal: Electrolytes maintained within normal limits  Description  INTERVENTIONS:  - Monitor labs and assess patient for signs and symptoms of electrolyte imbalances  - Administer electrolyte replacement as ordered  - Monitor response to electrolyte replacements, including repeat lab results as appropriate  - Instruct patient on fluid and nutrition as appropriate  Outcome: Progressing  Goal: Fluid balance maintained  Description  INTERVENTIONS:  - Monitor labs   - Monitor I/O and WT  - Instruct patient on fluid and nutrition as appropriate  - Assess for signs & symptoms of volume excess or deficit  Outcome: Progressing  Goal: Glucose maintained within target range  Description  INTERVENTIONS:  - Monitor Blood Glucose as ordered  - Assess for signs and symptoms of hyperglycemia and hypoglycemia  - Administer ordered medications to maintain glucose within target range  - Assess nutritional intake and initiate nutrition service referral as needed  Outcome: Progressing     Problem: PAIN - ADULT  Goal: Verbalizes/displays adequate comfort level or baseline comfort level  Description  Interventions:  - Encourage patient to monitor pain and request assistance  - Assess pain using appropriate pain scale  - Administer analgesics based on type and severity of pain and evaluate response  - Implement non-pharmacological measures as appropriate and evaluate response  - Consider cultural and social influences on pain and pain management  - Notify physician/advanced practitioner if interventions unsuccessful or patient reports new pain  Outcome: Progressing     Problem: SAFETY ADULT  Goal: Patient will remain free of falls  Description  INTERVENTIONS:  - Assess patient frequently for physical needs  -  Identify cognitive and physical deficits and behaviors that affect risk of falls    -  Leslie fall precautions as indicated by assessment   - Educate patient/family on patient safety including physical limitations  - Instruct patient to call for assistance with activity based on assessment  - Modify environment to reduce risk of injury  - Consider OT/PT consult to assist with strengthening/mobility  Outcome: Progressing  Goal: Maintain or return to baseline ADL function  Description  INTERVENTIONS:  -  Assess patient's ability to carry out ADLs; assess patient's baseline for ADL function and identify physical deficits which impact ability to perform ADLs (bathing, care of mouth/teeth, toileting, grooming, dressing, etc )  - Assess/evaluate cause of self-care deficits   - Assess range of motion  - Assess patient's mobility; develop plan if impaired  - Assess patient's need for assistive devices and provide as appropriate  - Encourage maximum independence but intervene and supervise when necessary  - Involve family in performance of ADLs  - Assess for home care needs following discharge   - Consider OT consult to assist with ADL evaluation and planning for discharge  - Provide patient education as appropriate  Outcome: Progressing  Goal: Maintain or return mobility status to optimal level  Description  INTERVENTIONS:  - Assess patient's baseline mobility status (ambulation, transfers, stairs, etc )    - Identify cognitive and physical deficits and behaviors that affect mobility  - Identify mobility aids required to assist with transfers and/or ambulation (gait belt, sit-to-stand, lift, walker, cane, etc )  - Hazard fall precautions as indicated by assessment  - Record patient progress and toleration of activity level on Mobility SBAR; progress patient to next Phase/Stage  - Instruct patient to call for assistance with activity based on assessment  - Consider rehabilitation consult to assist with strengthening/weightbearing, etc   Outcome: Progressing     Problem: DISCHARGE PLANNING  Goal: Discharge to home or other facility with appropriate resources  Description  INTERVENTIONS:  - Identify barriers to discharge w/patient and caregiver  - Arrange for needed discharge resources and transportation as appropriate  - Identify discharge learning needs (meds, wound care, etc )  - Arrange for interpretive services to assist at discharge as needed  - Refer to Case Management Department for coordinating discharge planning if the patient needs post-hospital services based on physician/advanced practitioner order or complex needs related to functional status, cognitive ability, or social support system  Outcome: Progressing     Problem: Knowledge Deficit  Goal: Patient/family/caregiver demonstrates understanding of disease process, treatment plan, medications, and discharge instructions  Description  Complete learning assessment and assess knowledge base    Interventions:  - Provide teaching at level of understanding  - Provide teaching via preferred learning methods  Outcome: Progressing

## 2020-07-15 NOTE — UTILIZATION REVIEW
Initial Clinical Review    Admission: Date/Time/Statement: Admission Orders (From admission, onward)     Ordered        07/14/20 1540  Place in Observation  Once                   Orders Placed This Encounter   Procedures    Place in Observation     Standing Status:   Standing     Number of Occurrences:   1     Order Specific Question:   Admitting Physician     Answer:   Norberto Olson     Order Specific Question:   Level of Care     Answer:   Med Surg [16]     Order Specific Question:   Bed request comments     Answer:   tele     ED Arrival Information     Expected Arrival 70 Porsha Lott of Arrival Escorted By Service Admission Type    - 7/14/2020 09:59 [facility]    Arrival Complaint    weakness        Chief Complaint   Patient presents with    Shortness of Breath     Assessment/Plan: 70 y o  female who presents with shortness of breath  The patient with history of atrial fibrillation, CVA, and congestive heart failure  The patient was recently discharged from facility on 07/07/2020 after she was admitted for chest pain and shortness of breath and found that the patient had atrial fibrillation with RVR  Medication adjustment was done and cardiology evaluation done at that time  The patient stated she has been doing relatively well up until this a m  After she woke up she noticed that she has been feeling increasing dyspnea worse with exertion with orthopnea  This did not get much better throughout the day and subsequently she call the ambulance to come into the ED for further evaluation  Patient denies any chest pain, palpitation, nausea, vomiting, abdominal pain  She has been taking medications as directed  Denies any weight gain  The patient was evaluated by Cardiology in the ED  She received diltiazem p o  And IV furosemide 80 mg x1  Currently she stated she is feeling much improved    * Acute on chronic diastolic congestive heart failure [facility])  Assessment & Plan      Wt Readings from Last 3 Encounters:   07/14/20 117 kg (258 lb 4 3 oz)   07/06/20 129 kg (285 lb)   05/29/20 124 kg (272 lb 14 9 oz)         · Suspected that acute exacerbation is related to rapid heart rates  · Patient has orthopnea and dyspnea on exertion  · She received 1 dose of 80 mg of IV Lasix in the ED; cardiology recommend to continue with torsemide 40 mg daily  · Will monitor daily weights and strict intake and output  · Will obtain cardiology consult while in house        Paroxysmal atrial fibrillation ([facility] [address] )  Assessment & Plan  · With atrial fibrillation/flutter with RVR with variable block  · The patient was evaluated by Cardiology in the ED  · She underwent an ablation in 2018; stating that she has been feeling relatively well up until to 2 weeks ago when she developed acute dyspnea and was admitted and subsequently found be in atrial fibrillation  · Consult Cardiology  · Continue with Eliquis for anticoagulation  · Continue amiodarone  Cardiology has added Cardizem to help with rate controlled  7/14 cardio: Atrial fibrillation/flutter with RVR  · Appears to have atrial flutter with variable block  · Continue metoprolol 100 mg b i d   · Start Cardizem CD 1 80 mg, and up titrate as needed    · Continue amiodarone  · Continue apixaban for anticoagulation  · If continues to have difficult to control heart rate and is him symptomatic, then she may benefit from consideration for cardioversion v/s repeat ablation     Acute diastolic heart failure  · Likely related to rapid heart rates in atrial flutter  · Has orthopnea  · Give 1 dose IV lasix 80 now, and subsequently increased home torsemide to 40 mg daily  Further IV diuresis based on response    ED Triage Vitals [07/14/20 1004]   Temperature Pulse Respirations Blood Pressure SpO2   (!) 97 3 °F (36 3 °C) 79 22 150/68 98 %      Temp Source Heart Rate Source Patient Position - Orthostatic VS BP Location FiO2 (%)   Temporal Monitor Sitting Left arm --      Pain Score       7        Wt Readings from Last 1 Encounters:   07/15/20 117 kg (257 lb 15 oz)     Additional Vital Signs:   Date/Time  Temp  Pulse  Resp  BP  MAP (mmHg)  SpO2  O2 Device  Patient Position - Orthostatic VS   07/15/20 0855    60    138/68           07/15/20 0743  97 8 °F (36 6 °C)  58  18  132/72    95 %    Sitting   07/15/20 0734              None (Room air)     07/15/20 0300  96 7 °F (35 9 °C)Abnormal   82  17  145/65    92 %  None (Room air)  Lying   07/15/20 0008  96 4 °F (35 8 °C)Abnormal   65  17  126/61    95 %  None (Room air)  Lying   07/14/20 2246  96 1 °F (35 6 °C)Abnormal   89  17  167/84    97 %  None (Room air)  Sitting   07/14/20 1930  96 4 °F (35 8 °C)Abnormal   97  18  138/63    98 %  None (Room air)  Sitting   07/14/20 1841              None (Room air)     07/14/20 1615  97 °F (36 1 °C)Abnormal   115Abnormal   18  161/79    97 %  None (Room air)  Lying   07/14/20 1530    104    146/66  94  94 %       07/14/20 1500    113Abnormal     140/61  88  93 %       07/14/20 1430    114Abnormal     122/69  91  96 %       07/14/20 1300    102  14  166/79  113  97 %       07/14/20 1240    97  18  157/72  104  97 %       07/14/20 1230    98  19  177/83Abnormal   119  97 %       07/14/20 1200    96  12  159/78  102  97 %       07/14/20 1100    76  17  119/56  79  94 %       07/14/20 1030    76  16  153/70  100  95 %           Pertinent Labs/Diagnostic Test Results:     7/14 PCXR: Worsening opacity in the right upper lung since the chest CT from 7/6/2020, likely pneumonia  7/14 EKG: Probable atrial flutter with variable block or atrial fibrillation    Results from last 7 days   Lab Units 07/15/20  0645 07/14/20  1050   WBC Thousand/uL 7 60 6 90   HEMOGLOBIN g/dL 9 2* 9 1*   HEMATOCRIT % 29 9* 29 9*   PLATELETS Thousands/uL 399* 376   NEUTROS ABS Thousands/µL  --  4 30         Results from last 7 days   Lab Units 07/15/20  0645 07/14/20  1050   SODIUM mmol/L 141 142   POTASSIUM mmol/L 3 7 3 4*   CHLORIDE mmol/L 104 105   CO2 mmol/L 28 24   ANION GAP mmol/L 9 13   BUN mg/dL 14 14   CREATININE mg/dL 1 32* 1 30*   EGFR ml/min/1 73sq m 40 41   CALCIUM mg/dL 8 8 8 8   MAGNESIUM mg/dL 2 4 2 3     Results from last 7 days   Lab Units 07/15/20  0645   AST U/L 20   ALT U/L 20   ALK PHOS U/L 94   TOTAL PROTEIN g/dL 7 2   ALBUMIN g/dL 3 8   TOTAL BILIRUBIN mg/dL 0 60         Results from last 7 days   Lab Units 07/15/20  0645 07/14/20  1050   GLUCOSE RANDOM mg/dL 102* 102*           Results from last 7 days   Lab Units 07/14/20  1050   TROPONIN I ng/mL <0 03             Results from last 7 days   Lab Units 07/15/20  0645 07/14/20  1050   TSH 3RD GENERATON uIU/mL 4 630 6 630*         ED Treatment:   Medication Administration from 07/14/2020 0959 to 07/14/2020 1613       Date/Time Order Dose Route Action Action by Comments     07/14/2020 1108 aspirin chewable tablet 324 mg 324 mg Oral Given Ricardo Bishop RN      07/14/2020 1108 albuterol inhalation solution 5 mg 5 mg Nebulization Given Ricardo Bishop RN      07/14/2020 1109 ipratropium (ATROVENT) 0 02 % inhalation solution 0 5 mg 0 5 mg Nebulization Given Ricardo Bishop RN      07/14/2020 1258 diltiazem (CARDIZEM CD) 24 hr capsule 120 mg 120 mg Oral Not Given Sandra Hutton RN      07/14/2020 1237 furosemide (LASIX) injection 80 mg 80 mg Intravenous Given Sandra Hutton, RN      07/14/2020 1431 diltiazem (CARDIZEM CD) 24 hr capsule 180 mg 180 mg Oral Given Sandra Hutton RN         Past Medical History:   Diagnosis Date    Acquired hypothyroidism 12/26/2018    Acute on chronic diastolic heart failure ([facility] [address] ) 8/30/2018    Anemia     [facility]     [facility]     History of transfusion     Hyperlipidemia     Hypertension     Migraine     Polyp of sigmoid colon     Prediabetes     Renal disorder     [facility] Present on Admission:   Paroxysmal atrial fibrillation (HCC)   Essential hypertension   Stage 3 chronic kidney disease (HCC)   Acute on chronic diastolic congestive heart failure (HCC)      Admitting Diagnosis: Atrial fibrillation (HCC) [I48 91]  Shortness of breath [R06 02]  SOB (shortness of breath) [R06 02]  Age/Sex: 70 y o  female  Admission Orders:  Scheduled Medications:    Medications:  amiodarone 200 mg Oral TID With Meals   apixaban 5 mg Oral BID   clopidogrel 75 mg Oral Daily   diltiazem 240 mg Oral Daily   escitalopram 10 mg Oral Daily   gabapentin 100 mg Oral TID   levothyroxine 88 mcg Oral Early Morning   metoprolol tartrate 100 mg Oral Q12H [address]   nortriptyline 50 mg Oral Daily   potassium chloride 20 mEq Oral BID   pravastatin 20 mg Oral Daily With Dinner   torsemide 40 mg Oral Daily     Continuous IV Infusions: none     PRN Meds:    acetaminophen 650 mg Oral Q6H PRN   oxyCODONE-acetaminophen   X 1 7/15 1 tablet Oral Q4H PRN   sodium chloride (PF) 3 mL Intravenous Q1H PRN     SCD's  Tele    IP CONSULT TO CARDIOLOGY  IP CONSULT TO CASE MANAGEMENT    Network Utilization Review Department  [email or web address]  ATTENTION: Please call with any questions or concerns to [phone] and carefully listen to the prompts so that you are directed to the right person  All voicemails are confidential   Sudie Crigler all requests for admission clinical reviews, approved or denied determinations and any other requests to dedicated fax number below belonging to the campus where the patient is receiving treatment   List of dedicated fax numbers for the Facilities:  FACILITY NAME UR FAX NUMBER   ADMISSION DENIALS (Administrative/Medical Necessity) [phone]   [address] [place] [address] (Maternity/NICU/Pediatrics) [phone]   Ike Rowland [address] [phone] Harriet [place] [address] [facility] [phone]   [place] [phone]   [place] [address] [phone] [address] [phone]

## 2020-07-15 NOTE — ASSESSMENT & PLAN NOTE
· Baseline creatinine appears to be 1 0-1 1 mg/dL  · Slight elevation from baseline  · Recommend to follow up with PCP to check BMP in a week

## 2020-07-15 NOTE — ASSESSMENT & PLAN NOTE
· With atrial fibrillation/flutter with RVR with variable block despite being on amiodarone  · The patient was evaluated by Cardiology in the ED  · She underwent an ablation in 2018; stating that she has been feeling relatively well up until to 2 weeks ago when she developed acute dyspnea and was admitted and subsequently found be in atrial fibrillation  · Cardiology input appreciated  · Continue with Eliquis for anticoagulation  · Continue amiodarone- frequency decreased to daily  Cardiology has added Cardizem to help with rate controlled     · Will follow-up outpatient with Cardiology next week; may be a possibility to discuss LARRY cardioversion

## 2020-07-15 NOTE — ASSESSMENT & PLAN NOTE
Presents with SOB with breakthrough atrial flutter (atypical), Despite being on Amiodarone    S/P Afib/Flutter ablation 2018   CV about 7 years prior   Continues on Eliquis for stroke risk reduction    High CHADSVASC2 score   Sex, Age, dHF, Hx of Stroke and HTN  Will add Cardizem for rate and symptom control and will follow up in the office as scheduled     Symptoms improved with lasix    Ok for discharge on Cardizem and torsemide (20 mg daily and 40 mg prn SOB)   Will follow up as OP in one week as scheduled  There may be a possibility to discuss LARRY CV

## 2020-07-15 NOTE — ASSESSMENT & PLAN NOTE
Wt Readings from Last 3 Encounters:   07/15/20 117 kg (257 lb 15 oz)   07/06/20 129 kg (285 lb)   05/29/20 124 kg (272 lb 14 9 oz)       · Suspected that acute exacerbation is related to rapid heart rates  · Patient has orthopnea and dyspnea on exertion  · Cardiology input appreciated  · She received 1 dose of 80 mg of IV Lasix in the ED; cardiology recommend to continue with torsemide but increase to 40 mg 3 times per week and 20 mg 4 times per week  Patient will stay on 40 mg daily for the 3 days after discharge     · Recommend to monitor daily weights and strict intake and output

## 2020-07-15 NOTE — PROGRESS NOTES
Progress Note - Tessa San 1945, 70 y o  female MRN: [id number]    Unit/Bed#: -02 Encounter: [id number]    Primary Care Provider: Anel Damon MD   Date and time admitted to hospital: 7/14/2020 10:04 AM        Essential hypertension  Assessment & Plan  Moderate control   Cardizem will help control BP as well as HR     Arrhythmia  Assessment & Plan  Presents with SOB with breakthrough atrial flutter (atypical), Despite being on Amiodarone    S/P Afib/Flutter ablation 2018   CV about 7 years prior   Continues on Eliquis for stroke risk reduction    High CHADSVASC2 score   Sex, Age, dHF, Hx of Stroke and HTN  Will add Cardizem for rate and symptom control and will follow up in the office as scheduled     Symptoms improved with lasix    Ok for discharge on Cardizem and torsemide (20 mg daily and 40 mg prn SOB)   Will follow up as OP in one week as scheduled  There may be a possibility to discuss LARRY CV    * Acute on chronic diastolic congestive heart failure (HCC)  Assessment & Plan  Wt Readings from Last 3 Encounters:   07/15/20 117 kg (257 lb 15 oz)   07/06/20 129 kg (285 lb)   05/29/20 124 kg (272 lb 14 9 oz)   stable today  Improved with lasix  Continue OP medication as prescribed   See comments         We will arrange OP follow  Thank you  Subjective:  Patient seen and examined at the the bedside  Feeling better  No chest pain dyspnea, or palpitations  No edema  Objective:   Vitals: Blood pressure 126/60, pulse 67, temperature 97 5 °F (36 4 °C), temperature source Temporal, resp   rate 18, height 5' 7" (1 702 m), weight 117 kg (257 lb 15 oz), SpO2 97 %, not currently breastfeeding ,   Orthostatic Blood Pressures      Most Recent Value   Blood Pressure  126/60 filed at 07/15/2020 1109   Patient Position - Orthostatic VS  Sitting filed at 07/15/2020 1109          Telemetry: Atrial flutter with varying AV block     Physical Exam:  Physical Exam   Constitutional: She is oriented to person, place, and time  She appears well-developed and well-nourished  HENT:   Head: Normocephalic and atraumatic  Mouth/Throat: Oropharynx is clear and moist    Eyes: Conjunctivae and EOM are normal  No scleral icterus  Neck: Normal range of motion  Neck supple  No JVD present  No tracheal deviation present  Cardiovascular: Normal rate, S1 normal, S2 normal and intact distal pulses  An irregularly irregular rhythm present  Exam reveals no gallop and no friction rub  Murmur heard  Systolic murmur is present with a grade of 2/6 radiating to the apex  Pulmonary/Chest: Effort normal and breath sounds normal  No respiratory distress  She has no wheezes  She has no rales  She exhibits no tenderness  Abdominal: Soft  Bowel sounds are normal  She exhibits no distension  There is no tenderness  Aorta not palpable    Musculoskeletal: Normal range of motion  She exhibits no edema or tenderness  Neurological: She is alert and oriented to person, place, and time  Skin: Skin is warm and dry  No rash noted  No erythema  No pallor  Psychiatric: She has a normal mood and affect  Her behavior is normal    Nursing note and vitals reviewed        Medications:    Current Facility-Administered Medications:     acetaminophen (TYLENOL) tablet 650 mg, 650 mg, Oral, Q6H PRN, ALEJANDRA Goldstein    amiodarone tablet 200 mg, 200 mg, Oral, TID With Meals, ALEJANDRA Goldstein, 200 mg at 07/15/20 0732    apixaban (ELIQUIS) tablet 5 mg, 5 mg, Oral, BID, ALEJANDRA Goldstein, 5 mg at 07/15/20 0855    clopidogrel (PLAVIX) tablet 75 mg, 75 mg, Oral, Daily, ALEJANDRA Glodstein, 75 mg at 07/15/20 0855    diltiazem (CARDIZEM CD) 24 hr capsule 240 mg, 240 mg, Oral, Daily, Bhavin Patricio Gottron, MD, 240 mg at 07/15/20 0856    escitalopram (LEXAPRO) tablet 10 mg, 10 mg, Oral, Daily, ALEJANDRA Goldstein, 10 mg at 07/15/20 0855    gabapentin (NEURONTIN) capsule 100 mg, 100 mg, Oral, TID, ALEJANDRA Goldstein, 100 mg at 07/15/20 0855    levothyroxine tablet 88 mcg, 88 mcg, Oral, Early Morning, ROBERT GuerraNP, 88 mcg at 07/15/20 0612    metoprolol tartrate (LOPRESSOR) tablet 100 mg, 100 mg, Oral, Q12H [facility], ROBERT GuerraNP, 100 mg at 07/15/20 0857    nortriptyline (PAMELOR) capsule 50 mg, 50 mg, Oral, Daily, ROBERT GuerraNP, 50 mg at 07/15/20 0902    oxyCODONE-acetaminophen (PERCOCET) 5-325 mg per tablet 1 tablet, 1 tablet, Oral, Q4H PRN, ALEJANDRA Guerra, 1 tablet at 07/15/20 0611    potassium chloride (K-DUR,KLOR-CON) CR tablet 20 mEq, 20 mEq, Oral, BID, ALEJANDRA Guerra, 20 mEq at 07/15/20 0855    pravastatin (PRAVACHOL) tablet 20 mg, 20 mg, Oral, Daily With Dinner, ALEJANDRA Guerra, 20 mg at 07/14/20 1823    Insert peripheral IV, , , Once **AND** sodium chloride (PF) 0 9 % injection 3 mL, 3 mL, Intravenous, Q1H PRN, ALEJANDRA Guerra    torsemide (DEMADEX) tablet 40 mg, 40 mg, Oral, Daily, ROBERT GuerraNP, 40 mg at 07/15/20 0856     Labs & Results:  Results from last 7 days   Lab Units 07/14/20  1050   TROPONIN I ng/mL <0 03     Results from last 7 days   Lab Units 07/15/20  0645   WBC Thousand/uL 7 60   HEMOGLOBIN g/dL 9 2*   HEMATOCRIT % 29 9*   PLATELETS Thousands/uL 399*         Results from last 7 days   Lab Units 07/15/20  0645   POTASSIUM mmol/L 3 7   CHLORIDE mmol/L 104   CO2 mmol/L 28   BUN mg/dL 14   CREATININE mg/dL 1 32*

## 2020-07-15 NOTE — ASSESSMENT & PLAN NOTE
· The patient states that she has been somewhat compliant with wearing CPAP at home  · Recommend to continue with CPAP at home

## 2020-07-15 NOTE — NURSING NOTE
Patient sitting at bedside  Denies chest pain or SOB at this time  Call bell and belongings within reach, will continue to monitor

## 2020-07-15 NOTE — ASSESSMENT & PLAN NOTE
Wt Readings from Last 3 Encounters:   07/15/20 117 kg (257 lb 15 oz)   07/06/20 129 kg (285 lb)   05/29/20 124 kg (272 lb 14 9 oz)   stable today  Improved with lasix  Continue OP medication as prescribed   See comments

## 2020-07-15 NOTE — PLAN OF CARE
Problem: CARDIOVASCULAR - ADULT  Goal: Maintains optimal cardiac output and hemodynamic stability  Description  INTERVENTIONS:  - Monitor I/O, vital signs and rhythm  - Monitor for S/S and trends of decreased cardiac output  - Administer and titrate ordered vasoactive medications to optimize hemodynamic stability  - Assess quality of pulses, skin color and temperature  - Assess for signs of decreased coronary artery perfusion  - Instruct patient to report change in severity of symptoms  Outcome: Progressing  Goal: Absence of cardiac dysrhythmias or at baseline rhythm  Description  INTERVENTIONS:  - Continuous cardiac monitoring, vital signs, obtain 12 lead EKG if ordered  - Administer antiarrhythmic and heart rate control medications as ordered  - Monitor electrolytes and administer replacement therapy as ordered  Outcome: Progressing     Problem: RESPIRATORY - ADULT  Goal: Achieves optimal ventilation and oxygenation  Description  INTERVENTIONS:  - Assess for changes in respiratory status  - Assess for changes in mentation and behavior  - Position to facilitate oxygenation and minimize respiratory effort  - Oxygen administered by appropriate delivery if ordered  - Initiate smoking cessation education as indicated  - Encourage broncho-pulmonary hygiene including cough, deep breathe, Incentive Spirometry  - Assess the need for suctioning and aspirate as needed  - Assess and instruct to report SOB or any respiratory difficulty  - Respiratory Therapy support as indicated  Outcome: Progressing     Problem: METABOLIC, FLUID AND ELECTROLYTES - ADULT  Goal: Electrolytes maintained within normal limits  Description  INTERVENTIONS:  - Monitor labs and assess patient for signs and symptoms of electrolyte imbalances  - Administer electrolyte replacement as ordered  - Monitor response to electrolyte replacements, including repeat lab results as appropriate  - Instruct patient on fluid and nutrition as appropriate  Outcome: Progressing  Goal: Fluid balance maintained  Description  INTERVENTIONS:  - Monitor labs   - Monitor I/O and WT  - Instruct patient on fluid and nutrition as appropriate  - Assess for signs & symptoms of volume excess or deficit  Outcome: Progressing  Goal: Glucose maintained within target range  Description  INTERVENTIONS:  - Monitor Blood Glucose as ordered  - Assess for signs and symptoms of hyperglycemia and hypoglycemia  - Administer ordered medications to maintain glucose within target range  - Assess nutritional intake and initiate nutrition service referral as needed  Outcome: Progressing     Problem: PAIN - ADULT  Goal: Verbalizes/displays adequate comfort level or baseline comfort level  Description  Interventions:  - Encourage patient to monitor pain and request assistance  - Assess pain using appropriate pain scale  - Administer analgesics based on type and severity of pain and evaluate response  - Implement non-pharmacological measures as appropriate and evaluate response  - Consider cultural and social influences on pain and pain management  - Notify physician/advanced practitioner if interventions unsuccessful or patient reports new pain  Outcome: Progressing     Problem: SAFETY ADULT  Goal: Patient will remain free of falls  Description  INTERVENTIONS:  - Assess patient frequently for physical needs  -  Identify cognitive and physical deficits and behaviors that affect risk of falls    -  [place] fall precautions as indicated by assessment   - Educate patient/family on patient safety including physical limitations  - Instruct patient to call for assistance with activity based on assessment  - Modify environment to reduce risk of injury  - Consider OT/PT consult to assist with strengthening/mobility  Outcome: Progressing  Goal: Maintain or return to baseline ADL function  Description  INTERVENTIONS:  -  Assess patient's ability to carry out ADLs; assess patient's baseline for ADL function and identify physical deficits which impact ability to perform ADLs (bathing, care of mouth/teeth, toileting, grooming, dressing, etc )  - Assess/evaluate cause of self-care deficits   - Assess range of motion  - Assess patient's mobility; develop plan if impaired  - Assess patient's need for assistive devices and provide as appropriate  - Encourage maximum independence but intervene and supervise when necessary  - Involve family in performance of ADLs  - Assess for home care needs following discharge   - Consider OT consult to assist with ADL evaluation and planning for discharge  - Provide patient education as appropriate  Outcome: Progressing  Goal: Maintain or return mobility status to optimal level  Description  INTERVENTIONS:  - Assess patient's baseline mobility status (ambulation, transfers, stairs, etc )    - Identify cognitive and physical deficits and behaviors that affect mobility  - Identify mobility aids required to assist with transfers and/or ambulation (gait belt, sit-to-stand, lift, walker, cane, etc )  - [place] fall precautions as indicated by assessment  - Record patient progress and toleration of activity level on Mobility SBAR; progress patient to next Phase/Stage  - Instruct patient to call for assistance with activity based on assessment  - Consider rehabilitation consult to assist with strengthening/weightbearing, etc   Outcome: Progressing     Problem: DISCHARGE PLANNING  Goal: Discharge to home or other facility with appropriate resources  Description  INTERVENTIONS:  - Identify barriers to discharge w/patient and caregiver  - Arrange for needed discharge resources and transportation as appropriate  - Identify discharge learning needs (meds, wound care, etc )  - Arrange for interpretive services to assist at discharge as needed  - Refer to Case Management Department for coordinating discharge planning if the patient needs post-hospital services based on physician/advanced practitioner order or complex needs related to functional status, cognitive ability, or social support system  Outcome: Progressing     Problem: Knowledge Deficit  Goal: Patient/family/caregiver demonstrates understanding of disease process, treatment plan, medications, and discharge instructions  Description  Complete learning assessment and assess knowledge base  Interventions:  - Provide teaching at level of understanding  - Provide teaching via preferred learning methods  Outcome: Progressing     Problem: Potential for Falls  Goal: Patient will remain free of falls  Description  INTERVENTIONS:  - Assess patient frequently for physical needs  -  Identify cognitive and physical deficits and behaviors that affect risk of falls    -  [place] fall precautions as indicated by assessment   - Educate patient/family on patient safety including physical limitations  - Instruct patient to call for assistance with activity based on assessment  - Modify environment to reduce risk of injury  - Consider OT/PT consult to assist with strengthening/mobility  Outcome: Progressing

## 2020-07-16 ENCOUNTER — PATIENT OUTREACH (OUTPATIENT)
Dept: INTERNAL MEDICINE CLINIC | Facility: CLINIC | Age: 75
End: 2020-07-16

## 2020-07-17 ENCOUNTER — TRANSITIONAL CARE MANAGEMENT (OUTPATIENT)
Dept: INTERNAL MEDICINE CLINIC | Facility: CLINIC | Age: 75
End: 2020-07-17

## 2020-07-26 DIAGNOSIS — I10 ESSENTIAL HYPERTENSION: ICD-10-CM

## 2020-07-28 RX ORDER — POTASSIUM CHLORIDE 20 MEQ/1
20 TABLET, EXTENDED RELEASE ORAL 2 TIMES DAILY
Qty: 180 TABLET | Refills: 0 | Status: SHIPPED | OUTPATIENT
Start: 2020-07-28 | End: 2020-08-03 | Stop reason: HOSPADM

## 2020-07-29 ENCOUNTER — TELEPHONE (OUTPATIENT)
Dept: INTERNAL MEDICINE CLINIC | Facility: CLINIC | Age: 75
End: 2020-07-29

## 2020-07-29 NOTE — TELEPHONE ENCOUNTER
I reached out to Edwina Núñez again today  She did not answer my call  The last time I called her (7 17 20) she declined a TCM appt stating she no longer can drive to [address] [place] [address] that she would be looking for a PCP closer to home  Since then she has gone to the ER again (4 times this year)  Dr Leia Wray asked me to call her to let her know he would only fill prescriptions for the next 30 days - she needs to get a PCP closer to home  I also called her daughter Vikash Rincon  She answered the phone  I explained what Dr Leia Wray said, she will call Edwina Núñez and ask her to call the office

## 2020-07-30 DIAGNOSIS — F32.A DEPRESSION, UNSPECIFIED DEPRESSION TYPE: ICD-10-CM

## 2020-07-30 NOTE — TELEPHONE ENCOUNTER
Soumya Patel is calling to make a new patient appt with Dr Sina Rankin in [facility] pass tomorrow

## 2020-08-02 ENCOUNTER — HOSPITAL ENCOUNTER (OUTPATIENT)
Facility: HOSPITAL | Age: 75
Setting detail: OBSERVATION
Discharge: HOME | End: 2020-08-03
Attending: INTERNAL MEDICINE | Admitting: HOSPITALIST
Payer: MEDICARE

## 2020-08-02 ENCOUNTER — APPOINTMENT (EMERGENCY)
Dept: CT IMAGING | Facility: HOSPITAL | Age: 75
End: 2020-08-02
Payer: MEDICARE

## 2020-08-02 DIAGNOSIS — F32.A DEPRESSION, UNSPECIFIED DEPRESSION TYPE: ICD-10-CM

## 2020-08-02 DIAGNOSIS — Z98.890 HISTORY OF PRIOR ABLATION TREATMENT: Primary | ICD-10-CM

## 2020-08-02 DIAGNOSIS — H81.13 VERTIGO, BENIGN PAROXYSMAL, BILATERAL: ICD-10-CM

## 2020-08-02 DIAGNOSIS — R42 VERTIGO: ICD-10-CM

## 2020-08-02 LAB
ALBUMIN SERPL BCP-MCNC: 3.6 G/DL (ref 3.5–5.7)
ALP SERPL-CCNC: 80 U/L (ref 55–165)
ALT SERPL W P-5'-P-CCNC: 16 U/L (ref 7–52)
ANION GAP SERPL CALCULATED.3IONS-SCNC: 12 MMOL/L (ref 4–13)
ANISOCYTOSIS BLD QL SMEAR: PRESENT
AST SERPL W P-5'-P-CCNC: 31 U/L (ref 13–39)
BASOPHILS # BLD AUTO: 0.1 THOUSANDS/ΜL (ref 0–0.1)
BASOPHILS NFR BLD AUTO: 2 % (ref 0–2)
BILIRUB SERPL-MCNC: 0.4 MG/DL (ref 0.2–1)
BILIRUB UR QL STRIP: NEGATIVE
BUN SERPL-MCNC: 23 MG/DL (ref 7–25)
CALCIUM SERPL-MCNC: 8.5 MG/DL (ref 8.6–10.5)
CHLORIDE SERPL-SCNC: 105 MMOL/L (ref 98–107)
CLARITY UR: ABNORMAL
CO2 SERPL-SCNC: 20 MMOL/L (ref 21–31)
COLOR UR: YELLOW
CREAT SERPL-MCNC: 1.33 MG/DL (ref 0.6–1.2)
D DIMER PPP FEU-MCNC: 0.3 UG/ML FEU
EOSINOPHIL # BLD AUTO: 0.2 THOUSAND/ΜL (ref 0–0.61)
EOSINOPHIL NFR BLD AUTO: 3 % (ref 0–5)
ERYTHROCYTE [DISTWIDTH] IN BLOOD BY AUTOMATED COUNT: 19.4 % (ref 11.5–14.5)
GFR SERPL CREATININE-BSD FRML MDRD: 40 ML/MIN/1.73SQ M
GLUCOSE SERPL-MCNC: 126 MG/DL (ref 65–99)
GLUCOSE UR STRIP-MCNC: NEGATIVE MG/DL
HCT VFR BLD AUTO: 30.7 % (ref 42–47)
HGB BLD-MCNC: 9.7 G/DL (ref 12–16)
HGB UR QL STRIP.AUTO: NEGATIVE
KETONES UR STRIP-MCNC: NEGATIVE MG/DL
LEUKOCYTE ESTERASE UR QL STRIP: NEGATIVE
LYMPHOCYTES # BLD AUTO: 1.7 THOUSANDS/ΜL (ref 0.6–4.47)
LYMPHOCYTES NFR BLD AUTO: 24 % (ref 21–51)
MCH RBC QN AUTO: 21.9 PG (ref 26–34)
MCHC RBC AUTO-ENTMCNC: 31.5 G/DL (ref 31–37)
MCV RBC AUTO: 70 FL (ref 81–99)
MICROCYTES BLD QL AUTO: PRESENT
MONOCYTES # BLD AUTO: 0.5 THOUSAND/ΜL (ref 0.17–1.22)
MONOCYTES NFR BLD AUTO: 8 % (ref 2–12)
NEUTROPHILS # BLD AUTO: 4.5 THOUSANDS/ΜL (ref 1.4–6.5)
NEUTS SEG NFR BLD AUTO: 64 % (ref 42–75)
NITRITE UR QL STRIP: NEGATIVE
OVALOCYTES BLD QL SMEAR: PRESENT
PH UR STRIP.AUTO: 6 [PH]
PLATELET # BLD AUTO: 291 THOUSANDS/UL (ref 149–390)
PLATELET BLD QL SMEAR: ADEQUATE
PMV BLD AUTO: 9.8 FL (ref 8.6–11.7)
POTASSIUM SERPL-SCNC: 3.6 MMOL/L (ref 3.5–5.5)
PROT SERPL-MCNC: 6.5 G/DL (ref 6.4–8.9)
PROT UR STRIP-MCNC: NEGATIVE MG/DL
RBC # BLD AUTO: 4.41 MILLION/UL (ref 3.9–5.2)
RBC MORPH BLD: PRESENT
SODIUM SERPL-SCNC: 137 MMOL/L (ref 134–143)
SP GR UR STRIP.AUTO: 1.01 (ref 1–1.03)
TROPONIN I SERPL-MCNC: <0.03 NG/ML
UROBILINOGEN UR QL STRIP.AUTO: 0.2 E.U./DL
WBC # BLD AUTO: 7.1 THOUSAND/UL (ref 4.8–10.8)

## 2020-08-02 PROCEDURE — 84484 ASSAY OF TROPONIN QUANT: CPT | Performed by: INTERNAL MEDICINE

## 2020-08-02 PROCEDURE — 1124F ACP DISCUSS-NO DSCNMKR DOCD: CPT | Performed by: INTERNAL MEDICINE

## 2020-08-02 PROCEDURE — 93005 ELECTROCARDIOGRAM TRACING: CPT

## 2020-08-02 PROCEDURE — 80053 COMPREHEN METABOLIC PANEL: CPT | Performed by: INTERNAL MEDICINE

## 2020-08-02 PROCEDURE — 70450 CT HEAD/BRAIN W/O DYE: CPT

## 2020-08-02 PROCEDURE — 81003 URINALYSIS AUTO W/O SCOPE: CPT | Performed by: INTERNAL MEDICINE

## 2020-08-02 PROCEDURE — G1004 CDSM NDSC: HCPCS

## 2020-08-02 PROCEDURE — 99285 EMERGENCY DEPT VISIT HI MDM: CPT

## 2020-08-02 PROCEDURE — 85379 FIBRIN DEGRADATION QUANT: CPT | Performed by: INTERNAL MEDICINE

## 2020-08-02 PROCEDURE — 99284 EMERGENCY DEPT VISIT MOD MDM: CPT | Performed by: INTERNAL MEDICINE

## 2020-08-02 PROCEDURE — 99220 PR INITIAL OBSERVATION CARE/DAY 70 MINUTES: CPT | Performed by: NURSE PRACTITIONER

## 2020-08-02 PROCEDURE — 36415 COLL VENOUS BLD VENIPUNCTURE: CPT | Performed by: INTERNAL MEDICINE

## 2020-08-02 PROCEDURE — 85025 COMPLETE CBC W/AUTO DIFF WBC: CPT | Performed by: INTERNAL MEDICINE

## 2020-08-02 RX ORDER — GABAPENTIN 100 MG/1
100 CAPSULE ORAL 3 TIMES DAILY
Status: DISCONTINUED | OUTPATIENT
Start: 2020-08-02 | End: 2020-08-03 | Stop reason: HOSPADM

## 2020-08-02 RX ORDER — ACETAMINOPHEN 325 MG/1
650 TABLET ORAL EVERY 6 HOURS PRN
Status: DISCONTINUED | OUTPATIENT
Start: 2020-08-02 | End: 2020-08-03 | Stop reason: HOSPADM

## 2020-08-02 RX ORDER — LORAZEPAM 1 MG/1
1 TABLET ORAL 3 TIMES DAILY PRN
Status: DISCONTINUED | OUTPATIENT
Start: 2020-08-02 | End: 2020-08-03 | Stop reason: HOSPADM

## 2020-08-02 RX ORDER — ESCITALOPRAM OXALATE 10 MG/1
10 TABLET ORAL DAILY
Status: DISCONTINUED | OUTPATIENT
Start: 2020-08-03 | End: 2020-08-03 | Stop reason: HOSPADM

## 2020-08-02 RX ORDER — POTASSIUM CHLORIDE 20 MEQ/1
20 TABLET, EXTENDED RELEASE ORAL 2 TIMES DAILY
Status: DISCONTINUED | OUTPATIENT
Start: 2020-08-03 | End: 2020-08-03

## 2020-08-02 RX ORDER — AMIODARONE HYDROCHLORIDE 100 MG/1
200 TABLET ORAL DAILY
Status: DISCONTINUED | OUTPATIENT
Start: 2020-08-03 | End: 2020-08-03 | Stop reason: HOSPADM

## 2020-08-02 RX ORDER — NORTRIPTYLINE HYDROCHLORIDE 25 MG/1
50 CAPSULE ORAL DAILY
Status: DISCONTINUED | OUTPATIENT
Start: 2020-08-03 | End: 2020-08-03 | Stop reason: HOSPADM

## 2020-08-02 RX ORDER — PRAVASTATIN SODIUM 20 MG
20 TABLET ORAL
Status: DISCONTINUED | OUTPATIENT
Start: 2020-08-03 | End: 2020-08-03 | Stop reason: HOSPADM

## 2020-08-02 RX ORDER — TORSEMIDE 20 MG/1
20 TABLET ORAL SEE ADMIN INSTRUCTIONS
Status: DISCONTINUED | OUTPATIENT
Start: 2020-08-02 | End: 2020-08-03 | Stop reason: SDUPTHER

## 2020-08-02 RX ORDER — MECLIZINE HCL 12.5 MG/1
25 TABLET ORAL EVERY 8 HOURS SCHEDULED
Status: DISCONTINUED | OUTPATIENT
Start: 2020-08-02 | End: 2020-08-03 | Stop reason: HOSPADM

## 2020-08-02 RX ORDER — SODIUM CHLORIDE 9 MG/ML
125 INJECTION, SOLUTION INTRAVENOUS CONTINUOUS
Status: DISCONTINUED | OUTPATIENT
Start: 2020-08-02 | End: 2020-08-03 | Stop reason: HOSPADM

## 2020-08-02 RX ORDER — ONDANSETRON 2 MG/ML
1 INJECTION INTRAMUSCULAR; INTRAVENOUS ONCE
Status: COMPLETED | OUTPATIENT
Start: 2020-08-02 | End: 2020-08-02

## 2020-08-02 RX ORDER — OXYCODONE HYDROCHLORIDE AND ACETAMINOPHEN 5; 325 MG/1; MG/1
1 TABLET ORAL EVERY 4 HOURS PRN
Status: DISCONTINUED | OUTPATIENT
Start: 2020-08-02 | End: 2020-08-03 | Stop reason: HOSPADM

## 2020-08-02 RX ORDER — METOPROLOL TARTRATE 50 MG/1
100 TABLET, FILM COATED ORAL EVERY 12 HOURS SCHEDULED
Status: DISCONTINUED | OUTPATIENT
Start: 2020-08-02 | End: 2020-08-03 | Stop reason: HOSPADM

## 2020-08-02 RX ORDER — DILTIAZEM HYDROCHLORIDE 240 MG/1
240 CAPSULE, COATED, EXTENDED RELEASE ORAL DAILY
Status: DISCONTINUED | OUTPATIENT
Start: 2020-08-03 | End: 2020-08-03 | Stop reason: HOSPADM

## 2020-08-02 RX ORDER — LEVOTHYROXINE SODIUM 88 UG/1
88 TABLET ORAL
Status: DISCONTINUED | OUTPATIENT
Start: 2020-08-03 | End: 2020-08-03 | Stop reason: HOSPADM

## 2020-08-02 RX ORDER — ONDANSETRON 2 MG/ML
4 INJECTION INTRAMUSCULAR; INTRAVENOUS EVERY 6 HOURS PRN
Status: DISCONTINUED | OUTPATIENT
Start: 2020-08-02 | End: 2020-08-03 | Stop reason: HOSPADM

## 2020-08-02 RX ADMIN — MECLIZINE 25 MG: 12.5 TABLET ORAL at 23:24

## 2020-08-02 RX ADMIN — SODIUM CHLORIDE 125 ML/HR: 0.9 INJECTION, SOLUTION INTRAVENOUS at 23:24

## 2020-08-02 RX ADMIN — SODIUM CHLORIDE 125 ML/HR: 0.9 INJECTION, SOLUTION INTRAVENOUS at 17:17

## 2020-08-02 RX ADMIN — GABAPENTIN 100 MG: 100 CAPSULE ORAL at 23:24

## 2020-08-02 RX ADMIN — LORAZEPAM 1 MG: 1 TABLET ORAL at 23:24

## 2020-08-02 NOTE — ED PROVIDER NOTES
History  Chief Complaint   Patient presents with    Dizziness     states she started to feel dizzy last night like the room was spinning, intermittent SOB     Weakness - Generalized     79-year-old female presents complaining of vertigo  Patient states she is very lightheaded and dizzy  She is also complaining of shortness of breath and some chest tightness  She has the vertigo started this morning  On initial evaluation her blood pressure systolic is however in between 9960 diastolic in the 28B and low 60s  She denies any facial droop slurred speech hemiparesis  She had shortness of breath and chest tightness only when getting into the ambulance it lasted a few seconds she states it resolved on the ride here and does not have at this time  She has had no abdominal pain nausea vomiting  She denies fever chills rigors or cough          Prior to Admission Medications   Prescriptions Last Dose Informant Patient Reported? Taking?    LORazepam (ATIVAN) 1 mg tablet  Self No No   Sig: Take 1 tablet (1 mg total) by mouth 3 (three) times a day as needed for anxiety   Multiple Vitamin (MULTIVITAMIN) tablet  Self Yes No   Sig: Take 1 tablet by mouth daily     amiodarone 200 mg tablet   No No   Sig: Take 1 tablet (200 mg total) by mouth daily   apixaban (Eliquis) 5 mg   No No   Sig: Take 1 tablet (5 mg total) by mouth 2 (two) times a day   diltiazem (CARDIZEM CD) 240 mg 24 hr capsule   No No   Sig: Take 1 capsule (240 mg total) by mouth daily   escitalopram (LEXAPRO) 10 mg tablet   No No   Sig: Take 1 tablet (10 mg total) by mouth daily   gabapentin (NEURONTIN) 100 mg capsule   No No   Sig: Take 1 capsule (100 mg total) by mouth 3 (three) times a day   levothyroxine 88 mcg tablet   No No   Sig: Take 1 tablet (88 mcg total) by mouth daily   meclizine (ANTIVERT) 25 mg tablet  Self Yes No   Sig: Take 25 mg by mouth 3 (three) times a day as needed for dizziness    metoprolol tartrate (LOPRESSOR) 100 mg tablet   No No Sig: Take 1 tablet (100 mg total) by mouth every 12 (twelve) hours   nortriptyline (PAMELOR) 50 mg capsule   No No   Sig: Take 1 capsule (50 mg total) by mouth daily   oxyCODONE-acetaminophen (PERCOCET) 5-325 mg per tablet  Self No No   Sig: Take 1 tablet by mouth every 4 (four) hours as needed for moderate pain Max Daily Amount: 6 tablets   potassium chloride (K-DUR,KLOR-CON) 20 mEq tablet   No No   Sig: Take 1 tablet (20 mEq total) by mouth 2 (two) times a day   simvastatin (ZOCOR) 10 mg tablet   No No   Sig: Take 1 tablet (10 mg total) by mouth daily at bedtime   torsemide (DEMADEX) 20 mg tablet   No No   Sig: Take 1 tablet (20 mg total) by mouth see administration instructions Take 40 mg daily x 3 days (7/16, 7/17, 7/18) Take 1 tablet (20 mg total) by mouth daily except for Monday, Wednesday and Friday take 2 tablets (40 mg total) in morning      Facility-Administered Medications: None       Past Medical History:   Diagnosis Date    Acquired hypothyroidism 12/26/2018    [facility] 8/30/2018    Anemia     Atrial fibrillation (HCC)     [facility]congest[facility] failure[facility]     History of transfusion     Hyperlipidemia     Hypertension     Migraine     Polyp of sigmoid colon     Prediabetes     Renal disorder     Stroke ([facility] ) 2011       Past Surgical History:   Procedure Laterality Date    APPENDECTOMY      CARDIAC ELECTROPHYSIOLOGY STUDY AND ABLATION      CHOLECYSTECTOMY      COLONOSCOPY         Family History   Problem Relation Age of Onset    Diabetes Mother     Heart disease Mother     Hypertension Mother     Arthritis Mother     Coronary artery disease Mother     Drug abuse Paternal Grandmother      I have reviewed and agree with the history as documented      E-Cigarette/Vaping    E-Cigarette Use Never User      E-Cigarette/Vaping Substances    Nicotine No     THC No     CBD No     Flavoring No     Other No     Unknown No      Social History Tobacco Use    Smoking status: Former Smoker     Types: Cigarettes     Last attempt to quit: 2011     Years since quittin 4    Smokeless tobacco: Never Used   Substance Use Topics    Alcohol use: Never     Alcohol/week: 0 0 standard drinks     Frequency: Never     Binge frequency: Never    Drug use: No       Review of Systems   Constitutional: Positive for fatigue  HENT: Negative  Respiratory: Positive for chest tightness and shortness of breath  Cardiovascular: Negative  Negative for chest pain, palpitations and leg swelling  Gastrointestinal: Negative  Endocrine: Negative  Genitourinary: Negative  Musculoskeletal: Negative  Neurological: Positive for dizziness and weakness  Psychiatric/Behavioral: Negative  Physical Exam  Physical Exam  Vitals signs and nursing note reviewed  Constitutional:       General: She is not in acute distress  Appearance: She is obese  She is not ill-appearing, toxic-appearing or diaphoretic  HENT:      Head: Normocephalic and atraumatic  Right Ear: Tympanic membrane normal       Left Ear: Tympanic membrane normal       Nose: Nose normal       Mouth/Throat:      Mouth: Mucous membranes are dry  Eyes:      Extraocular Movements: Extraocular movements intact  Conjunctiva/sclera: Conjunctivae normal       Pupils: Pupils are equal, round, and reactive to light  Neck:      Musculoskeletal: Normal range of motion and neck supple  Cardiovascular:      Rate and Rhythm: Normal rate and regular rhythm  Pulses: Normal pulses  Heart sounds: Normal heart sounds  Pulmonary:      Effort: Pulmonary effort is normal       Breath sounds: Normal breath sounds  Abdominal:      General: Abdomen is flat  Bowel sounds are normal       Palpations: Abdomen is soft  Musculoskeletal: Normal range of motion  Skin:     General: Skin is warm and dry  Capillary Refill: Capillary refill takes less than 2 seconds  Neurological:      Mental Status: She is alert  Vital Signs  ED Triage Vitals [08/02/20 1639]   Temperature Pulse Respirations Blood Pressure SpO2   99 °F (37 2 °C) 56 20 (!) 88/53 99 %      Temp Source Heart Rate Source Patient Position - Orthostatic VS BP Location FiO2 (%)   Temporal Monitor Sitting Right arm --      Pain Score       7           Vitals:    08/02/20 1639   BP: (!) 88/53   Pulse: 56   Patient Position - Orthostatic VS: Sitting         Visual Acuity      ED Medications  Medications   ondansetron (FOR EMS ONLY) (ZOFRAN) 4 mg/2 mL injection 4 mg (0 mg Does not apply Given to EMS 8/2/20 1649)       Diagnostic Studies  Results Reviewed     None                 No orders to display              Procedures  ECG 12 Lead Documentation Only    Date/Time: 8/2/2020 5:01 PM  Performed by: Dolores Medrano MD  Authorized by: Dolores Medrano MD     Indications / Diagnosis:  Sob  ECG reviewed by me, the ED Provider: yes    Patient location:  Bedside  Previous ECG:     Previous ECG:  Compared to current    Similarity:  No change    Comparison to cardiac monitor: Yes    Interpretation:     Interpretation: abnormal    Rate:     ECG rate:  60    ECG rate assessment: normal    Rhythm:     Rhythm: A-V block and atrial flutter    Ectopy:     Ectopy: none    QRS:     QRS axis:  Normal  Conduction:     Conduction: abnormal      Abnormal conduction: 1st degree    T waves:     T waves: non-specific    Q waves:     Q waves:  III and aVF  Other findings:     Other findings: LVH               ED Course       US AUDIT      Most Recent Value   Initial Alcohol Screen: US AUDIT-C    1   How often do you have a drink containing alcohol?  0 Filed at: 08/02/2020 1640   Audit-C Score  0 Filed at: 08/02/2020 [facility]                                            MDM      Disposition  Final diagnoses:   None     ED Disposition     None      Follow-up Information    None         Patient's Medications   Discharge Prescriptions    No medications on file     No discharge procedures on file      PDMP Review     None          ED Provider  Electronically Signed by           Altaf Borwn MD  08/02/20 082 Jamir Gibbons MD  08/02/20 [id number]

## 2020-08-03 ENCOUNTER — PATIENT OUTREACH (OUTPATIENT)
Dept: INTERNAL MEDICINE CLINIC | Facility: CLINIC | Age: 75
End: 2020-08-03

## 2020-08-03 VITALS
RESPIRATION RATE: 19 BRPM | HEIGHT: 67 IN | HEART RATE: 51 BPM | WEIGHT: 280.87 LBS | SYSTOLIC BLOOD PRESSURE: 104 MMHG | TEMPERATURE: 97.6 F | DIASTOLIC BLOOD PRESSURE: 50 MMHG | OXYGEN SATURATION: 97 % | BODY MASS INDEX: 44.08 KG/M2

## 2020-08-03 LAB
ANION GAP SERPL CALCULATED.3IONS-SCNC: 9 MMOL/L (ref 4–13)
ATRIAL RATE: 227 BPM
BASOPHILS # BLD AUTO: 0.1 THOUSANDS/ΜL (ref 0–0.1)
BASOPHILS NFR BLD AUTO: 1 % (ref 0–2)
BUN SERPL-MCNC: 19 MG/DL (ref 7–25)
CALCIUM SERPL-MCNC: 8.4 MG/DL (ref 8.6–10.5)
CHLORIDE SERPL-SCNC: 108 MMOL/L (ref 98–107)
CO2 SERPL-SCNC: 25 MMOL/L (ref 21–31)
CREAT SERPL-MCNC: 1.3 MG/DL (ref 0.6–1.2)
EOSINOPHIL # BLD AUTO: 0.2 THOUSAND/ΜL (ref 0–0.61)
EOSINOPHIL NFR BLD AUTO: 4 % (ref 0–5)
ERYTHROCYTE [DISTWIDTH] IN BLOOD BY AUTOMATED COUNT: 19.7 % (ref 11.5–14.5)
GFR SERPL CREATININE-BSD FRML MDRD: 41 ML/MIN/1.73SQ M
GLUCOSE SERPL-MCNC: 82 MG/DL (ref 65–99)
HCT VFR BLD AUTO: 30.7 % (ref 42–47)
HGB BLD-MCNC: 9.5 G/DL (ref 12–16)
LYMPHOCYTES # BLD AUTO: 2.5 THOUSANDS/ΜL (ref 0.6–4.47)
LYMPHOCYTES NFR BLD AUTO: 38 % (ref 21–51)
MCH RBC QN AUTO: 22 PG (ref 26–34)
MCHC RBC AUTO-ENTMCNC: 31 G/DL (ref 31–37)
MCV RBC AUTO: 71 FL (ref 81–99)
MONOCYTES # BLD AUTO: 0.4 THOUSAND/ΜL (ref 0.17–1.22)
MONOCYTES NFR BLD AUTO: 7 % (ref 2–12)
NEUTROPHILS # BLD AUTO: 3.2 THOUSANDS/ΜL (ref 1.4–6.5)
NEUTS SEG NFR BLD AUTO: 50 % (ref 42–75)
P AXIS: 60 DEGREES
PLATELET # BLD AUTO: 275 THOUSANDS/UL (ref 149–390)
PMV BLD AUTO: 9.3 FL (ref 8.6–11.7)
POTASSIUM SERPL-SCNC: 2.9 MMOL/L (ref 3.5–5.5)
QRS AXIS: 3 DEGREES
QRSD INTERVAL: 86 MS
QT INTERVAL: 500 MS
QTC INTERVAL: 486 MS
RBC # BLD AUTO: 4.33 MILLION/UL (ref 3.9–5.2)
SODIUM SERPL-SCNC: 142 MMOL/L (ref 134–143)
T WAVE AXIS: 13 DEGREES
VENTRICULAR RATE: 57 BPM
WBC # BLD AUTO: 6.5 THOUSAND/UL (ref 4.8–10.8)

## 2020-08-03 PROCEDURE — 85025 COMPLETE CBC W/AUTO DIFF WBC: CPT | Performed by: NURSE PRACTITIONER

## 2020-08-03 PROCEDURE — 99215 OFFICE O/P EST HI 40 MIN: CPT | Performed by: INTERNAL MEDICINE

## 2020-08-03 PROCEDURE — 80048 BASIC METABOLIC PNL TOTAL CA: CPT | Performed by: NURSE PRACTITIONER

## 2020-08-03 PROCEDURE — 99217 PR OBSERVATION CARE DISCHARGE MANAGEMENT: CPT | Performed by: HOSPITALIST

## 2020-08-03 PROCEDURE — 93010 ELECTROCARDIOGRAM REPORT: CPT | Performed by: INTERNAL MEDICINE

## 2020-08-03 RX ORDER — MECLIZINE HYDROCHLORIDE 25 MG/1
25 TABLET ORAL EVERY 8 HOURS SCHEDULED
Qty: 90 TABLET | Refills: 0 | Status: ON HOLD | OUTPATIENT
Start: 2020-08-03 | End: 2020-09-04 | Stop reason: SDUPTHER

## 2020-08-03 RX ORDER — POTASSIUM CHLORIDE 20 MEQ/1
40 TABLET, EXTENDED RELEASE ORAL 2 TIMES DAILY
Status: DISCONTINUED | OUTPATIENT
Start: 2020-08-03 | End: 2020-08-03 | Stop reason: HOSPADM

## 2020-08-03 RX ORDER — MECLIZINE HYDROCHLORIDE 25 MG/1
25 TABLET ORAL 3 TIMES DAILY PRN
Qty: 30 TABLET | Refills: 0 | Status: SHIPPED | OUTPATIENT
Start: 2020-08-03 | End: 2020-08-03 | Stop reason: HOSPADM

## 2020-08-03 RX ORDER — POTASSIUM CHLORIDE 20 MEQ/1
40 TABLET, EXTENDED RELEASE ORAL 2 TIMES DAILY
Qty: 8 TABLET | Refills: 0 | Status: SHIPPED | OUTPATIENT
Start: 2020-08-03 | End: 2020-09-04 | Stop reason: HOSPADM

## 2020-08-03 RX ORDER — TORSEMIDE 20 MG/1
20 TABLET ORAL
Status: DISCONTINUED | OUTPATIENT
Start: 2020-08-04 | End: 2020-08-03 | Stop reason: HOSPADM

## 2020-08-03 RX ORDER — LORAZEPAM 1 MG/1
1 TABLET ORAL 3 TIMES DAILY PRN
Qty: 30 TABLET | Refills: 0 | Status: ON HOLD | OUTPATIENT
Start: 2020-08-03 | End: 2020-09-04 | Stop reason: SDUPTHER

## 2020-08-03 RX ORDER — TORSEMIDE 20 MG/1
40 TABLET ORAL
Status: DISCONTINUED | OUTPATIENT
Start: 2020-08-03 | End: 2020-08-03 | Stop reason: HOSPADM

## 2020-08-03 RX ADMIN — NORTRIPTYLINE HYDROCHLORIDE 50 MG: 25 CAPSULE ORAL at 09:41

## 2020-08-03 RX ADMIN — MECLIZINE 25 MG: 12.5 TABLET ORAL at 14:28

## 2020-08-03 RX ADMIN — GABAPENTIN 100 MG: 100 CAPSULE ORAL at 15:58

## 2020-08-03 RX ADMIN — AMIODARONE HYDROCHLORIDE 200 MG: 100 TABLET ORAL at 09:36

## 2020-08-03 RX ADMIN — SODIUM CHLORIDE 125 ML/HR: 0.9 INJECTION, SOLUTION INTRAVENOUS at 05:36

## 2020-08-03 RX ADMIN — POTASSIUM CHLORIDE 40 MEQ: 1500 TABLET, EXTENDED RELEASE ORAL at 17:24

## 2020-08-03 RX ADMIN — DILTIAZEM HYDROCHLORIDE 240 MG: 240 CAPSULE, COATED, EXTENDED RELEASE ORAL at 09:36

## 2020-08-03 RX ADMIN — PRAVASTATIN SODIUM 20 MG: 20 TABLET ORAL at 15:58

## 2020-08-03 RX ADMIN — APIXABAN 5 MG: 5 TABLET, FILM COATED ORAL at 17:24

## 2020-08-03 RX ADMIN — MECLIZINE 25 MG: 12.5 TABLET ORAL at 05:36

## 2020-08-03 RX ADMIN — POTASSIUM CHLORIDE 20 MEQ: 1500 TABLET, EXTENDED RELEASE ORAL at 09:36

## 2020-08-03 RX ADMIN — METOPROLOL TARTRATE 100 MG: 50 TABLET, FILM COATED ORAL at 09:36

## 2020-08-03 RX ADMIN — TORSEMIDE 40 MG: 20 TABLET ORAL at 09:42

## 2020-08-03 RX ADMIN — APIXABAN 5 MG: 5 TABLET, FILM COATED ORAL at 09:36

## 2020-08-03 RX ADMIN — LEVOTHYROXINE SODIUM 88 MCG: 88 TABLET ORAL at 05:36

## 2020-08-03 RX ADMIN — GABAPENTIN 100 MG: 100 CAPSULE ORAL at 09:36

## 2020-08-03 RX ADMIN — ESCITALOPRAM OXALATE 10 MG: 10 TABLET ORAL at 09:36

## 2020-08-03 NOTE — ASSESSMENT & PLAN NOTE
· The initial hypotension resolved with IV fluids, possibly volume depleted at time of arrival  · Status post a Cardiology evaluation  · Okay for discharge home on all pre-admit meds at pre-admit dosages as per Cardiology

## 2020-08-03 NOTE — DISCHARGE SUMMARY
Discharge- Marin Welch 1945, 70 y o  female MRN: [id number]    Unit/Bed#: -01 Encounter: [id number]    Primary Care Provider: Franky Rivera MD   Date and time admitted to hospital: 8/2/2020  4:36 PM        * Vertigo, benign paroxysmal, bilateral  Assessment & Plan  · All symptoms have resolved  · Prescription has been provided for Antivert  · Patient has a history of chronic vertigo/dizziness  · CT head performed in the ER yielded the following results:1   Chronic infarcts in the bilateral cerebellar hemispheres   If there is concern for acute infarct, MRI may be helpful  2   No acute intracranial hemorrhage or mass effect  · No further inpatient workup required  · Patient's symptoms have resolved with Antivert  · Outpatient follow-up with PCP    Chronic diastolic congestive heart failure (HCC)  Assessment & Plan  Wt Readings from Last 3 Encounters:   08/03/20 127 kg (280 lb 13 9 oz)   07/15/20 117 kg (257 lb 15 oz)   07/06/20 129 kg (285 lb)     · Status post a Cardiology evaluation  · Okay for DC home from their standpoint, outpatient follow-up has already been set up      Prediabetes  Assessment & Plan  · Continue Neurontin for neuropathy  · Patient's blood sugars have otherwise been stable      Acquired hypothyroidism  Assessment & Plan  · Continue Synthroid    Essential hypertension  Assessment & Plan  · The initial hypotension resolved with IV fluids, possibly volume depleted at time of arrival  · Status post a Cardiology evaluation  · Okay for discharge home on all pre-admit meds at pre-admit dosages as per Cardiology    Mild episode of recurrent major depressive disorder ([facility] )  Assessment & Plan  · Continue Lexapro, Ativan, Pamelor    Hypercholesterolemia  Assessment & Plan  · Continue statin    Anemia due to stage 3 chronic kidney disease ([facility] [address] )  Assessment & Plan  · Patient baseline creatinine of 1 30 - creatinine is at baseline at time of discharge  · Will need periodic outpatient BMP monitoring via her primary care physician      History of prior ablation treatment  Assessment & Plan  · Status post a Cardiology evaluation, no changes in medications, no need for further inpatient cardiac workup    Class 3 severe obesity due to excess calories with serious comorbidity and body mass index (BMI) of 40 0 to 44 9 in [facility])  Assessment & Plan  · BMI of 44 34  · Weight loss, dietary and lifestyle modification counseling was provided          Discharging Physician / Practitioner: Becca Kerr MD  PCP: Jeanna Silva MD  Admission Date:   Admission Orders (From admission, onward)     Ordered        08/02/20 2201  Place in Observation (expected length of stay for this patient is less than two midnights)  Once                   Discharge Date: 08/03/20    Resolved Problems  Date Reviewed: 8/2/2020    None          Consultations During Hospital Stay:  · Cardiology    Procedures Performed:   · None    Significant Findings / Test Results:   · X-ray chest-Worsening opacity in the right upper lung since the chest CT from 7/6/2020, likely pneumonia  ·  CT brain-1   Chronic infarcts in the bilateral cerebellar hemispheres   If there is concern for acute infarct, MRI may be helpful  2   No acute intracranial hemorrhage or mass effect  Incidental Findings:   · None     Test Results Pending at Discharge (will require follow up): · None     Outpatient Tests Requested:  · None    Complications:     None    Reason for Admission:  Dizziness    Hospital Course:     Jimena Coker is a 70 y o  female patient who originally presented to the hospital on 8/2/2020 due to dizziness  Please refer to the initial history and physical examination completed by Yossi Guzman for the initial presenting features and complaints  In brief, the patient is a 57-year-old female who was admitted to [facility] after she presented to the emergency room with dizziness    Of note, the patient has a chronic history of benign positional paroxysmal vertigo  She reported having run out of her Antivert  She was admitted to [facility], and put back on Antivert  Additionally a cardiology consultation was obtained  Patient was slightly bradycardic and has a history of atrial fibrillation/flutter  After Cardiology evaluated the patient, they felt there was no need for any type of further inpatient testing, treatment and or medication change  They recommended that she can be discharged and she should follow-up with them in the outpatient setting  She was a little hypokalemic, however potassium was supplemented while in-house, and she was also given a prescription for 40 mEq of Klor-Con to be taken twice a day for the next 2-3 days  She was otherwise discharged home on all of her other pre-admission medications at the preadmission dosages  Of note, at time of arrival she was a little hypotensive, however the blood pressure responded very well to a bag of normal saline  Blood pressures remained stable throughout  She was discharged home on all of her other pre-admission medications at the preadmission dosages, and will follow up in the outpatient setting with her PCP and Cardiology  Please see above list of diagnoses and related plan for additional information  Condition at Discharge: good     Discharge Day Visit / Exam:     Subjective:  Patient seen and examined, no complaints no distress  Vitals: Blood Pressure: 108/55 (08/03/20 1343)  Pulse: 58 (08/03/20 1343)  Temperature: (!) 97 2 °F (36 2 °C) (08/03/20 0729)  Temp Source: Temporal (08/03/20 0729)  Respirations: 18 (08/03/20 0729)  Height: 5' 7" (08/02/20 2244)  Weight - Scale: 127 kg (280 lb 13 9 oz) (08/03/20 0600)  SpO2: 94 % (08/03/20 0729)  Exam:   Physical Exam   Constitutional: She is oriented to person, place, and time  She appears well-developed  HENT:   Head: Normocephalic and atraumatic     Nose: Nose normal    Eyes: Pupils are equal, round, and reactive to light  Conjunctivae are normal    Neck: Normal range of motion  Neck supple  No JVD present  No thyromegaly present  Cardiovascular: Normal rate and regular rhythm  Exam reveals no gallop and no friction rub  No murmur heard  Pulmonary/Chest: Effort normal and breath sounds normal  No respiratory distress  Abdominal: Soft  Bowel sounds are normal  She exhibits no distension and no mass  There is no abdominal tenderness  There is no guarding  Musculoskeletal: Normal range of motion  Lymphadenopathy:     She has no cervical adenopathy  Neurological: She is alert and oriented to person, place, and time  No cranial nerve deficit  Skin: Skin is warm  No rash noted  No erythema  Psychiatric: Her behavior is normal    Vitals reviewed  Discussion with Family:  No family members were present at the time of my discharge evaluation, nor did the patient want me to call anyone    Discharge instructions/Information to patient and family:   See after visit summary for information provided to patient and family  Provisions for Follow-Up Care:  See after visit summary for information related to follow-up care and any pertinent home health orders  Disposition:     Home    For Discharges to [facility] SNF:   · Not Applicable to this Patient - Not Applicable to this Patient    Planned Readmission:    None     Discharge Statement:  I spent 40 minutes discharging the patient  This time was spent on the day of discharge  I had direct contact with the patient on the day of discharge  Greater than 50% of the total time was spent examining patient, answering all patient questions, arranging and discussing plan of care with patient as well as directly providing post-discharge instructions  Additional time then spent on discharge activities  Discharge Medications:  See after visit summary for reconciled discharge medications provided to patient and family        ** Please Note: This note has been constructed using a voice recognition system **

## 2020-08-03 NOTE — PLAN OF CARE
Problem: Potential for Falls  Goal: Patient will remain free of falls  Description: INTERVENTIONS:  - Assess patient frequently for physical needs  -  Identify cognitive and physical deficits and behaviors that affect risk of falls    -  [place] fall precautions as indicated by assessment   - Educate patient/family on patient safety including physical limitations  - Instruct patient to call for assistance with activity based on assessment  - Modify environment to reduce risk of injury  - Consider OT/PT consult to assist with strengthening/mobility  8/3/2020 1429 by Veronica Boyd RN  Outcome: Adequate for Discharge  8/3/2020 0946 by Veronica Boyd RN  Outcome: Progressing     Problem: PAIN - ADULT  Goal: Verbalizes/displays adequate comfort level or baseline comfort level  Description: Interventions:  - Encourage patient to monitor pain and request assistance  - Assess pain using appropriate pain scale  - Administer analgesics based on type and severity of pain and evaluate response  - Implement non-pharmacological measures as appropriate and evaluate response  - Consider cultural and social influences on pain and pain management  - Notify physician/advanced practitioner if interventions unsuccessful or patient reports new pain  8/3/2020 1429 by Veronica Boyd RN  Outcome: Adequate for Discharge  8/3/2020 0946 by Veronica Boyd RN  Outcome: Progressing     Problem: INFECTION - ADULT  Goal: Absence or prevention of progression during hospitalization  Description: INTERVENTIONS:  - Assess and monitor for signs and symptoms of infection  - Monitor lab/diagnostic results  - Monitor all insertion sites, i e  indwelling lines, tubes, and drains  - Monitor endotracheal if appropriate and nasal secretions for changes in amount and color  - [place] appropriate cooling/warming therapies per order  - Administer medications as ordered  - Instruct and encourage patient and family to use good hand hygiene technique  - Identify and instruct in appropriate isolation precautions for identified infection/condition  8/3/2020 1429 by Manuel Ahn RN  Outcome: Adequate for Discharge  8/3/2020 0946 by Manuel Ahn RN  Outcome: Progressing     Problem: SAFETY ADULT  Goal: Patient will remain free of falls  Description: INTERVENTIONS:  - Assess patient frequently for physical needs  -  Identify cognitive and physical deficits and behaviors that affect risk of falls    -  [place] fall precautions as indicated by assessment   - Educate patient/family on patient safety including physical limitations  - Instruct patient to call for assistance with activity based on assessment  - Modify environment to reduce risk of injury  - Consider OT/PT consult to assist with strengthening/mobility  8/3/2020 1429 by Manuel Ahn RN  Outcome: Adequate for Discharge  8/3/2020 0946 by Manuel Ahn RN  Outcome: Progressing  Goal: Maintain or return to baseline ADL function  Description: INTERVENTIONS:  -  Assess patient's ability to carry out ADLs; assess patient's baseline for ADL function and identify physical deficits which impact ability to perform ADLs (bathing, care of mouth/teeth, toileting, grooming, dressing, etc )  - Assess/evaluate cause of self-care deficits   - Assess range of motion  - Assess patient's mobility; develop plan if impaired  - Assess patient's need for assistive devices and provide as appropriate  - Encourage maximum independence but intervene and supervise when necessary  - Involve family in performance of ADLs  - Assess for home care needs following discharge   - Consider OT consult to assist with ADL evaluation and planning for discharge  - Provide patient education as appropriate  8/3/2020 1429 by Manuel Ahn RN  Outcome: Adequate for Discharge  8/3/2020 0946 by Manuel Ahn RN  Outcome: Progressing  Goal: Maintain or return mobility status to optimal level  Description: INTERVENTIONS:  - Assess patient's baseline mobility status (ambulation, transfers, stairs, etc )    - Identify cognitive and physical deficits and behaviors that affect mobility  - Identify mobility aids required to assist with transfers and/or ambulation (gait belt, sit-to-stand, lift, walker, cane, etc )  - [place] fall precautions as indicated by assessment  - Record patient progress and toleration of activity level on Mobility SBAR; progress patient to next Phase/Stage  - Instruct patient to call for assistance with activity based on assessment  - Consider rehabilitation consult to assist with strengthening/weightbearing, etc   8/3/2020 1429 by Víctor Peacock RN  Outcome: Adequate for Discharge  8/3/2020 0946 by Víctor Peacock RN  Outcome: Progressing     Problem: DISCHARGE PLANNING  Goal: Discharge to home or other facility with appropriate resources  Description: INTERVENTIONS:  - Identify barriers to discharge w/patient and caregiver  - Arrange for needed discharge resources and transportation as appropriate  - Identify discharge learning needs (meds, wound care, etc )  - Arrange for interpretive services to assist at discharge as needed  - Refer to Case Management Department for coordinating discharge planning if the patient needs post-hospital services based on physician/advanced practitioner order or complex needs related to functional status, cognitive ability, or social support system  8/3/2020 1429 by Víctor Peacock RN  Outcome: Adequate for Discharge  8/3/2020 0946 by Víctor Peacock RN  Outcome: Progressing     Problem: Knowledge Deficit  Goal: Patient/family/caregiver demonstrates understanding of disease process, treatment plan, medications, and discharge instructions  Description: Complete learning assessment and assess knowledge base    Interventions:  - Provide teaching at level of understanding  - Provide teaching via preferred learning methods  8/3/2020 1429 by Víctor Peacock RN  Outcome: Adequate for Discharge  8/3/2020 0946 by Víctor Peacock RN  Outcome: Progressing     Problem: CARDIOVASCULAR - ADULT  Goal: Maintains optimal cardiac output and hemodynamic stability  Description: INTERVENTIONS:  - Monitor I/O, vital signs and rhythm  - Monitor for S/S and trends of decreased cardiac output  - Administer and titrate ordered vasoactive medications to optimize hemodynamic stability  - Assess quality of pulses, skin color and temperature  - Assess for signs of decreased coronary artery perfusion  - Instruct patient to report change in severity of symptoms  8/3/2020 1429 by John Esposito RN  Outcome: Adequate for Discharge  8/3/2020 0946 by John Esposito RN  Outcome: Progressing  Goal: Absence of cardiac dysrhythmias or at baseline rhythm  Description: INTERVENTIONS:  - Continuous cardiac monitoring, vital signs, obtain 12 lead EKG if ordered  - Administer antiarrhythmic and heart rate control medications as ordered  - Monitor electrolytes and administer replacement therapy as ordered  8/3/2020 1429 by John Esposito RN  Outcome: Adequate for Discharge  8/3/2020 0946 by John Esposito RN  Outcome: Progressing     Problem: METABOLIC, FLUID AND ELECTROLYTES - ADULT  Goal: Electrolytes maintained within normal limits  Description: INTERVENTIONS:  - Monitor labs and assess patient for signs and symptoms of electrolyte imbalances  - Administer electrolyte replacement as ordered  - Monitor response to electrolyte replacements, including repeat lab results as appropriate  - Instruct patient on fluid and nutrition as appropriate  8/3/2020 1429 by John Esposito RN  Outcome: Adequate for Discharge  8/3/2020 0946 by John Esposito RN  Outcome: Progressing  Goal: Fluid balance maintained  Description: INTERVENTIONS:  - Monitor labs   - Monitor I/O and WT  - Instruct patient on fluid and nutrition as appropriate  - Assess for signs & symptoms of volume excess or deficit  8/3/2020 1429 by John Esposito RN  Outcome: Adequate for Discharge  8/3/2020 0946 by John Esposito RN  Outcome: Progressing     Problem: MUSCULOSKELETAL - ADULT  Goal: Maintain or return mobility to safest level of function  Description: INTERVENTIONS:  - Assess patient's ability to carry out ADLs; assess patient's baseline for ADL function and identify physical deficits which impact ability to perform ADLs (bathing, care of mouth/teeth, toileting, grooming, dressing, etc )  - Assess/evaluate cause of self-care deficits   - Assess range of motion  - Assess patient's mobility  - Assess patient's need for assistive devices and provide as appropriate  - Encourage maximum independence but intervene and supervise when necessary  - Involve family in performance of ADLs  - Assess for home care needs following discharge   - Consider OT consult to assist with ADL evaluation and planning for discharge  - Provide patient education as appropriate  8/3/2020 1429 by Veronica Boyd, RN  Outcome: Adequate for Discharge  8/3/2020 0946 by Veronica Boyd, RN  Outcome: Progressing

## 2020-08-03 NOTE — ASSESSMENT & PLAN NOTE
· Patient initial BP in ED on admission 88/53  · After 1 L normal saline solution patient blood pressure 115/57  · Will continue amiodarone, Cardizem, metoprolol, Demadex  · Patient will have cardiology consult in a m    · Patient may need adjustment of blood pressure medications as patient is also chronic sinus bradycardia in the 50s

## 2020-08-03 NOTE — ASSESSMENT & PLAN NOTE
Wt Readings from Last 3 Encounters:   08/02/20 128 kg (283 lb 1 1 oz)   07/15/20 117 kg (257 lb 15 oz)   07/06/20 129 kg (285 lb)       · Education with daily weight and accurate I&O  · Continue with Demadex:  40 mg on Monday Wednesday Friday; 20 mg the other days    · Continue potassium supplementation Cardiology is on consult

## 2020-08-03 NOTE — ASSESSMENT & PLAN NOTE
· Cardiology is on consult  · Patient does have sinus bradycardia as a chronic issue  · Patient does have past medical history of atrial fibrillation  · Will continue Eliquis

## 2020-08-03 NOTE — SOCIAL WORK
CM met with pt at bedside and explained role  Pt was made aware of observation status and provided with Medicare notice and brochure regarding same  Pt reports she lives alone in a 1st floor apartment; [address], She does not use an assistive device to ambulate  She has shower bench at home  No other DME use reported  Pt reports she is completely indpendent with ADLs  She typically drives herself but has not been recently due to issues with her eyesight  Pt PCP is Kenyon Altman  She uses [facility] for medications and denies any barriers to obtaining them  Pt denies any history of HHC, STR, MH and DA& treatment  She denies any current discharge needs  She reports she may need a ride home if her daughter is unavailable  CM to follow  Patient/caregiver received discharge checklist   Content reviewed  Patient/caregiver encouraged to participate in discharge plan of care prior to discharge home  CM reviewed d/c planning process including the following: identifying help at home, patient preference for d/c planning needs, availability of treatment team to discuss questions or concerns patient and/or family may have regarding understanding medications and recognizing signs and symptoms once discharged  CM also encouraged patient to follow up with all recommended appointments after discharge  Patient advised of importance for patient and family to participate in managing patients medical well being

## 2020-08-03 NOTE — NURSING NOTE
Pt sleeping most of the night, wakes easily, denies any dizziness  IV fluids infusing  Call bell within reach

## 2020-08-03 NOTE — CONSULTS
[address]  [address]  [place], [address]  [phone]     Tax ID: [id number]      NPI: [id number]                                                Cardiology Consult  Marin Welch 70 y o  female   YOB: 1945 MRN: [id number]  Unit/Bed#: -01 Encounter: [id number]      Physician Requesting Consult: Adrianna Cali MD  Reason for Consult / Principal Problem: Vertigo / hypotension    Assessments  1  Vertigo  2  Acute hypotension  3  Persistent Atrial flutter  · H/o ablation  4  Chronic diastolic heart failure      Plan  Vertigo, hypotension, Chronic diastolic heart failure  · No evidence of acute stroke on CT, but she does have chronic b/l cerebellar infarcts  · Symptoms seem to be improving with IV fluids and resolution of hypotension  · No evidence of acute volume overload or heart failure  · Weight has been stable at 09845 lb since discharge  · Possibly dehydrated as a result of being on torsemide while not eating well over the weekend  · Her torsemide was recently increased from 40 mg - 2x/wk to 40 mg 4x/wk --> can decrease back to prior regimen: I e 20 mg x 5 day, 40 mg on Monday and Thursday with higher dose of 40 mg with weight gain > 3 lbs/day or >5 lbs/wk  · Check orthostatic vital signs  · Will defer further treatment of vertigo and need for meclizine to primary service    Persistent atrial flutter  · Remains in rate controlled atrial flutter  · Continue Cardizem  mg, metoprolol 100 mg b i d , amiodarone  · Continue Eliquis for anticoagulation    No further inpatient cardiac evaluation for now  If orthostatic vital signs negative, and able to ambulate without any significant dizziness or vertigo, then can consider discharge later today      ECG: Personally reviewed   Atypical atrial flutter with 4:1 AV block  Telemetry: Not on telemetry    History of Present Illness   HPI: Marin Welch is a 70y o  year old female who presents with a complains of vertigo and room spinning sensation  She was recently discharged from the hospital about 2 weeks ago, after treatment for acute diastolic heart failure and atrial flutter  She was doing well since discharge and was overall feeling well up until 2 days ago  D/W Dr eYe    She initially felt unwell over the weekend, and did not eat much over the weekend  On Sunday night, when she was lying in bed, she noticed a room spinning sensation while lying down  She tried to close her eyes and rest in the hopes of it going away, and possibly being able to get to Dr Catarino Rubinstein appointment the next day  She was able to doze off a couple of times, but woke up with similar symptoms  Eventually in the morning, she continued to have severe symptoms, and was barely able to stand up, prompting her to finally called EMS for which she needed to crawl due to the fear of falling  In the ED, she was ruled out for an acute stroke, and was noted to be severely hypertensive in the 80s, and as a result was started on IV fluids  He was initially also started on meclizine, and this morning she feels better  No complains of chest pain, shortness of breath, palpitations        Past Medical History:   Diagnosis Date    Acquired hypothyroidism 12/26/2018    Acute on chronic diastolic heart failure ([facility] [address] ) 8/30/2018    Anemia     Atrial fibrillation (HCC)     CHF (congestive heart failure) (HCC)     Disease of thyroid gland     History of transfusion     Hyperlipidemia     Hypertension     Migraine     Polyp of sigmoid colon     Prediabetes     Renal disorder     [facility] 2011     Past Surgical History:   Procedure Laterality Date    APPENDECTOMY      CARDIAC ELECTROPHYSIOLOGY STUDY AND ABLATION      CHOLECYSTECTOMY      COLONOSCOPY       Family History   Problem Relation Age of Onset    Diabetes Mother     Heart disease Mother     Hypertension Mother     Arthritis Mother     Coronary artery disease Mother     Drug abuse Paternal Grandmother      Meds/Allergies   all current active meds have been reviewed and current meds:   Current Facility-Administered Medications   Medication Dose Route Frequency    acetaminophen (TYLENOL) tablet 650 mg  650 mg Oral Q6H PRN    amiodarone tablet 200 mg  200 mg Oral Daily    apixaban (ELIQUIS) tablet 5 mg  5 mg Oral BID    diltiazem (CARDIZEM CD) 24 hr capsule 240 mg  240 mg Oral Daily    escitalopram (LEXAPRO) tablet 10 mg  10 mg Oral Daily    gabapentin (NEURONTIN) capsule 100 mg  100 mg Oral TID    levothyroxine tablet 88 mcg  88 mcg Oral Early Morning    LORazepam (ATIVAN) tablet 1 mg  1 mg Oral TID PRN    meclizine (ANTIVERT) tablet 25 mg  25 mg Oral Q8H [address]    metoprolol tartrate (LOPRESSOR) tablet 100 mg  100 mg Oral Q12H PÉREZ    nortriptyline (PAMELOR) capsule 50 mg  50 mg Oral Daily    ondansetron (ZOFRAN) injection 4 mg  4 mg Intravenous Q6H PRN    oxyCODONE-acetaminophen (PERCOCET) 5-325 mg per tablet 1 tablet  1 tablet Oral Q4H PRN    potassium chloride (K-DUR,KLOR-CON) CR tablet 20 mEq  20 mEq Oral BID    pravastatin (PRAVACHOL) tablet 20 mg  20 mg Oral Daily With Dinner    sodium chloride 0 9 % infusion  125 mL/hr Intravenous Continuous    [START ON 8/4/2020] torsemide (DEMADEX) tablet 20 mg  20 mg Oral Once per day on Sun Tue Thu Sat    torsemide [facility]) tablet 40 mg  40 mg Oral Once per day on Mon Wed Fri     Medications Prior to Admission   Medication    amiodarone 200 mg tablet    apixaban (Eliquis) 5 mg    diltiazem (CARDIZEM CD) 240 mg 24 hr capsule    escitalopram (LEXAPRO) 10 mg tablet    gabapentin (NEURONTIN) 100 mg capsule    levothyroxine 88 mcg tablet    LORazepam (ATIVAN) 1 mg tablet    meclizine (ANTIVERT) 25 mg tablet    metoprolol tartrate (LOPRESSOR) 100 mg tablet    Multiple Vitamin (MULTIVITAMIN) tablet    nortriptyline (PAMELOR) 50 mg capsule    oxyCODONE-acetaminophen (PERCOCET) 5-325 mg per tablet    potassium chloride (K-DUR,KLOR-CON) 20 mEq tablet    simvastatin (ZOCOR) 10 mg tablet    torsemide (DEMADEX) 20 mg tablet     No Known Allergies  Social History     Socioeconomic History    Marital status:      Spouse name: None    Number of children: None    Years of education: None    Highest education level: None   Occupational History    None   Social Needs    Financial resource strain: None    Food insecurity     Worry: None     Inability: None    Transportation needs     Medical: None     Non-medical: None   Tobacco Use    Smoking status: Former Smoker     Types: Cigarettes     Last attempt to quit: 2011     Years since quittin 4    Smokeless tobacco: Never Used   Substance and Sexual Activity    Alcohol use: Never     Alcohol/week: 0 0 standard drinks     Frequency: Never     Binge frequency: Never    Drug use: No    Sexual activity: Not Currently   Lifestyle    Physical activity     Days per week: None     Minutes per session: None    Stress: None   Relationships    Social connections     Talks on phone: None     Gets together: None     Attends Caodaism service: None     Active member of club or organization: None     Attends meetings of clubs or organizations: None     Relationship status: None    Intimate partner violence     Fear of current or ex partner: None     Emotionally abused: None     Physically abused: None     Forced sexual activity: None   Other Topics Concern    None   Social History Narrative    fhx not asked in triage         Review of Systems   Neurological: Positive for dizziness  Negative for syncope  All other systems reviewed and are negative          Vitals:    20 2244 20 2352 20 0600 20 0729   BP: 138/76 141/59  140/63   BP Location: Left arm Right arm  Left arm   Pulse: 65 57  55   Resp: 18 18  18   Temp: (!) 97 °F (36 1 °C) (!) 97 2 °F (36 2 °C)  (!) 97 2 °F (36 2 °C)   TempSrc: Temporal Temporal  Temporal   SpO2: 96% 97%  94%   Weight: 128 kg (283 lb 1 1 oz)  127 kg (280 lb 13 9 oz)    Height: 5' 7"        Orthostatic Blood Pressures      Most Recent Value   Blood Pressure  140/63 filed at 08/03/2020 0729   Patient Position - Orthostatic VS  Lying filed at 08/03/2020 0729        Body mass index is 43 99 kg/m²  Wt Readings from Last 5 Encounters:   08/03/20 127 kg (280 lb 13 9 oz)   07/15/20 117 kg (257 lb 15 oz)   07/06/20 129 kg (285 lb)   05/29/20 124 kg (272 lb 14 9 oz)   05/04/20 120 kg (264 lb 1 8 oz)     I/O last 3 completed shifts: In: 1000 [I V :1000]  Out: -       Physical Exam   Constitutional: She is oriented to person, place, and time  She appears well-developed  No distress  HENT:   Head: Normocephalic and atraumatic  Eyes: Conjunctivae are normal  No scleral icterus  Neck: No JVD present  Cardiovascular: Normal rate, regular rhythm and normal heart sounds  Exam reveals no gallop and no friction rub  No murmur heard  Pulmonary/Chest: Effort normal and breath sounds normal  No respiratory distress  She has no rales  Abdominal: Soft  She exhibits no distension  There is no abdominal tenderness  Musculoskeletal:      Right lower leg: No edema  Left lower leg: No edema  Neurological: She is alert and oriented to person, place, and time  Skin: Skin is warm  Psychiatric: Her behavior is normal    Nursing note and vitals reviewed          Labs:  Results from last 7 days   Lab Units 08/03/20  0628 08/02/20  1717   WBC Thousand/uL 6 50 7 10   HEMOGLOBIN g/dL 9 5* 9 7*   HEMATOCRIT % 30 7* 30 7*   RDW % 19 7* 19 4*   PLATELETS Thousands/uL 275 291     Results from last 7 days   Lab Units 08/03/20  0628 08/02/20  1717   POTASSIUM mmol/L 2 9* 3 6   CHLORIDE mmol/L 108* 105   CO2 mmol/L 25 20*   BUN mg/dL 19 23   CREATININE mg/dL 1 30* 1 33*   CALCIUM mg/dL 8 4* 8 5*   AST U/L  --  31   ALT U/L  --  16   ALK PHOS U/L  --  80     Results from last 7 days   Lab Units 08/02/20  1717   TROPONIN I ng/mL <0 03                     Imaging: X-ray Chest 1 View Portable    Result Date: 7/14/2020  Narrative: CHEST INDICATION:   chest pain  COMPARISON:  Chest radiograph from 5/2/2020 and chest CT from 7/6/2020  EXAM PERFORMED/VIEWS:  XR CHEST PORTABLE FINDINGS: Mild cardiomegaly  Worsening opacity in the right upper lung compared with the recent chest CT  No effusion or pneumothorax  Osseous structures appear within normal limits for patient age  Impression: Worsening opacity in the right upper lung since the chest CT from 7/6/2020, likely pneumonia  The study was marked in [facility] for immediate notification  Workstation performed: [id number]     Ct Head Without Contrast    Result Date: 8/2/2020  Narrative: CT BRAIN - WITHOUT CONTRAST INDICATION:   Dizziness  COMPARISON:  5/29/2020  TECHNIQUE:  CT examination of the brain was performed  In addition to axial images, coronal 2D reformatted images were created and submitted for interpretation  Radiation dose length product (DLP) for this visit:  0699 646 28 85 mGy-cm   This examination, like all CT scans performed in the [facility], was performed utilizing techniques to minimize radiation dose exposure, including the use of iterative reconstruction and automated exposure control  IMAGE QUALITY:  Diagnostic  FINDINGS: PARENCHYMA:  Small, chronic infarct in the left posterior temporal occipital region  Chronic infarcts in the bilateral cerebellar hemispheres  No acute intracranial hemorrhage or mass effect  VENTRICLES AND EXTRA-AXIAL SPACES:  No hydrocephalus or extra-axial collection  VISUALIZED ORBITS AND PARANASAL SINUSES:  Intact globes and orbits  Visualized paranasal sinuses are clear  CALVARIUM AND EXTRACRANIAL SOFT TISSUES:  No lytic or blastic lesion or soft tissue mass  Impression: 1  Chronic infarcts in the bilateral cerebellar hemispheres  If there is concern for acute infarct, MRI may be helpful   2   No acute intracranial hemorrhage or mass effect  Workstation performed: [id number]     Cta Ed Chest Pe Study    Result Date: 7/6/2020  Narrative: CTA - CHEST WITH IV CONTRAST - PULMONARY ANGIOGRAM INDICATION:   PE suspected, high pretest prob  COMPARISON: The 2nd 2020 TECHNIQUE: CTA examination of the chest was performed using angiographic technique according to a protocol specifically tailored to evaluate for pulmonary embolism  Axial, sagittal, and coronal 2D reformatted images were created from the source data and  submitted for interpretation  In addition, coronal 3D MIP postprocessing was performed on the acquisition scanner  Radiation dose length product (DLP) for this visit:  708 mGy-cm   This examination, like all CT scans performed in the [facility], was performed utilizing techniques to minimize radiation dose exposure, including the use of iterative reconstruction and automated exposure control  IV Contrast:  100 mL of iohexol (OMNIPAQUE)  FINDINGS: PULMONARY ARTERIAL TREE:  No evidence of pulmonary embolus LUNGS:  The trachea and central bronchial tree are patent  Scattered airspace opacities are seen within the lungs  PLEURA:  Unremarkable  HEART/GREAT VESSELS:  Unremarkable for patient's age  MEDIASTINUM AND JESSE:  Mediastinal lymph nodes measuring up to 2 2 cm is seen  CHEST WALL AND LOWER NECK:   Unremarkable  VISUALIZED STRUCTURES IN THE UPPER ABDOMEN:  Status post cholecystectomy  Fatty infiltration of the liver is seen  OSSEOUS STRUCTURES:  No acute fracture or destructive osseous lesion  Impression: No evidence of pulmonary embolus Scattered airspace opacities within the lungs which may represent infection    Short-term follow-up chest CT scan in 3 months is recommended to evaluate for resolution Mediastinal lymphadenopathy Workstation performed: [id number]       Cardiac testing:   Results for orders placed during the hospital encounter of 05/02/20   Echo complete with contrast if indicated    Narrative St  [address] [place] [address]    Transthoracic Echocardiogram  2D, M-mode, Doppler, and Color Doppler    Study date:  04-May-2020    Patient: Ela Ma  MR number: [id number]  Account number: [de-identified]  : 1945  Age: 69 years  Gender: Female  Status: Inpatient  Location: [facility]   Height: 67 in  Weight: 284 5 lb  BP: 140/ 80 mmHg    Indications: Heart Failure    Diagnoses: I50 9 - Heart failure, unspecified    Sonographer:  Lor Ferrera RDCS  Primary Physician:  Fannie Kwong MD  Referring Physician:  Dirk Kawasaki, CRNP  Group:  [facility]  Interpreting Physician:  Carol Ceron MD    SUMMARY    PROCEDURE INFORMATION:  This was a technically difficult study  LEFT VENTRICLE:  Systolic function was normal  Ejection fraction was estimated to be 60 %  Although no diagnostic regional wall motion abnormality was identified, this possibility cannot be completely excluded on the basis of this study  The changes were consistent with concentric remodeling (increased wall thickness with normal wall mass)  AORTIC VALVE:  There was mild regurgitation  HISTORY: PRIOR HISTORY: Atrial Fibrillation, HTN, Acute Respiratory Distress, Stage 3 Kidney Disease    PROCEDURE: The study was performed in the [facility]  This was a routine study  The transthoracic approach was used  The study included complete 2D imaging, M-mode, complete spectral Doppler, and color Doppler  The  heart rate was 80 bpm, at the start of the study  This was a technically difficult study  LEFT VENTRICLE: Size was normal  Systolic function was normal  Ejection fraction was estimated to be 60 %  Although no diagnostic regional wall motion abnormality was identified, this possibility cannot be completely excluded on the basis  of this study  Wall thickness was mildly increased   The changes were consistent with concentric remodeling (increased wall thickness with normal wall mass)  DOPPLER: Left ventricular diastolic function parameters were normal     RIGHT VENTRICLE: The size was normal  Systolic function was normal  Wall thickness was normal     LEFT ATRIUM: The atrium was mildly dilated  RIGHT ATRIUM: Size was normal     MITRAL VALVE: Valve structure was normal  There was normal leaflet separation  DOPPLER: The transmitral velocity was within the normal range  There was no evidence for stenosis  There was no significant regurgitation  AORTIC VALVE: The valve was probably trileaflet  Leaflets exhibited normal thickness and normal cuspal separation  DOPPLER: Transaortic velocity was within the normal range  There was no evidence for stenosis  There was mild regurgitation  TRICUSPID VALVE: The valve structure was normal  There was normal leaflet separation  DOPPLER: The transtricuspid velocity was within the normal range  There was no evidence for stenosis  There was trace regurgitation  Pulmonary artery  systolic pressure was within the normal range  PULMONIC VALVE: DOPPLER: The transpulmonic velocity was within the normal range  There was no significant regurgitation  PERICARDIUM: There was no pericardial effusion  The pericardium was normal in appearance  AORTA: The root exhibited normal size  SYSTEMIC VEINS: IVC: The inferior vena cava was not well visualized  The inferior vena cava was normal in size      SYSTEM MEASUREMENT TABLES    2D  %FS: 59 67 %  AV Diam: 3 52 cm  EDV(Teich): 126 06 ml  EF(Teich): 88 94 %  ESV(Teich): 13 94 ml  IVSd: 0 91 cm  LA Area: 39 81 cm2  LA Diam: 4 59 cm  LVEDV MOD A4C: 125 73 ml  LVEF MOD A4C: 63 61 %  LVESV MOD A4C: 45 75 ml  LVIDd: 5 14 cm  LVIDs: 2 07 cm  LVLd A4C: 8 68 cm  LVLs A4C: 8 11 cm  LVPWd: 0 96 cm  RA Area: 24 94 cm2  RVOT Diam: 2 87 cm  SI(Teich): 47 71 ml/m2  SV MOD A4C: 79 97 ml  SV(Cube): 126 87 ml  SV(Teich): 112 12 ml    CW  AV Vmax: 1 4 m/s  AV maxP 85 mmHg  PV Vmax: 1 19 m/s  PV maxP 64 mmHg  TR Vmax: 2 77 m/s  TR maxP 75 mmHg    MM  TAPSE: 1 95 cm    PW  LVOT Env  Ti: 399 26 ms  LVOT VTI: 27 48 cm  LVOT Vmax: 1 m/s  LVOT Vmean: 0 69 m/s  LVOT maxP 02 mmHg  LVOT meanP 24 mmHg  MV A Rahul: 1 51 m/s  MV Dec [place]: 4 8 m/s2  MV DecT: 303 1 ms  MV E Rahul: 1 45 m/s  MV E/A Ratio: 0 96  PVA (Vmax): 5 31 cm2  RVOT Vmax: 0 98 m/s  RVOT maxPG: 3 81 mmHg    [employer] Accredited Echocardiography Laboratory    Prepared and electronically signed by    Den Sim MD  Signed 04-May-2020 12:17:25       Results for orders placed during the hospital encounter of 18   LARRY    Narrative [facility]  [facility], [address] [facility]  [phone]    Transesophageal Echocardiogram  2D, Doppler, and Color Doppler    Study date:  2018    Patient: Mark Lynn  MR number: [id number]  Account number: [de-identified]  : 1945  Age: 81 years  Gender: Female  Status: Outpatient  Location: Cath lab  Height: 63 in  Weight: 246 lb  BP: 123/ 67 mmHg    Indications: Atrial Fibrillation    Diagnoses: I48 0 - Atrial fibrillation    Sonographer:  HALLE Holt, RDCS  Primary Physician:  Simon Thibodeaux MD  Referring Physician:  Severino Saldana DO  Group:  [facility]  Interpreting Physician:  Severino Saldana DO    SUMMARY    LEFT VENTRICLE:  Systolic function was normal  Ejection fraction was estimated in the range of 55 % to 65 %  There were no regional wall motion abnormalities  LEFT ATRIAL APPENDAGE:  smoke present but no organized thrombus    ATRIAL SEPTUM:  No defect or patent foramen ovale was identified  Contrast injection was performed  There was no right-to-left shunt, with provocative maneuvers to increase right atrial pressure  MITRAL VALVE:  There was moderate regurgitation  AORTIC VALVE:  There was mild regurgitation      HISTORY: PRIOR HISTORY: Atrial Fibrillation, Shortness of Breath, CHF, Hypertension, Hyperlipidemia, Respiratory Failure    PROCEDURE: The procedure was performed in the catheterization laboratory  This was a routine study  The risks and alternatives of the procedure were explained to the patient and informed consent was obtained  The transesophageal approach  was used  The study included complete 2D imaging, complete spectral Doppler, and color Doppler  The heart rate was 67 bpm, at the start of the study  An adult omniplane probe was inserted by the attending cardiologist  Intubated with ease  One intubation attempt(s)  There was no blood detected on the probe  Image quality was adequate  There were no complications during the procedure  MEDICATIONS: Anesthesia administered by anesthesia team     LEFT VENTRICLE: Size was normal  Systolic function was normal  Ejection fraction was estimated in the range of 55 % to 65 %  There were no regional wall motion abnormalities  Wall thickness was normal  No evidence of apical thrombus  RIGHT VENTRICLE: The size was normal  Systolic function was normal  Wall thickness was normal     LEFT ATRIUM: Size was normal  APPENDAGE: smoke present but no organized thrombus The size was normal     ATRIAL SEPTUM: No defect or patent foramen ovale was identified  Contrast injection was performed  There was no right-to-left shunt, with provocative maneuvers to increase right atrial pressure  RIGHT ATRIUM: Size was normal     MITRAL VALVE: Valve structure was normal  There was normal leaflet separation  DOPPLER: There was no evidence for stenosis  There was moderate regurgitation  AORTIC VALVE: The valve was trileaflet  Leaflets exhibited normal thickness and normal cuspal separation  DOPPLER: There was no evidence for stenosis  There was mild regurgitation  TRICUSPID VALVE: The valve structure was normal  There was normal leaflet separation  DOPPLER: There was no significant regurgitation      PULMONIC VALVE: Not well visualized  PERICARDIUM: There was no pericardial effusion  The pericardium was normal in appearance  [address] Accredited Echocardiography Laboratory    Prepared and electronically signed by    Alva Serrano DO  Signed 29-Jan-2018 18:47:26       No results found for this or any previous visit  No results found for this or any previous visit  Counseling / Coordination of Care  Total floor / unit time spent today 35 minutes  Greater than 50% of total time was spent with the patient and / or family counseling and / or coordination of care  A description of the counseling / coordination of care: Obtained history, performed physical examination, discussed laboratory and imaging results, and explained further plan of care  Dionne Solomon

## 2020-08-03 NOTE — ASSESSMENT & PLAN NOTE
· Patient baseline creatinine of 1 30 - creatinine is at baseline at time of discharge  · Will need periodic outpatient BMP monitoring via her primary care physician

## 2020-08-03 NOTE — UTILIZATION REVIEW
Initial Clinical Review    Admission: Date/Time/Statement:   Admission Orders (From admission, onward)     Ordered        08/02/20 2201  Place in Observation (expected length of stay for this patient is less than two midnights)  Once                   Orders Placed This Encounter   Procedures    Place in Observation (expected length of stay for this patient is less than two midnights)     Standing Status:   Standing     Number of Occurrences:   1     Order Specific Question:   Admitting Physician     Answer:   Racquel Griffin [[id number]]     Order Specific Question:   Level of Care     Answer:   Med Surg [[id number]]     ED Arrival Information     Expected Arrival Acuity Means of Arrival Escorted By Service Admission Type    - 8/2/2020 16:34 Urgent Ambulance [facility] General Medicine Urgent    Arrival Complaint    dizzy        Chief Complaint   Patient presents with    Dizziness     states she started to feel dizzy last night like the room was spinning, intermittent SOB     Weakness - Generalized     Assessment/Plan:   72-year-old female presents complaining of vertigo  Patient states she is very lightheaded and dizzy  She is also complaining of shortness of breath and some chest tightness  She has the vertigo started this morning  On initial evaluation her blood pressure systolic is however in between 4141 diastolic in the 33C and low 60s  She denies any facial droop slurred speech hemiparesis  She had shortness of breath and chest tightness only when getting into the ambulance it lasted a few seconds she states it resolved on the ride here and does not have at this time  She has had no abdominal pain nausea vomiting    She denies fever chills rigors or cough  Vertigo, benign paroxysmal, bilateral  Assessment & Plan  · Patient with chronic history of dizziness/vertigo  · Patient states she had no more Antivert at home  · Patient states positive nausea however, no vomiting or diarrhea  · Room was spinning  · Start meclizine Q 8 hours around the clock  · Consult to cardiology  · Continue IV fluids     Essential hypertension  Assessment & Plan  · Patient initial BP in ED on admission 88/53  · After 1 L normal saline solution patient blood pressure 115/57  · Will continue amiodarone, Cardizem, metoprolol, Demadex  · Patient will have cardiology consult in a m    · Patient may need adjustment of blood pressure medications as patient is also chronic sinus bradycardia in the 50s    ED Triage Vitals [08/02/20 1639]   Temperature Pulse Respirations Blood Pressure SpO2   99 °F (37 2 °C) 56 20 (!) 88/53 99 %      Temp Source Heart Rate Source Patient Position - Orthostatic VS BP Location FiO2 (%)   Temporal Monitor Sitting Right arm --      Pain Score       7          Wt Readings from Last 1 Encounters:   08/03/20 127 kg (280 lb 13 9 oz)     Additional Vital Signs:   Date/Time   Temp   Pulse   Resp   BP   MAP (mmHg)   SpO2   O2 Device   Patient Position - Orthostatic VS    08/03/20 0729   97 2 °F (36 2 °C)Abnormal     55   18   140/63      94 %   None (Room air)   Lying    08/02/20 2352   97 2 °F (36 2 °C)Abnormal     57   18   141/59   85   97 %   None (Room air)   Lying    08/02/20 2244   97 °F (36 1 °C)Abnormal     65   18   138/76   99   96 %   None (Room air)   Lying    08/02/20 2200      55   16   131/59   85   98 %   None (Room air)   Sitting    08/02/20 2145      54Abnormal     15   134/60   86   97 %   None (Room air)   Sitting    08/02/20 2045      54Abnormal     14   115/57   82   97 %   None (Room air)   Sitting      Pertinent Labs/Diagnostic Test Results:   Results from last 7 days   Lab Units 08/03/20  0628 08/02/20  1717   WBC Thousand/uL 6 50 7 10   HEMOGLOBIN g/dL 9 5* 9 7*   HEMATOCRIT % 30 7* 30 7*   PLATELETS Thousands/uL 275 291   NEUTROS ABS Thousands/µL 3 20 4 50     Results from last 7 days   Lab Units 08/02/20  1717   SODIUM mmol/L 137   POTASSIUM mmol/L 3 6   CHLORIDE mmol/L 105   CO2 mmol/L 20*   ANION GAP mmol/L 12   BUN mg/dL 23   CREATININE mg/dL 1 33*   EGFR ml/min/1 73sq m 40   CALCIUM mg/dL 8 5*     Results from last 7 days   Lab Units 08/02/20  1717   AST U/L 31   ALT U/L 16   ALK PHOS U/L 80   TOTAL PROTEIN g/dL 6 5   ALBUMIN g/dL 3 6   TOTAL BILIRUBIN mg/dL 0 40     Results from last 7 days   Lab Units 08/02/20  1717   GLUCOSE RANDOM mg/dL 126*     Results from last 7 days   Lab Units 08/02/20  1717   TROPONIN I ng/mL <0 03     Results from last 7 days   Lab Units 08/02/20  1717   D-DIMER QUANTITATIVE ug/ml FEU 0 30     Results from last 7 days   Lab Units 08/02/20  2133   CLARITY UA  Cloudy*   COLOR UA  Yellow   SPEC GRAV UA  1 015   PH UA  6 0   GLUCOSE UA mg/dl Negative   KETONES UA mg/dl Negative   BLOOD UA  Negative   PROTEIN UA mg/dl Negative   NITRITE UA  Negative   BILIRUBIN UA  Negative   UROBILINOGEN UA E U /dl 0 2   LEUKOCYTES UA  Negative     8/2  Ct head= 1  Chronic infarcts in the bilateral cerebellar hemispheres  If there is concern for acute infarct, MRI may be helpful  2   No acute intracranial hemorrhage or mass effect    Ekg=Rhythm: A-V block and atrial flutter    Ectopy:     Ectopy: none    QRS:     QRS axis:  Normal  Conduction:     Conduction: abnormal      Abnormal conduction: 1st degree    T waves:     T waves: non-specific    Q waves:     Q waves:  III and aVF  Other findings:     Other findings: LVH       ED Treatment:   Medication Administration from 08/02/2020 1634 to 08/02/2020 2251       Date/Time Order Dose Route Action     08/02/2020 1649 ondansetron (FOR EMS ONLY) (ZOFRAN) 4 mg/2 mL injection 4 mg 0 mg Does not apply Given to EMS     08/02/2020 1717 sodium chloride 0 9 % infusion 125 mL/hr Intravenous New Bag        Past Medical History:   Diagnosis Date    Acquired hypothyroidism 12/26/2018    Acute on chronic diastolic heart failure ([facility] [address] ) 8/30/2018    Anemia     Atrial fibrillation (HCC)     CHF (congestive heart failure) ([facility] [address] )     Disease of thyroid gland     History of transfusion     Hyperlipidemia     Hypertension     Migraine     Polyp of sigmoid colon     Prediabetes     Renal disorder     Stroke ([facility] [address] ) 2011     Present on Admission:   Vertigo, benign paroxysmal, bilateral   Chronic diastolic congestive heart failure (HCC)   Anemia due to stage 3 chronic kidney disease (HCC)   Essential hypertension   Hypercholesterolemia   Mild episode of recurrent major depressive disorder (HCC)   Acquired hypothyroidism   Prediabetes    Admitting Diagnosis: Dizziness [R42]  Vertigo [R42]  Weakness [R53 1]  History of prior ablation treatment [Z98 890]  Vertigo, benign paroxysmal, bilateral [H81 13]  Age/Sex: 70 y o  female  Admission Orders:  Consult cardio  scd    Scheduled Medications:  amiodarone, 200 mg, Oral, Daily  apixaban, 5 mg, Oral, BID  diltiazem, 240 mg, Oral, Daily  escitalopram, 10 mg, Oral, Daily  gabapentin, 100 mg, Oral, TID  levothyroxine, 88 mcg, Oral, Early Morning  meclizine, 25 mg, Oral, Q8H PÉREZ  metoprolol tartrate, 100 mg, Oral, Q12H PÉREZ  nortriptyline, 50 mg, Oral, Daily  potassium chloride, 20 mEq, Oral, BID  pravastatin, 20 mg, Oral, Daily With Dinner  [START ON 8/4/2020] torsemide, 20 mg, Oral, Once per day on Sun Tue Thu Sat  torsemide, 40 mg, Oral, Once per day on Mon Wed Fri    Continuous IV Infusions:  sodium chloride, 125 mL/hr, Intravenous, Continuous    PRN Meds:  acetaminophen, 650 mg, Oral, Q6H PRN  LORazepam, 1 mg, Oral, TID PRN  ondansetron, 4 mg, Intravenous, Q6H PRN  oxyCODONE-acetaminophen, 1 tablet, Oral, Q4H PRN    Network Utilization Review Department  [email or web address]  ATTENTION: Please call with any questions or concerns to [phone] and carefully listen to the prompts so that you are directed to the right person   All voicemails are confidential   Davidson Iqbal all requests for admission clinical reviews, approved or denied determinations and any other requests to dedicated fax number below belonging to the campus where the patient is receiving treatment   List of dedicated fax numbers for the Facilities:  [address] [place] [address] DENIALS (Administrative/Medical Necessity) [phone]   [address]  (Maternity/NICU/Pediatrics) [phone]   Catie Head [phone]   [facility] [phone]   [facility] [place] [phone]   Karenann Deal [phone]   [address] [place] [address] [phone]   [facility]  [phone]   [address] [facility] [address]  [address] [facility] [address] [facility] [phone]

## 2020-08-03 NOTE — NURSING NOTE
Pt admitted to room 114-1  Alert and oriented, pleasant and cooperative  Heart is regular, no edema noted  Lungs are clear on room air, denies sob, denies cough  States she was weak and dizzy at home, 'I ran out of meclizine', currently denies any dizzyness or weakness  IV fluids running, held lopressor, other meds given per order  Advised pt to use call bell due to scds and iv pole  Hourly rounding discussed, call bell within reach

## 2020-08-03 NOTE — PLAN OF CARE
Problem: Potential for Falls  Goal: Patient will remain free of falls  Description: INTERVENTIONS:  - Assess patient frequently for physical needs  -  Identify cognitive and physical deficits and behaviors that affect risk of falls    -  [place] fall precautions as indicated by assessment   - Educate patient/family on patient safety including physical limitations  - Instruct patient to call for assistance with activity based on assessment  - Modify environment to reduce risk of injury  - Consider OT/PT consult to assist with strengthening/mobility  Outcome: Progressing     Problem: PAIN - ADULT  Goal: Verbalizes/displays adequate comfort level or baseline comfort level  Description: Interventions:  - Encourage patient to monitor pain and request assistance  - Assess pain using appropriate pain scale  - Administer analgesics based on type and severity of pain and evaluate response  - Implement non-pharmacological measures as appropriate and evaluate response  - Consider cultural and social influences on pain and pain management  - Notify physician/advanced practitioner if interventions unsuccessful or patient reports new pain  Outcome: Progressing     Problem: INFECTION - ADULT  Goal: Absence or prevention of progression during hospitalization  Description: INTERVENTIONS:  - Assess and monitor for signs and symptoms of infection  - Monitor lab/diagnostic results  - Monitor all insertion sites, i e  indwelling lines, tubes, and drains  - Monitor endotracheal if appropriate and nasal secretions for changes in amount and color  - [place] appropriate cooling/warming therapies per order  - Administer medications as ordered  - Instruct and encourage patient and family to use good hand hygiene technique  - Identify and instruct in appropriate isolation precautions for identified infection/condition  Outcome: Progressing  Goal: Absence of fever/infection during neutropenic period  Description: INTERVENTIONS:  - Monitor WBC    Outcome: Progressing     Problem: SAFETY ADULT  Goal: Patient will remain free of falls  Description: INTERVENTIONS:  - Assess patient frequently for physical needs  -  Identify cognitive and physical deficits and behaviors that affect risk of falls    -  [place] fall precautions as indicated by assessment   - Educate patient/family on patient safety including physical limitations  - Instruct patient to call for assistance with activity based on assessment  - Modify environment to reduce risk of injury  - Consider OT/PT consult to assist with strengthening/mobility  Outcome: Progressing  Goal: Maintain or return to baseline ADL function  Description: INTERVENTIONS:  -  Assess patient's ability to carry out ADLs; assess patient's baseline for ADL function and identify physical deficits which impact ability to perform ADLs (bathing, care of mouth/teeth, toileting, grooming, dressing, etc )  - Assess/evaluate cause of self-care deficits   - Assess range of motion  - Assess patient's mobility; develop plan if impaired  - Assess patient's need for assistive devices and provide as appropriate  - Encourage maximum independence but intervene and supervise when necessary  - Involve family in performance of ADLs  - Assess for home care needs following discharge   - Consider OT consult to assist with ADL evaluation and planning for discharge  - Provide patient education as appropriate  Outcome: Progressing  Goal: Maintain or return mobility status to optimal level  Description: INTERVENTIONS:  - Assess patient's baseline mobility status (ambulation, transfers, stairs, etc )    - Identify cognitive and physical deficits and behaviors that affect mobility  - Identify mobility aids required to assist with transfers and/or ambulation (gait belt, sit-to-stand, lift, walker, cane, etc )  - [place] fall precautions as indicated by assessment  - Record patient progress and toleration of activity level on Mobility SBAR; progress patient to next Phase/Stage  - Instruct patient to call for assistance with activity based on assessment  - Consider rehabilitation consult to assist with strengthening/weightbearing, etc   Outcome: Progressing     Problem: DISCHARGE PLANNING  Goal: Discharge to home or other facility with appropriate resources  Description: INTERVENTIONS:  - Identify barriers to discharge w/patient and caregiver  - Arrange for needed discharge resources and transportation as appropriate  - Identify discharge learning needs (meds, wound care, etc )  - Arrange for interpretive services to assist at discharge as needed  - Refer to Case Management Department for coordinating discharge planning if the patient needs post-hospital services based on physician/advanced practitioner order or complex needs related to functional status, cognitive ability, or social support system  Outcome: Progressing     Problem: Knowledge Deficit  Goal: Patient/family/caregiver demonstrates understanding of disease process, treatment plan, medications, and discharge instructions  Description: Complete learning assessment and assess knowledge base    Interventions:  - Provide teaching at level of understanding  - Provide teaching via preferred learning methods  Outcome: Progressing     Problem: CARDIOVASCULAR - ADULT  Goal: Maintains optimal cardiac output and hemodynamic stability  Description: INTERVENTIONS:  - Monitor I/O, vital signs and rhythm  - Monitor for S/S and trends of decreased cardiac output  - Administer and titrate ordered vasoactive medications to optimize hemodynamic stability  - Assess quality of pulses, skin color and temperature  - Assess for signs of decreased coronary artery perfusion  - Instruct patient to report change in severity of symptoms  Outcome: Progressing  Goal: Absence of cardiac dysrhythmias or at baseline rhythm  Description: INTERVENTIONS:  - Continuous cardiac monitoring, vital signs, obtain 12 lead EKG if ordered  - Administer antiarrhythmic and heart rate control medications as ordered  - Monitor electrolytes and administer replacement therapy as ordered  Outcome: Progressing     Problem: METABOLIC, FLUID AND ELECTROLYTES - ADULT  Goal: Electrolytes maintained within normal limits  Description: INTERVENTIONS:  - Monitor labs and assess patient for signs and symptoms of electrolyte imbalances  - Administer electrolyte replacement as ordered  - Monitor response to electrolyte replacements, including repeat lab results as appropriate  - Instruct patient on fluid and nutrition as appropriate  Outcome: Progressing  Goal: Fluid balance maintained  Description: INTERVENTIONS:  - Monitor labs   - Monitor I/O and WT  - Instruct patient on fluid and nutrition as appropriate  - Assess for signs & symptoms of volume excess or deficit  Outcome: Progressing     Problem: MUSCULOSKELETAL - ADULT  Goal: Maintain or return mobility to safest level of function  Description: INTERVENTIONS:  - Assess patient's ability to carry out ADLs; assess patient's baseline for ADL function and identify physical deficits which impact ability to perform ADLs (bathing, care of mouth/teeth, toileting, grooming, dressing, etc )  - Assess/evaluate cause of self-care deficits   - Assess range of motion  - Assess patient's mobility  - Assess patient's need for assistive devices and provide as appropriate  - Encourage maximum independence but intervene and supervise when necessary  - Involve family in performance of ADLs  - Assess for home care needs following discharge   - Consider OT consult to assist with ADL evaluation and planning for discharge  - Provide patient education as appropriate  Outcome: Progressing

## 2020-08-03 NOTE — PLAN OF CARE
Problem: Potential for Falls  Goal: Patient will remain free of falls  Description: INTERVENTIONS:  - Assess patient frequently for physical needs  -  Identify cognitive and physical deficits and behaviors that affect risk of falls    -  [place] fall precautions as indicated by assessment   - Educate patient/family on patient safety including physical limitations  - Instruct patient to call for assistance with activity based on assessment  - Modify environment to reduce risk of injury  - Consider OT/PT consult to assist with strengthening/mobility  Outcome: Progressing     Problem: PAIN - ADULT  Goal: Verbalizes/displays adequate comfort level or baseline comfort level  Description: Interventions:  - Encourage patient to monitor pain and request assistance  - Assess pain using appropriate pain scale  - Administer analgesics based on type and severity of pain and evaluate response  - Implement non-pharmacological measures as appropriate and evaluate response  - Consider cultural and social influences on pain and pain management  - Notify physician/advanced practitioner if interventions unsuccessful or patient reports new pain  Outcome: Progressing     Problem: INFECTION - ADULT  Goal: Absence or prevention of progression during hospitalization  Description: INTERVENTIONS:  - Assess and monitor for signs and symptoms of infection  - Monitor lab/diagnostic results  - Monitor all insertion sites, i e  indwelling lines, tubes, and drains  - Monitor endotracheal if appropriate and nasal secretions for changes in amount and color  - [place] appropriate cooling/warming therapies per order  - Administer medications as ordered  - Instruct and encourage patient and family to use good hand hygiene technique  - Identify and instruct in appropriate isolation precautions for identified infection/condition  Outcome: Progressing     Problem: SAFETY ADULT  Goal: Patient will remain free of falls  Description: INTERVENTIONS:  - Assess patient frequently for physical needs  -  Identify cognitive and physical deficits and behaviors that affect risk of falls    -  [place] fall precautions as indicated by assessment   - Educate patient/family on patient safety including physical limitations  - Instruct patient to call for assistance with activity based on assessment  - Modify environment to reduce risk of injury  - Consider OT/PT consult to assist with strengthening/mobility  Outcome: Progressing  Goal: Maintain or return to baseline ADL function  Description: INTERVENTIONS:  -  Assess patient's ability to carry out ADLs; assess patient's baseline for ADL function and identify physical deficits which impact ability to perform ADLs (bathing, care of mouth/teeth, toileting, grooming, dressing, etc )  - Assess/evaluate cause of self-care deficits   - Assess range of motion  - Assess patient's mobility; develop plan if impaired  - Assess patient's need for assistive devices and provide as appropriate  - Encourage maximum independence but intervene and supervise when necessary  - Involve family in performance of ADLs  - Assess for home care needs following discharge   - Consider OT consult to assist with ADL evaluation and planning for discharge  - Provide patient education as appropriate  Outcome: Progressing  Goal: Maintain or return mobility status to optimal level  Description: INTERVENTIONS:  - Assess patient's baseline mobility status (ambulation, transfers, stairs, etc )    - Identify cognitive and physical deficits and behaviors that affect mobility  - Identify mobility aids required to assist with transfers and/or ambulation (gait belt, sit-to-stand, lift, walker, cane, etc )  - [place] fall precautions as indicated by assessment  - Record patient progress and toleration of activity level on Mobility SBAR; progress patient to next Phase/Stage  - Instruct patient to call for assistance with activity based on assessment  - Consider rehabilitation consult to assist with strengthening/weightbearing, etc   Outcome: Progressing     Problem: DISCHARGE PLANNING  Goal: Discharge to home or other facility with appropriate resources  Description: INTERVENTIONS:  - Identify barriers to discharge w/patient and caregiver  - Arrange for needed discharge resources and transportation as appropriate  - Identify discharge learning needs (meds, wound care, etc )  - Arrange for interpretive services to assist at discharge as needed  - Refer to Case Management Department for coordinating discharge planning if the patient needs post-hospital services based on physician/advanced practitioner order or complex needs related to functional status, cognitive ability, or social support system  Outcome: Progressing     Problem: Knowledge Deficit  Goal: Patient/family/caregiver demonstrates understanding of disease process, treatment plan, medications, and discharge instructions  Description: Complete learning assessment and assess knowledge base    Interventions:  - Provide teaching at level of understanding  - Provide teaching via preferred learning methods  Outcome: Progressing     Problem: CARDIOVASCULAR - ADULT  Goal: Maintains optimal cardiac output and hemodynamic stability  Description: INTERVENTIONS:  - Monitor I/O, vital signs and rhythm  - Monitor for S/S and trends of decreased cardiac output  - Administer and titrate ordered vasoactive medications to optimize hemodynamic stability  - Assess quality of pulses, skin color and temperature  - Assess for signs of decreased coronary artery perfusion  - Instruct patient to report change in severity of symptoms  Outcome: Progressing  Goal: Absence of cardiac dysrhythmias or at baseline rhythm  Description: INTERVENTIONS:  - Continuous cardiac monitoring, vital signs, obtain 12 lead EKG if ordered  - Administer antiarrhythmic and heart rate control medications as ordered  - Monitor electrolytes and administer replacement therapy as ordered  Outcome: Progressing     Problem: METABOLIC, FLUID AND ELECTROLYTES - ADULT  Goal: Electrolytes maintained within normal limits  Description: INTERVENTIONS:  - Monitor labs and assess patient for signs and symptoms of electrolyte imbalances  - Administer electrolyte replacement as ordered  - Monitor response to electrolyte replacements, including repeat lab results as appropriate  - Instruct patient on fluid and nutrition as appropriate  Outcome: Progressing  Goal: Fluid balance maintained  Description: INTERVENTIONS:  - Monitor labs   - Monitor I/O and WT  - Instruct patient on fluid and nutrition as appropriate  - Assess for signs & symptoms of volume excess or deficit  Outcome: Progressing     Problem: MUSCULOSKELETAL - ADULT  Goal: Maintain or return mobility to safest level of function  Description: INTERVENTIONS:  - Assess patient's ability to carry out ADLs; assess patient's baseline for ADL function and identify physical deficits which impact ability to perform ADLs (bathing, care of mouth/teeth, toileting, grooming, dressing, etc )  - Assess/evaluate cause of self-care deficits   - Assess range of motion  - Assess patient's mobility  - Assess patient's need for assistive devices and provide as appropriate  - Encourage maximum independence but intervene and supervise when necessary  - Involve family in performance of ADLs  - Assess for home care needs following discharge   - Consider OT consult to assist with ADL evaluation and planning for discharge  - Provide patient education as appropriate  Outcome: Progressing

## 2020-08-03 NOTE — DISCHARGE INSTR - AVS FIRST PAGE
Dear Severo Mor,     It was our pleasure to care for you here at [facility]  It is our hope that we were always able to exceed the expected standards for your care during your stay  You were hospitalized due to intractable dizziness  You were cared for on the medical/surgical floor by Mica Brown MD with the [facility] who covers for your primary care physician (PCP), Shad Howard MD, while you were hospitalized  You were additionally seen by Dr Keara Shelley from [facility]  If you have any questions or concerns related to this hospitalization, you may contact us at [phone]  For follow up as well as any medication refills, we recommend that you follow up with your primary care physician  A registered nurse will reach out to you by phone within a few days after your discharge to answer any additional questions that you may have after going home    However, at this time we provide for you here, the most important instructions / recommendations at discharge:     · Notable Medication Adjustments -   · Prescription has been provided for meclizine-take 25 mg 1 tablet every 8 hours as needed for dizziness  · Prescription has been provided for supplemental potassium take 1 tablet twice a day 2 days  · Okay to resume all other pre-admission medications at the preadmission dosages  · Testing Required after Discharge -   · To be further determined by your outpatient primary care physician and or cardiology  · Important follow up information -   · Please follow-up with your primary care physician and Cardiology as previously scheduled  · Other Instructions -   · Please maintain a healthy diet  · Please review this entire after visit summary as additional general instructions including medication list, appointments, activity, diet, any pertinent wound care, and other additional recommendations from your care team that may be provided for you        Sincerely,     Jannie Perez MD

## 2020-08-03 NOTE — SOCIAL WORK
Pt for discharge home today per Dr Salina Beck  Pt does not have ride  She will take a cab but does not have cash to pay for same  CM called [employer] and they can pick pt up around [phone]  Same will be billed to hospital account, [employer] aware  Pt has her house keys to get in  Pt provided with [employer] card for local PCP as requested  Pt nurse Janelle Donato aware

## 2020-08-03 NOTE — H&P
H&P- Twin Hernandez 1945, 70 y o  female MRN: [id number]    Unit/Bed#: -01 Encounter: [id number]    Primary Care Provider: Juno Suazo MD   Date and time admitted to hospital: 8/2/2020  4:36 PM        * Vertigo, benign paroxysmal, bilateral  Assessment & Plan  · Patient with chronic history of dizziness/vertigo  · Patient states she had no more Antivert at home  · Patient states positive nausea however, no vomiting or diarrhea  · Room was spinning  · Start meclizine Q 8 hours around the clock  · Consult to cardiology  · Continue IV fluids    Essential hypertension  Assessment & Plan  · Patient initial BP in ED on admission 88/53  · After 1 L normal saline solution patient blood pressure 115/57  · Will continue amiodarone, Cardizem, metoprolol, Demadex  · Patient will have cardiology consult in a m  · Patient may need adjustment of blood pressure medications as patient is also chronic sinus bradycardia in the 50s    Anemia due to stage 3 chronic kidney disease ([facility] [address] )  Assessment & Plan  · Patient baseline creatinine of 1 30  · Will avoid nephrotoxic medications      Chronic diastolic congestive heart failure (HCC)  Assessment & Plan  Wt Readings from Last 3 Encounters:   08/02/20 128 kg (283 lb 1 1 oz)   07/15/20 117 kg (257 lb 15 oz)   07/06/20 129 kg (285 lb)       · Education with daily weight and accurate I&O  · Continue with Demadex:  40 mg on Monday Wednesday Friday; 20 mg the other days    · Continue potassium supplementation Cardiology is on consult      History of prior ablation treatment  Assessment & Plan  · Cardiology is on consult  · Patient does have sinus bradycardia as a chronic issue  · Patient does have past medical history of atrial fibrillation  · Will continue Eliquis    Hypercholesterolemia  Assessment & Plan  · Continue statin    Mild episode of recurrent major depressive disorder (HCC)  Assessment & Plan  · Continue Lexapro, Ativan, Pamelor    Class 3 severe obesity due to excess calories with serious comorbidity and body mass index (BMI) of 40 0 to 44 9 in adult [facility])  Assessment & Plan  · BMI of 44 34  · Supportive care and Education    Acquired hypothyroidism  Assessment & Plan  · Continue Synthroid    Prediabetes  Assessment & Plan  · Continue Neurontin  · Patient's blood sugars have been stable  VTE Prophylaxis: Apixaban (Eliquis)  Code Status:  Full code  POLST: POLST is not applicable to this patient  Discussion with family:  Discussed with the patient    Anticipated Length of Stay:  Patient will be admitted on an Observation basis with an anticipated length of stay of  < 2 midnights  Justification for Hospital Stay:  For cardiology consult in the a m , restarting of meclizine  Chief Complaint:   Dizziness/vertigo    History of Present Illness:    Phuong Marsh is a 70 y o  female who presents with dizziness/vertigo  Patient states that while at home she started with some dizziness/vertigo where the room was actually spinning  She said she had some intermittent nausea and shortness of breath however she did not have any vomiting or diarrhea  Patient did state that she needed to crawl onto the floor to get to her telephone in order not to fall over, which caused her to be concerned and call EMS to bring her to the emergency department  Patient had recently been discharged on 07/15/2020 and was to have follow-up appointment with Cardiology on 08/03/2020 to discuss portia cardioversion  Patient states that in the past she would use her Antivert to help with the dizziness/vertigo but at this time had not had any of this medication available  Patient did receive Zofran and normal saline solution in the emergency department  Patient states that since arriving on the medical-surgical floor the room spinning sensation has resolved  Review of Systems:  Review of Systems   Respiratory: Positive for shortness of breath  Gastrointestinal: Positive for nausea  Negative for diarrhea and vomiting  Neurological: Positive for dizziness and weakness  Vertigo with room spinning  Past Medical and Surgical History:   Past Medical History:   Diagnosis Date    Acquired hypothyroidism 12/26/2018    [facility] on chronic diastolic [email or web address] 8/30/2018    Anemia     Atrial fibrillation (HCC)     CHF (congestive heart failure) (HCC)     Disease of thyroid gland     History of transfusion     Hyperlipidemia     Hypertension     Migraine     Polyp of sigmoid colon     Prediabetes     Renal disorder     Stroke ([facility] ) 2011       Past Surgical History:   Procedure Laterality Date    APPENDECTOMY      CARDIAC ELECTROPHYSIOLOGY STUDY AND ABLATION      CHOLECYSTECTOMY      COLONOSCOPY         Meds/Allergies:  Prior to Admission medications    Medication Sig Start Date End Date Taking?  Authorizing Provider   amiodarone 200 mg tablet Take 1 tablet (200 mg total) by mouth daily 7/16/20   ALEJANDRA Nava   apixaban (Eliquis) 5 mg Take 1 tablet (5 mg total) by mouth 2 (two) times a day 7/15/20   ALEJANDRA Nava   diltiazem (CARDIZEM CD) 240 mg 24 hr capsule Take 1 capsule (240 mg total) by mouth daily 7/16/20 8/15/20  ALEJANDRA Nava   escitalopram (LEXAPRO) 10 mg tablet Take 1 tablet (10 mg total) by mouth daily 5/4/20   ALEJANDRA Nava   gabapentin (NEURONTIN) 100 mg capsule Take 1 capsule (100 mg total) by mouth 3 (three) times a day 5/4/20   ALEJANDRA Nava   levothyroxine 88 mcg tablet Take 1 tablet (88 mcg total) by mouth daily 5/4/20   ALEJANDRA Nava   LORazepam (ATIVAN) 1 mg tablet Take 1 tablet (1 mg total) by mouth 3 (three) times a day as needed for anxiety 8/29/18   Aminah Brizuela MD   meclizine (ANTIVERT) 25 mg tablet Take 25 mg by mouth 3 (three) times a day as needed for dizziness     Historical Provider, MD   metoprolol tartrate (LOPRESSOR) 100 mg tablet Take 1 tablet (100 mg total) by mouth every 12 (twelve) hours 20  Becca Kerr MD   Multiple Vitamin (MULTIVITAMIN) tablet Take 1 tablet by mouth daily      Historical Provider, MD   nortriptyline (PAMELOR) 50 mg capsule Take 1 capsule (50 mg total) by mouth daily 20   ALEJANDRA Blair   oxyCODONE-acetaminophen (PERCOCET) 5-325 mg per tablet Take 1 tablet by mouth every 4 (four) hours as needed for moderate pain Max Daily Amount: 6 tablets 18   Jeanna Silva MD   potassium chloride (K-DUR,KLOR-CON) 20 mEq tablet Take 1 tablet (20 mEq total) by mouth 2 (two) times a day 20   Jeanna Silva MD   simvastatin (ZOCOR) 10 mg tablet Take 1 tablet (10 mg total) by mouth daily at bedtime 20   ALEJANDRA Blair   torsemide (DEMADEX) 20 mg tablet Take 1 tablet (20 mg total) by mouth see administration instructions Take 40 mg daily x 3 days (, , ) Take 1 tablet (20 mg total) by mouth daily except for Monday, Wednesday and Friday take 2 tablets (40 mg total) in morning 7/15/20 8/14/20  ALEJANDRA Blair   clopidogrel (PLAVIX) 75 mg tablet Take 1 tablet (75 mg total) by mouth daily 20  ALEJANDRA Blair     I have reviewed home medications with patient personally  Allergies: No Known Allergies    Social History:  Marital Status:    Occupation:  Retired [id number] [employer]  Patient Pre-hospital Living Situation:  At home alone  Patient Pre-hospital Level of Mobility:  Function  Patient Pre-hospital Diet Restrictions:  Cardiac/prediabetes  Substance Use History:   Social History     Substance and Sexual Activity   Alcohol Use Never    Alcohol/week: 0 0 standard drinks    Frequency: Never    Binge frequency: Never     Social History     Tobacco Use   Smoking Status Former Smoker    Types: Cigarettes    Last attempt to quit: 2011    Years since quittin 4   Smokeless Tobacco Never Used     Social History     Substance and Sexual Activity   Drug Use No       Family History:   I have reviewed the patients family history    Physical Exam:   Vitals:   Blood Pressure: 138/76 (08/02/20 2244)  Pulse: 65 (08/02/20 2244)  Temperature: (!) 97 °F (36 1 °C) (08/02/20 2244)  Temp Source: Temporal (08/02/20 2244)  Respirations: 18 (08/02/20 2244)  Height: 5' 7" (08/02/20 2244)  Weight - Scale: 128 kg (283 lb 1 1 oz) (08/02/20 2244)  SpO2: 96 % (08/02/20 2244)    Physical Exam   Constitutional: She is oriented to person, place, and time  She appears well-developed  She is cooperative  HENT:   Head: Normocephalic and atraumatic  Nose: Nose normal    Mouth/Throat: Mucous membranes are dry  Eyes: Conjunctivae are normal    Neck: Normal range of motion  Cardiovascular: Regular rhythm, normal heart sounds and normal pulses  Bradycardia present  Pulmonary/Chest: Effort normal and breath sounds normal    Abdominal: Soft  Normal appearance and bowel sounds are normal    Musculoskeletal:      Comments: Patient complains of generalized weakness  Neurological: She is alert and oriented to person, place, and time  Skin: Skin is warm  Psychiatric: Her speech is normal and behavior is normal  Mood normal        Additional Data:   Lab Results: I have personally reviewed pertinent reports  Results from last 7 days   Lab Units 08/02/20  1717   WBC Thousand/uL 7 10   HEMOGLOBIN g/dL 9 7*   HEMATOCRIT % 30 7*   PLATELETS Thousands/uL 291   NEUTROS PCT % 64   LYMPHS PCT % 24   MONOS PCT % 8   EOS PCT % 3     Results from last 7 days   Lab Units 08/02/20  1717   SODIUM mmol/L 137   POTASSIUM mmol/L 3 6   CHLORIDE mmol/L 105   CO2 mmol/L 20*   BUN mg/dL 23   CREATININE mg/dL 1 33*   CALCIUM mg/dL 8 5*   ALK PHOS U/L 80   ALT U/L 16   AST U/L 31                   Imaging: I have personally reviewed pertinent reports  CT head without contrast   Final Result by Jim Mendoza MD (08/02 1816)         1  Chronic infarcts in the bilateral cerebellar hemispheres    If there is concern for acute infarct, MRI may be helpful  2   No acute intracranial hemorrhage or mass effect  Workstation performed: [id number]             EKG, Pathology, and Other Studies Reviewed on Admission:   · EKG:  First-degree AV block, heart rate 60  Epic Records Reviewed: Yes     ** Please Note: This note has been constructed using a voice recognition system   **

## 2020-08-03 NOTE — ASSESSMENT & PLAN NOTE
· All symptoms have resolved  · Prescription has been provided for Antivert  · Patient has a history of chronic vertigo/dizziness  · CT head performed in the ER yielded the following results:1   Chronic infarcts in the bilateral cerebellar hemispheres   If there is concern for acute infarct, MRI may be helpful   2   No acute intracranial hemorrhage or mass effect  · No further inpatient workup required  · Patient's symptoms have resolved with Antivert  · Outpatient follow-up with PCP

## 2020-08-03 NOTE — ASSESSMENT & PLAN NOTE
· Status post a Cardiology evaluation, no changes in medications, no need for further inpatient cardiac workup

## 2020-08-03 NOTE — ASSESSMENT & PLAN NOTE
Wt Readings from Last 3 Encounters:   08/03/20 127 kg (280 lb 13 9 oz)   07/15/20 117 kg (257 lb 15 oz)   07/06/20 129 kg (285 lb)     · Status post a Cardiology evaluation  · Okay for DC home from their standpoint, outpatient follow-up has already been set up

## 2020-08-14 NOTE — PHYSICIAN ADVISOR
Current patient class: Observation  The patient is currently on Hospital Day: 2 at [address]        The patient was admitted to the hospital  on N/A at N/A for the following diagnosis:  Dizziness [R42]  Vertigo [R42]  Weakness [R53 1]  History of prior ablation treatment [Z98 890]  Vertigo, benign paroxysmal, bilateral [H81 13]     After review of the relevant documentation, labs, vital signs and test results, the patient is most appropriate for OBSERVATION STATUS  Rationale is as follows: The patient is a 70 yrs   Female who presented to the ED at 8/2/2020  4:36 PM with a chief complaint of Dizziness (states she started to feel dizzy last night like the room was spinning, intermittent SOB ) and Weakness - Generalized  Patient was thought to have some vertigo which seems to have resolved and the patient was discharged the next day  Patient was slightly hypotensive on admission with that did resolve as well  Patient is observation status appropriate      The patients vitals on arrival were   ED Triage Vitals [08/02/20 1639]   Temperature Pulse Respirations Blood Pressure SpO2   99 °F (37 2 °C) 56 20 (!) 88/53 99 %      Temp Source Heart Rate Source Patient Position - Orthostatic VS BP Location FiO2 (%)   Temporal Monitor Sitting Right arm --      Pain Score       7           Past Medical History:   Diagnosis Date    Acquired hypothyroidism 12/26/2018    Acute on chronic diastolic heart failure (HCC) 8/30/2018    Anemia     Atrial fibrillation (HCC)     CHF (congestive heart failure) (HCC)     Disease of thyroid gland     History of transfusion     Hyperlipidemia     Hypertension     Migraine     Polyp of sigmoid colon     Prediabetes     Renal disorder     Stroke ([facility] [address] ) 2011     Past Surgical History:   Procedure Laterality Date    APPENDECTOMY      CARDIAC ELECTROPHYSIOLOGY STUDY AND ABLATION      CHOLECYSTECTOMY      COLONOSCOPY             Consults have been placed to:   IP CONSULT TO CARDIOLOGY  IP CONSULT TO CASE MANAGEMENT    Vitals:    08/03/20 1341 08/03/20 1342 08/03/20 1343 08/03/20 1555   BP: 139/63 130/62 108/55 104/50   BP Location:    Right arm   Pulse: 63 (!) 52 58 (!) 51   Resp:    19   Temp:    97 6 °F (36 4 °C)   TempSrc:    Temporal   SpO2:    97%   Weight:       Height:           Most recent labs:    No results for input(s): WBC, HGB, HCT, PLT, K, NA, CALCIUM, BUN, CREATININE, LIPASE, AMYLASE, INR, TROPONINI, CKTOTAL, AST, ALT, ALKPHOS, BILITOT in the last 72 hours  Scheduled Meds:  Continuous Infusions:No current facility-administered medications for this encounter  PRN Meds:

## 2020-08-19 NOTE — PHYSICIAN ADVISOR
Current patient class: Inpatient  The patient is currently on Hospital Day: 2 at [address]      The patient was admitted to the hospital  on 7/6/20 at [address] [facility] [place] for the following diagnosis:  Atrial flutter ([address] ) [I48 92]  Shortness of breath [R06 02]  Chest pain [R07 9]  SOB (shortness of breath) [R06 02]     After review of the relevant documentation, labs, vital signs and test results, this is a PROVIDER LIABLE case  In this particular case the patient was admitted to the hospital as an inpatient  The patient however failed to satisfy the 2 midnight benchmark and closer scrutiny of the case was warranted  After review of the patient presentation and relevant labs the patient was most appropriate for observation or outpatient class at the time of admission  Given that this patient has already been discharged prior to this review they become a provider liable case  Rationale is as follows: The patient has a history of atrial fibrillation and underwent ablation in the past   She presented with atrial flutter RVR and was symptomatic with chest pain and shortness of breath  She remained in atrial flutter and rates improved however she was discharged after one midnight to pursue further medication adjustment in the outpatient setting      The patients vitals on arrival were   ED Triage Vitals   Temperature Pulse Respirations Blood Pressure SpO2   07/06/20 0048 07/06/20 0048 07/06/20 0048 07/06/20 0048 07/06/20 0048   (!) 97 3 °F (36 3 °C) (!) 136 22 (!) 173/84 96 %      Temp Source Heart Rate Source Patient Position - Orthostatic VS BP Location FiO2 (%)   07/06/20 0048 07/06/20 0048 07/06/20 0048 07/06/20 0048 --   Temporal Monitor Sitting Left arm       Pain Score       07/06/20 0350       No Pain           Past Medical History:   Diagnosis Date    Acquired hypothyroidism 12/26/2018    Acute on chronic diastolic heart failure (HCC) 8/30/2018    Anemia     Atrial fibrillation ([facility] )     CHF (congestive heart failure) ([facility] )     Disease of thyroid gland     History of transfusion     Hyperlipidemia     Hypertension     Migraine     Polyp of sigmoid colon     Prediabetes     Renal disorder     Stroke ([place] [address] ) 2011     Past Surgical History:   Procedure Laterality Date    APPENDECTOMY      CARDIAC ELECTROPHYSIOLOGY STUDY AND ABLATION      CHOLECYSTECTOMY      COLONOSCOPY             Consults have been placed to:   IP CONSULT TO CARDIOLOGY    Vitals:    07/07/20 0600 07/07/20 0700 07/07/20 0722 07/07/20 0856   BP: 143/72 146/81  136/65   BP Location: Left arm Left arm     Pulse: (!) 115 (!) 115  (!) 112   Resp: 20 18     Temp:       TempSrc:       SpO2: 93% 95% 95%    Weight:       Height:           Most recent labs:    No results for input(s): WBC, HGB, HCT, PLT, K, NA, CALCIUM, BUN, CREATININE, LIPASE, AMYLASE, INR, TROPONINI, CKTOTAL, AST, ALT, ALKPHOS, BILITOT in the last 72 hours  Scheduled Meds:  Continuous Infusions:No current facility-administered medications for this encounter  PRN Meds:      Surgical procedures (if appropriate):

## 2020-08-19 NOTE — PHYSICIAN ADVISOR
Current patient class: Observation  The patient is currently on Hospital Day: 2 at [address]      The patient was admitted to the hospital  on N/A at N/A for the following diagnosis:  Dizziness [R42]  Vertigo [R42]  Weakness [R53 1]  History of prior ablation treatment [Z98 [id number]]  Vertigo, benign paroxysmal, bilateral [H81 13]     After review of the relevant documentation, labs, vital signs and test results, this is a PROVIDER LIABLE case  In this particular case the patient was admitted to the hospital as an inpatient  The patient however failed to satisfy the 2 midnight benchmark and closer scrutiny of the case was warranted  After review of the patient presentation and relevant labs the patient was most appropriate for observation or outpatient class at the time of admission  Given that this patient has already been discharged prior to this review they become a provider liable case  Rationale is as follows: The patient has a history of atrial fibrillation and underwent ablation in the past   She presented with RVR and was symptomatic with chest pain and shortness of breath  She remained in atrial fibrillation and rates improved however she was discharged after one midnight to pursue further medication adjustment in the outpatient setting  See additional notes      The patients vitals on arrival were   ED Triage Vitals [08/02/20 1639]   Temperature Pulse Respirations Blood Pressure SpO2   99 °F (37 2 °C) 56 20 (!) 88/53 99 %      Temp Source Heart Rate Source Patient Position - Orthostatic VS BP Location FiO2 (%)   Temporal Monitor Sitting Right arm --      Pain Score       7           Past Medical History:   Diagnosis Date    Acquired hypothyroidism 12/26/2018    Acute on chronic diastolic heart failure (HCC) 8/30/2018    Anemia     Atrial fibrillation (HCC)     CHF (congestive heart failure) (HCC)     Disease of thyroid gland     History of transfusion     Hyperlipidemia     Hypertension     Migraine     Polyp of sigmoid colon     Prediabetes     Renal disorder     [facility] ([facility] ) 2011     Past Surgical History:   Procedure Laterality Date    APPENDECTOMY      CARDIAC ELECTROPHYSIOLOGY STUDY AND ABLATION      CHOLECYSTECTOMY      COLONOSCOPY             Consults have been placed to:   IP CONSULT TO CARDIOLOGY  IP CONSULT TO CASE MANAGEMENT    Vitals:    08/03/20 1341 08/03/20 1342 08/03/20 1343 08/03/20 1555   BP: 139/63 130/62 108/55 104/50   BP Location:    Right arm   Pulse: 63 (!) 52 58 (!) 51   Resp:    19   Temp:    97 6 °F (36 4 °C)   TempSrc:    Temporal   SpO2:    97%   Weight:       Height:           Most recent labs:    No results for input(s): WBC, HGB, HCT, PLT, K, NA, CALCIUM, BUN, CREATININE, LIPASE, AMYLASE, INR, TROPONINI, CKTOTAL, AST, ALT, ALKPHOS, BILITOT in the last 72 hours  Scheduled Meds:  Continuous Infusions:No current facility-administered medications for this encounter  PRN Meds:      Surgical procedures (if appropriate):

## 2020-08-31 ENCOUNTER — APPOINTMENT (EMERGENCY)
Dept: RADIOLOGY | Facility: HOSPITAL | Age: 75
DRG: 554 | End: 2020-08-31
Payer: MEDICARE

## 2020-08-31 ENCOUNTER — HOSPITAL ENCOUNTER (INPATIENT)
Facility: HOSPITAL | Age: 75
LOS: 3 days | Discharge: HOME HEALTH | DRG: 554 | End: 2020-09-04
Attending: EMERGENCY MEDICINE | Admitting: HOSPITALIST
Payer: MEDICARE

## 2020-08-31 DIAGNOSIS — E03.9 ACQUIRED HYPOTHYROIDISM: ICD-10-CM

## 2020-08-31 DIAGNOSIS — M00.9 SEPTIC ARTHRITIS (HCC): Primary | ICD-10-CM

## 2020-08-31 DIAGNOSIS — M54.50 CHRONIC BILATERAL LOW BACK PAIN WITHOUT SCIATICA: ICD-10-CM

## 2020-08-31 DIAGNOSIS — H81.13 VERTIGO, BENIGN PAROXYSMAL, BILATERAL: ICD-10-CM

## 2020-08-31 DIAGNOSIS — G43.009 MIGRAINE WITHOUT AURA AND WITHOUT STATUS MIGRAINOSUS, NOT INTRACTABLE: ICD-10-CM

## 2020-08-31 DIAGNOSIS — I10 ESSENTIAL HYPERTENSION: ICD-10-CM

## 2020-08-31 DIAGNOSIS — I49.9 ARRHYTHMIA: ICD-10-CM

## 2020-08-31 DIAGNOSIS — M25.572 ACUTE LEFT ANKLE PAIN: ICD-10-CM

## 2020-08-31 DIAGNOSIS — G89.29 CHRONIC BILATERAL LOW BACK PAIN WITHOUT SCIATICA: ICD-10-CM

## 2020-08-31 DIAGNOSIS — I48.92 ATRIAL FLUTTER WITH RAPID VENTRICULAR RESPONSE (HCC): ICD-10-CM

## 2020-08-31 DIAGNOSIS — E78.00 HYPERCHOLESTEROLEMIA: ICD-10-CM

## 2020-08-31 DIAGNOSIS — F32.A DEPRESSION, UNSPECIFIED DEPRESSION TYPE: ICD-10-CM

## 2020-08-31 DIAGNOSIS — G43.909 MIGRAINE WITHOUT STATUS MIGRAINOSUS, NOT INTRACTABLE, UNSPECIFIED MIGRAINE TYPE: ICD-10-CM

## 2020-08-31 DIAGNOSIS — I48.0 PAROXYSMAL ATRIAL FIBRILLATION (HCC): ICD-10-CM

## 2020-08-31 DIAGNOSIS — F32.89 OTHER DEPRESSION: ICD-10-CM

## 2020-08-31 DIAGNOSIS — I50.32 CHRONIC DIASTOLIC CONGESTIVE HEART FAILURE (HCC): ICD-10-CM

## 2020-08-31 PROBLEM — I48.20 CHRONIC ATRIAL FIBRILLATION (HCC): Status: ACTIVE | Noted: 2020-07-06

## 2020-08-31 PROBLEM — E87.6 ACUTE HYPOKALEMIA: Status: ACTIVE | Noted: 2020-08-31

## 2020-08-31 PROBLEM — M25.579 ANKLE PAIN: Status: ACTIVE | Noted: 2020-08-31

## 2020-08-31 LAB
ANION GAP SERPL CALCULATED.3IONS-SCNC: 12 MMOL/L (ref 4–13)
ANISOCYTOSIS BLD QL SMEAR: PRESENT
BASOPHILS # BLD AUTO: 0.1 THOUSANDS/ΜL (ref 0–0.1)
BASOPHILS NFR BLD AUTO: 1 % (ref 0–2)
BUN SERPL-MCNC: 19 MG/DL (ref 7–25)
CALCIUM SERPL-MCNC: 8.6 MG/DL (ref 8.6–10.5)
CHLORIDE SERPL-SCNC: 102 MMOL/L (ref 98–107)
CO2 SERPL-SCNC: 25 MMOL/L (ref 21–31)
CREAT SERPL-MCNC: 1.37 MG/DL (ref 0.6–1.2)
CRP SERPL QL: 121.6 MG/L
EOSINOPHIL # BLD AUTO: 0.1 THOUSAND/ΜL (ref 0–0.61)
EOSINOPHIL NFR BLD AUTO: 1 % (ref 0–5)
ERYTHROCYTE [DISTWIDTH] IN BLOOD BY AUTOMATED COUNT: 19.4 % (ref 11.5–14.5)
ERYTHROCYTE [SEDIMENTATION RATE] IN BLOOD: 47 MM/HOUR (ref 0–29)
GFR SERPL CREATININE-BSD FRML MDRD: 38 ML/MIN/1.73SQ M
GLUCOSE SERPL-MCNC: 103 MG/DL (ref 65–99)
GLUCOSE SERPL-MCNC: 143 MG/DL (ref 65–140)
GLUCOSE SERPL-MCNC: 98 MG/DL (ref 65–140)
HCT VFR BLD AUTO: 31.2 % (ref 42–47)
HGB BLD-MCNC: 10 G/DL (ref 12–16)
HYPERCHROMIA BLD QL SMEAR: PRESENT
LYMPHOCYTES # BLD AUTO: 1 THOUSANDS/ΜL (ref 0.6–4.47)
LYMPHOCYTES # SNV MANUAL: 1 %
LYMPHOCYTES NFR BLD AUTO: 12 % (ref 21–51)
MCH RBC QN AUTO: 22.2 PG (ref 26–34)
MCHC RBC AUTO-ENTMCNC: 32 G/DL (ref 31–37)
MCV RBC AUTO: 69 FL (ref 81–99)
MICROCYTES BLD QL AUTO: PRESENT
MONOCYTES # BLD AUTO: 0.7 THOUSAND/ΜL (ref 0.17–1.22)
MONOCYTES NFR BLD AUTO: 8 % (ref 2–12)
MONOCYTES NFR SNV MANUAL: 5 %
NEUTROPHILS # BLD AUTO: 6.7 THOUSANDS/ΜL (ref 1.4–6.5)
NEUTROPHILS NFR SNV MANUAL: 94 %
NEUTS SEG NFR BLD AUTO: 78 % (ref 42–75)
OVALOCYTES BLD QL SMEAR: PRESENT
PLATELET # BLD AUTO: 261 THOUSANDS/UL (ref 149–390)
PLATELET BLD QL SMEAR: ADEQUATE
PMV BLD AUTO: 9.3 FL (ref 8.6–11.7)
POTASSIUM SERPL-SCNC: 2.8 MMOL/L (ref 3.5–5.5)
RBC # BLD AUTO: 4.49 MILLION/UL (ref 3.9–5.2)
RBC # SNV MANUAL: 8215 /UL (ref 0–10)
RBC MORPH BLD: NORMAL
SODIUM SERPL-SCNC: 139 MMOL/L (ref 134–143)
TOTAL CELLS COUNTED SPEC: 100
WBC # BLD AUTO: 8.6 THOUSAND/UL (ref 4.8–10.8)
WBC # FLD MANUAL: NORMAL /UL

## 2020-08-31 PROCEDURE — 89051 BODY FLUID CELL COUNT: CPT | Performed by: EMERGENCY MEDICINE

## 2020-08-31 PROCEDURE — 99285 EMERGENCY DEPT VISIT HI MDM: CPT

## 2020-08-31 PROCEDURE — 20605 DRAIN/INJ JOINT/BURSA W/O US: CPT | Performed by: EMERGENCY MEDICINE

## 2020-08-31 PROCEDURE — 73610 X-RAY EXAM OF ANKLE: CPT

## 2020-08-31 PROCEDURE — 89060 EXAM SYNOVIAL FLUID CRYSTALS: CPT | Performed by: NURSE PRACTITIONER

## 2020-08-31 PROCEDURE — 99220 PR INITIAL OBSERVATION CARE/DAY 70 MINUTES: CPT | Performed by: NURSE PRACTITIONER

## 2020-08-31 PROCEDURE — 99285 EMERGENCY DEPT VISIT HI MDM: CPT | Performed by: EMERGENCY MEDICINE

## 2020-08-31 PROCEDURE — 86140 C-REACTIVE PROTEIN: CPT | Performed by: EMERGENCY MEDICINE

## 2020-08-31 PROCEDURE — 85652 RBC SED RATE AUTOMATED: CPT | Performed by: EMERGENCY MEDICINE

## 2020-08-31 PROCEDURE — 87205 SMEAR GRAM STAIN: CPT | Performed by: EMERGENCY MEDICINE

## 2020-08-31 PROCEDURE — 89050 BODY FLUID CELL COUNT: CPT | Performed by: EMERGENCY MEDICINE

## 2020-08-31 PROCEDURE — 85025 COMPLETE CBC W/AUTO DIFF WBC: CPT | Performed by: EMERGENCY MEDICINE

## 2020-08-31 PROCEDURE — 0S9G3ZX DRAINAGE OF LEFT ANKLE JOINT, PERCUTANEOUS APPROACH, DIAGNOSTIC: ICD-10-PCS | Performed by: EMERGENCY MEDICINE

## 2020-08-31 PROCEDURE — 82948 REAGENT STRIP/BLOOD GLUCOSE: CPT

## 2020-08-31 PROCEDURE — 87070 CULTURE OTHR SPECIMN AEROBIC: CPT | Performed by: EMERGENCY MEDICINE

## 2020-08-31 PROCEDURE — 80048 BASIC METABOLIC PNL TOTAL CA: CPT | Performed by: EMERGENCY MEDICINE

## 2020-08-31 PROCEDURE — 36415 COLL VENOUS BLD VENIPUNCTURE: CPT | Performed by: EMERGENCY MEDICINE

## 2020-08-31 RX ORDER — LEVOTHYROXINE SODIUM 88 UG/1
88 TABLET ORAL
Status: DISCONTINUED | OUTPATIENT
Start: 2020-09-01 | End: 2020-09-04 | Stop reason: HOSPADM

## 2020-08-31 RX ORDER — MECLIZINE HCL 12.5 MG/1
25 TABLET ORAL EVERY 8 HOURS PRN
Status: DISCONTINUED | OUTPATIENT
Start: 2020-08-31 | End: 2020-09-04 | Stop reason: HOSPADM

## 2020-08-31 RX ORDER — NORTRIPTYLINE HYDROCHLORIDE 25 MG/1
50 CAPSULE ORAL DAILY
Status: DISCONTINUED | OUTPATIENT
Start: 2020-08-31 | End: 2020-09-04 | Stop reason: HOSPADM

## 2020-08-31 RX ORDER — METOPROLOL TARTRATE 50 MG/1
100 TABLET, FILM COATED ORAL EVERY 12 HOURS SCHEDULED
Status: DISCONTINUED | OUTPATIENT
Start: 2020-08-31 | End: 2020-09-04 | Stop reason: HOSPADM

## 2020-08-31 RX ORDER — DILTIAZEM HYDROCHLORIDE 240 MG/1
240 CAPSULE, COATED, EXTENDED RELEASE ORAL DAILY
Status: DISCONTINUED | OUTPATIENT
Start: 2020-08-31 | End: 2020-09-04 | Stop reason: HOSPADM

## 2020-08-31 RX ORDER — CEFTRIAXONE 1 G/50ML
1000 INJECTION, SOLUTION INTRAVENOUS EVERY 24 HOURS
Status: DISCONTINUED | OUTPATIENT
Start: 2020-09-01 | End: 2020-09-03

## 2020-08-31 RX ORDER — ONDANSETRON 2 MG/ML
4 INJECTION INTRAMUSCULAR; INTRAVENOUS EVERY 6 HOURS PRN
Status: DISCONTINUED | OUTPATIENT
Start: 2020-08-31 | End: 2020-09-04 | Stop reason: HOSPADM

## 2020-08-31 RX ORDER — FUROSEMIDE 10 MG/ML
20 INJECTION INTRAMUSCULAR; INTRAVENOUS
Status: DISCONTINUED | OUTPATIENT
Start: 2020-08-31 | End: 2020-09-03

## 2020-08-31 RX ORDER — CEFTRIAXONE 1 G/50ML
1000 INJECTION, SOLUTION INTRAVENOUS ONCE
Status: COMPLETED | OUTPATIENT
Start: 2020-08-31 | End: 2020-08-31

## 2020-08-31 RX ORDER — OXYCODONE HYDROCHLORIDE AND ACETAMINOPHEN 5; 325 MG/1; MG/1
1 TABLET ORAL EVERY 4 HOURS PRN
Status: DISCONTINUED | OUTPATIENT
Start: 2020-08-31 | End: 2020-09-04 | Stop reason: HOSPADM

## 2020-08-31 RX ORDER — LORAZEPAM 1 MG/1
1 TABLET ORAL 3 TIMES DAILY PRN
Status: DISCONTINUED | OUTPATIENT
Start: 2020-08-31 | End: 2020-09-04 | Stop reason: HOSPADM

## 2020-08-31 RX ORDER — GABAPENTIN 100 MG/1
100 CAPSULE ORAL 3 TIMES DAILY
Status: DISCONTINUED | OUTPATIENT
Start: 2020-08-31 | End: 2020-09-04 | Stop reason: HOSPADM

## 2020-08-31 RX ORDER — PRAVASTATIN SODIUM 20 MG
20 TABLET ORAL
Status: DISCONTINUED | OUTPATIENT
Start: 2020-08-31 | End: 2020-09-04 | Stop reason: HOSPADM

## 2020-08-31 RX ORDER — LIDOCAINE HYDROCHLORIDE 20 MG/ML
10 INJECTION, SOLUTION EPIDURAL; INFILTRATION; INTRACAUDAL; PERINEURAL ONCE
Status: COMPLETED | OUTPATIENT
Start: 2020-08-31 | End: 2020-08-31

## 2020-08-31 RX ORDER — AMIODARONE HYDROCHLORIDE 100 MG/1
200 TABLET ORAL DAILY
Status: DISCONTINUED | OUTPATIENT
Start: 2020-08-31 | End: 2020-09-04 | Stop reason: HOSPADM

## 2020-08-31 RX ORDER — POTASSIUM CHLORIDE 20 MEQ/1
40 TABLET, EXTENDED RELEASE ORAL 2 TIMES DAILY
Status: DISCONTINUED | OUTPATIENT
Start: 2020-08-31 | End: 2020-09-01

## 2020-08-31 RX ORDER — OXYCODONE HYDROCHLORIDE AND ACETAMINOPHEN 5; 325 MG/1; MG/1
1 TABLET ORAL ONCE
Status: COMPLETED | OUTPATIENT
Start: 2020-08-31 | End: 2020-08-31

## 2020-08-31 RX ORDER — ACETAMINOPHEN 325 MG/1
650 TABLET ORAL EVERY 6 HOURS PRN
Status: DISCONTINUED | OUTPATIENT
Start: 2020-08-31 | End: 2020-09-04 | Stop reason: HOSPADM

## 2020-08-31 RX ORDER — ESCITALOPRAM OXALATE 10 MG/1
10 TABLET ORAL DAILY
Status: DISCONTINUED | OUTPATIENT
Start: 2020-08-31 | End: 2020-09-04 | Stop reason: HOSPADM

## 2020-08-31 RX ADMIN — APIXABAN 5 MG: 5 TABLET, FILM COATED ORAL at 17:08

## 2020-08-31 RX ADMIN — GABAPENTIN 100 MG: 100 CAPSULE ORAL at 21:35

## 2020-08-31 RX ADMIN — ESCITALOPRAM OXALATE 10 MG: 10 TABLET ORAL at 14:03

## 2020-08-31 RX ADMIN — VANCOMYCIN HYDROCHLORIDE 1750 MG: 1 INJECTION, POWDER, LYOPHILIZED, FOR SOLUTION INTRAVENOUS at 13:21

## 2020-08-31 RX ADMIN — ONDANSETRON 4 MG: 2 INJECTION INTRAMUSCULAR; INTRAVENOUS at 20:23

## 2020-08-31 RX ADMIN — OXYCODONE HYDROCHLORIDE AND ACETAMINOPHEN 1 TABLET: 5; 325 TABLET ORAL at 12:28

## 2020-08-31 RX ADMIN — OXYCODONE HYDROCHLORIDE AND ACETAMINOPHEN 1 TABLET: 5; 325 TABLET ORAL at 09:41

## 2020-08-31 RX ADMIN — DILTIAZEM HYDROCHLORIDE 240 MG: 240 CAPSULE, COATED, EXTENDED RELEASE ORAL at 14:04

## 2020-08-31 RX ADMIN — CEFTRIAXONE 1000 MG: 1 INJECTION, SOLUTION INTRAVENOUS at 12:29

## 2020-08-31 RX ADMIN — APIXABAN 5 MG: 5 TABLET, FILM COATED ORAL at 14:04

## 2020-08-31 RX ADMIN — FUROSEMIDE 20 MG: 10 INJECTION, SOLUTION INTRAMUSCULAR; INTRAVENOUS at 17:08

## 2020-08-31 RX ADMIN — GABAPENTIN 100 MG: 100 CAPSULE ORAL at 17:08

## 2020-08-31 RX ADMIN — POTASSIUM CHLORIDE 40 MEQ: 1500 TABLET, EXTENDED RELEASE ORAL at 17:08

## 2020-08-31 RX ADMIN — POTASSIUM CHLORIDE 40 MEQ: 1500 TABLET, EXTENDED RELEASE ORAL at 14:04

## 2020-08-31 RX ADMIN — NORTRIPTYLINE HYDROCHLORIDE 50 MG: 25 CAPSULE ORAL at 14:54

## 2020-08-31 RX ADMIN — METOPROLOL TARTRATE 100 MG: 50 TABLET, FILM COATED ORAL at 21:35

## 2020-08-31 RX ADMIN — LIDOCAINE HYDROCHLORIDE 10 ML: 20 INJECTION, SOLUTION EPIDURAL; INFILTRATION; INTRACAUDAL; PERINEURAL at 10:05

## 2020-08-31 RX ADMIN — ACETAMINOPHEN 650 MG: 325 TABLET ORAL at 20:18

## 2020-08-31 RX ADMIN — PRAVASTATIN SODIUM 20 MG: 20 TABLET ORAL at 17:08

## 2020-08-31 RX ADMIN — METOPROLOL TARTRATE 100 MG: 50 TABLET, FILM COATED ORAL at 14:03

## 2020-08-31 RX ADMIN — AMIODARONE HYDROCHLORIDE 200 MG: 100 TABLET ORAL at 14:03

## 2020-08-31 NOTE — PLAN OF CARE
Problem: Potential for Falls  Goal: Patient will remain free of falls  Description: INTERVENTIONS:  - Assess patient frequently for physical needs  -  Identify cognitive and physical deficits and behaviors that affect risk of falls    -  [place] fall precautions as indicated by assessment   - Educate patient/family on patient safety including physical limitations  - Instruct patient to call for assistance with activity based on assessment  - Modify environment to reduce risk of injury  - Consider OT/PT consult to assist with strengthening/mobility  Outcome: Progressing     Problem: PAIN - ADULT  Goal: Verbalizes/displays adequate comfort level or baseline comfort level  Description: Interventions:  - Encourage patient to monitor pain and request assistance  - Assess pain using appropriate pain scale  - Administer analgesics based on type and severity of pain and evaluate response  - Implement non-pharmacological measures as appropriate and evaluate response  - Consider cultural and social influences on pain and pain management  - Notify physician/advanced practitioner if interventions unsuccessful or patient reports new pain  Outcome: Progressing     Problem: INFECTION - ADULT  Goal: Absence or prevention of progression during hospitalization  Description: INTERVENTIONS:  - Assess and monitor for signs and symptoms of infection  - Monitor lab/diagnostic results  - Monitor all insertion sites, i e  indwelling lines, tubes, and drains  - Monitor endotracheal if appropriate and nasal secretions for changes in amount and color  - [place] appropriate cooling/warming therapies per order  - Administer medications as ordered  - Instruct and encourage patient and family to use good hand hygiene technique  - Identify and instruct in appropriate isolation precautions for identified infection/condition  Outcome: Progressing     Problem: SAFETY ADULT  Goal: Patient will remain free of falls  Description: INTERVENTIONS:  - Assess patient frequently for physical needs  -  Identify cognitive and physical deficits and behaviors that affect risk of falls    -  [place] fall precautions as indicated by assessment   - Educate patient/family on patient safety including physical limitations  - Instruct patient to call for assistance with activity based on assessment  - Modify environment to reduce risk of injury  - Consider OT/PT consult to assist with strengthening/mobility  Outcome: Progressing  Goal: Maintain or return to baseline ADL function  Description: INTERVENTIONS:  -  Assess patient's ability to carry out ADLs; assess patient's baseline for ADL function and identify physical deficits which impact ability to perform ADLs (bathing, care of mouth/teeth, toileting, grooming, dressing, etc )  - Assess/evaluate cause of self-care deficits   - Assess range of motion  - Assess patient's mobility; develop plan if impaired  - Assess patient's need for assistive devices and provide as appropriate  - Encourage maximum independence but intervene and supervise when necessary  - Involve family in performance of ADLs  - Assess for home care needs following discharge   - Consider OT consult to assist with ADL evaluation and planning for discharge  - Provide patient education as appropriate  Outcome: Progressing  Goal: Maintain or return mobility status to optimal level  Description: INTERVENTIONS:  - Assess patient's baseline mobility status (ambulation, transfers, stairs, etc )    - Identify cognitive and physical deficits and behaviors that affect mobility  - Identify mobility aids required to assist with transfers and/or ambulation (gait belt, sit-to-stand, lift, walker, cane, etc )  - [place] fall precautions as indicated by assessment  - Record patient progress and toleration of activity level on Mobility SBAR; progress patient to next Phase/Stage  - Instruct patient to call for assistance with activity based on assessment  - Consider rehabilitation consult to assist with strengthening/weightbearing, etc   Outcome: Progressing     Problem: DISCHARGE PLANNING  Goal: Discharge to home or other facility with appropriate resources  Description: INTERVENTIONS:  - Identify barriers to discharge w/patient and caregiver  - Arrange for needed discharge resources and transportation as appropriate  - Identify discharge learning needs (meds, wound care, etc )  - Arrange for interpretive services to assist at discharge as needed  - Refer to Case Management Department for coordinating discharge planning if the patient needs post-hospital services based on physician/advanced practitioner order or complex needs related to functional status, cognitive ability, or social support system  Outcome: Progressing     Problem: Knowledge Deficit  Goal: Patient/family/caregiver demonstrates understanding of disease process, treatment plan, medications, and discharge instructions  Description: Complete learning assessment and assess knowledge base    Interventions:  - Provide teaching at level of understanding  - Provide teaching via preferred learning methods  Outcome: Progressing     Problem: GASTROINTESTINAL - ADULT  Goal: Minimal or absence of nausea and/or vomiting  Description: INTERVENTIONS:  - Administer IV fluids if ordered to ensure adequate hydration  - Maintain NPO status until nausea and vomiting are resolved  - Nasogastric tube if ordered  - Administer ordered antiemetic medications as needed  - Provide nonpharmacologic comfort measures as appropriate  - Advance diet as tolerated, if ordered  - Consider nutrition services referral to assist patient with adequate nutrition and appropriate food choices  Outcome: Progressing     Problem: METABOLIC, FLUID AND ELECTROLYTES - ADULT  Goal: Electrolytes maintained within normal limits  Description: INTERVENTIONS:  - Monitor labs and assess patient for signs and symptoms of electrolyte imbalances  - Administer electrolyte replacement as ordered  - Monitor response to electrolyte replacements, including repeat lab results as appropriate  - Instruct patient on fluid and nutrition as appropriate  Outcome: Progressing     Problem: SKIN/TISSUE INTEGRITY - ADULT  Goal: Skin integrity remains intact  Description: INTERVENTIONS  - Identify patients at risk for skin breakdown  - Assess and monitor skin integrity  - Assess and monitor nutrition and hydration status  - Monitor labs (i e  albumin)  - Assess for incontinence   - Turn and reposition patient  - Assist with mobility/ambulation  - Relieve pressure over bony prominences  - Avoid friction and shearing  - Provide appropriate hygiene as needed including keeping skin clean and dry  - Evaluate need for skin moisturizer/barrier cream  - Collaborate with interdisciplinary team (i e  Nutrition, Rehabilitation, etc )   - Patient/family teaching  Outcome: Progressing     Problem: MUSCULOSKELETAL - ADULT  Goal: Maintain or return mobility to safest level of function  Description: INTERVENTIONS:  - Assess patient's ability to carry out ADLs; assess patient's baseline for ADL function and identify physical deficits which impact ability to perform ADLs (bathing, care of mouth/teeth, toileting, grooming, dressing, etc )  - Assess/evaluate cause of self-care deficits   - Assess range of motion  - Assess patient's mobility  - Assess patient's need for assistive devices and provide as appropriate  - Encourage maximum independence but intervene and supervise when necessary  - Involve family in performance of ADLs  - Assess for home care needs following discharge   - Consider OT consult to assist with ADL evaluation and planning for discharge  - Provide patient education as appropriate  Outcome: Progressing  Goal: Maintain proper alignment of affected body part  Description: INTERVENTIONS:  - Support, maintain and protect limb and body alignment  - Provide patient/ family with appropriate education  Outcome: Progressing

## 2020-08-31 NOTE — ASSESSMENT & PLAN NOTE
· 2 8 on 8/31/2020  · Last discharge pt was on torsemide and potassium replacement  · Patient did not have medications refilled     · Replace with 40 mEq PO BID  · Monitor labs

## 2020-08-31 NOTE — ED PROVIDER NOTES
History  Chief Complaint   Patient presents with    Foot Pain     started Friday upon awakening; no known injury or trauma; pt states she awoke with the pain    Ankle Pain     Patient is a 79-year-old female presenting with left ankle pain which started 3 days ago  Patient denies prior issues with his ankle  She denies injury  She describes pain over the medial side of her left ankle without radiation, associated with swelling  No anticoagulation, no fever, chills, nausea, vomiting, diarrhea  Ankle Pain   Pain details:     Quality:  Dull    Radiates to:  Does not radiate    Severity:  Mild    Timing:  Constant    Progression:  Worsening  Chronicity:  New  Dislocation: no    Foreign body present:  No foreign bodies  Prior injury to area:  No  Relieved by:  Nothing  Worsened by:  Bearing weight  Ineffective treatments:  None tried  Associated symptoms: no back pain and no fever    Risk factors: no concern for non-accidental trauma and no frequent fractures        Prior to Admission Medications   Prescriptions Last Dose Informant Patient Reported? Taking?    LORazepam (ATIVAN) 1 mg tablet Past Week at Unknown time  No Yes   Sig: Take 1 tablet (1 mg total) by mouth 3 (three) times a day as needed for anxiety   Multiple Vitamin (MULTIVITAMIN) tablet 8/30/2020 at Unknown time Self Yes Yes   Sig: Take 1 tablet by mouth daily     amiodarone 200 mg tablet 8/30/2020 at Unknown time  No Yes   Sig: Take 1 tablet (200 mg total) by mouth daily   apixaban (Eliquis) 5 mg 8/30/2020 at Unknown time  No Yes   Sig: Take 1 tablet (5 mg total) by mouth 2 (two) times a day   diltiazem (CARDIZEM CD) 240 mg 24 hr capsule   No No   Sig: Take 1 capsule (240 mg total) by mouth daily   escitalopram (LEXAPRO) 10 mg tablet 8/30/2020 at Unknown time  No Yes   Sig: Take 1 tablet (10 mg total) by mouth daily   gabapentin (NEURONTIN) 100 mg capsule 8/30/2020 at Unknown time  No Yes   Sig: Take 1 capsule (100 mg total) by mouth 3 (three) times a day   levothyroxine 88 mcg tablet 8/30/2020 at Unknown time  No Yes   Sig: Take 1 tablet (88 mcg total) by mouth daily   meclizine (ANTIVERT) 25 mg tablet Past Week at Unknown time  No Yes   Sig: Take 1 tablet (25 mg total) by mouth every 8 (eight) hours   metoprolol tartrate (LOPRESSOR) 100 mg tablet 8/30/2020 at Unknown time  No Yes   Sig: Take 1 tablet (100 mg total) by mouth every 12 (twelve) hours   nortriptyline (PAMELOR) 50 mg capsule 8/30/2020 at Unknown time  No Yes   Sig: Take 1 capsule (50 mg total) by mouth daily   oxyCODONE-acetaminophen (PERCOCET) 5-325 mg per tablet  Self No No   Sig: Take 1 tablet by mouth every 4 (four) hours as needed for moderate pain Max Daily Amount: 6 tablets   potassium chloride (K-DUR,KLOR-CON) 20 mEq tablet 8/30/2020 at Unknown time  No Yes   Sig: Take 2 tablets (40 mEq total) by mouth 2 (two) times a day for 4 doses   simvastatin (ZOCOR) 10 mg tablet 8/30/2020 at Unknown time  No Yes   Sig: Take 1 tablet (10 mg total) by mouth daily at bedtime   torsemide (DEMADEX) 20 mg tablet 8/30/2020 at Unknown time  No Yes   Sig: Take 1 tablet (20 mg total) by mouth see administration instructions Take 40 mg daily x 3 days (7/16, 7/17, 7/18) Take 1 tablet (20 mg total) by mouth daily except for Monday, Wednesday and Friday take 2 tablets (40 mg total) in morning      Facility-Administered Medications: None       Past Medical History:   Diagnosis Date    Acquired hypothyroidism 12/26/2018    Acute on chronic diastolic heart failure ([facility] ) 8/30/2018    Anemia     Arthritis     Atrial fibrillation (HCC)     CHF (congestive heart failure) ([facility] )     Disease of thyroid gland     History of transfusion     Hyperlipidemia     Hypertension     Migraine     Polyp of sigmoid colon     Prediabetes     Renal disorder     [facility]roke [facility] 2011       Past Surgical History:   Procedure Laterality Date    APPENDECTOMY      CARDIAC ELECTROPHYSIOLOGY STUDY AND ABLATION      CHOLECYSTECTOMY      COLONOSCOPY         Family History   Problem Relation Age of Onset    Diabetes Mother     Heart disease Mother     Hypertension Mother     Arthritis Mother     Coronary artery disease Mother     Drug abuse Paternal Grandmother      I have reviewed and agree with the history as documented  E-Cigarette/Vaping    E-Cigarette Use Never User      E-Cigarette/Vaping Substances    Nicotine No     THC No     CBD No     Flavoring No     Other No     Unknown No      Social History     Tobacco Use    Smoking status: Former Smoker     Types: Cigarettes     Last attempt to quit: 2011     Years since quittin 5    Smokeless tobacco: Never Used   Substance Use Topics    Alcohol use: Never     Alcohol/week: 0 0 standard drinks     Frequency: Never     Binge frequency: Never    Drug use: Never       Review of Systems   Constitutional: Negative for chills and fever  HENT: Negative for congestion, nosebleeds, rhinorrhea and sore throat  Eyes: Negative for pain and visual disturbance  Respiratory: Negative for cough and wheezing  Cardiovascular: Negative for chest pain and leg swelling  Gastrointestinal: Negative for abdominal distention, abdominal pain, diarrhea, nausea and vomiting  Genitourinary: Negative for dysuria and frequency  Musculoskeletal: Negative for back pain and joint swelling  Skin: Negative for rash and wound  Neurological: Negative for weakness and numbness  Psychiatric/Behavioral: Negative for decreased concentration and suicidal ideas  Physical Exam  Physical Exam  Vitals signs and nursing note reviewed  Constitutional:       Appearance: She is well-developed  HENT:      Head: Normocephalic and atraumatic  Eyes:      Conjunctiva/sclera: Conjunctivae normal       Pupils: Pupils are equal, round, and reactive to light  Neck:      Musculoskeletal: Normal range of motion and neck supple  Trachea: No tracheal deviation  Cardiovascular:      Rate and Rhythm: Normal rate and regular rhythm  Heart sounds: Normal heart sounds  No murmur  Pulmonary:      Effort: Pulmonary effort is normal  No respiratory distress  Breath sounds: Normal breath sounds  No wheezing or rales  Abdominal:      General: Bowel sounds are normal  There is no distension  Palpations: Abdomen is soft  Tenderness: There is no abdominal tenderness  Musculoskeletal:      Comments: Left ankle warmth, tenderness, effusion, minimal erythema   Skin:     General: Skin is warm and dry  Capillary Refill: Capillary refill takes less than 2 seconds  Neurological:      Mental Status: She is alert and oriented to person, place, and time  Sensory: No sensory deficit     Psychiatric:         Judgment: Judgment normal          Vital Signs  ED Triage Vitals [08/31/20 0839]   Temperature Pulse Respirations Blood Pressure SpO2   98 1 °F (36 7 °C) 92 18 143/72 99 %      Temp Source Heart Rate Source Patient Position - Orthostatic VS BP Location FiO2 (%)   Temporal Monitor Sitting Left arm --      Pain Score       Worst Possible Pain           Vitals:    08/31/20 0839 08/31/20 0845 08/31/20 1307 08/31/20 1500   BP: 143/72 143/72 115/57 118/58   Pulse: 92 89 78 60   Patient Position - Orthostatic VS: Sitting Sitting Lying Lying         Visual Acuity      ED Medications  Medications   amiodarone tablet 200 mg (200 mg Oral Given 8/31/20 1403)   apixaban (ELIQUIS) tablet 5 mg (5 mg Oral Given 8/31/20 1708)   diltiazem (CARDIZEM CD) 24 hr capsule 240 mg (240 mg Oral Given 8/31/20 1404)   escitalopram (LEXAPRO) tablet 10 mg (10 mg Oral Given 8/31/20 1403)   gabapentin (NEURONTIN) capsule 100 mg (100 mg Oral Given 8/31/20 1708)   levothyroxine tablet 88 mcg (has no administration in time range)   LORazepam (ATIVAN) tablet 1 mg (has no administration in time range)   meclizine (ANTIVERT) tablet 25 mg (has no administration in time range)   metoprolol tartrate (LOPRESSOR) tablet 100 mg (100 mg Oral Given 8/31/20 1403)   nortriptyline (PAMELOR) capsule 50 mg (50 mg Oral Given 8/31/20 1454)   oxyCODONE-acetaminophen (PERCOCET) 5-325 mg per tablet 1 tablet (has no administration in time range)   potassium chloride (K-DUR,KLOR-CON) CR tablet 40 mEq (40 mEq Oral Given 8/31/20 1708)   pravastatin (PRAVACHOL) tablet 20 mg (20 mg Oral Given 8/31/20 1708)   ondansetron (ZOFRAN) injection 4 mg (has no administration in time range)   insulin lispro (HumaLOG) 100 units/mL subcutaneous injection 1-5 Units (1 Units Subcutaneous Not Given 8/31/20 1640)   insulin lispro (HumaLOG) 100 units/mL subcutaneous injection 1-5 Units (has no administration in time range)   acetaminophen (TYLENOL) tablet 650 mg (has no administration in time range)   furosemide (LASIX) injection 20 mg (20 mg Intravenous Given 8/31/20 1708)   cefTRIAXone (ROCEPHIN) IVPB (premix) 1,000 mg 50 mL (has no administration in time range)   vancomycin (VANCOCIN) IVPB (premix) 750 mg 150 mL (has no administration in time range)   oxyCODONE-acetaminophen (PERCOCET) 5-325 mg per tablet 1 tablet (1 tablet Oral Given 8/31/20 0941)   lidocaine (PF) (XYLOCAINE-MPF) 2 % injection 10 mL (10 mL Infiltration Given 8/31/20 1005)   vancomycin (VANCOCIN) 1,750 mg in sodium chloride 0 9 % 500 mL IVPB (0 mg Intravenous Stopped 8/31/20 1541)   cefTRIAXone (ROCEPHIN) IVPB (premix) 1,000 mg 50 mL (0 mg Intravenous Stopped 8/31/20 1315)   oxyCODONE-acetaminophen (PERCOCET) 5-325 mg per tablet 1 tablet (1 tablet Oral Given 8/31/20 1228)       Diagnostic Studies  Results Reviewed     Procedure Component Value Units Date/Time    C-reactive protein [[id number]]  (Abnormal) Collected:  08/31/20 1111    Lab Status:  Final result Specimen:  Blood from Arm, Right Updated:  08/31/20 1154      6 mg/L     Basic metabolic panel [[id number]]  (Abnormal) Collected:  08/31/20 1111    Lab Status:  Final result Specimen:  Blood from Arm, Right Updated:  08/31/20 1153     Sodium 139 mmol/L      Potassium 2 8 mmol/L      Chloride 102 mmol/L      CO2 25 mmol/L      ANION GAP 12 mmol/L      BUN 19 mg/dL      Creatinine 1 37 mg/dL      Glucose 103 mg/dL      Calcium 8 6 mg/dL      eGFR 38 ml/min/1 73sq m     Narrative:       [place] guidelines for Chronic Kidney Disease (CKD):     Stage 1 with normal or high GFR (GFR > 90 mL/min/1 73 square meters)    Stage 2 Mild CKD (GFR = 60-89 mL/min/1 73 square meters)    Stage 3A Moderate CKD (GFR = 45-59 mL/min/1 73 square meters)    Stage 3B Moderate CKD (GFR = 30-44 mL/min/1 73 square meters)    Stage 4 Severe CKD (GFR = 15-29 mL/min/1 73 square meters)    Stage 5 End Stage CKD (GFR <15 mL/min/1 73 square meters)  Note: GFR calculation is accurate only with a steady state creatinine    CBC and differential [[id number]]  (Abnormal) Collected:  08/31/20 1111    Lab Status:  Final result Specimen:  Blood from Arm, Right Updated:  08/31/20 1125     WBC 8 60 Thousand/uL      RBC 4 49 Million/uL      Hemoglobin 10 0 g/dL      Hematocrit 31 2 %      MCV 69 fL      MCH 22 2 pg      MCHC 32 0 g/dL      RDW 19 4 %      MPV 9 3 fL      Platelets 256 Thousands/uL      Neutrophils Relative 78 %      Lymphocytes Relative 12 %      Monocytes Relative 8 %      Eosinophils Relative 1 %      Basophils Relative 1 %      Neutrophils Absolute 6 70 Thousands/µL      Lymphocytes Absolute 1 00 Thousands/µL      Monocytes Absolute 0 70 Thousand/µL      Eosinophils Absolute 0 10 Thousand/µL      Basophils Absolute 0 10 Thousands/µL     Sedimentation rate, automated [[id number]]  (Abnormal) Collected:  08/31/20 1111    Lab Status:  Final result Specimen:  Blood from Arm, Right Updated:  08/31/20 1120     Sed Rate 47 mm/hour     Narrative:       New method- Test performed using  automated Rheology Technology  If following a patient's inflammatory disease during treatment, a new baseline should be established  Synovial Fluid Diff [[id number]] Collected:  08/31/20 1036    Lab Status:  Final result Specimen:  Synovial Fluid Updated:  08/31/20 1113     Total Counted 100     Neutrophil % Synovial 94 %      Lymph % Synovial 1 %      Monocyte % Synovial 5 %     Synovial fluid, RBC count [[id number]]  (Abnormal) Collected:  08/31/20 1036    Lab Status:  Final result Specimen:  Synovial Fluid Updated:  08/31/20 1055     RBC, SYNOVIAL 8,215    Synovial fluid, white cell count w/ diff [[id number]] Collected:  08/31/20 1036    Lab Status:  Final result Specimen:  Synovial Fluid Updated:  08/31/20 1055     WBC, Fluid 69,719 /ul     Synovial fluid, Culture and Gram stain [[id number]] Collected:  08/31/20 1036    Lab Status: In process Specimen: Body Fluid Updated:  08/31/20 1036    Synovial fluid, crystal [[id number]]     Lab Status:  No result Specimen:  Synovial Fluid                  XR ankle 3+ views LEFT   Final Result by Juan Sneed MD (08/31 7405)      No acute osseous abnormality  Soft tissue swelling  Workstation performed: [id number]                    Procedures  Arthrocentesis    Date/Time: 8/31/2020 11:00 AM  Performed by: Ayaz Blake DO  Authorized by: Ayaz Blake DO     Location:  ED  Verbal consent obtained?: Yes    Risks and benefits: Risks, benefits and alternatives were discussed    Consent given by:  Patient  Patient states understanding of procedure being performed: Yes    Patient's understanding of procedure matches consent: Yes    Procedure consent matches procedure scheduled: Yes    Relevant documents present and verified: Yes    Test results available and properly labeled: Yes    Site marked: Yes    Radiology Images displayed and confirmed   If images not available, report reviewed: Yes    Required items: Required blood products, implants, devices and special equipment available    Patient identity confirmed:  Verbally with patient  Time out: Immediately prior to the procedure a time out was called    Indications:  Joint swelling and possible septic joint  Body area: Ankle  Joint:  Left ankle  Local anesthesia used?: Yes    Anesthesia:  Local infiltration  Local anesthetic:  Lidocaine 2% without epinephrine  Anesthetic total (ml):  3  Preparation: Patient was prepped and draped in usual sterile fashion    Needle size:  18 G  Ultrasound guidance: No    Equipment Used:  30ml syringe   Approach:  Medial  Aspirate amount (ml):  8  Aspirate:  Cloudy  Patient tolerance:  Patient tolerated the procedure well with no immediate complications             ED Course  ED Course as of Aug 31 1749   Mon Aug 31, 2020   1154 Spoke with Dr Elizabeth Zhang, will admit for possible septic arthritis  Dr Inga Mart will evaluate          US AUDIT      Most Recent Value   Initial Alcohol Screen: US AUDIT-C    1  How often do you have a drink containing alcohol?  0 Filed at: 08/31/2020 0841   2  How many drinks containing alcohol do you have on a typical day you are drinking? 0 Filed at: 08/31/2020 0841   3a  Male UNDER 65: How often do you have five or more drinks on one occasion? 0 Filed at: 08/31/2020 0841   3b  FEMALE Any Age, or MALE 65+: How often do you have 4 or more drinks on one occassion? 0 Filed at: 08/31/2020 0841   Audit-C Score  0 Filed at: 08/31/2020 0841                  DYAN/DAST-10      Most Recent Value   How many times in the past year have you    Used an illegal drug or used a prescription medication for non-medical reasons?   Never Filed at: 08/31/2020 0841                                MDM  Number of Diagnoses or Management Options  Septic arthritis [facility]): new and requires workup  Diagnosis management comments: Patient with insidiously swollen, warm, tender left ankle  Minimal erythema  No systemic signs of infection, no nhistory of RA or gout  Ddx includes septic vs inflammatory arthritis  Patient consented for arthrocentesis, small amount of fluid obtained, cloudy  Unable to send all tubes; will send WBC/RBC, possible gram stain      No leukocytosis, slightly elevated ESR, will start antibiotics  Spoke with Dr Dago Medina, will c/s Dr Cain Hernandez  Will admit to [facility] for possible septic arthritis       Amount and/or Complexity of Data Reviewed  Clinical lab tests: ordered and reviewed  Review and summarize past medical records: yes  Discuss the patient with other providers: yes  Independent visualization of images, tracings, or specimens: yes    Risk of Complications, Morbidity, and/or Mortality  Presenting problems: high  Diagnostic procedures: minimal  Management options: high          Disposition  Final diagnoses:   Septic arthritis ([address] )     Time reflects when diagnosis was documented in both MDM as applicable and the Disposition within this note     Time User Action Codes Description Comment    8/31/2020 12:16 PM Tiffany Shona Add [G85 612J] Ankle sprain     8/31/2020 12:16 PM Tiffany Shona Remove [N90 103P] Ankle sprain     8/31/2020 12:16 PM Tiffany Shona Add [M00 9] Septic arthritis [facility])       ED Disposition     ED Disposition Condition Date/Time Comment    Admit Stable Mon Aug 31, 2020 12:16 PM Case was discussed with Chevy Puri and the patient's admission status was agreed to be Admission Status: observation status to the service of Dr Miesha Best           Follow-up Information    None         Current Discharge Medication List      CONTINUE these medications which have NOT CHANGED    Details   amiodarone 200 mg tablet Take 1 tablet (200 mg total) by mouth daily  Qty: 90 tablet, Refills: 0    Associated Diagnoses: Atrial flutter with rapid ventricular response (HCC)      apixaban (Eliquis) 5 mg Take 1 tablet (5 mg total) by mouth 2 (two) times a day  Qty: 60 tablet, Refills: 0    Associated Diagnoses: Paroxysmal atrial fibrillation (HCC)      escitalopram (LEXAPRO) 10 mg tablet Take 1 tablet (10 mg total) by mouth daily  Qty: 30 tablet, Refills: 0    Associated Diagnoses: Other depression gabapentin (NEURONTIN) 100 mg capsule Take 1 capsule (100 mg total) by mouth 3 (three) times a day  Qty: 90 capsule, Refills: 0    Associated Diagnoses: Chronic bilateral low back pain without sciatica      levothyroxine 88 mcg tablet Take 1 tablet (88 mcg total) by mouth daily  Qty: 30 tablet, Refills: 0    Associated Diagnoses: Acquired hypothyroidism      LORazepam (ATIVAN) 1 mg tablet Take 1 tablet (1 mg total) by mouth 3 (three) times a day as needed for anxiety  Qty: 30 tablet, Refills: 0    Associated Diagnoses: Depression, unspecified depression type      meclizine (ANTIVERT) 25 mg tablet Take 1 tablet (25 mg total) by mouth every 8 (eight) hours  Qty: 90 tablet, Refills: 0    Associated Diagnoses: Vertigo, benign paroxysmal, bilateral      metoprolol tartrate (LOPRESSOR) 100 mg tablet Take 1 tablet (100 mg total) by mouth every 12 (twelve) hours  Qty: 60 tablet, Refills: 0    Associated Diagnoses: Atrial flutter with rapid ventricular response (HCC)      Multiple Vitamin (MULTIVITAMIN) tablet Take 1 tablet by mouth daily        nortriptyline (PAMELOR) 50 mg capsule Take 1 capsule (50 mg total) by mouth daily  Qty: 30 capsule, Refills: 0    Associated Diagnoses: Migraine without status migrainosus, not intractable, unspecified migraine type      potassium chloride (K-DUR,KLOR-CON) 20 mEq tablet Take 2 tablets (40 mEq total) by mouth 2 (two) times a day for 4 doses  Qty: 8 tablet, Refills: 0    Associated Diagnoses: Depression, unspecified depression type      simvastatin (ZOCOR) 10 mg tablet Take 1 tablet (10 mg total) by mouth daily at bedtime  Qty: 30 tablet, Refills: 0    Associated Diagnoses: Hypercholesterolemia      torsemide (DEMADEX) 20 mg tablet Take 1 tablet (20 mg total) by mouth see administration instructions Take 40 mg daily x 3 days (7/16, 7/17, 7/18) Take 1 tablet (20 mg total) by mouth daily except for Monday, Wednesday and Friday take 2 tablets (40 mg total) in morning  Qty: 90 tablet, Refills: 0    Associated Diagnoses: Essential hypertension      diltiazem (CARDIZEM CD) 240 mg 24 hr capsule Take 1 capsule (240 mg total) by mouth daily  Qty: 30 capsule, Refills: 0    Associated Diagnoses: Arrhythmia      oxyCODONE-acetaminophen (PERCOCET) 5-325 mg per tablet Take 1 tablet by mouth every 4 (four) hours as needed for moderate pain Max Daily Amount: 6 tablets  Qty: 50 tablet, Refills: 0    Associated Diagnoses: Migraine without aura and without status migrainosus, not intractable           No discharge procedures on file      PDMP Review       Value Time User    PDMP Reviewed  Yes 8/3/2020  1:45 PM Josiephine Lundborg, MD          ED Provider  Electronically Signed by           Med Maynard DO  08/31/20 5682

## 2020-08-31 NOTE — CONSULTS
Vancomycin Assessment    Leelee Cornelius is a 70 y o  female who is currently receiving vancomycin 1750 mg IV X 1 dose then 750 mg IV Q 12 Hrs for   bone/joint infection   Relevant clinical data and objective history reviewed:  Creatinine   Date Value Ref Range Status   08/31/2020 1 37 (H) 0 60 - 1 20 mg/dL Final     Comment:     Standardized to IDMS reference method   08/03/2020 1 30 (H) 0 60 - 1 20 mg/dL Final     Comment:     Standardized to IDMS reference method   08/02/2020 1 33 (H) 0 60 - 1 20 mg/dL Final     Comment:     Standardized to IDMS reference method   08/19/2015 1 26 0 60 - 1 30 mg/dL Final     Comment:     Standardized to IDMS reference method   05/06/2015 1 07 0 60 - 1 30 mg/dL Final     Comment:     Standardized to IDMS reference method   03/06/2015 1 10 0 60 - 1 30 mg/dL Final     Comment:     Standardized to IDMS reference method     /57 (BP Location: Left arm)   Pulse 78   Temp (!) 97 1 °F (36 2 °C) (Temporal)   Resp 18   Ht 5' 7" (1 702 m)   Wt 113 kg (248 lb 10 9 oz)   LMP  (LMP Unknown)   SpO2 95%   Breastfeeding No   BMI 38 95 kg/m²   No intake/output data recorded  Lab Results   Component Value Date/Time    BUN 19 08/31/2020 11:11 AM    BUN 16 08/19/2015 07:13 AM    WBC 8 60 08/31/2020 11:11 AM    WBC 7 05 08/19/2015 07:13 AM    HGB 10 0 (L) 08/31/2020 11:11 AM    HGB 12 4 08/19/2015 07:13 AM    HCT 31 2 (L) 08/31/2020 11:11 AM    HCT 37 6 08/19/2015 07:13 AM    MCV 69 (L) 08/31/2020 11:11 AM    MCV 85 08/19/2015 07:13 AM     08/31/2020 11:11 AM     08/19/2015 07:13 AM     Temp Readings from Last 3 Encounters:   08/31/20 (!) 97 1 °F (36 2 °C) (Temporal)   08/03/20 97 6 °F (36 4 °C) (Temporal)   07/15/20 97 5 °F (36 4 °C) (Temporal)     Vancomycin Days of Therapy: 1    Assessment/Plan  The patient is currently on vancomycin utilizing scheduled dosing based on adjusted body weight (due to obesity)    Baseline risks associated with therapy include: pre-existing renal impairment, concomitant nephrotoxic medications, advanced age, and dehydration  The patient is currently receiving 1750 mg IV X 1 dose then 750 mg IV Q 12 Hrs and is clinically appropriate and dose will be continued  Pharmacy will also follow closely for s/sx of nephrotoxicity, infusion reactions, and appropriateness of therapy  BMP and CBC will be ordered per protocol  Plan for trough as patient approaches steady state, prior to the 5th  dose at approximately 1400 on 9/2/20  Due to infection severity, will target a trough of 15-20 (appropriate for most indications)   Pharmacy will continue to follow the patients culture results and clinical progress daily      Marianela [place], Pharmacist

## 2020-08-31 NOTE — ASSESSMENT & PLAN NOTE
· Woke with pain on Friday, 8/28/2020  · No injury  · Unable to ambulate  · In ED on 8/31/2020: fluid removed from ankle and sent for evaluation  · RBC: 8215  · WBC: 97999  · Neutrophils: 94  · Lymphocytes: 1  · Moncytes: 5  · CRP: 121 6  · SED: 47  · Percocet for pain control  · 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  · Continue Rocephin   · Consultation with Orthopedics called from ED  · Xray Left ankle: There is no acute fracture or dislocation  An inferior calcaneal spur is present  No lytic or blastic osseous lesion  There is soft tissue swelling

## 2020-08-31 NOTE — H&P
H&P- Denise [place] 1945, 70 y o  female MRN: [id number]    Unit/Bed#: -02 Encounter: [id number]    Primary Care Provider: Mark Mccullough MD   Date and time admitted to hospital: 8/31/2020  8:36 AM        * Acute left ankle pain  Assessment & Plan  · Woke with pain on Friday, 8/28/2020  · No injury  · Unable to ambulate  · In ED on 8/31/2020: fluid removed from ankle and sent for evaluation  · RBC: 8215  · WBC: 58964  · Neutrophils: 94  · Lymphocytes: 1  · Moncytes: 5  · CRP: 121 6  · SED: 47  · Percocet for pain control  · 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  · Consultation with Orthopedics called from ED  · Xray Left ankle: There is no acute fracture or dislocation  An inferior calcaneal spur is present  No lytic or blastic osseous lesion  There is soft tissue swelling  Acute hypokalemia  Assessment & Plan  · 2 8 on 8/31/2020  · Last discharge pt was on torsemide and potassium replacement  · Patient did not have medications refilled     · Replace with 40 mEq PO BID  · Monitor labs    Stage 3 chronic kidney disease ([facility] )  Assessment & Plan  · Patient is at her baseline  · Continue home medications    Anemia due to stage 3 chronic kidney disease ([facility] )  Assessment & Plan  · Patient is at her baseline  · Continue to monitor labs    Essential hypertension  Assessment & Plan  · Will continue home medications of amiodarone, cardizem, metoprolol    Vertigo, benign paroxysmal, bilateral  Assessment & Plan  · Stable at this time  · Continue with prn antivert    Hypercholesterolemia  Assessment & Plan  · Continue statin    Mild episode of recurrent major depressive disorder ([facility] )  Assessment & Plan  · Continue pamelor, lexapro, ativan    Class 3 severe obesity due to excess calories with serious comorbidity and body mass index (BMI) of 40 0 to 44 9 in [facility])  Assessment & Plan  · Supportive care     Acquired hypothyroidism  Assessment & Plan  · Continue synthroid     Prediabetes  Assessment & Plan  · Will check Accucheck QAC and HS with SSI    Chronic atrial fibrillation (HCC)  Assessment & Plan  · Stable on eliquis and cardizem    DANTE on CPAP  Assessment & Plan  · Respiratory protocol      VTE Prophylaxis: Apixaban (Eliquis)  Code Status: full code   POLST: POLST is not applicable to this patient  Discussion with family: discussed with the patient     Anticipated Length of Stay:  Patient will be admitted on an Observation basis with an anticipated length of stay of  < 2 midnights  Justification for Hospital Stay: continue with IV antibiotics and pain control  Have PT OT see patient  Chief Complaint:   Left ankle pain    History of Present Illness:    Garland Arnett is a 70 y o  female who presents with left ankle pain  It started on Friday, 8/28/2020  Since then has progressively gotten worse to the point now the patient is not able to walk  Patient rates the pain a 10/10 with ambulation, and states that she feels as if it would break if she kept walking on it  In the ED, the provider did attempt to drain the ankle and send fluid  There unfortunately was not enough fluid and only red blood cells, WBC, differential were able to be resulted  The culture and Gram stain and crystal were not able to be resulted  Patient does present as a gout flare without any history of gout  Patient does have elevated CRP of 121 6, and elevated sed rate at 47  X-ray of the left ankle does show inferior calcaneal spur  Patient was started on Rocephin IV and will be continued on this during this hospitalization  She was also given Percocet in the emergency department and will be continued on this as well  Patient was subsequently found to have low potassium level and will have replacement  Review of Systems:  Review of Systems   Constitutional: Positive for activity change  Musculoskeletal: Positive for gait problem and joint swelling         Past Medical and Surgical History:   Past Medical History: Diagnosis Date    Acquired hypothyroidism 12/26/2018    [facility] 8/30/2018    Anemia     Arthritis     Atrial fibrillation (HCC)     CHF (congestive heart failure) (HCC)     Disease of thyroid gland     History of transfusion     Hyperlipidemia     Hypertension     Migraine     Polyp of sigmoid colon     Prediabetes     Renal disorder     Stroke ([facility] ) 2011       Past Surgical History:   Procedure Laterality Date    APPENDECTOMY      CARDIAC ELECTROPHYSIOLOGY STUDY AND ABLATION      CHOLECYSTECTOMY      COLONOSCOPY         Meds/Allergies:  Prior to Admission medications    Medication Sig Start Date End Date Taking?  Authorizing Provider   amiodarone 200 mg tablet Take 1 tablet (200 mg total) by mouth daily 7/16/20  Yes ALEJANDRA Amezcua   apixaban (Eliquis) 5 mg Take 1 tablet (5 mg total) by mouth 2 (two) times a day 7/15/20  Yes ALEJANDRA Amezcua   escitalopram (LEXAPRO) 10 mg tablet Take 1 tablet (10 mg total) by mouth daily 5/4/20  Yes ALEJANDRA Amezcua   gabapentin (NEURONTIN) 100 mg capsule Take 1 capsule (100 mg total) by mouth 3 (three) times a day 5/4/20  Yes ALEJANDRA Amezcua   levothyroxine 88 mcg tablet Take 1 tablet (88 mcg total) by mouth daily 5/4/20  Yes ALEJANDRA Amezcua   LORazepam (ATIVAN) 1 mg tablet Take 1 tablet (1 mg total) by mouth 3 (three) times a day as needed for anxiety 8/3/20  Yes Archana Clemens MD   meclizine (ANTIVERT) 25 mg tablet Take 1 tablet (25 mg total) by mouth every 8 (eight) hours 8/3/20  Yes Zeny Irene MD   metoprolol tartrate (LOPRESSOR) 100 mg tablet Take 1 tablet (100 mg total) by mouth every 12 (twelve) hours 7/7/20 8/31/20 Yes Zeny Irene MD   Multiple Vitamin (MULTIVITAMIN) tablet Take 1 tablet by mouth daily     Yes Historical Provider, MD   nortriptyline (PAMELOR) 50 mg capsule Take 1 capsule (50 mg total) by mouth daily 5/4/20  Yes ALEJANDRA Amezcua   potassium chloride (K-DUR,KLOR-CON) 20 mEq tablet Take 2 tablets (40 mEq total) by mouth 2 (two) times a day for 4 doses 8/3/20 8/31/20 Yes Maria Teresa Crabtree MD   simvastatin (ZOCOR) 10 mg tablet Take 1 tablet (10 mg total) by mouth daily at bedtime 20  Yes ALEJANDRA San   torsemide (DEMADEX) 20 mg tablet Take 1 tablet (20 mg total) by mouth see administration instructions Take 40 mg daily x 3 days (, , ) Take 1 tablet (20 mg total) by mouth daily except for Monday, Wednesday and Friday take 2 tablets (40 mg total) in morning 7/15/20 8/31/20 Yes ALEJANDRA San   diltiazem (CARDIZEM CD) 240 mg 24 hr capsule Take 1 capsule (240 mg total) by mouth daily 7/16/20 8/15/20  ALEJANDRA San   oxyCODONE-acetaminophen (PERCOCET) 5-325 mg per tablet Take 1 tablet by mouth every 4 (four) hours as needed for moderate pain Max Daily Amount: 6 tablets 18   Lucas Shaffer MD     I have reviewed home medications with patient personally      Allergies: No Known Allergies    Social History:  Marital Status:    Occupation:  Retired  Patient Pre-hospital Living Situation:  In her apartment alone  Patient Pre-hospital Level of Mobility: full function  Patient Pre-hospital Diet Restrictions: regular  Substance Use History:   Social History     Substance and Sexual Activity   Alcohol Use Never    Alcohol/week: 0 0 standard drinks    Frequency: Never    Binge frequency: Never     Social History     Tobacco Use   Smoking Status Former Smoker    Types: Cigarettes    Last attempt to quit: 2011    Years since quittin 5   Smokeless Tobacco Never Used     Social History     Substance and Sexual Activity   Drug Use Never       Family History:  I have reviewed the patients family history    Physical Exam:   Vitals:   Blood Pressure: 118/58 (20 1500)  Pulse: 60 (20 1500)  Temperature: (!) 97 °F (36 1 °C) (20 1500)  Temp Source: Tympanic (20 1500)  Respirations: 18 (08/31/20 1500)  Height: 5' 7" (170 2 cm) (08/31/20 1307)  Weight - Scale: 113 kg (248 lb 10 9 oz) (08/31/20 1307)  SpO2: 99 % (08/31/20 1500)    Physical Exam  Constitutional:       General: She is awake  Appearance: Normal appearance  She is well-developed and overweight  HENT:      Head: Normocephalic and atraumatic  Nose: Nose normal       Mouth/Throat:      Mouth: Mucous membranes are moist    Eyes:      Extraocular Movements: Extraocular movements intact  Neck:      Musculoskeletal: Normal range of motion  Cardiovascular:      Rate and Rhythm: Normal rate and regular rhythm  Pulses: Normal pulses  Heart sounds: Normal heart sounds  Pulmonary:      Effort: Pulmonary effort is normal       Breath sounds: Normal breath sounds  Abdominal:      General: Bowel sounds are normal    Musculoskeletal:        Feet:    Skin:     General: Skin is warm  Neurological:      Mental Status: She is alert and oriented to person, place, and time  Psychiatric:         Attention and Perception: Attention normal          Mood and Affect: Mood normal          Speech: Speech normal          Behavior: Behavior normal  Behavior is cooperative  Additional Data:   Lab Results: I have personally reviewed pertinent reports  Results from last 7 days   Lab Units 08/31/20  1111   WBC Thousand/uL 8 60   HEMOGLOBIN g/dL 10 0*   HEMATOCRIT % 31 2*   PLATELETS Thousands/uL 261   NEUTROS PCT % 78*   LYMPHS PCT % 12*   MONOS PCT % 8   EOS PCT % 1     Results from last 7 days   Lab Units 08/31/20  1111   SODIUM mmol/L 139   POTASSIUM mmol/L 2 8*   CHLORIDE mmol/L 102   CO2 mmol/L 25   BUN mg/dL 19   CREATININE mg/dL 1 37*   CALCIUM mg/dL 8 6         Results from last 7 days   Lab Units 08/31/20  1606   POC GLUCOSE mg/dl 98           Imaging: I have personally reviewed pertinent reports  XR ankle 3+ views LEFT   Final Result by Savita Arevalo MD (08/31 4892)      No acute osseous abnormality    Soft tissue swelling  Workstation performed: [id number]             EKG, Pathology, and Other Studies Reviewed on Admission:   · EKG: not performed in the ED  Epic Records Reviewed: Yes     ** Please Note: This note has been constructed using a voice recognition system   **

## 2020-09-01 LAB
ANION GAP SERPL CALCULATED.3IONS-SCNC: 9 MMOL/L (ref 4–13)
BASOPHILS # BLD AUTO: 0.1 THOUSANDS/ΜL (ref 0–0.1)
BASOPHILS NFR BLD AUTO: 1 % (ref 0–2)
BUN SERPL-MCNC: 21 MG/DL (ref 7–25)
CALCIUM SERPL-MCNC: 8.7 MG/DL (ref 8.6–10.5)
CHLORIDE SERPL-SCNC: 104 MMOL/L (ref 98–107)
CO2 SERPL-SCNC: 27 MMOL/L (ref 21–31)
CREAT SERPL-MCNC: 1.41 MG/DL (ref 0.6–1.2)
EOSINOPHIL # BLD AUTO: 0.1 THOUSAND/ΜL (ref 0–0.61)
EOSINOPHIL NFR BLD AUTO: 2 % (ref 0–5)
ERYTHROCYTE [DISTWIDTH] IN BLOOD BY AUTOMATED COUNT: 18.7 % (ref 11.5–14.5)
GFR SERPL CREATININE-BSD FRML MDRD: 37 ML/MIN/1.73SQ M
GLUCOSE SERPL-MCNC: 102 MG/DL (ref 65–99)
GLUCOSE SERPL-MCNC: 129 MG/DL (ref 65–140)
GLUCOSE SERPL-MCNC: 134 MG/DL (ref 65–140)
GLUCOSE SERPL-MCNC: 167 MG/DL (ref 65–140)
GLUCOSE SERPL-MCNC: 82 MG/DL (ref 65–140)
HCT VFR BLD AUTO: 28.9 % (ref 42–47)
HGB BLD-MCNC: 9.3 G/DL (ref 12–16)
LYMPHOCYTES # BLD AUTO: 1.4 THOUSANDS/ΜL (ref 0.6–4.47)
LYMPHOCYTES NFR BLD AUTO: 18 % (ref 21–51)
MCH RBC QN AUTO: 22.6 PG (ref 26–34)
MCHC RBC AUTO-ENTMCNC: 32.1 G/DL (ref 31–37)
MCV RBC AUTO: 71 FL (ref 81–99)
MONOCYTES # BLD AUTO: 0.7 THOUSAND/ΜL (ref 0.17–1.22)
MONOCYTES NFR BLD AUTO: 9 % (ref 2–12)
NEUTROPHILS # BLD AUTO: 5.5 THOUSANDS/ΜL (ref 1.4–6.5)
NEUTS SEG NFR BLD AUTO: 71 % (ref 42–75)
PLATELET # BLD AUTO: 251 THOUSANDS/UL (ref 149–390)
PMV BLD AUTO: 9.5 FL (ref 8.6–11.7)
POTASSIUM SERPL-SCNC: 3.8 MMOL/L (ref 3.5–5.5)
RBC # BLD AUTO: 4.09 MILLION/UL (ref 3.9–5.2)
SODIUM SERPL-SCNC: 140 MMOL/L (ref 134–143)
URATE SERPL-MCNC: 11.6 MG/DL (ref 2.3–7.6)
WBC # BLD AUTO: 7.8 THOUSAND/UL (ref 4.8–10.8)

## 2020-09-01 PROCEDURE — 80048 BASIC METABOLIC PNL TOTAL CA: CPT | Performed by: NURSE PRACTITIONER

## 2020-09-01 PROCEDURE — 99222 1ST HOSP IP/OBS MODERATE 55: CPT | Performed by: ORTHOPAEDIC SURGERY

## 2020-09-01 PROCEDURE — 85025 COMPLETE CBC W/AUTO DIFF WBC: CPT | Performed by: NURSE PRACTITIONER

## 2020-09-01 PROCEDURE — 97167 OT EVAL HIGH COMPLEX 60 MIN: CPT

## 2020-09-01 PROCEDURE — 97530 THERAPEUTIC ACTIVITIES: CPT

## 2020-09-01 PROCEDURE — 97163 PT EVAL HIGH COMPLEX 45 MIN: CPT

## 2020-09-01 PROCEDURE — 84550 ASSAY OF BLOOD/URIC ACID: CPT | Performed by: ORTHOPAEDIC SURGERY

## 2020-09-01 PROCEDURE — 99233 SBSQ HOSP IP/OBS HIGH 50: CPT | Performed by: NURSE PRACTITIONER

## 2020-09-01 PROCEDURE — 82948 REAGENT STRIP/BLOOD GLUCOSE: CPT

## 2020-09-01 RX ORDER — POTASSIUM CHLORIDE 20 MEQ/1
20 TABLET, EXTENDED RELEASE ORAL 2 TIMES DAILY
Status: DISCONTINUED | OUTPATIENT
Start: 2020-09-01 | End: 2020-09-04 | Stop reason: HOSPADM

## 2020-09-01 RX ORDER — COLCHICINE 0.6 MG/1
1.2 TABLET ORAL ONCE
Status: COMPLETED | OUTPATIENT
Start: 2020-09-01 | End: 2020-09-01

## 2020-09-01 RX ORDER — COLCHICINE 0.6 MG/1
0.6 TABLET ORAL DAILY
Status: DISCONTINUED | OUTPATIENT
Start: 2020-09-02 | End: 2020-09-04 | Stop reason: HOSPADM

## 2020-09-01 RX ADMIN — AMIODARONE HYDROCHLORIDE 200 MG: 100 TABLET ORAL at 08:45

## 2020-09-01 RX ADMIN — DILTIAZEM HYDROCHLORIDE 240 MG: 240 CAPSULE, COATED, EXTENDED RELEASE ORAL at 08:45

## 2020-09-01 RX ADMIN — GABAPENTIN 100 MG: 100 CAPSULE ORAL at 17:43

## 2020-09-01 RX ADMIN — LEVOTHYROXINE SODIUM 88 MCG: 88 TABLET ORAL at 05:28

## 2020-09-01 RX ADMIN — COLCHICINE 1.2 MG: 0.6 TABLET, FILM COATED ORAL at 17:42

## 2020-09-01 RX ADMIN — FUROSEMIDE 20 MG: 10 INJECTION, SOLUTION INTRAMUSCULAR; INTRAVENOUS at 08:49

## 2020-09-01 RX ADMIN — ONDANSETRON 4 MG: 2 INJECTION INTRAMUSCULAR; INTRAVENOUS at 09:27

## 2020-09-01 RX ADMIN — PRAVASTATIN SODIUM 20 MG: 20 TABLET ORAL at 17:43

## 2020-09-01 RX ADMIN — APIXABAN 5 MG: 5 TABLET, FILM COATED ORAL at 17:43

## 2020-09-01 RX ADMIN — MECLIZINE 25 MG: 12.5 TABLET ORAL at 09:26

## 2020-09-01 RX ADMIN — INSULIN LISPRO 1 UNITS: 100 INJECTION, SOLUTION INTRAVENOUS; SUBCUTANEOUS at 17:42

## 2020-09-01 RX ADMIN — ESCITALOPRAM OXALATE 10 MG: 10 TABLET ORAL at 08:45

## 2020-09-01 RX ADMIN — POTASSIUM CHLORIDE 20 MEQ: 1500 TABLET, EXTENDED RELEASE ORAL at 17:43

## 2020-09-01 RX ADMIN — GABAPENTIN 100 MG: 100 CAPSULE ORAL at 08:45

## 2020-09-01 RX ADMIN — FUROSEMIDE 20 MG: 10 INJECTION, SOLUTION INTRAMUSCULAR; INTRAVENOUS at 17:42

## 2020-09-01 RX ADMIN — METOPROLOL TARTRATE 100 MG: 50 TABLET, FILM COATED ORAL at 21:42

## 2020-09-01 RX ADMIN — VANCOMYCIN HYDROCHLORIDE 750 MG: 750 INJECTION, SOLUTION INTRAVENOUS at 14:53

## 2020-09-01 RX ADMIN — GABAPENTIN 100 MG: 100 CAPSULE ORAL at 21:41

## 2020-09-01 RX ADMIN — NORTRIPTYLINE HYDROCHLORIDE 50 MG: 25 CAPSULE ORAL at 08:46

## 2020-09-01 RX ADMIN — VANCOMYCIN HYDROCHLORIDE 750 MG: 750 INJECTION, SOLUTION INTRAVENOUS at 01:55

## 2020-09-01 RX ADMIN — OXYCODONE HYDROCHLORIDE AND ACETAMINOPHEN 1 TABLET: 5; 325 TABLET ORAL at 08:44

## 2020-09-01 RX ADMIN — POTASSIUM CHLORIDE 40 MEQ: 1500 TABLET, EXTENDED RELEASE ORAL at 08:45

## 2020-09-01 RX ADMIN — CEFTRIAXONE 1000 MG: 1 INJECTION, SOLUTION INTRAVENOUS at 12:04

## 2020-09-01 RX ADMIN — APIXABAN 5 MG: 5 TABLET, FILM COATED ORAL at 08:45

## 2020-09-01 RX ADMIN — OXYCODONE HYDROCHLORIDE AND ACETAMINOPHEN 1 TABLET: 5; 325 TABLET ORAL at 21:46

## 2020-09-01 RX ADMIN — METOPROLOL TARTRATE 100 MG: 50 TABLET, FILM COATED ORAL at 08:45

## 2020-09-01 NOTE — CONSULTS
Vancomycin IV Pharmacy-To-Dose Consultation:    Twin Hernandez is a 70 y o  female who is currently receiving Vancomycin IV with management by the Pharmacy Consult service for the treatment of a   bone/joint injection  Assessment/Plan:    The patient's chart was reviewed  Renal function is stable  There are no signs or symptoms of nephrotoxicity and/or infusion reactions documented  Based on today's assessment, will continue current vancomycin (Day # 2) dosing of 750 mg IV Q 12 hrs, with a plan for trough to be drawn at 1400 on 9/2/20  We will continue to follow the patient's culture results and clinical progress daily      Yovany Shore, Pharmacist

## 2020-09-01 NOTE — PHYSICAL THERAPY NOTE
Physical Therapy Evaluation     Patient's Name: Marin Welch    Admitting Diagnosis  Se[facility] ([facility] ) [M00 9]  Foot pain [M79 673]  Ankle pain [M25 579]    Problem List  Patient Active Problem List   Diagnosis    Arrhythmia    Essential hypertension    Prediabetes    Acute respiratory distress    Mild episode of recurrent major depressive disorder ([facility] )    Hypercholesterolemia    Migraine    Mitral regurgitation    Class 3 severe obesity due to excess calories with serious comorbidity and body mass index (BMI) of 40 0 to 44 9 in adult ([facility] )    DANTE on CPAP    Primary localized osteoarthritis of right knee    Stroke ([facility] )    History of stroke    Chronic diastolic congestive heart failure (HCC)    Postural dizziness with presyncope    Symptomatic bradycardia    Chronic bilateral low back pain without sciatica    Stage 3 chronic kidney disease (HCC)    Anemia due to stage 3 chronic kidney disease ([facility] )    Acquired hypothyroidism    Acute gastric ulcer with hemorrhage    Gastroesophageal reflux disease without esophagitis    Blurred vision, right eye    History of noncompliance with medical treatment    Chest pain in adult    Chronic atrial fibrillation (HCC)    Elevated lactic acid level    Vertigo, benign paroxysmal, bilateral    History of prior ablation treatment    Acute left ankle pain    Acute hypokalemia       Past Medical History  Past Medical History:   Diagnosis Date    Acquired hypothyroidism 12/26/2018    Acute on chronic diastolic heart failure ([facility] ) 8/30/2018    Anemia     Arthritis     Atrial fibrillation (HCC)     CHF (congestive heart failure) ([facility] )     Disease of thyroid gland     History of transfusion     Hyperlipidemia     Hypertension     Migraine     Polyp of sigmoid colon     Prediabetes     Renal disorder     Stroke [facility]) 2011       Past Surgical History  Past Surgical History:   Procedure Laterality Date    APPENDECTOMY      CARDIAC ELECTROPHYSIOLOGY STUDY AND ABLATION      CHOLECYSTECTOMY      COLONOSCOPY            09/01/20 5879   Note Type   Note type Eval/Treat   Pain Assessment   Pain Assessment Tool 0-10   Pain Score 8   Pain Location/Orientation Orientation: Bilateral;Location: Foot  (ankles)   Pain Onset/Description Onset: Ongoing;Frequency: Constant/Continuous; Descriptor: Dull;Descriptor: Throbbing   Patient's Stated Pain Goal No pain   Hospital Pain Intervention(s) Repositioned;Elevated; Emotional support; Rest   Home Living   Type of [address] One level;Performs ADLs on one level; Able to live on main level with bedroom/bathroom; Access  (0 PETE)   Bathroom Shower/Tub Tub/shower unit   Bathroom Toilet Standard   Bathroom Equipment Grab bars in shower; Shower chair   Bathroom Accessibility Accessible   Home Equipment Walker;Cane  (no AD at baseline)   Prior Function   Level of [place] Independent with ADLs and functional mobility   Lives With Alone   Receives Help From Family  (dtr lives local to pt)   ADL Assistance Independent   IADLs Needs assistance   Falls in the last 6 months 0   Vocational Retired   Restrictions/Precautions   [facility] Bearing Precautions Per Order No   Other Precautions Fall Risk;Pain;Multiple lines   General   Family/Caregiver Present No   Cognition   Overall Cognitive Status WFL   Arousal/Participation Alert   Orientation Level Oriented X4   Memory Within functional limits   Following Commands Follows all commands and directions without difficulty   Comments pt agreeable to PT session   RLE Assessment   RLE Assessment X   Strength RLE   RLE Overall Strength 3+/5   LLE Assessment   LLE Assessment X   Strength LLE   LLE Overall Strength 3+/5   Coordination   Movements are Fluid and Coordinated 0   Sensation WFL   Bed Mobility   Supine to Sit 4  Minimal assistance   Additional items Assist x 1;HOB elevated; Bedrails; Increased time required;Verbal cues   Transfers   Sit to Stand 3  Moderate assistance   Additional items Assist x 2; Increased time required;Verbal cues   Stand to Sit 3  Moderate assistance   Additional items Assist x 2;Verbal cues;Armrests   Stand pivot 3  Moderate assistance   Additional items Assist x 2; Increased time required;Verbal cues  (c walker)   Ambulation/Elevation   Gait pattern Improper Weight shift; Poor UE support; Antalgic;Narrow LORI; Forward Flexion;Decreased foot clearance; Step to;Excessively slow   Gait Assistance 3  Moderate assist   Additional items Assist x 2;Verbal cues; Tactile cues   Assistive Device Rolling walker   Distance 3 ft from bed>recliner   Balance   Static Sitting Good   Dynamic Sitting Fair +   Static Standing Poor +   Dynamic Standing Poor   Ambulatory Poor   Endurance Deficit   Endurance Deficit Yes   Activity Tolerance   Activity Tolerance Patient limited by pain   Nurse Made Aware Yes, RN Magali Powell verbalized pt appropriate for PT session, made aware of outcomes/recs   Assessment   Prognosis Good   Problem List Decreased strength;Decreased endurance; Impaired balance;Decreased mobility;Pain;Obesity   Assessment Pt is 70 y o  female seen for PT evaluation on 9/1/2020 s/p admit to [address] on 8/31/2020 w/ Acute left ankle pain  PT consulted to assess pt's functional mobility and d/c needs  Order placed for PT eval and tx, w/ up w/ A order  PT performed at least 2 patient identifiers during session: Name and wristband  Comorbidities affecting pt's physical performance at time of assessment include: hypokalemia, CKD stage 3, anemia, essential HTN, h/o vertigo, obesity, MDD, hypercholesterolemia, AFib, DANTE on CPAP, h/o stroke  PTA, pt was independent w/ all functional mobility w/ no AD at baseline, ambulates household distances, lives alone in 1 level apartment and retired   Personal factors affecting pt at time of IE include: ambulating w/ assistive device, inability to navigate level surfaces w/o external assistance, unable to perform dynamic tasks in community, limited home support, decreased initiation and engagement, unable to perform physical activity, inability to perform IADLs and inability to perform ADLs  Please find objective findings from PT assessment regarding body systems outlined above with impairments and limitations including weakness, impaired balance, decreased endurance, gait deviations, pain, decreased activity tolerance, decreased functional mobility tolerance and fall risk, as well as mobility assessment (need for cueing for mobility technique)  The following objective measures performed on IE also reveal limitations: Barthel Index: 50/100 and Modified [place]: 4 (moderate/severe disability)  Pt's clinical presentation is currently unstable/unpredictable seen in pt's presentation of abnormal lab value(s) and ongoing medical assessment  Pt to benefit from continued PT tx to address deficits as defined above and maximize level of functional independent mobility and consistency  From PT/mobility standpoint, recommendation at time of d/c would be STR pending progress in order to facilitate return to PLOF  Barriers to Discharge Inaccessible home environment;Decreased caregiver support   Goals   Patient Goals "to have no pain"   [facility] Expiration Date 09/11/20   Short Term Goal #1 In 7-10 days: Increase bilateral LE strength 1/2 grade to facilitate independent mobility, Perform all bed mobility tasks modified independent to decrease caregiver burden, Perform all transfers modified independent to improve independence, Ambulate > 50 ft  with least restrictive assistive device modified independent w/o LOB and w/ normalized gait pattern 100% of the time, Increase all balance 1/2 grade to decrease risk for falls, Complete exercise program independently and Tolerate 4 hr OOB to faciliate upright tolerance   PT Treatment Day 1   Plan   Treatment/Interventions Functional transfer training;LE strengthening/ROM; Therapeutic exercise; Endurance training;Patient/family training;Equipment eval/education; Bed mobility;Gait training;Spoke to nursing;Spoke to case management   PT Frequency   (3-5x/wk)   Recommendation   PT Discharge Recommendation Post-Acute Rehabilitation Services   Equipment Recommended   (continue walker use)   PT - OK to Discharge Yes  (when medically cleared, if to STR)   Modified [place] Scale   Modified [place] Scale 4   Barthel Index   Feeding 10   Bathing 0   Grooming Score 5   Dressing Score 5   Bladder Score 10   Bowels Score 10   Toilet Use Score 5   Transfers (Bed/Chair) Score 5   Mobility (Level Surface) Score 0   Stairs Score 0   Barthel Index Score 50       Physical Therapy Treatment Note  Time In: 0918  Time Out: 0928  Total Time: 10 min     S:  Pt agreeable to PT treatment session s/p PT eval, in no apparent distress, A&O x 4 and responsive      O:  Pt seen for PT treatment session this date with interventions consisting of gait training w/ emphasis on improving pt's ability to ambulate level surfaces x 3 ft with mod A of 2 provided by therapist with RW and therapeutic activity consisting of training: static standing tolerance for < 1 minutes w/ B UE support and vc and tactile cues for static standing posture faciliation  VC required for technique  Pt c (+) reports of nausea and dizziness, requesting antivert- RN made aware      A:  Post session: pt returned back to recliner, all needs in reach and RN notified of session findings/recommendations     P:  Continue to recommend STR at time of d/c in order to maximize pt's functional independence and safety w/ mobility  Pt continues to be functioning below baseline level, and remains limited 2* factors listed above  PT will continue to see pt while here in order to address the deficits listed above and provide interventions consistent w/ POC in effort to achieve STGs      Jordan Tarango, PT

## 2020-09-01 NOTE — UTILIZATION REVIEW
Initial Clinical Review    OBSERVATION 8/31/20 @1217 CONVERTED TO INPATIENT [id number] @1922 DUE TO CONTINUED STAY REQUIRED TO EVAL AND TREAT  PATIENT WITH ACUTE ANKLE PAIN  WITH ANTIBIOTICS IV AND LABS  AS THOUGHT TO BE SEPTIC ARTHRITIS  Admission: Date/Time/Statement:   Admission Orders (From admission, onward)     Ordered        09/01/20 1922  Inpatient Admission  Once         08/31/20 1217  Place in Observation  Once                   Orders Placed This Encounter   Procedures    Inpatient Admission     Standing Status:   Standing     Number of Occurrences:   1     Order Specific Question:   Admitting Physician     Answer:   Catina Erickson [[id number]]     Order Specific Question:   Level of Care     Answer:   Med Surg [[id number]]     Order Specific Question:   Estimated length of stay     Answer:   More than 2 Midnights     Order Specific Question:   Certification     Answer:   I certify that inpatient services are medically necessary for this patient for a duration of greater than two midnights  See H&P and MD Progress Notes for additional information about the patient's course of treatment  ED Arrival Information     Expected Arrival Acuity Means of Arrival Escorted By Service Admission Type    - 8/31/2020 08:35 Urgent Ambulance [facility] Urgent    Arrival Complaint    ankle and foot pain        Chief Complaint   Patient presents with    Foot Pain     started Friday upon awakening; no known injury or trauma; pt states she awoke with the pain    Ankle Pain     Assessment/Plan:  History of Present Illness:     Indy Nelson is a 70 y o  female who presents with left ankle pain  It started on Friday, 8/28/2020  Since then has progressively gotten worse to the point now the patient is not able to walk    Patient rates the pain a 10/10 with ambulation, and states that she feels as if it would break if she kept walking on it      In the ED, the provider did attempt to drain the ankle and send fluid  There unfortunately was not enough fluid and only red blood cells, WBC, differential were able to be resulted  The culture and Gram stain and crystal were not able to be resulted      Patient does present as a gout flare without any history of gout      Patient does have elevated CRP of 121 6, and elevated sed rate at 47      X-ray of the left ankle does show inferior calcaneal spur      Patient was started on Rocephin IV and will be continued on this during this hospitalization  She was also given Percocet in the emergency department and will be continued on this as well      Patient was subsequently found to have low potassium level and will have replacement        Acute left ankle pain  Assessment & Plan  · Woke with pain on Friday, 8/28/2020  · No injury  · Unable to ambulate  · In ED on 8/31/2020: fluid removed from ankle and sent for evaluation  ? RBC: 8215  ? WBC: 55199  ? Neutrophils: 94  ? Lymphocytes: 1  ? Moncytes: 5  · CRP: 121 6  · SED: 47  · Percocet for pain control  · 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  · Consultation with Orthopedics called from ED  · Xray Left ankle: There is no acute fracture or dislocation  An inferior calcaneal spur is present  No lytic or blastic osseous lesion  There is soft tissue swelling      Acute hypokalemia  Assessment & Plan  · 2 8 on 8/31/2020  · Last discharge pt was on torsemide and potassium replacement  · Patient did not have medications refilled  · Replace with 40 mEq PO BID    9/1/20  ortho consult  Reason for Consult / Principal Problem:  Left ankle pain  HPI: Kerin Shone is a 70y o  year old female who presents with pain in her left ankle for the past 4 days  She denies any specific trauma  She denies any numbness or tingling  She denies any fever chills    Her pain got so severe, that she came to the emergency room yesterday in aspiration occurred by the ER faculty   Michael Mott:  Left ankle pain, rule out gouty arthritis  Plan:  Awaiting final culture and sensitivity  Awaiting crystal search from aspiration  Continue antibiotics until above  Uric acid level ordered  May need podiatry consult for 2nd opinion if tests are all negative    ED Triage Vitals [08/31/20 0839]   Temperature Pulse Respirations Blood Pressure SpO2   98 1 °F (36 7 °C) 92 18 143/72 99 %      Temp Source Heart Rate Source Patient Position - Orthostatic VS BP Location FiO2 (%)   Temporal Monitor Sitting Left arm --      Pain Score       Worst Possible Pain          Wt Readings from Last 1 Encounters:   09/02/20 116 kg (255 lb 15 3 oz)     Additional Vital Signs:   Date/Time   Temp   Pulse   Resp   BP   MAP (mmHg)   SpO2     09/01/20 0700   97 3 °F (36 3 °C)Abnormal     61   18   127/60   86   93 %     08/31/20 2300   97 4 °F (36 3 °C)Abnormal     63   16   104/51      93 %     08/31/20 2135      62      116/59           08/31/20 1500   97 °F (36 1 °C)Abnormal     60   18   118/58      99 %     08/31/20 1307   97 1 °F (36 2 °C)Abnormal     78   18   115/57      95 %     08/31/20 0845      89   18   143/72   100   100 %     08/31/20 0839   98 1 °F (36 7 °C)   92   18   143/72   100   99 %         Pertinent Labs/Diagnostic Test Results:   Xray ankle 8/31/20  No acute osseous abnormality  Soft tissue swelling          Results from last 7 days   Lab Units 09/01/20  0429 08/31/20  1111   WBC Thousand/uL 7 80 8 60   HEMOGLOBIN g/dL 9 3* 10 0*   HEMATOCRIT % 28 9* 31 2*   PLATELETS Thousands/uL 251 261   NEUTROS ABS Thousands/µL 5 50 6 70*         Results from last 7 days   Lab Units 09/01/20  0429 08/31/20  1111   SODIUM mmol/L 140 139   POTASSIUM mmol/L 3 8 2 8*   CHLORIDE mmol/L 104 102   CO2 mmol/L 27 25   ANION GAP mmol/L 9 12   BUN mg/dL 21 19   CREATININE mg/dL 1 41* 1 37*   EGFR ml/min/1 73sq m 37 38   CALCIUM mg/dL 8 7 8 6         Results from last 7 days   Lab Units 09/02/20  1120 09/02/20  0631 09/01/20  2026 09/01/20  1617 09/01/20  1126 09/01/20 0620 08/31/20 2050 08/31/20  1606   POC GLUCOSE mg/dl 111 95 129 167* 134 82 143* 98     Results from last 7 days   Lab Units 09/01/20  0429 08/31/20  1111   GLUCOSE RANDOM mg/dL 102* 103*           Results from last 7 days   Lab Units 08/31/20  1111   CRP mg/L 121 6*   SED RATE mm/hour 47*       Results from last 7 days   Lab Units 08/31/20  1036   GRAM STAIN RESULT  2+ Polys  No bacteria seen   BODY FLUID CULTURE, STERILE  No growth     Results from last 7 days   Lab Units 08/31/20  1036   TOTAL COUNTED  100   NEUTROPHIL % (SYNOVIAL) % 94   MONOCYTE % (SYNOVIAL) % 5   WBC FLUID /ul 69,719   RBC, SYNOVIAL  8,215*           ED Treatment:   Medication Administration from 08/31/2020 0835 to 08/31/2020 1246       Date/Time Order Dose Route Action     08/31/2020 0941 oxyCODONE-acetaminophen (PERCOCET) 5-325 mg per tablet 1 tablet 1 tablet Oral Given     08/31/2020 1005 lidocaine (PF) (XYLOCAINE-MPF) 2 % injection 10 mL 10 mL Infiltration Given     08/31/2020 1229 cefTRIAXone (ROCEPHIN) IVPB (premix) 1,000 mg 50 mL 1,000 mg Intravenous New Bag     08/31/2020 1228 oxyCODONE-acetaminophen (PERCOCET) 5-325 mg per tablet 1 tablet 1 tablet Oral Given        Past Medical History:   Diagnosis Date    Acquired hypothyroidism 12/26/2018    Acute on chronic diastolic heart failure ([facility] ) 8/30/2018    Anemia     Arthritis     Atrial fibrillation (HCC)     CHF (congestive heart failure) ([facility] )     Disease of thyroid gland     History of transfusion     Hyperlipidemia     Hypertension     Migraine     Polyp of sigmoid colon     Prediabetes     Renal disorder     Stroke ([facility] [facility] ) 2011     Present on Admission:   Vertigo, benign paroxysmal, bilateral   Essential hypertension   Anemia due to stage 3 chronic kidney disease ([address] )   Acquired hypothyroidism   Prediabetes   Stage 3 chronic kidney disease (HCC)   Chronic atrial fibrillation (HCC)   Mild episode of recurrent major depressive disorder ([address] )   Hypercholesterolemia   Acute left ankle pain   Acute hypokalemia      Admitting Diagnosis: Septic arthritis (HCC) [M00 9]  Foot pain [M79 673]  Ankle pain [M25 579]  Age/Sex: 70 y o  female  Admission Orders:  Scheduled Medications:  amiodarone, 200 mg, Oral, Daily  apixaban, 5 mg, Oral, BID  cefTRIAXone, 1,000 mg, Intravenous, Q24H  colchicine, 0 6 mg, Oral, Daily  diltiazem, 240 mg, Oral, Daily  escitalopram, 10 mg, Oral, Daily  furosemide, 20 mg, Intravenous, BID (diuretic)  gabapentin, 100 mg, Oral, TID  insulin lispro, 1-5 Units, Subcutaneous, TID AC  insulin lispro, 1-5 Units, Subcutaneous, HS  levothyroxine, 88 mcg, Oral, Early Morning  metoprolol tartrate, 100 mg, Oral, Q12H PÉREZ  nortriptyline, 50 mg, Oral, Daily  potassium chloride, 20 mEq, Oral, BID  pravastatin, 20 mg, Oral, Daily With Dinner  vancomycin, 10 mg/kg (Adjusted), Intravenous, Q12H      Continuous IV Infusions:     PRN Meds:  acetaminophen, 650 mg, Oral, Q6H PRN  LORazepam, 1 mg, Oral, TID PRN  meclizine, 25 mg, Oral, Q8H PRN  ondansetron, 4 mg, Intravenous, Q6H PRN  oxyCODONE-acetaminophen, 1 tablet, Oral, Q4H PRN    POCT glucose  Daily wy   I/O   scd's   CHO diet   CPAP -hs  PT /OT    IP CONSULT TO ORTHOPEDIC SURGERY  IP CONSULT TO CASE MANAGEMENT  IP CONSULT TO PHARMACY    Network Utilization Review Department  [email or web address]  ATTENTION: Please call with any questions or concerns to [phone] and carefully listen to the prompts so that you are directed to the right person  All voicemails are confidential   [facility] all requests for admission clinical reviews, approved or denied determinations and any other requests to dedicated fax number below belonging to the campus where the patient is receiving treatment   List of dedicated fax numbers for the Facilities:  FACILITY NAME UR FAX NUMBER   ADMISSION DENIALS (Administrative/Medical Necessity) [phone]   [address] (Maternity/NICU/Pediatrics) [place] [address] [facility] [phone]   [facility] [phone]   [address] [phone]   [facility]  [phone]   [address] [facility] [address]  [address] [phone]

## 2020-09-01 NOTE — RESPIRATORY THERAPY NOTE
Pt  Refuses to wear our CPAP unit  Pt  Is just here overnight  Hossein aware of pt's refusal to wear the CPAP tonight  PerJerrica Hudson-CRT

## 2020-09-01 NOTE — PLAN OF CARE
Problem: Potential for Falls  Goal: Patient will remain free of falls  Description: INTERVENTIONS:  - Assess patient frequently for physical needs  -  Identify cognitive and physical deficits and behaviors that affect risk of falls    -  [place] fall precautions as indicated by assessment   - Educate patient/family on patient safety including physical limitations  - Instruct patient to call for assistance with activity based on assessment  - Modify environment to reduce risk of injury  - Consider OT/PT consult to assist with strengthening/mobility  Outcome: Progressing     Problem: PAIN - ADULT  Goal: Verbalizes/displays adequate comfort level or baseline comfort level  Description: Interventions:  - Encourage patient to monitor pain and request assistance  - Assess pain using appropriate pain scale  - Administer analgesics based on type and severity of pain and evaluate response  - Implement non-pharmacological measures as appropriate and evaluate response  - Consider cultural and social influences on pain and pain management  - Notify physician/advanced practitioner if interventions unsuccessful or patient reports new pain  Outcome: Progressing     Problem: INFECTION - ADULT  Goal: Absence or prevention of progression during hospitalization  Description: INTERVENTIONS:  - Assess and monitor for signs and symptoms of infection  - Monitor lab/diagnostic results  - Monitor all insertion sites, i e  indwelling lines, tubes, and drains  - Monitor endotracheal if appropriate and nasal secretions for changes in amount and color  - [place] appropriate cooling/warming therapies per order  - Administer medications as ordered  - Instruct and encourage patient and family to use good hand hygiene technique  - Identify and instruct in appropriate isolation precautions for identified infection/condition  Outcome: Progressing     Problem: SAFETY ADULT  Goal: Patient will remain free of falls  Description: INTERVENTIONS:  - Assess patient frequently for physical needs  -  Identify cognitive and physical deficits and behaviors that affect risk of falls    -  [place] fall precautions as indicated by assessment   - Educate patient/family on patient safety including physical limitations  - Instruct patient to call for assistance with activity based on assessment  - Modify environment to reduce risk of injury  - Consider OT/PT consult to assist with strengthening/mobility  Outcome: Progressing  Goal: Maintain or return to baseline ADL function  Description: INTERVENTIONS:  -  Assess patient's ability to carry out ADLs; assess patient's baseline for ADL function and identify physical deficits which impact ability to perform ADLs (bathing, care of mouth/teeth, toileting, grooming, dressing, etc )  - Assess/evaluate cause of self-care deficits   - Assess range of motion  - Assess patient's mobility; develop plan if impaired  - Assess patient's need for assistive devices and provide as appropriate  - Encourage maximum independence but intervene and supervise when necessary  - Involve family in performance of ADLs  - Assess for home care needs following discharge   - Consider OT consult to assist with ADL evaluation and planning for discharge  - Provide patient education as appropriate  Outcome: Progressing  Goal: Maintain or return mobility status to optimal level  Description: INTERVENTIONS:  - Assess patient's baseline mobility status (ambulation, transfers, stairs, etc )    - Identify cognitive and physical deficits and behaviors that affect mobility  - Identify mobility aids required to assist with transfers and/or ambulation (gait belt, sit-to-stand, lift, walker, cane, etc )  - [place] fall precautions as indicated by assessment  - Record patient progress and toleration of activity level on Mobility SBAR; progress patient to next Phase/Stage  - Instruct patient to call for assistance with activity based on assessment  - Consider rehabilitation consult to assist with strengthening/weightbearing, etc   Outcome: Progressing     Problem: DISCHARGE PLANNING  Goal: Discharge to home or other facility with appropriate resources  Description: INTERVENTIONS:  - Identify barriers to discharge w/patient and caregiver  - Arrange for needed discharge resources and transportation as appropriate  - Identify discharge learning needs (meds, wound care, etc )  - Arrange for interpretive services to assist at discharge as needed  - Refer to Case Management Department for coordinating discharge planning if the patient needs post-hospital services based on physician/advanced practitioner order or complex needs related to functional status, cognitive ability, or social support system  Outcome: Progressing     Problem: Knowledge Deficit  Goal: Patient/family/caregiver demonstrates understanding of disease process, treatment plan, medications, and discharge instructions  Description: Complete learning assessment and assess knowledge base    Interventions:  - Provide teaching at level of understanding  - Provide teaching via preferred learning methods  Outcome: Progressing     Problem: GASTROINTESTINAL - ADULT  Goal: Minimal or absence of nausea and/or vomiting  Description: INTERVENTIONS:  - Administer IV fluids if ordered to ensure adequate hydration  - Maintain NPO status until nausea and vomiting are resolved  - Nasogastric tube if ordered  - Administer ordered antiemetic medications as needed  - Provide nonpharmacologic comfort measures as appropriate  - Advance diet as tolerated, if ordered  - Consider nutrition services referral to assist patient with adequate nutrition and appropriate food choices  Outcome: Progressing     Problem: METABOLIC, FLUID AND ELECTROLYTES - ADULT  Goal: Electrolytes maintained within normal limits  Description: INTERVENTIONS:  - Monitor labs and assess patient for signs and symptoms of electrolyte imbalances  - Administer electrolyte replacement as ordered  - Monitor response to electrolyte replacements, including repeat lab results as appropriate  - Instruct patient on fluid and nutrition as appropriate  Outcome: Progressing     Problem: SKIN/TISSUE INTEGRITY - ADULT  Goal: Skin integrity remains intact  Description: INTERVENTIONS  - Identify patients at risk for skin breakdown  - Assess and monitor skin integrity  - Assess and monitor nutrition and hydration status  - Monitor labs (i e  albumin)  - Assess for incontinence   - Turn and reposition patient  - Assist with mobility/ambulation  - Relieve pressure over bony prominences  - Avoid friction and shearing  - Provide appropriate hygiene as needed including keeping skin clean and dry  - Evaluate need for skin moisturizer/barrier cream  - Collaborate with interdisciplinary team (i e  Nutrition, Rehabilitation, etc )   - Patient/family teaching  Outcome: Progressing     Problem: MUSCULOSKELETAL - ADULT  Goal: Maintain or return mobility to safest level of function  Description: INTERVENTIONS:  - Assess patient's ability to carry out ADLs; assess patient's baseline for ADL function and identify physical deficits which impact ability to perform ADLs (bathing, care of mouth/teeth, toileting, grooming, dressing, etc )  - Assess/evaluate cause of self-care deficits   - Assess range of motion  - Assess patient's mobility  - Assess patient's need for assistive devices and provide as appropriate  - Encourage maximum independence but intervene and supervise when necessary  - Involve family in performance of ADLs  - Assess for home care needs following discharge   - Consider OT consult to assist with ADL evaluation and planning for discharge  - Provide patient education as appropriate  Outcome: Progressing  Goal: Maintain proper alignment of affected body part  Description: INTERVENTIONS:  - Support, maintain and protect limb and body alignment  - Provide patient/ family with appropriate education  Outcome: Progressing

## 2020-09-01 NOTE — PROGRESS NOTES
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  · Continue Rocephin   · Consultation with Orthopedics called from ED  · Uric acid: 11 6  · Xray Left ankle: There is no acute fracture or dislocation  An inferior calcaneal spur is present  No lytic or blastic osseous lesion  There is soft tissue swelling  · Start patient on colchicine     Acute hypokalemia  Assessment & Plan  · 2 8 on 8/31/2020  · Last discharge pt was on torsemide and potassium replacement  · Patient did not have medications refilled     · Replace with 40 mEq PO BID  · Monitor labs  · 9/1/2020 Resolved  · Will adjust potassium supplementation    Stage 3 chronic kidney disease ([address] )  Assessment & Plan  · Patient is at her baseline  · Continue home medications    Anemia due to stage 3 chronic kidney disease ([address] )  Assessment & Plan  · Patient is at her baseline  · Continue to monitor labs    Essential hypertension  Assessment & Plan  · Will continue home medications of amiodarone, cardizem, metoprolol    Vertigo, benign paroxysmal, bilateral  Assessment & Plan  · Stable at this time  · Continue with prn antivert    Hypercholesterolemia  Assessment & Plan  · Continue statin    Mild episode of recurrent major depressive disorder ([address] )  Assessment & Plan  · Continue pamelor, lexapro, ativan    Class 3 severe obesity due to excess calories with serious comorbidity and body mass index (BMI) of 40 0 to 44 9 in adult [facility])  Assessment & Plan  · Supportive care     Acquired hypothyroidism  Assessment & Plan  · Continue synthroid     Prediabetes  Assessment & Plan  · Will check Accucheck QAC and HS with SSI    Chronic atrial fibrillation (HCC)  Assessment & Plan  · Stable on eliquis and cardizem    DANTE on CPAP  Assessment & Plan  · Respiratory protocol      VTE Prophylaxis:  Apixaban (Eliquis)    Patient Centered Rounds: I have performed bedside rounds with nursing staff today  Discussions with Specialists or Other Care Team Provider: nursing, PT OT, Orthopedics, respiratory therapy, pharmacy, case management  Education and Discussions with Family / Patient: discussed with the patient     Current Length of Stay: 0 day(s)    Current Patient Status: Inpatient   Certification Statement: The patient will continue to require additional inpatient hospital stay due to pain control  Discharge Plan: pending hospital stay  Code Status: Level 1 - Full Code    Subjective:    Patient is lying in bed  She states she has continued pain in her bilateral ankles, she is having issues with ambulating and will need to continue with PT OT  Objective:     Vitals:   Temp (24hrs), Av 6 °F (36 4 °C), Min:97 3 °F (36 3 °C), Max:98 °F (36 7 °C)    Temp:  [97 3 °F (36 3 °C)-98 °F (36 7 °C)] 98 °F (36 7 °C)  HR:  [47-63] 47  Resp:  [16-18] 18  BP: (104-127)/(51-60) 113/55  SpO2:  [93 %-96 %] 96 %  Body mass index is 41 16 kg/m²  Input and Output Summary (last 24 hours): Intake/Output Summary (Last 24 hours) at 2020  Last data filed at 2020 1610  Gross per 24 hour   Intake 240 ml   Output    Net 240 ml       Physical Exam:   Physical Exam  Constitutional:       General: She is awake  Appearance: Normal appearance  She is well-developed and overweight  HENT:      Head: Normocephalic and atraumatic        Nose: Nose normal       Mouth/Throat:      Mouth: Mucous membranes are moist    Eyes:      Extraocular Movements: Extraocular movements intact  Neck:      Musculoskeletal: Normal range of motion  Cardiovascular:      Rate and Rhythm: Normal rate and regular rhythm  Pulses: Normal pulses  Heart sounds: Normal heart sounds  Pulmonary:      Effort: Pulmonary effort is normal       Breath sounds: Normal breath sounds  Abdominal:      General: Bowel sounds are normal    Musculoskeletal:        Feet:    Skin:     General: Skin is warm  Neurological:      Mental Status: She is alert and oriented to person, place, and time  Psychiatric:         Attention and Perception: Attention normal          Mood and Affect: Mood normal          Speech: Speech normal          Behavior: Behavior normal  Behavior is cooperative  Additional Data:     Labs:    Results from last 7 days   Lab Units 09/01/20  0429   WBC Thousand/uL 7 80   HEMOGLOBIN g/dL 9 3*   HEMATOCRIT % 28 9*   PLATELETS Thousands/uL 251   NEUTROS PCT % 71   LYMPHS PCT % 18*   MONOS PCT % 9   EOS PCT % 2     Results from last 7 days   Lab Units 09/01/20  0429   SODIUM mmol/L 140   POTASSIUM mmol/L 3 8   CHLORIDE mmol/L 104   CO2 mmol/L 27   BUN mg/dL 21   CREATININE mg/dL 1 41*   CALCIUM mg/dL 8 7         Results from last 7 days   Lab Units 09/01/20  1617 09/01/20  1126 09/01/20  0620 08/31/20  2050 08/31/20  1606   POC GLUCOSE mg/dl 167* 134 82 143* 98           * I Have Reviewed All Lab Data Listed Above  * Additional Pertinent Lab Tests Reviewed:  [address] Admission  Reviewed    Imaging:  Imaging Reports Reviewed Today Include: none    Recent Cultures (last 7 days):     Results from last 7 days   Lab Units 08/31/20  1036   GRAM STAIN RESULT  2+ Polys  No bacteria seen   BODY FLUID CULTURE, STERILE  No growth       Last 24 Hours Medication List:   Current Facility-Administered Medications   Medication Dose Route Frequency Provider Last Rate    acetaminophen  650 mg Oral Q6H PRN Valeta Rail, CRNP      amiodarone 200 mg Oral Daily Linda John, CRNP      apixaban  5 mg Oral BID Linda John, CRNP      cefTRIAXone  1,000 mg Intravenous Q24H Linda COOK Yonatanmarlin, CRNP Stopped (09/01/20 1230)    [START ON 9/2/2020] colchicine  0 6 mg Oral Daily Linda John, CRNP      diltiazem  240 mg Oral Daily Linda John, CRNP      escitalopram  10 mg Oral Daily Linda John, CRNP      furosemide  20 mg Intravenous BID (diuretic) Linda John, CRNP      gabapentin  100 mg Oral TID Linda John, CRNP      insulin lispro  1-5 Units Subcutaneous TID AC Linda John, CRNP      insulin lispro  1-5 Units Subcutaneous HS Linda John, CRNP      levothyroxine  88 mcg Oral Early Morning Linda John, CRNP      LORazepam  1 mg Oral TID PRN Yulissa Almonte, ALEJANDRA      meclizine  25 mg Oral Q8H PRN Linda John, CRNP      metoprolol tartrate  100 mg Oral Q12H [facility] Linda John, CRNP      nortriptyline  50 mg Oral Daily Linda John, CRNP      ondansetron  4 mg Intravenous Q6H PRN ALEJANDRA Mendoza      oxyCODONE-acetaminophen  1 tablet Oral Q4H PRN Linda John, CRNP      potassium chloride  20 mEq Oral BID Linda John, CRNP      pravastatin  20 mg Oral Daily With [employer], CRNP      vancomycin  10 mg/kg (Adjusted) Intravenous Q12H Linda John, ROBERTNP 750 mg (09/01/20 1453)        Today, Patient Was Seen By: ALEJANDRA Mendoza    ** Please Note: Dictation voice to text software may have been used in the creation of this document   **

## 2020-09-01 NOTE — PLAN OF CARE
Problem: PHYSICAL THERAPY ADULT  Goal: Performs mobility at highest level of function for planned discharge setting  See evaluation for individualized goals  Description: Treatment/Interventions: Functional transfer training, LE strengthening/ROM, Therapeutic exercise, Endurance training, Patient/family training, Equipment eval/education, Bed mobility, Gait training, Spoke to nursing, Spoke to case management  Equipment Recommended: (continue walker use)       See flowsheet documentation for full assessment, interventions and recommendations  Note: Prognosis: Good  Problem List: Decreased strength, Decreased endurance, Impaired balance, Decreased mobility, Pain, Obesity  Assessment: Pt is 70 y o  female seen for PT evaluation on 9/1/2020 s/p admit to [address] on 8/31/2020 w/ Acute left ankle pain  PT consulted to assess pt's functional mobility and d/c needs  Order placed for PT eval and tx, w/ up w/ A order  PT performed at least 2 patient identifiers during session: Name and wristband  Comorbidities affecting pt's physical performance at time of assessment include: hypokalemia, CKD stage 3, anemia, essential HTN, h/o vertigo, obesity, MDD, hypercholesterolemia, AFib, DANTE on CPAP, h/o stroke  PTA, pt was independent w/ all functional mobility w/ no AD at baseline, ambulates household distances, lives alone in 1 level apartment and retired  Personal factors affecting pt at time of IE include: ambulating w/ assistive device, inability to navigate level surfaces w/o external assistance, unable to perform dynamic tasks in community, limited home support, decreased initiation and engagement, unable to perform physical activity, inability to perform IADLs and inability to perform ADLs   Please find objective findings from PT assessment regarding body systems outlined above with impairments and limitations including weakness, impaired balance, decreased endurance, gait deviations, pain, decreased activity tolerance, decreased functional mobility tolerance and fall risk, as well as mobility assessment (need for cueing for mobility technique)  The following objective measures performed on IE also reveal limitations: Barthel Index: 50/100 and Modified [place]: 4 (moderate/severe disability)  Pt's clinical presentation is currently unstable/unpredictable seen in pt's presentation of abnormal lab value(s) and ongoing medical assessment  Pt to benefit from continued PT tx to address deficits as defined above and maximize level of functional independent mobility and consistency  From PT/mobility standpoint, recommendation at time of d/c would be STR pending progress in order to facilitate return to PLOF  Barriers to Discharge: Inaccessible home environment, Decreased caregiver support     PT Discharge Recommendation: [address]     PT - OK to Discharge: Yes(when medically cleared, if to STR)    See flowsheet documentation for full assessment

## 2020-09-01 NOTE — CONSULTS
Consultation - Kameron Evans 70 y o  female MRN: [id number]  Unit/Bed#: -02 Encounter: [id number]      Assessment/Plan     Assessment:  Left ankle pain, rule out gouty arthritis  Plan:  Awaiting final culture and sensitivity  Awaiting crystal search from aspiration  Continue antibiotics until above  Uric acid level ordered  May need podiatry consult for 2nd opinion if tests are all negative    History of Present Illness   Physician Requesting Consult: Niesha Ruiz MD  Reason for Consult / Principal Problem:  Left ankle pain  HPI: Morgan Jimenez is a 70y o  year old female who presents with pain in her left ankle for the past 4 days  She denies any specific trauma  She denies any numbness or tingling  She denies any fever chills  Her pain got so severe, that she came to the emergency room yesterday in aspiration occurred by the ER faculty  Consults    Review of Systems   Constitutional: Negative for chills, fever and unexpected weight change  HENT: Negative for hearing loss, nosebleeds and sore throat  Eyes: Negative for pain, redness and visual disturbance  Respiratory: Negative for cough, shortness of breath and wheezing  Cardiovascular: Negative for chest pain, palpitations and leg swelling  Gastrointestinal: Negative for abdominal pain, nausea and vomiting  Endocrine: Negative for polydipsia and polyuria  Genitourinary: Negative for dysuria and hematuria  Musculoskeletal: Positive for arthralgias, gait problem, joint swelling, myalgias and neck stiffness  Negative for back pain and neck pain  As noted in HPI   Skin: Negative for rash and wound  Neurological: Negative for dizziness, numbness and headaches  Psychiatric/Behavioral: Negative for decreased concentration and suicidal ideas  The patient is not nervous/anxious          Historical Information   Past Medical History:   Diagnosis Date    Acquired hypothyroidism 12/26/2018    Acute on chronic diastolic heart failure ([facility] ) 2018    Anemia     Arthritis     Atrial fibrillation (HCC)     CHF (congestive heart failure) (HCC)     Disease of thyroid gland     History of transfusion     Hyperlipidemia     Hypertension     Migraine     Polyp of sigmoid colon     Prediabetes     Renal disorder     Stroke ([facility] )      Past Surgical History:   Procedure Laterality Date    APPENDECTOMY      CARDIAC ELECTROPHYSIOLOGY STUDY AND ABLATION      CHOLECYSTECTOMY      COLONOSCOPY       Social History   Social History     Substance and Sexual Activity   Alcohol Use Never    Alcohol/week: 0 0 standard drinks    Frequency: Never    Binge frequency: Never     Social History     Substance and Sexual Activity   Drug Use Never     E-Cigarette/Vaping    E-Cigarette Use Never User      E-Cigarette/Vaping Substances    Nicotine No     THC No     CBD No     Flavoring No     Other No     Unknown No      Social History     Tobacco Use   Smoking Status Former Smoker    Types: Cigarettes    Last attempt to quit: 2011    Years since quittin 5   Smokeless Tobacco Never Used     Family History:   Family History   Problem Relation Age of Onset    Diabetes Mother     Heart disease Mother     Hypertension Mother     Arthritis Mother     Coronary artery disease Mother     Drug abuse Paternal Grandmother        Meds/Allergies   current meds:   Current Facility-Administered Medications   Medication Dose Route Frequency    acetaminophen (TYLENOL) tablet 650 mg  650 mg Oral Q6H PRN    amiodarone tablet 200 mg  200 mg Oral Daily    apixaban (ELIQUIS) tablet 5 mg  5 mg Oral BID    cefTRIAXone (ROCEPHIN) IVPB (premix) 1,000 mg 50 mL  1,000 mg Intravenous Q24H    diltiazem (CARDIZEM CD) 24 hr capsule 240 mg  240 mg Oral Daily    escitalopram (LEXAPRO) tablet 10 mg  10 mg Oral Daily    furosemide (LASIX) injection 20 mg  20 mg Intravenous BID (diuretic)    gabapentin (NEURONTIN) capsule 100 mg 100 mg Oral TID    insulin lispro (HumaLOG) 100 units/mL subcutaneous injection 1-5 Units  1-5 Units Subcutaneous TID AC    insulin lispro (HumaLOG) 100 units/mL subcutaneous injection 1-5 Units  1-5 Units Subcutaneous HS    levothyroxine tablet 88 mcg  88 mcg Oral Early Morning    LORazepam (ATIVAN) tablet 1 mg  1 mg Oral TID PRN    meclizine (ANTIVERT) tablet 25 mg  25 mg Oral Q8H PRN    metoprolol tartrate (LOPRESSOR) tablet 100 mg  100 mg Oral Q12H PÉREZ    nortriptyline (PAMELOR) capsule 50 mg  50 mg Oral Daily    ondansetron (ZOFRAN) injection 4 mg  4 mg Intravenous Q6H PRN    oxyCODONE-acetaminophen (PERCOCET) 5-325 mg per tablet 1 tablet  1 tablet Oral Q4H PRN    potassium chloride (K-DUR,KLOR-CON) CR tablet 40 mEq  40 mEq Oral BID    pravastatin (PRAVACHOL) tablet 20 mg  20 mg Oral Daily With Dinner    vancomycin (VANCOCIN) IVPB (premix) 750 mg 150 mL  10 mg/kg (Adjusted) Intravenous Q12H     No Known Allergies    Objective   Vitals: Blood pressure 127/60, pulse 61, temperature (!) 97 3 °F (36 3 °C), temperature source Temporal, resp  rate 18, height 5' 7" (1 702 m), weight 119 kg (262 lb 12 6 oz), SpO2 93 %, not currently breastfeeding  ,Body mass index is 41 16 kg/m²  Intake/Output Summary (Last 24 hours) at 9/1/2020 1228  Last data filed at 8/31/2020 1700  Gross per 24 hour   Intake 780 ml   Output    Net 780 ml     I/O last 24 hours: In: 80 [P O :240; IV Piggyback:540]  Out: -     Invasive Devices     Peripheral Intravenous Line            Peripheral IV 08/31/20 Right Antecubital 1 day                Physical Exam  Ortho Exam    Physical Exam   Constitutional: She is oriented to person, place, and time  She appears well-developed and well-nourished  No distress  HENT:   Head: Normocephalic  Eyes: Conjunctivae are normal  Right eye exhibits no discharge  Left eye exhibits no discharge  No scleral icterus  Cardiovascular: Normal rate      Pulmonary/Chest: Effort normal  Neurological: She is alert and oriented to person, place, and time  Skin: Skin is warm and dry  No rash noted  She is not diaphoretic  No erythema  No pallor  Psychiatric: She has a normal mood and affect  His behavior is normal  Judgment and thought content normal      Left lower extremity is neurovascular intact  Toes are pink and mobile  Compartments are soft  There is mild swelling of the left ankle as compared to the right  Skin temperature is the same bilaterally  No open wounds, pus or foul smell  Diffuse tenderness throughout the ankle  Negative Homans    Capillary refill intact        Lab Results:   CBC:   Lab Results   Component Value Date    WBC 7 80 09/01/2020    HGB 9 3 (L) 09/01/2020    HCT 28 9 (L) 09/01/2020    MCV 71 (L) 09/01/2020     09/01/2020    MCH 22 6 (L) 09/01/2020    MCHC 32 1 09/01/2020    RDW 18 7 (H) 09/01/2020    MPV 9 5 09/01/2020     CMP:   Lab Results   Component Value Date    SODIUM 140 09/01/2020     09/01/2020    CO2 27 09/01/2020    BUN 21 09/01/2020    CREATININE 1 41 (H) 09/01/2020    CALCIUM 8 7 09/01/2020    EGFR 37 09/01/2020 8/31/20    Synovial fluid, white cell count w/ diff   Order: [id number]   Status:  Final result   Visible to patient:  No (not released)   Next appt:  None    Ref Range & Units  8/31/20 1036   WBC, Fluid  /ul  06,949          Specimen Collected: 08/31/20 10:36  Last Resulted: 08/31/20 10:55         Lab Flowsheet      Order Details      View Encounter      Lab and Collection Details      Routing      Result History            Related Result Highlights             Synovial fluid, RBC count  Final result  8/31/2020                   Synovial Fluid Diff   Order: [id number] - Reflex for Order [id number]   Status:  Final result   Visible to patient:  Yes (MyChart)   Next appt:  None    Ref Range & Units  8/31/20 1036   Total Counted   100    Neutrophil % Synovial  %  94    Lymph % Synovial  %  1    Monocyte % Synovial  %  5 Specimen Collected: 08/31/20 10:36  Last Resulted: 08/31/20 11:13         Lab Flowsheet      Order Details      View Encounter      Lab and Collection Details      Routing      Result History            Related Result Highlights             Synovial fluid, RBC count  Final result                     PT/INR: No results found for: PT, INR  ESR: No results found for: ESR  CRP: No results found for: CRP  Imaging Studies: I have personally reviewed pertinent films in PACS     X-rays of the left ankle displaced no fractures or dislocations    EKG, Pathology, and Other Studies: I have personally reviewed pertinent films in PACS  VTE Prophylaxis: Sequential compression device (Venodyne)     Code Status: Level 1 - Full Code  Advance Directive and Living Will:      Power of :    POLST:      Counseling / Coordination of Care  Total floor / unit time spent today 40 minutes  Greater than 50% of total time was spent with the patient and / or family counseling and / or coordination of care   A description of the counseling / coordination of care:

## 2020-09-01 NOTE — ASSESSMENT & PLAN NOTE
· 2 8 on 8/31/2020  · Last discharge pt was on torsemide and potassium replacement  · Patient did not have medications refilled     · Replace with 40 mEq PO BID  · Monitor labs  · 9/1/2020 Resolved  · Will adjust potassium supplementation

## 2020-09-01 NOTE — NURSING NOTE
Patient resting comfortably in chair with legs elevated  Medicated once for pain in bilateral feet  Left lower extremity with trace edema  IV remains to right anticubital and flushes well  Patient able to transfer with standby assist  Will continue to monitor

## 2020-09-01 NOTE — OCCUPATIONAL THERAPY NOTE
Occupational Therapy Evaluation      Phuong Marsh    9/1/2020    Principal Problem:    Acute left ankle pain  Active Problems:    Essential hypertension    Prediabetes    Mild episode of recurrent major depressive disorder (HCC)    Hypercholesterolemia    Class 3 severe obesity due to excess calories with serious comorbidity and body mass index (BMI) of 40 0 to 44 9 in adult (HCC)    DANTE on CPAP    Stage 3 chronic kidney disease (HCC)    Anemia due to stage 3 chronic kidney disease (HCC)    Acquired hypothyroidism    Chronic atrial fibrillation (HCC)    Vertigo, benign paroxysmal, bilateral    Acute hypokalemia      Past Medical History:   Diagnosis Date    Acquired hypothyroidism 12/26/2018    Acute on chronic diastolic heart failure ([facility] ) 8/30/2018    Anemia     Arthritis     Atrial fibrillation (HCC)     [facility]     Disease of thyroid gland     History of transfusion     Hyperlipidemia     Hypertension     Migraine     Polyp of sigmoid colon     Prediabetes     Renal disorder     Stroke ([facility] ) 2011       Past Surgical History:   Procedure Laterality Date    APPENDECTOMY      CARDIAC ELECTROPHYSIOLOGY STUDY AND ABLATION      CHOLECYSTECTOMY      COLONOSCOPY          09/01/20 7002   Note Type   Note type Eval/Treat   Restrictions/Precautions   Weight Bearing Precautions Per Order No   Other Precautions Fall Risk;Pain;Multiple lines   Pain Assessment   Pain Assessment Tool 0-10   Pain Score 8   Pain Location/Orientation Orientation: Bilateral;Location: Foot  (ankles (L>R))   Pain Onset/Description Onset: Ongoing;Frequency: Constant/Continuous; Descriptor: Dull;Descriptor: Throbbing   Patient's Stated Pain Goal No pain   Hospital Pain Intervention(s) Repositioned;Elevated; Emotional support; Rest   Home Living   Type of [address] One level;Performs ADLs on one level; Able to live on main level with bedroom/bathroom; Access  (no PETE)   Bathroom Shower/Tub Tub/shower unit   Bathroom Toilet Standard   Bathroom Equipment Grab bars in shower   [address] Walker;Cane  (ambulatory w/o devices PTA)   Prior Function   Level of [place] Independent with ADLs and functional mobility   Lives With Alone   Receives Help From Family  (daughter nearby)   ADL Assistance Independent   IADLs Needs assistance  (assistance for transportation)   Falls in the last 6 months 0   Vocational Retired   Psychosocial   Psychosocial ([address]) WDL   Subjective   Subjective agreeable to therapy eval/getting OOB   ADL   Eating Assistance 7  Independent   Grooming Assistance 5  Supervision/Setup   Grooming Deficit Setup   UB Bathing Assistance 4  [address] 2  Maximal [address] 3  Moderate Assistance    [address] Unable to assess   Bed Mobility   Supine to Sit 4  Minimal assistance   Additional items Assist x 1;HOB elevated; Bedrails; Increased time required;Verbal cues   Additional Comments OOB to prepared RGC   Transfers   Sit to Stand 3  Moderate assistance   Additional items Assist x 2; Increased time required;Verbal cues   Stand to Sit 3  Moderate assistance   Additional items Assist x 2;Armrests; Verbal cues   Stand pivot 3  Moderate assistance   Additional items Assist x 2; Increased time required;Verbal cues   Additional Comments RW used   Balance   Static Sitting Good   Dynamic Sitting Fair +   Static Standing Poor +   Dynamic Standing Poor   Ambulatory Poor   Activity Tolerance   Activity Tolerance Patient limited by pain   Nurse Made Aware yes   RUE Assessment   RUE Assessment WFL   LUE Assessment   LUE Assessment WFL   Hand Function   Gross Motor Coordination Functional   Fine Motor Coordination Functional   Cognition   Overall Cognitive Status WFL   Arousal/Participation Alert; Cooperative   Attention Within functional limits   Orientation Level Oriented X4   Memory Within functional limits   Following Commands Follows all commands and directions without difficulty   Assessment   Limitation Decreased ADL status; Decreased Safe judgement during ADL;Decreased endurance;Decreased self-care trans;Decreased high-level ADLs   Prognosis Good   Assessment Pt is a 70 y o  female seen for OT evaluation s/p admit to [facility] on 8/31/2020 w/ Acute left ankle pain  Comorbidities affecting pt's functional performance at time of assessment include: HTN, obesity and Anemia, A Fib, MDD, DANTE, see H & P for additional PMHx  Personal factors affecting pt at time of IE include:limited home support, difficulty performing ADLS and difficulty performing IADLS   Prior to admission, pt was living at home, alone, I with self care ADL's, A with some IADL's, Mobility w/o devices  [place] Upon evaluation: Pt requires an increased level of assistance with self care ADL's, IADL's, functional transfers/toileting/mobility r/t  the following deficits impacting occupational performance: weakness, decreased balance, decreased tolerance, decreased safety awareness and increased pain  Pt to benefit from continued skilled OT tx while in the hospital to address deficits as defined above and maximize level of functional independence w ADL's and functional mobility  Occupational Performance areas to address include: bathing/shower, dressing and functional mobility  From OT standpoint, recommendation at time of d/c would be STR  Goals   Patient Goals no pain! get around       STG Time Frame 3-5   Short Term Goal #1 increase knowledge re: adaptations to good to increase level of safety and independence with self care and daily tasks   Short Term Goal #2 increase bed mobility to CGA for self care participation   Short Term Goal  increase UB self care to set up/SBA   LTG Time Frame 7-10   Long Term Goal #1 increase LB self care to Mod A w/ adaptations as indicated   Long Term Goal #2 increase bed mobility to MI for ADL's;  increase functional OOB transfers to [facility] A X 1 for toileting   Plan   Treatment Interventions Energy conservation; Activityengagement;ADL retraining;Functional transfer training; Endurance training;Patient/family training;Equipment evaluation/education; Compensatory technique education   Goal Expiration Date 09/11/20   OT Treatment Day 0   OT Frequency 3-5x/wk   Recommendation   OT Discharge Recommendation Post-Acute Rehabilitation Services   OT - OK to Discharge Yes  (when medically cleared)   Annamarie Amezcua OT

## 2020-09-01 NOTE — ASSESSMENT & PLAN NOTE
· Woke with pain on Friday, 8/28/2020  · No injury  · Unable to ambulate  · In ED on 8/31/2020: fluid removed from ankle and sent for evaluation  · RBC: 8215  · WBC: 95385  · Neutrophils: 94  · Lymphocytes: 1  · [place]: 5  · CRP: 121 6  · SED: 47  · Percocet for pain control  · 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  · Continue Rocephin   · Consultation with Orthopedics called from ED  · Uric acid: 11 6  · Xray Left ankle: There is no acute fracture or dislocation  An inferior calcaneal spur is present  No lytic or blastic osseous lesion  There is soft tissue swelling    · Start patient on colchicine

## 2020-09-01 NOTE — CASE MANAGEMENT
Chart reviewed by cm, assessment completed, pt was here on 8/2-8/3 as an obs for vertigo and was d/c home, pt was made aware that she is here as an obs status, pt lives alone and is independent except for driving , she does depend on her family to drive her places, pt lives in 1st floor apartment in a 2 story home, no steps outside or inside, pt has no hx of hhc, DME: walker, cane, shower chair, RX plan Emeli Sultana, pt doess wear glasses and she needs to use a magnifier to read her medication labels, pt has not been driving due to her cataracts, A post acute care recommendation was made by your care team for STR  Discussed Freedom of Choice with patient  List of facilities given to patient via in person  patient aware the list is custom filtered for them by zip code location and that [facility] post acute providers are designated  Pt's gave permission to send referrals to [facility], [address] [facility], referrals were sent as requested, ortho consullt was ordered, cm will continue to follow and assess for any additional d/c needs, Patient/caregiver received discharge checklist   Content reviewed  Patient/caregiver encouraged to participate in discharge plan of care prior to discharge home  CM reviewed d/c planning process including the following: identifying help at home, patient preference for d/c planning needs, availability of treatment team to discuss questions or concerns patient and/or family may have regarding understanding medications and recognizing signs and symptoms once discharged  CM also encouraged patient to follow up with all recommended appointments after discharge  Patient advised of importance for patient and family to participate in managing patients medical well being

## 2020-09-01 NOTE — PHYSICIAN ADVISOR
Today's progress note is pending  She presented yesterday with acute left ankle pain and inability to ambulate  She underwent arthrocentesis in the emergency department   6  There is concern for septic arthritis based on the available notes  Fluid WBC 69,719  She is on intravenous vancomycin  She is also receiving Lasix intravenous twice daily  The orthopedics consultation notes possible gouty arthritis  It was agreed to continue antibiotics until the final culture and sensitivity is available  Crystal analysis from the aspirated fluid is pending  Based on the available information, I recommend change to inpatient class as she requires active hospital management

## 2020-09-01 NOTE — PLAN OF CARE
Problem: OCCUPATIONAL THERAPY ADULT  Goal: Performs self-care activities at highest level of function for planned discharge setting  See evaluation for individualized goals  Note: Limitation: Decreased ADL status, Decreased Safe judgement during ADL, Decreased endurance, Decreased self-care trans, Decreased high-level ADLs  Prognosis: Good  Assessment: Pt is a 70 y o  female seen for OT evaluation s/p admit to [facility] on 8/31/2020 w/ Acute left ankle pain  Comorbidities affecting pt's functional performance at time of assessment include: HTN, obesity and Anemia, A Fib, MDD, DANTE, see H & P for additional PMHx  Personal factors affecting pt at time of IE include:limited home support, difficulty performing ADLS and difficulty performing IADLS   Prior to admission, pt was living at home, alone, I with self care ADL's, A with some IADL's, Mobility w/o devices  Dara Soulier Upon evaluation: Pt requires an increased level of assistance with self care ADL's, IADL's, functional transfers/toileting/mobility r/t  the following deficits impacting occupational performance: weakness, decreased balance, decreased tolerance, decreased safety awareness and increased pain  Pt to benefit from continued skilled OT tx while in the hospital to address deficits as defined above and maximize level of functional independence w ADL's and functional mobility  Occupational Performance areas to address include: bathing/shower, dressing and functional mobility  From OT standpoint, recommendation at time of d/c would be STR       OT Discharge Recommendation: Post-Acute Rehabilitation Services  OT - OK to Discharge: Yes(when medically cleared)

## 2020-09-01 NOTE — PLAN OF CARE
Problem: Potential for Falls  Goal: Patient will remain free of falls  Description: INTERVENTIONS:  - Assess patient frequently for physical needs  -  Identify cognitive and physical deficits and behaviors that affect risk of falls    -  [place] fall precautions as indicated by assessment   - Educate patient/family on patient safety including physical limitations  - Instruct patient to call for assistance with activity based on assessment  - Modify environment to reduce risk of injury  - Consider OT/PT consult to assist with strengthening/mobility  Outcome: Progressing     Problem: PAIN - ADULT  Goal: Verbalizes/displays adequate comfort level or baseline comfort level  Description: Interventions:  - Encourage patient to monitor pain and request assistance  - Assess pain using appropriate pain scale  - Administer analgesics based on type and severity of pain and evaluate response  - Implement non-pharmacological measures as appropriate and evaluate response  - Consider cultural and social influences on pain and pain management  - Notify physician/advanced practitioner if interventions unsuccessful or patient reports new pain  Outcome: Progressing     Problem: INFECTION - ADULT  Goal: Absence or prevention of progression during hospitalization  Description: INTERVENTIONS:  - Assess and monitor for signs and symptoms of infection  - Monitor lab/diagnostic results  - Monitor all insertion sites, i e  indwelling lines, tubes, and drains  - Monitor endotracheal if appropriate and nasal secretions for changes in amount and color  - [place] appropriate cooling/warming therapies per order  - Administer medications as ordered  - Instruct and encourage patient and family to use good hand hygiene technique  - Identify and instruct in appropriate isolation precautions for identified infection/condition  Outcome: Progressing     Problem: SAFETY ADULT  Goal: Patient will remain free of falls  Description: INTERVENTIONS:  - Assess patient frequently for physical needs  -  Identify cognitive and physical deficits and behaviors that affect risk of falls    -  [place] fall precautions as indicated by assessment   - Educate patient/family on patient safety including physical limitations  - Instruct patient to call for assistance with activity based on assessment  - Modify environment to reduce risk of injury  - Consider OT/PT consult to assist with strengthening/mobility  Outcome: Progressing  Goal: Maintain or return to baseline ADL function  Description: INTERVENTIONS:  -  Assess patient's ability to carry out ADLs; assess patient's baseline for ADL function and identify physical deficits which impact ability to perform ADLs (bathing, care of mouth/teeth, toileting, grooming, dressing, etc )  - Assess/evaluate cause of self-care deficits   - Assess range of motion  - Assess patient's mobility; develop plan if impaired  - Assess patient's need for assistive devices and provide as appropriate  - Encourage maximum independence but intervene and supervise when necessary  - Involve family in performance of ADLs  - Assess for home care needs following discharge   - Consider OT consult to assist with ADL evaluation and planning for discharge  - Provide patient education as appropriate  Outcome: Progressing  Goal: Maintain or return mobility status to optimal level  Description: INTERVENTIONS:  - Assess patient's baseline mobility status (ambulation, transfers, stairs, etc )    - Identify cognitive and physical deficits and behaviors that affect mobility  - Identify mobility aids required to assist with transfers and/or ambulation (gait belt, sit-to-stand, lift, walker, cane, etc )  - [place] fall precautions as indicated by assessment  - Record patient progress and toleration of activity level on Mobility SBAR; progress patient to next Phase/Stage  - Instruct patient to call for assistance with activity based on assessment  - Consider rehabilitation consult to assist with strengthening/weightbearing, etc   Outcome: Progressing     Problem: DISCHARGE PLANNING  Goal: Discharge to home or other facility with appropriate resources  Description: INTERVENTIONS:  - Identify barriers to discharge w/patient and caregiver  - Arrange for needed discharge resources and transportation as appropriate  - Identify discharge learning needs (meds, wound care, etc )  - Arrange for interpretive services to assist at discharge as needed  - Refer to Case Management Department for coordinating discharge planning if the patient needs post-hospital services based on physician/advanced practitioner order or complex needs related to functional status, cognitive ability, or social support system  Outcome: Progressing     Problem: Knowledge Deficit  Goal: Patient/family/caregiver demonstrates understanding of disease process, treatment plan, medications, and discharge instructions  Description: Complete learning assessment and assess knowledge base    Interventions:  - Provide teaching at level of understanding  - Provide teaching via preferred learning methods  Outcome: Progressing     Problem: GASTROINTESTINAL - ADULT  Goal: Minimal or absence of nausea and/or vomiting  Description: INTERVENTIONS:  - Administer IV fluids if ordered to ensure adequate hydration  - Maintain NPO status until nausea and vomiting are resolved  - Nasogastric tube if ordered  - Administer ordered antiemetic medications as needed  - Provide nonpharmacologic comfort measures as appropriate  - Advance diet as tolerated, if ordered  - Consider nutrition services referral to assist patient with adequate nutrition and appropriate food choices  Outcome: Progressing     Problem: SKIN/TISSUE INTEGRITY - ADULT  Goal: Skin integrity remains intact  Description: INTERVENTIONS  - Identify patients at risk for skin breakdown  - Assess and monitor skin integrity  - Assess and monitor nutrition and hydration status  - Monitor labs (i e  albumin)  - Assess for incontinence   - Turn and reposition patient  - Assist with mobility/ambulation  - Relieve pressure over bony prominences  - Avoid friction and shearing  - Provide appropriate hygiene as needed including keeping skin clean and dry  - Evaluate need for skin moisturizer/barrier cream  - Collaborate with interdisciplinary team (i e  Nutrition, Rehabilitation, etc )   - Patient/family teaching  Outcome: Progressing     Problem: MUSCULOSKELETAL - ADULT  Goal: Maintain or return mobility to safest level of function  Description: INTERVENTIONS:  - Assess patient's ability to carry out ADLs; assess patient's baseline for ADL function and identify physical deficits which impact ability to perform ADLs (bathing, care of mouth/teeth, toileting, grooming, dressing, etc )  - Assess/evaluate cause of self-care deficits   - Assess range of motion  - Assess patient's mobility  - Assess patient's need for assistive devices and provide as appropriate  - Encourage maximum independence but intervene and supervise when necessary  - Involve family in performance of ADLs  - Assess for home care needs following discharge   - Consider OT consult to assist with ADL evaluation and planning for discharge  - Provide patient education as appropriate  Outcome: Progressing  Goal: Maintain proper alignment of affected body part  Description: INTERVENTIONS:  - Support, maintain and protect limb and body alignment  - Provide patient/ family with appropriate education  Outcome: Progressing

## 2020-09-02 PROBLEM — M10.372 ACUTE GOUT DUE TO RENAL IMPAIRMENT INVOLVING LEFT ANKLE: Status: ACTIVE | Noted: 2020-09-02

## 2020-09-02 LAB
GLUCOSE SERPL-MCNC: 111 MG/DL (ref 65–140)
GLUCOSE SERPL-MCNC: 134 MG/DL (ref 65–140)
GLUCOSE SERPL-MCNC: 166 MG/DL (ref 65–140)
GLUCOSE SERPL-MCNC: 95 MG/DL (ref 65–140)

## 2020-09-02 PROCEDURE — 99233 SBSQ HOSP IP/OBS HIGH 50: CPT | Performed by: NURSE PRACTITIONER

## 2020-09-02 PROCEDURE — 97110 THERAPEUTIC EXERCISES: CPT

## 2020-09-02 PROCEDURE — 82948 REAGENT STRIP/BLOOD GLUCOSE: CPT

## 2020-09-02 PROCEDURE — 99231 SBSQ HOSP IP/OBS SF/LOW 25: CPT | Performed by: ORTHOPAEDIC SURGERY

## 2020-09-02 PROCEDURE — 97116 GAIT TRAINING THERAPY: CPT

## 2020-09-02 PROCEDURE — 94760 N-INVAS EAR/PLS OXIMETRY 1: CPT

## 2020-09-02 PROCEDURE — 97530 THERAPEUTIC ACTIVITIES: CPT

## 2020-09-02 RX ORDER — ALLOPURINOL 100 MG/1
100 TABLET ORAL DAILY
Status: DISCONTINUED | OUTPATIENT
Start: 2020-09-02 | End: 2020-09-04 | Stop reason: HOSPADM

## 2020-09-02 RX ADMIN — POTASSIUM CHLORIDE 20 MEQ: 1500 TABLET, EXTENDED RELEASE ORAL at 08:40

## 2020-09-02 RX ADMIN — VANCOMYCIN HYDROCHLORIDE 750 MG: 750 INJECTION, SOLUTION INTRAVENOUS at 14:54

## 2020-09-02 RX ADMIN — GABAPENTIN 100 MG: 100 CAPSULE ORAL at 21:05

## 2020-09-02 RX ADMIN — APIXABAN 5 MG: 5 TABLET, FILM COATED ORAL at 08:40

## 2020-09-02 RX ADMIN — METOPROLOL TARTRATE 100 MG: 50 TABLET, FILM COATED ORAL at 08:39

## 2020-09-02 RX ADMIN — FUROSEMIDE 20 MG: 10 INJECTION, SOLUTION INTRAMUSCULAR; INTRAVENOUS at 08:40

## 2020-09-02 RX ADMIN — APIXABAN 5 MG: 5 TABLET, FILM COATED ORAL at 17:12

## 2020-09-02 RX ADMIN — ONDANSETRON 4 MG: 2 INJECTION INTRAMUSCULAR; INTRAVENOUS at 16:38

## 2020-09-02 RX ADMIN — GABAPENTIN 100 MG: 100 CAPSULE ORAL at 08:40

## 2020-09-02 RX ADMIN — ALLOPURINOL 100 MG: 100 TABLET ORAL at 19:35

## 2020-09-02 RX ADMIN — POTASSIUM CHLORIDE 20 MEQ: 1500 TABLET, EXTENDED RELEASE ORAL at 17:12

## 2020-09-02 RX ADMIN — CEFTRIAXONE 1000 MG: 1 INJECTION, SOLUTION INTRAVENOUS at 11:51

## 2020-09-02 RX ADMIN — METOPROLOL TARTRATE 100 MG: 50 TABLET, FILM COATED ORAL at 21:05

## 2020-09-02 RX ADMIN — VANCOMYCIN HYDROCHLORIDE 750 MG: 750 INJECTION, SOLUTION INTRAVENOUS at 02:14

## 2020-09-02 RX ADMIN — COLCHICINE 0.6 MG: 0.6 TABLET, FILM COATED ORAL at 08:39

## 2020-09-02 RX ADMIN — GABAPENTIN 100 MG: 100 CAPSULE ORAL at 16:38

## 2020-09-02 RX ADMIN — PRAVASTATIN SODIUM 20 MG: 20 TABLET ORAL at 16:37

## 2020-09-02 RX ADMIN — NORTRIPTYLINE HYDROCHLORIDE 50 MG: 25 CAPSULE ORAL at 08:40

## 2020-09-02 RX ADMIN — ESCITALOPRAM OXALATE 10 MG: 10 TABLET ORAL at 08:39

## 2020-09-02 RX ADMIN — LEVOTHYROXINE SODIUM 88 MCG: 88 TABLET ORAL at 05:37

## 2020-09-02 RX ADMIN — DILTIAZEM HYDROCHLORIDE 240 MG: 240 CAPSULE, COATED, EXTENDED RELEASE ORAL at 08:39

## 2020-09-02 RX ADMIN — AMIODARONE HYDROCHLORIDE 200 MG: 100 TABLET ORAL at 08:39

## 2020-09-02 RX ADMIN — OXYCODONE HYDROCHLORIDE AND ACETAMINOPHEN 1 TABLET: 5; 325 TABLET ORAL at 11:03

## 2020-09-02 NOTE — PLAN OF CARE
Problem: Potential for Falls  Goal: Patient will remain free of falls  Description: INTERVENTIONS:  - Assess patient frequently for physical needs  -  Identify cognitive and physical deficits and behaviors that affect risk of falls    -  [place] fall precautions as indicated by assessment   - Educate patient/family on patient safety including physical limitations  - Instruct patient to call for assistance with activity based on assessment  - Modify environment to reduce risk of injury  - Consider OT/PT consult to assist with strengthening/mobility  Outcome: Progressing     Problem: PAIN - ADULT  Goal: Verbalizes/displays adequate comfort level or baseline comfort level  Description: Interventions:  - Encourage patient to monitor pain and request assistance  - Assess pain using appropriate pain scale  - Administer analgesics based on type and severity of pain and evaluate response  - Implement non-pharmacological measures as appropriate and evaluate response  - Consider cultural and social influences on pain and pain management  - Notify physician/advanced practitioner if interventions unsuccessful or patient reports new pain  Outcome: Progressing     Problem: INFECTION - ADULT  Goal: Absence or prevention of progression during hospitalization  Description: INTERVENTIONS:  - Assess and monitor for signs and symptoms of infection  - Monitor lab/diagnostic results  - Monitor all insertion sites, i e  indwelling lines, tubes, and drains  - Monitor endotracheal if appropriate and nasal secretions for changes in amount and color  - [place] appropriate cooling/warming therapies per order  - Administer medications as ordered  - Instruct and encourage patient and family to use good hand hygiene technique  - Identify and instruct in appropriate isolation precautions for identified infection/condition  Outcome: Progressing     Problem: SAFETY ADULT  Goal: Patient will remain free of falls  Description: INTERVENTIONS:  - Assess patient frequently for physical needs  -  Identify cognitive and physical deficits and behaviors that affect risk of falls    -  [place] fall precautions as indicated by assessment   - Educate patient/family on patient safety including physical limitations  - Instruct patient to call for assistance with activity based on assessment  - Modify environment to reduce risk of injury  - Consider OT/PT consult to assist with strengthening/mobility  Outcome: Progressing  Goal: Maintain or return to baseline ADL function  Description: INTERVENTIONS:  -  Assess patient's ability to carry out ADLs; assess patient's baseline for ADL function and identify physical deficits which impact ability to perform ADLs (bathing, care of mouth/teeth, toileting, grooming, dressing, etc )  - Assess/evaluate cause of self-care deficits   - Assess range of motion  - Assess patient's mobility; develop plan if impaired  - Assess patient's need for assistive devices and provide as appropriate  - Encourage maximum independence but intervene and supervise when necessary  - Involve family in performance of ADLs  - Assess for home care needs following discharge   - Consider OT consult to assist with ADL evaluation and planning for discharge  - Provide patient education as appropriate  Outcome: Progressing  Goal: Maintain or return mobility status to optimal level  Description: INTERVENTIONS:  - Assess patient's baseline mobility status (ambulation, transfers, stairs, etc )    - Identify cognitive and physical deficits and behaviors that affect mobility  - Identify mobility aids required to assist with transfers and/or ambulation (gait belt, sit-to-stand, lift, walker, cane, etc )  - [place] fall precautions as indicated by assessment  - Record patient progress and toleration of activity level on Mobility SBAR; progress patient to next Phase/Stage  - Instruct patient to call for assistance with activity based on assessment  - Consider rehabilitation consult to assist with strengthening/weightbearing, etc   Outcome: Progressing     Problem: DISCHARGE PLANNING  Goal: Discharge to home or other facility with appropriate resources  Description: INTERVENTIONS:  - Identify barriers to discharge w/patient and caregiver  - Arrange for needed discharge resources and transportation as appropriate  - Identify discharge learning needs (meds, wound care, etc )  - Arrange for interpretive services to assist at discharge as needed  - Refer to Case Management Department for coordinating discharge planning if the patient needs post-hospital services based on physician/advanced practitioner order or complex needs related to functional status, cognitive ability, or social support system  Outcome: Progressing     Problem: Knowledge Deficit  Goal: Patient/family/caregiver demonstrates understanding of disease process, treatment plan, medications, and discharge instructions  Description: Complete learning assessment and assess knowledge base    Interventions:  - Provide teaching at level of understanding  - Provide teaching via preferred learning methods  Outcome: Progressing     Problem: GASTROINTESTINAL - ADULT  Goal: Minimal or absence of nausea and/or vomiting  Description: INTERVENTIONS:  - Administer IV fluids if ordered to ensure adequate hydration  - Maintain NPO status until nausea and vomiting are resolved  - Nasogastric tube if ordered  - Administer ordered antiemetic medications as needed  - Provide nonpharmacologic comfort measures as appropriate  - Advance diet as tolerated, if ordered  - Consider nutrition services referral to assist patient with adequate nutrition and appropriate food choices  Outcome: Progressing     Problem: METABOLIC, FLUID AND ELECTROLYTES - ADULT  Goal: Electrolytes maintained within normal limits  Description: INTERVENTIONS:  - Monitor labs and assess patient for signs and symptoms of electrolyte imbalances  - Administer electrolyte replacement as ordered  - Monitor response to electrolyte replacements, including repeat lab results as appropriate  - Instruct patient on fluid and nutrition as appropriate  Outcome: Progressing     Problem: SKIN/TISSUE INTEGRITY - ADULT  Goal: Skin integrity remains intact  Description: INTERVENTIONS  - Identify patients at risk for skin breakdown  - Assess and monitor skin integrity  - Assess and monitor nutrition and hydration status  - Monitor labs (i e  albumin)  - Assess for incontinence   - Turn and reposition patient  - Assist with mobility/ambulation  - Relieve pressure over bony prominences  - Avoid friction and shearing  - Provide appropriate hygiene as needed including keeping skin clean and dry  - Evaluate need for skin moisturizer/barrier cream  - Collaborate with interdisciplinary team (i e  Nutrition, Rehabilitation, etc )   - Patient/family teaching  Outcome: Progressing     Problem: MUSCULOSKELETAL - ADULT  Goal: Maintain or return mobility to safest level of function  Description: INTERVENTIONS:  - Assess patient's ability to carry out ADLs; assess patient's baseline for ADL function and identify physical deficits which impact ability to perform ADLs (bathing, care of mouth/teeth, toileting, grooming, dressing, etc )  - Assess/evaluate cause of self-care deficits   - Assess range of motion  - Assess patient's mobility  - Assess patient's need for assistive devices and provide as appropriate  - Encourage maximum independence but intervene and supervise when necessary  - Involve family in performance of ADLs  - Assess for home care needs following discharge   - Consider OT consult to assist with ADL evaluation and planning for discharge  - Provide patient education as appropriate  Outcome: Progressing  Goal: Maintain proper alignment of affected body part  Description: INTERVENTIONS:  - Support, maintain and protect limb and body alignment  - Provide patient/ family with appropriate education  Outcome: Progressing

## 2020-09-02 NOTE — PHYSICAL THERAPY NOTE
09/02/20 0822   Pain Assessment   Pain Assessment Tool 0-10   Pain Score 6   Pain Location/Orientation Orientation: Left; Location: Foot   Restrictions/Precautions   Weight Bearing Precautions Per Order No   Other Precautions Multiple lines; Fall Risk;Pain   General   Chart Reviewed Yes   Response to Previous Treatment Patient with no complaints from previous session  Family/Caregiver Present No   Cognition   Overall Cognitive Status WFL   Arousal/Participation Alert; Cooperative   Attention Within functional limits   Orientation Level Oriented X4   Memory Within functional limits   Following Commands Follows all commands and directions without difficulty   Comments pt agreeable to PT session   Bed Mobility   Additional Comments Pt sitting on EOB to begin treatment   Transfers   Sit to Stand   (CGA)   Additional items Assist x 1;Bedrails; Increased time required;Verbal cues   Stand to Sit   (CGA)   Additional items Assist x 1; Armrests; Verbal cues; Increased time required   Stand pivot   (CGA)   Ambulation/Elevation   Gait pattern Improper Weight shift;Decreased L stance; Short stride; Step to   Gait Assistance   (CGA)   Additional items Assist x 1;Verbal cues; Tactile cues   Assistive Device Standard walker   Distance 30ft with 2 directional turns   Stair Management Assistance Not tested   Balance   Static Sitting Normal   Dynamic Sitting Good   Static Standing Fair +   Dynamic Standing Fair   Ambulatory Fair   Endurance Deficit   Endurance Deficit Yes   Activity Tolerance   Activity Tolerance Patient limited by pain   Nurse Made Aware Yes, NADEEM Sarmiento present during part of treatment   Exercises   Quad Sets Sitting;20 reps;AROM; Bilateral   Heelslides Sitting;20 reps;AROM; Bilateral   Glute Sets Sitting;20 reps;AROM; Bilateral   Hip Flexion Sitting;20 reps;AROM; Bilateral   Hip Abduction Sitting;20 reps;AROM; Bilateral   Hip Adduction Sitting;20 reps;AROM; Bilateral   Knee AROM Long Arc Quad Sitting;20 reps;AROM; Bilateral Ankle Pumps Sitting;20 reps;AROM; Bilateral   Marching Sitting;20 reps;AROM; Bilateral   Assessment   Prognosis Good   Problem List Decreased strength;Decreased endurance; Impaired balance;Decreased mobility;Obesity;Pain   Assessment Pt seen for PT treatment session this date with interventions consisting of gait training w/ emphasis on improving pt's ability to ambulate level surfaces x 30ft with 2 directional turns with CGA provided by therapist with SW, Therapeutic exercise consisting of: AROM 20 reps B LE in sitting position and therapeutic activity consisting of training: sit<>stand transfers, static sitting tolerance at EOB for 23 minutes w/ no UE support, vc and tactile cues for static sitting posture faciliation and stand pivot transfers towards both direction  Pt agreeable to PT treatment session upon arrival, pt found seated at EOB, in no apparent distress, A&O x 4 and responsive  In comparison to previous session, pt with improvements in distance ambualted and amount of assistance required  Post session: pt returned back to recliner, all needs in reach and RN notified of session findings/recommendations Continue to recommend Home PT at time of d/c in order to maximize pt's functional independence and safety w/ mobility  Pt continues to be functioning below baseline level, and remains limited 2* factors listed above and including pain  PT will continue to see pt while here in order to address the deficits listed above and provide interventions consistent w/ POC in effort to achieve STGs  Barriers to Discharge Decreased caregiver support   Plan   Treatment/Interventions Functional transfer training;LE strengthening/ROM; Therapeutic exercise; Endurance training;Bed mobility;Gait training   Progress Progressing toward goals   PT Frequency   (3-5x/wk)   Recommendation   PT Discharge Recommendation Home with skilled therapy   Equipment Recommended Walker   PT - OK to Discharge Yes  (when medically stable)

## 2020-09-02 NOTE — PLAN OF CARE
Problem: Potential for Falls  Goal: Patient will remain free of falls  Description: INTERVENTIONS:  - Assess patient frequently for physical needs  -  Identify cognitive and physical deficits and behaviors that affect risk of falls    -  [place] fall precautions as indicated by assessment   - Educate patient/family on patient safety including physical limitations  - Instruct patient to call for assistance with activity based on assessment  - Modify environment to reduce risk of injury  - Consider OT/PT consult to assist with strengthening/mobility  Outcome: Progressing     Problem: PAIN - ADULT  Goal: Verbalizes/displays adequate comfort level or baseline comfort level  Description: Interventions:  - Encourage patient to monitor pain and request assistance  - Assess pain using appropriate pain scale  - Administer analgesics based on type and severity of pain and evaluate response  - Implement non-pharmacological measures as appropriate and evaluate response  - Consider cultural and social influences on pain and pain management  - Notify physician/advanced practitioner if interventions unsuccessful or patient reports new pain  Outcome: Progressing     Problem: INFECTION - ADULT  Goal: Absence or prevention of progression during hospitalization  Description: INTERVENTIONS:  - Assess and monitor for signs and symptoms of infection  - Monitor lab/diagnostic results  - Monitor all insertion sites, i e  indwelling lines, tubes, and drains  - Monitor endotracheal if appropriate and nasal secretions for changes in amount and color  - [place] appropriate cooling/warming therapies per order  - Administer medications as ordered  - Instruct and encourage patient and family to use good hand hygiene technique  - Identify and instruct in appropriate isolation precautions for identified infection/condition  Outcome: Progressing     Problem: SAFETY ADULT  Goal: Patient will remain free of falls  Description: INTERVENTIONS:  - Assess patient frequently for physical needs  -  Identify cognitive and physical deficits and behaviors that affect risk of falls    -  [place] fall precautions as indicated by assessment   - Educate patient/family on patient safety including physical limitations  - Instruct patient to call for assistance with activity based on assessment  - Modify environment to reduce risk of injury  - Consider OT/PT consult to assist with strengthening/mobility  Outcome: Progressing  Goal: Maintain or return to baseline ADL function  Description: INTERVENTIONS:  -  Assess patient's ability to carry out ADLs; assess patient's baseline for ADL function and identify physical deficits which impact ability to perform ADLs (bathing, care of mouth/teeth, toileting, grooming, dressing, etc )  - Assess/evaluate cause of self-care deficits   - Assess range of motion  - Assess patient's mobility; develop plan if impaired  - Assess patient's need for assistive devices and provide as appropriate  - Encourage maximum independence but intervene and supervise when necessary  - Involve family in performance of ADLs  - Assess for home care needs following discharge   - Consider OT consult to assist with ADL evaluation and planning for discharge  - Provide patient education as appropriate  Outcome: Progressing  Goal: Maintain or return mobility status to optimal level  Description: INTERVENTIONS:  - Assess patient's baseline mobility status (ambulation, transfers, stairs, etc )    - Identify cognitive and physical deficits and behaviors that affect mobility  - Identify mobility aids required to assist with transfers and/or ambulation (gait belt, sit-to-stand, lift, walker, cane, etc )  - [place] fall precautions as indicated by assessment  - Record patient progress and toleration of activity level on Mobility SBAR; progress patient to next Phase/Stage  - Instruct patient to call for assistance with activity based on assessment  - Consider rehabilitation consult to assist with strengthening/weightbearing, etc   Outcome: Progressing     Problem: DISCHARGE PLANNING  Goal: Discharge to home or other facility with appropriate resources  Description: INTERVENTIONS:  - Identify barriers to discharge w/patient and caregiver  - Arrange for needed discharge resources and transportation as appropriate  - Identify discharge learning needs (meds, wound care, etc )  - Arrange for interpretive services to assist at discharge as needed  - Refer to Case Management Department for coordinating discharge planning if the patient needs post-hospital services based on physician/advanced practitioner order or complex needs related to functional status, cognitive ability, or social support system  Outcome: Progressing     Problem: Knowledge Deficit  Goal: Patient/family/caregiver demonstrates understanding of disease process, treatment plan, medications, and discharge instructions  Description: Complete learning assessment and assess knowledge base    Interventions:  - Provide teaching at level of understanding  - Provide teaching via preferred learning methods  Outcome: Progressing     Problem: GASTROINTESTINAL - ADULT  Goal: Minimal or absence of nausea and/or vomiting  Description: INTERVENTIONS:  - Administer IV fluids if ordered to ensure adequate hydration  - Maintain NPO status until nausea and vomiting are resolved  - Nasogastric tube if ordered  - Administer ordered antiemetic medications as needed  - Provide nonpharmacologic comfort measures as appropriate  - Advance diet as tolerated, if ordered  - Consider nutrition services referral to assist patient with adequate nutrition and appropriate food choices  Outcome: Progressing     Problem: METABOLIC, FLUID AND ELECTROLYTES - ADULT  Goal: Electrolytes maintained within normal limits  Description: INTERVENTIONS:  - Monitor labs and assess patient for signs and symptoms of electrolyte imbalances  - Administer electrolyte replacement as ordered  - Monitor response to electrolyte replacements, including repeat lab results as appropriate  - Instruct patient on fluid and nutrition as appropriate  Outcome: Progressing     Problem: SKIN/TISSUE INTEGRITY - ADULT  Goal: Skin integrity remains intact  Description: INTERVENTIONS  - Identify patients at risk for skin breakdown  - Assess and monitor skin integrity  - Assess and monitor nutrition and hydration status  - Monitor labs (i e  albumin)  - Assess for incontinence   - Turn and reposition patient  - Assist with mobility/ambulation  - Relieve pressure over bony prominences  - Avoid friction and shearing  - Provide appropriate hygiene as needed including keeping skin clean and dry  - Evaluate need for skin moisturizer/barrier cream  - Collaborate with interdisciplinary team (i e  Nutrition, Rehabilitation, etc )   - Patient/family teaching  Outcome: Progressing     Problem: MUSCULOSKELETAL - ADULT  Goal: Maintain or return mobility to safest level of function  Description: INTERVENTIONS:  - Assess patient's ability to carry out ADLs; assess patient's baseline for ADL function and identify physical deficits which impact ability to perform ADLs (bathing, care of mouth/teeth, toileting, grooming, dressing, etc )  - Assess/evaluate cause of self-care deficits   - Assess range of motion  - Assess patient's mobility  - Assess patient's need for assistive devices and provide as appropriate  - Encourage maximum independence but intervene and supervise when necessary  - Involve family in performance of ADLs  - Assess for home care needs following discharge   - Consider OT consult to assist with ADL evaluation and planning for discharge  - Provide patient education as appropriate  Outcome: Progressing  Goal: Maintain proper alignment of affected body part  Description: INTERVENTIONS:  - Support, maintain and protect limb and body alignment  - Provide patient/ family with appropriate education  Outcome: Progressing

## 2020-09-02 NOTE — PLAN OF CARE
Problem: PHYSICAL THERAPY ADULT  Goal: Performs mobility at highest level of function for planned discharge setting  See evaluation for individualized goals  Description: Treatment/Interventions: Functional transfer training, LE strengthening/ROM, Therapeutic exercise, Endurance training, Patient/family training, Equipment eval/education, Bed mobility, Gait training, Spoke to nursing, Spoke to case management  Equipment Recommended: (continue walker use)       See flowsheet documentation for full assessment, interventions and recommendations  Outcome: Progressing  Note: Prognosis: Good  Problem List: Decreased strength, Decreased endurance, Impaired balance, Decreased mobility, Obesity, Pain  Assessment: Pt seen for PT treatment session this date with interventions consisting of gait training w/ emphasis on improving pt's ability to ambulate level surfaces x 30ft with 2 directional turns with CGA provided by therapist with SW, Therapeutic exercise consisting of: AROM 20 reps B LE in sitting position and therapeutic activity consisting of training: sit<>stand transfers, static sitting tolerance at EOB for 23 minutes w/ no UE support, vc and tactile cues for static sitting posture faciliation and stand pivot transfers towards both direction  Pt agreeable to PT treatment session upon arrival, pt found seated at EOB, in no apparent distress, A&O x 4 and responsive  In comparison to previous session, pt with improvements in distance ambualted and amount of assistance required  Post session: pt returned back to recliner, all needs in reach and RN notified of session findings/recommendations Continue to recommend Home PT at time of d/c in order to maximize pt's functional independence and safety w/ mobility  Pt continues to be functioning below baseline level, and remains limited 2* factors listed above and including pain   PT will continue to see pt while here in order to address the deficits listed above and provide interventions consistent w/ POC in effort to achieve STGs  Barriers to Discharge: Decreased caregiver support     PT Discharge Recommendation: Home with skilled therapy     PT - OK to Discharge: Yes(when medically stable)    See flowsheet documentation for full assessment

## 2020-09-02 NOTE — ASSESSMENT & PLAN NOTE
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

## 2020-09-02 NOTE — CASE MANAGEMENT
I met with the pt to make her aware that therapy is recommending home with [facility],  A post acute care recommendation was made by your care team for [facility]  Discussed [place] of Choice with patient  List of agencies given to patient via in person  patient aware the list is custom filtered for them by zip code location and that [facility] post acute providers are designated  I gave the pt the list and she requested slvna, referral was sent

## 2020-09-02 NOTE — ASSESSMENT & PLAN NOTE
· 2 8 on 8/31/2020  · Last discharge pt was on torsemide and potassium replacement  · Patient did not have medications refilled     · Replace with 40 mEq PO BID  · Monitor labs  · 9/1/2020 Resolved  · Will adjust potassium supplementation  · Monitor labs

## 2020-09-02 NOTE — PROGRESS NOTES
Progress Note - Orthopedics   Kerin Shone 70 y o  female MRN: [id number]  Unit/Bed#: -02 Encounter: [id number]    Assessment:  Probably gouty arthritis left ankle  Slowly improving  Plan:  Awaiting final cultures and sensitivities  Up to this point, they have been negative  Looking at her uric acid level, this is severely elevated indicative of a gouty attack  Would recommend medication to decrease uric acid symptoms, may need rheumatology consultation upon discharge for her gout  Patient states she is feeling better  Encouraged ambulation  Orthopedically stable for discharge    Weight bearing:  As tolerated    VTE Pharmacologic Prophylaxis: Eliquis    VTE Mechanical Prophylaxis: sequential compression device    Subjective:  Starting to feel better  Less complaints of pain  Vitals: Blood pressure 125/60, pulse 60, temperature (!) 96 7 °F (35 9 °C), temperature source Temporal, resp  rate 18, height 5' 7" (1 702 m), weight 116 kg (255 lb 15 3 oz), SpO2 96 %, not currently breastfeeding  ,Body mass index is 40 09 kg/m²  Intake/Output Summary (Last 24 hours) at 9/2/2020 1522  Last data filed at 9/2/2020 1103  Gross per 24 hour   Intake 720 ml   Output    Net 720 ml       Invasive Devices     Peripheral Intravenous Line            Peripheral IV 09/02/20 Left Antecubital less than 1 day                Physical Exam:   Ortho Exam:     Bilateral lower extremities are neurovascular intact  Toes are pink and mobile  Compartments are soft  There is significant less swelling along her left ankle  No warmth erythema present  Arc of motion is dramatically improved  Negative Homans  Denies any knee or foot pain          Lab, Imaging and other studies: CBC: No results found for: WBC, HGB, HCT, MCV, PLT, ADJUSTEDWBC, MCH, MCHC, RDW, MPV, NRBC  CMP: No results found for: SODIUM, CL, CO2, ANIONGAP, BUN, CREATININE, GLUCOSE, CALCIUM, AST, ALT, ALKPHOS, PROT, BILITOT, EGFR  PT/INR: No results found for: PT, INR     Uric acid (Order #[id number]) on 9/1/20    Contains abnormal data  Uric acid   Order: [id number]   Status:  Final result   Visible to patient:  No (not released)   Next appt:  None    Ref Range & Units  9/1/20 1248   Uric Acid  2 3 - 7 6 mg/dL  11 6High      Comment: Specimen collection should occur prior to Metamizole administration due to the potential for falsely depressed results

## 2020-09-02 NOTE — PROGRESS NOTES
Vancomycin IV Pharmacy-to-Dose Consultation    Zachary Tenorio is a 70 y o  female who is currently receiving Vancomycin IV with management by the Pharmacy Consult service  Assessment/Plan:  The patient was reviewed  Renal function is stable and no signs or symptoms of nephrotoxicity and/or infusion reactions were documented in the chart  Based on todays assessment, continue current vancomycin (day # 3) dosing of 750 mg IV q12, with a plan for trough to be drawn at 0200 on 9/3/20  We will continue to follow the patients culture results and clinical progress daily      Jair Talavera, Pharmacist

## 2020-09-02 NOTE — PROGRESS NOTES
Progress Note - Rod Abbott 1945, 70 y o  female MRN: [id number]    Unit/Bed#: -02 Encounter: [id number]    Primary Care Provider: Mckayla Krishnamurthy MD   Date and time admitted to hospital: 8/31/2020  8:36 AM        * Acute left ankle pain  Assessment & Plan  · Woke with pain on Friday, 8/28/2020  · No injury  · Unable to ambulate - improving with rest and elevation and colchine  · Will add allopurinol   · In ED on 8/31/2020: fluid removed from ankle and sent for evaluation  · RBC: 8215  · WBC: 10584  · Neutrophils: 94  · Lymphocytes: 1  · Moncytes: 5  · CRP: 121 6  · SED: 47  · Percocet for pain control  · 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  · Continue Rocephin   · Consultation with Orthopedics called from ED  · Uric acid: 11 6  · Xray Left ankle: There is no acute fracture or dislocation  An inferior calcaneal spur is present  No lytic or blastic osseous lesion  There is soft tissue swelling  · Start patient on colchicine     Acute hypokalemia  Assessment & Plan  · 2 8 on 8/31/2020  · Last discharge pt was on torsemide and potassium replacement  · Patient did not have medications refilled     · Replace with 40 mEq PO BID  · Monitor labs  · 9/1/2020 Resolved  · Will adjust potassium supplementation  · Monitor labs    Stage 3 chronic kidney disease ([facility] [address] )  Assessment & Plan  · Patient is at her baseline  · Continue home medications    Anemia due to stage 3 chronic kidney disease ([address] )  Assessment & Plan  · Patient is at her baseline  · Continue to monitor labs    Essential hypertension  Assessment & Plan  · Will continue home medications of amiodarone, cardizem, metoprolol    Vertigo, benign paroxysmal, bilateral  Assessment & Plan  · Stable at this time  · Continue with prn antivert    Hypercholesterolemia  Assessment & Plan  · Continue statin    Mild episode of recurrent major depressive disorder ([address] )  Assessment & Plan  · Continue pamelor, lexapro, ativan    Class 3 severe obesity due to excess calories with serious comorbidity and body mass index (BMI) of 40 0 to 44 9 in adult [facility])  Assessment & Plan  · Supportive care     Acquired hypothyroidism  Assessment & Plan  · Continue synthroid     Prediabetes  Assessment & Plan  · Will check Accucheck QAC and HS with SSI    Chronic atrial fibrillation (HCC)  Assessment & Plan  · Stable on eliquis and cardizem    DANTE on CPAP  Assessment & Plan  · Respiratory protocol    VTE Prophylaxis:  Apixaban (Eliquis)    Patient Centered Rounds: I have performed bedside rounds with nursing staff today  Discussions with Specialists or Other Care Team Provider:  Nursing, PT OT, Orthopedics, respiratory therapy, pharmacy, case management  Education and Discussions with Family / Patient:  Discussed with the patient    Current Length of Stay: 1 day(s)    Current Patient Status: Inpatient   Certification Statement: The patient will continue to require additional inpatient hospital stay due to Continued need for monitoring of labs, starting of gout medication  Discharge Plan:  Home with home health care and PT when appropriate, most likely 2020  Code Status: Level 1 - Full Code    Subjective:   Patient is lying in bed  She states that she actually has improved pain levels of her left ankle  She did state that she was moving a little better, a little more with PT     Objective:     Vitals:   Temp (24hrs), Av 3 °F (36 3 °C), Min:96 7 °F (35 9 °C), Max:97 8 °F (36 6 °C)    Temp:  [96 7 °F (35 9 °C)-97 8 °F (36 6 °C)] 97 5 °F (36 4 °C)  HR:  [44-66] 44  Resp:  [18] 18  BP: (103-134)/(58-62) 103/59  SpO2:  [94 %-96 %] 94 %  Body mass index is 40 09 kg/m²  Input and Output Summary (last 24 hours): Intake/Output Summary (Last 24 hours) at 2020 1844  Last data filed at 2020 1700  Gross per 24 hour   Intake 600 ml   Output    Net 600 ml       Physical Exam:   Physical Exam  Constitutional:       General: She is awake        Appearance: Normal appearance  She is well-developed and overweight  HENT:      Head: Normocephalic and atraumatic  Nose: Nose normal       Mouth/Throat:      Mouth: Mucous membranes are moist    Eyes:      Extraocular Movements: Extraocular movements intact  Comments: Patient does have chronic issues with her eyesight, stating that she does not drive  Neck:      Musculoskeletal: Normal range of motion  Cardiovascular:      Rate and Rhythm: Normal rate and regular rhythm  Pulses: Normal pulses  Heart sounds: Normal heart sounds  Pulmonary:      Effort: Pulmonary effort is normal       Breath sounds: Normal breath sounds  Abdominal:      General: Bowel sounds are normal    Musculoskeletal:        Feet:    Skin:     General: Skin is warm  Neurological:      Mental Status: She is alert and oriented to person, place, and time  Psychiatric:         Attention and Perception: Attention normal          Mood and Affect: Mood normal          Speech: Speech normal          Behavior: Behavior normal  Behavior is cooperative  Additional Data:     Labs:    Results from last 7 days   Lab Units 09/01/20  0429   WBC Thousand/uL 7 80   HEMOGLOBIN g/dL 9 3*   HEMATOCRIT % 28 9*   PLATELETS Thousands/uL 251   NEUTROS PCT % 71   LYMPHS PCT % 18*   MONOS PCT % 9   EOS PCT % 2     Results from last 7 days   Lab Units 09/01/20  0429   SODIUM mmol/L 140   POTASSIUM mmol/L 3 8   CHLORIDE mmol/L 104   CO2 mmol/L 27   BUN mg/dL 21   CREATININE mg/dL 1 41*   CALCIUM mg/dL 8 7         Results from last 7 days   Lab Units 09/02/20  1612 09/02/20  1120 09/02/20  0631 09/01/20 2026 09/01/20  1617 09/01/20  1126 09/01/20  0620 08/31/20  2050 08/31/20  1606   POC GLUCOSE mg/dl 166* 111 95 129 167* 134 82 143* 98           * I Have Reviewed All Lab Data Listed Above  * Additional Pertinent Lab Tests Reviewed:  All Labs For Current Hospital Admission  Reviewed    Imaging:  Imaging Reports Reviewed Today Include:  None    Recent Cultures (last 7 days):     Results from last 7 days   Lab Units 08/31/20  1036   GRAM STAIN RESULT  2+ Polys  No bacteria seen   BODY FLUID CULTURE, STERILE  No growth       Last 24 Hours Medication List:   Current Facility-Administered Medications   Medication Dose Route Frequency Provider Last Rate    acetaminophen  650 mg Oral Q6H PRN Linda John, CRNP      allopurinol  100 mg Oral Daily Linda John, CRNP      amiodarone  200 mg Oral Daily Linda John, CRNP      apixaban  5 mg Oral BID Lindacharlette John, CRNP      cefTRIAXone  1,000 mg Intravenous Q24H Linda A Yonatankes, CRNP 1,000 mg (09/02/20 1151)    colchicine  0 6 mg Oral Daily Linda A Alvaro, CRNP      diltiazem  240 mg Oral Daily Linda A Alvaro, CRNP      escitalopram  10 mg Oral Daily Linda A Yonatankes, CRNP      furosemide  20 mg Intravenous BID (diuretic) Linda JOYCE Alvaro, CRNP      gabapentin  100 mg Oral TID Linda COOK Yonatanmarlin, CRNP      insulin lispro  1-5 Units Subcutaneous TID AC Linda John, CRNP      insulin lispro  1-5 Units Subcutaneous HS Lindacharlette John, CRNP      levothyroxine  88 mcg Oral Early Morning Lindacharlette John, CRNP      LORazepam  1 mg Oral TID PRN ALEJANDRA Gomez      meclizine  25 mg Oral Q8H PRN Linda John, CRNP      metoprolol tartrate  100 mg Oral Q12H [address] Linda COOK Alvaro, CRNP      nortriptyline  50 mg Oral Daily Linda John, CRRAQUEL      ondansetron  4 mg Intravenous Q6H PRN Jan Easton, ALEJANDRA      oxyCODONE-acetaminophen  1 tablet Oral Q4H PRN Linda A Alvaro, CRNP      potassium chloride  20 mEq Oral BID Linda COOK Yonatanmarlin, CRNP      pravastatin  20 mg Oral Daily With Mayi Zhaopin, CRNP      vancomycin  10 mg/kg (Adjusted) Intravenous Q12H Linda John, CRNP 750 mg (09/02/20 7554)        Today, Patient Was Seen By: ALEJANDRA Gomez    ** Please Note: Dictation voice to text software may have been used in the creation of this document  **

## 2020-09-02 NOTE — ASSESSMENT & PLAN NOTE
· Uric acid level of 11 6  · Patient initially started on colchicine 1 2 mg p o , followed by 0 6 mg daily  · Patient had improvement of ankle pain  · Will start patient on allopurinol 100 mg daily

## 2020-09-03 LAB
ANION GAP SERPL CALCULATED.3IONS-SCNC: 9 MMOL/L (ref 4–13)
ANISOCYTOSIS BLD QL SMEAR: PRESENT
BACTERIA SPEC BFLD CULT: NO GROWTH
BASOPHILS # BLD AUTO: 0.1 THOUSANDS/ΜL (ref 0–0.1)
BASOPHILS NFR BLD AUTO: 1 % (ref 0–2)
BUN SERPL-MCNC: 23 MG/DL (ref 7–25)
CALCIUM SERPL-MCNC: 8.8 MG/DL (ref 8.6–10.5)
CHLORIDE SERPL-SCNC: 102 MMOL/L (ref 98–107)
CO2 SERPL-SCNC: 26 MMOL/L (ref 21–31)
CREAT SERPL-MCNC: 1.47 MG/DL (ref 0.6–1.2)
CRYSTALS SNV QL MICRO: NORMAL
EOSINOPHIL # BLD AUTO: 0.4 THOUSAND/ΜL (ref 0–0.61)
EOSINOPHIL NFR BLD AUTO: 5 % (ref 0–5)
ERYTHROCYTE [DISTWIDTH] IN BLOOD BY AUTOMATED COUNT: 18.9 % (ref 11.5–14.5)
GFR SERPL CREATININE-BSD FRML MDRD: 35 ML/MIN/1.73SQ M
GLUCOSE SERPL-MCNC: 107 MG/DL (ref 65–140)
GLUCOSE SERPL-MCNC: 109 MG/DL (ref 65–140)
GLUCOSE SERPL-MCNC: 136 MG/DL (ref 65–140)
GLUCOSE SERPL-MCNC: 89 MG/DL (ref 65–140)
GLUCOSE SERPL-MCNC: 98 MG/DL (ref 65–99)
GRAM STN SPEC: NORMAL
GRAM STN SPEC: NORMAL
HCT VFR BLD AUTO: 28.8 % (ref 42–47)
HGB BLD-MCNC: 9 G/DL (ref 12–16)
HYPERCHROMIA BLD QL SMEAR: PRESENT
LYMPHOCYTES # BLD AUTO: 1.8 THOUSANDS/ΜL (ref 0.6–4.47)
LYMPHOCYTES NFR BLD AUTO: 23 % (ref 21–51)
MCH RBC QN AUTO: 22.2 PG (ref 26–34)
MCHC RBC AUTO-ENTMCNC: 31.1 G/DL (ref 31–37)
MCV RBC AUTO: 72 FL (ref 81–99)
MICROCYTES BLD QL AUTO: PRESENT
MONOCYTES # BLD AUTO: 0.5 THOUSAND/ΜL (ref 0.17–1.22)
MONOCYTES NFR BLD AUTO: 7 % (ref 2–12)
NEUTROPHILS # BLD AUTO: 4.8 THOUSANDS/ΜL (ref 1.4–6.5)
NEUTS SEG NFR BLD AUTO: 64 % (ref 42–75)
PLATELET # BLD AUTO: 331 THOUSANDS/UL (ref 149–390)
PLATELET BLD QL SMEAR: ADEQUATE
PMV BLD AUTO: 9.4 FL (ref 8.6–11.7)
POTASSIUM SERPL-SCNC: 3.7 MMOL/L (ref 3.5–5.5)
RBC # BLD AUTO: 4.03 MILLION/UL (ref 3.9–5.2)
RBC MORPH BLD: NORMAL
SARS-COV-2 RNA RESP QL NAA+PROBE: NEGATIVE
SODIUM SERPL-SCNC: 137 MMOL/L (ref 134–143)
VANCOMYCIN TROUGH SERPL-MCNC: 17.3 UG/ML (ref 5–12)
WBC # BLD AUTO: 7.5 THOUSAND/UL (ref 4.8–10.8)

## 2020-09-03 PROCEDURE — 97110 THERAPEUTIC EXERCISES: CPT

## 2020-09-03 PROCEDURE — 99233 SBSQ HOSP IP/OBS HIGH 50: CPT | Performed by: NURSE PRACTITIONER

## 2020-09-03 PROCEDURE — 80202 ASSAY OF VANCOMYCIN: CPT | Performed by: NURSE PRACTITIONER

## 2020-09-03 PROCEDURE — 87635 SARS-COV-2 COVID-19 AMP PRB: CPT | Performed by: NURSE PRACTITIONER

## 2020-09-03 PROCEDURE — 97530 THERAPEUTIC ACTIVITIES: CPT

## 2020-09-03 PROCEDURE — 82948 REAGENT STRIP/BLOOD GLUCOSE: CPT

## 2020-09-03 PROCEDURE — 97116 GAIT TRAINING THERAPY: CPT

## 2020-09-03 PROCEDURE — 97535 SELF CARE MNGMENT TRAINING: CPT

## 2020-09-03 PROCEDURE — 85025 COMPLETE CBC W/AUTO DIFF WBC: CPT | Performed by: NURSE PRACTITIONER

## 2020-09-03 PROCEDURE — 80048 BASIC METABOLIC PNL TOTAL CA: CPT | Performed by: NURSE PRACTITIONER

## 2020-09-03 RX ORDER — FUROSEMIDE 20 MG/1
20 TABLET ORAL
Status: DISCONTINUED | OUTPATIENT
Start: 2020-09-04 | End: 2020-09-04 | Stop reason: HOSPADM

## 2020-09-03 RX ADMIN — PRAVASTATIN SODIUM 20 MG: 20 TABLET ORAL at 16:09

## 2020-09-03 RX ADMIN — METOPROLOL TARTRATE 100 MG: 50 TABLET, FILM COATED ORAL at 08:20

## 2020-09-03 RX ADMIN — APIXABAN 5 MG: 5 TABLET, FILM COATED ORAL at 08:21

## 2020-09-03 RX ADMIN — GABAPENTIN 100 MG: 100 CAPSULE ORAL at 16:09

## 2020-09-03 RX ADMIN — FUROSEMIDE 20 MG: 10 INJECTION, SOLUTION INTRAMUSCULAR; INTRAVENOUS at 16:09

## 2020-09-03 RX ADMIN — FUROSEMIDE 20 MG: 10 INJECTION, SOLUTION INTRAMUSCULAR; INTRAVENOUS at 08:21

## 2020-09-03 RX ADMIN — VANCOMYCIN HYDROCHLORIDE 750 MG: 750 INJECTION, SOLUTION INTRAVENOUS at 13:48

## 2020-09-03 RX ADMIN — ESCITALOPRAM OXALATE 10 MG: 10 TABLET ORAL at 08:20

## 2020-09-03 RX ADMIN — APIXABAN 5 MG: 5 TABLET, FILM COATED ORAL at 17:08

## 2020-09-03 RX ADMIN — POTASSIUM CHLORIDE 20 MEQ: 1500 TABLET, EXTENDED RELEASE ORAL at 17:08

## 2020-09-03 RX ADMIN — LEVOTHYROXINE SODIUM 88 MCG: 88 TABLET ORAL at 05:55

## 2020-09-03 RX ADMIN — ONDANSETRON 4 MG: 2 INJECTION INTRAMUSCULAR; INTRAVENOUS at 16:13

## 2020-09-03 RX ADMIN — CEFTRIAXONE 1000 MG: 1 INJECTION, SOLUTION INTRAVENOUS at 11:01

## 2020-09-03 RX ADMIN — AMIODARONE HYDROCHLORIDE 200 MG: 100 TABLET ORAL at 08:19

## 2020-09-03 RX ADMIN — METOPROLOL TARTRATE 100 MG: 50 TABLET, FILM COATED ORAL at 21:57

## 2020-09-03 RX ADMIN — COLCHICINE 0.6 MG: 0.6 TABLET, FILM COATED ORAL at 08:19

## 2020-09-03 RX ADMIN — GABAPENTIN 100 MG: 100 CAPSULE ORAL at 21:57

## 2020-09-03 RX ADMIN — POTASSIUM CHLORIDE 20 MEQ: 1500 TABLET, EXTENDED RELEASE ORAL at 08:19

## 2020-09-03 RX ADMIN — GABAPENTIN 100 MG: 100 CAPSULE ORAL at 08:21

## 2020-09-03 RX ADMIN — DILTIAZEM HYDROCHLORIDE 240 MG: 240 CAPSULE, COATED, EXTENDED RELEASE ORAL at 08:49

## 2020-09-03 RX ADMIN — OXYCODONE HYDROCHLORIDE AND ACETAMINOPHEN 1 TABLET: 5; 325 TABLET ORAL at 08:20

## 2020-09-03 RX ADMIN — NORTRIPTYLINE HYDROCHLORIDE 50 MG: 25 CAPSULE ORAL at 08:19

## 2020-09-03 RX ADMIN — VANCOMYCIN HYDROCHLORIDE 750 MG: 750 INJECTION, SOLUTION INTRAVENOUS at 02:01

## 2020-09-03 RX ADMIN — ALLOPURINOL 100 MG: 100 TABLET ORAL at 08:21

## 2020-09-03 NOTE — SOCIAL WORK
Pt has been accepted to [facility] for rehab, covid test results were sent to fax# [phone], I called Za Paez again at [phone] at 16:10 and he is able to transport the pt tomorrow at 10:30am ,doctor and pt were made aware, pt denis nto have her cpcap here, pt stated she does not need her cpap if she sleeps with her head elevated, d/c plan was discussed at care coordination rounds,

## 2020-09-03 NOTE — PLAN OF CARE
Problem: OCCUPATIONAL THERAPY ADULT  Goal: Performs self-care activities at highest level of function for planned discharge setting  See evaluation for individualized goals  Description: Treatment Interventions: ADL retraining, Functional transfer training, Energy conservation, Activityengagement          See flowsheet documentation for full assessment, interventions and recommendations  Outcome: Progressing  Note: Limitation: Decreased ADL status, Decreased Safe judgement during ADL, Decreased endurance, Decreased self-care trans, Decreased high-level ADLs  Prognosis: Good  Assessment: Patient participated in Skilled OT session this date with interventions consisting of ADL re training with the use of correct body mechnaics, Energy Conservation techniques, safety awareness and fall prevention techniques and  therapeutic activities to: increase activity tolerance   Patient agreeable to OT treatment session, upon arrival patient was found seated in bed  Patient requiring verbal cues for correct technique and ocassional safety reminders  Patient continues to be functioning below baseline level, occupational performance remains limited secondary to factors listed above and increased risk for falls and injury  From OT standpoint, recommendation at time of d/c would be Post-Acute rehabilitation services for maximizing higher level ADL attainment PRN  Patient to benefit from continued Occupational Therapy treatment while in the hospital to address deficits as defined above and maximize level of functional independence with ADLs and functional mobility        OT Discharge Recommendation: Post-Acute Rehabilitation Services  OT - OK to Discharge: Yes(when medically cleared)     AYLIN Sousa

## 2020-09-03 NOTE — PHYSICAL THERAPY NOTE
09/03/20 0915   Pain Assessment   Pain Assessment Tool 0-10   Pain Score 5   Pain Location/Orientation Orientation: Left; Location: Foot   Restrictions/Precautions   Weight Bearing Precautions Per Order No   Other Precautions Multiple lines; Fall Risk;Pain   General   Chart Reviewed Yes   Response to Previous Treatment Patient with no complaints from previous session  Family/Caregiver Present No   Cognition   Overall Cognitive Status WFL   Arousal/Participation Alert; Cooperative   Attention Within functional limits   Orientation Level Oriented X4   Memory Within functional limits   Following Commands Follows all commands and directions without difficulty   Comments pt agreeable to PT session   Bed Mobility   Supine to Sit 6  Modified independent   Additional items HOB elevated; Bedrails   Sit to Supine 6  Modified independent   Additional items HOB elevated; Bedrails   Transfers   Sit to Stand 6  Modified independent   Additional items Armrests; Verbal cues   Stand to Sit 6  Modified independent   Additional items Armrests; Increased time required   Stand pivot 6  Modified independent   Additional items Verbal cues  (c walker)   Toilet transfer 6  Modified independent   Additional items Armrests;Standard toilet   Ambulation/Elevation   Gait pattern Improper Weight shift; Forward Flexion;Decreased foot clearance;Decreased L stance; Step to   Gait Assistance 5  Supervision   Additional items Assist x 1;Verbal cues   Assistive Device Rolling walker   Distance [id number], 40ftx1   Stair Management Assistance Not tested   Balance   Static Sitting Normal   Dynamic Sitting Good   Static Standing Fair   Dynamic Standing Fair -   Ambulatory Fair -   Endurance Deficit   Endurance Deficit Yes   Activity Tolerance   Activity Tolerance Patient limited by pain   Exercises   Quad Sets Sitting;20 reps;AROM; Bilateral   Heelslides Sitting;20 reps;AROM; Bilateral   Glute Sets Sitting;20 reps;AROM   Hip Abduction Sitting;20 reps;AROM; Bilateral   Hip Adduction Sitting;20 reps;AROM; Bilateral   Knee AROM Long Arc Quad Sitting;20 reps;AROM; Bilateral   Ankle Pumps Sitting;20 reps;AROM; Bilateral   Bridging Supine;5 reps  (for scooting up in bed for proper positioning)   Assessment   Prognosis Good   Problem List Decreased endurance;Decreased strength; Impaired balance;Decreased mobility;Obesity;Pain   Assessment Pt seen for PT treatment session this date with interventions consisting of gait training w/ emphasis on improving pt's ability to ambulate level surfaces x [id number], 40ftx1 with close S provided by therapist with RW, Therapeutic exercise consisting of: AROM 20 reps B LE in sitting position and therapeutic activity consisting of training: bed mobility, supine<>sit transfers, sit<>stand transfers, stand pivot transfers towards both direction and toilet transfers  Pt agreeable to PT treatment session upon arrival, pt found supine in bed w/ HOB elevated, in no apparent distress, A&O x 4 and responsive  In comparison to previous session, pt with improvements in distance ambulated and amount of assistance required  Post session: pt returned BTB, all needs in reach and RN notified of session findings/recommendations Continue to recommend Home PT at time of d/c in order to maximize pt's functional independence and safety w/ mobility  Pt continues to be functioning below baseline level, and remains limited 2* factors listed above and including pain and decreased functional activity tolerance  PT will continue to see pt while here in order to address the deficits listed above and provide interventions consistent w/ POC in effort to achieve STGs  Barriers to Discharge Decreased caregiver support   Plan   Treatment/Interventions Functional transfer training;LE strengthening/ROM; Therapeutic exercise; Endurance training;Bed mobility;Gait training   Progress Progressing toward goals   PT Frequency   (3-5x/wk)   Recommendation   PT Discharge Recommendation Home with skilled therapy   Equipment Recommended Walker   PT - OK to Discharge Yes   Additional Comments upon conclusion, pt returned to bed with all needs in place

## 2020-09-03 NOTE — OCCUPATIONAL THERAPY NOTE
09/03/20 2861   Restrictions/Precautions   Weight Bearing Precautions Per Order No   Other Precautions Fall Risk;Pain   General   Response to Previous Treatment Patient with no complaints from previous session   Lifestyle   Intrinsic Gratification light housework (stated "I''ll have to get someone to do a heavy cleaning     around the time I get my cataracts done - hopefully my vision gets better")   Pain Assessment   Pain Assessment Tool Pain Assessment not indicated - pt denies pain  (no complaints during self-care)   Pain Location/Orientation   (did comment on general Arthritis discomforts)   ADL   Where Assessed Edge of bed   Equipment Provided   (uses a reacher at home )   Grooming Comments brushed teeth post set-up   UB Bathing Assistance 5  Supervision/Setup   UB Bathing Deficit Setup   LB Bathing Assistance 5  Supervision/Setup   LB Bathing Deficit Setup; Increased time to complete   LB Dressing Comments able to doff/don socks with minimal difficulty at this time    Meal Prep   Meal Preparation reports doing mostly light cooking since her vision impairment   Bed Mobility   Supine to Sit 6  Modified independent   Additional items HOB elevated; Bedrails; Increased time required   Sit to Supine 5  Supervision   Additional items HOB elevated; Bedrails; Increased time required   Transfers   Additional Comments chose to defer pericare at this time - will do when she returns to bathroom    Coordination   Gross Motor [facility]   Dexterity WFL   Cognition   Overall Cognitive Status [facility]   Arousal/Participation Alert; Cooperative   Activity Tolerance   Activity Tolerance Patient limited by fatigue  (patient reports that "It's harder to regain your stamina"  )   Assessment   Assessment Patient participated in Skilled OT session this date with interventions consisting of ADL re training with the use of correct body mechnaics, Energy Conservation techniques, safety awareness and fall prevention techniques and  therapeutic activities to: increase activity tolerance   Patient agreeable to OT treatment session, upon arrival patient was found seated in bed  Patient requiring verbal cues for correct technique and ocassional safety reminders  Patient continues to be functioning below baseline level, occupational performance remains limited secondary to factors listed above and increased risk for falls and injury  From OT standpoint, recommendation at time of d/c would be Post-Acute rehabilitation services for maximizing higher level ADL attainment PRN  Patient to benefit from continued Occupational Therapy treatment while in the hospital to address deficits as defined above and maximize level of functional independence with ADLs and functional mobility  Plan   Treatment Interventions ADL retraining;Functional transfer training;Energy conservation; Activityengagement   Goal Expiration Date 09/11/20   OT Treatment Day 172 PRIMO Casarez/L

## 2020-09-03 NOTE — PROGRESS NOTES
Progress Note - Augustin Kahn 1945, 70 y o  female MRN: [id number]    Unit/Bed#: -02 Encounter: [id number]    Primary Care Provider: Fannie Kwong MD   Date and time admitted to hospital: 8/31/2020  8:36 AM        * Acute left ankle pain  Assessment & Plan  · Woke with pain on Friday, 8/28/2020  · No injury  · Unable to ambulate - improving with rest and elevation and colchine  · Will add allopurinol   · In ED on 8/31/2020: fluid removed from ankle and sent for evaluation  · RBC: 8215  · WBC: 25105  · Neutrophils: 94  · Lymphocytes: 1  · Moncytes: 5  · CRP: 121 6  · SED: 47  · Percocet for pain control  · 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  · Continue Rocephin: D/c on 9/3  · Consultation with Orthopedics called from ED  · Uric acid: 11 6  · Xray Left ankle: There is no acute fracture or dislocation  An inferior calcaneal spur is present  No lytic or blastic osseous lesion  There is soft tissue swelling  · Start patient on colchicine and allopurinol    Acute gout due to renal impairment involving left ankle  Assessment & Plan  · Uric acid level of 11 6  · Patient initially started on colchicine 1 2 mg p o , followed by 0 6 mg daily  · Patient had improvement of ankle pain  · Will start patient on allopurinol 100 mg daily    Acute hypokalemia  Assessment & Plan  · 2 8 on 8/31/2020  · Last discharge pt was on torsemide and potassium replacement  · Patient did not have medications refilled     · Replace with 40 mEq PO BID  · Monitor labs  · 9/1/2020 Resolved  · Will adjust potassium supplementation  · Monitor labs  · Resolved   · Continue 20mEq po BID  · Will change    Stage 3 chronic kidney disease ([facility] [address] )  Assessment & Plan  · Patient is at her baseline  · Continue home medications    Anemia due to stage 3 chronic kidney disease ([facility] [address] )  Assessment & Plan  · Patient is at her baseline  · Continue to monitor labs    Essential hypertension  Assessment & Plan  · Will continue home medications of amiodarone, cardizem, metoprolol    Vertigo, benign paroxysmal, bilateral  Assessment & Plan  · Stable at this time  · Continue with prn antivert    Hypercholesterolemia  Assessment & Plan  · Continue statin    Mild episode of recurrent major depressive disorder (HCC)  Assessment & Plan  · Continue pamelor, lexapro, ativan    Class 3 severe obesity due to excess calories with serious comorbidity and body mass index (BMI) of 40 0 to 44 9 in adult [facility])  Assessment & Plan  · Supportive care     Acquired hypothyroidism  Assessment & Plan  · Continue synthroid     Prediabetes  Assessment & Plan  · Will check Accucheck QAC and HS with SSI    Chronic atrial fibrillation (HCC)  Assessment & Plan  · Stable on eliquis and cardizem    DANTE on CPAP  Assessment & Plan  · Respiratory protocol      VTE Prophylaxis:  Apixaban (Eliquis)    Patient Centered Rounds: I have performed bedside rounds with nursing staff today  Discussions with Specialists or Other Care Team Provider: nursing, respiratory therapy, PT OT, orthopedic surgery, pharmacy, case management  Education and Discussions with Family / Patient: discussed with the patient    Current Length of Stay: 2 day(s)    Current Patient Status: Inpatient   Certification Statement: The patient will continue to require additional inpatient hospital stay due to patient medically stable for discharge to STR on 2020  Discharge Plan: Niko Cholo on 2020    Code Status: Level 1 - Full Code    Subjective:   Patient lying in bed  She has been working with PT OT and improving  She still has some needs due to her poor vision and use of a roller walker  She has been approved for STR at [facility]  Objective:     Vitals:   Temp (24hrs), Av 1 °F (36 2 °C), Min:96 7 °F (35 9 °C), Max:97 7 °F (36 5 °C)    Temp:  [96 7 °F (35 9 °C)-97 7 °F (36 5 °C)] 97 7 °F (36 5 °C)  HR:  [51-64] 51  Resp:  [17-18] 17  BP: (128-144)/(60-70) 128/62  SpO2:  [93 %-95 %] 93 %  Body mass index is 41 26 kg/m²  Input and Output Summary (last 24 hours): Intake/Output Summary (Last 24 hours) at 9/3/2020 1955  Last data filed at 9/3/2020 1100  Gross per 24 hour   Intake 480 ml   Output    Net 480 ml       Physical Exam:   Physical Exam  Constitutional:       General: She is awake  Appearance: Normal appearance  She is well-developed  HENT:      Head: Normocephalic and atraumatic  Nose: Nose normal       Mouth/Throat:      Mouth: Mucous membranes are moist    Eyes:      Comments: Chronic blurry vision   Neck:      Musculoskeletal: Normal range of motion  Cardiovascular:      Rate and Rhythm: Normal rate and regular rhythm  Pulses: Normal pulses  Heart sounds: Normal heart sounds  Pulmonary:      Effort: Pulmonary effort is normal       Breath sounds: Normal breath sounds  Abdominal:      General: Bowel sounds are normal    Musculoskeletal:        Feet:    Skin:     General: Skin is warm  Neurological:      Mental Status: She is alert and oriented to person, place, and time  Psychiatric:         Attention and Perception: Attention normal          Mood and Affect: Mood normal          Speech: Speech normal          Behavior: Behavior normal  Behavior is cooperative           Additional Data:     Labs:    Results from last 7 days   Lab Units 09/03/20  0439   WBC Thousand/uL 7 50   HEMOGLOBIN g/dL 9 0*   HEMATOCRIT % 28 8*   PLATELETS Thousands/uL 331   NEUTROS PCT % 64   LYMPHS PCT % 23   MONOS PCT % 7   EOS PCT % 5     Results from last 7 days   Lab Units 09/03/20  0439   SODIUM mmol/L 137   POTASSIUM mmol/L 3 7   CHLORIDE mmol/L 102   CO2 mmol/L 26   BUN mg/dL 23   CREATININE mg/dL 1 47*   CALCIUM mg/dL 8 8         Results from last 7 days   Lab Units 09/03/20  1603 09/03/20  1108 09/03/20  0635 09/02/20 2021 09/02/20  1612 09/02/20  1120 09/02/20  0631 09/01/20 2026 09/01/20  1617 09/01/20  1126 09/01/20  0620 08/31/20  2050   POC GLUCOSE mg/dl 136 109 89 134 166* 111 95 129 167* 134 82 143*           * I Have Reviewed All Lab Data Listed Above  * Additional Pertinent Lab Tests Reviewed:  [address] Admission  Reviewed    Imaging:  Imaging Reports Reviewed Today Include: none    Recent Cultures (last 7 days):     Results from last 7 days   Lab Units 08/31/20  1036   GRAM STAIN RESULT  2+ Polys  No bacteria seen   BODY FLUID CULTURE, STERILE  No growth       Last 24 Hours Medication List:   Current Facility-Administered Medications   Medication Dose Route Frequency Provider Last Rate    acetaminophen  650 mg Oral Q6H PRN Linda John, CRNP      allopurinol  100 mg Oral Daily Linda A Yonatankes, CRNP      amiodarone  200 mg Oral Daily Linda A Yonatankes, CRNP      apixaban  5 mg Oral BID Linda A Alvaro, CRNP      colchicine  0 6 mg Oral Daily Linda A Yonatankes, CRNP      diltiazem  240 mg Oral Daily Linda A Yonatankes, CRNP      escitalopram  10 mg Oral Daily Linda A Alvaro, ROBERTNP      [START ON 9/4/2020] furosemide  20 mg Oral BID (diuretic) Linda John, CRNP      gabapentin  100 mg Oral TID Linda Jhon, CRNP      insulin lispro  1-5 Units Subcutaneous TID AC Linda A Alvaro, CRNP      insulin lispro  1-5 Units Subcutaneous HS Linda JOYCE Alvaro, CRRAQUEL      levothyroxine  88 mcg Oral Early Morning Linda Tammarlin, CRNP      LORazepam  1 mg Oral TID PRN Carilyn Holstein, ALEJANDRA      meclizine  25 mg Oral Q8H PRN Linda John, ALEJANDRA      metoprolol tartrate  100 mg Oral Q12H [address] Linda A Alvaro, CRRAQUEL      nortriptyline  50 mg Oral Daily Linda John, ALEJANDRA      ondansetron  4 mg Intravenous Q6H PRN Carilyn Holstein, ALEJANDRA      oxyCODONE-acetaminophen  1 tablet Oral Q4H PRN Linda John, ALEJANDRA      potassium chloride  20 mEq Oral BID Lindacharlette John, ALEJANDRA      pravastatin  20 mg Oral Daily With EdvivoALEJANDRA          Today, Patient Was Seen By: Carilyn Holstein, CRNP    ** Please Note: Dictation voice to text software may have been used in the creation of this document   **

## 2020-09-03 NOTE — PROGRESS NOTES
Vancomycin Assessment    Nicole Christine is a 70 y o  female who is currently receiving vancomycin 750mg IV Q12hrs for other bone/joint infection   Relevant clinical data and objective history reviewed:  Creatinine   Date Value Ref Range Status   09/03/2020 1 47 (H) 0 60 - 1 20 mg/dL Final     Comment:     Standardized to IDMS reference method   09/01/2020 1 41 (H) 0 60 - 1 20 mg/dL Final     Comment:     Standardized to IDMS reference method   08/31/2020 1 37 (H) 0 60 - 1 20 mg/dL Final     Comment:     Standardized to IDMS reference method   08/19/2015 1 26 0 60 - 1 30 mg/dL Final     Comment:     Standardized to IDMS reference method   05/06/2015 1 07 0 60 - 1 30 mg/dL Final     Comment:     Standardized to IDMS reference method   03/06/2015 1 10 0 60 - 1 30 mg/dL Final     Comment:     Standardized to IDMS reference method     /62 (BP Location: Right arm)   Pulse 64   Temp (!) 96 9 °F (36 1 °C) (Temporal)   Resp 18   Ht 5' 7" (1 702 m)   Wt 120 kg (263 lb 7 2 oz)   LMP  (LMP Unknown)   SpO2 95%   Breastfeeding No   BMI 41 26 kg/m²   I/O last 3 completed shifts: In: 600 [P O :600]  Out: -   Lab Results   Component Value Date/Time    BUN 23 09/03/2020 04:39 AM    BUN 16 08/19/2015 07:13 AM    WBC 7 50 09/03/2020 04:39 AM    WBC 7 05 08/19/2015 07:13 AM    HGB 9 0 (L) 09/03/2020 04:39 AM    HGB 12 4 08/19/2015 07:13 AM    HCT 28 8 (L) 09/03/2020 04:39 AM    HCT 37 6 08/19/2015 07:13 AM    MCV 72 (L) 09/03/2020 04:39 AM    MCV 85 08/19/2015 07:13 AM     09/03/2020 04:39 AM     08/19/2015 07:13 AM     Temp Readings from Last 3 Encounters:   09/03/20 (!) 96 9 °F (36 1 °C) (Temporal)   08/03/20 97 6 °F (36 4 °C) (Temporal)   07/15/20 97 5 °F (36 4 °C) (Temporal)     Vancomycin Days of Therapy: 4    Assessment/Plan  The patient is currently on vancomycin utilizing scheduled dosing  Baseline risks associated with therapy include: concomitant nephrotoxic medications and advanced age    The patient is receiving 750mg IV Q12hrs with the most recent vancomycin level being at steady-state and therapeutic based on a goal of 15-20 (appropriate for most indications) ; therefore, is clinically appropriate and dose will be continued   Pharmacy will continue to follow closely for s/sx of nephrotoxicity, infusion reactions, and appropriateness of therapy  BMP and CBC will be ordered per protocol  Plan for trough as patient approaches steady state, prior to the other  dose at approximately 1400 at 09/5/2020  Pharmacy will continue to follow the patients culture results and clinical progress daily      Oren Keller, Pharmacist

## 2020-09-03 NOTE — PLAN OF CARE
Problem: PHYSICAL THERAPY ADULT  Goal: Performs mobility at highest level of function for planned discharge setting  See evaluation for individualized goals  Description: Treatment/Interventions: Functional transfer training, LE strengthening/ROM, Therapeutic exercise, Endurance training, Patient/family training, Equipment eval/education, Bed mobility, Gait training, Spoke to nursing, Spoke to case management  Equipment Recommended: (continue walker use)       See flowsheet documentation for full assessment, interventions and recommendations  Outcome: Progressing  Note: Prognosis: Good  Problem List: Decreased endurance, Decreased strength, Impaired balance, Decreased mobility, Obesity, Pain  Assessment: Pt seen for PT treatment session this date with interventions consisting of gait training w/ emphasis on improving pt's ability to ambulate level surfaces x [id number], [id number] with close S provided by therapist with RW, Therapeutic exercise consisting of: AROM 20 reps B LE in sitting position and therapeutic activity consisting of training: bed mobility, supine<>sit transfers, sit<>stand transfers, stand pivot transfers towards both direction and toilet transfers  Pt agreeable to PT treatment session upon arrival, pt found supine in bed w/ HOB elevated, in no apparent distress, A&O x 4 and responsive  In comparison to previous session, pt with improvements in distance ambulated and amount of assistance required  Post session: pt returned BTB, all needs in reach and RN notified of session findings/recommendations Continue to recommend Home PT at time of d/c in order to maximize pt's functional independence and safety w/ mobility  Pt continues to be functioning below baseline level, and remains limited 2* factors listed above and including pain and decreased functional activity tolerance   PT will continue to see pt while here in order to address the deficits listed above and provide interventions consistent w/ POC in effort to achieve STGs  Barriers to Discharge: Decreased caregiver support     PT Discharge Recommendation: Home with skilled therapy     PT - OK to Discharge: Yes    See flowsheet documentation for full assessment

## 2020-09-03 NOTE — ASSESSMENT & PLAN NOTE
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

## 2020-09-03 NOTE — ASSESSMENT & PLAN NOTE
· 2 8 on 8/31/2020  · Last discharge pt was on torsemide and potassium replacement  · Patient did not have medications refilled     · Replace with 40 mEq PO BID  · Monitor labs  · 9/1/2020 Resolved  · Will adjust potassium supplementation  · Monitor labs  · Resolved   · Continue 20mEq po BID  · Will change

## 2020-09-04 VITALS
HEIGHT: 67 IN | HEART RATE: 68 BPM | WEIGHT: 264.55 LBS | SYSTOLIC BLOOD PRESSURE: 132 MMHG | BODY MASS INDEX: 41.52 KG/M2 | DIASTOLIC BLOOD PRESSURE: 63 MMHG | TEMPERATURE: 97.3 F | RESPIRATION RATE: 18 BRPM | OXYGEN SATURATION: 93 %

## 2020-09-04 DIAGNOSIS — Z71.89 COMPLEX CARE COORDINATION: Primary | ICD-10-CM

## 2020-09-04 LAB
ANION GAP SERPL CALCULATED.3IONS-SCNC: 10 MMOL/L (ref 4–13)
BASOPHILS # BLD AUTO: 0.1 THOUSANDS/ΜL (ref 0–0.1)
BASOPHILS NFR BLD AUTO: 1 % (ref 0–2)
BUN SERPL-MCNC: 23 MG/DL (ref 7–25)
CALCIUM SERPL-MCNC: 8.7 MG/DL (ref 8.6–10.5)
CHLORIDE SERPL-SCNC: 103 MMOL/L (ref 98–107)
CO2 SERPL-SCNC: 25 MMOL/L (ref 21–31)
CREAT SERPL-MCNC: 1.54 MG/DL (ref 0.6–1.2)
EOSINOPHIL # BLD AUTO: 0.4 THOUSAND/ΜL (ref 0–0.61)
EOSINOPHIL NFR BLD AUTO: 4 % (ref 0–5)
ERYTHROCYTE [DISTWIDTH] IN BLOOD BY AUTOMATED COUNT: 19.5 % (ref 11.5–14.5)
GFR SERPL CREATININE-BSD FRML MDRD: 33 ML/MIN/1.73SQ M
GLUCOSE SERPL-MCNC: 104 MG/DL (ref 65–140)
GLUCOSE SERPL-MCNC: 108 MG/DL (ref 65–99)
HCT VFR BLD AUTO: 29.6 % (ref 42–47)
HGB BLD-MCNC: 9.4 G/DL (ref 12–16)
LYMPHOCYTES # BLD AUTO: 1 THOUSANDS/ΜL (ref 0.6–4.47)
LYMPHOCYTES NFR BLD AUTO: 12 % (ref 21–51)
MCH RBC QN AUTO: 22.7 PG (ref 26–34)
MCHC RBC AUTO-ENTMCNC: 31.7 G/DL (ref 31–37)
MCV RBC AUTO: 72 FL (ref 81–99)
MONOCYTES # BLD AUTO: 0.6 THOUSAND/ΜL (ref 0.17–1.22)
MONOCYTES NFR BLD AUTO: 7 % (ref 2–12)
NEUTROPHILS # BLD AUTO: 6.3 THOUSANDS/ΜL (ref 1.4–6.5)
NEUTS SEG NFR BLD AUTO: 75 % (ref 42–75)
PLATELET # BLD AUTO: 336 THOUSANDS/UL (ref 149–390)
PMV BLD AUTO: 9.9 FL (ref 8.6–11.7)
POTASSIUM SERPL-SCNC: 4.3 MMOL/L (ref 3.5–5.5)
RBC # BLD AUTO: 4.13 MILLION/UL (ref 3.9–5.2)
SODIUM SERPL-SCNC: 138 MMOL/L (ref 134–143)
WBC # BLD AUTO: 8.4 THOUSAND/UL (ref 4.8–10.8)

## 2020-09-04 PROCEDURE — 82948 REAGENT STRIP/BLOOD GLUCOSE: CPT

## 2020-09-04 PROCEDURE — 80048 BASIC METABOLIC PNL TOTAL CA: CPT | Performed by: NURSE PRACTITIONER

## 2020-09-04 PROCEDURE — 85025 COMPLETE CBC W/AUTO DIFF WBC: CPT | Performed by: NURSE PRACTITIONER

## 2020-09-04 PROCEDURE — 99239 HOSP IP/OBS DSCHRG MGMT >30: CPT | Performed by: NURSE PRACTITIONER

## 2020-09-04 RX ORDER — GABAPENTIN 100 MG/1
100 CAPSULE ORAL 3 TIMES DAILY
Qty: 90 CAPSULE | Refills: 0 | Status: SHIPPED | OUTPATIENT
Start: 2020-09-04 | End: 2020-11-09 | Stop reason: SDUPTHER

## 2020-09-04 RX ORDER — AMIODARONE HYDROCHLORIDE 200 MG/1
200 TABLET ORAL DAILY
Qty: 90 TABLET | Refills: 0 | Status: SHIPPED | OUTPATIENT
Start: 2020-09-04 | End: 2020-11-09 | Stop reason: SDUPTHER

## 2020-09-04 RX ORDER — ESCITALOPRAM OXALATE 10 MG/1
10 TABLET ORAL DAILY
Qty: 30 TABLET | Refills: 0 | Status: SHIPPED | OUTPATIENT
Start: 2020-09-04 | End: 2020-11-09 | Stop reason: SDUPTHER

## 2020-09-04 RX ORDER — LEVOTHYROXINE SODIUM 88 UG/1
88 TABLET ORAL
Refills: 0
Start: 2020-09-04 | End: 2020-09-04 | Stop reason: HOSPADM

## 2020-09-04 RX ORDER — DILTIAZEM HYDROCHLORIDE 240 MG/1
240 CAPSULE, COATED, EXTENDED RELEASE ORAL DAILY
Qty: 30 CAPSULE | Refills: 0 | Status: SHIPPED | OUTPATIENT
Start: 2020-09-04 | End: 2020-11-09 | Stop reason: SDUPTHER

## 2020-09-04 RX ORDER — METOPROLOL TARTRATE 100 MG/1
100 TABLET ORAL EVERY 12 HOURS SCHEDULED
Qty: 60 TABLET | Refills: 0
Start: 2020-09-04 | End: 2020-09-04

## 2020-09-04 RX ORDER — TORSEMIDE 20 MG/1
20 TABLET ORAL SEE ADMIN INSTRUCTIONS
Qty: 60 TABLET | Refills: 0 | Status: SHIPPED | OUTPATIENT
Start: 2020-09-04 | End: 2020-11-09 | Stop reason: SDUPTHER

## 2020-09-04 RX ORDER — NORTRIPTYLINE HYDROCHLORIDE 50 MG/1
50 CAPSULE ORAL DAILY
Qty: 30 CAPSULE | Refills: 0 | Status: SHIPPED | OUTPATIENT
Start: 2020-09-04 | End: 2020-11-09 | Stop reason: SDUPTHER

## 2020-09-04 RX ORDER — METOPROLOL TARTRATE 100 MG/1
100 TABLET ORAL EVERY 12 HOURS SCHEDULED
Qty: 60 TABLET | Refills: 0 | Status: SHIPPED | OUTPATIENT
Start: 2020-09-04 | End: 2020-11-09 | Stop reason: SDUPTHER

## 2020-09-04 RX ORDER — MECLIZINE HYDROCHLORIDE 25 MG/1
25 TABLET ORAL EVERY 8 HOURS PRN
Qty: 30 TABLET | Refills: 0
Start: 2020-09-04 | End: 2020-09-04 | Stop reason: HOSPADM

## 2020-09-04 RX ORDER — TORSEMIDE 20 MG/1
20 TABLET ORAL SEE ADMIN INSTRUCTIONS
Qty: 60 TABLET | Refills: 0 | Status: SHIPPED | OUTPATIENT
Start: 2020-09-04 | End: 2020-09-04

## 2020-09-04 RX ORDER — COLCHICINE 0.6 MG/1
0.6 TABLET ORAL DAILY
Qty: 30 TABLET | Refills: 0 | Status: SHIPPED | OUTPATIENT
Start: 2020-09-04 | End: 2020-12-04 | Stop reason: HOSPADM

## 2020-09-04 RX ORDER — ALLOPURINOL 100 MG/1
100 TABLET ORAL DAILY
Qty: 30 TABLET | Refills: 0 | Status: SHIPPED | OUTPATIENT
Start: 2020-09-04 | End: 2020-12-04 | Stop reason: HOSPADM

## 2020-09-04 RX ORDER — COLCHICINE 0.6 MG/1
0.6 TABLET ORAL DAILY
Refills: 0
Start: 2020-09-04 | End: 2020-09-04

## 2020-09-04 RX ORDER — DILTIAZEM HYDROCHLORIDE 240 MG/1
240 CAPSULE, COATED, EXTENDED RELEASE ORAL DAILY
Refills: 0
Start: 2020-09-04 | End: 2020-09-04 | Stop reason: HOSPADM

## 2020-09-04 RX ORDER — MECLIZINE HYDROCHLORIDE 25 MG/1
25 TABLET ORAL EVERY 8 HOURS PRN
Qty: 30 TABLET | Refills: 0 | Status: SHIPPED | OUTPATIENT
Start: 2020-09-04 | End: 2020-09-04

## 2020-09-04 RX ORDER — LEVOTHYROXINE SODIUM 88 UG/1
88 TABLET ORAL DAILY
Qty: 30 TABLET | Refills: 0 | Status: SHIPPED | OUTPATIENT
Start: 2020-09-04 | End: 2020-11-09 | Stop reason: SDUPTHER

## 2020-09-04 RX ORDER — OXYCODONE HYDROCHLORIDE AND ACETAMINOPHEN 5; 325 MG/1; MG/1
1 TABLET ORAL EVERY 4 HOURS PRN
Qty: 50 TABLET | Refills: 0 | Status: SHIPPED | OUTPATIENT
Start: 2020-09-04 | End: 2020-09-14

## 2020-09-04 RX ORDER — ALLOPURINOL 100 MG/1
100 TABLET ORAL DAILY
Refills: 0
Start: 2020-09-04 | End: 2020-09-04

## 2020-09-04 RX ORDER — LORAZEPAM 1 MG/1
1 TABLET ORAL 3 TIMES DAILY PRN
Qty: 30 TABLET | Refills: 0 | Status: SHIPPED | OUTPATIENT
Start: 2020-09-04 | End: 2020-12-04 | Stop reason: HOSPADM

## 2020-09-04 RX ORDER — ACETAMINOPHEN 325 MG/1
650 TABLET ORAL EVERY 6 HOURS PRN
Qty: 30 TABLET | Refills: 0 | Status: SHIPPED | OUTPATIENT
Start: 2020-09-04 | End: 2020-12-04 | Stop reason: HOSPADM

## 2020-09-04 RX ORDER — FUROSEMIDE 20 MG/1
20 TABLET ORAL 2 TIMES DAILY
Refills: 0
Start: 2020-09-04 | End: 2020-09-04 | Stop reason: HOSPADM

## 2020-09-04 RX ORDER — SIMVASTATIN 10 MG
10 TABLET ORAL
Qty: 30 TABLET | Refills: 0 | Status: SHIPPED | OUTPATIENT
Start: 2020-09-04 | End: 2020-11-09 | Stop reason: SDUPTHER

## 2020-09-04 RX ORDER — AMIODARONE HYDROCHLORIDE 200 MG/1
200 TABLET ORAL DAILY
Refills: 0
Start: 2020-09-04 | End: 2020-09-04 | Stop reason: HOSPADM

## 2020-09-04 RX ORDER — POTASSIUM CHLORIDE 20 MEQ/1
20 TABLET, EXTENDED RELEASE ORAL 2 TIMES DAILY
Qty: 60 TABLET | Refills: 0
Start: 2020-09-04 | End: 2020-09-04

## 2020-09-04 RX ORDER — METOPROLOL TARTRATE 100 MG/1
100 TABLET ORAL EVERY 12 HOURS SCHEDULED
Qty: 60 TABLET | Refills: 0
Start: 2020-09-04 | End: 2020-09-04 | Stop reason: HOSPADM

## 2020-09-04 RX ORDER — POTASSIUM CHLORIDE 20 MEQ/1
20 TABLET, EXTENDED RELEASE ORAL 2 TIMES DAILY
Qty: 60 TABLET | Refills: 0 | Status: SHIPPED | OUTPATIENT
Start: 2020-09-04 | End: 2020-11-09 | Stop reason: SDUPTHER

## 2020-09-04 RX ORDER — MECLIZINE HYDROCHLORIDE 25 MG/1
25 TABLET ORAL EVERY 8 HOURS SCHEDULED
Qty: 90 TABLET | Refills: 0 | Status: SHIPPED | OUTPATIENT
Start: 2020-09-04 | End: 2020-12-04 | Stop reason: HOSPADM

## 2020-09-04 RX ADMIN — DILTIAZEM HYDROCHLORIDE 240 MG: 240 CAPSULE, COATED, EXTENDED RELEASE ORAL at 09:19

## 2020-09-04 RX ADMIN — COLCHICINE 0.6 MG: 0.6 TABLET, FILM COATED ORAL at 09:16

## 2020-09-04 RX ADMIN — METOPROLOL TARTRATE 100 MG: 50 TABLET, FILM COATED ORAL at 09:19

## 2020-09-04 RX ADMIN — ESCITALOPRAM OXALATE 10 MG: 10 TABLET ORAL at 09:16

## 2020-09-04 RX ADMIN — FUROSEMIDE 20 MG: 20 TABLET ORAL at 09:19

## 2020-09-04 RX ADMIN — LEVOTHYROXINE SODIUM 88 MCG: 88 TABLET ORAL at 05:28

## 2020-09-04 RX ADMIN — APIXABAN 5 MG: 5 TABLET, FILM COATED ORAL at 09:16

## 2020-09-04 RX ADMIN — AMIODARONE HYDROCHLORIDE 200 MG: 100 TABLET ORAL at 09:19

## 2020-09-04 RX ADMIN — POTASSIUM CHLORIDE 20 MEQ: 1500 TABLET, EXTENDED RELEASE ORAL at 09:16

## 2020-09-04 RX ADMIN — GABAPENTIN 100 MG: 100 CAPSULE ORAL at 09:16

## 2020-09-04 RX ADMIN — ALLOPURINOL 100 MG: 100 TABLET ORAL at 09:19

## 2020-09-04 RX ADMIN — NORTRIPTYLINE HYDROCHLORIDE 50 MG: 25 CAPSULE ORAL at 09:16

## 2020-09-04 NOTE — PLAN OF CARE
Problem: Potential for Falls  Goal: Patient will remain free of falls  Description: INTERVENTIONS:  - Assess patient frequently for physical needs  -  Identify cognitive and physical deficits and behaviors that affect risk of falls    -  [place] fall precautions as indicated by assessment   - Educate patient/family on patient safety including physical limitations  - Instruct patient to call for assistance with activity based on assessment  - Modify environment to reduce risk of injury  - Consider OT/PT consult to assist with strengthening/mobility  9/3/2020 2113 by Britney Anderson RN  Outcome: Progressing  9/3/2020 2113 by Britney Anderson RN  Outcome: Progressing     Problem: PAIN - ADULT  Goal: Verbalizes/displays adequate comfort level or baseline comfort level  Description: Interventions:  - Encourage patient to monitor pain and request assistance  - Assess pain using appropriate pain scale  - Administer analgesics based on type and severity of pain and evaluate response  - Implement non-pharmacological measures as appropriate and evaluate response  - Consider cultural and social influences on pain and pain management  - Notify physician/advanced practitioner if interventions unsuccessful or patient reports new pain  9/3/2020 2113 by Britney Anderson RN  Outcome: Progressing  9/3/2020 2113 by Britney Anderson RN  Outcome: Progressing     Problem: INFECTION - ADULT  Goal: Absence or prevention of progression during hospitalization  Description: INTERVENTIONS:  - Assess and monitor for signs and symptoms of infection  - Monitor lab/diagnostic results  - Monitor all insertion sites, i e  indwelling lines, tubes, and drains  - Monitor endotracheal if appropriate and nasal secretions for changes in amount and color  - [place] appropriate cooling/warming therapies per order  - Administer medications as ordered  - Instruct and encourage patient and family to use good hand hygiene technique  - Identify and instruct in appropriate isolation precautions for identified infection/condition  9/3/2020 2113 by Haley Kwong RN  Outcome: Progressing  9/3/2020 2113 by Haley Kwong RN  Outcome: Progressing     Problem: SAFETY ADULT  Goal: Patient will remain free of falls  Description: INTERVENTIONS:  - Assess patient frequently for physical needs  -  Identify cognitive and physical deficits and behaviors that affect risk of falls    -  [place] fall precautions as indicated by assessment   - Educate patient/family on patient safety including physical limitations  - Instruct patient to call for assistance with activity based on assessment  - Modify environment to reduce risk of injury  - Consider OT/PT consult to assist with strengthening/mobility  9/3/2020 2113 by Haley Kwong RN  Outcome: Progressing  9/3/2020 2113 by Haley Kwong RN  Outcome: Progressing  Goal: Maintain or return to baseline ADL function  Description: INTERVENTIONS:  -  Assess patient's ability to carry out ADLs; assess patient's baseline for ADL function and identify physical deficits which impact ability to perform ADLs (bathing, care of mouth/teeth, toileting, grooming, dressing, etc )  - Assess/evaluate cause of self-care deficits   - Assess range of motion  - Assess patient's mobility; develop plan if impaired  - Assess patient's need for assistive devices and provide as appropriate  - Encourage maximum independence but intervene and supervise when necessary  - Involve family in performance of ADLs  - Assess for home care needs following discharge   - Consider OT consult to assist with ADL evaluation and planning for discharge  - Provide patient education as appropriate  9/3/2020 2113 by Haley Kwong RN  Outcome: Progressing  9/3/2020 2113 by Haley Kwong RN  Outcome: Progressing  Goal: Maintain or return mobility status to optimal level  Description: INTERVENTIONS:  - Assess patient's baseline mobility status (ambulation, transfers, stairs, etc )    - Identify cognitive and physical deficits and behaviors that affect mobility  - Identify mobility aids required to assist with transfers and/or ambulation (gait belt, sit-to-stand, lift, walker, cane, etc )  - [place] fall precautions as indicated by assessment  - Record patient progress and toleration of activity level on Mobility SBAR; progress patient to next Phase/Stage  - Instruct patient to call for assistance with activity based on assessment  - Consider rehabilitation consult to assist with strengthening/weightbearing, etc   9/3/2020 2113 by Lesvia Marrero RN  Outcome: Progressing  9/3/2020 2113 by Lesvia Marrero RN  Outcome: Progressing     Problem: DISCHARGE PLANNING  Goal: Discharge to home or other facility with appropriate resources  Description: INTERVENTIONS:  - Identify barriers to discharge w/patient and caregiver  - Arrange for needed discharge resources and transportation as appropriate  - Identify discharge learning needs (meds, wound care, etc )  - Arrange for interpretive services to assist at discharge as needed  - Refer to Case Management Department for coordinating discharge planning if the patient needs post-hospital services based on physician/advanced practitioner order or complex needs related to functional status, cognitive ability, or social support system  9/3/2020 2113 by Lesvia Marrero RN  Outcome: Progressing  9/3/2020 2113 by Lesvia Marrero RN  Outcome: Progressing     Problem: Knowledge Deficit  Goal: Patient/family/caregiver demonstrates understanding of disease process, treatment plan, medications, and discharge instructions  Description: Complete learning assessment and assess knowledge base    Interventions:  - Provide teaching at level of understanding  - Provide teaching via preferred learning methods  9/3/2020 2113 by Lesvia Marrero RN  Outcome: Progressing  9/3/2020 2113 by Lesvia Marrero RN  Outcome: Progressing     Problem: GASTROINTESTINAL - ADULT  Goal: Minimal or absence of nausea and/or vomiting  Description: INTERVENTIONS:  - Administer IV fluids if ordered to ensure adequate hydration  - Maintain NPO status until nausea and vomiting are resolved  - Nasogastric tube if ordered  - Administer ordered antiemetic medications as needed  - Provide nonpharmacologic comfort measures as appropriate  - Advance diet as tolerated, if ordered  - Consider nutrition services referral to assist patient with adequate nutrition and appropriate food choices  9/3/2020 2113 by Sundeep Angel RN  Outcome: Progressing  9/3/2020 2113 by Sundeep Angel RN  Outcome: Progressing     Problem: METABOLIC, FLUID AND ELECTROLYTES - ADULT  Goal: Electrolytes maintained within normal limits  Description: INTERVENTIONS:  - Monitor labs and assess patient for signs and symptoms of electrolyte imbalances  - Administer electrolyte replacement as ordered  - Monitor response to electrolyte replacements, including repeat lab results as appropriate  - Instruct patient on fluid and nutrition as appropriate  9/3/2020 2113 by Sundeep Angel RN  Outcome: Progressing  9/3/2020 2113 by Sundeep Angel RN  Outcome: Progressing     Problem: SKIN/TISSUE INTEGRITY - ADULT  Goal: Skin integrity remains intact  Description: INTERVENTIONS  - Identify patients at risk for skin breakdown  - Assess and monitor skin integrity  - Assess and monitor nutrition and hydration status  - Monitor labs (i e  albumin)  - Assess for incontinence   - Turn and reposition patient  - Assist with mobility/ambulation  - Relieve pressure over bony prominences  - Avoid friction and shearing  - Provide appropriate hygiene as needed including keeping skin clean and dry  - Evaluate need for skin moisturizer/barrier cream  - Collaborate with interdisciplinary team (i e  Nutrition, Rehabilitation, etc )   - Patient/family teaching  9/3/2020 2113 by Ramesh Carrero Ronak Pa RN  Outcome: Progressing  9/3/2020 2113 by Robson Wade RN  Outcome: Progressing     Problem: MUSCULOSKELETAL - ADULT  Goal: Maintain or return mobility to safest level of function  Description: INTERVENTIONS:  - Assess patient's ability to carry out ADLs; assess patient's baseline for ADL function and identify physical deficits which impact ability to perform ADLs (bathing, care of mouth/teeth, toileting, grooming, dressing, etc )  - Assess/evaluate cause of self-care deficits   - Assess range of motion  - Assess patient's mobility  - Assess patient's need for assistive devices and provide as appropriate  - Encourage maximum independence but intervene and supervise when necessary  - Involve family in performance of ADLs  - Assess for home care needs following discharge   - Consider OT consult to assist with ADL evaluation and planning for discharge  - Provide patient education as appropriate  9/3/2020 2113 by Robson Wade RN  Outcome: Progressing  9/3/2020 2113 by Robson Wade RN  Outcome: Progressing  Goal: Maintain proper alignment of affected body part  Description: INTERVENTIONS:  - Support, maintain and protect limb and body alignment  - Provide patient/ family with appropriate education  9/3/2020 2113 by Robson Wade RN  Outcome: Progressing  9/3/2020 2113 by Robson Wade RN  Outcome: Progressing

## 2020-09-04 NOTE — DISCHARGE SUMMARY
Discharge- Tessa San 1945, 70 y o  female MRN: [id number]    Unit/Bed#: -02 Encounter: [id number]    Primary Care Provider: Anel Damon MD   Date and time admitted to hospital: 8/31/2020  8:36 AM        * Acute left ankle pain  Assessment & Plan  · Woke with pain on Friday, 8/28/2020  · No injury  · Unable to ambulate - improving with rest and elevation and colchine  · Will add allopurinol   · In ED on 8/31/2020: fluid removed from ankle and sent for evaluation  · RBC: 8215  · WBC: 29377  · Neutrophils: 94  · Lymphocytes: 1  · Moncytes: 5  · CRP: 121 6  · SED: 47  · Percocet for pain control  · 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  · Continue Rocephin: D/c on 9/3  · Consultation with Orthopedics called from ED  · Uric acid: 11 6  · Xray Left ankle: There is no acute fracture or dislocation  An inferior calcaneal spur is present  No lytic or blastic osseous lesion  There is soft tissue swelling  · Start patient on colchicine and allopurinol    Acute gout due to renal impairment involving left ankle  Assessment & Plan  · Uric acid level of 11 6  · Patient initially started on colchicine 1 2 mg p o , followed by 0 6 mg daily  · Patient had improvement of ankle pain  · Will start patient on allopurinol 100 mg daily    Acute hypokalemia  Assessment & Plan  · 2 8 on 8/31/2020  · Last discharge pt was on torsemide and potassium replacement  · Patient did not have medications refilled     · Replace with 40 mEq PO BID  · Monitor labs  · 9/1/2020 Resolved  · Will adjust potassium supplementation  · Monitor labs  · Resolved   · Continue 20mEq po BID  · Will change    Stage 3 chronic kidney disease ([facility] [address] )  Assessment & Plan  · Patient is at her baseline  · Continue home medications    Anemia due to stage 3 chronic kidney disease ([facility] [address] )  Assessment & Plan  · Patient is at her baseline  · Continue to monitor labs    Essential hypertension  Assessment & Plan  · Will continue home medications of amiodarone, cardizem, metoprolol    Vertigo, benign paroxysmal, bilateral  Assessment & Plan  · Stable at this time  · Continue with prn antivert    Hypercholesterolemia  Assessment & Plan  · Continue statin    Mild episode of recurrent major depressive disorder (HCC)  Assessment & Plan  · Continue pamelor, lexapro, ativan    Class 3 severe obesity due to excess calories with serious comorbidity and body mass index (BMI) of 40 0 to 44 9 in adult [facility])  Assessment & Plan  · Supportive care     Acquired hypothyroidism  Assessment & Plan  · Continue synthroid     Prediabetes  Assessment & Plan  · Will check Accucheck QAC and HS with SSI    Chronic atrial fibrillation ([facility] [address] )  Assessment & Plan  · Stable on eliquis and cardizem    DANTE on CPAP  Assessment & Plan  · Respiratory protocol      Discharging Physician / Practitioner: ALEJANDRA Judge  PCP: Elie King MD  Admission Date:   Admission Orders (From admission, onward)     Ordered        09/01/20 1922  Inpatient Admission  Once         08/31/20 1217  Place in Observation  Once                   Discharge Date: 09/04/20    Resolved Problems  Date Reviewed: 9/4/2020    None          Consultations During Hospital Stay:  · Pharmacy  · Respiratory therapy  · PT OT  · Orthopedic surgery  · Case management    Procedures Performed:   · 8/31/2020: LEFT ANKLE     INDICATION:   pain      COMPARISON:  None     VIEWS:  XR ANKLE 3+ VW LEFT   Images: 3     FINDINGS:     There is no acute fracture or dislocation      An inferior calcaneal spur is present      No lytic or blastic osseous lesion      There is soft tissue swelling      IMPRESSION:     No acute osseous abnormality  Soft tissue swelling      · Arthrocentesis   Date/Time: 8/31/2020 11:00 AM  Performed by: Benita Reynolds DO  Authorized by: Benita Reynolds DO      Location:  ED  Verbal consent obtained?: Yes    Risks and benefits: Risks, benefits and alternatives were discussed    Consent given by:  Patient  Patient states understanding of procedure being performed: Yes    Patient's understanding of procedure matches consent: Yes    Procedure consent matches procedure scheduled: Yes    Relevant documents present and verified: Yes    Test results available and properly labeled: Yes    Site marked: Yes    Radiology Images displayed and confirmed  If images not available, report reviewed: Yes    Required items: Required blood products, implants, devices and special equipment available    Patient identity confirmed:  Verbally with patient  Time out: Immediately prior to the procedure a time out was called    Indications:  Joint swelling and possible septic joint  Body area: Ankle  Joint:  Left ankle  Local anesthesia used?: Yes    Anesthesia:  Local infiltration  Local anesthetic:  Lidocaine 2% without epinephrine  Anesthetic total (ml):  3  Preparation: Patient was prepped and draped in usual sterile fashion    Needle size:  18 G  Ultrasound guidance: No    Equipment Used:  30ml syringe   Approach:  Medial  Aspirate amount (ml):  8  Aspirate:  Cloudy  Patient tolerance:  Patient tolerated the procedure well with no immediate complications    · Synovial fluid  RBC: 8,215  Culture and Gram Stain: no growth, no bacteria seen  Crystal: Positive for Monosodium Urate Crystals  WBC: 69,719  Fluid Diff: Total counted: 100          Neutrophil %: 94          Lymph %: 1          Monocyte %: 5    Significant Findings / Test Results:   · Positive Gout in Left Ankle    Incidental Findings:   · SED: 47  · CRP: 121 6    Test Results Pending at Discharge (will require follow up):   · none     Outpatient Tests Requested:  · Patient should have follow up lab work    Complications:     Decreased ambulation ability    Reason for Admission: Left ankle pain    Hospital Course:     Rod Abbott is a 70 y o  female patient who originally presented to the hospital on 8/31/2020 due to  left ankle pain  It started on Friday, 8/28/2020   Since then has progressively gotten worse to the point now the patient is not able to walk  Patient rates the pain a 10/10 with ambulation, and states that she feels as if it would break if she kept walking on it       In the ED, the provider did attempt to drain the ankle and send fluid  There unfortunately was not enough fluid and only red blood cells, WBC, differential were able to be resulted  The culture and Gram stain and crystal were not able to be resulted  However, there was fluid sent, and on day of discharge, there were results of the synovial fluid:   · Synovial fluid   RBC: 8,215   Culture and Gram Stain: no growth, no bacteria seen   Crystal: Positive for Monosodium Urate Crystals   WBC: 69,719   Fluid Diff: Total counted: 100                      Neutrophil %: 94           Lymph %: 1           Monocyte %: 5     Patient does present as a gout flare without any history of gout      Orthopedics were consulted and ordered a Uric acid level which resulted at 11 6  The patient was started on colchicine and allopurinol  Patient does have elevated CRP of 121 6, and elevated sed rate at 47      X-ray of the left ankle does show inferior calcaneal spur      Patient was started on Rocephin IV and will be continued on this during this hospitalization  She was also given Percocet in the emergency department and will be continued on this as well      Patient was subsequently found to have low potassium level and will have replacement  Rocephin had been discontinued  Patient's left ankle redness had resolved, and she has been improving since starting the colchicine and allopurinol  PT OT had originally suggested STR, but as the patient improved, PT OT suggested outpatient PT   Patient had originally wanted to be discharged to a STR, and was set up at [facility] with a  time of 10:30 am on day of discharge, however, the patient changed her mind on the day of discharge, and decided to be discharged home with outpatient/home PT services  Case management is working on acquiring home services at this time  Please see above list of diagnoses and related plan for additional information  Condition at Discharge: fair     Discharge Day Visit / Exam:     Subjective:  Patient is sitting up in bed  She states that she feels even better today than yesterday  She states she is ready to go home and feels that home PT would be better than going to a STR  Patient will be discharged today  Vitals: Blood Pressure: 132/63 (09/04/20 0747)  Pulse: 68 (09/04/20 0747)  Temperature: (!) 97 3 °F (36 3 °C) (09/04/20 0747)  Temp Source: Temporal (09/04/20 0747)  Respirations: 18 (09/04/20 0747)  Height: 5' 7" (170 2 cm) (08/31/20 1307)  Weight - Scale: 120 kg (264 lb 8 8 oz) (09/04/20 0600)  SpO2: 93 % (09/04/20 0747)  Exam:   Physical Exam  Constitutional:       General: She is awake  Appearance: Normal appearance  She is well-developed and overweight  HENT:      Head: Normocephalic and atraumatic  Nose: Nose normal       Mouth/Throat:      Mouth: Mucous membranes are moist    Eyes:      Comments: Chronic blurry vision    Neck:      Musculoskeletal: Normal range of motion  Cardiovascular:      Rate and Rhythm: Normal rate and regular rhythm  Pulses: Normal pulses  Heart sounds: Normal heart sounds  Pulmonary:      Effort: Pulmonary effort is normal       Breath sounds: Normal breath sounds  Abdominal:      General: Bowel sounds are normal       Palpations: Abdomen is soft  Musculoskeletal:        Feet:    Skin:     General: Skin is warm  Neurological:      Mental Status: She is alert and oriented to person, place, and time  Psychiatric:         Attention and Perception: Attention normal          Mood and Affect: Mood normal          Speech: Speech normal          Behavior: Behavior normal  Behavior is cooperative  Discussion with Family: discussed with the patient       Discharge instructions/Information to patient and family:   See after visit summary for information provided to patient and family  Provisions for Follow-Up Care:  See after visit summary for information related to follow-up care and any pertinent home health orders  Disposition:     Home with VNA Services (Reminder: Complete face to face encounter)    For Discharges to [facility] SNF:   · Not Applicable to this Patient - Not Applicable to this Patient    Planned Readmission:    no     Discharge Statement:  I spent 35 minutes discharging the patient  This time was spent on the day of discharge  I had direct contact with the patient on the day of discharge  Greater than 50% of the total time was spent examining patient, answering all patient questions, arranging and discussing plan of care with patient as well as directly providing post-discharge instructions  Additional time then spent on discharge activities  Discharge Medications:  See after visit summary for reconciled discharge medications provided to patient and family        ** Please Note: This note has been constructed using a voice recognition system **

## 2020-09-04 NOTE — CASE MANAGEMENT
I was called to the pt room early this morning and the pt stated she does not want to go to a snf for rehab, I made [facility] aware that the pt has denis tita wish to go for rehab, I had made a referral the other day for [facility] with estelita, they were able to accept the pt and they were made aware of the d/c , pt had 10:30 am w/c Jose Dieter set up, I called Bo and made him aware of the change, rn and doctor were made aware, pt told me "DO NOT" call my daughter, "in basket" follow up appointment was sent, " in basket" message sent to outp cm , pt may benefit from a cm, pt in agreement with the change in d/c plan and d/c for today, pt d/c home with estelita

## 2020-09-04 NOTE — PLAN OF CARE
Problem: Potential for Falls  Goal: Patient will remain free of falls  Description: INTERVENTIONS:  - Assess patient frequently for physical needs  -  Identify cognitive and physical deficits and behaviors that affect risk of falls    -  [place] fall precautions as indicated by assessment   - Educate patient/family on patient safety including physical limitations  - Instruct patient to call for assistance with activity based on assessment  - Modify environment to reduce risk of injury  - Consider OT/PT consult to assist with strengthening/mobility  Outcome: Progressing     Problem: PAIN - ADULT  Goal: Verbalizes/displays adequate comfort level or baseline comfort level  Description: Interventions:  - Encourage patient to monitor pain and request assistance  - Assess pain using appropriate pain scale  - Administer analgesics based on type and severity of pain and evaluate response  - Implement non-pharmacological measures as appropriate and evaluate response  - Consider cultural and social influences on pain and pain management  - Notify physician/advanced practitioner if interventions unsuccessful or patient reports new pain  Outcome: Progressing     Problem: INFECTION - ADULT  Goal: Absence or prevention of progression during hospitalization  Description: INTERVENTIONS:  - Assess and monitor for signs and symptoms of infection  - Monitor lab/diagnostic results  - Monitor all insertion sites, i e  indwelling lines, tubes, and drains  - Monitor endotracheal if appropriate and nasal secretions for changes in amount and color  - [place] appropriate cooling/warming therapies per order  - Administer medications as ordered  - Instruct and encourage patient and family to use good hand hygiene technique  - Identify and instruct in appropriate isolation precautions for identified infection/condition  Outcome: Progressing     Problem: SAFETY ADULT  Goal: Patient will remain free of falls  Description: INTERVENTIONS:  - Assess patient frequently for physical needs  -  Identify cognitive and physical deficits and behaviors that affect risk of falls    -  [place] fall precautions as indicated by assessment   - Educate patient/family on patient safety including physical limitations  - Instruct patient to call for assistance with activity based on assessment  - Modify environment to reduce risk of injury  - Consider OT/PT consult to assist with strengthening/mobility  Outcome: Progressing  Goal: Maintain or return to baseline ADL function  Description: INTERVENTIONS:  -  Assess patient's ability to carry out ADLs; assess patient's baseline for ADL function and identify physical deficits which impact ability to perform ADLs (bathing, care of mouth/teeth, toileting, grooming, dressing, etc )  - Assess/evaluate cause of self-care deficits   - Assess range of motion  - Assess patient's mobility; develop plan if impaired  - Assess patient's need for assistive devices and provide as appropriate  - Encourage maximum independence but intervene and supervise when necessary  - Involve family in performance of ADLs  - Assess for home care needs following discharge   - Consider OT consult to assist with ADL evaluation and planning for discharge  - Provide patient education as appropriate  Outcome: Progressing  Goal: Maintain or return mobility status to optimal level  Description: INTERVENTIONS:  - Assess patient's baseline mobility status (ambulation, transfers, stairs, etc )    - Identify cognitive and physical deficits and behaviors that affect mobility  - Identify mobility aids required to assist with transfers and/or ambulation (gait belt, sit-to-stand, lift, walker, cane, etc )  - [place] fall precautions as indicated by assessment  - Record patient progress and toleration of activity level on Mobility SBAR; progress patient to next Phase/Stage  - Instruct patient to call for assistance with activity based on assessment  - Consider rehabilitation consult to assist with strengthening/weightbearing, etc   Outcome: Progressing     Problem: DISCHARGE PLANNING  Goal: Discharge to home or other facility with appropriate resources  Description: INTERVENTIONS:  - Identify barriers to discharge w/patient and caregiver  - Arrange for needed discharge resources and transportation as appropriate  - Identify discharge learning needs (meds, wound care, etc )  - Arrange for interpretive services to assist at discharge as needed  - Refer to Case Management Department for coordinating discharge planning if the patient needs post-hospital services based on physician/advanced practitioner order or complex needs related to functional status, cognitive ability, or social support system  Outcome: Progressing     Problem: Knowledge Deficit  Goal: Patient/family/caregiver demonstrates understanding of disease process, treatment plan, medications, and discharge instructions  Description: Complete learning assessment and assess knowledge base    Interventions:  - Provide teaching at level of understanding  - Provide teaching via preferred learning methods  Outcome: Progressing     Problem: GASTROINTESTINAL - ADULT  Goal: Minimal or absence of nausea and/or vomiting  Description: INTERVENTIONS:  - Administer IV fluids if ordered to ensure adequate hydration  - Maintain NPO status until nausea and vomiting are resolved  - Nasogastric tube if ordered  - Administer ordered antiemetic medications as needed  - Provide nonpharmacologic comfort measures as appropriate  - Advance diet as tolerated, if ordered  - Consider nutrition services referral to assist patient with adequate nutrition and appropriate food choices  Outcome: Progressing     Problem: METABOLIC, FLUID AND ELECTROLYTES - ADULT  Goal: Electrolytes maintained within normal limits  Description: INTERVENTIONS:  - Monitor labs and assess patient for signs and symptoms of electrolyte imbalances  - Administer electrolyte replacement as ordered  - Monitor response to electrolyte replacements, including repeat lab results as appropriate  - Instruct patient on fluid and nutrition as appropriate  Outcome: Progressing     Problem: SKIN/TISSUE INTEGRITY - ADULT  Goal: Skin integrity remains intact  Description: INTERVENTIONS  - Identify patients at risk for skin breakdown  - Assess and monitor skin integrity  - Assess and monitor nutrition and hydration status  - Monitor labs (i e  albumin)  - Assess for incontinence   - Turn and reposition patient  - Assist with mobility/ambulation  - Relieve pressure over bony prominences  - Avoid friction and shearing  - Provide appropriate hygiene as needed including keeping skin clean and dry  - Evaluate need for skin moisturizer/barrier cream  - Collaborate with interdisciplinary team (i e  Nutrition, Rehabilitation, etc )   - Patient/family teaching  Outcome: Progressing     Problem: MUSCULOSKELETAL - ADULT  Goal: Maintain or return mobility to safest level of function  Description: INTERVENTIONS:  - Assess patient's ability to carry out ADLs; assess patient's baseline for ADL function and identify physical deficits which impact ability to perform ADLs (bathing, care of mouth/teeth, toileting, grooming, dressing, etc )  - Assess/evaluate cause of self-care deficits   - Assess range of motion  - Assess patient's mobility  - Assess patient's need for assistive devices and provide as appropriate  - Encourage maximum independence but intervene and supervise when necessary  - Involve family in performance of ADLs  - Assess for home care needs following discharge   - Consider OT consult to assist with ADL evaluation and planning for discharge  - Provide patient education as appropriate  Outcome: Progressing  Goal: Maintain proper alignment of affected body part  Description: INTERVENTIONS:  - Support, maintain and protect limb and body alignment  - Provide patient/ family with appropriate education  Outcome: Progressing

## 2020-09-04 NOTE — ASSESSMENT & PLAN NOTE
· Woke with pain on Friday, 8/28/2020  · No injury  · Unable to ambulate - improving with rest and elevation and colchine  · Will add allopurinol   · In ED on 8/31/2020: fluid removed from ankle and sent for evaluation  · RBC: 8215  · WBC: 22031  · Neutrophils: 94  · Lymphocytes: 1  · Moncytes: 5  · CRP: 121 6  · SED: 47  · Percocet for pain control  · 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  · Continue Rocephin: D/c on 9/3  · Consultation with Orthopedics called from ED  · Uric acid: 11 6  · Xray Left ankle: There is no acute fracture or dislocation  An inferior calcaneal spur is present  No lytic or blastic osseous lesion  There is soft tissue swelling    · Start patient on colchicine and allopurinol

## 2020-09-04 NOTE — PHYSICAL THERAPY NOTE
PHYSICAL THERAPY NOTE          Patient Name: Zachary COMBS'X Date: 9/4/2020  Attempted PT treatment this morning but nursing made therapy aware that patient was being D/C'ed shortly to home with HHPT    Hannah Melgar, PTA

## 2020-09-04 NOTE — ASSESSMENT & PLAN NOTE
· Uric acid level of 11 6  · Patient initially started on colchicine 1 2 mg p o , followed by 0 6 mg daily  · Patient had improvement of ankle pain  · Will start patient on allopurinol 100 mg daily  · Synovial fluid   RBC: 8,215   Culture and Gram Stain: no growth, no bacteria seen   Crystal: Positive for Monosodium Urate Crystals   WBC: 69,719   Fluid Diff: Total counted: 100                      Neutrophil %: 94           Lymph %: 1           Monocyte %: 5

## 2020-09-04 NOTE — NURSING NOTE
Resting in bed  Pain manageable at 5/10  Plus 1 edema lle  Heart is ireg  Lungs clear  No sob  No c/o voiced  Compliant with scds  Call bell given

## 2020-09-04 NOTE — PLAN OF CARE
Problem: Potential for Falls  Goal: Patient will remain free of falls  Description: INTERVENTIONS:  - Assess patient frequently for physical needs  -  Identify cognitive and physical deficits and behaviors that affect risk of falls    -  [place] fall precautions as indicated by assessment   - Educate patient/family on patient safety including physical limitations  - Instruct patient to call for assistance with activity based on assessment  - Modify environment to reduce risk of injury  - Consider OT/PT consult to assist with strengthening/mobility  9/4/2020 0101 by Sissy Silverman RN  Outcome: Progressing  9/3/2020 2113 by Sissy Silverman RN  Outcome: Progressing  9/3/2020 2113 by Sissy Silverman RN  Outcome: Progressing     Problem: PAIN - ADULT  Goal: Verbalizes/displays adequate comfort level or baseline comfort level  Description: Interventions:  - Encourage patient to monitor pain and request assistance  - Assess pain using appropriate pain scale  - Administer analgesics based on type and severity of pain and evaluate response  - Implement non-pharmacological measures as appropriate and evaluate response  - Consider cultural and social influences on pain and pain management  - Notify physician/advanced practitioner if interventions unsuccessful or patient reports new pain  9/4/2020 0101 by Sissy Silverman RN  Outcome: Progressing  9/3/2020 2113 by Sissy Silverman RN  Outcome: Progressing  9/3/2020 2113 by Sissy Silverman RN  Outcome: Progressing     Problem: INFECTION - ADULT  Goal: Absence or prevention of progression during hospitalization  Description: INTERVENTIONS:  - Assess and monitor for signs and symptoms of infection  - Monitor lab/diagnostic results  - Monitor all insertion sites, i e  indwelling lines, tubes, and drains  - Monitor endotracheal if appropriate and nasal secretions for changes in amount and color  - Black appropriate cooling/warming therapies per order  - Administer medications as ordered  - Instruct and encourage patient and family to use good hand hygiene technique  - Identify and instruct in appropriate isolation precautions for identified infection/condition  9/4/2020 0101 by Cristina Tubbs RN  Outcome: Progressing  9/3/2020 2113 by Cristina Tubbs RN  Outcome: Progressing  9/3/2020 2113 by Cristina Tubbs RN  Outcome: Progressing     Problem: SAFETY ADULT  Goal: Patient will remain free of falls  Description: INTERVENTIONS:  - Assess patient frequently for physical needs  -  Identify cognitive and physical deficits and behaviors that affect risk of falls    -  [place] fall precautions as indicated by assessment   - Educate patient/family on patient safety including physical limitations  - Instruct patient to call for assistance with activity based on assessment  - Modify environment to reduce risk of injury  - Consider OT/PT consult to assist with strengthening/mobility  9/4/2020 0101 by Cristina Tubbs RN  Outcome: Progressing  9/3/2020 2113 by Cristina Tubbs RN  Outcome: Progressing  9/3/2020 2113 by Cristina Tubbs RN  Outcome: Progressing  Goal: Maintain or return to baseline ADL function  Description: INTERVENTIONS:  -  Assess patient's ability to carry out ADLs; assess patient's baseline for ADL function and identify physical deficits which impact ability to perform ADLs (bathing, care of mouth/teeth, toileting, grooming, dressing, etc )  - Assess/evaluate cause of self-care deficits   - Assess range of motion  - Assess patient's mobility; develop plan if impaired  - Assess patient's need for assistive devices and provide as appropriate  - Encourage maximum independence but intervene and supervise when necessary  - Involve family in performance of ADLs  - Assess for home care needs following discharge   - Consider OT consult to assist with ADL evaluation and planning for discharge  - Provide patient education as appropriate  9/4/2020 0101 by Hosea Lundborg, RN  Outcome: Progressing  9/3/2020 2113 by Hosea Lundborg, RN  Outcome: Progressing  9/3/2020 2113 by Hosea Lundborg, RN  Outcome: Progressing  Goal: Maintain or return mobility status to optimal level  Description: INTERVENTIONS:  - Assess patient's baseline mobility status (ambulation, transfers, stairs, etc )    - Identify cognitive and physical deficits and behaviors that affect mobility  - Identify mobility aids required to assist with transfers and/or ambulation (gait belt, sit-to-stand, lift, walker, cane, etc )  - [place] fall precautions as indicated by assessment  - Record patient progress and toleration of activity level on Mobility SBAR; progress patient to next Phase/Stage  - Instruct patient to call for assistance with activity based on assessment  - Consider rehabilitation consult to assist with strengthening/weightbearing, etc   9/4/2020 0101 by Hosea Lundborg, RN  Outcome: Progressing  9/3/2020 2113 by Hosea Lundborg, RN  Outcome: Progressing  9/3/2020 2113 by Hosea Lundborg, RN  Outcome: Progressing     Problem: DISCHARGE PLANNING  Goal: Discharge to home or other facility with appropriate resources  Description: INTERVENTIONS:  - Identify barriers to discharge w/patient and caregiver  - Arrange for needed discharge resources and transportation as appropriate  - Identify discharge learning needs (meds, wound care, etc )  - Arrange for interpretive services to assist at discharge as needed  - Refer to Case Management Department for coordinating discharge planning if the patient needs post-hospital services based on physician/advanced practitioner order or complex needs related to functional status, cognitive ability, or social support system  9/4/2020 0101 by Hosea Lundborg, RN  Outcome: Progressing  9/3/2020 2113 by Hosea Lundborg, RN  Outcome: Progressing  9/3/2020 2113 by Hosea Lundborg, RN  Outcome: Progressing Problem: Knowledge Deficit  Goal: Patient/family/caregiver demonstrates understanding of disease process, treatment plan, medications, and discharge instructions  Description: Complete learning assessment and assess knowledge base    Interventions:  - Provide teaching at level of understanding  - Provide teaching via preferred learning methods  9/4/2020 0101 by Julien Garcia RN  Outcome: Progressing  9/3/2020 2113 by Julien Garcia RN  Outcome: Progressing  9/3/2020 2113 by Julien Garcia RN  Outcome: Progressing     Problem: GASTROINTESTINAL - ADULT  Goal: Minimal or absence of nausea and/or vomiting  Description: INTERVENTIONS:  - Administer IV fluids if ordered to ensure adequate hydration  - Maintain NPO status until nausea and vomiting are resolved  - Nasogastric tube if ordered  - Administer ordered antiemetic medications as needed  - Provide nonpharmacologic comfort measures as appropriate  - Advance diet as tolerated, if ordered  - Consider nutrition services referral to assist patient with adequate nutrition and appropriate food choices  9/4/2020 0101 by Julien Garcia RN  Outcome: Progressing  9/3/2020 2113 by Julien Garcia RN  Outcome: Progressing  9/3/2020 2113 by Julien Garcia RN  Outcome: Progressing     Problem: METABOLIC, FLUID AND ELECTROLYTES - ADULT  Goal: Electrolytes maintained within normal limits  Description: INTERVENTIONS:  - Monitor labs and assess patient for signs and symptoms of electrolyte imbalances  - Administer electrolyte replacement as ordered  - Monitor response to electrolyte replacements, including repeat lab results as appropriate  - Instruct patient on fluid and nutrition as appropriate  9/4/2020 0101 by Julien Garcia RN  Outcome: Progressing  9/3/2020 2113 by Julien Garcia RN  Outcome: Progressing  9/3/2020 2113 by Julien Garcia RN  Outcome: Progressing     Problem: SKIN/TISSUE INTEGRITY - ADULT  Goal: Skin integrity remains intact  Description: INTERVENTIONS  - Identify patients at risk for skin breakdown  - Assess and monitor skin integrity  - Assess and monitor nutrition and hydration status  - Monitor labs (i e  albumin)  - Assess for incontinence   - Turn and reposition patient  - Assist with mobility/ambulation  - Relieve pressure over bony prominences  - Avoid friction and shearing  - Provide appropriate hygiene as needed including keeping skin clean and dry  - Evaluate need for skin moisturizer/barrier cream  - Collaborate with interdisciplinary team (i e  Nutrition, Rehabilitation, etc )   - Patient/family teaching  9/4/2020 0101 by Sophia Carey RN  Outcome: Progressing  9/3/2020 2113 by Sophia Carey RN  Outcome: Progressing  9/3/2020 2113 by Sophia Carey RN  Outcome: Progressing     Problem: MUSCULOSKELETAL - ADULT  Goal: Maintain or return mobility to safest level of function  Description: INTERVENTIONS:  - Assess patient's ability to carry out ADLs; assess patient's baseline for ADL function and identify physical deficits which impact ability to perform ADLs (bathing, care of mouth/teeth, toileting, grooming, dressing, etc )  - Assess/evaluate cause of self-care deficits   - Assess range of motion  - Assess patient's mobility  - Assess patient's need for assistive devices and provide as appropriate  - Encourage maximum independence but intervene and supervise when necessary  - Involve family in performance of ADLs  - Assess for home care needs following discharge   - Consider OT consult to assist with ADL evaluation and planning for discharge  - Provide patient education as appropriate  9/4/2020 0101 by Sophia Carey RN  Outcome: Progressing  9/3/2020 2113 by Sophia Carey RN  Outcome: Progressing  9/3/2020 2113 by Sophia Carey RN  Outcome: Progressing  Goal: Maintain proper alignment of affected body part  Description: INTERVENTIONS:  - Support, maintain and protect limb and body alignment  - Provide patient/ family with appropriate education  9/4/2020 0101 by Alban Diaz RN  Outcome: Progressing  9/3/2020 2113 by Alban Diaz RN  Outcome: Progressing  9/3/2020 2113 by Alban Diaz RN  Outcome: Progressing

## 2020-09-04 NOTE — NURSING NOTE
AWAKE AND PLEASANT  NO C/O PAIN VOICED  VOICES CONCERN OVER ATTENDING REHAB TODAY  CALL PARK IS WITHIN REACH

## 2020-09-08 ENCOUNTER — PATIENT OUTREACH (OUTPATIENT)
Dept: INTERNAL MEDICINE CLINIC | Facility: CLINIC | Age: 75
End: 2020-09-08

## 2020-09-09 ENCOUNTER — TELEPHONE (OUTPATIENT)
Dept: FAMILY MEDICINE CLINIC | Facility: CLINIC | Age: 75
End: 2020-09-09

## 2020-09-09 NOTE — TELEPHONE ENCOUNTER
Dr Linda Sky called to make sure patient schedules an appt with us  I will follow up on Friday 9/11/2020 if she does not call first for an appt

## 2020-09-10 ENCOUNTER — TRANSITIONAL CARE MANAGEMENT (OUTPATIENT)
Dept: INTERNAL MEDICINE CLINIC | Facility: CLINIC | Age: 75
End: 2020-09-10

## 2020-09-15 ENCOUNTER — PATIENT OUTREACH (OUTPATIENT)
Dept: INTERNAL MEDICINE CLINIC | Facility: CLINIC | Age: 75
End: 2020-09-15

## 2020-09-15 NOTE — PROGRESS NOTES
Contacted patient to f/u on recent hospitalization for septic arthritis  Francia Valdez is managing at home and denies needing additional assistance  She canceled home care services  She will be transferring primary  care to [facility]  Encouraged her to schedule a f/u appointment asap  She states she will call today  Also reminded her to schedule f/u with cardiology  She verbalized understanding  She denies pain or discomfort in L ankle and is performing exercises given to her in hospital   She is ambulatory without assistive device  She is not currently driving due to cataracts and is using [employer] transport van for appointments  She is taking all medications as prescribed  Nutritional intake adequate  No other issues or concerns at this time

## 2020-10-06 NOTE — SOCIAL WORK
Patient identified as high risk for readmission (HRR)  PCP follow-up appointment information provided and transportation arrangements verified with patient and/or caregiver  AVS reviewed for appointment documentation prior to discharge  or rn

## 2020-11-09 DIAGNOSIS — F32.89 OTHER DEPRESSION: ICD-10-CM

## 2020-11-09 DIAGNOSIS — I48.92 ATRIAL FLUTTER WITH RAPID VENTRICULAR RESPONSE (HCC): ICD-10-CM

## 2020-11-09 DIAGNOSIS — I48.0 PAROXYSMAL ATRIAL FIBRILLATION (HCC): ICD-10-CM

## 2020-11-09 DIAGNOSIS — I49.9 ARRHYTHMIA: ICD-10-CM

## 2020-11-09 DIAGNOSIS — I10 ESSENTIAL HYPERTENSION: ICD-10-CM

## 2020-11-09 DIAGNOSIS — I50.32 CHRONIC DIASTOLIC CONGESTIVE HEART FAILURE (HCC): ICD-10-CM

## 2020-11-09 DIAGNOSIS — G43.909 MIGRAINE WITHOUT STATUS MIGRAINOSUS, NOT INTRACTABLE, UNSPECIFIED MIGRAINE TYPE: ICD-10-CM

## 2020-11-09 DIAGNOSIS — G89.29 CHRONIC BILATERAL LOW BACK PAIN WITHOUT SCIATICA: ICD-10-CM

## 2020-11-09 DIAGNOSIS — M25.572 ACUTE LEFT ANKLE PAIN: ICD-10-CM

## 2020-11-09 DIAGNOSIS — M54.50 CHRONIC BILATERAL LOW BACK PAIN WITHOUT SCIATICA: ICD-10-CM

## 2020-11-09 DIAGNOSIS — E78.00 HYPERCHOLESTEROLEMIA: ICD-10-CM

## 2020-11-09 DIAGNOSIS — E03.9 ACQUIRED HYPOTHYROIDISM: ICD-10-CM

## 2020-11-09 RX ORDER — AMIODARONE HYDROCHLORIDE 200 MG/1
200 TABLET ORAL DAILY
Qty: 90 TABLET | Refills: 0 | Status: SHIPPED | OUTPATIENT
Start: 2020-11-09 | End: 2020-12-04 | Stop reason: HOSPADM

## 2020-11-09 RX ORDER — DILTIAZEM HYDROCHLORIDE 240 MG/1
240 CAPSULE, COATED, EXTENDED RELEASE ORAL DAILY
Qty: 30 CAPSULE | Refills: 0 | Status: SHIPPED | OUTPATIENT
Start: 2020-11-09 | End: 2020-12-04 | Stop reason: HOSPADM

## 2020-11-09 RX ORDER — SIMVASTATIN 10 MG
10 TABLET ORAL
Qty: 30 TABLET | Refills: 0 | Status: SHIPPED | OUTPATIENT
Start: 2020-11-09 | End: 2020-12-04 | Stop reason: HOSPADM

## 2020-11-09 RX ORDER — TORSEMIDE 20 MG/1
20 TABLET ORAL SEE ADMIN INSTRUCTIONS
Qty: 60 TABLET | Refills: 0 | Status: SHIPPED | OUTPATIENT
Start: 2020-11-09 | End: 2020-12-04 | Stop reason: HOSPADM

## 2020-11-09 RX ORDER — NORTRIPTYLINE HYDROCHLORIDE 50 MG/1
50 CAPSULE ORAL DAILY
Qty: 30 CAPSULE | Refills: 0 | Status: SHIPPED | OUTPATIENT
Start: 2020-11-09 | End: 2020-12-04 | Stop reason: HOSPADM

## 2020-11-09 RX ORDER — ESCITALOPRAM OXALATE 10 MG/1
10 TABLET ORAL DAILY
Qty: 30 TABLET | Refills: 0 | Status: SHIPPED | OUTPATIENT
Start: 2020-11-09 | End: 2020-12-04 | Stop reason: HOSPADM

## 2020-11-09 RX ORDER — METOPROLOL TARTRATE 100 MG/1
100 TABLET ORAL EVERY 12 HOURS SCHEDULED
Qty: 60 TABLET | Refills: 0 | Status: SHIPPED | OUTPATIENT
Start: 2020-11-09 | End: 2020-12-04 | Stop reason: HOSPADM

## 2020-11-09 RX ORDER — POTASSIUM CHLORIDE 20 MEQ/1
20 TABLET, EXTENDED RELEASE ORAL 2 TIMES DAILY
Qty: 60 TABLET | Refills: 0 | Status: SHIPPED | OUTPATIENT
Start: 2020-11-09 | End: 2020-12-04 | Stop reason: HOSPADM

## 2020-11-09 RX ORDER — LEVOTHYROXINE SODIUM 88 UG/1
88 TABLET ORAL DAILY
Qty: 30 TABLET | Refills: 0 | Status: SHIPPED | OUTPATIENT
Start: 2020-11-09 | End: 2020-12-04 | Stop reason: HOSPADM

## 2020-11-09 RX ORDER — GABAPENTIN 100 MG/1
100 CAPSULE ORAL 3 TIMES DAILY
Qty: 90 CAPSULE | Refills: 0 | Status: SHIPPED | OUTPATIENT
Start: 2020-11-09 | End: 2020-12-04 | Stop reason: HOSPADM

## 2020-11-27 ENCOUNTER — APPOINTMENT (EMERGENCY)
Dept: RADIOLOGY | Facility: HOSPITAL | Age: 75
DRG: 193 | End: 2020-11-27
Payer: MEDICARE

## 2020-11-27 ENCOUNTER — HOSPITAL ENCOUNTER (INPATIENT)
Facility: HOSPITAL | Age: 75
LOS: 7 days | DRG: 193 | End: 2020-12-04
Attending: EMERGENCY MEDICINE | Admitting: HOSPITALIST
Payer: MEDICARE

## 2020-11-27 DIAGNOSIS — U07.1 PNEUMONIA DUE TO COVID-19 VIRUS: Primary | ICD-10-CM

## 2020-11-27 DIAGNOSIS — N18.32 STAGE 3B CHRONIC KIDNEY DISEASE (HCC): ICD-10-CM

## 2020-11-27 DIAGNOSIS — R06.03 ACUTE RESPIRATORY DISTRESS: ICD-10-CM

## 2020-11-27 DIAGNOSIS — R09.02 HYPOXIA: ICD-10-CM

## 2020-11-27 DIAGNOSIS — J12.82 PNEUMONIA DUE TO COVID-19 VIRUS: Primary | ICD-10-CM

## 2020-11-27 PROBLEM — J96.01 ACUTE RESPIRATORY FAILURE WITH HYPOXIA (HCC): Status: ACTIVE | Noted: 2020-11-27

## 2020-11-27 PROBLEM — J18.9 PNEUMONIA OF BOTH LOWER LOBES DUE TO INFECTIOUS ORGANISM: Status: ACTIVE | Noted: 2020-11-27

## 2020-11-27 PROBLEM — I48.0 PAROXYSMAL ATRIAL FIBRILLATION (HCC): Status: ACTIVE | Noted: 2020-11-27

## 2020-11-27 LAB
ALBUMIN SERPL BCP-MCNC: 3.4 G/DL (ref 3.5–5.7)
ALBUMIN SERPL BCP-MCNC: 3.6 G/DL (ref 3.5–5.7)
ALP SERPL-CCNC: 92 U/L (ref 55–165)
ALP SERPL-CCNC: 96 U/L (ref 55–165)
ALT SERPL W P-5'-P-CCNC: 6 U/L (ref 7–52)
ALT SERPL W P-5'-P-CCNC: 7 U/L (ref 7–52)
ANION GAP SERPL CALCULATED.3IONS-SCNC: 11 MMOL/L (ref 4–13)
ANION GAP SERPL CALCULATED.3IONS-SCNC: 12 MMOL/L (ref 4–13)
ANISOCYTOSIS BLD QL SMEAR: PRESENT
AST SERPL W P-5'-P-CCNC: 11 U/L (ref 13–39)
AST SERPL W P-5'-P-CCNC: 11 U/L (ref 13–39)
ATRIAL RATE: 258 BPM
BASOPHILS # BLD AUTO: 0.1 THOUSANDS/ΜL (ref 0–0.1)
BASOPHILS # BLD AUTO: 0.1 THOUSANDS/ΜL (ref 0–0.1)
BASOPHILS NFR BLD AUTO: 1 % (ref 0–2)
BASOPHILS NFR BLD AUTO: 1 % (ref 0–2)
BILIRUB SERPL-MCNC: 0.7 MG/DL (ref 0.2–1)
BILIRUB SERPL-MCNC: 0.7 MG/DL (ref 0.2–1)
BNP SERPL-MCNC: 451 PG/ML (ref 1–100)
BUN SERPL-MCNC: 15 MG/DL (ref 7–25)
BUN SERPL-MCNC: 16 MG/DL (ref 7–25)
CA-I BLD-SCNC: 1.08 MMOL/L (ref 1.12–1.32)
CALCIUM ALBUM COR SERPL-MCNC: 9 MG/DL (ref 8.3–10.1)
CALCIUM SERPL-MCNC: 8.5 MG/DL (ref 8.6–10.5)
CALCIUM SERPL-MCNC: 8.7 MG/DL (ref 8.6–10.5)
CHLORIDE SERPL-SCNC: 97 MMOL/L (ref 98–107)
CHLORIDE SERPL-SCNC: 98 MMOL/L (ref 98–107)
CK SERPL-CCNC: 51 U/L (ref 30–192)
CO2 SERPL-SCNC: 28 MMOL/L (ref 21–31)
CO2 SERPL-SCNC: 28 MMOL/L (ref 21–31)
CREAT SERPL-MCNC: 1.43 MG/DL (ref 0.6–1.2)
CREAT SERPL-MCNC: 1.45 MG/DL (ref 0.6–1.2)
CRP SERPL QL: 185.1 MG/L
EOSINOPHIL # BLD AUTO: 0.1 THOUSAND/ΜL (ref 0–0.61)
EOSINOPHIL # BLD AUTO: 0.2 THOUSAND/ΜL (ref 0–0.61)
EOSINOPHIL NFR BLD AUTO: 1 % (ref 0–5)
EOSINOPHIL NFR BLD AUTO: 2 % (ref 0–5)
ERYTHROCYTE [DISTWIDTH] IN BLOOD BY AUTOMATED COUNT: 20.9 % (ref 11.5–14.5)
ERYTHROCYTE [DISTWIDTH] IN BLOOD BY AUTOMATED COUNT: 21.1 % (ref 11.5–14.5)
FERRITIN SERPL-MCNC: 65 NG/ML (ref 8–388)
FLUAV RNA RESP QL NAA+PROBE: NEGATIVE
FLUAV RNA RESP QL NAA+PROBE: NEGATIVE
FLUBV RNA RESP QL NAA+PROBE: NEGATIVE
FLUBV RNA RESP QL NAA+PROBE: NEGATIVE
GFR SERPL CREATININE-BSD FRML MDRD: 36 ML/MIN/1.73SQ M
GFR SERPL CREATININE-BSD FRML MDRD: 36 ML/MIN/1.73SQ M
GLUCOSE SERPL-MCNC: 101 MG/DL (ref 65–99)
GLUCOSE SERPL-MCNC: 106 MG/DL (ref 65–99)
HCT VFR BLD AUTO: 27.4 % (ref 42–47)
HCT VFR BLD AUTO: 28.3 % (ref 42–47)
HGB BLD-MCNC: 8.5 G/DL (ref 12–16)
HGB BLD-MCNC: 8.9 G/DL (ref 12–16)
HYPERCHROMIA BLD QL SMEAR: PRESENT
INR PPP: 1.51 (ref 0.84–1.19)
LACTATE SERPL-SCNC: 0.9 MMOL/L (ref 0.5–2)
LIPASE SERPL-CCNC: 11 U/L (ref 11–82)
LYMPHOCYTES # BLD AUTO: 1.5 THOUSANDS/ΜL (ref 0.6–4.47)
LYMPHOCYTES # BLD AUTO: 1.8 THOUSANDS/ΜL (ref 0.6–4.47)
LYMPHOCYTES NFR BLD AUTO: 13 % (ref 21–51)
LYMPHOCYTES NFR BLD AUTO: 18 % (ref 21–51)
MAGNESIUM SERPL-MCNC: 2 MG/DL (ref 1.9–2.7)
MCH RBC QN AUTO: 22.2 PG (ref 26–34)
MCH RBC QN AUTO: 22.5 PG (ref 26–34)
MCHC RBC AUTO-ENTMCNC: 31.2 G/DL (ref 31–37)
MCHC RBC AUTO-ENTMCNC: 31.4 G/DL (ref 31–37)
MCV RBC AUTO: 71 FL (ref 81–99)
MCV RBC AUTO: 72 FL (ref 81–99)
MICROCYTES BLD QL AUTO: PRESENT
MONOCYTES # BLD AUTO: 0.6 THOUSAND/ΜL (ref 0.17–1.22)
MONOCYTES # BLD AUTO: 0.8 THOUSAND/ΜL (ref 0.17–1.22)
MONOCYTES NFR BLD AUTO: 6 % (ref 2–12)
MONOCYTES NFR BLD AUTO: 8 % (ref 2–12)
NEUTROPHILS # BLD AUTO: 7.6 THOUSANDS/ΜL (ref 1.4–6.5)
NEUTROPHILS # BLD AUTO: 8.4 THOUSANDS/ΜL (ref 1.4–6.5)
NEUTS SEG NFR BLD AUTO: 74 % (ref 42–75)
NEUTS SEG NFR BLD AUTO: 77 % (ref 42–75)
OVALOCYTES BLD QL SMEAR: PRESENT
P AXIS: 61 DEGREES
PLATELET # BLD AUTO: 319 THOUSANDS/UL (ref 149–390)
PLATELET # BLD AUTO: 341 THOUSANDS/UL (ref 149–390)
PLATELET BLD QL SMEAR: ADEQUATE
PMV BLD AUTO: 8.1 FL (ref 8.6–11.7)
PMV BLD AUTO: 8.1 FL (ref 8.6–11.7)
POTASSIUM SERPL-SCNC: 3.2 MMOL/L (ref 3.5–5.5)
POTASSIUM SERPL-SCNC: 3.5 MMOL/L (ref 3.5–5.5)
PROCALCITONIN SERPL-MCNC: 0.26 NG/ML
PROCALCITONIN SERPL-MCNC: 0.28 NG/ML
PROT SERPL-MCNC: 6.6 G/DL (ref 6.4–8.9)
PROT SERPL-MCNC: 6.8 G/DL (ref 6.4–8.9)
PROTHROMBIN TIME: 18 SECONDS (ref 11.6–14.5)
QRS AXIS: 6 DEGREES
QRSD INTERVAL: 90 MS
QT INTERVAL: 394 MS
QTC INTERVAL: 471 MS
RBC # BLD AUTO: 3.83 MILLION/UL (ref 3.9–5.2)
RBC # BLD AUTO: 3.94 MILLION/UL (ref 3.9–5.2)
RBC MORPH BLD: PRESENT
RSV RNA RESP QL NAA+PROBE: NEGATIVE
RSV RNA RESP QL NAA+PROBE: NEGATIVE
SARS-COV-2 RNA RESP QL NAA+PROBE: NEGATIVE
SARS-COV-2 RNA RESP QL NAA+PROBE: NEGATIVE
SODIUM SERPL-SCNC: 137 MMOL/L (ref 134–143)
SODIUM SERPL-SCNC: 137 MMOL/L (ref 134–143)
T WAVE AXIS: 44 DEGREES
TRIGL SERPL-MCNC: 77 MG/DL (ref 44–166)
TROPONIN I SERPL-MCNC: <0.03 NG/ML
VENTRICULAR RATE: 86 BPM
WBC # BLD AUTO: 10.3 THOUSAND/UL (ref 4.8–10.8)
WBC # BLD AUTO: 10.9 THOUSAND/UL (ref 4.8–10.8)

## 2020-11-27 PROCEDURE — 85025 COMPLETE CBC W/AUTO DIFF WBC: CPT | Performed by: NURSE PRACTITIONER

## 2020-11-27 PROCEDURE — 94760 N-INVAS EAR/PLS OXIMETRY 1: CPT

## 2020-11-27 PROCEDURE — 82550 ASSAY OF CK (CPK): CPT | Performed by: EMERGENCY MEDICINE

## 2020-11-27 PROCEDURE — 87040 BLOOD CULTURE FOR BACTERIA: CPT | Performed by: EMERGENCY MEDICINE

## 2020-11-27 PROCEDURE — 99285 EMERGENCY DEPT VISIT HI MDM: CPT

## 2020-11-27 PROCEDURE — 83880 ASSAY OF NATRIURETIC PEPTIDE: CPT | Performed by: EMERGENCY MEDICINE

## 2020-11-27 PROCEDURE — 84484 ASSAY OF TROPONIN QUANT: CPT | Performed by: EMERGENCY MEDICINE

## 2020-11-27 PROCEDURE — 84145 PROCALCITONIN (PCT): CPT | Performed by: EMERGENCY MEDICINE

## 2020-11-27 PROCEDURE — 84478 ASSAY OF TRIGLYCERIDES: CPT | Performed by: EMERGENCY MEDICINE

## 2020-11-27 PROCEDURE — 86140 C-REACTIVE PROTEIN: CPT | Performed by: EMERGENCY MEDICINE

## 2020-11-27 PROCEDURE — 83605 ASSAY OF LACTIC ACID: CPT | Performed by: EMERGENCY MEDICINE

## 2020-11-27 PROCEDURE — 0241U HB NFCT DS VIR RESP RNA 4 TRGT: CPT | Performed by: HOSPITALIST

## 2020-11-27 PROCEDURE — 99223 1ST HOSP IP/OBS HIGH 75: CPT | Performed by: HOSPITALIST

## 2020-11-27 PROCEDURE — 99285 EMERGENCY DEPT VISIT HI MDM: CPT | Performed by: EMERGENCY MEDICINE

## 2020-11-27 PROCEDURE — 36415 COLL VENOUS BLD VENIPUNCTURE: CPT | Performed by: EMERGENCY MEDICINE

## 2020-11-27 PROCEDURE — 93005 ELECTROCARDIOGRAM TRACING: CPT

## 2020-11-27 PROCEDURE — 93010 ELECTROCARDIOGRAM REPORT: CPT | Performed by: INTERNAL MEDICINE

## 2020-11-27 PROCEDURE — 80053 COMPREHEN METABOLIC PANEL: CPT | Performed by: EMERGENCY MEDICINE

## 2020-11-27 PROCEDURE — 84145 PROCALCITONIN (PCT): CPT | Performed by: NURSE PRACTITIONER

## 2020-11-27 PROCEDURE — 83690 ASSAY OF LIPASE: CPT | Performed by: EMERGENCY MEDICINE

## 2020-11-27 PROCEDURE — 83520 IMMUNOASSAY QUANT NOS NONAB: CPT | Performed by: EMERGENCY MEDICINE

## 2020-11-27 PROCEDURE — 83735 ASSAY OF MAGNESIUM: CPT | Performed by: EMERGENCY MEDICINE

## 2020-11-27 PROCEDURE — 0241U HB NFCT DS VIR RESP RNA 4 TRGT: CPT | Performed by: EMERGENCY MEDICINE

## 2020-11-27 PROCEDURE — 82955 ASSAY OF G6PD ENZYME: CPT | Performed by: EMERGENCY MEDICINE

## 2020-11-27 PROCEDURE — 82330 ASSAY OF CALCIUM: CPT | Performed by: EMERGENCY MEDICINE

## 2020-11-27 PROCEDURE — 85610 PROTHROMBIN TIME: CPT | Performed by: EMERGENCY MEDICINE

## 2020-11-27 PROCEDURE — 85025 COMPLETE CBC W/AUTO DIFF WBC: CPT | Performed by: EMERGENCY MEDICINE

## 2020-11-27 PROCEDURE — 71045 X-RAY EXAM CHEST 1 VIEW: CPT

## 2020-11-27 PROCEDURE — 80053 COMPREHEN METABOLIC PANEL: CPT | Performed by: NURSE PRACTITIONER

## 2020-11-27 PROCEDURE — 94664 DEMO&/EVAL PT USE INHALER: CPT

## 2020-11-27 PROCEDURE — 82728 ASSAY OF FERRITIN: CPT | Performed by: EMERGENCY MEDICINE

## 2020-11-27 RX ORDER — DILTIAZEM HYDROCHLORIDE 240 MG/1
240 CAPSULE, COATED, EXTENDED RELEASE ORAL DAILY
Status: DISCONTINUED | OUTPATIENT
Start: 2020-11-27 | End: 2020-12-04 | Stop reason: HOSPADM

## 2020-11-27 RX ORDER — GUAIFENESIN/DEXTROMETHORPHAN 100-10MG/5
10 SYRUP ORAL EVERY 4 HOURS PRN
Status: DISCONTINUED | OUTPATIENT
Start: 2020-11-27 | End: 2020-12-04 | Stop reason: HOSPADM

## 2020-11-27 RX ORDER — TORSEMIDE 20 MG/1
40 TABLET ORAL
Status: DISCONTINUED | OUTPATIENT
Start: 2020-11-27 | End: 2020-11-28

## 2020-11-27 RX ORDER — ALBUTEROL SULFATE 90 UG/1
2 AEROSOL, METERED RESPIRATORY (INHALATION) EVERY 4 HOURS PRN
Status: DISCONTINUED | OUTPATIENT
Start: 2020-11-27 | End: 2020-12-04 | Stop reason: HOSPADM

## 2020-11-27 RX ORDER — POTASSIUM CHLORIDE 20 MEQ/1
20 TABLET, EXTENDED RELEASE ORAL 2 TIMES DAILY
Status: DISCONTINUED | OUTPATIENT
Start: 2020-11-27 | End: 2020-11-27

## 2020-11-27 RX ORDER — MECLIZINE HCL 12.5 MG/1
25 TABLET ORAL EVERY 8 HOURS SCHEDULED
Status: DISCONTINUED | OUTPATIENT
Start: 2020-11-27 | End: 2020-12-04 | Stop reason: HOSPADM

## 2020-11-27 RX ORDER — AMIODARONE HYDROCHLORIDE 100 MG/1
200 TABLET ORAL DAILY
Status: DISCONTINUED | OUTPATIENT
Start: 2020-11-27 | End: 2020-11-30

## 2020-11-27 RX ORDER — POTASSIUM CHLORIDE 14.9 MG/ML
20 INJECTION INTRAVENOUS ONCE
Status: COMPLETED | OUTPATIENT
Start: 2020-11-27 | End: 2020-11-27

## 2020-11-27 RX ORDER — CEFTRIAXONE 1 G/50ML
1000 INJECTION, SOLUTION INTRAVENOUS EVERY 24 HOURS
Status: DISCONTINUED | OUTPATIENT
Start: 2020-11-27 | End: 2020-11-29

## 2020-11-27 RX ORDER — ONDANSETRON 2 MG/ML
4 INJECTION INTRAMUSCULAR; INTRAVENOUS EVERY 6 HOURS PRN
Status: DISCONTINUED | OUTPATIENT
Start: 2020-11-27 | End: 2020-12-04 | Stop reason: HOSPADM

## 2020-11-27 RX ORDER — SODIUM CHLORIDE 9 MG/ML
75 INJECTION, SOLUTION INTRAVENOUS CONTINUOUS
Status: DISCONTINUED | OUTPATIENT
Start: 2020-11-27 | End: 2020-11-28

## 2020-11-27 RX ORDER — BUTALBITAL, ACETAMINOPHEN AND CAFFEINE 50; 325; 40 MG/1; MG/1; MG/1
1 TABLET ORAL EVERY 4 HOURS PRN
Status: DISCONTINUED | OUTPATIENT
Start: 2020-11-27 | End: 2020-12-04 | Stop reason: HOSPADM

## 2020-11-27 RX ORDER — POTASSIUM CHLORIDE 29.8 MG/ML
40 INJECTION INTRAVENOUS ONCE
Status: DISCONTINUED | OUTPATIENT
Start: 2020-11-27 | End: 2020-11-27

## 2020-11-27 RX ORDER — GABAPENTIN 100 MG/1
100 CAPSULE ORAL 3 TIMES DAILY
Status: DISCONTINUED | OUTPATIENT
Start: 2020-11-27 | End: 2020-12-04 | Stop reason: HOSPADM

## 2020-11-27 RX ORDER — ESCITALOPRAM OXALATE 10 MG/1
10 TABLET ORAL DAILY
Status: DISCONTINUED | OUTPATIENT
Start: 2020-11-27 | End: 2020-12-04 | Stop reason: HOSPADM

## 2020-11-27 RX ORDER — TORSEMIDE 20 MG/1
20 TABLET ORAL SEE ADMIN INSTRUCTIONS
Status: DISCONTINUED | OUTPATIENT
Start: 2020-11-27 | End: 2020-11-27

## 2020-11-27 RX ORDER — LORAZEPAM 1 MG/1
1 TABLET ORAL EVERY 8 HOURS PRN
Status: DISCONTINUED | OUTPATIENT
Start: 2020-11-27 | End: 2020-11-30

## 2020-11-27 RX ORDER — ACETAMINOPHEN 325 MG/1
650 TABLET ORAL EVERY 6 HOURS PRN
Status: DISCONTINUED | OUTPATIENT
Start: 2020-11-27 | End: 2020-11-27

## 2020-11-27 RX ORDER — TORSEMIDE 20 MG/1
20 TABLET ORAL
Status: DISCONTINUED | OUTPATIENT
Start: 2020-11-28 | End: 2020-11-28

## 2020-11-27 RX ORDER — ACETAMINOPHEN 325 MG/1
650 TABLET ORAL EVERY 6 HOURS PRN
Status: DISCONTINUED | OUTPATIENT
Start: 2020-11-27 | End: 2020-12-04 | Stop reason: HOSPADM

## 2020-11-27 RX ORDER — METOPROLOL TARTRATE 50 MG/1
100 TABLET, FILM COATED ORAL EVERY 12 HOURS SCHEDULED
Status: DISCONTINUED | OUTPATIENT
Start: 2020-11-27 | End: 2020-12-04 | Stop reason: HOSPADM

## 2020-11-27 RX ORDER — PRAVASTATIN SODIUM 20 MG
20 TABLET ORAL
Status: DISCONTINUED | OUTPATIENT
Start: 2020-11-27 | End: 2020-11-29

## 2020-11-27 RX ORDER — LEVOTHYROXINE SODIUM 88 UG/1
88 TABLET ORAL DAILY
Status: DISCONTINUED | OUTPATIENT
Start: 2020-11-27 | End: 2020-12-04 | Stop reason: HOSPADM

## 2020-11-27 RX ORDER — NORTRIPTYLINE HYDROCHLORIDE 25 MG/1
50 CAPSULE ORAL DAILY
Status: DISCONTINUED | OUTPATIENT
Start: 2020-11-27 | End: 2020-12-04 | Stop reason: HOSPADM

## 2020-11-27 RX ADMIN — GUAIFENESIN AND DEXTROMETHORPHAN 10 ML: 100; 10 SYRUP ORAL at 13:47

## 2020-11-27 RX ADMIN — APIXABAN 5 MG: 5 TABLET, FILM COATED ORAL at 18:00

## 2020-11-27 RX ADMIN — GABAPENTIN 100 MG: 100 CAPSULE ORAL at 15:25

## 2020-11-27 RX ADMIN — CEFTRIAXONE 1000 MG: 1 INJECTION, SOLUTION INTRAVENOUS at 07:37

## 2020-11-27 RX ADMIN — GABAPENTIN 100 MG: 100 CAPSULE ORAL at 12:09

## 2020-11-27 RX ADMIN — MECLIZINE 25 MG: 12.5 TABLET ORAL at 20:51

## 2020-11-27 RX ADMIN — SODIUM CHLORIDE 75 ML/HR: 0.9 INJECTION, SOLUTION INTRAVENOUS at 20:51

## 2020-11-27 RX ADMIN — METOPROLOL TARTRATE 100 MG: 50 TABLET, FILM COATED ORAL at 12:09

## 2020-11-27 RX ADMIN — ONDANSETRON 4 MG: 2 INJECTION INTRAMUSCULAR; INTRAVENOUS at 12:13

## 2020-11-27 RX ADMIN — AZITHROMYCIN MONOHYDRATE 500 MG: 500 INJECTION, POWDER, LYOPHILIZED, FOR SOLUTION INTRAVENOUS at 12:09

## 2020-11-27 RX ADMIN — LEVOTHYROXINE SODIUM 88 MCG: 88 TABLET ORAL at 07:37

## 2020-11-27 RX ADMIN — MECLIZINE 25 MG: 12.5 TABLET ORAL at 07:37

## 2020-11-27 RX ADMIN — PRAVASTATIN SODIUM 20 MG: 20 TABLET ORAL at 15:31

## 2020-11-27 RX ADMIN — SODIUM CHLORIDE 75 ML/HR: 0.9 INJECTION, SOLUTION INTRAVENOUS at 07:07

## 2020-11-27 RX ADMIN — POTASSIUM CHLORIDE 20 MEQ: 14.9 INJECTION, SOLUTION INTRAVENOUS at 18:04

## 2020-11-27 RX ADMIN — DILTIAZEM HYDROCHLORIDE 240 MG: 240 CAPSULE, COATED, EXTENDED RELEASE ORAL at 12:08

## 2020-11-27 RX ADMIN — POTASSIUM CHLORIDE 20 MEQ: 14.9 INJECTION, SOLUTION INTRAVENOUS at 13:46

## 2020-11-27 RX ADMIN — POTASSIUM CHLORIDE 20 MEQ: 1500 TABLET, EXTENDED RELEASE ORAL at 12:08

## 2020-11-27 RX ADMIN — NORTRIPTYLINE HYDROCHLORIDE 50 MG: 25 CAPSULE ORAL at 12:08

## 2020-11-27 RX ADMIN — AMIODARONE HYDROCHLORIDE 200 MG: 100 TABLET ORAL at 12:08

## 2020-11-27 RX ADMIN — APIXABAN 5 MG: 5 TABLET, FILM COATED ORAL at 12:09

## 2020-11-27 RX ADMIN — ACETAMINOPHEN 650 MG: 325 TABLET ORAL at 22:39

## 2020-11-27 RX ADMIN — TORSEMIDE 40 MG: 20 TABLET ORAL at 12:08

## 2020-11-27 RX ADMIN — ESCITALOPRAM OXALATE 10 MG: 10 TABLET ORAL at 12:09

## 2020-11-27 RX ADMIN — LORAZEPAM 1 MG: 1 TABLET ORAL at 13:47

## 2020-11-27 RX ADMIN — GABAPENTIN 100 MG: 100 CAPSULE ORAL at 20:51

## 2020-11-28 ENCOUNTER — APPOINTMENT (INPATIENT)
Dept: RADIOLOGY | Facility: HOSPITAL | Age: 75
DRG: 193 | End: 2020-11-28
Payer: MEDICARE

## 2020-11-28 LAB
ALBUMIN SERPL BCP-MCNC: 3.3 G/DL (ref 3.5–5.7)
ALP SERPL-CCNC: 95 U/L (ref 55–165)
ALT SERPL W P-5'-P-CCNC: 6 U/L (ref 7–52)
ANION GAP SERPL CALCULATED.3IONS-SCNC: 11 MMOL/L (ref 4–13)
AST SERPL W P-5'-P-CCNC: 11 U/L (ref 13–39)
BACTERIA UR QL AUTO: ABNORMAL /HPF
BASOPHILS # BLD AUTO: 0.1 THOUSANDS/ΜL (ref 0–0.1)
BASOPHILS NFR BLD AUTO: 1 % (ref 0–2)
BILIRUB SERPL-MCNC: 0.6 MG/DL (ref 0.2–1)
BILIRUB UR QL STRIP: NEGATIVE
BUN SERPL-MCNC: 20 MG/DL (ref 7–25)
CALCIUM ALBUM COR SERPL-MCNC: 9 MG/DL (ref 8.3–10.1)
CALCIUM SERPL-MCNC: 8.4 MG/DL (ref 8.6–10.5)
CHLORIDE SERPL-SCNC: 102 MMOL/L (ref 98–107)
CLARITY UR: ABNORMAL
CO2 SERPL-SCNC: 24 MMOL/L (ref 21–31)
COLOR UR: YELLOW
CREAT SERPL-MCNC: 1.72 MG/DL (ref 0.6–1.2)
D DIMER PPP FEU-MCNC: 1.26 UG/ML FEU
EOSINOPHIL # BLD AUTO: 0.4 THOUSAND/ΜL (ref 0–0.61)
EOSINOPHIL NFR BLD AUTO: 5 % (ref 0–5)
ERYTHROCYTE [DISTWIDTH] IN BLOOD BY AUTOMATED COUNT: 21.2 % (ref 11.5–14.5)
GFR SERPL CREATININE-BSD FRML MDRD: 29 ML/MIN/1.73SQ M
GLUCOSE SERPL-MCNC: 97 MG/DL (ref 65–99)
GLUCOSE UR STRIP-MCNC: NEGATIVE MG/DL
HCT VFR BLD AUTO: 25.1 % (ref 42–47)
HGB BLD-MCNC: 8 G/DL (ref 12–16)
HGB UR QL STRIP.AUTO: NEGATIVE
KETONES UR STRIP-MCNC: NEGATIVE MG/DL
L PNEUMO1 AG UR QL IA.RAPID: NEGATIVE
LEUKOCYTE ESTERASE UR QL STRIP: ABNORMAL
LYMPHOCYTES # BLD AUTO: 1.8 THOUSANDS/ΜL (ref 0.6–4.47)
LYMPHOCYTES NFR BLD AUTO: 18 % (ref 21–51)
MCH RBC QN AUTO: 23 PG (ref 26–34)
MCHC RBC AUTO-ENTMCNC: 31.9 G/DL (ref 31–37)
MCV RBC AUTO: 72 FL (ref 81–99)
MONOCYTES # BLD AUTO: 0.7 THOUSAND/ΜL (ref 0.17–1.22)
MONOCYTES NFR BLD AUTO: 7 % (ref 2–12)
NEUTROPHILS # BLD AUTO: 6.8 THOUSANDS/ΜL (ref 1.4–6.5)
NEUTS SEG NFR BLD AUTO: 69 % (ref 42–75)
NITRITE UR QL STRIP: NEGATIVE
NON-SQ EPI CELLS URNS QL MICRO: ABNORMAL /HPF
OTHER STN SPEC: ABNORMAL
PH UR STRIP.AUTO: 5.5 [PH]
PLATELET # BLD AUTO: 325 THOUSANDS/UL (ref 149–390)
PMV BLD AUTO: 8.4 FL (ref 8.6–11.7)
POTASSIUM SERPL-SCNC: 4 MMOL/L (ref 3.5–5.5)
PROCALCITONIN SERPL-MCNC: 0.22 NG/ML
PROT SERPL-MCNC: 6.3 G/DL (ref 6.4–8.9)
PROT UR STRIP-MCNC: NEGATIVE MG/DL
RBC # BLD AUTO: 3.47 MILLION/UL (ref 3.9–5.2)
RBC #/AREA URNS AUTO: ABNORMAL /HPF
S PNEUM AG UR QL: NEGATIVE
SODIUM SERPL-SCNC: 137 MMOL/L (ref 134–143)
SP GR UR STRIP.AUTO: >=1.03 (ref 1–1.03)
UROBILINOGEN UR QL STRIP.AUTO: 0.2 E.U./DL
WBC # BLD AUTO: 9.8 THOUSAND/UL (ref 4.8–10.8)
WBC #/AREA URNS AUTO: ABNORMAL /HPF

## 2020-11-28 PROCEDURE — 71045 X-RAY EXAM CHEST 1 VIEW: CPT

## 2020-11-28 PROCEDURE — 80053 COMPREHEN METABOLIC PANEL: CPT | Performed by: HOSPITALIST

## 2020-11-28 PROCEDURE — 87086 URINE CULTURE/COLONY COUNT: CPT | Performed by: NURSE PRACTITIONER

## 2020-11-28 PROCEDURE — 94760 N-INVAS EAR/PLS OXIMETRY 1: CPT

## 2020-11-28 PROCEDURE — 81003 URINALYSIS AUTO W/O SCOPE: CPT | Performed by: NURSE PRACTITIONER

## 2020-11-28 PROCEDURE — 99233 SBSQ HOSP IP/OBS HIGH 50: CPT | Performed by: HOSPITALIST

## 2020-11-28 PROCEDURE — XW033E5 INTRODUCTION OF REMDESIVIR ANTI-INFECTIVE INTO PERIPHERAL VEIN, PERCUTANEOUS APPROACH, NEW TECHNOLOGY GROUP 5: ICD-10-PCS | Performed by: INTERNAL MEDICINE

## 2020-11-28 PROCEDURE — 85025 COMPLETE CBC W/AUTO DIFF WBC: CPT | Performed by: HOSPITALIST

## 2020-11-28 PROCEDURE — 85379 FIBRIN DEGRADATION QUANT: CPT | Performed by: HOSPITALIST

## 2020-11-28 PROCEDURE — 87449 NOS EACH ORGANISM AG IA: CPT | Performed by: NURSE PRACTITIONER

## 2020-11-28 PROCEDURE — 84145 PROCALCITONIN (PCT): CPT | Performed by: EMERGENCY MEDICINE

## 2020-11-28 PROCEDURE — 99356 PR PROLONGED SVC I/P OR OBS SETTING 1ST HOUR: CPT | Performed by: HOSPITALIST

## 2020-11-28 PROCEDURE — 81001 URINALYSIS AUTO W/SCOPE: CPT | Performed by: NURSE PRACTITIONER

## 2020-11-28 RX ORDER — MELATONIN
2000 DAILY
Status: DISCONTINUED | OUTPATIENT
Start: 2020-11-29 | End: 2020-11-30

## 2020-11-28 RX ORDER — DOXYCYCLINE HYCLATE 100 MG/1
100 CAPSULE ORAL EVERY 12 HOURS SCHEDULED
Status: DISCONTINUED | OUTPATIENT
Start: 2020-11-28 | End: 2020-11-29

## 2020-11-28 RX ORDER — ZINC SULFATE 50(220)MG
220 CAPSULE ORAL DAILY
Status: DISCONTINUED | OUTPATIENT
Start: 2020-11-29 | End: 2020-11-30

## 2020-11-28 RX ORDER — ASCORBIC ACID 500 MG
1000 TABLET ORAL EVERY 12 HOURS
Status: DISCONTINUED | OUTPATIENT
Start: 2020-11-28 | End: 2020-11-30

## 2020-11-28 RX ORDER — DEXAMETHASONE SODIUM PHOSPHATE 4 MG/ML
6 INJECTION, SOLUTION INTRA-ARTICULAR; INTRALESIONAL; INTRAMUSCULAR; INTRAVENOUS; SOFT TISSUE DAILY
Status: DISCONTINUED | OUTPATIENT
Start: 2020-11-28 | End: 2020-11-30

## 2020-11-28 RX ORDER — FUROSEMIDE 10 MG/ML
40 INJECTION INTRAMUSCULAR; INTRAVENOUS ONCE
Status: COMPLETED | OUTPATIENT
Start: 2020-11-28 | End: 2020-11-28

## 2020-11-28 RX ORDER — AZITHROMYCIN 250 MG/1
500 TABLET, FILM COATED ORAL EVERY 24 HOURS
Status: DISCONTINUED | OUTPATIENT
Start: 2020-11-28 | End: 2020-11-28

## 2020-11-28 RX ADMIN — GABAPENTIN 100 MG: 100 CAPSULE ORAL at 21:33

## 2020-11-28 RX ADMIN — METOPROLOL TARTRATE 100 MG: 50 TABLET, FILM COATED ORAL at 08:24

## 2020-11-28 RX ADMIN — AZITHROMYCIN MONOHYDRATE 500 MG: 500 INJECTION, POWDER, LYOPHILIZED, FOR SOLUTION INTRAVENOUS at 05:09

## 2020-11-28 RX ADMIN — AZITHROMYCIN MONOHYDRATE 500 MG: 250 TABLET ORAL at 13:31

## 2020-11-28 RX ADMIN — NORTRIPTYLINE HYDROCHLORIDE 50 MG: 25 CAPSULE ORAL at 08:23

## 2020-11-28 RX ADMIN — GABAPENTIN 100 MG: 100 CAPSULE ORAL at 17:01

## 2020-11-28 RX ADMIN — MECLIZINE 25 MG: 12.5 TABLET ORAL at 21:33

## 2020-11-28 RX ADMIN — MECLIZINE 25 MG: 12.5 TABLET ORAL at 05:09

## 2020-11-28 RX ADMIN — AMIODARONE HYDROCHLORIDE 200 MG: 100 TABLET ORAL at 08:23

## 2020-11-28 RX ADMIN — METOPROLOL TARTRATE 100 MG: 50 TABLET, FILM COATED ORAL at 21:33

## 2020-11-28 RX ADMIN — FAMOTIDINE 20 MG: 10 INJECTION, SOLUTION INTRAVENOUS at 17:01

## 2020-11-28 RX ADMIN — FUROSEMIDE 40 MG: 10 INJECTION, SOLUTION INTRAMUSCULAR; INTRAVENOUS at 13:31

## 2020-11-28 RX ADMIN — DILTIAZEM HYDROCHLORIDE 240 MG: 240 CAPSULE, COATED, EXTENDED RELEASE ORAL at 08:24

## 2020-11-28 RX ADMIN — GABAPENTIN 100 MG: 100 CAPSULE ORAL at 08:24

## 2020-11-28 RX ADMIN — PRAVASTATIN SODIUM 20 MG: 20 TABLET ORAL at 17:01

## 2020-11-28 RX ADMIN — DOXYCYCLINE 100 MG: 100 CAPSULE ORAL at 21:33

## 2020-11-28 RX ADMIN — ESCITALOPRAM OXALATE 10 MG: 10 TABLET ORAL at 08:24

## 2020-11-28 RX ADMIN — ACETAMINOPHEN 650 MG: 325 TABLET ORAL at 19:55

## 2020-11-28 RX ADMIN — DEXAMETHASONE SODIUM PHOSPHATE 6 MG: 4 INJECTION, SOLUTION INTRA-ARTICULAR; INTRALESIONAL; INTRAMUSCULAR; INTRAVENOUS; SOFT TISSUE at 17:00

## 2020-11-28 RX ADMIN — OXYCODONE HYDROCHLORIDE AND ACETAMINOPHEN 1000 MG: 500 TABLET ORAL at 17:01

## 2020-11-28 RX ADMIN — MECLIZINE 25 MG: 12.5 TABLET ORAL at 13:30

## 2020-11-28 RX ADMIN — APIXABAN 5 MG: 5 TABLET, FILM COATED ORAL at 08:24

## 2020-11-28 RX ADMIN — CEFTRIAXONE 1000 MG: 1 INJECTION, SOLUTION INTRAVENOUS at 05:09

## 2020-11-28 RX ADMIN — APIXABAN 5 MG: 5 TABLET, FILM COATED ORAL at 17:01

## 2020-11-28 RX ADMIN — SODIUM CHLORIDE 75 ML/HR: 0.9 INJECTION, SOLUTION INTRAVENOUS at 11:55

## 2020-11-28 RX ADMIN — LEVOTHYROXINE SODIUM 88 MCG: 88 TABLET ORAL at 05:09

## 2020-11-28 RX ADMIN — TORSEMIDE 20 MG: 20 TABLET ORAL at 08:24

## 2020-11-28 RX ADMIN — REMDESIVIR 200 MG: 100 INJECTION, POWDER, LYOPHILIZED, FOR SOLUTION INTRAVENOUS at 17:00

## 2020-11-29 ENCOUNTER — APPOINTMENT (INPATIENT)
Dept: CT IMAGING | Facility: HOSPITAL | Age: 75
DRG: 193 | End: 2020-11-29
Payer: MEDICARE

## 2020-11-29 LAB
ANION GAP SERPL CALCULATED.3IONS-SCNC: 12 MMOL/L (ref 4–13)
BACTERIA UR CULT: NORMAL
BASOPHILS # BLD AUTO: 0 THOUSANDS/ΜL (ref 0–0.1)
BASOPHILS NFR BLD AUTO: 1 % (ref 0–2)
BUN SERPL-MCNC: 23 MG/DL (ref 7–25)
CALCIUM SERPL-MCNC: 8.6 MG/DL (ref 8.6–10.5)
CHLORIDE SERPL-SCNC: 100 MMOL/L (ref 98–107)
CO2 SERPL-SCNC: 24 MMOL/L (ref 21–31)
CREAT SERPL-MCNC: 1.47 MG/DL (ref 0.6–1.2)
CRP SERPL QL: 177.7 MG/L
D DIMER PPP FEU-MCNC: 0.96 UG/ML FEU
EOSINOPHIL # BLD AUTO: 0 THOUSAND/ΜL (ref 0–0.61)
EOSINOPHIL NFR BLD AUTO: 0 % (ref 0–5)
ERYTHROCYTE [DISTWIDTH] IN BLOOD BY AUTOMATED COUNT: 21.6 % (ref 11.5–14.5)
FERRITIN SERPL-MCNC: 79 NG/ML (ref 8–388)
FLUAV RNA RESP QL NAA+PROBE: NEGATIVE
FLUBV RNA RESP QL NAA+PROBE: NEGATIVE
GFR SERPL CREATININE-BSD FRML MDRD: 35 ML/MIN/1.73SQ M
GLUCOSE SERPL-MCNC: 133 MG/DL (ref 65–99)
HCT VFR BLD AUTO: 24.4 % (ref 42–47)
HGB BLD-MCNC: 7.6 G/DL (ref 12–16)
LYMPHOCYTES # BLD AUTO: 0.7 THOUSANDS/ΜL (ref 0.6–4.47)
LYMPHOCYTES NFR BLD AUTO: 8 % (ref 21–51)
MCH RBC QN AUTO: 22.6 PG (ref 26–34)
MCHC RBC AUTO-ENTMCNC: 31.2 G/DL (ref 31–37)
MCV RBC AUTO: 72 FL (ref 81–99)
MONOCYTES # BLD AUTO: 0.2 THOUSAND/ΜL (ref 0.17–1.22)
MONOCYTES NFR BLD AUTO: 2 % (ref 2–12)
NEUTROPHILS # BLD AUTO: 7.4 THOUSANDS/ΜL (ref 1.4–6.5)
NEUTS SEG NFR BLD AUTO: 89 % (ref 42–75)
PLATELET # BLD AUTO: 320 THOUSANDS/UL (ref 149–390)
PMV BLD AUTO: 8.3 FL (ref 8.6–11.7)
POTASSIUM SERPL-SCNC: 4.3 MMOL/L (ref 3.5–5.5)
PROCALCITONIN SERPL-MCNC: 0.25 NG/ML
RBC # BLD AUTO: 3.37 MILLION/UL (ref 3.9–5.2)
RSV RNA RESP QL NAA+PROBE: NEGATIVE
SARS-COV-2 RNA RESP QL NAA+PROBE: NEGATIVE
SODIUM SERPL-SCNC: 136 MMOL/L (ref 134–143)
WBC # BLD AUTO: 8.3 THOUSAND/UL (ref 4.8–10.8)

## 2020-11-29 PROCEDURE — 85379 FIBRIN DEGRADATION QUANT: CPT | Performed by: HOSPITALIST

## 2020-11-29 PROCEDURE — 82043 UR ALBUMIN QUANTITATIVE: CPT | Performed by: NURSE PRACTITIONER

## 2020-11-29 PROCEDURE — 87581 M.PNEUMON DNA AMP PROBE: CPT | Performed by: ANESTHESIOLOGY

## 2020-11-29 PROCEDURE — 99233 SBSQ HOSP IP/OBS HIGH 50: CPT | Performed by: HOSPITALIST

## 2020-11-29 PROCEDURE — 87633 RESP VIRUS 12-25 TARGETS: CPT | Performed by: ANESTHESIOLOGY

## 2020-11-29 PROCEDURE — NC001 PR NO CHARGE: Performed by: ANESTHESIOLOGY

## 2020-11-29 PROCEDURE — 87486 CHLMYD PNEUM DNA AMP PROBE: CPT | Performed by: ANESTHESIOLOGY

## 2020-11-29 PROCEDURE — 84540 ASSAY OF URINE/UREA-N: CPT | Performed by: NURSE PRACTITIONER

## 2020-11-29 PROCEDURE — 85025 COMPLETE CBC W/AUTO DIFF WBC: CPT | Performed by: HOSPITALIST

## 2020-11-29 PROCEDURE — 84156 ASSAY OF PROTEIN URINE: CPT | Performed by: NURSE PRACTITIONER

## 2020-11-29 PROCEDURE — 71250 CT THORAX DX C-: CPT

## 2020-11-29 PROCEDURE — 82570 ASSAY OF URINE CREATININE: CPT | Performed by: NURSE PRACTITIONER

## 2020-11-29 PROCEDURE — 80048 BASIC METABOLIC PNL TOTAL CA: CPT | Performed by: HOSPITALIST

## 2020-11-29 PROCEDURE — 86140 C-REACTIVE PROTEIN: CPT | Performed by: HOSPITALIST

## 2020-11-29 PROCEDURE — G1004 CDSM NDSC: HCPCS

## 2020-11-29 PROCEDURE — 84145 PROCALCITONIN (PCT): CPT | Performed by: HOSPITALIST

## 2020-11-29 PROCEDURE — 94760 N-INVAS EAR/PLS OXIMETRY 1: CPT

## 2020-11-29 PROCEDURE — 86430 RHEUMATOID FACTOR TEST QUAL: CPT | Performed by: ANESTHESIOLOGY

## 2020-11-29 PROCEDURE — 0241U HB NFCT DS VIR RESP RNA 4 TRGT: CPT | Performed by: HOSPITALIST

## 2020-11-29 PROCEDURE — 86255 FLUORESCENT ANTIBODY SCREEN: CPT | Performed by: ANESTHESIOLOGY

## 2020-11-29 PROCEDURE — 99223 1ST HOSP IP/OBS HIGH 75: CPT | Performed by: ANESTHESIOLOGY

## 2020-11-29 PROCEDURE — 86038 ANTINUCLEAR ANTIBODIES: CPT | Performed by: ANESTHESIOLOGY

## 2020-11-29 PROCEDURE — 82728 ASSAY OF FERRITIN: CPT | Performed by: HOSPITALIST

## 2020-11-29 PROCEDURE — 87798 DETECT AGENT NOS DNA AMP: CPT | Performed by: ANESTHESIOLOGY

## 2020-11-29 PROCEDURE — 99223 1ST HOSP IP/OBS HIGH 75: CPT | Performed by: NURSE PRACTITIONER

## 2020-11-29 RX ORDER — ATORVASTATIN CALCIUM 40 MG/1
40 TABLET, FILM COATED ORAL
Status: DISCONTINUED | OUTPATIENT
Start: 2020-11-29 | End: 2020-11-30

## 2020-11-29 RX ORDER — FUROSEMIDE 10 MG/ML
40 INJECTION INTRAMUSCULAR; INTRAVENOUS
Status: DISCONTINUED | OUTPATIENT
Start: 2020-11-29 | End: 2020-12-01

## 2020-11-29 RX ORDER — LORAZEPAM 1 MG/1
1 TABLET ORAL ONCE
Status: COMPLETED | OUTPATIENT
Start: 2020-11-29 | End: 2020-11-29

## 2020-11-29 RX ADMIN — DILTIAZEM HYDROCHLORIDE 240 MG: 240 CAPSULE, COATED, EXTENDED RELEASE ORAL at 08:34

## 2020-11-29 RX ADMIN — METOPROLOL TARTRATE 100 MG: 50 TABLET, FILM COATED ORAL at 21:14

## 2020-11-29 RX ADMIN — Medication 2000 UNITS: at 08:34

## 2020-11-29 RX ADMIN — DEXAMETHASONE SODIUM PHOSPHATE 6 MG: 4 INJECTION, SOLUTION INTRA-ARTICULAR; INTRALESIONAL; INTRAMUSCULAR; INTRAVENOUS; SOFT TISSUE at 08:34

## 2020-11-29 RX ADMIN — APIXABAN 5 MG: 5 TABLET, FILM COATED ORAL at 08:34

## 2020-11-29 RX ADMIN — NORTRIPTYLINE HYDROCHLORIDE 50 MG: 25 CAPSULE ORAL at 08:34

## 2020-11-29 RX ADMIN — LORAZEPAM 1 MG: 1 TABLET ORAL at 23:31

## 2020-11-29 RX ADMIN — OXYCODONE HYDROCHLORIDE AND ACETAMINOPHEN 1000 MG: 500 TABLET ORAL at 15:32

## 2020-11-29 RX ADMIN — MECLIZINE 25 MG: 12.5 TABLET ORAL at 21:14

## 2020-11-29 RX ADMIN — LEVOTHYROXINE SODIUM 88 MCG: 88 TABLET ORAL at 05:22

## 2020-11-29 RX ADMIN — CEFTRIAXONE 1000 MG: 1 INJECTION, SOLUTION INTRAVENOUS at 05:22

## 2020-11-29 RX ADMIN — LORAZEPAM 1 MG: 1 TABLET ORAL at 15:32

## 2020-11-29 RX ADMIN — ESCITALOPRAM OXALATE 10 MG: 10 TABLET ORAL at 08:34

## 2020-11-29 RX ADMIN — APIXABAN 5 MG: 5 TABLET, FILM COATED ORAL at 18:14

## 2020-11-29 RX ADMIN — FAMOTIDINE 20 MG: 10 INJECTION, SOLUTION INTRAVENOUS at 08:34

## 2020-11-29 RX ADMIN — GABAPENTIN 100 MG: 100 CAPSULE ORAL at 21:15

## 2020-11-29 RX ADMIN — MECLIZINE 25 MG: 12.5 TABLET ORAL at 15:31

## 2020-11-29 RX ADMIN — OXYCODONE HYDROCHLORIDE AND ACETAMINOPHEN 1000 MG: 500 TABLET ORAL at 05:22

## 2020-11-29 RX ADMIN — FUROSEMIDE 40 MG: 10 INJECTION, SOLUTION INTRAMUSCULAR; INTRAVENOUS at 11:23

## 2020-11-29 RX ADMIN — ATORVASTATIN CALCIUM 40 MG: 40 TABLET, FILM COATED ORAL at 15:32

## 2020-11-29 RX ADMIN — GABAPENTIN 100 MG: 100 CAPSULE ORAL at 08:34

## 2020-11-29 RX ADMIN — METOPROLOL TARTRATE 100 MG: 50 TABLET, FILM COATED ORAL at 08:34

## 2020-11-29 RX ADMIN — LORAZEPAM 1 MG: 1 TABLET ORAL at 18:14

## 2020-11-29 RX ADMIN — MECLIZINE 25 MG: 12.5 TABLET ORAL at 05:22

## 2020-11-29 RX ADMIN — FUROSEMIDE 40 MG: 10 INJECTION, SOLUTION INTRAMUSCULAR; INTRAVENOUS at 18:13

## 2020-11-29 RX ADMIN — GABAPENTIN 100 MG: 100 CAPSULE ORAL at 15:32

## 2020-11-29 RX ADMIN — AMIODARONE HYDROCHLORIDE 200 MG: 100 TABLET ORAL at 08:34

## 2020-11-29 RX ADMIN — ZINC SULFATE 220 MG (50 MG) CAPSULE 220 MG: CAPSULE at 08:34

## 2020-11-29 RX ADMIN — DOXYCYCLINE 100 MG: 100 CAPSULE ORAL at 08:34

## 2020-11-30 LAB
ALBUMIN SERPL BCP-MCNC: 3.4 G/DL (ref 3.5–5.7)
ALP SERPL-CCNC: 115 U/L (ref 55–165)
ALT SERPL W P-5'-P-CCNC: 11 U/L (ref 7–52)
ANION GAP SERPL CALCULATED.3IONS-SCNC: 9 MMOL/L (ref 4–13)
AST SERPL W P-5'-P-CCNC: 18 U/L (ref 13–39)
B PARAP IS1001 DNA NPH QL NAA+NON-PROBE: NOT DETECTED
B PERT.PT PRMT NPH QL NAA+NON-PROBE: NOT DETECTED
BASOPHILS # BLD AUTO: 0 THOUSANDS/ΜL (ref 0–0.1)
BASOPHILS NFR BLD AUTO: 0 % (ref 0–2)
BILIRUB SERPL-MCNC: 0.5 MG/DL (ref 0.2–1)
BUN SERPL-MCNC: 36 MG/DL (ref 7–25)
C PNEUM DNA NPH QL NAA+NON-PROBE: NOT DETECTED
CALCIUM ALBUM COR SERPL-MCNC: 9.2 MG/DL (ref 8.3–10.1)
CALCIUM SERPL-MCNC: 8.7 MG/DL (ref 8.6–10.5)
CHLORIDE SERPL-SCNC: 100 MMOL/L (ref 98–107)
CO2 SERPL-SCNC: 25 MMOL/L (ref 21–31)
CREAT SERPL-MCNC: 1.71 MG/DL (ref 0.6–1.2)
CREAT UR-MCNC: 60.1 MG/DL
CRP SERPL QL: 136.3 MG/L
D DIMER PPP FEU-MCNC: 1.02 UG/ML FEU
EOSINOPHIL # BLD AUTO: 0 THOUSAND/ΜL (ref 0–0.61)
EOSINOPHIL NFR BLD AUTO: 0 % (ref 0–5)
ERYTHROCYTE [DISTWIDTH] IN BLOOD BY AUTOMATED COUNT: 21.3 % (ref 11.5–14.5)
FLUAV RNA NPH QL NAA+NON-PROBE: NOT DETECTED
FLUBV RNA NPH QL NAA+NON-PROBE: NOT DETECTED
G6PD BLD QN: 404 U/10E12 RBC (ref 127–427)
GFR SERPL CREATININE-BSD FRML MDRD: 29 ML/MIN/1.73SQ M
GLUCOSE SERPL-MCNC: 133 MG/DL (ref 65–99)
HADV DNA NPH QL NAA+NON-PROBE: NOT DETECTED
HCT VFR BLD AUTO: 26.8 % (ref 42–47)
HGB BLD-MCNC: 8.1 G/DL (ref 12–16)
HMPV RNA NPH QL NAA+NON-PROBE: NOT DETECTED
HPIV 1+2+3+4 RNA NPH QL NAA+NON-PROBE: NOT DETECTED
HPIV 1+2+3+4 RNA NPH QL NAA+NON-PROBE: NOT DETECTED
LYMPHOCYTES # BLD AUTO: 0.8 THOUSANDS/ΜL (ref 0.6–4.47)
LYMPHOCYTES NFR BLD AUTO: 6 % (ref 21–51)
M PNEUMO DNA NPH QL NAA+NON-PROBE: NOT DETECTED
MCH RBC QN AUTO: 22.2 PG (ref 26–34)
MCHC RBC AUTO-ENTMCNC: 30.4 G/DL (ref 31–37)
MCV RBC AUTO: 73 FL (ref 81–99)
MICROALBUMIN UR-MCNC: 19.6 MG/L (ref 0–20)
MICROALBUMIN/CREAT 24H UR: 33 MG/G CREATININE (ref 0–30)
MONOCYTES # BLD AUTO: 0.7 THOUSAND/ΜL (ref 0.17–1.22)
MONOCYTES NFR BLD AUTO: 4 % (ref 2–12)
NEUTROPHILS # BLD AUTO: 13.2 THOUSANDS/ΜL (ref 1.4–6.5)
NEUTS SEG NFR BLD AUTO: 90 % (ref 42–75)
PLATELET # BLD AUTO: 415 THOUSANDS/UL (ref 149–390)
PMV BLD AUTO: 8.6 FL (ref 8.6–11.7)
POTASSIUM SERPL-SCNC: 3.9 MMOL/L (ref 3.5–5.5)
PROCALCITONIN SERPL-MCNC: 0.14 NG/ML
PROT SERPL-MCNC: 6.9 G/DL (ref 6.4–8.9)
PROT UR-MCNC: 14 MG/DL
PROT/CREAT UR: 0.23 MG/G{CREAT} (ref 0–0.1)
RBC # BLD AUTO: 3.66 MILLION/UL (ref 3.9–5.2)
RBC # BLD AUTO: 3.81 X10E6/UL (ref 3.77–5.28)
RHEUMATOID FACT SER QL LA: NEGATIVE
RSV RNA NPH QL NAA+NON-PROBE: NOT DETECTED
RV+EV RNA NPH QL NAA+NON-PROBE: NOT DETECTED
RYE IGE QN: NEGATIVE
SODIUM SERPL-SCNC: 134 MMOL/L (ref 134–143)
UUN 24H UR-MCNC: 459 MG/DL
WBC # BLD AUTO: 14.8 THOUSAND/UL (ref 4.8–10.8)

## 2020-11-30 PROCEDURE — 99291 CRITICAL CARE FIRST HOUR: CPT | Performed by: ANESTHESIOLOGY

## 2020-11-30 PROCEDURE — 94664 DEMO&/EVAL PT USE INHALER: CPT

## 2020-11-30 PROCEDURE — 94760 N-INVAS EAR/PLS OXIMETRY 1: CPT

## 2020-11-30 PROCEDURE — 99232 SBSQ HOSP IP/OBS MODERATE 35: CPT | Performed by: INTERNAL MEDICINE

## 2020-11-30 PROCEDURE — 85379 FIBRIN DEGRADATION QUANT: CPT | Performed by: HOSPITALIST

## 2020-11-30 PROCEDURE — 94002 VENT MGMT INPAT INIT DAY: CPT

## 2020-11-30 PROCEDURE — 84145 PROCALCITONIN (PCT): CPT | Performed by: HOSPITALIST

## 2020-11-30 PROCEDURE — 86140 C-REACTIVE PROTEIN: CPT | Performed by: HOSPITALIST

## 2020-11-30 PROCEDURE — NC001 PR NO CHARGE: Performed by: ANESTHESIOLOGY

## 2020-11-30 PROCEDURE — 80053 COMPREHEN METABOLIC PANEL: CPT | Performed by: HOSPITALIST

## 2020-11-30 PROCEDURE — 85025 COMPLETE CBC W/AUTO DIFF WBC: CPT | Performed by: HOSPITALIST

## 2020-11-30 PROCEDURE — 99223 1ST HOSP IP/OBS HIGH 75: CPT | Performed by: INTERNAL MEDICINE

## 2020-11-30 RX ORDER — LANOLIN ALCOHOL/MO/W.PET/CERES
6 CREAM (GRAM) TOPICAL
Status: DISCONTINUED | OUTPATIENT
Start: 2020-11-30 | End: 2020-12-04 | Stop reason: HOSPADM

## 2020-11-30 RX ORDER — OLANZAPINE 5 MG/1
5 TABLET, ORALLY DISINTEGRATING ORAL ONCE
Status: COMPLETED | OUTPATIENT
Start: 2020-11-30 | End: 2020-11-30

## 2020-11-30 RX ORDER — METHYLPREDNISOLONE SODIUM SUCCINATE 40 MG/ML
40 INJECTION, POWDER, LYOPHILIZED, FOR SOLUTION INTRAMUSCULAR; INTRAVENOUS 2 TIMES DAILY
Status: DISCONTINUED | OUTPATIENT
Start: 2020-11-30 | End: 2020-12-02

## 2020-11-30 RX ADMIN — FUROSEMIDE 40 MG: 10 INJECTION, SOLUTION INTRAMUSCULAR; INTRAVENOUS at 17:03

## 2020-11-30 RX ADMIN — DEXAMETHASONE SODIUM PHOSPHATE 6 MG: 4 INJECTION, SOLUTION INTRA-ARTICULAR; INTRALESIONAL; INTRAMUSCULAR; INTRAVENOUS; SOFT TISSUE at 08:13

## 2020-11-30 RX ADMIN — DILTIAZEM HYDROCHLORIDE 240 MG: 240 CAPSULE, COATED, EXTENDED RELEASE ORAL at 08:12

## 2020-11-30 RX ADMIN — NORTRIPTYLINE HYDROCHLORIDE 50 MG: 25 CAPSULE ORAL at 08:11

## 2020-11-30 RX ADMIN — MECLIZINE 25 MG: 12.5 TABLET ORAL at 05:20

## 2020-11-30 RX ADMIN — AMIODARONE HYDROCHLORIDE 200 MG: 100 TABLET ORAL at 08:12

## 2020-11-30 RX ADMIN — OXYCODONE HYDROCHLORIDE AND ACETAMINOPHEN 1000 MG: 500 TABLET ORAL at 05:20

## 2020-11-30 RX ADMIN — LORAZEPAM 1 MG: 1 TABLET ORAL at 07:56

## 2020-11-30 RX ADMIN — METHYLPREDNISOLONE SODIUM SUCCINATE 40 MG: 40 INJECTION, POWDER, FOR SOLUTION INTRAMUSCULAR; INTRAVENOUS at 11:19

## 2020-11-30 RX ADMIN — OLANZAPINE 5 MG: 5 TABLET, ORALLY DISINTEGRATING ORAL at 11:39

## 2020-11-30 RX ADMIN — GABAPENTIN 100 MG: 100 CAPSULE ORAL at 08:11

## 2020-11-30 RX ADMIN — FUROSEMIDE 40 MG: 10 INJECTION, SOLUTION INTRAMUSCULAR; INTRAVENOUS at 11:18

## 2020-11-30 RX ADMIN — FUROSEMIDE 40 MG: 10 INJECTION, SOLUTION INTRAMUSCULAR; INTRAVENOUS at 06:19

## 2020-11-30 RX ADMIN — ZINC SULFATE 220 MG (50 MG) CAPSULE 220 MG: CAPSULE at 08:11

## 2020-11-30 RX ADMIN — METOPROLOL TARTRATE 100 MG: 50 TABLET, FILM COATED ORAL at 08:13

## 2020-11-30 RX ADMIN — APIXABAN 5 MG: 5 TABLET, FILM COATED ORAL at 08:11

## 2020-11-30 RX ADMIN — ESCITALOPRAM OXALATE 10 MG: 10 TABLET ORAL at 08:12

## 2020-11-30 RX ADMIN — DEXMEDETOMIDINE 0.1 MCG/KG/HR: 100 INJECTION, SOLUTION, CONCENTRATE INTRAVENOUS at 15:06

## 2020-11-30 RX ADMIN — LEVOTHYROXINE SODIUM 88 MCG: 88 TABLET ORAL at 05:20

## 2020-11-30 RX ADMIN — FAMOTIDINE 20 MG: 10 INJECTION, SOLUTION INTRAVENOUS at 08:13

## 2020-11-30 RX ADMIN — Medication 2000 UNITS: at 08:12

## 2020-11-30 RX ADMIN — METHYLPREDNISOLONE SODIUM SUCCINATE 40 MG: 40 INJECTION, POWDER, FOR SOLUTION INTRAMUSCULAR; INTRAVENOUS at 21:47

## 2020-11-30 RX ADMIN — DEXMEDETOMIDINE 0.7 MCG/KG/HR: 100 INJECTION, SOLUTION, CONCENTRATE INTRAVENOUS at 20:45

## 2020-11-30 RX ADMIN — MORPHINE SULFATE 2 MG: 2 INJECTION, SOLUTION INTRAMUSCULAR; INTRAVENOUS at 20:02

## 2020-12-01 LAB
ALBUMIN SERPL BCP-MCNC: 3.3 G/DL (ref 3.5–5.7)
ALP SERPL-CCNC: 114 U/L (ref 55–165)
ALT SERPL W P-5'-P-CCNC: 12 U/L (ref 7–52)
ANION GAP SERPL CALCULATED.3IONS-SCNC: 12 MMOL/L (ref 4–13)
ANISOCYTOSIS BLD QL SMEAR: PRESENT
AST SERPL W P-5'-P-CCNC: 40 U/L (ref 13–39)
BILIRUB SERPL-MCNC: 0.5 MG/DL (ref 0.2–1)
BUN SERPL-MCNC: 52 MG/DL (ref 7–25)
CALCIUM ALBUM COR SERPL-MCNC: 9.4 MG/DL (ref 8.3–10.1)
CALCIUM SERPL-MCNC: 8.8 MG/DL (ref 8.6–10.5)
CHLORIDE SERPL-SCNC: 100 MMOL/L (ref 98–107)
CO2 SERPL-SCNC: 27 MMOL/L (ref 21–31)
CREAT SERPL-MCNC: 1.77 MG/DL (ref 0.6–1.2)
ERYTHROCYTE [DISTWIDTH] IN BLOOD BY AUTOMATED COUNT: 21.4 % (ref 11.5–14.5)
GFR SERPL CREATININE-BSD FRML MDRD: 28 ML/MIN/1.73SQ M
GLUCOSE SERPL-MCNC: 146 MG/DL (ref 65–99)
HCT VFR BLD AUTO: 25.5 % (ref 42–47)
HGB BLD-MCNC: 7.8 G/DL (ref 12–16)
IL6 SERPL-MCNC: 59.7 PG/ML (ref 0–13)
LYMPHOCYTES # BLD AUTO: 0.71 THOUSAND/UL (ref 0.6–4.47)
LYMPHOCYTES # BLD AUTO: 6 % (ref 20–51)
MCH RBC QN AUTO: 22.6 PG (ref 26–34)
MCHC RBC AUTO-ENTMCNC: 30.8 G/DL (ref 31–37)
MCV RBC AUTO: 73 FL (ref 81–99)
MICROCYTES BLD QL AUTO: PRESENT
MONOCYTES # BLD AUTO: 0.35 THOUSAND/UL (ref 0–1.22)
MONOCYTES NFR BLD AUTO: 3 % (ref 4–12)
NEUTS SEG # BLD: 10.74 THOUSAND/UL (ref 1.81–6.82)
NEUTS SEG NFR BLD AUTO: 91 % (ref 42–75)
OVALOCYTES BLD QL SMEAR: PRESENT
PLATELET # BLD AUTO: 355 THOUSANDS/UL (ref 149–390)
PLATELET BLD QL SMEAR: ADEQUATE
PMV BLD AUTO: 8.6 FL (ref 8.6–11.7)
POTASSIUM SERPL-SCNC: 4.4 MMOL/L (ref 3.5–5.5)
PROCALCITONIN SERPL-MCNC: 0.97 NG/ML
PROT SERPL-MCNC: 6.7 G/DL (ref 6.4–8.9)
RBC # BLD AUTO: 3.47 MILLION/UL (ref 3.9–5.2)
RBC MORPH BLD: ABNORMAL
SODIUM SERPL-SCNC: 139 MMOL/L (ref 134–143)
STOMATOCYTES BLD QL SMEAR: PRESENT
TOTAL CELLS COUNTED SPEC: 100
WBC # BLD AUTO: 11.8 THOUSAND/UL (ref 4.8–10.8)

## 2020-12-01 PROCEDURE — 85027 COMPLETE CBC AUTOMATED: CPT | Performed by: INTERNAL MEDICINE

## 2020-12-01 PROCEDURE — 99233 SBSQ HOSP IP/OBS HIGH 50: CPT | Performed by: NURSE PRACTITIONER

## 2020-12-01 PROCEDURE — 94760 N-INVAS EAR/PLS OXIMETRY 1: CPT

## 2020-12-01 PROCEDURE — 94003 VENT MGMT INPAT SUBQ DAY: CPT

## 2020-12-01 PROCEDURE — 85007 BL SMEAR W/DIFF WBC COUNT: CPT | Performed by: INTERNAL MEDICINE

## 2020-12-01 PROCEDURE — 94664 DEMO&/EVAL PT USE INHALER: CPT

## 2020-12-01 PROCEDURE — 80053 COMPREHEN METABOLIC PANEL: CPT | Performed by: INTERNAL MEDICINE

## 2020-12-01 PROCEDURE — 97163 PT EVAL HIGH COMPLEX 45 MIN: CPT

## 2020-12-01 PROCEDURE — 84145 PROCALCITONIN (PCT): CPT | Performed by: INTERNAL MEDICINE

## 2020-12-01 PROCEDURE — 99232 SBSQ HOSP IP/OBS MODERATE 35: CPT | Performed by: INTERNAL MEDICINE

## 2020-12-01 PROCEDURE — 99291 CRITICAL CARE FIRST HOUR: CPT | Performed by: ANESTHESIOLOGY

## 2020-12-01 PROCEDURE — 97167 OT EVAL HIGH COMPLEX 60 MIN: CPT

## 2020-12-01 RX ORDER — QUETIAPINE FUMARATE 25 MG/1
25 TABLET, FILM COATED ORAL
Status: DISCONTINUED | OUTPATIENT
Start: 2020-12-01 | End: 2020-12-04 | Stop reason: HOSPADM

## 2020-12-01 RX ORDER — FUROSEMIDE 10 MG/ML
40 INJECTION INTRAMUSCULAR; INTRAVENOUS
Status: DISCONTINUED | OUTPATIENT
Start: 2020-12-01 | End: 2020-12-02

## 2020-12-01 RX ORDER — PRAVASTATIN SODIUM 20 MG
20 TABLET ORAL
Status: DISCONTINUED | OUTPATIENT
Start: 2020-12-01 | End: 2020-12-04 | Stop reason: HOSPADM

## 2020-12-01 RX ADMIN — MECLIZINE 25 MG: 12.5 TABLET ORAL at 14:52

## 2020-12-01 RX ADMIN — DEXMEDETOMIDINE 0.7 MCG/KG/HR: 100 INJECTION, SOLUTION, CONCENTRATE INTRAVENOUS at 06:48

## 2020-12-01 RX ADMIN — MECLIZINE 25 MG: 12.5 TABLET ORAL at 21:44

## 2020-12-01 RX ADMIN — QUETIAPINE FUMARATE 25 MG: 25 TABLET ORAL at 21:45

## 2020-12-01 RX ADMIN — MELATONIN 6 MG: at 21:45

## 2020-12-01 RX ADMIN — MORPHINE SULFATE 2 MG: 2 INJECTION, SOLUTION INTRAMUSCULAR; INTRAVENOUS at 00:52

## 2020-12-01 RX ADMIN — METHYLPREDNISOLONE SODIUM SUCCINATE 40 MG: 40 INJECTION, POWDER, FOR SOLUTION INTRAMUSCULAR; INTRAVENOUS at 20:07

## 2020-12-01 RX ADMIN — APIXABAN 5 MG: 5 TABLET, FILM COATED ORAL at 09:46

## 2020-12-01 RX ADMIN — GABAPENTIN 100 MG: 100 CAPSULE ORAL at 21:45

## 2020-12-01 RX ADMIN — NORTRIPTYLINE HYDROCHLORIDE 50 MG: 25 CAPSULE ORAL at 09:46

## 2020-12-01 RX ADMIN — METHYLPREDNISOLONE SODIUM SUCCINATE 40 MG: 40 INJECTION, POWDER, FOR SOLUTION INTRAMUSCULAR; INTRAVENOUS at 09:46

## 2020-12-01 RX ADMIN — APIXABAN 5 MG: 5 TABLET, FILM COATED ORAL at 17:03

## 2020-12-01 RX ADMIN — DEXMEDETOMIDINE 0.7 MCG/KG/HR: 100 INJECTION, SOLUTION, CONCENTRATE INTRAVENOUS at 01:25

## 2020-12-01 RX ADMIN — GABAPENTIN 100 MG: 100 CAPSULE ORAL at 09:46

## 2020-12-01 RX ADMIN — PRAVASTATIN SODIUM 20 MG: 20 TABLET ORAL at 16:40

## 2020-12-01 RX ADMIN — ESCITALOPRAM OXALATE 10 MG: 10 TABLET ORAL at 09:46

## 2020-12-01 RX ADMIN — FUROSEMIDE 40 MG: 10 INJECTION, SOLUTION INTRAMUSCULAR; INTRAVENOUS at 05:43

## 2020-12-01 RX ADMIN — DEXMEDETOMIDINE 0.7 MCG/KG/HR: 100 INJECTION, SOLUTION, CONCENTRATE INTRAVENOUS at 11:36

## 2020-12-01 RX ADMIN — FUROSEMIDE 40 MG: 10 INJECTION, SOLUTION INTRAMUSCULAR; INTRAVENOUS at 16:40

## 2020-12-01 RX ADMIN — FAMOTIDINE 20 MG: 10 INJECTION, SOLUTION INTRAVENOUS at 09:46

## 2020-12-01 RX ADMIN — DEXMEDETOMIDINE 0.7 MCG/KG/HR: 100 INJECTION, SOLUTION, CONCENTRATE INTRAVENOUS at 22:19

## 2020-12-01 RX ADMIN — GABAPENTIN 100 MG: 100 CAPSULE ORAL at 16:40

## 2020-12-02 ENCOUNTER — APPOINTMENT (INPATIENT)
Dept: RADIOLOGY | Facility: HOSPITAL | Age: 75
DRG: 193 | End: 2020-12-02
Payer: MEDICARE

## 2020-12-02 ENCOUNTER — APPOINTMENT (INPATIENT)
Dept: NON INVASIVE DIAGNOSTICS | Facility: HOSPITAL | Age: 75
DRG: 193 | End: 2020-12-02
Payer: MEDICARE

## 2020-12-02 LAB
ANION GAP SERPL CALCULATED.3IONS-SCNC: 12 MMOL/L (ref 4–13)
ANISOCYTOSIS BLD QL SMEAR: PRESENT
BACTERIA BLD CULT: NORMAL
BACTERIA BLD CULT: NORMAL
BUN SERPL-MCNC: 67 MG/DL (ref 7–25)
C-ANCA TITR SER IF: ABNORMAL TITER
CALCIUM SERPL-MCNC: 8.5 MG/DL (ref 8.6–10.5)
CHLORIDE SERPL-SCNC: 100 MMOL/L (ref 98–107)
CO2 SERPL-SCNC: 27 MMOL/L (ref 21–31)
CREAT SERPL-MCNC: 1.58 MG/DL (ref 0.6–1.2)
ERYTHROCYTE [DISTWIDTH] IN BLOOD BY AUTOMATED COUNT: 21.6 % (ref 11.5–14.5)
GFR SERPL CREATININE-BSD FRML MDRD: 32 ML/MIN/1.73SQ M
GLUCOSE SERPL-MCNC: 146 MG/DL (ref 65–99)
HCT VFR BLD AUTO: 26.1 % (ref 42–47)
HGB BLD-MCNC: 8 G/DL (ref 12–16)
HYPERCHROMIA BLD QL SMEAR: PRESENT
LYMPHOCYTES # BLD AUTO: 0.45 THOUSAND/UL (ref 0.6–4.47)
LYMPHOCYTES # BLD AUTO: 4 % (ref 20–51)
MCH RBC QN AUTO: 22.1 PG (ref 26–34)
MCHC RBC AUTO-ENTMCNC: 30.5 G/DL (ref 31–37)
MCV RBC AUTO: 73 FL (ref 81–99)
MONOCYTES # BLD AUTO: 0.45 THOUSAND/UL (ref 0–1.22)
MONOCYTES NFR BLD AUTO: 4 % (ref 4–12)
MYELOPEROXIDASE AB SER IA-ACNC: 13.2 U/ML (ref 0–9)
NEUTS SEG # BLD: 10.4 THOUSAND/UL (ref 1.81–6.82)
NEUTS SEG NFR BLD AUTO: 92 % (ref 42–75)
OVALOCYTES BLD QL SMEAR: PRESENT
P-ANCA ATYPICAL TITR SER IF: ABNORMAL TITER
P-ANCA TITR SER IF: ABNORMAL TITER
PLATELET # BLD AUTO: 335 THOUSANDS/UL (ref 149–390)
PLATELET BLD QL SMEAR: ADEQUATE
PMV BLD AUTO: 8.6 FL (ref 8.6–11.7)
POTASSIUM SERPL-SCNC: 4.3 MMOL/L (ref 3.5–5.5)
PROCALCITONIN SERPL-MCNC: 0.81 NG/ML
PROTEINASE3 AB SER IA-ACNC: 6.2 U/ML (ref 0–3.5)
RBC # BLD AUTO: 3.6 MILLION/UL (ref 3.9–5.2)
RBC MORPH BLD: ABNORMAL
SODIUM SERPL-SCNC: 139 MMOL/L (ref 134–143)
STOMATOCYTES BLD QL SMEAR: PRESENT
TOTAL CELLS COUNTED SPEC: 100
WBC # BLD AUTO: 11.3 THOUSAND/UL (ref 4.8–10.8)

## 2020-12-02 PROCEDURE — 85007 BL SMEAR W/DIFF WBC COUNT: CPT | Performed by: INTERNAL MEDICINE

## 2020-12-02 PROCEDURE — 87081 CULTURE SCREEN ONLY: CPT | Performed by: ANESTHESIOLOGY

## 2020-12-02 PROCEDURE — 86769 SARS-COV-2 COVID-19 ANTIBODY: CPT | Performed by: INTERNAL MEDICINE

## 2020-12-02 PROCEDURE — 71045 X-RAY EXAM CHEST 1 VIEW: CPT

## 2020-12-02 PROCEDURE — 94003 VENT MGMT INPAT SUBQ DAY: CPT

## 2020-12-02 PROCEDURE — 97530 THERAPEUTIC ACTIVITIES: CPT

## 2020-12-02 PROCEDURE — 94664 DEMO&/EVAL PT USE INHALER: CPT

## 2020-12-02 PROCEDURE — 84145 PROCALCITONIN (PCT): CPT | Performed by: INTERNAL MEDICINE

## 2020-12-02 PROCEDURE — 97112 NEUROMUSCULAR REEDUCATION: CPT

## 2020-12-02 PROCEDURE — 85027 COMPLETE CBC AUTOMATED: CPT | Performed by: INTERNAL MEDICINE

## 2020-12-02 PROCEDURE — 99291 CRITICAL CARE FIRST HOUR: CPT | Performed by: ANESTHESIOLOGY

## 2020-12-02 PROCEDURE — 99232 SBSQ HOSP IP/OBS MODERATE 35: CPT | Performed by: INTERNAL MEDICINE

## 2020-12-02 PROCEDURE — 94760 N-INVAS EAR/PLS OXIMETRY 1: CPT

## 2020-12-02 PROCEDURE — 80048 BASIC METABOLIC PNL TOTAL CA: CPT | Performed by: INTERNAL MEDICINE

## 2020-12-02 PROCEDURE — 97110 THERAPEUTIC EXERCISES: CPT

## 2020-12-02 PROCEDURE — 83520 IMMUNOASSAY QUANT NOS NONAB: CPT | Performed by: INTERNAL MEDICINE

## 2020-12-02 PROCEDURE — 97535 SELF CARE MNGMENT TRAINING: CPT

## 2020-12-02 RX ORDER — FUROSEMIDE 10 MG/ML
80 INJECTION INTRAMUSCULAR; INTRAVENOUS
Status: DISCONTINUED | OUTPATIENT
Start: 2020-12-02 | End: 2020-12-03

## 2020-12-02 RX ORDER — CEFEPIME HYDROCHLORIDE 2 G/50ML
2000 INJECTION, SOLUTION INTRAVENOUS EVERY 12 HOURS
Status: DISCONTINUED | OUTPATIENT
Start: 2020-12-02 | End: 2020-12-03

## 2020-12-02 RX ADMIN — GABAPENTIN 100 MG: 100 CAPSULE ORAL at 08:20

## 2020-12-02 RX ADMIN — DEXMEDETOMIDINE 0.7 MCG/KG/HR: 100 INJECTION, SOLUTION, CONCENTRATE INTRAVENOUS at 08:22

## 2020-12-02 RX ADMIN — VANCOMYCIN HYDROCHLORIDE 750 MG: 750 INJECTION, SOLUTION INTRAVENOUS at 09:51

## 2020-12-02 RX ADMIN — SODIUM CHLORIDE 1000 MG: 0.9 INJECTION, SOLUTION INTRAVENOUS at 21:11

## 2020-12-02 RX ADMIN — DILTIAZEM HYDROCHLORIDE 240 MG: 240 CAPSULE, COATED, EXTENDED RELEASE ORAL at 08:21

## 2020-12-02 RX ADMIN — LEVOTHYROXINE SODIUM 88 MCG: 88 TABLET ORAL at 05:14

## 2020-12-02 RX ADMIN — FUROSEMIDE 40 MG: 10 INJECTION, SOLUTION INTRAMUSCULAR; INTRAVENOUS at 08:21

## 2020-12-02 RX ADMIN — MECLIZINE 25 MG: 12.5 TABLET ORAL at 21:46

## 2020-12-02 RX ADMIN — GABAPENTIN 100 MG: 100 CAPSULE ORAL at 21:48

## 2020-12-02 RX ADMIN — QUETIAPINE FUMARATE 25 MG: 25 TABLET ORAL at 21:46

## 2020-12-02 RX ADMIN — MECLIZINE 25 MG: 12.5 TABLET ORAL at 05:14

## 2020-12-02 RX ADMIN — DEXMEDETOMIDINE 0.7 MCG/KG/HR: 100 INJECTION, SOLUTION, CONCENTRATE INTRAVENOUS at 03:11

## 2020-12-02 RX ADMIN — MELATONIN 6 MG: at 21:46

## 2020-12-02 RX ADMIN — FAMOTIDINE 20 MG: 10 INJECTION, SOLUTION INTRAVENOUS at 08:21

## 2020-12-02 RX ADMIN — VANCOMYCIN HYDROCHLORIDE 750 MG: 750 INJECTION, SOLUTION INTRAVENOUS at 21:56

## 2020-12-02 RX ADMIN — NORTRIPTYLINE HYDROCHLORIDE 50 MG: 25 CAPSULE ORAL at 08:20

## 2020-12-02 RX ADMIN — ESCITALOPRAM OXALATE 10 MG: 10 TABLET ORAL at 08:21

## 2020-12-02 RX ADMIN — APIXABAN 5 MG: 5 TABLET, FILM COATED ORAL at 08:21

## 2020-12-02 RX ADMIN — CEFEPIME HYDROCHLORIDE 2000 MG: 2 INJECTION, SOLUTION INTRAVENOUS at 21:45

## 2020-12-02 RX ADMIN — PRAVASTATIN SODIUM 20 MG: 20 TABLET ORAL at 16:52

## 2020-12-02 RX ADMIN — METOPROLOL TARTRATE 100 MG: 50 TABLET, FILM COATED ORAL at 08:21

## 2020-12-02 RX ADMIN — METHYLPREDNISOLONE SODIUM SUCCINATE 40 MG: 40 INJECTION, POWDER, FOR SOLUTION INTRAMUSCULAR; INTRAVENOUS at 08:20

## 2020-12-02 RX ADMIN — APIXABAN 5 MG: 5 TABLET, FILM COATED ORAL at 17:09

## 2020-12-02 RX ADMIN — CEFEPIME HYDROCHLORIDE 2000 MG: 2 INJECTION, SOLUTION INTRAVENOUS at 09:48

## 2020-12-02 RX ADMIN — DEXMEDETOMIDINE 0.4 MCG/KG/HR: 100 INJECTION, SOLUTION, CONCENTRATE INTRAVENOUS at 16:45

## 2020-12-02 RX ADMIN — GABAPENTIN 100 MG: 100 CAPSULE ORAL at 16:52

## 2020-12-03 ENCOUNTER — APPOINTMENT (INPATIENT)
Dept: NON INVASIVE DIAGNOSTICS | Facility: HOSPITAL | Age: 75
DRG: 193 | End: 2020-12-03
Payer: MEDICARE

## 2020-12-03 PROBLEM — Z71.89 GOALS OF CARE, COUNSELING/DISCUSSION: Status: ACTIVE | Noted: 2020-12-03

## 2020-12-03 LAB
ANION GAP SERPL CALCULATED.3IONS-SCNC: 15 MMOL/L (ref 4–13)
BUN SERPL-MCNC: 78 MG/DL (ref 7–25)
CALCIUM SERPL-MCNC: 8.2 MG/DL (ref 8.6–10.5)
CHLORIDE SERPL-SCNC: 98 MMOL/L (ref 98–107)
CO2 SERPL-SCNC: 22 MMOL/L (ref 21–31)
CREAT SERPL-MCNC: 1.65 MG/DL (ref 0.6–1.2)
ERYTHROCYTE [DISTWIDTH] IN BLOOD BY AUTOMATED COUNT: 21.2 % (ref 11.5–14.5)
GFR SERPL CREATININE-BSD FRML MDRD: 31 ML/MIN/1.73SQ M
GLUCOSE SERPL-MCNC: 187 MG/DL (ref 65–99)
HCT VFR BLD AUTO: 26.7 % (ref 42–47)
HGB BLD-MCNC: 8 G/DL (ref 12–16)
MCH RBC QN AUTO: 21.8 PG (ref 26–34)
MCHC RBC AUTO-ENTMCNC: 29.9 G/DL (ref 31–37)
MCV RBC AUTO: 73 FL (ref 81–99)
PLATELET # BLD AUTO: 392 THOUSANDS/UL (ref 149–390)
PMV BLD AUTO: 8.7 FL (ref 8.6–11.7)
POTASSIUM SERPL-SCNC: 3.9 MMOL/L (ref 3.5–5.5)
PROCALCITONIN SERPL-MCNC: 0.52 NG/ML
RBC # BLD AUTO: 3.65 MILLION/UL (ref 3.9–5.2)
SARS-COV-2 IGG+IGM SERPL QL IA: NORMAL
SODIUM SERPL-SCNC: 135 MMOL/L (ref 134–143)
WBC # BLD AUTO: 12.2 THOUSAND/UL (ref 4.8–10.8)

## 2020-12-03 PROCEDURE — 80048 BASIC METABOLIC PNL TOTAL CA: CPT | Performed by: INTERNAL MEDICINE

## 2020-12-03 PROCEDURE — 84145 PROCALCITONIN (PCT): CPT | Performed by: INTERNAL MEDICINE

## 2020-12-03 PROCEDURE — 93306 TTE W/DOPPLER COMPLETE: CPT

## 2020-12-03 PROCEDURE — 97530 THERAPEUTIC ACTIVITIES: CPT

## 2020-12-03 PROCEDURE — 99232 SBSQ HOSP IP/OBS MODERATE 35: CPT | Performed by: INTERNAL MEDICINE

## 2020-12-03 PROCEDURE — 94760 N-INVAS EAR/PLS OXIMETRY 1: CPT

## 2020-12-03 PROCEDURE — 99291 CRITICAL CARE FIRST HOUR: CPT | Performed by: ANESTHESIOLOGY

## 2020-12-03 PROCEDURE — 94003 VENT MGMT INPAT SUBQ DAY: CPT

## 2020-12-03 PROCEDURE — 85027 COMPLETE CBC AUTOMATED: CPT | Performed by: INTERNAL MEDICINE

## 2020-12-03 PROCEDURE — 93306 TTE W/DOPPLER COMPLETE: CPT | Performed by: INTERNAL MEDICINE

## 2020-12-03 RX ORDER — FUROSEMIDE 10 MG/ML
10 SYRINGE (ML) INJECTION CONTINUOUS
Status: DISCONTINUED | OUTPATIENT
Start: 2020-12-03 | End: 2020-12-03

## 2020-12-03 RX ORDER — FUROSEMIDE 10 MG/ML
80 INJECTION INTRAMUSCULAR; INTRAVENOUS ONCE
Status: DISCONTINUED | OUTPATIENT
Start: 2020-12-03 | End: 2020-12-04 | Stop reason: HOSPADM

## 2020-12-03 RX ORDER — LORAZEPAM 2 MG/ML
1 INJECTION INTRAMUSCULAR EVERY 4 HOURS PRN
Status: DISCONTINUED | OUTPATIENT
Start: 2020-12-03 | End: 2020-12-04 | Stop reason: HOSPADM

## 2020-12-03 RX ADMIN — QUETIAPINE FUMARATE 25 MG: 25 TABLET ORAL at 23:44

## 2020-12-03 RX ADMIN — GABAPENTIN 100 MG: 100 CAPSULE ORAL at 08:03

## 2020-12-03 RX ADMIN — APIXABAN 5 MG: 5 TABLET, FILM COATED ORAL at 08:03

## 2020-12-03 RX ADMIN — LORAZEPAM 1 MG: 2 INJECTION INTRAMUSCULAR; INTRAVENOUS at 22:54

## 2020-12-03 RX ADMIN — ESCITALOPRAM OXALATE 10 MG: 10 TABLET ORAL at 08:03

## 2020-12-03 RX ADMIN — FUROSEMIDE 80 MG: 10 INJECTION, SOLUTION INTRAMUSCULAR; INTRAVENOUS at 08:01

## 2020-12-03 RX ADMIN — NORTRIPTYLINE HYDROCHLORIDE 50 MG: 25 CAPSULE ORAL at 08:01

## 2020-12-03 RX ADMIN — METOPROLOL TARTRATE 100 MG: 50 TABLET, FILM COATED ORAL at 08:02

## 2020-12-03 RX ADMIN — VANCOMYCIN HYDROCHLORIDE 750 MG: 750 INJECTION, SOLUTION INTRAVENOUS at 09:02

## 2020-12-03 RX ADMIN — MELATONIN 6 MG: at 23:35

## 2020-12-03 RX ADMIN — MECLIZINE 25 MG: 12.5 TABLET ORAL at 05:48

## 2020-12-03 RX ADMIN — DILTIAZEM HYDROCHLORIDE 240 MG: 240 CAPSULE, COATED, EXTENDED RELEASE ORAL at 08:02

## 2020-12-03 RX ADMIN — MORPHINE SULFATE 2 MG: 2 INJECTION, SOLUTION INTRAMUSCULAR; INTRAVENOUS at 23:44

## 2020-12-03 RX ADMIN — DEXMEDETOMIDINE 0.6 MCG/KG/HR: 100 INJECTION, SOLUTION, CONCENTRATE INTRAVENOUS at 09:02

## 2020-12-03 RX ADMIN — DEXMEDETOMIDINE 0.6 MCG/KG/HR: 100 INJECTION, SOLUTION, CONCENTRATE INTRAVENOUS at 02:30

## 2020-12-03 RX ADMIN — FAMOTIDINE 20 MG: 10 INJECTION, SOLUTION INTRAVENOUS at 08:03

## 2020-12-03 RX ADMIN — MORPHINE SULFATE 2 MG: 2 INJECTION, SOLUTION INTRAMUSCULAR; INTRAVENOUS at 03:53

## 2020-12-03 RX ADMIN — MORPHINE SULFATE 2 MG: 2 INJECTION, SOLUTION INTRAMUSCULAR; INTRAVENOUS at 15:41

## 2020-12-03 RX ADMIN — LEVOTHYROXINE SODIUM 88 MCG: 88 TABLET ORAL at 05:48

## 2020-12-03 RX ADMIN — CEFEPIME HYDROCHLORIDE 2000 MG: 2 INJECTION, SOLUTION INTRAVENOUS at 08:00

## 2020-12-03 RX ADMIN — DEXMEDETOMIDINE 0.6 MCG/KG/HR: 100 INJECTION, SOLUTION, CONCENTRATE INTRAVENOUS at 15:13

## 2020-12-04 VITALS
BODY MASS INDEX: 40.14 KG/M2 | RESPIRATION RATE: 24 BRPM | SYSTOLIC BLOOD PRESSURE: 138 MMHG | HEART RATE: 86 BPM | DIASTOLIC BLOOD PRESSURE: 68 MMHG | WEIGHT: 255.73 LBS | OXYGEN SATURATION: 63 % | TEMPERATURE: 96 F | HEIGHT: 67 IN

## 2020-12-04 LAB — MRSA NOSE QL CULT: NORMAL

## 2020-12-04 PROCEDURE — 94760 N-INVAS EAR/PLS OXIMETRY 1: CPT

## 2020-12-04 PROCEDURE — 99238 HOSP IP/OBS DSCHRG MGMT 30/<: CPT | Performed by: INTERNAL MEDICINE

## 2020-12-04 RX ADMIN — MORPHINE SULFATE 2 MG: 2 INJECTION, SOLUTION INTRAMUSCULAR; INTRAVENOUS at 06:09

## 2020-12-04 RX ADMIN — MORPHINE SULFATE 2 MG: 2 INJECTION, SOLUTION INTRAMUSCULAR; INTRAVENOUS at 03:04

## 2020-12-04 RX ADMIN — MORPHINE SULFATE 2 MG: 2 INJECTION, SOLUTION INTRAMUSCULAR; INTRAVENOUS at 01:02

## 2020-12-04 RX ADMIN — LORAZEPAM 1 MG: 2 INJECTION INTRAMUSCULAR; INTRAVENOUS at 09:51

## 2020-12-04 RX ADMIN — MORPHINE SULFATE 2 MG: 2 INJECTION, SOLUTION INTRAMUSCULAR; INTRAVENOUS at 08:48

## 2020-12-04 RX ADMIN — ONDANSETRON 4 MG: 2 INJECTION INTRAMUSCULAR; INTRAVENOUS at 12:45

## 2020-12-04 RX ADMIN — LORAZEPAM 1 MG: 2 INJECTION INTRAMUSCULAR; INTRAVENOUS at 12:45

## 2020-12-04 RX ADMIN — MORPHINE SULFATE 2 MG: 2 INJECTION, SOLUTION INTRAMUSCULAR; INTRAVENOUS at 12:45

## 2020-12-04 RX ADMIN — LORAZEPAM 1 MG: 2 INJECTION INTRAMUSCULAR; INTRAVENOUS at 03:04

## 2020-12-05 PROBLEM — R00.1 SYMPTOMATIC BRADYCARDIA: Status: RESOLVED | Noted: 2018-10-05 | Resolved: 2020-12-05

## 2020-12-05 PROBLEM — Z91.19 HISTORY OF NONCOMPLIANCE WITH MEDICAL TREATMENT: Status: RESOLVED | Noted: 2020-05-02 | Resolved: 2020-12-05

## 2020-12-05 PROBLEM — G89.29 CHRONIC BILATERAL LOW BACK PAIN WITHOUT SCIATICA: Status: RESOLVED | Noted: 2018-11-01 | Resolved: 2020-12-05

## 2020-12-05 PROBLEM — I34.0 MITRAL REGURGITATION: Status: RESOLVED | Noted: 2017-10-18 | Resolved: 2020-12-05

## 2020-12-05 PROBLEM — R55 POSTURAL DIZZINESS WITH PRESYNCOPE: Status: RESOLVED | Noted: 2018-10-05 | Resolved: 2020-12-05

## 2020-12-05 PROBLEM — H81.13 VERTIGO, BENIGN PAROXYSMAL, BILATERAL: Status: RESOLVED | Noted: 2020-08-02 | Resolved: 2020-12-05

## 2020-12-05 PROBLEM — Z98.890 HISTORY OF PRIOR ABLATION TREATMENT: Status: RESOLVED | Noted: 2020-08-02 | Resolved: 2020-12-05

## 2020-12-05 PROBLEM — M54.50 CHRONIC BILATERAL LOW BACK PAIN WITHOUT SCIATICA: Status: RESOLVED | Noted: 2018-11-01 | Resolved: 2020-12-05

## 2020-12-05 PROBLEM — E03.9 ACQUIRED HYPOTHYROIDISM: Status: RESOLVED | Noted: 2018-12-26 | Resolved: 2020-12-05

## 2020-12-05 PROBLEM — H53.8 BLURRED VISION, RIGHT EYE: Status: RESOLVED | Noted: 2020-05-02 | Resolved: 2020-12-05

## 2020-12-05 PROBLEM — R42 POSTURAL DIZZINESS WITH PRESYNCOPE: Status: RESOLVED | Noted: 2018-10-05 | Resolved: 2020-12-05

## 2020-12-05 PROBLEM — E87.6 ACUTE HYPOKALEMIA: Status: RESOLVED | Noted: 2020-08-31 | Resolved: 2020-12-05

## 2020-12-05 PROBLEM — M25.572 ACUTE LEFT ANKLE PAIN: Status: RESOLVED | Noted: 2020-08-31 | Resolved: 2020-12-05

## 2020-12-05 PROBLEM — R73.03 PREDIABETES: Status: RESOLVED | Noted: 2017-10-07 | Resolved: 2020-12-05

## 2020-12-05 PROBLEM — M10.372 ACUTE GOUT DUE TO RENAL IMPAIRMENT INVOLVING LEFT ANKLE: Status: RESOLVED | Noted: 2020-09-02 | Resolved: 2020-12-05

## 2020-12-05 PROBLEM — R07.9 CHEST PAIN IN ADULT: Status: RESOLVED | Noted: 2020-07-06 | Resolved: 2020-12-05

## 2020-12-05 PROBLEM — R79.89 ELEVATED LACTIC ACID LEVEL: Status: RESOLVED | Noted: 2020-07-06 | Resolved: 2020-12-05

## 2020-12-05 PROBLEM — K21.9 GASTROESOPHAGEAL REFLUX DISEASE WITHOUT ESOPHAGITIS: Status: RESOLVED | Noted: 2020-05-02 | Resolved: 2020-12-05

## 2020-12-05 PROBLEM — K25.0 ACUTE GASTRIC ULCER WITH HEMORRHAGE: Status: RESOLVED | Noted: 2019-12-05 | Resolved: 2020-12-05

## 2020-12-07 LAB — GBM AB SER IA-ACNC: 3 UNITS (ref 0–20)

## 2022-04-18 NOTE — DISCHARGE INSTR - AVS FIRST PAGE
Dear Marin Welch,     It was our pleasure to care for you here at [facility]  It is our hope that we were always able to exceed the expected standards for your care during your stay  You were hospitalized due to left ankle pain  You were cared for on the medical surgical  floor by ALEJANDRA Raymond with [facility] who covers for your primary care physician (PCP), Franky Rivera MD, while you were hospitalized  If you have any questions or concerns related to this hospitalization, you may contact us at [phone]  For follow up as well as any medication refills, we recommend that you follow up with your primary care physician  A registered nurse will reach out to you by phone within a few days after your discharge to answer any additional questions that you may have after going home    However, at this time we provide for you here, the most important instructions / recommendations at discharge:     · Notable Medication Adjustments -   · All of the following medications have been read ordered for you and will be at the pharmacy waiting to be picked up  · The following medications are once you have been taking:  · Torsemide (Demadex) 20 mg by mouth on Sunday, Tuesday, Thursday, Saturday; 40 mg by mouth on Monday, Wednesday, Friday  · Apixaban (Eliquis) 5 mg by mouth twice a day  · Escitalopram (Lexapro) 10 mg by mouth daily  · Gabapentin (Neurontin) 100 mg 3 times a day  · Lorazepam (Ativan) 1 mg by mouth up to 3 times a day as needed for anxiety  · Nortriptyline (Pamelor) 50 mg by mouth daily  · Percocet 1 tablet by mouth every 4 hours as needed for pain  · Amiodarone 200 mg by mouth daily  · Diltiazem (Cardizem) 240 mg by mouth daily  · Levothyroxine (Synthroid) 88 mcg by mouth daily  · Meclizine (Antivert) 25 mg by mouth as needed every 8 hours for dizziness  · Potassium 20 mEq by mouth twice a day  · Simvastatin (Zocor) 10 mg by mouth Pt's left floor via wheelchair with his daughter to visit his wife who is also hospitalized, daughter stated that they will be back shortly.   at night  · Multivitamin  · The following medications are new an are available at the pharmacy:  · Colchicine 0 6 mg by mouth daily  · Allopurinol 100 mg by mouth daily  · Testing Required after Discharge -   · In 1 week you need to have blood work  · CMP  · CBC  · Magnesium  · In 1 month you need to have blood work  · Uric acid  · Important follow up information -   · It is important that you have this blood work so that your family doctor can follow up on the medications that you take and to ensure that your electrolytes are balanced, and that your uric acid levels are improving with the medications you are taking  · Other Instructions -   · Please call when you get home and schedule appointments to see your family doctor and your cardiologist   · Please review this entire after visit summary as additional general instructions including medication list, appointments, activity, diet, any pertinent wound care, and other additional recommendations from your care team that may be provided for you        Sincerely,     ALEJANDRA Gomez

## 2022-07-12 NOTE — ED NOTES
Returned from CT scan reporting feeling better - no longer having any chest pain reporting it felt good to be in the cold air in CT scan     Jmaila Morales RN  07/06/20 4717 Doxycycline Counseling:  Patient counseled regarding possible photosensitivity and increased risk for sunburn.  Patient instructed to avoid sunlight, if possible.  When exposed to sunlight, patients should wear protective clothing, sunglasses, and sunscreen.  The patient was instructed to call the office immediately if the following severe adverse effects occur:  hearing changes, easy bruising/bleeding, severe headache, or vision changes.  The patient verbalized understanding of the proper use and possible adverse effects of doxycycline.  All of the patient's questions and concerns were addressed.

## 2022-10-21 NOTE — CONSULTS
Consult - Cardiology   Roosevelt Carpenter 72 y o  female MRN: [id number]  Unit/Bed#: E4 -01 Encounter: [id number]        Reason For Consult:  Dyspnea               Assessment and Plan:     1  Effort dyspnea:  Suspect some component of acute diastolic CHF though alternative etiologies should also be entertained (valvular heart disease or possible anginal equivalency)  2  Chronic and probably acute diastolic CHF  3  Valvular heart disease: Moderate AI, mild MR/mild MS, mild TR with estimated peak PAP-45mmHg (TTE 05/2018)  4  History of paroxysmal atrial fibrillation, S/p fib and typical flutter ablation - 1/2018:  Currently sinus rhythm  5  Hypertension:  Controlled  6  DANTE:  CPAP compliant  7  Hx CVA without sequela  8  CKD 3:  Baseline creatinine approximately 1 4 (currently 1 37)  9  Obesity:  Patient has recently refused referral to bariatric surgery    · Continue IV diuretic following response  If patient remains symptomatic thereafter would consider stress imaging exam/repeat echo  · Check standing weight daily  · For atrial fibrillation continue pre-hospital regimen of amiodarone, metoprolol, & Eliquis anticoagulation      History Of Present Illness: This woman is a 70-year-old patient of our practice with outpatient care by Dr Bárbara Burnett  In the aftermath of a previous CVA she had previous loop recorder discovery of atrial fibrillation in September 2017  She was subsequently treated with amiodarone and ultimately underwent ablation of fibrillation and typical flutter in January 2018  In November of the same year she was hospitalized with diastolic CHF  At that time because of bradycardia, while taking amiodarone + metoprolol, her metoprolol was reduced from 100 mg b i d  to 50mg b i d  Other cardiac problems include chronic diastolic CHF, moderate aortic insufficiency, hypertension, dyslipidemia, pulmonary hypertension (likely related to untreated DANTE) and first-degree AV block    Noncardiac problems are No highlighted by obesity, DANTE, diabetes, and prior CVA without sequela  This patient was last seen in our office on 1/2/2019  It was noted at that visit the patient in November had weight 276 lb  At that office visit her weight was recorded as 292 lb with a mix of increased total body weight due to excessive caloric intake as well as some component of fluid weight/volume overload  Interestingly, she did not report any dyspnea at that time  She was advised to restrict her sodium intake and double her dose of torsemide to 40 mg b i d  X3 days followed by resumption of 20 mg b i d  Ms  Robyn Arce reports having done as instructed reporting some increase in urinary output without change in edema or weight  Earlier this week the patient did begin to experience some new effort dyspnea  This seems to have shown some progression though she indicates there has been some inconsistency  She has had no rest symptoms  She also denies chest pain, perceived cardiac ectopy, wheeze, subjective fever or other new symptoms  She has been compliant with her medications  She reports her weight as having been rather stable at approximately 285 lb        Past Medical History:        Past Medical History:   Diagnosis Date    Acquired hypothyroidism 12/26/2018    Atrial fibrillation (HCC)     CHF (congestive heart failure) (HCC)     Hyperlipidemia     Hypertension     Migraine     Polyp of sigmoid colon     Prediabetes     Stroke [facility])     Past Surgical History:   Procedure Laterality Date    APPENDECTOMY      CARDIAC ELECTROPHYSIOLOGY STUDY AND ABLATION      CHOLECYSTECTOMY          Allergy:        No Known Allergies    Medications:       Prior to Admission medications    Medication Sig Start Date End Date Taking?  Authorizing Provider   amiodarone 200 mg tablet Take 1/2 tablet daily 12/18/18  Yes Linsey Rowley DO   clopidogrel (PLAVIX) 75 mg tablet TAKE ONE TABLET BY MOUTH ONCE DAILY 1/23/19  Yes Madonna Castellon MD ELIQUIS 5 MG TAKE ONE TABLET BY MOUTH TWO TIMES A DAY 1/29/19  Yes Elie King MD   escitalopram (LEXAPRO) 10 mg tablet Take 1 tablet (10 mg total) by mouth daily 11/16/18  Yes Elie King MD   gabapentin (NEURONTIN) 100 mg capsule Take 1 capsule (100 mg total) by mouth 3 (three) times a day 12/17/18  Yes Elie King MD   levothyroxine 50 mcg tablet Take 1 5 tablets (75 mcg total) by mouth daily 12/27/18  Yes Elie King MD   metoprolol tartrate (LOPRESSOR) 50 mg tablet Take 1 tablet (50 mg total) by mouth every 12 (twelve) hours 10/6/18  Yes Chris Peck MD   Multiple Vitamin (MULTIVITAMIN) tablet Take 1 tablet by mouth daily     Yes Historical Provider, MD   nortriptyline (PAMELOR) 50 mg capsule Take 1 capsule (50 mg total) by mouth daily 11/29/18  Yes Elie King MD   potassium chloride (K-DUR,KLOR-CON) 20 mEq tablet Take 1 tablet (20 mEq total) by mouth 2 (two) times a day 2/13/18  Yes Elie King MD   simvastatin (ZOCOR) 10 mg tablet Take 1 tablet (10 mg total) by mouth daily at bedtime 1/31/18  Yes Elie King MD   torsemide [facility]) 20 mg tablet Take 1 tablet (20 mg total) by mouth 2 (two) times a day 11/29/18  Yes Elie King MD   LORazepam (ATIVAN) 1 mg tablet Take 1 tablet (1 mg total) by mouth 3 (three) times a day as needed for anxiety 8/29/18   Elie King MD   meclizine (ANTIVERT) 25 mg tablet Take 25 mg by mouth 3 (three) times a day as needed for dizziness     Historical Provider, MD   oxyCODONE-acetaminophen (PERCOCET) 5-325 mg per tablet Take 1 tablet by mouth every 4 (four) hours as needed for moderate pain Max Daily Amount: 6 tablets 12/27/18   Elie King MD       Family History:     Family History   Problem Relation Age of Onset    Diabetes Mother     Heart disease Mother     Hypertension Mother     Arthritis Mother     Coronary artery disease Mother         Social History:       Social History     Social History    Marital status:      Spouse name: N/A    Number of children: N/A    Years of education: N/A     Social History Main Topics    Smoking status: Former Smoker     Quit date: 2/11/2011    Smokeless tobacco: Never Used    Alcohol use No    Drug use: No    Sexual activity: No     Other Topics Concern    None     Social History Narrative    fhx not asked in triage       ROS:  Symptoms per HPI  Compliant with CPAP  Remainder review of systems is negative    Exam:  General:  Alert, normally conversant, comfortable appearing  Visually appears to have BMI of obesity  Head: Normocephalic, atraumatic  Eyes:  EOMI  Pupils - equal, round, reactive to accomodation  No icterus  Normal Conjunctiva  Oropharynx:  Moist without lesion  Neck:  Robust with No gross bruit, thyromegaly, or lymphadenopathy  JVD hard to appreciate  Heart:  Regular with controlled rate  Somewhat distant with no rub nor pathologic murmur  Lungs: Moderate excursion air exchange  Clear without rales/rhonchi/wheeze  Abdomen:  Soft and nontender with normal bowel sounds  No organomegaly or mass  Lower Limbs:  Mild pitting edema  Pulses[de-identified]  RLE - DP:  2+                 LLE - DP:  2+  Musculoskeletal: Independent movement of limbs observed, Formal ROM and strength eval not performed  Neurologic:    Oriented to: person , place, situation  Cranial Nerves: grossly intact - vision, smell, taste, and hearing  were not tested  Motor function:grossly normal, symmetric   Sensation: Was not tested    DATA:      Telemetry:  Normal sinus rhythm without ectopy          Echocardiogram:         Transthoracic - 5/18/2018     SUMMARY     LEFT VENTRICLE:  Systolic function was normal  Ejection fraction was estimated to be 60 %  There were no regional wall motion abnormalities  Wall thickness was mildly increased  There was mild concentric hypertrophy    Doppler parameters were consistent with abnormal left ventricular relaxation (grade 1 diastolic dysfunction)      MITRAL VALVE:  There was very mild stenosis (mean transvalvular diastolic gradient ~ 4 mmHg at HR 62 BPM)  There was mild regurgitation      AORTIC VALVE:  There was moderate regurgitation      TRICUSPID VALVE:  There was mild regurgitation      PULMONARY ARTERIES:  Systolic pressure was mildly to moderately increased  Estimated peak pressure was 45 mmHg          Weights: Wt Readings from Last 3 Encounters:   02/01/19 131 kg (288 lb 12 8 oz)   01/02/19 133 kg (292 lb 9 6 oz)   12/26/18 132 kg (290 lb)   , Body mass index is 45 23 kg/m²           Lab Studies:      Results from last 7 days  Lab Units 01/31/19  2124 01/31/19  1805 01/31/19  1337   TROPONIN I ng/mL 0 02 0 02 <0 02            Results from last 7 days  Lab Units 02/01/19  0508 01/31/19  1337   WBC Thousand/uL 6 20 7 49   HEMOGLOBIN g/dL 10 7* 10 8*   HEMATOCRIT % 35 8 36 5   PLATELETS Thousands/uL 265 269   ,   Results from last 7 days  Lab Units 02/01/19  0508 01/31/19  1337   POTASSIUM mmol/L 4 0 3 9   CHLORIDE mmol/L 103 104   CO2 mmol/L 26 28   BUN mg/dL 20 14   CREATININE mg/dL 1 37* 1 39*   CALCIUM mg/dL 9 0 8 8   ALK PHOS U/L  --  128*   ALT U/L  --  56   AST U/L  --  48*

## 2024-06-14 NOTE — ASSESSMENT & PLAN NOTE
BRONCHOSPASM/BRONCHOCONSTRICTION     [x]         IMPROVE AERATION/BREATH SOUNDS  [x]   ADMINISTER BRONCHODILATOR THERAPY AS APPROPRIATE  [x]   ASSESS BREATH SOUNDS  [x]   IMPLEMENT AEROSOL/MDI PROTOCOL  [x]   PATIENT EDUCATION AS NEEDED     Resumed home regimen